# Patient Record
Sex: FEMALE | Race: WHITE | NOT HISPANIC OR LATINO | Employment: OTHER | URBAN - METROPOLITAN AREA
[De-identification: names, ages, dates, MRNs, and addresses within clinical notes are randomized per-mention and may not be internally consistent; named-entity substitution may affect disease eponyms.]

---

## 2017-02-13 ENCOUNTER — OFFICE VISIT (OUTPATIENT)
Dept: FAMILY MEDICINE CLINIC | Facility: CLINIC | Age: 77
End: 2017-02-13

## 2017-02-13 VITALS
HEIGHT: 65 IN | OXYGEN SATURATION: 97 % | HEART RATE: 59 BPM | WEIGHT: 210.8 LBS | SYSTOLIC BLOOD PRESSURE: 132 MMHG | DIASTOLIC BLOOD PRESSURE: 85 MMHG | RESPIRATION RATE: 16 BRPM | TEMPERATURE: 97.9 F | BODY MASS INDEX: 35.12 KG/M2

## 2017-02-13 DIAGNOSIS — J43.8 OTHER EMPHYSEMA (HCC): ICD-10-CM

## 2017-02-13 DIAGNOSIS — G47.00 INSOMNIA, UNSPECIFIED TYPE: ICD-10-CM

## 2017-02-13 DIAGNOSIS — B02.29 POST HERPETIC NEURALGIA: Primary | ICD-10-CM

## 2017-02-13 PROCEDURE — 99214 OFFICE O/P EST MOD 30 MIN: CPT | Performed by: FAMILY MEDICINE

## 2017-02-13 RX ORDER — ZOLPIDEM TARTRATE 5 MG/1
5 TABLET ORAL NIGHTLY PRN
Qty: 30 TABLET | Refills: 0 | Status: SHIPPED | OUTPATIENT
Start: 2017-02-13 | End: 2017-06-12 | Stop reason: SDUPTHER

## 2017-02-13 RX ORDER — PREGABALIN 75 MG/1
75 CAPSULE ORAL 2 TIMES DAILY
Qty: 28 CAPSULE | Refills: 0 | Status: SHIPPED | OUTPATIENT
Start: 2017-02-13 | End: 2017-03-02 | Stop reason: SDUPTHER

## 2017-02-13 NOTE — PROGRESS NOTES
Subjective   Luann Frances is a 76 y.o. female.     Chief Complaint   Patient presents with   • Establish Care       HPI     Pt recently diagnosed with facial shingles. Was given Valacyclovir and Hydrocodone. Doing okay. States the pain meds make her nauseous. Pain was getting better but has gotten worse. Located on R side of face.     Insomnia: Patient suffers occasionally from difficulty falling asleep.  She has a prescription that she has previously taken for Ambien 5 mg on a when necessary basis.  She states the last time she took this medication was about 10 days ago.  She uses maybe 1 pill every 2 weeks.  She is in need of a refill today.  No adverse side effects noted.  Taking and tolerating this medication irregularly and not very frequently.    COPD: Stable and well controlled with Advair and when necessary albuterol.  She states that she does not have any complications with this.  She does smoke 5 cigarettes a day.  She's in need of refills today.    The following portions of the patient's history were reviewed and updated as appropriate: allergies, current medications, past family history, past medical history, past social history, past surgical history and problem list.    Review of Systems   Eyes: Positive for pain, discharge and itching.   Skin: Positive for rash.   All other systems reviewed and are negative.      Objective  Vitals:    02/13/17 1028   BP: 132/85   Pulse: 59   Resp: 16   Temp: 97.9 °F (36.6 °C)   SpO2: 97%        Physical Exam   Constitutional: She is oriented to person, place, and time. She appears well-developed and well-nourished. No distress.   HENT:   Head: Normocephalic and atraumatic.   Right Ear: External ear normal.   Left Ear: External ear normal.   Nose: Nose normal.   Mouth/Throat: Oropharynx is clear and moist.   Eyes: Conjunctivae and EOM are normal. Pupils are equal, round, and reactive to light. Right eye exhibits no discharge. Left eye exhibits no discharge. No  scleral icterus.   Neck: Normal range of motion. Neck supple.   Cardiovascular: Normal rate, regular rhythm and normal heart sounds.  Exam reveals no friction rub.    No murmur heard.  Pulmonary/Chest: Effort normal and breath sounds normal. No respiratory distress. She has no wheezes. She has no rales.   Abdominal: Soft. Bowel sounds are normal. She exhibits no distension. There is no tenderness. There is no rebound and no guarding.   Lymphadenopathy:     She has no cervical adenopathy.   Neurological: She is alert and oriented to person, place, and time.   Skin: Skin is warm and dry. Lesion noted. She is not diaphoretic. There is erythema.        Nursing note and vitals reviewed.        Current Outpatient Prescriptions:   •  [employer] ANTACID & ANTI-GAS 1000-60 MG chewable tablet, , Disp: , Rfl:   •  docusate sodium (COLACE) 100 MG capsule, Take 1 capsule by mouth daily., Disp: , Rfl:   •  ergocalciferol (DRISDOL) 51292 UNITS capsule, Take 1 capsule by mouth 1 (One) Time Per Week., Disp: 13 capsule, Rfl: 3  •  fluticasone-salmeterol (ADVAIR) 100-50 MCG/DOSE DISKUS, Inhale 1 puff 2 (Two) Times a Day. Advair Diskus 100-50 MCG/DOSE Inhalation Aerosol Powder Breath Activated, 1 puff twice daily, Disp: 2 each, Rfl: 3  •  omeprazole OTC (PriLOSEC OTC) 20 MG EC tablet, Take 20 mg by mouth Daily. Take 1-2 tablet, Disp: , Rfl:   •  Probiotic Product (PROBIOTIC DAILY) capsule, Take 1 tablet by mouth daily., Disp: , Rfl:   •  Thyroid 130 MG PO tablet, Take 1 tablet by mouth Daily., Disp: 30 tablet, Rfl: 5  •  valACYclovir (VALTREX) 1000 MG tablet, One tab PO TID for 7 days, Disp: 21 tablet, Rfl: 0  •  zolpidem (AMBIEN) 5 MG tablet, Take 1 tablet by mouth At Night As Needed for sleep., Disp: 30 tablet, Rfl: 0  •  albuterol (PROAIR HFA) 108 (90 BASE) MCG/ACT inhaler, Inhale 2 puffs every 4 (four) hours as needed for wheezing., Disp: 18 g, Rfl: 11  •  ondansetron (ZOFRAN) 4 MG tablet, Take 1 tablet by mouth Every 8 (Eight) Hours As  Needed for nausea or vomiting. For nausea/vomiting, Disp: 30 tablet, Rfl: 0  •  pregabalin (LYRICA) 75 MG capsule, Take 1 capsule by mouth 2 (Two) Times a Day., Disp: 28 capsule, Rfl: 0    Procedures    Lab Results (most recent)     None          Assessment/Plan   Luann was seen today for establish care.    Diagnoses and all orders for this visit:    Post herpetic neuralgia  -     pregabalin (LYRICA) 75 MG capsule; Take 1 capsule by mouth 2 (Two) Times a Day.    Insomnia, unspecified type  -     zolpidem (AMBIEN) 5 MG tablet; Take 1 tablet by mouth At Night As Needed for sleep.    Other emphysema  -     fluticasone-salmeterol (ADVAIR) 100-50 MCG/DOSE DISKUS; Inhale 1 puff 2 (Two) Times a Day. Advair Diskus 100-50 MCG/DOSE Inhalation Aerosol Powder Breath Activated, 1 puff twice daily      I advised the patient to discontinue taking her hydrocodone.  We'll give a prescription for 2 weeks of Lyrica.  Discussed side effect profile.    Extensive conversation had with the patient regarding her use of Ambien.  I believe it's okay to give her a refill of this medication at this time and advised her to take it on a strictly when necessary basis.  I advised her that we may need to get discontinue this medication in the future due to side effect profile at her age.    Refill given for Advair as above.  Discussed smoking cessation.    Return in about 4 weeks (around 3/13/2017) for Recheck.      Sanjiv Small MD

## 2017-03-01 ENCOUNTER — RESULTS ENCOUNTER (OUTPATIENT)
Dept: ENDOCRINOLOGY | Age: 77
End: 2017-03-01

## 2017-03-01 ENCOUNTER — TELEPHONE (OUTPATIENT)
Dept: FAMILY MEDICINE CLINIC | Facility: CLINIC | Age: 77
End: 2017-03-01

## 2017-03-01 DIAGNOSIS — R63.5 WEIGHT GAIN: ICD-10-CM

## 2017-03-01 DIAGNOSIS — E78.5 DYSLIPIDEMIA: ICD-10-CM

## 2017-03-01 DIAGNOSIS — R68.89 COLD INTOLERANCE: ICD-10-CM

## 2017-03-01 DIAGNOSIS — E03.9 ACQUIRED HYPOTHYROIDISM: ICD-10-CM

## 2017-03-01 DIAGNOSIS — R53.82 CHRONIC FATIGUE: ICD-10-CM

## 2017-03-01 NOTE — TELEPHONE ENCOUNTER
Pt called and stated that she had shingles when she was seeing and given Lyrica for shingles, she said that she still has pain in her face been taken Tylenol it did not help, she said she will make a sooner Appt to be checked againA

## 2017-03-02 ENCOUNTER — OFFICE VISIT (OUTPATIENT)
Dept: FAMILY MEDICINE CLINIC | Facility: CLINIC | Age: 77
End: 2017-03-02

## 2017-03-02 VITALS
HEIGHT: 65 IN | DIASTOLIC BLOOD PRESSURE: 82 MMHG | TEMPERATURE: 98.1 F | OXYGEN SATURATION: 96 % | RESPIRATION RATE: 14 BRPM | WEIGHT: 213.2 LBS | HEART RATE: 67 BPM | SYSTOLIC BLOOD PRESSURE: 144 MMHG | BODY MASS INDEX: 35.52 KG/M2

## 2017-03-02 DIAGNOSIS — B02.29 POST HERPETIC NEURALGIA: ICD-10-CM

## 2017-03-02 PROCEDURE — 99214 OFFICE O/P EST MOD 30 MIN: CPT | Performed by: FAMILY MEDICINE

## 2017-03-02 RX ORDER — PREGABALIN 75 MG/1
75 CAPSULE ORAL 2 TIMES DAILY
Qty: 60 CAPSULE | Refills: 1 | Status: SHIPPED | OUTPATIENT
Start: 2017-03-02 | End: 2017-03-22 | Stop reason: SDUPTHER

## 2017-03-02 NOTE — PROGRESS NOTES
Subjective   Luann Frances is a 77 y.o. female.     Chief Complaint   Patient presents with   • Herpes Zoster     follow up face shingles. Pt still has pain and itchness in the face        HPI     Patient presents for follow-up with shingles.  At our last visit she had been diagnosed was receiving treatment for shingles on her right side of her face.  She was having some nerve pain which was most likely postherpetic neuralgia.  She was trialed on 2 weeks of Lyrica which she states greatly resolved her symptoms.  After finishing the trial she had an increase in pain.  No blurry vision, no headache, nausea, or vomiting.    The following portions of the patient's history were reviewed and updated as appropriate: allergies, current medications, past family history, past medical history, past social history, past surgical history and problem list.    Review of Systems   HENT: Positive for facial swelling (itchness and pain due to shingles).        Objective  Vitals:    03/02/17 1437   BP: 144/82   Pulse: 67   Resp: 14   Temp: 98.1 °F (36.7 °C)   SpO2: 96%        Physical Exam   Constitutional: She is oriented to person, place, and time. She appears well-developed and well-nourished. No distress.   HENT:   Head: Normocephalic and atraumatic.       Right Ear: External ear normal.   Left Ear: External ear normal.   Nose: Nose normal.   Mouth/Throat: Oropharynx is clear and moist.   Eyes: Conjunctivae and EOM are normal. Pupils are equal, round, and reactive to light. Right eye exhibits no discharge. Left eye exhibits no discharge. No scleral icterus.   Cardiovascular: Normal rate, regular rhythm and normal heart sounds.    Pulmonary/Chest: Effort normal and breath sounds normal. No respiratory distress. She has no wheezes. She has no rales.   Abdominal: Soft. Bowel sounds are normal. She exhibits no distension. There is no tenderness.   Neurological: She is alert and oriented to person, place, and time.   Skin: Skin is  warm and dry. She is not diaphoretic.   Nursing note and vitals reviewed.        Current Outpatient Prescriptions:   •  albuterol (PROAIR HFA) 108 (90 BASE) MCG/ACT inhaler, Inhale 2 puffs every 4 (four) hours as needed for wheezing., Disp: 18 g, Rfl: 11  •  [employer] ANTACID & ANTI-GAS 1000-60 MG chewable tablet, , Disp: , Rfl:   •  docusate sodium (COLACE) 100 MG capsule, Take 1 capsule by mouth daily., Disp: , Rfl:   •  ergocalciferol (DRISDOL) 17874 UNITS capsule, Take 1 capsule by mouth 1 (One) Time Per Week., Disp: 13 capsule, Rfl: 3  •  fluticasone-salmeterol (ADVAIR) 100-50 MCG/DOSE DISKUS, Inhale 1 puff 2 (Two) Times a Day. Advair Diskus 100-50 MCG/DOSE Inhalation Aerosol Powder Breath Activated, 1 puff twice daily, Disp: 2 each, Rfl: 3  •  omeprazole OTC (PriLOSEC OTC) 20 MG EC tablet, Take 20 mg by mouth Daily. Take 1-2 tablet, Disp: , Rfl:   •  ondansetron (ZOFRAN) 4 MG tablet, Take 1 tablet by mouth Every 8 (Eight) Hours As Needed for nausea or vomiting. For nausea/vomiting, Disp: 30 tablet, Rfl: 0  •  pregabalin (LYRICA) 75 MG capsule, Take 1 capsule by mouth 2 (Two) Times a Day., Disp: 60 capsule, Rfl: 1  •  Probiotic Product (PROBIOTIC DAILY) capsule, Take 1 tablet by mouth daily., Disp: , Rfl:   •  Thyroid 130 MG PO tablet, Take 1 tablet by mouth Daily., Disp: 30 tablet, Rfl: 5  •  valACYclovir (VALTREX) 1000 MG tablet, One tab PO TID for 7 days, Disp: 21 tablet, Rfl: 0  •  zolpidem (AMBIEN) 5 MG tablet, Take 1 tablet by mouth At Night As Needed for sleep., Disp: 30 tablet, Rfl: 0    Procedures    Lab Results (most recent)     None          Assessment/Plan   Luann was seen today for herpes zoster.    Diagnoses and all orders for this visit:    Post herpetic neuralgia  -     pregabalin (LYRICA) 75 MG capsule; Take 1 capsule by mouth 2 (Two) Times a Day.    Due to the patient's increase of posthepatic neuralgia symptoms Will give patient a one-month supply of Lyrica.  Discussed concerning signs and symptoms  and reasons for discontinuing the medication.  Patient will follow-up in 2 weeks.    Return in about 2 weeks (around 3/16/2017) for Recheck.      Sanjiv Small MD

## 2017-03-14 ENCOUNTER — OFFICE VISIT (OUTPATIENT)
Dept: FAMILY MEDICINE CLINIC | Facility: CLINIC | Age: 77
End: 2017-03-14

## 2017-03-14 VITALS
HEART RATE: 59 BPM | OXYGEN SATURATION: 96 % | TEMPERATURE: 98.4 F | WEIGHT: 221.5 LBS | SYSTOLIC BLOOD PRESSURE: 118 MMHG | HEIGHT: 65 IN | DIASTOLIC BLOOD PRESSURE: 80 MMHG | BODY MASS INDEX: 36.9 KG/M2 | RESPIRATION RATE: 16 BRPM

## 2017-03-14 DIAGNOSIS — B02.29 POST HERPETIC NEURALGIA: Primary | ICD-10-CM

## 2017-03-14 PROCEDURE — 99213 OFFICE O/P EST LOW 20 MIN: CPT | Performed by: FAMILY MEDICINE

## 2017-03-14 NOTE — PROGRESS NOTES
Subjective   Luann Frances is a 77 y.o. female.     Chief Complaint   Patient presents with   • Follow-up     Patient had shingles and wants to follow up on that still has pain.        HPI     Pt is suffering from post herpetic neuralgia. She was started on Lyrica about 4 weeks ago. Tolerating well but she states that she is having continued pain and irritation at night. She is wondering if she can take two pills at night. No vision problems. No further lesions. No fever or chills.    The following portions of the patient's history were reviewed and updated as appropriate: allergies, current medications, past family history, past medical history, past social history, past surgical history and problem list.    Review of Systems   Reason unable to perform ROS: shingles on face    All other systems reviewed and are negative.      Objective  Vitals:    03/14/17 1121   BP: 118/80   Pulse: 59   Resp: 16   Temp: 98.4 °F (36.9 °C)   SpO2: 96%        Physical Exam   Constitutional: She is oriented to person, place, and time. She appears well-developed and well-nourished. No distress.   HENT:   Head: Normocephalic and atraumatic.   Right Ear: External ear normal.   Left Ear: External ear normal.   Nose: Nose normal.   Mouth/Throat: Oropharynx is clear and moist.   Eyes: Conjunctivae and EOM are normal. Pupils are equal, round, and reactive to light. Right eye exhibits no discharge. Left eye exhibits no discharge. No scleral icterus.   Cardiovascular: Normal rate, regular rhythm and normal heart sounds.    Pulmonary/Chest: Effort normal and breath sounds normal. No respiratory distress. She has no wheezes. She has no rales.   Abdominal: Soft. Bowel sounds are normal. She exhibits no distension. There is no tenderness.   Neurological: She is alert and oriented to person, place, and time.   Skin: Skin is warm and dry. She is not diaphoretic.   Nursing note and vitals reviewed.        Current Outpatient Prescriptions:   •   albuterol (PROAIR HFA) 108 (90 BASE) MCG/ACT inhaler, Inhale 2 puffs every 4 (four) hours as needed for wheezing., Disp: 18 g, Rfl: 11  •  [employer] ANTACID & ANTI-GAS 1000-60 MG chewable tablet, , Disp: , Rfl:   •  docusate sodium (COLACE) 100 MG capsule, Take 1 capsule by mouth daily., Disp: , Rfl:   •  ergocalciferol (DRISDOL) 63417 UNITS capsule, Take 1 capsule by mouth 1 (One) Time Per Week., Disp: 13 capsule, Rfl: 3  •  fluticasone-salmeterol (ADVAIR) 100-50 MCG/DOSE DISKUS, Inhale 1 puff 2 (Two) Times a Day. Advair Diskus 100-50 MCG/DOSE Inhalation Aerosol Powder Breath Activated, 1 puff twice daily, Disp: 2 each, Rfl: 3  •  omeprazole OTC (PriLOSEC OTC) 20 MG EC tablet, Take 20 mg by mouth Daily. Take 1-2 tablet, Disp: , Rfl:   •  pregabalin (LYRICA) 75 MG capsule, Take 1 capsule by mouth 2 (Two) Times a Day., Disp: 60 capsule, Rfl: 1  •  Thyroid 130 MG PO tablet, Take 1 tablet by mouth Daily., Disp: 30 tablet, Rfl: 5  •  zolpidem (AMBIEN) 5 MG tablet, Take 1 tablet by mouth At Night As Needed for sleep., Disp: 30 tablet, Rfl: 0  •  ondansetron (ZOFRAN) 4 MG tablet, Take 1 tablet by mouth Every 8 (Eight) Hours As Needed for nausea or vomiting. For nausea/vomiting, Disp: 30 tablet, Rfl: 0  •  Probiotic Product (PROBIOTIC DAILY) capsule, Take 1 tablet by mouth daily., Disp: , Rfl:     Procedures    Lab Results (most recent)     None          Assessment/Plan   Luann was seen today for follow-up.    Diagnoses and all orders for this visit:    Post herpetic neuralgia    Patient is currently taking Lyrica 75 mg twice a day.  We will increase this to one pill in the morning and 2 pills at night.  Patient will try this for one week.  Advised patient on the titration process.  If one in the morning and 2 pills at night is not sufficient the patient will increase to 2 pills in the morning and 2 pills at night.  Advised that this is the maximum dose that she should be taking for this medication.  If symptoms are well  controlled at a new dosage will phone in prescription    Return in about 1 week (around 3/21/2017) for Recheck.      Sanjiv Small MD

## 2017-03-22 ENCOUNTER — TELEPHONE (OUTPATIENT)
Dept: FAMILY MEDICINE CLINIC | Facility: CLINIC | Age: 77
End: 2017-03-22

## 2017-03-22 DIAGNOSIS — B02.29 POST HERPETIC NEURALGIA: ICD-10-CM

## 2017-03-22 RX ORDER — PREGABALIN 75 MG/1
75 CAPSULE ORAL 2 TIMES DAILY
Qty: 60 CAPSULE | Refills: 0 | OUTPATIENT
Start: 2017-03-22 | End: 2017-04-19

## 2017-03-22 RX ORDER — PREGABALIN 75 MG/1
75 CAPSULE ORAL 2 TIMES DAILY
Qty: 60 CAPSULE | Refills: 3 | Status: SHIPPED | OUTPATIENT
Start: 2017-03-22 | End: 2017-03-22 | Stop reason: SDUPTHER

## 2017-03-22 NOTE — TELEPHONE ENCOUNTER
Pt called and said she is taking Lyrica 75 mg  For 8 days   one in the morning and two in the evening but she had mild headache for 4 days and 4 days she was fine. She will call next week to inform you about how she is doing after using it for 2 weeks.

## 2017-03-27 ENCOUNTER — LAB (OUTPATIENT)
Dept: ENDOCRINOLOGY | Age: 77
End: 2017-03-27

## 2017-03-27 DIAGNOSIS — G47.00 INSOMNIA, UNSPECIFIED TYPE: ICD-10-CM

## 2017-03-27 DIAGNOSIS — E78.5 DYSLIPIDEMIA: ICD-10-CM

## 2017-03-27 DIAGNOSIS — E55.9 UNSPECIFIED VITAMIN D DEFICIENCY: ICD-10-CM

## 2017-03-27 DIAGNOSIS — R53.82 CHRONIC FATIGUE: ICD-10-CM

## 2017-03-27 DIAGNOSIS — E55.9 UNSPECIFIED VITAMIN D DEFICIENCY: Primary | ICD-10-CM

## 2017-03-27 DIAGNOSIS — R68.89 COLD INTOLERANCE: ICD-10-CM

## 2017-03-27 DIAGNOSIS — E03.9 ACQUIRED HYPOTHYROIDISM: ICD-10-CM

## 2017-03-27 DIAGNOSIS — R63.5 WEIGHT GAIN: ICD-10-CM

## 2017-03-27 DIAGNOSIS — E03.9 ACQUIRED HYPOTHYROIDISM: Primary | ICD-10-CM

## 2017-03-28 LAB
25(OH)D3+25(OH)D2 SERPL-MCNC: 41.9 NG/ML (ref 30–100)
ALBUMIN SERPL-MCNC: 4 G/DL (ref 3.5–5.2)
ALBUMIN/GLOB SERPL: 1.3 G/DL
ALP SERPL-CCNC: 68 U/L (ref 39–117)
ALT SERPL-CCNC: 23 U/L (ref 1–33)
AST SERPL-CCNC: 19 U/L (ref 1–32)
BILIRUB SERPL-MCNC: 0.3 MG/DL (ref 0.1–1.2)
BUN SERPL-MCNC: 23 MG/DL (ref 8–23)
BUN/CREAT SERPL: 26.7 (ref 7–25)
CALCIUM SERPL-MCNC: 9.9 MG/DL (ref 8.6–10.5)
CHLORIDE SERPL-SCNC: 106 MMOL/L (ref 98–107)
CHOLEST SERPL-MCNC: 193 MG/DL (ref 0–200)
CO2 SERPL-SCNC: 22.9 MMOL/L (ref 22–29)
CREAT SERPL-MCNC: 0.86 MG/DL (ref 0.57–1)
FT4I SERPL CALC-MCNC: 1.9 (ref 1.2–4.9)
GLOBULIN SER CALC-MCNC: 3.1 GM/DL
GLUCOSE SERPL-MCNC: 98 MG/DL (ref 65–99)
HDLC SERPL-MCNC: 78 MG/DL (ref 40–60)
LDLC SERPL CALC-MCNC: 97 MG/DL (ref 0–100)
POTASSIUM SERPL-SCNC: 4.9 MMOL/L (ref 3.5–5.2)
PROT SERPL-MCNC: 7.1 G/DL (ref 6–8.5)
SODIUM SERPL-SCNC: 145 MMOL/L (ref 136–145)
T3FREE SERPL-MCNC: 3.4 PG/ML (ref 2–4.4)
T3RU NFR SERPL: 25 % (ref 24–39)
T4 FREE SERPL-MCNC: 0.95 NG/DL (ref 0.93–1.7)
T4 SERPL-MCNC: 7.4 UG/DL (ref 4.5–12)
TRIGL SERPL-MCNC: 88 MG/DL (ref 0–150)
TSH SERPL DL<=0.005 MIU/L-ACNC: 0.15 UIU/ML (ref 0.45–4.5)
VLDLC SERPL CALC-MCNC: 17.6 MG/DL (ref 5–40)

## 2017-04-10 ENCOUNTER — OFFICE VISIT (OUTPATIENT)
Dept: ENDOCRINOLOGY | Age: 77
End: 2017-04-10

## 2017-04-10 VITALS
BODY MASS INDEX: 36.49 KG/M2 | HEIGHT: 65 IN | DIASTOLIC BLOOD PRESSURE: 88 MMHG | WEIGHT: 219 LBS | SYSTOLIC BLOOD PRESSURE: 142 MMHG

## 2017-04-10 DIAGNOSIS — E03.9 ACQUIRED HYPOTHYROIDISM: Primary | ICD-10-CM

## 2017-04-10 DIAGNOSIS — E88.81 INSULIN RESISTANCE: ICD-10-CM

## 2017-04-10 DIAGNOSIS — R53.82 CHRONIC FATIGUE: ICD-10-CM

## 2017-04-10 DIAGNOSIS — E78.5 DYSLIPIDEMIA: ICD-10-CM

## 2017-04-10 PROBLEM — E88.819 INSULIN RESISTANCE: Status: ACTIVE | Noted: 2017-04-10

## 2017-04-10 PROCEDURE — 99214 OFFICE O/P EST MOD 30 MIN: CPT | Performed by: NURSE PRACTITIONER

## 2017-04-10 NOTE — PROGRESS NOTES
Subjective   Luann Frances is a 77 y.o. female is here today for follow-up primary hypothyroid    Hypothyroidism   This is a chronic problem. The current episode started more than 1 month ago. Associated symptoms include fatigue, headaches and joint swelling (ankles). Pertinent negatives include no coughing or rash. Nothing aggravates the symptoms. Treatments tried: medication. The treatment provided mild (symptoms are waxing and waning) relief.   Fatigue   This is a chronic problem. The current episode started more than 1 year ago. The problem occurs constantly. The problem has been waxing and waning. Associated symptoms include fatigue, headaches and joint swelling (ankles). Pertinent negatives include no coughing or rash. Nothing aggravates the symptoms. She has tried nothing for the symptoms. The treatment provided mild relief.   Obesity   This is a chronic (in the last year, has gained 40-50lbs) problem. The current episode started more than 1 year ago. The problem occurs constantly. The problem has been gradually improving. Associated symptoms include fatigue, headaches and joint swelling (ankles). Pertinent negatives include no coughing or rash. Nothing aggravates the symptoms. She has tried eating for the symptoms. The treatment provided no relief.       The following portions of the patient's history were reviewed and updated as appropriate: current medications, past family history, past medical history, past social history, past surgical history and problem list.    Patient Active Problem List    Diagnosis   • Insulin resistance [E88.81]   • Post herpetic neuralgia [B02.29]   • Weight gain [R63.5]   • Cold intolerance [R68.89]   • Dyslipidemia [E78.5]   • Elevated BP [I10]   • Acute sinusitis [J01.90]   • Atopic rhinitis [J30.9]   • Asthma [J45.909]   • Candidiasis [B37.9]   • Chronic obstructive pulmonary disease [J44.9]   • Cough [R05]   • Mild dehydration [E86.0]   • Diverticulitis of colon [K57.32]    • Gastroesophageal reflux disease with esophagitis [K21.0]   • Fatigue [R53.83]   • Hypothyroidism [E03.9]   • Insomnia [G47.00]   • Knee pain [M25.569]   • Pain of lower extremity [M79.606]   • Nausea [R11.0]   • Chronic obstructive pulmonary disease with acute exacerbation [J44.1]   • Shoulder pain [M25.519]   • Ventricular premature beats [I49.3]         Current Outpatient Prescriptions:   •  albuterol (PROAIR HFA) 108 (90 BASE) MCG/ACT inhaler, Inhale 2 puffs every 4 (four) hours as needed for wheezing., Disp: 18 g, Rfl: 11  •  [employer] ANTACID & ANTI-GAS 1000-60 MG chewable tablet, , Disp: , Rfl:   •  docusate sodium (COLACE) 100 MG capsule, Take 1 capsule by mouth daily., Disp: , Rfl:   •  ergocalciferol (DRISDOL) 69271 UNITS capsule, Take 1 capsule by mouth 1 (One) Time Per Week., Disp: 13 capsule, Rfl: 3  •  fluticasone-salmeterol (ADVAIR) 100-50 MCG/DOSE DISKUS, Inhale 1 puff 2 (Two) Times a Day. Advair Diskus 100-50 MCG/DOSE Inhalation Aerosol Powder Breath Activated, 1 puff twice daily, Disp: 2 each, Rfl: 3  •  omeprazole OTC (PriLOSEC OTC) 20 MG EC tablet, Take 20 mg by mouth Daily. Take 1-2 tablet, Disp: , Rfl:   •  ondansetron (ZOFRAN) 4 MG tablet, Take 1 tablet by mouth Every 8 (Eight) Hours As Needed for nausea or vomiting. For nausea/vomiting, Disp: 30 tablet, Rfl: 0  •  pregabalin (LYRICA) 75 MG capsule, Take 1 capsule by mouth 2 (Two) Times a Day., Disp: 60 capsule, Rfl: 0  •  Probiotic Product (PROBIOTIC DAILY) capsule, Take 1 tablet by mouth daily., Disp: , Rfl:   •  Thyroid 130 MG PO tablet, Take 1 tablet by mouth Daily., Disp: 30 tablet, Rfl: 5  •  zolpidem (AMBIEN) 5 MG tablet, Take 1 tablet by mouth At Night As Needed for sleep., Disp: 30 tablet, Rfl: 0       Review of Systems   Constitutional: Positive for fatigue and unexpected weight change.   HENT: Negative for trouble swallowing.    Eyes: Negative for visual disturbance.   Respiratory: Negative for cough.    Gastrointestinal: Negative for  constipation.   Endocrine: Positive for cold intolerance.   Genitourinary: Negative for difficulty urinating.   Musculoskeletal: Positive for joint swelling (ankles).   Skin: Negative for rash.   Allergic/Immunologic: Negative.    Neurological: Positive for headaches.   Hematological: Bruises/bleeds easily.   Psychiatric/Behavioral: Positive for sleep disturbance. The patient is not nervous/anxious.    All other systems reviewed and are negative.    Lab on 03/27/2017   Component Date Value Ref Range Status   • Glucose 03/27/2017 98  65 - 99 mg/dL Final   • BUN 03/27/2017 23  8 - 23 mg/dL Final   • Creatinine 03/27/2017 0.86  0.57 - 1.00 mg/dL Final   • eGFR Non  Am 03/27/2017 64  >60 mL/min/1.73 Final    Comment: The MDRD GFR formula is only valid for adults with stable  renal function between ages 18 and 70.     • eGFR  Am 03/27/2017 78  >60 mL/min/1.73 Final   • BUN/Creatinine Ratio 03/27/2017 26.7* 7.0 - 25.0 Final   • Sodium 03/27/2017 145  136 - 145 mmol/L Final   • Potassium 03/27/2017 4.9  3.5 - 5.2 mmol/L Final   • Chloride 03/27/2017 106  98 - 107 mmol/L Final   • Total CO2 03/27/2017 22.9  22.0 - 29.0 mmol/L Final   • Calcium 03/27/2017 9.9  8.6 - 10.5 mg/dL Final   • Total Protein 03/27/2017 7.1  6.0 - 8.5 g/dL Final   • Albumin 03/27/2017 4.00  3.50 - 5.20 g/dL Final   • Globulin 03/27/2017 3.1  gm/dL Final   • A/G Ratio 03/27/2017 1.3  g/dL Final   • Total Bilirubin 03/27/2017 0.3  0.1 - 1.2 mg/dL Final   • Alkaline Phosphatase 03/27/2017 68  39 - 117 U/L Final   • AST (SGOT) 03/27/2017 19  1 - 32 U/L Final   • ALT (SGPT) 03/27/2017 23  1 - 33 U/L Final   • Free T4 03/27/2017 0.95  0.93 - 1.70 ng/dL Final   • T3, Free 03/27/2017 3.4  2.0 - 4.4 pg/mL Final   • TSH 03/27/2017 0.147* 0.450 - 4.500 uIU/mL Final   • T4, Total 03/27/2017 7.4  4.5 - 12.0 ug/dL Final   • T3 Uptake 03/27/2017 25  24 - 39 % Final   • Free Thyroxine Index 03/27/2017 1.9  1.2 - 4.9 Final   • Total Cholesterol  03/27/2017 193  0 - 200 mg/dL Final   • Triglycerides 03/27/2017 88  0 - 150 mg/dL Final   • HDL Cholesterol 03/27/2017 78* 40 - 60 mg/dL Final   • VLDL Cholesterol 03/27/2017 17.6  5 - 40 mg/dL Final   • LDL Cholesterol  03/27/2017 97  0 - 100 mg/dL Final       Wt Readings from Last 3 Encounters:   04/10/17 219 lb (99.3 kg)   03/14/17 221 lb 8 oz (100 kg)   03/02/17 213 lb 3.2 oz (96.7 kg)     Temp Readings from Last 3 Encounters:   03/14/17 98.4 °F (36.9 °C) (Oral)   03/02/17 98.1 °F (36.7 °C) (Oral)   02/13/17 97.9 °F (36.6 °C) (Oral)     BP Readings from Last 3 Encounters:   04/10/17 142/88   03/14/17 118/80   03/02/17 144/82     Pulse Readings from Last 3 Encounters:   03/14/17 59   03/02/17 67   02/13/17 59       Objective   Physical Exam   Constitutional: She is oriented to person, place, and time. Vital signs are normal. She appears well-developed and well-nourished. She is sleeping, active and cooperative.   HENT:   Head: Atraumatic.   Eyes: EOM are normal.   Neck: Normal range of motion. Neck supple. No tracheal tenderness present. Carotid bruit is not present. No tracheal deviation present. No thyroid mass and no thyromegaly present.   Cardiovascular: Normal rate, regular rhythm, S1 normal, S2 normal and normal heart sounds.  Exam reveals no gallop.    No murmur heard.  Pulmonary/Chest: Effort normal and breath sounds normal. No accessory muscle usage. No respiratory distress.   Abdominal: Soft. Normal appearance and bowel sounds are normal. There is no tenderness.   Musculoskeletal: Normal range of motion.   Neurological: She is alert and oriented to person, place, and time. She has normal strength and normal reflexes. Gait normal.   Skin: Skin is warm, dry and intact. There is pallor.   Psychiatric: She has a normal mood and affect. Her speech is normal and behavior is normal. Judgment and thought content normal. Her mood appears not anxious. Her affect is not blunt. Cognition and memory are normal. She  does not exhibit a depressed mood. She is attentive.   Nursing note and vitals reviewed.        Assessment/Plan   Luann was seen today for follow-up.    Diagnoses and all orders for this visit:    Acquired hypothyroidism    Chronic fatigue  -     C-Peptide  -     Hemoglobin A1c  -     Insulin, Total    Dyslipidemia  -     C-Peptide  -     Hemoglobin A1c  -     Insulin, Total    Insulin resistance  -     C-Peptide  -     Hemoglobin A1c  -     Insulin, Total          Instructions today:  We will draw C-peptide insulin level today-if still elevated as was in December,  will put on Janumet 100/1000 in the morning only.    Return in about 7 months (around 11/10/2017). Appointment in August with Dr. Saleh-appointment in November with Josephine-1 week prior to each for labs

## 2017-04-11 LAB
C PEPTIDE SERPL-MCNC: 2.8 NG/ML (ref 1.1–4.4)
HBA1C MFR BLD: 5.53 % (ref 4.8–5.6)
INSULIN SERPL-ACNC: 7.3 UIU/ML (ref 2.6–24.9)

## 2017-04-14 ENCOUNTER — TELEPHONE (OUTPATIENT)
Dept: FAMILY MEDICINE CLINIC | Facility: CLINIC | Age: 77
End: 2017-04-14

## 2017-04-14 DIAGNOSIS — H26.493 BILATERAL POSTERIOR CAPSULAR OPACIFICATION: Primary | ICD-10-CM

## 2017-04-19 ENCOUNTER — OFFICE VISIT (OUTPATIENT)
Dept: FAMILY MEDICINE CLINIC | Facility: CLINIC | Age: 77
End: 2017-04-19

## 2017-04-19 VITALS
HEART RATE: 56 BPM | SYSTOLIC BLOOD PRESSURE: 112 MMHG | DIASTOLIC BLOOD PRESSURE: 70 MMHG | WEIGHT: 224.4 LBS | BODY MASS INDEX: 37.39 KG/M2 | OXYGEN SATURATION: 98 % | HEIGHT: 65 IN | TEMPERATURE: 97.8 F

## 2017-04-19 DIAGNOSIS — M54.41 ACUTE RIGHT-SIDED LOW BACK PAIN WITH RIGHT-SIDED SCIATICA: Primary | ICD-10-CM

## 2017-04-19 DIAGNOSIS — B02.29 POST HERPETIC NEURALGIA: ICD-10-CM

## 2017-04-19 PROCEDURE — 99214 OFFICE O/P EST MOD 30 MIN: CPT | Performed by: FAMILY MEDICINE

## 2017-04-19 PROCEDURE — 96372 THER/PROPH/DIAG INJ SC/IM: CPT | Performed by: FAMILY MEDICINE

## 2017-04-19 RX ORDER — METHYLPREDNISOLONE ACETATE 80 MG/ML
80 INJECTION, SUSPENSION INTRA-ARTICULAR; INTRALESIONAL; INTRAMUSCULAR; SOFT TISSUE ONCE
Status: COMPLETED | OUTPATIENT
Start: 2017-04-19 | End: 2017-04-19

## 2017-04-19 RX ORDER — PREGABALIN 75 MG/1
75 CAPSULE ORAL 2 TIMES DAILY
Qty: 60 CAPSULE | Refills: 6 | Status: SHIPPED | OUTPATIENT
Start: 2017-04-19 | End: 2017-04-24 | Stop reason: SDUPTHER

## 2017-04-19 RX ADMIN — METHYLPREDNISOLONE ACETATE 80 MG: 80 INJECTION, SUSPENSION INTRA-ARTICULAR; INTRALESIONAL; INTRAMUSCULAR; SOFT TISSUE at 12:15

## 2017-04-19 NOTE — PATIENT INSTRUCTIONS
Sciatica With Rehab  The sciatic nerve runs from the back down the leg and is responsible for sensation and control of the muscles in the back (posterior) side of the thigh, lower leg, and foot. Sciatica is a condition that is characterized by inflammation of this nerve.   SYMPTOMS   · Signs of nerve damage, including numbness and/or weakness along the posterior side of the lower extremity.  · Pain in the back of the thigh that may also travel down the leg.  · Pain that worsens when sitting for long periods of time.  · Occasionally, pain in the back or buttock.  CAUSES   Inflammation of the sciatic nerve is the cause of sciatica. The inflammation is due to something irritating the nerve. Common sources of irritation include:  · Sitting for long periods of time.  · Direct trauma to the nerve.  · Arthritis of the spine.  · Herniated or ruptured disk.  · Slipping of the vertebrae (spondylolisthesis).  · Pressure from soft tissues, such as muscles or ligament-like tissue (fascia).  RISK INCREASES WITH:  · Sports that place pressure or stress on the spine (football or weightlifting).  · Poor strength and flexibility.  · Failure to warm up properly before activity.  · Family history of low back pain or disk disorders.  · Previous back injury or surgery.  · Poor body mechanics, especially when lifting, or poor posture.  PREVENTION   · Warm up and stretch properly before activity.  · Maintain physical fitness:    Strength, flexibility, and endurance.  · Cardiovascular fitness.  · Learn and use proper technique, especially with posture and lifting. When possible, have  correct improper technique.  · Avoid activities that place stress on the spine.  PROGNOSIS  If treated properly, then sciatica usually resolves within 6 weeks. However, occasionally surgery is necessary.   RELATED COMPLICATIONS   · Permanent nerve damage, including pain, numbness, tingle, or weakness.  · Chronic back pain.  · Risks of surgery: infection,  bleeding, nerve damage, or damage to surrounding tissues.  TREATMENT  Treatment initially involves resting from any activities that aggravate your symptoms. The use of ice and medication may help reduce pain and inflammation. The use of strengthening and stretching exercises may help reduce pain with activity. These exercises may be performed at home or with referral to a therapist. A therapist may recommend further treatments, such as transcutaneous electronic nerve stimulation (TENS) or ultrasound. Your caregiver may recommend corticosteroid injections to help reduce inflammation of the sciatic nerve. If symptoms persist despite non-surgical (conservative) treatment, then surgery may be recommended.  MEDICATION  · If pain medication is necessary, then nonsteroidal anti-inflammatory medications, such as aspirin and ibuprofen, or other minor pain relievers, such as acetaminophen, are often recommended.  · Do not take pain medication for 7 days before surgery.  · Prescription pain relievers may be given if deemed necessary by your caregiver. Use only as directed and only as much as you need.  · Ointments applied to the skin may be helpful.  · Corticosteroid injections may be given by your caregiver. These injections should be reserved for the most serious cases, because they may only be given a certain number of times.  HEAT AND COLD  · Cold treatment (icing) relieves pain and reduces inflammation. Cold treatment should be applied for 10 to 15 minutes every 2 to 3 hours for inflammation and pain and immediately after any activity that aggravates your symptoms. Use ice packs or massage the area with a piece of ice (ice massage).  · Heat treatment may be used prior to performing the stretching and strengthening activities prescribed by your caregiver, physical therapist, or . Use a heat pack or soak the injury in warm water.  SEEK MEDICAL CARE IF:  · Treatment seems to offer no benefit, or the condition  worsens.  · Any medications produce adverse side effects.  EXERCISES   RANGE OF MOTION (ROM) AND STRETCHING EXERCISES - Sciatica  Most people with sciatic will find that their symptoms worsen with either excessive bending forward (flexion) or arching at the low back (extension). The exercises which will help resolve your symptoms will focus on the opposite motion. Your physician, physical therapist or  will help you determine which exercises will be most helpful to resolve your low back pain. Do not complete any exercises without first consulting with your clinician. Discontinue any exercises which worsen your symptoms until you speak to your clinician. If you have pain, numbness or tingling which travels down into your buttocks, leg or foot, the goal of the therapy is for these symptoms to move closer to your back and eventually resolve. Occasionally, these leg symptoms will get better, but your low back pain may worsen; this is typically an indication of progress in your rehabilitation. Be certain to be very alert to any changes in your symptoms and the activities in which you participated in the 24 hours prior to the change. Sharing this information with your clinician will allow him/her to most efficiently treat your condition.  These exercises may help you when beginning to rehabilitate your injury. Your symptoms may resolve with or without further involvement from your physician, physical therapist or . While completing these exercises, remember:   · Restoring tissue flexibility helps normal motion to return to the joints. This allows healthier, less painful movement and activity.  · An effective stretch should be held for at least 30 seconds.  · A stretch should never be painful. You should only feel a gentle lengthening or release in the stretched tissue.  FLEXION RANGE OF MOTION AND STRETCHING EXERCISES:  STRETCH - Flexion, Single Knee to Chest   · Lie on a firm bed or floor  with both legs extended in front of you.  · Keeping one leg in contact with the floor, bring your opposite knee to your chest. Hold your leg in place by either grabbing behind your thigh or at your knee.  · Pull until you feel a gentle stretch in your low back. Hold __________ seconds.  · Slowly release your grasp and repeat the exercise with the opposite side.  Repeat __________ times. Complete this exercise __________ times per day.   STRETCH - Flexion, Double Knee to Chest  · Lie on a firm bed or floor with both legs extended in front of you.  · Keeping one leg in contact with the floor, bring your opposite knee to your chest.  · Tense your stomach muscles to support your back and then lift your other knee to your chest. Hold your legs in place by either grabbing behind your thighs or at your knees.  · Pull both knees toward your chest until you feel a gentle stretch in your low back. Hold __________ seconds.  · Tense your stomach muscles and slowly return one leg at a time to the floor.  Repeat __________ times. Complete this exercise __________ times per day.   STRETCH - Low Trunk Rotation   · Lie on a firm bed or floor. Keeping your legs in front of you, bend your knees so they are both pointed toward the ceiling and your feet are flat on the floor.  · Extend your arms out to the side. This will stabilize your upper body by keeping your shoulders in contact with the floor.  · Gently and slowly drop both knees together to one side until you feel a gentle stretch in your low back. Hold for __________ seconds.  · Tense your stomach muscles to support your low back as you bring your knees back to the starting position. Repeat the exercise to the other side.  Repeat __________ times. Complete this exercise __________ times per day   EXTENSION RANGE OF MOTION AND FLEXIBILITY EXERCISES:  STRETCH - Extension, Prone on Elbows  · Lie on your stomach on the floor, a bed will be too soft. Place your palms about shoulder  "width apart and at the height of your head.  · Place your elbows under your shoulders. If this is too painful, stack pillows under your chest.  · Allow your body to relax so that your hips drop lower and make contact more completely with the floor.  · Hold this position for __________ seconds.  · Slowly return to lying flat on the floor.  Repeat __________ times. Complete this exercise __________ times per day.   RANGE OF MOTION - Extension, Prone Press Ups  · Lie on your stomach on the floor, a bed will be too soft. Place your palms about shoulder width apart and at the height of your head.  · Keeping your back as relaxed as possible, slowly straighten your elbows while keeping your hips on the floor. You may adjust the placement of your hands to maximize your comfort. As you gain motion, your hands will come more underneath your shoulders.  · Hold this position __________ seconds.  · Slowly return to lying flat on the floor.  Repeat __________ times. Complete this exercise __________ times per day.   STRENGTHENING EXERCISES - Sciatica   These exercises may help you when beginning to rehabilitate your injury. These exercises should be done near your \"sweet spot.\" This is the neutral, low-back arch, somewhere between fully rounded and fully arched, that is your least painful position. When performed in this safe range of motion, these exercises can be used for people who have either a flexion or extension based injury. These exercises may resolve your symptoms with or without further involvement from your physician, physical therapist or . While completing these exercises, remember:   · Muscles can gain both the endurance and the strength needed for everyday activities through controlled exercises.  · Complete these exercises as instructed by your physician, physical therapist or . Progress with the resistance and repetition exercises only as your caregiver advises.  · You may " experience muscle soreness or fatigue, but the pain or discomfort you are trying to eliminate should never worsen during these exercises. If this pain does worsen, stop and make certain you are following the directions exactly. If the pain is still present after adjustments, discontinue the exercise until you can discuss the trouble with your clinician.  STRENGTHENING - Deep Abdominals, Pelvic Tilt   · Lie on a firm bed or floor. Keeping your legs in front of you, bend your knees so they are both pointed toward the ceiling and your feet are flat on the floor.  · Tense your lower abdominal muscles to press your low back into the floor. This motion will rotate your pelvis so that your tail bone is scooping upwards rather than pointing at your feet or into the floor.  · With a gentle tension and even breathing, hold this position for __________ seconds.  Repeat __________ times. Complete this exercise __________ times per day.   STRENGTHENING - Abdominals, Crunches   · Lie on a firm bed or floor. Keeping your legs in front of you, bend your knees so they are both pointed toward the ceiling and your feet are flat on the floor. Cross your arms over your chest.  · Slightly tip your chin down without bending your neck.  · Tense your abdominals and slowly lift your trunk high enough to just clear your shoulder blades. Lifting higher can put excessive stress on the low back and does not further strengthen your abdominal muscles.  · Control your return to the starting position.  Repeat __________ times. Complete this exercise __________ times per day.   STRENGTHENING - Quadruped, Opposite UE/LE Lift  · Assume a hands and knees position on a firm surface. Keep your hands under your shoulders and your knees under your hips. You may place padding under your knees for comfort.  · Find your neutral spine and gently tense your abdominal muscles so that you can maintain this position. Your shoulders and hips should form a rectangle  that is parallel with the floor and is not twisted.  · Keeping your trunk steady, lift your right hand no higher than your shoulder and then your left leg no higher than your hip. Make sure you are not holding your breath. Hold this position __________ seconds.  · Continuing to keep your abdominal muscles tense and your back steady, slowly return to your starting position. Repeat with the opposite arm and leg.  Repeat __________ times. Complete this exercise __________ times per day.   STRENGTHENING - Abdominals and Quadriceps, Straight Leg Raise   · Lie on a firm bed or floor with both legs extended in front of you.  · Keeping one leg in contact with the floor, bend the other knee so that your foot can rest flat on the floor.  · Find your neutral spine, and tense your abdominal muscles to maintain your spinal position throughout the exercise.  · Slowly lift your straight leg off the floor about 6 inches for a count of 15, making sure to not hold your breath.  · Still keeping your neutral spine, slowly lower your leg all the way to the floor.  Repeat this exercise with each leg __________ times. Complete this exercise __________ times per day.  POSTURE AND BODY MECHANICS CONSIDERATIONS - Sciatica  Keeping correct posture when sitting, standing or completing your activities will reduce the stress put on different body tissues, allowing injured tissues a chance to heal and limiting painful experiences. The following are general guidelines for improved posture. Your physician or physical therapist will provide you with any instructions specific to your needs. While reading these guidelines, remember:  · The exercises prescribed by your provider will help you have the flexibility and strength to maintain correct postures.  · The correct posture provides the optimal environment for your joints to work. All of your joints have less wear and tear when properly supported by a spine with good posture. This means you will  experience a healthier, less painful body.  · Correct posture must be practiced with all of your activities, especially prolonged sitting and standing. Correct posture is as important when doing repetitive low-stress activities (typing) as it is when doing a single heavy-load activity (lifting).  RESTING POSITIONS  Consider which positions are most painful for you when choosing a resting position. If you have pain with flexion-based activities (sitting, bending, stooping, squatting), choose a position that allows you to rest in a less flexed posture. You would want to avoid curling into a fetal position on your side. If your pain worsens with extension-based activities (prolonged standing, working overhead), avoid resting in an extended position such as sleeping on your stomach. Most people will find more comfort when they rest with their spine in a more neutral position, neither too rounded nor too arched. Lying on a non-sagging bed on your side with a pillow between your knees, or on your back with a pillow under your knees will often provide some relief. Keep in mind, being in any one position for a prolonged period of time, no matter how correct your posture, can still lead to stiffness.  PROPER SITTING POSTURE  In order to minimize stress and discomfort on your spine, you must sit with correct posture Sitting with good posture should be effortless for a healthy body. Returning to good posture is a gradual process. Many people can work toward this most comfortably by using various supports until they have the flexibility and strength to maintain this posture on their own.  When sitting with proper posture, your ears will fall over your shoulders and your shoulders will fall over your hips. You should use the back of the chair to support your upper back. Your low back will be in a neutral position, just slightly arched. You may place a small pillow or folded towel at the base of your low back for support.   When  working at a desk, create an environment that supports good, upright posture. Without extra support, muscles fatigue and lead to excessive strain on joints and other tissues. Keep these recommendations in mind:  CHAIR:   · A chair should be able to slide under your desk when your back makes contact with the back of the chair. This allows you to work closely.  · The chair's height should allow your eyes to be level with the upper part of your monitor and your hands to be slightly lower than your elbows.  BODY POSITION  · Your feet should make contact with the floor. If this is not possible, use a foot rest.  · Keep your ears over your shoulders. This will reduce stress on your neck and low back.  INCORRECT SITTING POSTURES   · If you are feeling tired and unable to assume a healthy sitting posture, do not slouch or slump. This puts excessive strain on your back tissues, causing more damage and pain. Healthier options include:  · Using more support, like a lumbar pillow.  · Switching tasks to something that requires you to be upright or walking.  · Talking a brief walk.  · Lying down to rest in a neutral-spine position.  PROLONGED STANDING WHILE SLIGHTLY LEANING FORWARD   When completing a task that requires you to lean forward while standing in one place for a long time, place either foot up on a stationary 2-4 inch high object to help maintain the best posture. When both feet are on the ground, the low back tends to lose its slight inward curve. If this curve flattens (or becomes too large), then the back and your other joints will experience too much stress, fatigue more quickly and can cause pain.   CORRECT STANDING POSTURES  Proper standing posture should be assumed with all daily activities, even if they only take a few moments, like when brushing your teeth. As in sitting, your ears should fall over your shoulders and your shoulders should fall over your hips. You should keep a slight tension in your abdominal  muscles to brace your spine. Your tailbone should point down to the ground, not behind your body, resulting in an over-extended swayback posture.   INCORRECT STANDING POSTURES   Common incorrect standing postures include a forward head, locked knees and/or an excessive swayback.  WALKING  Walk with an upright posture. Your ears, shoulders and hips should all line-up.  PROLONGED ACTIVITY IN A FLEXED POSITION  When completing a task that requires you to bend forward at your waist or lean over a low surface, try to find a way to stabilize 3 of 4 of your limbs. You can place a hand or elbow on your thigh or rest a knee on the surface you are reaching across. This will provide you more stability so that your muscles do not fatigue as quickly. By keeping your knees relaxed, or slightly bent, you will also reduce stress across your low back.  CORRECT LIFTING TECHNIQUES  DO :   · Assume a wide stance. This will provide you more stability and the opportunity to get as close as possible to the object which you are lifting.  · Tense your abdominals to brace your spine; then bend at the knees and hips. Keeping your back locked in a neutral-spine position, lift using your leg muscles. Lift with your legs, keeping your back straight.  · Test the weight of unknown objects before attempting to lift them.  · Try to keep your elbows locked down at your sides in order get the best strength from your shoulders when carrying an object.  · Always ask for help when lifting heavy or awkward objects.  INCORRECT LIFTING TECHNIQUES  DO NOT:   · Lock your knees when lifting, even if it is a small object.  · Bend and twist. Pivot at your feet or move your feet when needing to change directions.  · Assume that you cannot safely  a paperclip without proper posture.     This information is not intended to replace advice given to you by your health care provider. Make sure you discuss any questions you have with your health care provider.      Document Released: 12/18/2006 Document Revised: 05/03/2016 Document Reviewed: 09/02/2016  [employer] Patient Education ©2016 [employer]

## 2017-04-19 NOTE — PROGRESS NOTES
Subjective   Luann Frances is a 77 y.o. female.     Chief Complaint   Patient presents with   • Sciatica     Patient is having pain on the right side of her lower back going down her right leg            HPI     Patient presents today with a new problem.  She states that over the last 3 or 4 days she's had increased low back pain on the right side with pain that shoots down into her right leg.  She denies any known injury.  She does have a history of some low back issues and has had sciatica in the past.  No numbness or tingling.  No muscle weakness.  No loss of bowel or bladder.  No fever or chills.    She continues to have issues with some postherpetic neuralgia that she had from shingles on her face.  She was taking and tolerating Lyrica quite nicely.  She is out of this and would like a prescription for this.    The following portions of the patient's history were reviewed and updated as appropriate: allergies, current medications, past family history, past medical history, past social history, past surgical history and problem list.    Review of Systems   Musculoskeletal: Positive for back pain.   All other systems reviewed and are negative.      Objective  Vitals:    04/19/17 1141   BP: 112/70   Pulse: 56   Temp: 97.8 °F (36.6 °C)   SpO2: 98%        Physical Exam   Constitutional: She is oriented to person, place, and time. She appears well-developed and well-nourished. No distress.   HENT:   Head: Normocephalic and atraumatic.   Right Ear: External ear normal.   Left Ear: External ear normal.   Nose: Nose normal.   Mouth/Throat: Oropharynx is clear and moist.   Eyes: Conjunctivae and EOM are normal. Pupils are equal, round, and reactive to light. Right eye exhibits no discharge. Left eye exhibits no discharge. No scleral icterus.   Cardiovascular: Normal rate, regular rhythm and normal heart sounds.    Pulmonary/Chest: Effort normal and breath sounds normal. No respiratory distress. She has no wheezes. She  has no rales.   Abdominal: Soft. Bowel sounds are normal. She exhibits no distension. There is no tenderness.   Musculoskeletal:        Lumbar back: She exhibits tenderness, pain and spasm. She exhibits normal range of motion, no bony tenderness, no swelling, no edema, no deformity, no laceration and normal pulse.        Back:    Neurological: She is alert and oriented to person, place, and time.   Skin: Skin is warm and dry. She is not diaphoretic.   Nursing note and vitals reviewed.        Current Outpatient Prescriptions:   •  albuterol (PROAIR HFA) 108 (90 BASE) MCG/ACT inhaler, Inhale 2 puffs every 4 (four) hours as needed for wheezing., Disp: 18 g, Rfl: 11  •  [employer] ANTACID & ANTI-GAS 1000-60 MG chewable tablet, , Disp: , Rfl:   •  docusate sodium (COLACE) 100 MG capsule, Take 1 capsule by mouth daily., Disp: , Rfl:   •  ergocalciferol (DRISDOL) 39535 UNITS capsule, Take 1 capsule by mouth 1 (One) Time Per Week., Disp: 13 capsule, Rfl: 3  •  fluticasone-salmeterol (ADVAIR) 100-50 MCG/DOSE DISKUS, Inhale 1 puff 2 (Two) Times a Day. Advair Diskus 100-50 MCG/DOSE Inhalation Aerosol Powder Breath Activated, 1 puff twice daily, Disp: 2 each, Rfl: 3  •  omeprazole OTC (PriLOSEC OTC) 20 MG EC tablet, Take 20 mg by mouth Daily. Take 1-2 tablet, Disp: , Rfl:   •  Probiotic Product (PROBIOTIC DAILY) capsule, Take 1 tablet by mouth daily., Disp: , Rfl:   •  Thyroid 130 MG PO tablet, Take 1 tablet by mouth Daily., Disp: 30 tablet, Rfl: 5  •  zolpidem (AMBIEN) 5 MG tablet, Take 1 tablet by mouth At Night As Needed for sleep., Disp: 30 tablet, Rfl: 0  •  pregabalin (LYRICA) 75 MG capsule, Take 1 capsule by mouth 2 (Two) Times a Day., Disp: 60 capsule, Rfl: 6    Current Facility-Administered Medications:   •  methylPREDNISolone acetate (DEPO-medrol) injection 80 mg, 80 mg, Intramuscular, Once, Sanjiv Small MD    Procedures    Lab Results (most recent)     None          Assessment/Plan   Luann was seen today for  sciatica.    Diagnoses and all orders for this visit:    Acute right-sided low back pain with right-sided sciatica  -     methylPREDNISolone acetate (DEPO-medrol) injection 80 mg; Inject 1 mL into the shoulder, thigh, or buttocks 1 (One) Time.  -     pregabalin (LYRICA) 75 MG capsule; Take 1 capsule by mouth 2 (Two) Times a Day.    Post herpetic neuralgia  -     pregabalin (LYRICA) 75 MG capsule; Take 1 capsule by mouth 2 (Two) Times a Day.      We'll give the patient a steroid injection in the office today.  A refill of her pregabalin will also likely help with the pain.  Refill given for both the sciatic pain and the postherpetic neuralgia.  Patient will monitor things closely.  If no improvement in 4-5 days will consider physical therapy.    Return in about 2 weeks (around 5/3/2017) for Recheck.      Sanjiv Small MD

## 2017-04-24 ENCOUNTER — TELEPHONE (OUTPATIENT)
Dept: FAMILY MEDICINE CLINIC | Facility: CLINIC | Age: 77
End: 2017-04-24

## 2017-04-24 DIAGNOSIS — M54.41 ACUTE RIGHT-SIDED LOW BACK PAIN WITH RIGHT-SIDED SCIATICA: ICD-10-CM

## 2017-04-24 DIAGNOSIS — B02.29 POST HERPETIC NEURALGIA: ICD-10-CM

## 2017-04-24 RX ORDER — PREGABALIN 75 MG/1
75 CAPSULE ORAL 3 TIMES DAILY
Qty: 90 CAPSULE | Refills: 6 | OUTPATIENT
Start: 2017-04-24 | End: 2017-07-24

## 2017-04-24 NOTE — TELEPHONE ENCOUNTER
----- Message from Verito Jeter MA sent at 4/21/2017  2:10 PM EDT -----  Regarding: Pharmacy request  Pt stated that she is using Lyrica 75 mg more than two times when she needs it , so she is running out of it sooner. However med instructions says take 1capsule twice a day.Is it to take more than twice a day or she should take it twice only ? And if it is ok can you give her more than 60 pre month?

## 2017-04-24 NOTE — TELEPHONE ENCOUNTER
I will write a new prescription for 3 times a day dosing.  Please call the patient and let her know.  Also please call this into the pharmacy.  Thank you.

## 2017-06-12 ENCOUNTER — OFFICE VISIT (OUTPATIENT)
Dept: FAMILY MEDICINE CLINIC | Facility: CLINIC | Age: 77
End: 2017-06-12

## 2017-06-12 VITALS
OXYGEN SATURATION: 97 % | TEMPERATURE: 98.8 F | BODY MASS INDEX: 35.65 KG/M2 | RESPIRATION RATE: 14 BRPM | HEART RATE: 64 BPM | HEIGHT: 65 IN | SYSTOLIC BLOOD PRESSURE: 118 MMHG | DIASTOLIC BLOOD PRESSURE: 62 MMHG | WEIGHT: 214 LBS

## 2017-06-12 DIAGNOSIS — G89.29 CHRONIC RIGHT-SIDED LOW BACK PAIN WITH RIGHT-SIDED SCIATICA: Primary | ICD-10-CM

## 2017-06-12 DIAGNOSIS — M54.41 CHRONIC RIGHT-SIDED LOW BACK PAIN WITH RIGHT-SIDED SCIATICA: Primary | ICD-10-CM

## 2017-06-12 DIAGNOSIS — G47.00 INSOMNIA, UNSPECIFIED TYPE: ICD-10-CM

## 2017-06-12 PROCEDURE — 99214 OFFICE O/P EST MOD 30 MIN: CPT | Performed by: FAMILY MEDICINE

## 2017-06-12 RX ORDER — ZOLPIDEM TARTRATE 5 MG/1
2.5 TABLET ORAL NIGHTLY PRN
Qty: 30 TABLET | Refills: 0 | Status: SHIPPED | OUTPATIENT
Start: 2017-06-12 | End: 2017-11-13

## 2017-06-12 RX ORDER — PREDNISONE 20 MG/1
40 TABLET ORAL DAILY
Qty: 10 TABLET | Refills: 0 | Status: SHIPPED | OUTPATIENT
Start: 2017-06-12 | End: 2017-06-24

## 2017-06-12 NOTE — PROGRESS NOTES
Subjective   Luann Frances is a 77 y.o. female.     Chief Complaint   Patient presents with   • Back Pain     Patient has back pain and went to urgent care had a cortison shot, but pain started to hurt again.        HPI     Patient is here to discuss problem that is new to me as well as follow-up on insomnia.  Patient states that she is been having a flare of her sciatica of her right lower extremity.  She denies any injury to the area.  She's had this issue on and off for quite some time.  Previously she has been treated with nonsteroidal anti-inflammatories which she cannot take due to stomach issues.  She states that the pain is sharp and will radiate down her right leg.  She denies any muscle weakness, numbness, or tingling.  No incontinence issues.  No issues walking.  Previously steroids have helped her quite well with these issues.    Patient has insomnia for which she has been taking Ambien 5 mg on a when necessary basis.  She tolerates this medication well with no adverse side effects noted.  She states understanding of the risks associated with use of this medication and would like to wean down over time if possible.    The following portions of the patient's history were reviewed and updated as appropriate: allergies, current medications, past family history, past medical history, past social history, past surgical history and problem list.    Review of Systems   Musculoskeletal: Positive for back pain (right hip pain).   All other systems reviewed and are negative.      Objective  Vitals:    06/12/17 1400   BP: 118/62   Pulse: 64   Resp: 14   Temp: 98.8 °F (37.1 °C)   SpO2: 97%        Physical Exam   Constitutional: She is oriented to person, place, and time. She appears well-developed and well-nourished. No distress.   HENT:   Head: Normocephalic and atraumatic.   Right Ear: External ear normal.   Left Ear: External ear normal.   Nose: Nose normal.   Mouth/Throat: Oropharynx is clear and moist.    Eyes: Conjunctivae and EOM are normal. Pupils are equal, round, and reactive to light. Right eye exhibits no discharge. Left eye exhibits no discharge. No scleral icterus.   Cardiovascular: Normal rate, regular rhythm and normal heart sounds.    Pulmonary/Chest: Effort normal and breath sounds normal. No respiratory distress. She has no wheezes. She has no rales.   Abdominal: Soft. Bowel sounds are normal. She exhibits no distension. There is no tenderness.   Musculoskeletal:        Lumbar back: She exhibits tenderness, pain and spasm. She exhibits normal range of motion, no bony tenderness, no swelling, no edema, no deformity, no laceration and normal pulse.        Back:    Neurological: She is alert and oriented to person, place, and time.   Skin: Skin is warm and dry. She is not diaphoretic.   Nursing note and vitals reviewed.        Current Outpatient Prescriptions:   •  [employer] ANTACID & ANTI-GAS 1000-60 MG chewable tablet, , Disp: , Rfl:   •  docusate sodium (COLACE) 100 MG capsule, Take 1 capsule by mouth daily., Disp: , Rfl:   •  fluticasone-salmeterol (ADVAIR) 100-50 MCG/DOSE DISKUS, Inhale 1 puff 2 (Two) Times a Day. Advair Diskus 100-50 MCG/DOSE Inhalation Aerosol Powder Breath Activated, 1 puff twice daily, Disp: 2 each, Rfl: 3  •  omeprazole OTC (PriLOSEC OTC) 20 MG EC tablet, Take 20 mg by mouth Daily. Take 1-2 tablet, Disp: , Rfl:   •  pregabalin (LYRICA) 75 MG capsule, Take 1 capsule by mouth 3 (Three) Times a Day., Disp: 90 capsule, Rfl: 6  •  Probiotic Product (PROBIOTIC DAILY) capsule, Take 1 tablet by mouth daily., Disp: , Rfl:   •  Thyroid 130 MG PO tablet, Take 1 tablet by mouth Daily., Disp: 30 tablet, Rfl: 5  •  zolpidem (AMBIEN) 5 MG tablet, Take 0.5 tablets by mouth At Night As Needed for Sleep., Disp: 30 tablet, Rfl: 0  •  albuterol (PROAIR HFA) 108 (90 BASE) MCG/ACT inhaler, Inhale 2 puffs every 4 (four) hours as needed for wheezing., Disp: 18 g, Rfl: 11  •  Cetirizine HCl 10 MG capsule, 10  mg Daily., Disp: , Rfl:   •  ergocalciferol (DRISDOL) 83740 UNITS capsule, Take 1 capsule by mouth 1 (One) Time Per Week., Disp: 13 capsule, Rfl: 3  •  predniSONE (DELTASONE) 20 MG tablet, Take 2 tablets by mouth Daily., Disp: 10 tablet, Rfl: 0  •  traMADol (ULTRAM) 50 MG tablet, Take 1 tablet by mouth Every 4 (Four) Hours As Needed for Moderate Pain (4-6)., Disp: 40 tablet, Rfl: 0    Procedures    Lab Results (most recent)     None          Assessment/Plan   Luann was seen today for back pain.    Diagnoses and all orders for this visit:    Chronic right-sided low back pain with right-sided sciatica  -     predniSONE (DELTASONE) 20 MG tablet; Take 2 tablets by mouth Daily.    Insomnia, unspecified type  -     zolpidem (AMBIEN) 5 MG tablet; Take 0.5 tablets by mouth At Night As Needed for Sleep.    Extensive conversation had with the patient regarding her use of Ambien.  Greater than 25 minutes was spent in total in counseling and coronation of care.  A 10 day supply of prednisone was given to the patient as above to hopefully decrease the inflammation and help with her leg.  We will give a refill of Ambien with instructions to take one half tablet on a when necessary basis.  Also recommended the use of melatonin as needed.    Return for As needed.      Sanjiv Small MD

## 2017-07-24 ENCOUNTER — OFFICE VISIT (OUTPATIENT)
Dept: FAMILY MEDICINE CLINIC | Facility: CLINIC | Age: 77
End: 2017-07-24

## 2017-07-24 VITALS
TEMPERATURE: 98.6 F | WEIGHT: 212 LBS | HEIGHT: 65 IN | HEART RATE: 71 BPM | BODY MASS INDEX: 35.32 KG/M2 | DIASTOLIC BLOOD PRESSURE: 70 MMHG | SYSTOLIC BLOOD PRESSURE: 142 MMHG | OXYGEN SATURATION: 96 %

## 2017-07-24 DIAGNOSIS — R11.0 NAUSEA: Primary | ICD-10-CM

## 2017-07-24 DIAGNOSIS — R63.0 DECREASED APPETITE: ICD-10-CM

## 2017-07-24 LAB
ALBUMIN SERPL-MCNC: 4 G/DL (ref 3.5–5.2)
ALBUMIN/GLOB SERPL: 1.2 G/DL
ALP SERPL-CCNC: 70 U/L (ref 39–117)
ALT SERPL-CCNC: 24 U/L (ref 1–33)
AST SERPL-CCNC: 18 U/L (ref 1–32)
BILIRUB SERPL-MCNC: 0.3 MG/DL (ref 0.1–1.2)
BUN SERPL-MCNC: 23 MG/DL (ref 8–23)
BUN/CREAT SERPL: 23.2 (ref 7–25)
CALCIUM SERPL-MCNC: 10.2 MG/DL (ref 8.6–10.5)
CHLORIDE SERPL-SCNC: 103 MMOL/L (ref 98–107)
CO2 SERPL-SCNC: 22.8 MMOL/L (ref 22–29)
CREAT SERPL-MCNC: 0.99 MG/DL (ref 0.57–1)
GLOBULIN SER CALC-MCNC: 3.3 GM/DL
GLUCOSE SERPL-MCNC: 85 MG/DL (ref 65–99)
POTASSIUM SERPL-SCNC: 4.8 MMOL/L (ref 3.5–5.2)
PROT SERPL-MCNC: 7.3 G/DL (ref 6–8.5)
SODIUM SERPL-SCNC: 142 MMOL/L (ref 136–145)

## 2017-07-24 PROCEDURE — 99214 OFFICE O/P EST MOD 30 MIN: CPT | Performed by: FAMILY MEDICINE

## 2017-07-24 RX ORDER — ONDANSETRON 4 MG/1
4 TABLET, FILM COATED ORAL EVERY 8 HOURS PRN
Qty: 30 TABLET | Refills: 1 | Status: SHIPPED | OUTPATIENT
Start: 2017-07-24 | End: 2017-08-01 | Stop reason: SDUPTHER

## 2017-07-24 NOTE — PROGRESS NOTES
Subjective   Luann Frances is a 77 y.o. female.     Chief Complaint   Patient presents with   • Nausea     Nausea off and on for the past10 days       HPI     Patient is here to discuss couple of issues that are new to me.  Patient states over the last month she has had increasing nausea.  No vomiting.  She's also had a decreased appetite during the day.  She does stay aerobically that when she lays down and the symptoms go away.  She denies any diarrhea or abdominal pain.  No fever or chills.  She's noted maybe a 2 pound weight loss over the last 6 weeks.  She's never had symptoms like this before.  She's not tried any medication.    The following portions of the patient's history were reviewed and updated as appropriate: allergies, current medications, past family history, past medical history, past social history, past surgical history and problem list.    Review of Systems   Gastrointestinal: Positive for nausea.       Objective  Vitals:    07/24/17 1321   BP: 142/70   Pulse: 71   Temp: 98.6 °F (37 °C)   SpO2: 96%        Physical Exam   Constitutional: She is oriented to person, place, and time. She appears well-developed and well-nourished. No distress.   HENT:   Head: Normocephalic and atraumatic.   Right Ear: External ear normal.   Left Ear: External ear normal.   Nose: Nose normal.   Mouth/Throat: Oropharynx is clear and moist.   Eyes: Conjunctivae and EOM are normal. Pupils are equal, round, and reactive to light. Right eye exhibits no discharge. Left eye exhibits no discharge. No scleral icterus.   Cardiovascular: Normal rate, regular rhythm and normal heart sounds.    Pulmonary/Chest: Effort normal and breath sounds normal. No respiratory distress. She has no wheezes. She has no rales.   Abdominal: Soft. Bowel sounds are normal. She exhibits no distension. There is no tenderness.   Neurological: She is alert and oriented to person, place, and time.   Skin: Skin is warm and dry. She is not diaphoretic.    Nursing note and vitals reviewed.        Current Outpatient Prescriptions:   •  albuterol (PROAIR HFA) 108 (90 BASE) MCG/ACT inhaler, Inhale 2 puffs every 4 (four) hours as needed for wheezing., Disp: 18 g, Rfl: 11  •  [employer] ANTACID & ANTI-GAS 1000-60 MG chewable tablet, , Disp: , Rfl:   •  docusate sodium (COLACE) 100 MG capsule, Take 1 capsule by mouth daily., Disp: , Rfl:   •  ergocalciferol (DRISDOL) 81933 UNITS capsule, Take 1 capsule by mouth 1 (One) Time Per Week., Disp: 13 capsule, Rfl: 3  •  fluticasone-salmeterol (ADVAIR) 100-50 MCG/DOSE DISKUS, Inhale 1 puff 2 (Two) Times a Day. Advair Diskus 100-50 MCG/DOSE Inhalation Aerosol Powder Breath Activated, 1 puff twice daily, Disp: 2 each, Rfl: 3  •  omeprazole OTC (PriLOSEC OTC) 20 MG EC tablet, Take 20 mg by mouth Daily. Take 1-2 tablet, Disp: , Rfl:   •  Probiotic Product (PROBIOTIC DAILY) capsule, Take 1 tablet by mouth daily., Disp: , Rfl:   •  Thyroid 130 MG PO tablet, Take 1 tablet by mouth Daily., Disp: 30 tablet, Rfl: 5  •  zolpidem (AMBIEN) 5 MG tablet, Take 0.5 tablets by mouth At Night As Needed for Sleep., Disp: 30 tablet, Rfl: 0  •  ondansetron (ZOFRAN) 4 MG tablet, Take 1 tablet by mouth Every 8 (Eight) Hours As Needed for Nausea or Vomiting., Disp: 30 tablet, Rfl: 1    Procedures    Lab Results (most recent)     None          Assessment/Plan   Luann was seen today for nausea.    Diagnoses and all orders for this visit:    Nausea  -     Comprehensive Metabolic Panel  -     ondansetron (ZOFRAN) 4 MG tablet; Take 1 tablet by mouth Every 8 (Eight) Hours As Needed for Nausea or Vomiting.    Decreased appetite  -     Comprehensive Metabolic Panel  -     ondansetron (ZOFRAN) 4 MG tablet; Take 1 tablet by mouth Every 8 (Eight) Hours As Needed for Nausea or Vomiting.    We'll get labs as above.  Prescription for Zofran given.  The other thing that it could be is an exacerbation of her acid reflux.  She will also try to take a double dose of her  omeprazole ordered take her pill in the morning.  We'll follow-up in one week.  Will consider referral to GI if no improvement noted.    Return in about 1 week (around 7/31/2017) for Recheck.      Sanjiv Small MD

## 2017-08-01 ENCOUNTER — OFFICE VISIT (OUTPATIENT)
Dept: FAMILY MEDICINE CLINIC | Facility: CLINIC | Age: 77
End: 2017-08-01

## 2017-08-01 VITALS
OXYGEN SATURATION: 98 % | TEMPERATURE: 98.6 F | DIASTOLIC BLOOD PRESSURE: 78 MMHG | HEART RATE: 89 BPM | BODY MASS INDEX: 35.77 KG/M2 | SYSTOLIC BLOOD PRESSURE: 122 MMHG | HEIGHT: 65 IN | WEIGHT: 214.7 LBS | RESPIRATION RATE: 16 BRPM

## 2017-08-01 DIAGNOSIS — R11.0 NAUSEA: Primary | ICD-10-CM

## 2017-08-01 DIAGNOSIS — R63.0 DECREASED APPETITE: ICD-10-CM

## 2017-08-01 DIAGNOSIS — Z23 NEED FOR SHINGLES VACCINE: ICD-10-CM

## 2017-08-01 PROCEDURE — 99213 OFFICE O/P EST LOW 20 MIN: CPT | Performed by: FAMILY MEDICINE

## 2017-08-01 RX ORDER — ONDANSETRON 4 MG/1
4 TABLET, FILM COATED ORAL EVERY 8 HOURS PRN
Qty: 30 TABLET | Refills: 1 | Status: SHIPPED | OUTPATIENT
Start: 2017-08-01 | End: 2020-06-22

## 2017-08-01 NOTE — PROGRESS NOTES
Subjective   Luann Frances is a 77 y.o. female.     Chief Complaint   Patient presents with   • Follow-up     Patient is still has nausea and she is using Zofran.        HPI       Patient is here today to follow-up on decreased appetite and nausea.  At her last visit 2 weeks ago she was given a prescription for Zofran.  She states which takes Zofran she actually feels pretty good and has no nausea.  The symptoms she doesn't take the nausea medicine for a day she has her return of her symptoms.  She denies any pain or actual vomiting.  No blood or diarrhea in her stool.  She is unable to pinpoint whether or not any certain foods are causative.  Patient is also in need of the shingles shot.      The following portions of the patient's history were reviewed and updated as appropriate: allergies, current medications, past family history, past medical history, past social history, past surgical history and problem list.    Review of Systems   Gastrointestinal: Positive for nausea.   All other systems reviewed and are negative.      Objective  Vitals:    08/01/17 1109   BP: 122/78   Pulse: 89   Resp: 16   Temp: 98.6 °F (37 °C)   SpO2: 98%        Physical Exam   Constitutional: She is oriented to person, place, and time. She appears well-developed and well-nourished. No distress.   HENT:   Head: Normocephalic and atraumatic.   Right Ear: External ear normal.   Left Ear: External ear normal.   Nose: Nose normal.   Mouth/Throat: Oropharynx is clear and moist.   Eyes: Conjunctivae and EOM are normal. Pupils are equal, round, and reactive to light. Right eye exhibits no discharge. Left eye exhibits no discharge. No scleral icterus.   Cardiovascular: Normal rate, regular rhythm and normal heart sounds.    Pulmonary/Chest: Effort normal and breath sounds normal. No respiratory distress. She has no wheezes. She has no rales.   Abdominal: Soft. Bowel sounds are normal. She exhibits no distension. There is no tenderness.    Neurological: She is alert and oriented to person, place, and time.   Skin: Skin is warm and dry. She is not diaphoretic.   Nursing note and vitals reviewed.        Current Outpatient Prescriptions:   •  docusate sodium (COLACE) 100 MG capsule, Take 1 capsule by mouth daily., Disp: , Rfl:   •  ergocalciferol (DRISDOL) 34388 UNITS capsule, Take 1 capsule by mouth 1 (One) Time Per Week., Disp: 13 capsule, Rfl: 3  •  fluticasone-salmeterol (ADVAIR) 100-50 MCG/DOSE DISKUS, Inhale 1 puff 2 (Two) Times a Day. Advair Diskus 100-50 MCG/DOSE Inhalation Aerosol Powder Breath Activated, 1 puff twice daily, Disp: 2 each, Rfl: 3  •  omeprazole OTC (PriLOSEC OTC) 20 MG EC tablet, Take 20 mg by mouth Daily. Take 1-2 tablet, Disp: , Rfl:   •  ondansetron (ZOFRAN) 4 MG tablet, Take 1 tablet by mouth Every 8 (Eight) Hours As Needed for Nausea or Vomiting., Disp: 30 tablet, Rfl: 1  •  Probiotic Product (PROBIOTIC DAILY) capsule, Take 1 tablet by mouth daily., Disp: , Rfl:   •  Thyroid 130 MG PO tablet, Take 1 tablet by mouth Daily., Disp: 30 tablet, Rfl: 5  •  zolpidem (AMBIEN) 5 MG tablet, Take 0.5 tablets by mouth At Night As Needed for Sleep., Disp: 30 tablet, Rfl: 0  •  albuterol (PROAIR HFA) 108 (90 BASE) MCG/ACT inhaler, Inhale 2 puffs every 4 (four) hours as needed for wheezing., Disp: 18 g, Rfl: 11  •  [employer] ANTACID & ANTI-GAS 1000-60 MG chewable tablet, , Disp: , Rfl:   •  zoster vaccine live (ZOSTAVAX) 98358 UNT/0.65ML reconstituted suspension, Inject 1 dose under the skin 1 (One) Time for 1 dose., Disp: 1 each, Rfl: 0    Procedures    Lab Results (most recent)     None          Assessment/Plan   Luann was seen today for follow-up.    Diagnoses and all orders for this visit:    Nausea  -     ondansetron (ZOFRAN) 4 MG tablet; Take 1 tablet by mouth Every 8 (Eight) Hours As Needed for Nausea or Vomiting.    Decreased appetite  -     ondansetron (ZOFRAN) 4 MG tablet; Take 1 tablet by mouth Every 8 (Eight) Hours As Needed for  Nausea or Vomiting.    Need for shingles vaccine  -     zoster vaccine live (ZOSTAVAX) 83979 UNT/0.65ML reconstituted suspension; Inject 1 dose under the skin 1 (One) Time for 1 dose.      We'll continue for another 2 weeks the use of Zofran.  If symptoms continue or do not completely resolve well refer to gastroenterology.    Prescription sent to the patient's pharmacy for the shingles shot.    Return in about 2 weeks (around 8/15/2017) for Recheck.      Sanjiv Small MD

## 2017-08-02 LAB
25(OH)D3+25(OH)D2 SERPL-MCNC: 47 NG/ML (ref 30–100)
ALBUMIN SERPL-MCNC: 4.1 G/DL (ref 3.5–5.2)
ALBUMIN/GLOB SERPL: 1.3 G/DL
ALP SERPL-CCNC: 67 U/L (ref 39–117)
ALT SERPL-CCNC: 25 U/L (ref 1–33)
AST SERPL-CCNC: 20 U/L (ref 1–32)
BILIRUB SERPL-MCNC: 0.4 MG/DL (ref 0.1–1.2)
BUN SERPL-MCNC: 23 MG/DL (ref 8–23)
BUN/CREAT SERPL: 19.2 (ref 7–25)
CALCIUM SERPL-MCNC: 10.3 MG/DL (ref 8.6–10.5)
CHLORIDE SERPL-SCNC: 106 MMOL/L (ref 98–107)
CHOLEST SERPL-MCNC: 217 MG/DL (ref 0–200)
CO2 SERPL-SCNC: 22.6 MMOL/L (ref 22–29)
CREAT SERPL-MCNC: 1.2 MG/DL (ref 0.57–1)
GLOBULIN SER CALC-MCNC: 3.1 GM/DL
GLUCOSE SERPL-MCNC: 94 MG/DL (ref 65–99)
HDLC SERPL-MCNC: 71 MG/DL (ref 40–60)
LDLC SERPL CALC-MCNC: 129 MG/DL (ref 0–100)
POTASSIUM SERPL-SCNC: 5.5 MMOL/L (ref 3.5–5.2)
PROT SERPL-MCNC: 7.2 G/DL (ref 6–8.5)
SODIUM SERPL-SCNC: 144 MMOL/L (ref 136–145)
T3FREE SERPL-MCNC: 2.7 PG/ML (ref 2–4.4)
T4 FREE SERPL-MCNC: 0.88 NG/DL (ref 0.93–1.7)
T4 SERPL-MCNC: 6.54 MCG/DL (ref 4.5–11.7)
THYROGLOB AB SERPL-ACNC: 122.3 IU/ML (ref 0–0.9)
THYROGLOB SERPL-MCNC: 9.8 NG/ML
TRIGL SERPL-MCNC: 83 MG/DL (ref 0–150)
TSH SERPL DL<=0.005 MIU/L-ACNC: 1.17 MIU/ML (ref 0.27–4.2)
URATE SERPL-MCNC: 6.6 MG/DL (ref 2.4–5.7)
VLDLC SERPL CALC-MCNC: 16.6 MG/DL (ref 5–40)

## 2017-08-03 ENCOUNTER — OFFICE VISIT (OUTPATIENT)
Dept: FAMILY MEDICINE CLINIC | Facility: CLINIC | Age: 77
End: 2017-08-03

## 2017-08-03 VITALS
BODY MASS INDEX: 36.74 KG/M2 | SYSTOLIC BLOOD PRESSURE: 128 MMHG | WEIGHT: 220.5 LBS | OXYGEN SATURATION: 94 % | DIASTOLIC BLOOD PRESSURE: 64 MMHG | HEART RATE: 95 BPM | HEIGHT: 65 IN | TEMPERATURE: 98.4 F

## 2017-08-03 DIAGNOSIS — J44.1 CHRONIC OBSTRUCTIVE PULMONARY DISEASE WITH ACUTE EXACERBATION (HCC): Primary | ICD-10-CM

## 2017-08-03 PROCEDURE — 99214 OFFICE O/P EST MOD 30 MIN: CPT | Performed by: FAMILY MEDICINE

## 2017-08-03 RX ORDER — PREDNISONE 20 MG/1
60 TABLET ORAL DAILY
Qty: 15 TABLET | Refills: 0 | Status: SHIPPED | OUTPATIENT
Start: 2017-08-03 | End: 2017-08-08

## 2017-08-03 RX ORDER — AZITHROMYCIN 250 MG/1
TABLET, FILM COATED ORAL
Qty: 6 TABLET | Refills: 0 | Status: SHIPPED | OUTPATIENT
Start: 2017-08-03 | End: 2017-08-11

## 2017-08-03 NOTE — PROGRESS NOTES
Subjective   Luann Frances is a 77 y.o. female.     Chief Complaint   Patient presents with   • Cough     Patient has had a cough since yesterday with chest congestion, coughing up alot of mucus         HPI     Patient presents stating that she has been having increasing cough and shortness of breath over the last 2 or 3 days.  She has no fever but has had chills.  Productive of sputum.  Has a history of COPD exacerbations.  She's tried some over-the-counter therapies which have not resolved her issues.  She states that symptoms seem to be getting worse.    The following portions of the patient's history were reviewed and updated as appropriate: allergies, current medications, past family history, past medical history, past social history, past surgical history and problem list.    Review of Systems   HENT: Positive for congestion.    Respiratory: Positive for cough.    All other systems reviewed and are negative.      Objective  Vitals:    08/03/17 1438   BP: 128/64   Pulse: 95   Temp: 98.4 °F (36.9 °C)   SpO2: 94%        Physical Exam   Constitutional: She is oriented to person, place, and time. She appears well-developed and well-nourished. No distress.   HENT:   Head: Normocephalic and atraumatic.   Right Ear: Tympanic membrane, external ear and ear canal normal.   Left Ear: Tympanic membrane, external ear and ear canal normal.   Nose: Mucosal edema and rhinorrhea present. Right sinus exhibits no maxillary sinus tenderness and no frontal sinus tenderness. Left sinus exhibits no maxillary sinus tenderness and no frontal sinus tenderness.   Mouth/Throat: Uvula is midline. Posterior oropharyngeal erythema present. No oropharyngeal exudate, posterior oropharyngeal edema or tonsillar abscesses. No tonsillar exudate.   Eyes: Conjunctivae, EOM and lids are normal. Pupils are equal, round, and reactive to light.   Cardiovascular: Normal rate, regular rhythm and normal heart sounds.  Exam reveals no friction rub.     No murmur heard.  Pulmonary/Chest: Effort normal. No respiratory distress. She has wheezes. She has rales.   Abdominal: Soft. Bowel sounds are normal. She exhibits no distension. There is no tenderness. There is no rebound and no guarding.   Neurological: She is alert and oriented to person, place, and time.   Skin: Skin is warm and dry. She is not diaphoretic.   Nursing note and vitals reviewed.        Current Outpatient Prescriptions:   •  albuterol (PROAIR HFA) 108 (90 BASE) MCG/ACT inhaler, Inhale 2 puffs every 4 (four) hours as needed for wheezing., Disp: 18 g, Rfl: 11  •  [employer] ANTACID & ANTI-GAS 1000-60 MG chewable tablet, , Disp: , Rfl:   •  docusate sodium (COLACE) 100 MG capsule, Take 1 capsule by mouth daily., Disp: , Rfl:   •  ergocalciferol (DRISDOL) 20817 UNITS capsule, Take 1 capsule by mouth 1 (One) Time Per Week., Disp: 13 capsule, Rfl: 3  •  fluticasone-salmeterol (ADVAIR) 100-50 MCG/DOSE DISKUS, Inhale 1 puff 2 (Two) Times a Day. Advair Diskus 100-50 MCG/DOSE Inhalation Aerosol Powder Breath Activated, 1 puff twice daily, Disp: 2 each, Rfl: 3  •  omeprazole OTC (PriLOSEC OTC) 20 MG EC tablet, Take 20 mg by mouth Daily. Take 1-2 tablet, Disp: , Rfl:   •  ondansetron (ZOFRAN) 4 MG tablet, Take 1 tablet by mouth Every 8 (Eight) Hours As Needed for Nausea or Vomiting., Disp: 30 tablet, Rfl: 1  •  Probiotic Product (PROBIOTIC DAILY) capsule, Take 1 tablet by mouth daily., Disp: , Rfl:   •  Thyroid 130 MG PO tablet, Take 1 tablet by mouth Daily., Disp: 30 tablet, Rfl: 5  •  zolpidem (AMBIEN) 5 MG tablet, Take 0.5 tablets by mouth At Night As Needed for Sleep., Disp: 30 tablet, Rfl: 0  •  azithromycin (ZITHROMAX Z-SIN) 250 MG tablet, Take 2 tablets the first day, then 1 tablet daily for 4 days., Disp: 6 tablet, Rfl: 0  •  predniSONE (DELTASONE) 20 MG tablet, Take 3 tablets by mouth Daily for 5 days., Disp: 15 tablet, Rfl: 0    Procedures    Lab Results (most recent)     None          Assessment/Plan    Luann was seen today for cough.    Diagnoses and all orders for this visit:    Chronic obstructive pulmonary disease with acute exacerbation  -     predniSONE (DELTASONE) 20 MG tablet; Take 3 tablets by mouth Daily for 5 days.  -     azithromycin (ZITHROMAX Z-SIN) 250 MG tablet; Take 2 tablets the first day, then 1 tablet daily for 4 days.    We'll treat as above for likely COPD exacerbation.  Discussed the need to monitor her symptoms closely.  Advised her to use her albuterol rescue inhaler every 4 hours.  Patient will call on Monday with report.    Return in about 2 weeks (around 8/17/2017) for Recheck.      Sanjiv Small MD

## 2017-08-07 ENCOUNTER — TELEPHONE (OUTPATIENT)
Dept: FAMILY MEDICINE CLINIC | Facility: CLINIC | Age: 77
End: 2017-08-07

## 2017-08-07 NOTE — TELEPHONE ENCOUNTER
----- Message from Sanjiv Small MD sent at 8/7/2017  2:56 PM EDT -----  Regarding: RE: Follow up call  As long as she is getting better I'm happy.  Please let her know that we're here for her for whatever she needs.  ----- Message -----     From: Verito Jeter MA     Sent: 8/7/2017  10:46 AM       To: Sanjiv Small MD  Subject: Follow up call                                   Pt called and stated that she is still coughing but she does feel a little bit better. She is just giving a follow up call

## 2017-08-10 ENCOUNTER — TELEPHONE (OUTPATIENT)
Dept: FAMILY MEDICINE CLINIC | Facility: CLINIC | Age: 77
End: 2017-08-10

## 2017-08-10 NOTE — TELEPHONE ENCOUNTER
----- Message from Sanjiv Small MD sent at 8/10/2017  3:47 PM EDT -----  Regarding: RE: Cough   Contact: [phone]  Yes, I should probably see her back in the office.  ----- Message -----     From: Verito Jeter MA     Sent: 8/10/2017  11:18 AM       To: Sanjiv Small MD  Subject: Cough                                            Pt left massage saying that she is still coughing and not feeling well she is asking if she needs to come back for recheck ?

## 2017-08-11 ENCOUNTER — OFFICE VISIT (OUTPATIENT)
Dept: FAMILY MEDICINE CLINIC | Facility: CLINIC | Age: 77
End: 2017-08-11

## 2017-08-11 VITALS
SYSTOLIC BLOOD PRESSURE: 118 MMHG | HEIGHT: 65 IN | WEIGHT: 213.6 LBS | DIASTOLIC BLOOD PRESSURE: 68 MMHG | HEART RATE: 71 BPM | BODY MASS INDEX: 35.59 KG/M2 | RESPIRATION RATE: 16 BRPM | OXYGEN SATURATION: 99 % | TEMPERATURE: 98.5 F

## 2017-08-11 DIAGNOSIS — J44.1 COPD EXACERBATION (HCC): Primary | ICD-10-CM

## 2017-08-11 PROCEDURE — 99213 OFFICE O/P EST LOW 20 MIN: CPT | Performed by: FAMILY MEDICINE

## 2017-08-11 RX ORDER — CLARITHROMYCIN 500 MG/1
500 TABLET, COATED ORAL 2 TIMES DAILY
Qty: 10 TABLET | Refills: 0 | Status: SHIPPED | OUTPATIENT
Start: 2017-08-11 | End: 2017-08-16

## 2017-08-11 NOTE — PROGRESS NOTES
Subjective   Luann Frances is a 77 y.o. female.     Chief Complaint   Patient presents with   • Cough     Patient is following up on bronchitis.    • Nausea     Patient still has nausea.        HPI     Patient presents with continued symptoms of COPD exacerbation.  Patient complains of shortness of breath and cough.  Also continues to have increased sinus congestion.  No fever or chills.  She did take the steroids and azithromycin that were prescribed to her previously.  She stated minimal benefit.  She has continued using her Advair and rescue inhaler.    The following portions of the patient's history were reviewed and updated as appropriate: allergies, current medications, past family history, past medical history, past social history, past surgical history and problem list.    Review of Systems   Respiratory: Positive for cough and shortness of breath.    Gastrointestinal: Positive for nausea.   All other systems reviewed and are negative.      Objective  Vitals:    08/11/17 1258   BP: 118/68   Pulse: 71   Resp: 16   Temp: 98.5 °F (36.9 °C)   SpO2: 99%        Physical Exam   Constitutional: She is oriented to person, place, and time. She appears well-developed and well-nourished. No distress.   HENT:   Head: Normocephalic and atraumatic.   Right Ear: External ear normal.   Left Ear: External ear normal.   Nose: Nose normal.   Mouth/Throat: Oropharynx is clear and moist.   Eyes: Conjunctivae and EOM are normal. Pupils are equal, round, and reactive to light. Right eye exhibits no discharge. Left eye exhibits no discharge. No scleral icterus.   Cardiovascular: Normal rate, regular rhythm and normal heart sounds.    Pulmonary/Chest: Effort normal. No respiratory distress. She has no wheezes. She has rales.   Abdominal: Soft. Bowel sounds are normal. She exhibits no distension. There is no tenderness.   Neurological: She is alert and oriented to person, place, and time.   Skin: Skin is warm and dry. She is not  diaphoretic.   Nursing note and vitals reviewed.        Current Outpatient Prescriptions:   •  albuterol (PROAIR HFA) 108 (90 BASE) MCG/ACT inhaler, Inhale 2 puffs every 4 (four) hours as needed for wheezing., Disp: 18 g, Rfl: 11  •  [employer] ANTACID & ANTI-GAS 1000-60 MG chewable tablet, , Disp: , Rfl:   •  docusate sodium (COLACE) 100 MG capsule, Take 1 capsule by mouth daily., Disp: , Rfl:   •  ergocalciferol (DRISDOL) 44491 UNITS capsule, Take 1 capsule by mouth 1 (One) Time Per Week., Disp: 13 capsule, Rfl: 3  •  fluticasone-salmeterol (ADVAIR) 100-50 MCG/DOSE DISKUS, Inhale 1 puff 2 (Two) Times a Day. Advair Diskus 100-50 MCG/DOSE Inhalation Aerosol Powder Breath Activated, 1 puff twice daily, Disp: 2 each, Rfl: 3  •  omeprazole OTC (PriLOSEC OTC) 20 MG EC tablet, Take 20 mg by mouth Daily. Take 1-2 tablet, Disp: , Rfl:   •  ondansetron (ZOFRAN) 4 MG tablet, Take 1 tablet by mouth Every 8 (Eight) Hours As Needed for Nausea or Vomiting., Disp: 30 tablet, Rfl: 1  •  Probiotic Product (PROBIOTIC DAILY) capsule, Take 1 tablet by mouth daily., Disp: , Rfl:   •  Thyroid 130 MG PO tablet, Take 1 tablet by mouth Daily., Disp: 30 tablet, Rfl: 5  •  zolpidem (AMBIEN) 5 MG tablet, Take 0.5 tablets by mouth At Night As Needed for Sleep., Disp: 30 tablet, Rfl: 0  •  clarithromycin (BIAXIN) 500 MG tablet, Take 1 tablet by mouth 2 (Two) Times a Day for 5 days., Disp: 10 tablet, Rfl: 0    Procedures    Lab Results (most recent)     None          Assessment/Plan   Luann was seen today for cough and nausea.    Diagnoses and all orders for this visit:    COPD exacerbation  -     clarithromycin (BIAXIN) 500 MG tablet; Take 1 tablet by mouth 2 (Two) Times a Day for 5 days.    Will change antibiotics to clarithromycin as above.  Samples given to patient for an inhaled anticholinergic.  Follow-up on Monday.    Return in about 1 week (around 8/18/2017) for Recheck.      Sanjiv Small MD

## 2017-08-23 RX ORDER — THYROID,PORK 130 MG
TABLET ORAL
Qty: 30 TABLET | Refills: 0 | Status: SHIPPED | OUTPATIENT
Start: 2017-08-23 | End: 2017-09-20 | Stop reason: SDUPTHER

## 2017-09-20 RX ORDER — THYROID,PORK 130 MG
TABLET ORAL
Qty: 30 TABLET | Refills: 0 | Status: SHIPPED | OUTPATIENT
Start: 2017-09-20 | End: 2017-10-31 | Stop reason: SDUPTHER

## 2017-10-31 RX ORDER — THYROID,PORK 130 MG
TABLET ORAL
Qty: 30 TABLET | Refills: 0 | Status: SHIPPED | OUTPATIENT
Start: 2017-10-31 | End: 2017-11-29 | Stop reason: SDUPTHER

## 2017-11-03 DIAGNOSIS — R73.01 IMPAIRED FASTING GLUCOSE: ICD-10-CM

## 2017-11-03 DIAGNOSIS — R68.89 ABNORMAL ENDOCRINE LABORATORY TEST FINDING: ICD-10-CM

## 2017-11-03 DIAGNOSIS — R63.5 WEIGHT GAIN: ICD-10-CM

## 2017-11-03 DIAGNOSIS — E55.9 AVITAMINOSIS D: ICD-10-CM

## 2017-11-03 DIAGNOSIS — E78.5 DYSLIPIDEMIA: ICD-10-CM

## 2017-11-03 DIAGNOSIS — E03.9 ACQUIRED HYPOTHYROIDISM: Primary | ICD-10-CM

## 2017-11-03 DIAGNOSIS — E88.81 INSULIN RESISTANCE: ICD-10-CM

## 2017-11-06 ENCOUNTER — LAB (OUTPATIENT)
Dept: ENDOCRINOLOGY | Age: 77
End: 2017-11-06

## 2017-11-06 DIAGNOSIS — R73.01 IMPAIRED FASTING GLUCOSE: ICD-10-CM

## 2017-11-06 DIAGNOSIS — E03.9 ACQUIRED HYPOTHYROIDISM: ICD-10-CM

## 2017-11-06 DIAGNOSIS — E78.5 DYSLIPIDEMIA: ICD-10-CM

## 2017-11-06 DIAGNOSIS — E55.9 AVITAMINOSIS D: ICD-10-CM

## 2017-11-06 DIAGNOSIS — R63.5 WEIGHT GAIN: ICD-10-CM

## 2017-11-06 DIAGNOSIS — E88.81 INSULIN RESISTANCE: ICD-10-CM

## 2017-11-06 DIAGNOSIS — R68.89 ABNORMAL ENDOCRINE LABORATORY TEST FINDING: ICD-10-CM

## 2017-11-12 LAB
25(OH)D3+25(OH)D2 SERPL-MCNC: 48.2 NG/ML (ref 30–100)
ALBUMIN SERPL-MCNC: 4.3 G/DL (ref 3.5–5.2)
ALBUMIN/GLOB SERPL: 1.3 G/DL
ALP SERPL-CCNC: 73 U/L (ref 39–117)
ALT SERPL-CCNC: 25 U/L (ref 1–33)
AST SERPL-CCNC: 19 U/L (ref 1–32)
BILIRUB SERPL-MCNC: 0.4 MG/DL (ref 0.1–1.2)
BUN SERPL-MCNC: 22 MG/DL (ref 8–23)
BUN/CREAT SERPL: 24.4 (ref 7–25)
C PEPTIDE SERPL-MCNC: 10.8 NG/ML (ref 1.1–4.4)
CALCIUM SERPL-MCNC: 10.6 MG/DL (ref 8.6–10.5)
CHLORIDE SERPL-SCNC: 103 MMOL/L (ref 98–107)
CHOLEST SERPL-MCNC: 194 MG/DL (ref 0–200)
CO2 SERPL-SCNC: 26.5 MMOL/L (ref 22–29)
CREAT SERPL-MCNC: 0.9 MG/DL (ref 0.57–1)
FT4I SERPL CALC-MCNC: 2.8 (ref 1.2–4.9)
GFR SERPLBLD CREATININE-BSD FMLA CKD-EPI: 61 ML/MIN/1.73
GFR SERPLBLD CREATININE-BSD FMLA CKD-EPI: 74 ML/MIN/1.73
GLOBULIN SER CALC-MCNC: 3.2 GM/DL
GLUCOSE SERPL-MCNC: 108 MG/DL (ref 65–99)
HBA1C MFR BLD: 5.89 % (ref 4.8–5.6)
HDLC SERPL-MCNC: 69 MG/DL (ref 40–60)
LDLC SERPL CALC-MCNC: 98 MG/DL (ref 0–100)
POTASSIUM SERPL-SCNC: 3.9 MMOL/L (ref 3.5–5.2)
PROT SERPL-MCNC: 7.5 G/DL (ref 6–8.5)
SODIUM SERPL-SCNC: 146 MMOL/L (ref 136–145)
T3FREE SERPL-MCNC: 5.3 PG/ML (ref 2–4.4)
T3RU NFR SERPL: 27 % (ref 24–39)
T4 FREE SERPL-MCNC: 1.28 NG/DL (ref 0.93–1.7)
T4 SERPL-MCNC: 10.2 UG/DL (ref 4.5–12)
THYROGLOB AB SERPL-ACNC: 213 IU/ML (ref 0–0.9)
THYROGLOB SERPL-MCNC: 16 NG/ML
TRIGL SERPL-MCNC: 133 MG/DL (ref 0–150)
TSH SERPL DL<=0.005 MIU/L-ACNC: 0.03 UIU/ML (ref 0.45–4.5)
VLDLC SERPL CALC-MCNC: 26.6 MG/DL (ref 5–40)

## 2017-11-13 ENCOUNTER — OFFICE VISIT (OUTPATIENT)
Dept: ENDOCRINOLOGY | Age: 77
End: 2017-11-13

## 2017-11-13 VITALS
BODY MASS INDEX: 36.99 KG/M2 | SYSTOLIC BLOOD PRESSURE: 142 MMHG | WEIGHT: 222 LBS | DIASTOLIC BLOOD PRESSURE: 90 MMHG | HEIGHT: 65 IN

## 2017-11-13 DIAGNOSIS — E88.81 INSULIN RESISTANCE: ICD-10-CM

## 2017-11-13 DIAGNOSIS — E78.5 DYSLIPIDEMIA: ICD-10-CM

## 2017-11-13 DIAGNOSIS — E03.8 OTHER SPECIFIED HYPOTHYROIDISM: Primary | ICD-10-CM

## 2017-11-13 DIAGNOSIS — R68.89 ABNORMAL ENDOCRINE LABORATORY TEST FINDING: ICD-10-CM

## 2017-11-13 DIAGNOSIS — R73.01 IMPAIRED FASTING GLUCOSE: ICD-10-CM

## 2017-11-13 DIAGNOSIS — G47.00 INSOMNIA, UNSPECIFIED TYPE: ICD-10-CM

## 2017-11-13 DIAGNOSIS — E55.9 VITAMIN D DEFICIENCY: ICD-10-CM

## 2017-11-13 PROCEDURE — 99214 OFFICE O/P EST MOD 30 MIN: CPT | Performed by: NURSE PRACTITIONER

## 2017-11-13 NOTE — PROGRESS NOTES
Subjective   Luann Frances is a 77 y.o. female is here today for follow-up.    Hypothyroidism   This is a chronic problem. The current episode started more than 1 month ago. Associated symptoms include fatigue, joint swelling and a rash (shingles). Nothing aggravates the symptoms. Treatments tried: medication. The treatment provided moderate (symptoms are waxing and waning) relief.   Fatigue   This is a chronic problem. The current episode started more than 1 year ago. The problem occurs constantly. The problem has been waxing and waning. Associated symptoms include fatigue, joint swelling and a rash (shingles). Nothing aggravates the symptoms. She has tried nothing for the symptoms. The treatment provided moderate relief.   Obesity   This is a chronic (in the last year, has gained 40-50lbs) problem. The current episode started more than 1 year ago. The problem occurs constantly. The problem has been gradually improving. Associated symptoms include fatigue, joint swelling and a rash (shingles). Nothing aggravates the symptoms. She has tried eating for the symptoms. The treatment provided no relief.   Insomnia   This is a chronic problem. The current episode started more than 1 year ago. The problem occurs constantly. The problem has been unchanged. Associated symptoms include fatigue, joint swelling and a rash (shingles). Nothing aggravates the symptoms. She has tried nothing for the symptoms. The treatment provided no relief.       The following portions of the patient's history were reviewed and updated as appropriate: current medications, past family history, past medical history, past social history, past surgical history and problem list.       Current Outpatient Prescriptions:   •  albuterol (PROAIR HFA) 108 (90 BASE) MCG/ACT inhaler, Inhale 2 puffs every 4 (four) hours as needed for wheezing., Disp: 18 g, Rfl: 11  •  [employer] ANTACID & ANTI-GAS 1000-60 MG chewable tablet, , Disp: , Rfl:   •  docusate sodium  (COLACE) 100 MG capsule, Take 1 capsule by mouth daily., Disp: , Rfl:   •  ergocalciferol (DRISDOL) 02432 UNITS capsule, Take 1 capsule by mouth 1 (One) Time Per Week., Disp: 13 capsule, Rfl: 3  •  fluticasone-salmeterol (ADVAIR) 100-50 MCG/DOSE DISKUS, Inhale 1 puff 2 (Two) Times a Day. Advair Diskus 100-50 MCG/DOSE Inhalation Aerosol Powder Breath Activated, 1 puff twice daily, Disp: 2 each, Rfl: 3  •  omeprazole OTC (PriLOSEC OTC) 20 MG EC tablet, Take 20 mg by mouth Daily. Take 1-2 tablet, Disp: , Rfl:   •  ondansetron (ZOFRAN) 4 MG tablet, Take 1 tablet by mouth Every 8 (Eight) Hours As Needed for Nausea or Vomiting., Disp: 30 tablet, Rfl: 1  •  Probiotic Product (PROBIOTIC DAILY) capsule, Take 1 tablet by mouth daily., Disp: , Rfl:   •  THYROID 130 MG tablet, TAKE 1 TABLET BY MOUTH DAILY, Disp: 30 tablet, Rfl: 0  •  Linagliptin-Metformin HCl (JENTADUETO) 2.5-850 MG tablet, Take one by mouth daily in the AM with breakfast, Disp: 30 tablet, Rfl: 4     Patient Active Problem List   Diagnosis   • Acute sinusitis   • Atopic rhinitis   • Asthma   • Candidiasis   • Chronic obstructive pulmonary disease   • Cough   • Mild dehydration   • Diverticulitis of colon   • Gastroesophageal reflux disease with esophagitis   • Fatigue   • Hypothyroidism   • Insomnia   • Knee pain   • Pain of lower extremity   • Nausea   • Chronic obstructive pulmonary disease with acute exacerbation   • Shoulder pain   • Ventricular premature beats   • Elevated BP   • Weight gain   • Cold intolerance   • Dyslipidemia   • Post herpetic neuralgia   • Insulin resistance       Review of Systems   Constitutional: Positive for fatigue.   HENT: Negative for trouble swallowing.    Eyes: Negative for visual disturbance.   Respiratory: Negative for choking.    Cardiovascular: Negative for palpitations.   Gastrointestinal: Negative for constipation.   Endocrine: Positive for cold intolerance.   Genitourinary: Negative for difficulty urinating.    Musculoskeletal: Positive for joint swelling.   Skin: Positive for rash (shingles).   Allergic/Immunologic: Negative.    Neurological: Negative for light-headedness.   Hematological: Bruises/bleeds easily.   Psychiatric/Behavioral: Positive for sleep disturbance (wake up every 2-3 hours). The patient has insomnia.      Lab on 11/06/2017   Component Date Value Ref Range Status   • Glucose 11/06/2017 108* 65 - 99 mg/dL Final   • BUN 11/06/2017 22  8 - 23 mg/dL Final   • Creatinine 11/06/2017 0.90  0.57 - 1.00 mg/dL Final   • eGFR Non  Am 11/06/2017 61  >60 mL/min/1.73 Final    Comment: The MDRD GFR formula is only valid for adults with stable  renal function between ages 18 and 70.     • eGFR  Am 11/06/2017 74  >60 mL/min/1.73 Final   • BUN/Creatinine Ratio 11/06/2017 24.4  7.0 - 25.0 Final   • Sodium 11/06/2017 146* 136 - 145 mmol/L Final   • Potassium 11/06/2017 3.9  3.5 - 5.2 mmol/L Final   • Chloride 11/06/2017 103  98 - 107 mmol/L Final   • Total CO2 11/06/2017 26.5  22.0 - 29.0 mmol/L Final   • Calcium 11/06/2017 10.6* 8.6 - 10.5 mg/dL Final   • Total Protein 11/06/2017 7.5  6.0 - 8.5 g/dL Final   • Albumin 11/06/2017 4.30  3.50 - 5.20 g/dL Final   • Globulin 11/06/2017 3.2  gm/dL Final   • A/G Ratio 11/06/2017 1.3  g/dL Final   • Total Bilirubin 11/06/2017 0.4  0.1 - 1.2 mg/dL Final   • Alkaline Phosphatase 11/06/2017 73  39 - 117 U/L Final   • AST (SGOT) 11/06/2017 19  1 - 32 U/L Final   • ALT (SGPT) 11/06/2017 25  1 - 33 U/L Final   • C-Peptide 11/06/2017 10.8* 1.1 - 4.4 ng/mL Final    C-Peptide reference interval is for fasting patients.   • Hemoglobin A1C 11/06/2017 5.89* 4.80 - 5.60 % Final    Comment: Hemoglobin A1C Ranges:  Increased Risk for Diabetes  5.7% to 6.4%  Diabetes                     >= 6.5%  Diabetic Goal                < 7.0%     • 25 Hydroxy, Vitamin D 11/06/2017 48.2  30.0 - 100.0 ng/mL Final    Comment: Reference Range for Total Vitamin D 25(OH)  Deficiency    <20.0  ng/mL  Insufficiency 21-29 ng/mL  Sufficiency    ng/mL  Toxicity      >100 ng/ml        • Free T4 11/06/2017 1.28  0.93 - 1.70 ng/dL Final   • T3, Free 11/06/2017 5.3* 2.0 - 4.4 pg/mL Final   • TSH 11/06/2017 0.033* 0.450 - 4.500 uIU/mL Final   • T4, Total 11/06/2017 10.2  4.5 - 12.0 ug/dL Final   • T3 Uptake 11/06/2017 27  24 - 39 % Final   • Free Thyroxine Index 11/06/2017 2.8  1.2 - 4.9 Final   • Thyroglobulin (TG-TIERRA) 11/06/2017 16  ng/mL Final    Comment: Reference Range:  Pubertal Children  and Adults: <40  According to the National Academy of Clinical [employer]ochemistry,  the reference interval for Thyroglobulin (TG) should be  related to euthyroid patients and not for patients who  underwent thyroidectomy.  TG reference intervals for these  patients depend on the residual mass of the thyroid tissue  left after surgery.  Establishing a post-operative baseline  is recommended.  The assay quantitation limit is 2.0 ng/mL.     • Thyroglobulin Ab 11/06/2017 213.0* 0.0 - 0.9 IU/mL Final    Thyroglobulin Antibody measured by [facility] Method[employer]   • Total Cholesterol 11/06/2017 194  0 - 200 mg/dL Final   • Triglycerides 11/06/2017 133  0 - 150 mg/dL Final   • HDL Cholesterol 11/06/2017 69* 40 - 60 mg/dL Final   • VLDL Cholesterol 11/06/2017 26.6  5 - 40 mg/dL Final   • LDL Cholesterol  11/06/2017 98  0 - 100 mg/dL Final       Wt Readings from Last 3 Encounters:   11/13/17 222 lb (101 kg)   08/11/17 213 lb 9.6 oz (96.9 kg)   08/03/17 220 lb 8 oz (100 kg)     Temp Readings from Last 3 Encounters:   08/11/17 98.5 °F (36.9 °C) (Oral)   08/03/17 98.4 °F (36.9 °C)   08/01/17 98.6 °F (37 °C) (Oral)     BP Readings from Last 3 Encounters:   11/13/17 142/90   08/11/17 118/68   08/03/17 128/64     Pulse Readings from Last 3 Encounters:   08/11/17 71   08/03/17 95   08/01/17 89     Objective   Physical Exam   Constitutional: She is oriented to person, place, and time. Vital signs are normal. She appears well-developed  and well-nourished. She is sleeping, active and cooperative.   HENT:   Head: Atraumatic.   Eyes: EOM are normal.   Neck: Normal range of motion. Neck supple. No tracheal tenderness present. Carotid bruit is not present. No tracheal deviation present. No thyroid mass and no thyromegaly present.   Cardiovascular: Normal rate, regular rhythm, S1 normal, S2 normal and normal heart sounds.  Exam reveals no gallop.    No murmur heard.  Pulmonary/Chest: Effort normal and breath sounds normal. No accessory muscle usage. No respiratory distress.   Abdominal: Soft. Normal appearance and bowel sounds are normal. There is no tenderness.   Musculoskeletal: Normal range of motion.   Neurological: She is alert and oriented to person, place, and time. She has normal strength. Gait normal.   Skin: Skin is warm, dry and intact. There is pallor.   Psychiatric: She has a normal mood and affect. Her speech is normal and behavior is normal. Judgment and thought content normal. Her mood appears not anxious. Her affect is not blunt. Cognition and memory are normal. She does not exhibit a depressed mood. She is attentive.   Nursing note and vitals reviewed.        Assessment/Plan   Luann was seen today for follow-up.    Diagnoses and all orders for this visit:    Other specified hypothyroidism  -     Comprehensive Metabolic Panel; Future  -     TSH; Future  -     T4, Free; Future  -     T3, Free; Future    Insomnia, unspecified type  -     Comprehensive Metabolic Panel; Future    Insulin resistance  -     Linagliptin-Metformin HCl (JENTADUETO) 2.5-850 MG tablet; Take one by mouth daily in the AM with breakfast  -     Comprehensive Metabolic Panel; Future  -     C-Peptide; Future  -     Hemoglobin A1c; Future  -     Insulin, Total; Future  -     Lipid Panel; Future  -     Microalbumin / Creatinine Urine Ratio - Urine, Clean Catch; Future  -     Uric Acid; Future  -     Vitamin D 25 Hydroxy; Future    Dyslipidemia  -     Lipid Panel;  Future    Vitamin D deficiency  -     Vitamin D 25 Hydroxy; Future    Abnormal endocrine laboratory test finding    Impaired fasting glucose   -     Hemoglobin A1c; Future               In Summary: I met with patient today is metabolically stable and doing well.  Patient reports that there is an increased fatigue, she is not sleeping well.  She does report that she has increased her exercise by walking.  Discussed her insomnia.  Patient reports that she has discontinued her Ambien due to chronic nausea.  At this time the patient reports that she will attempt and wants to try over-the-counter melatonin.  Instructed her that she could try up to 5-10 mg nightly per the 's advice and instruction.  Discussed laboratory workup that was obtained prior to the office visit.  Increased C-peptide and insulin level.  At this time we will treat her with Jentadueto and recheck labs at next visit.  Patient verbally stated she understood all instructions.      Medication changes:  Start Jentadeuto 2.5/850mg once daily in the AM with breakfast    Continue all other all     Start melatonin 5-10 mg as discussed      Face-to-face 25 minutes with 13 minutes being in education-instructed on follow-up instructions with signs and symptoms of insulin resistance.  Treatment of insomnia.  Sleep hygiene.    Return in about 3 months (around 2/13/2018), or if symptoms worsen or fail to improve, for Recheck. 3 months with Josephine-1 week prior for labs 6 months with Dr. Saleh-one week prior for labs

## 2017-11-13 NOTE — PATIENT INSTRUCTIONS
Start Jentadeuto 2.5/850mg once daily in the AM with breakfast    Continue all other all     Start melatonin 5-10 mg as discussed

## 2017-11-29 RX ORDER — THYROID,PORK 130 MG
TABLET ORAL
Qty: 30 TABLET | Refills: 0 | Status: SHIPPED | OUTPATIENT
Start: 2017-11-29 | End: 2017-12-30 | Stop reason: SDUPTHER

## 2017-12-06 ENCOUNTER — OFFICE VISIT (OUTPATIENT)
Dept: FAMILY MEDICINE CLINIC | Facility: CLINIC | Age: 77
End: 2017-12-06

## 2017-12-06 VITALS
TEMPERATURE: 99.1 F | OXYGEN SATURATION: 99 % | WEIGHT: 218 LBS | SYSTOLIC BLOOD PRESSURE: 138 MMHG | BODY MASS INDEX: 36.32 KG/M2 | HEART RATE: 74 BPM | RESPIRATION RATE: 14 BRPM | HEIGHT: 65 IN | DIASTOLIC BLOOD PRESSURE: 74 MMHG

## 2017-12-06 DIAGNOSIS — J32.9 SINOBRONCHITIS: Primary | ICD-10-CM

## 2017-12-06 DIAGNOSIS — J43.8 OTHER EMPHYSEMA (HCC): ICD-10-CM

## 2017-12-06 DIAGNOSIS — J40 SINOBRONCHITIS: Primary | ICD-10-CM

## 2017-12-06 DIAGNOSIS — J44.1 CHRONIC OBSTRUCTIVE PULMONARY DISEASE WITH ACUTE EXACERBATION (HCC): ICD-10-CM

## 2017-12-06 PROCEDURE — 99214 OFFICE O/P EST MOD 30 MIN: CPT | Performed by: FAMILY MEDICINE

## 2017-12-06 RX ORDER — DEXAMETHASONE 4 MG/1
4 TABLET ORAL 2 TIMES DAILY WITH MEALS
Qty: 5 TABLET | Refills: 0 | Status: SHIPPED | OUTPATIENT
Start: 2017-12-06 | End: 2018-03-20

## 2017-12-06 RX ORDER — ALBUTEROL SULFATE 90 UG/1
2 AEROSOL, METERED RESPIRATORY (INHALATION) EVERY 4 HOURS PRN
Qty: 1 INHALER | Refills: 3 | Status: SHIPPED | OUTPATIENT
Start: 2017-12-06 | End: 2022-01-17 | Stop reason: SDUPTHER

## 2017-12-06 RX ORDER — CLARITHROMYCIN 250 MG/1
250 TABLET, FILM COATED ORAL 2 TIMES DAILY
Qty: 10 TABLET | Refills: 0 | Status: SHIPPED | OUTPATIENT
Start: 2017-12-06 | End: 2017-12-11

## 2017-12-06 NOTE — PROGRESS NOTES
Subjective   Luann Frances is a 77 y.o. female.     Chief Complaint   Patient presents with   • Cough     Patient is having cough and congestion for some days.        HPI     Patient since the office today to discuss some problems that are new.  She has history of COPD.  She states that over the last 3-4 days she's had increasing cough, congestion, shortness of breath.  She denies any chest pain.  She is using her medications as prescribed for her COPD.  She also has had sputum production which is different than normal.  Also having sinus congestion and drainage.  No fever or chills.  Over-the-counter therapies for cough if not improved her symptoms.    The following portions of the patient's history were reviewed and updated as appropriate: allergies, current medications, past family history, past medical history, past social history, past surgical history and problem list.    Review of Systems   HENT: Positive for congestion.    Respiratory: Positive for cough.    All other systems reviewed and are negative.      Objective  Vitals:    12/06/17 1134   BP: 138/74   Pulse: 74   Resp: 14   Temp: 99.1 °F (37.3 °C)   SpO2: 99%        Physical Exam   Constitutional: She is oriented to person, place, and time. She appears well-developed and well-nourished. No distress.   HENT:   Head: Normocephalic and atraumatic.   Right Ear: External ear and ear canal normal. No drainage, swelling or tenderness. Tympanic membrane is bulging. Tympanic membrane is not injected and not erythematous. Tympanic membrane mobility is normal. No middle ear effusion. Decreased hearing is noted.   Left Ear: External ear and ear canal normal. No drainage, swelling or tenderness. Tympanic membrane is bulging. Tympanic membrane is not injected and not erythematous. Tympanic membrane mobility is normal.  No middle ear effusion. Decreased hearing is noted.   Nose: Mucosal edema and rhinorrhea present. Right sinus exhibits maxillary sinus tenderness  and frontal sinus tenderness. Left sinus exhibits maxillary sinus tenderness and frontal sinus tenderness.   Mouth/Throat: Uvula is midline. Posterior oropharyngeal erythema present. No oropharyngeal exudate, posterior oropharyngeal edema or tonsillar abscesses. No tonsillar exudate.   Eyes: Conjunctivae and EOM are normal. Pupils are equal, round, and reactive to light. Right eye exhibits no discharge. Left eye exhibits no discharge. No scleral icterus.   Neck: Normal range of motion. Neck supple.   Cardiovascular: Normal rate, regular rhythm and normal heart sounds.  Exam reveals no friction rub.    No murmur heard.  Pulmonary/Chest: Effort normal. No respiratory distress. She has wheezes. She has rales.   Abdominal: Soft. Bowel sounds are normal. She exhibits no distension. There is no tenderness. There is no rebound and no guarding.   Lymphadenopathy:     She has no cervical adenopathy.   Neurological: She is alert and oriented to person, place, and time.   Skin: Skin is warm and dry. She is not diaphoretic.   Nursing note and vitals reviewed.        Current Outpatient Prescriptions:   •  [employer] ANTACID & ANTI-GAS 1000-60 MG chewable tablet, , Disp: , Rfl:   •  docusate sodium (COLACE) 100 MG capsule, Take 1 capsule by mouth daily., Disp: , Rfl:   •  ergocalciferol (DRISDOL) 35013 UNITS capsule, Take 1 capsule by mouth 1 (One) Time Per Week., Disp: 13 capsule, Rfl: 3  •  fluticasone-salmeterol (ADVAIR) 100-50 MCG/DOSE DISKUS, Inhale 1 puff 2 (Two) Times a Day. Advair Diskus 100-50 MCG/DOSE Inhalation Aerosol Powder Breath Activated, 1 puff twice daily, Disp: 2 each, Rfl: 3  •  Linagliptin-Metformin HCl (JENTADUETO) 2.5-850 MG tablet, Take one by mouth daily in the AM with breakfast, Disp: 30 tablet, Rfl: 4  •  omeprazole OTC (PriLOSEC OTC) 20 MG EC tablet, Take 20 mg by mouth Daily. Take 1-2 tablet, Disp: , Rfl:   •  ondansetron (ZOFRAN) 4 MG tablet, Take 1 tablet by mouth Every 8 (Eight) Hours As Needed for Nausea  or Vomiting., Disp: 30 tablet, Rfl: 1  •  Probiotic Product (PROBIOTIC DAILY) capsule, Take 1 tablet by mouth daily., Disp: , Rfl:   •  THYROID 130 MG tablet, TAKE 1 TABLET BY MOUTH DAILY, Disp: 30 tablet, Rfl: 0  •  albuterol (PROVENTIL HFA;VENTOLIN HFA) 108 (90 Base) MCG/ACT inhaler, Inhale 2 puffs Every 4 (Four) Hours As Needed for Wheezing or Shortness of Air., Disp: 1 inhaler, Rfl: 3  •  clarithromycin (BIAXIN) 250 MG tablet, Take 1 tablet by mouth 2 (Two) Times a Day for 5 days., Disp: 10 tablet, Rfl: 0  •  dexamethasone (DECADRON) 4 MG tablet, Take 1 tablet by mouth 2 (Two) Times a Day With Meals., Disp: 5 tablet, Rfl: 0    Procedures    Lab Results (most recent)     None          Assessment/Plan   Luann was seen today for cough.    Diagnoses and all orders for this visit:    Sinobronchitis  -     clarithromycin (BIAXIN) 250 MG tablet; Take 1 tablet by mouth 2 (Two) Times a Day for 5 days.  -     albuterol (PROVENTIL HFA;VENTOLIN HFA) 108 (90 Base) MCG/ACT inhaler; Inhale 2 puffs Every 4 (Four) Hours As Needed for Wheezing or Shortness of Air.  -     dexamethasone (DECADRON) 4 MG tablet; Take 1 tablet by mouth 2 (Two) Times a Day With Meals.    Other emphysema  -     clarithromycin (BIAXIN) 250 MG tablet; Take 1 tablet by mouth 2 (Two) Times a Day for 5 days.  -     albuterol (PROVENTIL HFA;VENTOLIN HFA) 108 (90 Base) MCG/ACT inhaler; Inhale 2 puffs Every 4 (Four) Hours As Needed for Wheezing or Shortness of Air.  -     dexamethasone (DECADRON) 4 MG tablet; Take 1 tablet by mouth 2 (Two) Times a Day With Meals.    Chronic obstructive pulmonary disease with acute exacerbation  -     clarithromycin (BIAXIN) 250 MG tablet; Take 1 tablet by mouth 2 (Two) Times a Day for 5 days.  -     albuterol (PROVENTIL HFA;VENTOLIN HFA) 108 (90 Base) MCG/ACT inhaler; Inhale 2 puffs Every 4 (Four) Hours As Needed for Wheezing or Shortness of Air.  -     dexamethasone (DECADRON) 4 MG tablet; Take 1 tablet by mouth 2 (Two) Times  a Day With Meals.    We will treat with antibiotics, steroids, and albuterol refill.  Discussed other treatment options including over-the-counter therapies that will benefit her cough.  We'll keep a close eye.  Discussed concerning signs and symptoms and reasons for an ER visit.    Return in about 4 weeks (around 1/3/2018) for Recheck.      Sanjiv Small MD

## 2017-12-07 ENCOUNTER — TELEPHONE (OUTPATIENT)
Dept: FAMILY MEDICINE CLINIC | Facility: CLINIC | Age: 77
End: 2017-12-07

## 2017-12-07 NOTE — TELEPHONE ENCOUNTER
Pharmacy left massage said that pt was prescribed clarithromycin and she is using Advair too. Pharmacist said that Advair can contribute with clarithromycin and gives high risk medication use. Per Dr maher it is ok to dispense

## 2017-12-16 DIAGNOSIS — E55.9 VITAMIN D DEFICIENCY: ICD-10-CM

## 2017-12-18 RX ORDER — ERGOCALCIFEROL 1.25 MG/1
CAPSULE ORAL
Qty: 13 CAPSULE | Refills: 0 | Status: SHIPPED | OUTPATIENT
Start: 2017-12-18 | End: 2018-02-19 | Stop reason: SDUPTHER

## 2018-01-02 RX ORDER — THYROID,PORK 130 MG
TABLET ORAL
Qty: 30 TABLET | Refills: 0 | Status: SHIPPED | OUTPATIENT
Start: 2018-01-02 | End: 2018-02-19 | Stop reason: SDDI

## 2018-02-03 DIAGNOSIS — J43.8 OTHER EMPHYSEMA (HCC): ICD-10-CM

## 2018-02-12 ENCOUNTER — LAB (OUTPATIENT)
Dept: ENDOCRINOLOGY | Age: 78
End: 2018-02-12

## 2018-02-12 DIAGNOSIS — E88.81 INSULIN RESISTANCE: ICD-10-CM

## 2018-02-13 ENCOUNTER — RESULTS ENCOUNTER (OUTPATIENT)
Dept: ENDOCRINOLOGY | Age: 78
End: 2018-02-13

## 2018-02-13 DIAGNOSIS — E03.8 OTHER SPECIFIED HYPOTHYROIDISM: ICD-10-CM

## 2018-02-13 DIAGNOSIS — E55.9 VITAMIN D DEFICIENCY: ICD-10-CM

## 2018-02-13 DIAGNOSIS — G47.00 INSOMNIA, UNSPECIFIED TYPE: ICD-10-CM

## 2018-02-13 DIAGNOSIS — E78.5 DYSLIPIDEMIA: ICD-10-CM

## 2018-02-13 DIAGNOSIS — E88.81 INSULIN RESISTANCE: ICD-10-CM

## 2018-02-13 DIAGNOSIS — R73.01 IMPAIRED FASTING GLUCOSE: ICD-10-CM

## 2018-02-13 LAB
25(OH)D3+25(OH)D2 SERPL-MCNC: 54.8 NG/ML (ref 30–100)
ALBUMIN SERPL-MCNC: 3.7 G/DL (ref 3.5–5.2)
ALBUMIN/CREAT UR: 5.8 MG/G CREAT (ref 0–30)
ALBUMIN/GLOB SERPL: 1.2 G/DL
ALP SERPL-CCNC: 70 U/L (ref 39–117)
ALT SERPL-CCNC: 18 U/L (ref 1–33)
AST SERPL-CCNC: 16 U/L (ref 1–32)
BILIRUB SERPL-MCNC: 0.4 MG/DL (ref 0.1–1.2)
BUN SERPL-MCNC: 27 MG/DL (ref 8–23)
BUN/CREAT SERPL: 20.1 (ref 7–25)
C PEPTIDE SERPL-MCNC: 10.9 NG/ML (ref 1.1–4.4)
CALCIUM SERPL-MCNC: 9.8 MG/DL (ref 8.6–10.5)
CHLORIDE SERPL-SCNC: 104 MMOL/L (ref 98–107)
CHOLEST SERPL-MCNC: 237 MG/DL (ref 0–200)
CO2 SERPL-SCNC: 25.3 MMOL/L (ref 22–29)
CREAT SERPL-MCNC: 1.34 MG/DL (ref 0.57–1)
CREAT UR-MCNC: 230.4 MG/DL
GFR SERPLBLD CREATININE-BSD FMLA CKD-EPI: 38 ML/MIN/1.73
GFR SERPLBLD CREATININE-BSD FMLA CKD-EPI: 47 ML/MIN/1.73
GLOBULIN SER CALC-MCNC: 3.2 GM/DL
GLUCOSE SERPL-MCNC: 134 MG/DL (ref 65–99)
HBA1C MFR BLD: 6.04 % (ref 4.8–5.6)
HDLC SERPL-MCNC: 82 MG/DL (ref 40–60)
INSULIN SERPL-ACNC: 98.6 UIU/ML (ref 2.6–24.9)
INTERPRETATION: NORMAL
LDLC SERPL CALC-MCNC: 126 MG/DL (ref 0–100)
MICROALBUMIN UR-MCNC: 13.4 UG/ML
POTASSIUM SERPL-SCNC: 4.6 MMOL/L (ref 3.5–5.2)
PROT SERPL-MCNC: 6.9 G/DL (ref 6–8.5)
SODIUM SERPL-SCNC: 143 MMOL/L (ref 136–145)
T3FREE SERPL-MCNC: 1 PG/ML (ref 2–4.4)
T4 FREE SERPL-MCNC: 0.3 NG/DL (ref 0.93–1.7)
TRIGL SERPL-MCNC: 147 MG/DL (ref 0–150)
TSH SERPL DL<=0.005 MIU/L-ACNC: 54.14 MIU/ML (ref 0.27–4.2)
URATE SERPL-MCNC: 7.5 MG/DL (ref 2.4–5.7)
VLDLC SERPL CALC-MCNC: 29.4 MG/DL (ref 5–40)

## 2018-02-19 ENCOUNTER — OFFICE VISIT (OUTPATIENT)
Dept: ENDOCRINOLOGY | Age: 78
End: 2018-02-19

## 2018-02-19 VITALS
HEIGHT: 65 IN | DIASTOLIC BLOOD PRESSURE: 80 MMHG | BODY MASS INDEX: 35.65 KG/M2 | WEIGHT: 214 LBS | SYSTOLIC BLOOD PRESSURE: 120 MMHG

## 2018-02-19 DIAGNOSIS — E79.0 HYPERURICEMIA: ICD-10-CM

## 2018-02-19 DIAGNOSIS — E78.2 MIXED HYPERLIPIDEMIA: ICD-10-CM

## 2018-02-19 DIAGNOSIS — E03.8 OTHER SPECIFIED HYPOTHYROIDISM: Primary | ICD-10-CM

## 2018-02-19 DIAGNOSIS — E88.81 INSULIN RESISTANCE: ICD-10-CM

## 2018-02-19 DIAGNOSIS — N18.30 CKD (CHRONIC KIDNEY DISEASE) STAGE 3, GFR 30-59 ML/MIN (HCC): ICD-10-CM

## 2018-02-19 DIAGNOSIS — E55.9 VITAMIN D DEFICIENCY: ICD-10-CM

## 2018-02-19 DIAGNOSIS — R53.82 CHRONIC FATIGUE: ICD-10-CM

## 2018-02-19 PROCEDURE — 99214 OFFICE O/P EST MOD 30 MIN: CPT | Performed by: NURSE PRACTITIONER

## 2018-02-19 RX ORDER — ATORVASTATIN CALCIUM 20 MG/1
20 TABLET, FILM COATED ORAL DAILY
Qty: 30 TABLET | Refills: 4 | Status: SHIPPED | OUTPATIENT
Start: 2018-02-19 | End: 2018-05-30 | Stop reason: SDUPTHER

## 2018-02-19 RX ORDER — LEVOTHYROXINE SODIUM 0.12 MG/1
125 TABLET ORAL DAILY
Qty: 30 TABLET | Refills: 4 | Status: SHIPPED | OUTPATIENT
Start: 2018-02-19 | End: 2018-05-30

## 2018-02-19 RX ORDER — ALLOPURINOL 100 MG/1
100 TABLET ORAL DAILY
Qty: 30 TABLET | Refills: 2 | Status: SHIPPED | OUTPATIENT
Start: 2018-02-19 | End: 2018-05-30 | Stop reason: SDUPTHER

## 2018-02-19 RX ORDER — ERGOCALCIFEROL 1.25 MG/1
50000 CAPSULE ORAL
Qty: 13 CAPSULE | Refills: 4 | Status: SHIPPED | OUTPATIENT
Start: 2018-02-19 | End: 2018-05-30 | Stop reason: SDUPTHER

## 2018-02-19 NOTE — PATIENT INSTRUCTIONS
Start levoxyl 125 MCG daily on an empty stomach- Recheck labs in 6 weeks    Restart Jentadueto 2.5/850 mg once daily    Continue vitamin D as previously prescribed    Start Lipitor 20 mg once at night      Start Allopurinol 100 mg once a day

## 2018-02-19 NOTE — PROGRESS NOTES
Subjective   Luann Frances is a 78 y.o. female is here today for follow-up.    Hypothyroidism   This is a chronic problem. The current episode started more than 1 month ago. Associated symptoms include fatigue and joint swelling. Pertinent negatives include no headaches or rash. Nothing aggravates the symptoms. Treatments tried: medication. The treatment provided mild (symptoms are waxing and waning) relief.   Fatigue   This is a chronic problem. The current episode started more than 1 year ago. The problem occurs constantly. The problem has been gradually improving. Associated symptoms include fatigue and joint swelling. Pertinent negatives include no headaches or rash. Nothing aggravates the symptoms. She has tried nothing for the symptoms. The treatment provided mild relief.   Obesity   This is a chronic (in the last year, has gained 40-50lbs) problem. The current episode started more than 1 year ago. The problem occurs constantly. The problem has been gradually improving. Associated symptoms include fatigue and joint swelling. Pertinent negatives include no headaches or rash. Nothing aggravates the symptoms. She has tried eating for the symptoms. The treatment provided no relief.   Insomnia   This is a chronic problem. The current episode started more than 1 year ago. The problem occurs constantly. The problem has been unchanged. Associated symptoms include fatigue and joint swelling. Pertinent negatives include no headaches or rash. Nothing aggravates the symptoms. She has tried nothing for the symptoms. The treatment provided no relief.       The following portions of the patient's history were reviewed and updated as appropriate: current medications, past family history, past medical history, past social history, past surgical history and problem list.       Current Outpatient Prescriptions:   •  ADVAIR DISKUS 100-50 MCG/DOSE DISKUS, INHALE ONE PUFF BY MOUTH INTO THE LUNGS TWICE DAILY., Disp: 60 each, Rfl:  0  •  albuterol (PROVENTIL HFA;VENTOLIN HFA) 108 (90 Base) MCG/ACT inhaler, Inhale 2 puffs Every 4 (Four) Hours As Needed for Wheezing or Shortness of Air., Disp: 1 inhaler, Rfl: 3  •  [employer] ANTACID & ANTI-GAS 1000-60 MG chewable tablet, , Disp: , Rfl:   •  dexamethasone (DECADRON) 4 MG tablet, Take 1 tablet by mouth 2 (Two) Times a Day With Meals., Disp: 5 tablet, Rfl: 0  •  docusate sodium (COLACE) 100 MG capsule, Take 1 capsule by mouth daily., Disp: , Rfl:   •  Linagliptin-Metformin HCl (JENTADUETO) 2.5-850 MG tablet, Take one by mouth daily in the AM with breakfast, Disp: 30 tablet, Rfl: 4  •  omeprazole OTC (PriLOSEC OTC) 20 MG EC tablet, Take 20 mg by mouth Daily. Take 1-2 tablet, Disp: , Rfl:   •  ondansetron (ZOFRAN) 4 MG tablet, Take 1 tablet by mouth Every 8 (Eight) Hours As Needed for Nausea or Vomiting., Disp: 30 tablet, Rfl: 1  •  Probiotic Product (PROBIOTIC DAILY) capsule, Take 1 tablet by mouth daily., Disp: , Rfl:   •  vitamin D (ERGOCALCIFEROL) 70096 units capsule capsule, Take 1 capsule by mouth Every 7 (Seven) Days., Disp: 13 capsule, Rfl: 4  •  allopurinol (ZYLOPRIM) 100 MG tablet, Take 1 tablet by mouth Daily., Disp: 30 tablet, Rfl: 2  •  atorvastatin (LIPITOR) 20 MG tablet, Take 1 tablet by mouth Daily., Disp: 30 tablet, Rfl: 4  •  levothyroxine (SYNTHROID, LEVOTHROID) 125 MCG tablet, Take 1 tablet by mouth Daily., Disp: 30 tablet, Rfl: 4     Patient Active Problem List   Diagnosis   • Acute sinusitis   • Atopic rhinitis   • Asthma   • Candidiasis   • Chronic obstructive pulmonary disease   • Cough   • Mild dehydration   • Diverticulitis of colon   • Gastroesophageal reflux disease with esophagitis   • Fatigue   • Hypothyroidism   • Insomnia   • Knee pain   • Pain of lower extremity   • Nausea   • Chronic obstructive pulmonary disease with acute exacerbation   • Shoulder pain   • Ventricular premature beats   • Elevated BP   • Weight gain   • Cold intolerance   • Dyslipidemia   • Post herpetic  neuralgia   • Insulin resistance   • Vitamin D deficiency   • CKD (chronic kidney disease) stage 3, GFR 30-59 ml/min   • Mixed hyperlipidemia       Review of Systems   Constitutional: Positive for fatigue.   HENT: Negative for trouble swallowing.    Eyes: Negative for visual disturbance.   Respiratory: Negative for choking.    Cardiovascular: Negative for palpitations.   Gastrointestinal: Negative for diarrhea.   Endocrine: Positive for cold intolerance.   Genitourinary: Negative for difficulty urinating.   Musculoskeletal: Positive for joint swelling.   Skin: Negative for rash.   Allergic/Immunologic: Negative.    Neurological: Negative for headaches.   Hematological: Bruises/bleeds easily.   Psychiatric/Behavioral: Positive for sleep disturbance. The patient has insomnia.      Results Encounter on 02/13/2018   Component Date Value Ref Range Status   • Glucose 02/12/2018 134* 65 - 99 mg/dL Final   • BUN 02/12/2018 27* 8 - 23 mg/dL Final   • Creatinine 02/12/2018 1.34* 0.57 - 1.00 mg/dL Final   • eGFR Non  Am 02/12/2018 38* >60 mL/min/1.73 Final    Comment: The MDRD GFR formula is only valid for adults with stable  renal function between ages 18 and 70.     • eGFR  Am 02/12/2018 47* >60 mL/min/1.73 Final   • BUN/Creatinine Ratio 02/12/2018 20.1  7.0 - 25.0 Final   • Sodium 02/12/2018 143  136 - 145 mmol/L Final   • Potassium 02/12/2018 4.6  3.5 - 5.2 mmol/L Final   • Chloride 02/12/2018 104  98 - 107 mmol/L Final   • Total CO2 02/12/2018 25.3  22.0 - 29.0 mmol/L Final   • Calcium 02/12/2018 9.8  8.6 - 10.5 mg/dL Final   • Total Protein 02/12/2018 6.9  6.0 - 8.5 g/dL Final   • Albumin 02/12/2018 3.70  3.50 - 5.20 g/dL Final   • Globulin 02/12/2018 3.2  gm/dL Final   • A/G Ratio 02/12/2018 1.2  g/dL Final   • Total Bilirubin 02/12/2018 0.4  0.1 - 1.2 mg/dL Final   • Alkaline Phosphatase 02/12/2018 70  39 - 117 U/L Final   • AST (SGOT) 02/12/2018 16  1 - 32 U/L Final   • ALT (SGPT) 02/12/2018 18  1 - 33  U/L Final   • C-Peptide 02/12/2018 10.9* 1.1 - 4.4 ng/mL Final    C-Peptide reference interval is for fasting patients.   • Hemoglobin A1C 02/12/2018 6.04* 4.80 - 5.60 % Final    Comment: Hemoglobin A1C Ranges:  Increased Risk for Diabetes  5.7% to 6.4%  Diabetes                     >= 6.5%  Diabetic Goal                < 7.0%     • Insulin 02/12/2018 98.6* 2.6 - 24.9 uIU/mL Final   • Total Cholesterol 02/12/2018 237* 0 - 200 mg/dL Final   • Triglycerides 02/12/2018 147  0 - 150 mg/dL Final   • HDL Cholesterol 02/12/2018 82* 40 - 60 mg/dL Final   • VLDL Cholesterol 02/12/2018 29.4  5 - 40 mg/dL Final   • LDL Cholesterol  02/12/2018 126* 0 - 100 mg/dL Final   • Uric Acid 02/12/2018 7.5* 2.4 - 5.7 mg/dL Final   • 25 Hydroxy, Vitamin D 02/12/2018 54.8  30.0 - 100.0 ng/mL Final    Comment: Reference Range for Total Vitamin D 25(OH)  Deficiency    <20.0 ng/mL  Insufficiency 21-29 ng/mL  Sufficiency    ng/mL  Toxicity      >100 ng/ml        • TSH 02/12/2018 54.140* 0.270 - 4.200 mIU/mL Final   • Free T4 02/12/2018 0.30* 0.93 - 1.70 ng/dL Final   • T3, Free 02/12/2018 1.0* 2.0 - 4.4 pg/mL Final   • Creatinine, Urine 02/12/2018 230.4  Not Estab. mg/dL Final   • Microalbumin, Urine 02/12/2018 13.4  Not Estab. ug/mL Final   • Microalbumin/Creatinine Ratio 02/12/2018 5.8  0.0 - 30.0 mg/g creat Final   • Interpretation 02/12/2018 Note   Final    Supplemental report is available.       Wt Readings from Last 3 Encounters:   02/19/18 97.1 kg (214 lb)   12/06/17 98.9 kg (218 lb)   11/13/17 101 kg (222 lb)     Temp Readings from Last 3 Encounters:   12/06/17 99.1 °F (37.3 °C) (Oral)   08/11/17 98.5 °F (36.9 °C) (Oral)   08/03/17 98.4 °F (36.9 °C)     BP Readings from Last 3 Encounters:   02/19/18 120/80   12/06/17 138/74   11/13/17 142/90     Pulse Readings from Last 3 Encounters:   12/06/17 74   08/11/17 71   08/03/17 95       Objective   Physical Exam   Constitutional: She is oriented to person, place, and time. Vital signs  are normal. She appears well-developed and well-nourished. She is sleeping, active and cooperative.   HENT:   Head: Atraumatic.   Eyes: EOM are normal.   Neck: Normal range of motion. Neck supple. No tracheal tenderness present. Carotid bruit is not present. No tracheal deviation present. No thyroid mass and no thyromegaly present.   Cardiovascular: Normal rate, regular rhythm, S1 normal, S2 normal and normal heart sounds.  Exam reveals no gallop.    No murmur heard.  Pulmonary/Chest: Effort normal and breath sounds normal. No accessory muscle usage. No respiratory distress.   Abdominal: Soft. Normal appearance and bowel sounds are normal. There is no tenderness.   Neurological: She is alert and oriented to person, place, and time. She has normal strength and normal reflexes. Gait normal.   Skin: Skin is warm, dry and intact. There is pallor.   Psychiatric: She has a normal mood and affect. Her speech is normal and behavior is normal. Judgment and thought content normal. Her mood appears not anxious. Her affect is not blunt. Cognition and memory are normal. She does not exhibit a depressed mood. She is attentive.   Nursing note and vitals reviewed.        Assessment/Plan   Luann was seen today for follow-up.    Diagnoses and all orders for this visit:    Other specified hypothyroidism  -     levothyroxine (SYNTHROID, LEVOTHROID) 125 MCG tablet; Take 1 tablet by mouth Daily.  -     TSH; Future  -     T4, Free; Future  -     T3, Free; Future    Insulin resistance  -     Linagliptin-Metformin HCl (JENTADUETO) 2.5-850 MG tablet; Take one by mouth daily in the AM with breakfast    Vitamin D deficiency  -     vitamin D (ERGOCALCIFEROL) 48861 units capsule capsule; Take 1 capsule by mouth Every 7 (Seven) Days.    Chronic fatigue    CKD (chronic kidney disease) stage 3, GFR 30-59 ml/min    Mixed hyperlipidemia    Hyperuricemia  -     allopurinol (ZYLOPRIM) 100 MG tablet; Take 1 tablet by mouth Daily.    Other orders  -      atorvastatin (LIPITOR) 20 MG tablet; Take 1 tablet by mouth Daily.          In Summary: With the patient today who is metabolically stable and doing well.  Patient states that she feels better than she has in months she states that she had became ill and during the illness she had stopped all her medications and has not restarted them.  Reviewed lab work prior to obtain to the visit, discussed outcome with patient the medication changes today will be as follows:    Start levoxyl 125 MCG daily on an empty stomach- Recheck labs in 6 weeks    Restart Jentadueto 2.5/850 mg once daily    Continue vitamin D as previously prescribed    Start Lipitor 20 mg once at night      Start Allopurinol 100 mg once a day        Return in about 3 months (around 5/19/2018), or if symptoms worsen or fail to improve, for Recheck. Labs in 6 weeks, keep appointment in May with Dr. Saleh with labs 1 week prior.

## 2018-03-11 DIAGNOSIS — J43.8 OTHER EMPHYSEMA (HCC): ICD-10-CM

## 2018-03-20 ENCOUNTER — APPOINTMENT (OUTPATIENT)
Dept: GENERAL RADIOLOGY | Facility: HOSPITAL | Age: 78
End: 2018-03-20

## 2018-03-20 PROCEDURE — 71046 X-RAY EXAM CHEST 2 VIEWS: CPT | Performed by: GENERAL PRACTICE

## 2018-03-22 DIAGNOSIS — E78.5 DYSLIPIDEMIA: ICD-10-CM

## 2018-03-22 DIAGNOSIS — E78.2 MIXED HYPERLIPIDEMIA: ICD-10-CM

## 2018-03-22 DIAGNOSIS — E88.81 INSULIN RESISTANCE: Primary | ICD-10-CM

## 2018-03-22 DIAGNOSIS — E55.9 VITAMIN D DEFICIENCY: ICD-10-CM

## 2018-03-22 DIAGNOSIS — R73.09 OTHER ABNORMAL GLUCOSE: ICD-10-CM

## 2018-03-22 DIAGNOSIS — R53.82 CHRONIC FATIGUE: ICD-10-CM

## 2018-03-22 DIAGNOSIS — E03.8 OTHER SPECIFIED HYPOTHYROIDISM: ICD-10-CM

## 2018-04-02 ENCOUNTER — LAB (OUTPATIENT)
Dept: ENDOCRINOLOGY | Age: 78
End: 2018-04-02

## 2018-04-02 ENCOUNTER — RESULTS ENCOUNTER (OUTPATIENT)
Dept: ENDOCRINOLOGY | Age: 78
End: 2018-04-02

## 2018-04-02 DIAGNOSIS — E55.9 VITAMIN D DEFICIENCY: ICD-10-CM

## 2018-04-02 DIAGNOSIS — E78.5 DYSLIPIDEMIA: ICD-10-CM

## 2018-04-02 DIAGNOSIS — R53.82 CHRONIC FATIGUE: ICD-10-CM

## 2018-04-02 DIAGNOSIS — E03.8 OTHER SPECIFIED HYPOTHYROIDISM: ICD-10-CM

## 2018-04-02 DIAGNOSIS — R73.09 OTHER ABNORMAL GLUCOSE: ICD-10-CM

## 2018-04-02 DIAGNOSIS — E78.2 MIXED HYPERLIPIDEMIA: ICD-10-CM

## 2018-04-02 DIAGNOSIS — E88.81 INSULIN RESISTANCE: ICD-10-CM

## 2018-04-03 LAB
25(OH)D3+25(OH)D2 SERPL-MCNC: 71.6 NG/ML (ref 30–100)
ALBUMIN SERPL-MCNC: 3.9 G/DL (ref 3.5–5.2)
ALBUMIN/GLOB SERPL: 1.4 G/DL
ALP SERPL-CCNC: 64 U/L (ref 39–117)
ALT SERPL-CCNC: 17 U/L (ref 1–33)
AST SERPL-CCNC: 17 U/L (ref 1–32)
BILIRUB SERPL-MCNC: 0.8 MG/DL (ref 0.1–1.2)
BUN SERPL-MCNC: 22 MG/DL (ref 8–23)
BUN/CREAT SERPL: 17.6 (ref 7–25)
C PEPTIDE SERPL-MCNC: 4.9 NG/ML (ref 1.1–4.4)
CALCIUM SERPL-MCNC: 10.1 MG/DL (ref 8.6–10.5)
CHLORIDE SERPL-SCNC: 101 MMOL/L (ref 98–107)
CHOLEST SERPL-MCNC: 161 MG/DL (ref 0–200)
CO2 SERPL-SCNC: 26.6 MMOL/L (ref 22–29)
CREAT SERPL-MCNC: 1.25 MG/DL (ref 0.57–1)
GFR SERPLBLD CREATININE-BSD FMLA CKD-EPI: 42 ML/MIN/1.73
GFR SERPLBLD CREATININE-BSD FMLA CKD-EPI: 50 ML/MIN/1.73
GLOBULIN SER CALC-MCNC: 2.8 GM/DL
GLUCOSE SERPL-MCNC: 117 MG/DL (ref 65–99)
HBA1C MFR BLD: 5.79 % (ref 4.8–5.6)
HDLC SERPL-MCNC: 66 MG/DL (ref 40–60)
INTERPRETATION: NORMAL
LDLC SERPL CALC-MCNC: 71 MG/DL (ref 0–100)
Lab: NORMAL
POTASSIUM SERPL-SCNC: 5.1 MMOL/L (ref 3.5–5.2)
PROT SERPL-MCNC: 6.7 G/DL (ref 6–8.5)
SODIUM SERPL-SCNC: 142 MMOL/L (ref 136–145)
T3FREE SERPL-MCNC: 3.3 PG/ML (ref 2–4.4)
T4 FREE SERPL-MCNC: 2.32 NG/DL (ref 0.93–1.7)
T4 SERPL-MCNC: 14.09 MCG/DL (ref 4.5–11.7)
TRIGL SERPL-MCNC: 122 MG/DL (ref 0–150)
TSH SERPL DL<=0.005 MIU/L-ACNC: 0.28 MIU/ML (ref 0.27–4.2)
URATE SERPL-MCNC: 5.7 MG/DL (ref 2.4–5.7)
VLDLC SERPL CALC-MCNC: 24.4 MG/DL (ref 5–40)

## 2018-04-05 ENCOUNTER — OFFICE VISIT (OUTPATIENT)
Dept: FAMILY MEDICINE CLINIC | Facility: CLINIC | Age: 78
End: 2018-04-05

## 2018-04-05 VITALS
OXYGEN SATURATION: 97 % | HEART RATE: 89 BPM | DIASTOLIC BLOOD PRESSURE: 66 MMHG | BODY MASS INDEX: 35.85 KG/M2 | RESPIRATION RATE: 12 BRPM | WEIGHT: 210 LBS | HEIGHT: 64 IN | SYSTOLIC BLOOD PRESSURE: 126 MMHG | TEMPERATURE: 97.6 F

## 2018-04-05 DIAGNOSIS — J44.1 CHRONIC OBSTRUCTIVE PULMONARY DISEASE WITH ACUTE EXACERBATION (HCC): Primary | ICD-10-CM

## 2018-04-05 PROCEDURE — 99213 OFFICE O/P EST LOW 20 MIN: CPT | Performed by: NURSE PRACTITIONER

## 2018-04-05 RX ORDER — PREDNISONE 10 MG/1
TABLET ORAL
COMMUNITY
Start: 2018-03-20 | End: 2018-04-05

## 2018-04-05 RX ORDER — PREDNISONE 20 MG/1
TABLET ORAL
Qty: 15 TABLET | Refills: 0 | Status: SHIPPED | OUTPATIENT
Start: 2018-04-05 | End: 2019-11-05

## 2018-04-05 NOTE — PROGRESS NOTES
"Subjective   Luann Frances is a 78 y.o. female.     History of Present Illness   pT PRESENTING TO THE OFFICE TODAY WITH A CONTINUED COUGH AND soa.  sHE WENT TO [facility] AND given zpack and prednisone pack and she is feeling a little better but not 100%.  Still coughing and SOA.      The following portions of the patient's history were reviewed and updated as appropriate: allergies, current medications, past social history and problem list.    Review of Systems   HENT: Positive for congestion.    Respiratory: Positive for cough.    All other systems reviewed and are negative.      Objective   /66 (BP Location: Right arm, Patient Position: Sitting, Cuff Size: Adult)   Pulse 89   Temp 97.6 °F (36.4 °C) (Oral)   Resp 12   Ht 162.6 cm (64\")   Wt 95.3 kg (210 lb)   SpO2 97%   BMI 36.05 kg/m²   Physical Exam   Constitutional: She is oriented to person, place, and time. Vital signs are normal. She appears well-developed and well-nourished. No distress.   HENT:   Head: Normocephalic.   Cardiovascular: Normal rate, regular rhythm and normal heart sounds.    Pulmonary/Chest: Effort normal and breath sounds normal.   Neurological: She is alert and oriented to person, place, and time. Gait normal.   Psychiatric: She has a normal mood and affect. Her behavior is normal. Judgment and thought content normal.   Vitals reviewed.      Assessment/Plan   Problem List Items Addressed This Visit        Respiratory    Chronic obstructive pulmonary disease with acute exacerbation - Primary    Relevant Medications    predniSONE (DELTASONE) 20 MG tablet      Other Visit Diagnoses    None.          RTO PRN  "

## 2018-05-10 DIAGNOSIS — E55.9 VITAMIN D DEFICIENCY: ICD-10-CM

## 2018-05-10 DIAGNOSIS — R73.09 OTHER ABNORMAL GLUCOSE: ICD-10-CM

## 2018-05-10 DIAGNOSIS — R53.82 CHRONIC FATIGUE: ICD-10-CM

## 2018-05-10 DIAGNOSIS — E03.8 OTHER SPECIFIED HYPOTHYROIDISM: ICD-10-CM

## 2018-05-10 DIAGNOSIS — E88.81 INSULIN RESISTANCE: Primary | ICD-10-CM

## 2018-05-10 DIAGNOSIS — E78.2 MIXED HYPERLIPIDEMIA: ICD-10-CM

## 2018-05-10 DIAGNOSIS — E78.5 DYSLIPIDEMIA: ICD-10-CM

## 2018-05-23 ENCOUNTER — LAB (OUTPATIENT)
Dept: ENDOCRINOLOGY | Age: 78
End: 2018-05-23

## 2018-05-23 DIAGNOSIS — E03.8 OTHER SPECIFIED HYPOTHYROIDISM: ICD-10-CM

## 2018-05-23 DIAGNOSIS — E78.2 MIXED HYPERLIPIDEMIA: ICD-10-CM

## 2018-05-23 DIAGNOSIS — R73.09 OTHER ABNORMAL GLUCOSE: ICD-10-CM

## 2018-05-23 DIAGNOSIS — E78.5 DYSLIPIDEMIA: ICD-10-CM

## 2018-05-23 DIAGNOSIS — E88.81 INSULIN RESISTANCE: ICD-10-CM

## 2018-05-23 DIAGNOSIS — E55.9 VITAMIN D DEFICIENCY: ICD-10-CM

## 2018-05-23 DIAGNOSIS — R53.82 CHRONIC FATIGUE: ICD-10-CM

## 2018-05-28 LAB
25(OH)D3+25(OH)D2 SERPL-MCNC: 58 NG/ML (ref 30–100)
ALBUMIN SERPL-MCNC: 4.2 G/DL (ref 3.5–5.2)
ALBUMIN/GLOB SERPL: 1.5 G/DL
ALP SERPL-CCNC: 59 U/L (ref 39–117)
ALT SERPL-CCNC: 18 U/L (ref 1–33)
AST SERPL-CCNC: 16 U/L (ref 1–32)
BILIRUB SERPL-MCNC: 0.3 MG/DL (ref 0.1–1.2)
BUN SERPL-MCNC: 22 MG/DL (ref 8–23)
BUN/CREAT SERPL: 22.4 (ref 7–25)
C PEPTIDE SERPL-MCNC: 5.2 NG/ML (ref 1.1–4.4)
CALCIUM SERPL-MCNC: 9.7 MG/DL (ref 8.6–10.5)
CHLORIDE SERPL-SCNC: 104 MMOL/L (ref 98–107)
CHOLEST SERPL-MCNC: 139 MG/DL (ref 0–200)
CO2 SERPL-SCNC: 23.9 MMOL/L (ref 22–29)
CREAT SERPL-MCNC: 0.98 MG/DL (ref 0.57–1)
GFR SERPLBLD CREATININE-BSD FMLA CKD-EPI: 55 ML/MIN/1.73
GFR SERPLBLD CREATININE-BSD FMLA CKD-EPI: 67 ML/MIN/1.73
GLOBULIN SER CALC-MCNC: 2.8 GM/DL
GLUCOSE SERPL-MCNC: 92 MG/DL (ref 65–99)
HBA1C MFR BLD: 5.8 % (ref 4.8–5.6)
HDLC SERPL-MCNC: 71 MG/DL (ref 40–60)
INTERPRETATION: NORMAL
LDLC SERPL CALC-MCNC: 48 MG/DL (ref 0–100)
Lab: NORMAL
POTASSIUM SERPL-SCNC: 4.9 MMOL/L (ref 3.5–5.2)
PROT SERPL-MCNC: 7 G/DL (ref 6–8.5)
SODIUM SERPL-SCNC: 143 MMOL/L (ref 136–145)
T3FREE SERPL-MCNC: 3.5 PG/ML (ref 2–4.4)
T4 FREE SERPL-MCNC: 2.3 NG/DL (ref 0.93–1.7)
T4 SERPL-MCNC: 14.5 MCG/DL (ref 4.5–11.7)
THYROGLOB AB SERPL-ACNC: 262 IU/ML
THYROGLOB SERPL-MCNC: 16 NG/ML
THYROGLOB SERPL-MCNC: ABNORMAL NG/ML
TRIGL SERPL-MCNC: 99 MG/DL (ref 0–150)
TSH SERPL DL<=0.005 MIU/L-ACNC: 0.04 MIU/ML (ref 0.27–4.2)
URATE SERPL-MCNC: 5.4 MG/DL (ref 2.4–5.7)
VLDLC SERPL CALC-MCNC: 19.8 MG/DL (ref 5–40)

## 2018-05-30 ENCOUNTER — OFFICE VISIT (OUTPATIENT)
Dept: ENDOCRINOLOGY | Age: 78
End: 2018-05-30

## 2018-05-30 VITALS
DIASTOLIC BLOOD PRESSURE: 80 MMHG | WEIGHT: 216.2 LBS | HEIGHT: 65 IN | RESPIRATION RATE: 16 BRPM | SYSTOLIC BLOOD PRESSURE: 130 MMHG | BODY MASS INDEX: 36.02 KG/M2

## 2018-05-30 DIAGNOSIS — E79.0 HYPERURICEMIA: ICD-10-CM

## 2018-05-30 DIAGNOSIS — E03.8 OTHER SPECIFIED HYPOTHYROIDISM: Primary | ICD-10-CM

## 2018-05-30 DIAGNOSIS — E88.81 INSULIN RESISTANCE: ICD-10-CM

## 2018-05-30 DIAGNOSIS — E55.9 VITAMIN D DEFICIENCY: ICD-10-CM

## 2018-05-30 DIAGNOSIS — R73.9 HYPERGLYCEMIA: ICD-10-CM

## 2018-05-30 DIAGNOSIS — J44.1 CHRONIC OBSTRUCTIVE PULMONARY DISEASE WITH ACUTE EXACERBATION (HCC): ICD-10-CM

## 2018-05-30 PROCEDURE — 99214 OFFICE O/P EST MOD 30 MIN: CPT | Performed by: INTERNAL MEDICINE

## 2018-05-30 RX ORDER — LINAGLIPTIN AND METFORMIN HYDROCHLORIDE 5; 1000 MG/1; MG/1
1 TABLET, FILM COATED, EXTENDED RELEASE ORAL
Qty: 30 TABLET | Refills: 5 | Status: SHIPPED | OUTPATIENT
Start: 2018-05-30 | End: 2018-12-13 | Stop reason: SDUPTHER

## 2018-05-30 RX ORDER — ALLOPURINOL 100 MG/1
100 TABLET ORAL DAILY
Qty: 30 TABLET | Refills: 5 | Status: SHIPPED | OUTPATIENT
Start: 2018-05-30 | End: 2018-05-30 | Stop reason: SDUPTHER

## 2018-05-30 RX ORDER — ATORVASTATIN CALCIUM 20 MG/1
20 TABLET, FILM COATED ORAL DAILY
Qty: 30 TABLET | Refills: 5 | Status: SHIPPED | OUTPATIENT
Start: 2018-05-30 | End: 2018-12-13 | Stop reason: SDUPTHER

## 2018-05-30 RX ORDER — ERGOCALCIFEROL 1.25 MG/1
50000 CAPSULE ORAL
Qty: 13 CAPSULE | Refills: 3 | Status: SHIPPED | OUTPATIENT
Start: 2018-05-30 | End: 2018-12-13 | Stop reason: SDUPTHER

## 2018-05-30 RX ORDER — LEVOTHYROXINE SODIUM 112 MCG
112 TABLET ORAL DAILY
Qty: 30 TABLET | Refills: 11 | Status: SHIPPED | OUTPATIENT
Start: 2018-05-30 | End: 2018-12-13 | Stop reason: SDUPTHER

## 2018-05-30 RX ORDER — ALLOPURINOL 100 MG/1
100 TABLET ORAL DAILY
Qty: 30 TABLET | Refills: 5 | Status: SHIPPED | OUTPATIENT
Start: 2018-05-30 | End: 2019-05-30

## 2018-05-30 NOTE — PROGRESS NOTES
"Subjective   Luann Frances is a 78 y.o. female seen for follow up for hypothyroidism, lab review. Patient states that she is still feeling tired. She denies any other problems or concerns.     History of Present Illness this is a 78-year-old female known patient with hypothyroidism as well as dyslipidemia and vitamin D deficiency and prediabetes as well as vitamin D deficiency and hyperuricemia.  Over the course of last 6 months she has had no significant health problems for which to go to the emergency room or hospital.    /80   Resp 16   Ht 165.1 cm (65\")   Wt 98.1 kg (216 lb 3.2 oz)   BMI 35.98 kg/m²      Allergies   Allergen Reactions   • Ambien [Zolpidem Tartrate]      nausea   • Amoxicillin-Pot Clavulanate    • Doxycycline    • Levofloxacin    • Metronidazole    • Naproxen    • Sulfamethoxazole-Trimethoprim    • Synthroid [Levothyroxine Sodium] Nausea Only       Current Outpatient Prescriptions:   •  ADVAIR DISKUS 100-50 MCG/DOSE DISKUS, INHALE 1 PUFF BY MOUTH INTO THE LUNGS TWICE DAILY, Disp: 1 each, Rfl: 3  •  albuterol (PROVENTIL HFA;VENTOLIN HFA) 108 (90 Base) MCG/ACT inhaler, Inhale 2 puffs Every 4 (Four) Hours As Needed for Wheezing or Shortness of Air., Disp: 1 inhaler, Rfl: 3  •  allopurinol (ZYLOPRIM) 100 MG tablet, Take 1 tablet by mouth Daily., Disp: 30 tablet, Rfl: 2  •  atorvastatin (LIPITOR) 20 MG tablet, Take 1 tablet by mouth Daily., Disp: 30 tablet, Rfl: 4  •  [employer] ANTACID & ANTI-GAS 1000-60 MG chewable tablet, , Disp: , Rfl:   •  docusate sodium (COLACE) 100 MG capsule, Take 1 capsule by mouth daily., Disp: , Rfl:   •  levothyroxine (SYNTHROID, LEVOTHROID) 125 MCG tablet, Take 1 tablet by mouth Daily., Disp: 30 tablet, Rfl: 4  •  Linagliptin-Metformin HCl (JENTADUETO) 2.5-850 MG tablet, Take one by mouth daily in the AM with breakfast, Disp: 30 tablet, Rfl: 4  •  omeprazole OTC (PriLOSEC OTC) 20 MG EC tablet, Take 20 mg by mouth Daily. Take 1-2 tablet, Disp: , Rfl:   •  " ondansetron (ZOFRAN) 4 MG tablet, Take 1 tablet by mouth Every 8 (Eight) Hours As Needed for Nausea or Vomiting., Disp: 30 tablet, Rfl: 1  •  predniSONE (DELTASONE) 20 MG tablet, TAKE 2 TABS AS A ONE TIME DOSE FOR 5 DAYS, Disp: 15 tablet, Rfl: 0  •  Probiotic Product (PROBIOTIC DAILY) capsule, Take 1 tablet by mouth daily., Disp: , Rfl:   •  vitamin D (ERGOCALCIFEROL) 95717 units capsule capsule, Take 1 capsule by mouth Every 7 (Seven) Days., Disp: 13 capsule, Rfl: 4      The following portions of the patient's history were reviewed and updated as appropriate: allergies, current medications, past family history, past medical history, past social history, past surgical history and problem list.    Review of Systems   Constitutional: Positive for fatigue.   HENT: Negative.    Eyes: Negative.    Respiratory: Negative.    Cardiovascular: Negative.    Gastrointestinal: Negative.    Endocrine: Negative.    Genitourinary: Negative.    Musculoskeletal: Negative.    Skin: Negative.    Allergic/Immunologic: Negative.    Neurological: Negative.    Hematological: Negative.    Psychiatric/Behavioral: Negative.        Objective   Physical Exam   Constitutional: She is oriented to person, place, and time. She appears well-developed and well-nourished. No distress.   HENT:   Head: Normocephalic and atraumatic.   Right Ear: External ear normal.   Left Ear: External ear normal.   Nose: Nose normal.   Mouth/Throat: Oropharynx is clear and moist. No oropharyngeal exudate.   Eyes: Conjunctivae and EOM are normal. Pupils are equal, round, and reactive to light. Right eye exhibits no discharge. Left eye exhibits no discharge. No scleral icterus.   Neck: Normal range of motion. Neck supple. No JVD present. No tracheal deviation present. No thyromegaly present.   Cardiovascular: Normal rate, regular rhythm, normal heart sounds and intact distal pulses.  Exam reveals no gallop and no friction rub.    No murmur heard.  Pulmonary/Chest: Effort  normal and breath sounds normal. No stridor. No respiratory distress. She has no wheezes. She has no rales. She exhibits no tenderness.   Abdominal: Soft. Bowel sounds are normal. She exhibits no distension and no mass. There is no tenderness. There is no rebound and no guarding. No hernia.   Musculoskeletal: Normal range of motion. She exhibits no edema, tenderness or deformity.   Lymphadenopathy:     She has no cervical adenopathy.   Neurological: She is alert and oriented to person, place, and time. She displays normal reflexes. No cranial nerve deficit or sensory deficit. She exhibits normal muscle tone. Coordination normal.   Sluggish DTR in the relaxation phase.   Skin: Skin is warm and dry. No rash noted. She is not diaphoretic. No erythema. No pallor.   Psychiatric: She has a normal mood and affect. Her behavior is normal. Judgment and thought content normal.   Nursing note and vitals reviewed.    Results for orders placed or performed in visit on 05/23/18   Comprehensive Metabolic Panel   Result Value Ref Range    Glucose 92 65 - 99 mg/dL    BUN 22 8 - 23 mg/dL    Creatinine 0.98 0.57 - 1.00 mg/dL    eGFR Non African Am 55 (L) >60 mL/min/1.73    eGFR African Am 67 >60 mL/min/1.73    BUN/Creatinine Ratio 22.4 7.0 - 25.0    Sodium 143 136 - 145 mmol/L    Potassium 4.9 3.5 - 5.2 mmol/L    Chloride 104 98 - 107 mmol/L    Total CO2 23.9 22.0 - 29.0 mmol/L    Calcium 9.7 8.6 - 10.5 mg/dL    Total Protein 7.0 6.0 - 8.5 g/dL    Albumin 4.20 3.50 - 5.20 g/dL    Globulin 2.8 gm/dL    A/G Ratio 1.5 g/dL    Total Bilirubin 0.3 0.1 - 1.2 mg/dL    Alkaline Phosphatase 59 39 - 117 U/L    AST (SGOT) 16 1 - 32 U/L    ALT (SGPT) 18 1 - 33 U/L   Comprehensive Thyroglobulin   Result Value Ref Range    Thyroglobulin Ab 262 (H) IU/mL    Thyroglobulin Comment ng/mL    Thyroglobulin (TG-TIERRA) 16 ng/mL   T3, Free   Result Value Ref Range    T3, Free 3.5 2.0 - 4.4 pg/mL   T4 & TSH ([facility])   Result Value Ref Range    TSH 0.036 (L)  0.270 - 4.200 mIU/mL    T4, Total 14.50 (H) 4.50 - 11.70 mcg/dL   T4, Free   Result Value Ref Range    Free T4 2.30 (H) 0.93 - 1.70 ng/dL   Uric Acid   Result Value Ref Range    Uric Acid 5.4 2.4 - 5.7 mg/dL   Vitamin D 25 Hydroxy   Result Value Ref Range    25 Hydroxy, Vitamin D 58.0 30.0 - 100.0 ng/ml   Lipid Panel   Result Value Ref Range    Total Cholesterol 139 0 - 200 mg/dL    Triglycerides 99 0 - 150 mg/dL    HDL Cholesterol 71 (H) 40 - 60 mg/dL    VLDL Cholesterol 19.8 5 - 40 mg/dL    LDL Cholesterol  48 0 - 100 mg/dL   Hemoglobin A1c   Result Value Ref Range    Hemoglobin A1C 5.80 (H) 4.80 - 5.60 %   C-Peptide   Result Value Ref Range    C-Peptide 5.2 (H) 1.1 - 4.4 ng/mL   Cardiovascular Risk Assessment   Result Value Ref Range    Interpretation Note    Diabetes Patient Education   Result Value Ref Range    PDF Image Not applicable          Assessment/Plan   Diagnoses and all orders for this visit:    Other specified hypothyroidism  -     T3, Free; Future  -     T4 & TSH ([facility]); Future  -     T4, Free; Future  -     Thyroglobulin With Anti-TG; Future  -     Uric Acid; Future  -     Vitamin D 25 Hydroxy; Future  -     Comprehensive Metabolic Panel; Future  -     C-Peptide; Future  -     Follicle Stimulating Hormone; Future  -     Lipid Panel; Future  -     MicroAlbumin, Urine, Random - Urine, Clean Catch; Future    Vitamin D deficiency  -     vitamin D (ERGOCALCIFEROL) 78295 units capsule capsule; Take 1 capsule by mouth Every 7 (Seven) Days.  -     T3, Free; Future  -     T4 & TSH ([facility]); Future  -     T4, Free; Future  -     Thyroglobulin With Anti-TG; Future  -     Uric Acid; Future  -     Vitamin D 25 Hydroxy; Future  -     Comprehensive Metabolic Panel; Future  -     C-Peptide; Future  -     Follicle Stimulating Hormone; Future  -     Lipid Panel; Future  -     MicroAlbumin, Urine, Random - Urine, Clean Catch; Future    Chronic obstructive pulmonary disease with acute  exacerbation    Hyperuricemia  -     Discontinue: allopurinol (ZYLOPRIM) 100 MG tablet; Take 1 tablet by mouth Daily.  -     allopurinol (ZYLOPRIM) 100 MG tablet; Take 1 tablet by mouth Daily.  -     T3, Free; Future  -     T4 & TSH ([facility]); Future  -     T4, Free; Future  -     Thyroglobulin With Anti-TG; Future  -     Uric Acid; Future  -     Vitamin D 25 Hydroxy; Future  -     Comprehensive Metabolic Panel; Future  -     C-Peptide; Future  -     Follicle Stimulating Hormone; Future  -     Lipid Panel; Future  -     MicroAlbumin, Urine, Random - Urine, Clean Catch; Future    Insulin resistance  -     T3, Free; Future  -     T4 & TSH ([facility]); Future  -     T4, Free; Future  -     Thyroglobulin With Anti-TG; Future  -     Uric Acid; Future  -     Vitamin D 25 Hydroxy; Future  -     Comprehensive Metabolic Panel; Future  -     C-Peptide; Future  -     Follicle Stimulating Hormone; Future  -     Lipid Panel; Future  -     MicroAlbumin, Urine, Random - Urine, Clean Catch; Future    Hyperglycemia  -     T3, Free; Future  -     T4 & TSH ([facility]); Future  -     T4, Free; Future  -     Thyroglobulin With Anti-TG; Future  -     Uric Acid; Future  -     Vitamin D 25 Hydroxy; Future  -     Comprehensive Metabolic Panel; Future  -     C-Peptide; Future  -     Follicle Stimulating Hormone; Future  -     Lipid Panel; Future  -     MicroAlbumin, Urine, Random - Urine, Clean Catch; Future    Other orders  -     atorvastatin (LIPITOR) 20 MG tablet; Take 1 tablet by mouth Daily.  -     JENTADUETO XR 5-1000 MG tablet sustained-release 24 hour; Take 1 tablet by mouth Daily With Dinner.  -     SYNTHROID 112 MCG tablet; Take 1 tablet by mouth Daily.               In summary I saw and examined this 78-year-old female for above-mentioned problems.  I reviewed her laboratory evaluation of 05/16/2018 and provided her with a hard copy of it.  She is overall clinically and metabolically stable however her thyroid functions test borders  on high T4 and high high free T4.  We will therefore reduce the dose to Synthroid 112 µg daily.  She will continue rest of her medications and will see Ms. Josephine Jeffery in 6 months or sooner if needed with laboratory evaluation prior to each office visit.

## 2018-08-20 DIAGNOSIS — J43.8 OTHER EMPHYSEMA (HCC): ICD-10-CM

## 2018-09-27 DIAGNOSIS — J43.8 OTHER EMPHYSEMA (HCC): ICD-10-CM

## 2018-11-16 ENCOUNTER — RESULTS ENCOUNTER (OUTPATIENT)
Dept: ENDOCRINOLOGY | Age: 78
End: 2018-11-16

## 2018-11-16 DIAGNOSIS — E88.81 INSULIN RESISTANCE: ICD-10-CM

## 2018-11-16 DIAGNOSIS — E79.0 HYPERURICEMIA: ICD-10-CM

## 2018-11-16 DIAGNOSIS — E55.9 VITAMIN D DEFICIENCY: ICD-10-CM

## 2018-11-16 DIAGNOSIS — E03.8 OTHER SPECIFIED HYPOTHYROIDISM: ICD-10-CM

## 2018-11-16 DIAGNOSIS — R73.9 HYPERGLYCEMIA: ICD-10-CM

## 2018-11-19 DIAGNOSIS — J43.8 OTHER EMPHYSEMA (HCC): ICD-10-CM

## 2018-11-29 ENCOUNTER — LAB (OUTPATIENT)
Dept: ENDOCRINOLOGY | Age: 78
End: 2018-11-29

## 2018-11-29 DIAGNOSIS — E55.9 VITAMIN D DEFICIENCY: ICD-10-CM

## 2018-11-29 DIAGNOSIS — E03.8 OTHER SPECIFIED HYPOTHYROIDISM: ICD-10-CM

## 2018-11-29 DIAGNOSIS — E78.2 MIXED HYPERLIPIDEMIA: Primary | ICD-10-CM

## 2018-11-29 DIAGNOSIS — R73.9 HYPERGLYCEMIA: ICD-10-CM

## 2018-11-29 DIAGNOSIS — E88.81 INSULIN RESISTANCE: ICD-10-CM

## 2018-11-29 DIAGNOSIS — E78.2 MIXED HYPERLIPIDEMIA: ICD-10-CM

## 2018-11-30 LAB
25(OH)D3+25(OH)D2 SERPL-MCNC: 45.4 NG/ML (ref 30–100)
ALBUMIN SERPL-MCNC: 4.3 G/DL (ref 3.5–5.2)
ALBUMIN/GLOB SERPL: 1.7 G/DL
ALP SERPL-CCNC: 74 U/L (ref 39–117)
ALT SERPL-CCNC: 16 U/L (ref 1–33)
AST SERPL-CCNC: 16 U/L (ref 1–32)
BILIRUB SERPL-MCNC: 0.4 MG/DL (ref 0.1–1.2)
BUN SERPL-MCNC: 23 MG/DL (ref 8–23)
BUN/CREAT SERPL: 20.5 (ref 7–25)
C PEPTIDE SERPL-MCNC: 3.7 NG/ML (ref 1.1–4.4)
CALCIUM SERPL-MCNC: 10.1 MG/DL (ref 8.6–10.5)
CHLORIDE SERPL-SCNC: 103 MMOL/L (ref 98–107)
CHOLEST SERPL-MCNC: 145 MG/DL (ref 0–200)
CO2 SERPL-SCNC: 24.9 MMOL/L (ref 22–29)
CREAT SERPL-MCNC: 1.12 MG/DL (ref 0.57–1)
FT4I SERPL CALC-MCNC: 3.2 (ref 1.2–4.9)
GLOBULIN SER CALC-MCNC: 2.6 GM/DL
GLUCOSE SERPL-MCNC: 82 MG/DL (ref 65–99)
HBA1C MFR BLD: 5.4 % (ref 4.8–5.6)
HDLC SERPL-MCNC: 71 MG/DL (ref 40–60)
INSULIN SERPL-ACNC: 14.2 UIU/ML (ref 2.6–24.9)
INTERPRETATION: NORMAL
LDLC SERPL CALC-MCNC: 57 MG/DL (ref 0–100)
Lab: NORMAL
POTASSIUM SERPL-SCNC: 4.9 MMOL/L (ref 3.5–5.2)
PROT SERPL-MCNC: 6.9 G/DL (ref 6–8.5)
SODIUM SERPL-SCNC: 142 MMOL/L (ref 136–145)
T3FREE SERPL-MCNC: 2.5 PG/ML (ref 2–4.4)
T3RU NFR SERPL: 27 % (ref 24–39)
T4 FREE SERPL-MCNC: 1.53 NG/DL (ref 0.93–1.7)
T4 SERPL-MCNC: 11.7 UG/DL (ref 4.5–12)
TRIGL SERPL-MCNC: 87 MG/DL (ref 0–150)
TSH SERPL DL<=0.005 MIU/L-ACNC: 2.98 UIU/ML (ref 0.45–4.5)
VLDLC SERPL CALC-MCNC: 17.4 MG/DL (ref 5–40)

## 2018-12-13 ENCOUNTER — OFFICE VISIT (OUTPATIENT)
Dept: ENDOCRINOLOGY | Age: 78
End: 2018-12-13

## 2018-12-13 VITALS
HEIGHT: 65 IN | BODY MASS INDEX: 35.99 KG/M2 | WEIGHT: 216 LBS | SYSTOLIC BLOOD PRESSURE: 124 MMHG | DIASTOLIC BLOOD PRESSURE: 78 MMHG

## 2018-12-13 DIAGNOSIS — E78.2 MIXED HYPERLIPIDEMIA: Primary | ICD-10-CM

## 2018-12-13 DIAGNOSIS — E03.9 ACQUIRED HYPOTHYROIDISM: ICD-10-CM

## 2018-12-13 DIAGNOSIS — N18.30 CKD (CHRONIC KIDNEY DISEASE) STAGE 3, GFR 30-59 ML/MIN (HCC): ICD-10-CM

## 2018-12-13 DIAGNOSIS — E55.9 VITAMIN D DEFICIENCY: ICD-10-CM

## 2018-12-13 DIAGNOSIS — E88.81 INSULIN RESISTANCE: ICD-10-CM

## 2018-12-13 DIAGNOSIS — R79.9 ABNORMAL FINDING OF BLOOD CHEMISTRY: ICD-10-CM

## 2018-12-13 PROCEDURE — 99214 OFFICE O/P EST MOD 30 MIN: CPT | Performed by: NURSE PRACTITIONER

## 2018-12-13 RX ORDER — LINAGLIPTIN AND METFORMIN HYDROCHLORIDE 5; 1000 MG/1; MG/1
1 TABLET, FILM COATED, EXTENDED RELEASE ORAL
Qty: 30 TABLET | Refills: 5 | Status: SHIPPED | OUTPATIENT
Start: 2018-12-13 | End: 2019-01-04 | Stop reason: SDUPTHER

## 2018-12-13 RX ORDER — ERGOCALCIFEROL 1.25 MG/1
50000 CAPSULE ORAL
Qty: 13 CAPSULE | Refills: 4 | Status: SHIPPED | OUTPATIENT
Start: 2018-12-13 | End: 2019-06-13 | Stop reason: SDUPTHER

## 2018-12-13 RX ORDER — ATORVASTATIN CALCIUM 20 MG/1
20 TABLET, FILM COATED ORAL DAILY
Qty: 30 TABLET | Refills: 5 | Status: SHIPPED | OUTPATIENT
Start: 2018-12-13 | End: 2019-06-13 | Stop reason: SDUPTHER

## 2018-12-13 RX ORDER — LEVOTHYROXINE SODIUM 112 MCG
112 TABLET ORAL DAILY
Qty: 30 TABLET | Refills: 5 | Status: SHIPPED | OUTPATIENT
Start: 2018-12-13 | End: 2019-06-13

## 2018-12-13 NOTE — PROGRESS NOTES
Subjective    Luann Frances is a 78 y.o. female. she is here to be seen for hypothyroidism.     hypothyroid      Hypothyroidism   This is a chronic problem. The current episode started more than 1 year ago. The problem occurs constantly. The problem has been waxing and waning. Associated symptoms include fatigue. Pertinent negatives include no headaches or rash. Nothing aggravates the symptoms. Treatments tried: meds and labs. The treatment provided mild relief.        Evaluation history:  TSH   Date Value Ref Range Status   11/29/2018 2.980 0.450 - 4.500 uIU/mL Final     Free T4   Date Value Ref Range Status   11/29/2018 1.53 0.93 - 1.70 ng/dL Final     T3, Free   Date Value Ref Range Status   11/29/2018 2.5 2.0 - 4.4 pg/mL Final       Current medications:  Current Outpatient Medications   Medication Sig Dispense Refill   • ADVAIR DISKUS 100-50 MCG/DOSE DISKUS INHALE 1 PUFF BY MOUTH INTO THE LUNGS TWICE DAILY 60 each 0   • albuterol (PROVENTIL HFA;VENTOLIN HFA) 108 (90 Base) MCG/ACT inhaler Inhale 2 puffs Every 4 (Four) Hours As Needed for Wheezing or Shortness of Air. 1 inhaler 3   • allopurinol (ZYLOPRIM) 100 MG tablet Take 1 tablet by mouth Daily. 30 tablet 5   • atorvastatin (LIPITOR) 20 MG tablet Take 1 tablet by mouth Daily. 30 tablet 5   • [facility] ANTACID & ANTI-GAS 1000-60 MG chewable tablet      • docusate sodium (COLACE) 100 MG capsule Take 1 capsule by mouth daily.     • JENTADUETO XR 5-1000 MG tablet sustained-release 24 hour Take 1 tablet by mouth Daily With Dinner. 30 tablet 5   • omeprazole OTC (PriLOSEC OTC) 20 MG EC tablet Take 20 mg by mouth Daily. Take 1-2 tablet     • ondansetron (ZOFRAN) 4 MG tablet Take 1 tablet by mouth Every 8 (Eight) Hours As Needed for Nausea or Vomiting. 30 tablet 1   • predniSONE (DELTASONE) 20 MG tablet TAKE 2 TABS AS A ONE TIME DOSE FOR 5 DAYS 15 tablet 0   • Probiotic Product (PROBIOTIC DAILY) capsule Take 1 tablet by mouth daily.     • SYNTHROID 112 MCG tablet Take  1 tablet by mouth Daily. 30 tablet 5   • vitamin D (ERGOCALCIFEROL) 67787 units capsule capsule Take 1 capsule by mouth Every 7 (Seven) Days. 13 capsule 4     No current facility-administered medications for this visit.        The following portions of the patient's history were reviewed and updated as appropriate:   Past Medical History:   Diagnosis Date   • Chronic bronchitis (CMS/HCC)    • Depression    • Fracture of humerus    • Hypothyroidism    • Itch of skin     face itchness due to shingles   • Pneumonia    • Pulmonary emphysema (CMS/HCC)    • Renal calculi    • Shingles rash      Past Surgical History:   Procedure Laterality Date   • APPENDECTOMY     • EYE SURGERY      cataracts   • HUMERUS FRACTURE SURGERY     • TOTAL KNEE ARTHROPLASTY Left 04/2015     Family History   Problem Relation Age of Onset   • Cancer Mother         breast   • Heart disease Maternal Grandmother      OB History     No data available        Allergies   Allergen Reactions   • Ambien [Zolpidem Tartrate]      nausea   • Amoxicillin-Pot Clavulanate    • Doxycycline    • Levofloxacin    • Metronidazole    • Naproxen    • Sulfamethoxazole-Trimethoprim    • Synthroid [Levothyroxine Sodium] Nausea Only     Social History     Socioeconomic History   • Marital status:      Spouse name: Not on file   • Number of children: Not on file   • Years of education: Not on file   • Highest education level: Not on file   Tobacco Use   • Smoking status: Current Every Day Smoker   • Smokeless tobacco: Current User   Substance and Sexual Activity   • Alcohol use: Yes     Comment: social   • Drug use: No   • Sexual activity: Defer       Review of Systems  Review of Systems   Constitutional: Positive for fatigue.   HENT: Negative for trouble swallowing.    Eyes: Negative for visual disturbance.   Respiratory: Negative for choking.    Cardiovascular: Negative for palpitations.   Gastrointestinal: Negative for constipation.   Endocrine: Positive for cold  "intolerance.   Genitourinary: Negative for difficulty urinating.   Musculoskeletal: Positive for back pain.   Skin: Negative for rash.   Allergic/Immunologic: Negative.    Neurological: Negative for headaches.   Hematological: Does not bruise/bleed easily.   Psychiatric/Behavioral: Negative for sleep disturbance.   All other systems reviewed and are negative.      Objective    /78   Ht 165.1 cm (65\")   Wt 98 kg (216 lb)   BMI 35.94 kg/m²   Physical Exam   Constitutional: She is oriented to person, place, and time. She appears well-developed and well-nourished. No distress.   HENT:   Head: Normocephalic and atraumatic.   Eyes: EOM are normal. Pupils are equal, round, and reactive to light.   Neck: Normal range of motion. Neck supple.   Cardiovascular: Normal rate, regular rhythm, normal heart sounds and intact distal pulses.   No murmur heard.  Pulmonary/Chest: Effort normal and breath sounds normal.   Abdominal: Soft. Bowel sounds are normal.   Musculoskeletal: Normal range of motion.   Neurological: She is alert and oriented to person, place, and time.   Skin: Skin is warm and dry. Capillary refill takes 2 to 3 seconds. She is not diaphoretic. No pallor.   Psychiatric: She has a normal mood and affect. Her behavior is normal. Judgment and thought content normal.   Nursing note and vitals reviewed.      Lab Review  Lab Results   Component Value Date    TSH 2.980 11/29/2018     Lab Results   Component Value Date    FREET4 1.53 11/29/2018     Lab on 11/29/2018   Component Date Value Ref Range Status   • Total Cholesterol 11/29/2018 145  0 - 200 mg/dL Final   • Triglycerides 11/29/2018 87  0 - 150 mg/dL Final   • HDL Cholesterol 11/29/2018 71* 40 - 60 mg/dL Final   • VLDL Cholesterol 11/29/2018 17.4  5 - 40 mg/dL Final   • LDL Cholesterol  11/29/2018 57  0 - 100 mg/dL Final   • TSH 11/29/2018 2.980  0.450 - 4.500 uIU/mL Final   • T4, Total 11/29/2018 11.7  4.5 - 12.0 ug/dL Final   • T3 Uptake 11/29/2018 27  24 " - 39 % Final   • Free Thyroxine Index 11/29/2018 3.2  1.2 - 4.9 Final   • T3, Free 11/29/2018 2.5  2.0 - 4.4 pg/mL Final   • Free T4 11/29/2018 1.53  0.93 - 1.70 ng/dL Final   • 25 Hydroxy, Vitamin D 11/29/2018 45.4  30.0 - 100.0 ng/ml Final    Comment: Reference Range for Total Vitamin D 25(OH)  Deficiency    <20.0 ng/mL  Insufficiency 21-29 ng/mL  Sufficiency    ng/mL  Toxicity      >100 ng/ml        • Hemoglobin A1C 11/29/2018 5.40  4.80 - 5.60 % Final    Comment: Hemoglobin A1C Ranges:  Increased Risk for Diabetes  5.7% to 6.4%  Diabetes                     >= 6.5%  Diabetic Goal                < 7.0%     • C-Peptide 11/29/2018 3.7  1.1 - 4.4 ng/mL Final    C-Peptide reference interval is for fasting patients.   • Glucose 11/29/2018 82  65 - 99 mg/dL Final   • BUN 11/29/2018 23  8 - 23 mg/dL Final   • Creatinine 11/29/2018 1.12* 0.57 - 1.00 mg/dL Final   • eGFR Non  Am 11/29/2018 47* >60 mL/min/1.73 Final    Comment: The MDRD GFR formula is only valid for adults with stable  renal function between ages 18 and 70.     • eGFR  Am 11/29/2018 57* >60 mL/min/1.73 Final   • BUN/Creatinine Ratio 11/29/2018 20.5  7.0 - 25.0 Final   • Sodium 11/29/2018 142  136 - 145 mmol/L Final   • Potassium 11/29/2018 4.9  3.5 - 5.2 mmol/L Final   • Chloride 11/29/2018 103  98 - 107 mmol/L Final   • Total CO2 11/29/2018 24.9  22.0 - 29.0 mmol/L Final   • Calcium 11/29/2018 10.1  8.6 - 10.5 mg/dL Final   • Total Protein 11/29/2018 6.9  6.0 - 8.5 g/dL Final   • Albumin 11/29/2018 4.30  3.50 - 5.20 g/dL Final   • Globulin 11/29/2018 2.6  gm/dL Final   • A/G Ratio 11/29/2018 1.7  g/dL Final   • Total Bilirubin 11/29/2018 0.4  0.1 - 1.2 mg/dL Final   • Alkaline Phosphatase 11/29/2018 74  39 - 117 U/L Final   • AST (SGOT) 11/29/2018 16  1 - 32 U/L Final   • ALT (SGPT) 11/29/2018 16  1 - 33 U/L Final   • Insulin 11/29/2018 14.2  2.6 - 24.9 uIU/mL Final   • Interpretation 11/29/2018 Note   Final    Supplemental report is  available.   • PDF Image 11/29/2018 Not applicable   Final        Assessment/Plan      1. Mixed hyperlipidemia    2. Vitamin D deficiency    3. Insulin resistance    4. CKD (chronic kidney disease) stage 3, GFR 30-59 ml/min (CMS/[facility])    5. Acquired hypothyroidism    6. Abnormal finding of blood chemistry     . This diagnosis was discussed and reviewed with the patient including the advantages of drug therapy.     1. Orders placed during this encounter include:  Orders Placed This Encounter   Procedures   • Comprehensive Metabolic Panel     Standing Status:   Future   • C-Peptide     Standing Status:   Future   • Hemoglobin A1c     Standing Status:   Future     Standing Expiration Date:   12/13/2019   • Lipid Panel     Standing Status:   Future     Standing Expiration Date:   12/13/2019     Order Specific Question:   [facility] Has the patient fasted?     Answer:   No   • Microalbumin / Creatinine Urine Ratio - Urine, Clean Catch     Standing Status:   Future   • Uric Acid     Standing Status:   Future   • Vitamin D 25 Hydroxy     Standing Status:   Future   • TSH     Standing Status:   Future   • T4     Standing Status:   Future   • T3     Standing Status:   Future       Medications prescribed:      2. Repeat s-TSH in before patient's next visit.    3. The risks and benefits of my recommendations, as well as other treatment options were discussed with the patient today. Questions were answered.    A. Hypothyroid - chronic, stable, no change with medications.  B. 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  C. Vitamin D def- stable, chronic. No change with medications.       4. Return in about 6 months (around 6/13/2019), or if symptoms worsen or fail to improve, for Recheck.- 2 weeks prior for labs.

## 2019-01-04 DIAGNOSIS — J43.8 OTHER EMPHYSEMA (HCC): ICD-10-CM

## 2019-01-04 RX ORDER — LINAGLIPTIN AND METFORMIN HYDROCHLORIDE 5; 1000 MG/1; MG/1
1 TABLET, FILM COATED, EXTENDED RELEASE ORAL
Qty: 90 TABLET | Refills: 5 | Status: SHIPPED | OUTPATIENT
Start: 2019-01-04 | End: 2019-06-13 | Stop reason: SDUPTHER

## 2019-02-21 DIAGNOSIS — J43.8 OTHER EMPHYSEMA (HCC): ICD-10-CM

## 2019-04-15 DIAGNOSIS — J43.8 OTHER EMPHYSEMA (HCC): ICD-10-CM

## 2019-05-30 ENCOUNTER — LAB (OUTPATIENT)
Dept: ENDOCRINOLOGY | Age: 79
End: 2019-05-30

## 2019-05-30 DIAGNOSIS — E78.2 MIXED HYPERLIPIDEMIA: ICD-10-CM

## 2019-05-30 DIAGNOSIS — N18.30 CKD (CHRONIC KIDNEY DISEASE) STAGE 3, GFR 30-59 ML/MIN (HCC): ICD-10-CM

## 2019-05-30 DIAGNOSIS — R79.9 ABNORMAL FINDING OF BLOOD CHEMISTRY: ICD-10-CM

## 2019-05-30 DIAGNOSIS — E03.9 ACQUIRED HYPOTHYROIDISM: ICD-10-CM

## 2019-05-30 DIAGNOSIS — E55.9 VITAMIN D DEFICIENCY: ICD-10-CM

## 2019-05-30 DIAGNOSIS — E88.81 INSULIN RESISTANCE: ICD-10-CM

## 2019-05-31 LAB
25(OH)D3+25(OH)D2 SERPL-MCNC: 55.7 NG/ML (ref 30–100)
ALBUMIN SERPL-MCNC: 3.8 G/DL (ref 3.5–5.2)
ALBUMIN/CREAT UR: 3.3 MG/G CREAT (ref 0–30)
ALBUMIN/GLOB SERPL: 1.2 G/DL
ALP SERPL-CCNC: 68 U/L (ref 39–117)
ALT SERPL-CCNC: 15 U/L (ref 1–33)
AST SERPL-CCNC: 14 U/L (ref 1–32)
BILIRUB SERPL-MCNC: 0.4 MG/DL (ref 0.2–1.2)
BUN SERPL-MCNC: 24 MG/DL (ref 8–23)
BUN/CREAT SERPL: 21.1 (ref 7–25)
C PEPTIDE SERPL-MCNC: 5 NG/ML (ref 1.1–4.4)
CALCIUM SERPL-MCNC: 10.5 MG/DL (ref 8.6–10.5)
CHLORIDE SERPL-SCNC: 106 MMOL/L (ref 98–107)
CHOLEST SERPL-MCNC: 139 MG/DL (ref 0–200)
CO2 SERPL-SCNC: 25 MMOL/L (ref 22–29)
CREAT SERPL-MCNC: 1.14 MG/DL (ref 0.57–1)
CREAT UR-MCNC: 157.3 MG/DL
GLOBULIN SER CALC-MCNC: 3.1 GM/DL
GLUCOSE SERPL-MCNC: 84 MG/DL (ref 65–99)
HBA1C MFR BLD: 5.5 % (ref 4.8–5.6)
HDLC SERPL-MCNC: 67 MG/DL (ref 40–60)
INTERPRETATION: NORMAL
INTERPRETATION: NORMAL
LDLC SERPL CALC-MCNC: 47 MG/DL (ref 0–100)
Lab: NORMAL
Lab: NORMAL
MICROALBUMIN UR-MCNC: 5.2 UG/ML
POTASSIUM SERPL-SCNC: 5.2 MMOL/L (ref 3.5–5.2)
PROT SERPL-MCNC: 6.9 G/DL (ref 6–8.5)
SODIUM SERPL-SCNC: 143 MMOL/L (ref 136–145)
T3 SERPL-MCNC: 91.8 NG/DL (ref 80–200)
T4 SERPL-MCNC: 7.93 MCG/DL (ref 4.5–11.7)
TRIGL SERPL-MCNC: 126 MG/DL (ref 0–150)
TSH SERPL DL<=0.005 MIU/L-ACNC: 16.5 MIU/ML (ref 0.27–4.2)
URATE SERPL-MCNC: 5.3 MG/DL (ref 2.4–5.7)
VLDLC SERPL CALC-MCNC: 25.2 MG/DL

## 2019-06-13 ENCOUNTER — OFFICE VISIT (OUTPATIENT)
Dept: ENDOCRINOLOGY | Age: 79
End: 2019-06-13

## 2019-06-13 VITALS
HEIGHT: 65 IN | WEIGHT: 224.6 LBS | DIASTOLIC BLOOD PRESSURE: 78 MMHG | SYSTOLIC BLOOD PRESSURE: 140 MMHG | BODY MASS INDEX: 37.42 KG/M2

## 2019-06-13 DIAGNOSIS — E78.2 MIXED HYPERLIPIDEMIA: ICD-10-CM

## 2019-06-13 DIAGNOSIS — E88.81 INSULIN RESISTANCE: ICD-10-CM

## 2019-06-13 DIAGNOSIS — R53.82 CHRONIC FATIGUE: ICD-10-CM

## 2019-06-13 DIAGNOSIS — R79.9 ABNORMAL FINDING OF BLOOD CHEMISTRY: ICD-10-CM

## 2019-06-13 DIAGNOSIS — E78.5 DYSLIPIDEMIA: ICD-10-CM

## 2019-06-13 DIAGNOSIS — E03.9 ACQUIRED HYPOTHYROIDISM: Primary | ICD-10-CM

## 2019-06-13 DIAGNOSIS — E55.9 VITAMIN D DEFICIENCY: ICD-10-CM

## 2019-06-13 PROCEDURE — 99214 OFFICE O/P EST MOD 30 MIN: CPT | Performed by: NURSE PRACTITIONER

## 2019-06-13 RX ORDER — ATORVASTATIN CALCIUM 20 MG/1
20 TABLET, FILM COATED ORAL DAILY
Qty: 30 TABLET | Refills: 5 | Status: SHIPPED | OUTPATIENT
Start: 2019-06-13 | End: 2019-09-24 | Stop reason: SDUPTHER

## 2019-06-13 RX ORDER — ERGOCALCIFEROL 1.25 MG/1
50000 CAPSULE ORAL
Qty: 13 CAPSULE | Refills: 4 | Status: SHIPPED | OUTPATIENT
Start: 2019-06-13 | End: 2019-09-24 | Stop reason: SDUPTHER

## 2019-06-13 RX ORDER — LEVOTHYROXINE SODIUM 137 UG/1
137 TABLET ORAL DAILY
Qty: 30 TABLET | Refills: 4 | Status: SHIPPED | OUTPATIENT
Start: 2019-06-13 | End: 2019-09-24 | Stop reason: SDUPTHER

## 2019-06-13 RX ORDER — LINAGLIPTIN AND METFORMIN HYDROCHLORIDE 5; 1000 MG/1; MG/1
1 TABLET, FILM COATED, EXTENDED RELEASE ORAL
Qty: 90 TABLET | Refills: 5 | Status: SHIPPED | OUTPATIENT
Start: 2019-06-13 | End: 2019-09-24 | Stop reason: SDUPTHER

## 2019-06-13 NOTE — PROGRESS NOTES
Subjective    Luann Frances is a 79 y.o. female. she is here to be seen for hypothyroidism.     Hypothyroid, abn endocrine labs- IRS, chronic fatigue        Hypothyroidism   This is a chronic problem. The current episode started more than 1 month ago. The problem occurs constantly. The problem has been gradually worsening. Associated symptoms include fatigue. Pertinent negatives include no myalgias or rash. Nothing aggravates the symptoms. Treatments tried: labs and meds. The treatment provided no relief.   Fatigue   This is a chronic problem. The current episode started more than 1 year ago. The problem occurs constantly. The problem has been waxing and waning. Associated symptoms include fatigue. Pertinent negatives include no myalgias or rash. Nothing aggravates the symptoms. Treatments tried: labs and meds. The treatment provided no relief.        Evaluation history:  TSH   Date Value Ref Range Status   05/30/2019 16.500 (H) 0.270 - 4.200 mIU/mL Final     Free T4   Date Value Ref Range Status   11/29/2018 1.53 0.93 - 1.70 ng/dL Final     T3, Free   Date Value Ref Range Status   11/29/2018 2.5 2.0 - 4.4 pg/mL Final       Current medications:  Current Outpatient Medications   Medication Sig Dispense Refill   • ADVAIR DISKUS 100-50 MCG/DOSE DISKUS INHALE 1 PUFFS BY MOUTH TWICE DAILY 60 each 0   • albuterol (PROVENTIL HFA;VENTOLIN HFA) 108 (90 Base) MCG/ACT inhaler Inhale 2 puffs Every 4 (Four) Hours As Needed for Wheezing or Shortness of Air. 1 inhaler 3   • atorvastatin (LIPITOR) 20 MG tablet Take 1 tablet by mouth Daily. 30 tablet 5   • [facility] ANTACID & ANTI-GAS 1000-60 MG chewable tablet      • docusate sodium (COLACE) 100 MG capsule Take 1 capsule by mouth daily.     • JENTADUETO XR 5-1000 MG tablet sustained-release 24 hour Take 1 tablet by mouth Daily With Dinner. 90 tablet 5   • omeprazole OTC (PriLOSEC OTC) 20 MG EC tablet Take 20 mg by mouth Daily. Take 1-2 tablet     • ondansetron (ZOFRAN) 4 MG tablet  Take 1 tablet by mouth Every 8 (Eight) Hours As Needed for Nausea or Vomiting. 30 tablet 1   • predniSONE (DELTASONE) 20 MG tablet TAKE 2 TABS AS A ONE TIME DOSE FOR 5 DAYS 15 tablet 0   • Probiotic Product (PROBIOTIC DAILY) capsule Take 1 tablet by mouth daily.     • vitamin D (ERGOCALCIFEROL) 65786 units capsule capsule Take 1 capsule by mouth Every 7 (Seven) Days. 13 capsule 4   • levothyroxine (SYNTHROID) 137 MCG tablet Take 1 tablet by mouth Daily. 30 tablet 4     No current facility-administered medications for this visit.        The following portions of the patient's history were reviewed and updated as appropriate:   Past Medical History:   Diagnosis Date   • Chronic bronchitis (CMS/HCC)    • Depression    • Fracture of humerus    • Hypothyroidism    • Itch of skin     face itchness due to shingles   • Pneumonia    • Pulmonary emphysema (CMS/HCC)    • Renal calculi    • Shingles rash      Past Surgical History:   Procedure Laterality Date   • APPENDECTOMY     • EYE SURGERY      cataracts   • HUMERUS FRACTURE SURGERY     • TOTAL KNEE ARTHROPLASTY Left 04/2015     Family History   Problem Relation Age of Onset   • Cancer Mother         breast   • Heart disease Maternal Grandmother      OB History     No data available        Allergies   Allergen Reactions   • Ambien [Zolpidem Tartrate]      nausea   • Amoxicillin-Pot Clavulanate    • Doxycycline    • Levofloxacin    • Metronidazole    • Naproxen    • Sulfamethoxazole-Trimethoprim    • Synthroid [Levothyroxine Sodium] Nausea Only     Social History     Socioeconomic History   • Marital status:      Spouse name: Not on file   • Number of children: Not on file   • Years of education: Not on file   • Highest education level: Not on file   Tobacco Use   • Smoking status: Current Every Day Smoker     Packs/day: 0.25   • Smokeless tobacco: Current User   Substance and Sexual Activity   • Alcohol use: Yes     Comment: social   • Drug use: No   • Sexual  "activity: Defer       Review of Systems  Review of Systems   Constitutional: Positive for fatigue.   HENT: Negative for trouble swallowing.    Eyes: Negative for visual disturbance.   Respiratory: Negative for choking.    Cardiovascular: Negative for palpitations.   Gastrointestinal: Negative for constipation.   Endocrine: Positive for cold intolerance.   Genitourinary: Negative for difficulty urinating.   Musculoskeletal: Negative for myalgias.   Skin: Negative for rash.   Allergic/Immunologic: Negative.    Neurological: Negative for dizziness.   Hematological: Does not bruise/bleed easily.   Psychiatric/Behavioral: The patient is not nervous/anxious.         Objective    /78   Ht 165.1 cm (65\")   Wt 102 kg (224 lb 9.6 oz)   BMI 37.38 kg/m²   Physical Exam   Constitutional: She is oriented to person, place, and time. She appears well-developed and well-nourished. No distress.   HENT:   Head: Normocephalic and atraumatic.   Eyes: EOM are normal. Pupils are equal, round, and reactive to light.   Neck: Normal range of motion. Neck supple.   Cardiovascular: Normal rate, regular rhythm, normal heart sounds and intact distal pulses.   No murmur heard.  Pulmonary/Chest: Effort normal and breath sounds normal.   Abdominal: Soft. Bowel sounds are normal.   Musculoskeletal: Normal range of motion.   Neurological: She is alert and oriented to person, place, and time.   Skin: Skin is warm and dry. Capillary refill takes 2 to 3 seconds. She is not diaphoretic. No pallor.   Psychiatric: She has a normal mood and affect. Her behavior is normal. Judgment and thought content normal.   Nursing note and vitals reviewed.      Lab Review  Lab Results   Component Value Date    TSH 16.500 (H) 05/30/2019     Lab Results   Component Value Date    FREET4 1.53 11/29/2018     Lab on 05/30/2019   Component Date Value Ref Range Status   • T3, Total 05/30/2019 91.8  80.0 - 200.0 ng/dl Final   • T4, Total 05/30/2019 7.93  4.50 - 11.70 " mcg/dL Final   • TSH 05/30/2019 16.500* 0.270 - 4.200 mIU/mL Final   • 25 Hydroxy, Vitamin D 05/30/2019 55.7  30.0 - 100.0 ng/ml Final    Comment: Reference Range for Total Vitamin D 25(OH)  Deficiency <20.0 ng/mL  Insufficiency 21-29 ng/mL  Sufficiency  ng/mL  Toxicity >100 ng/ml     • Uric Acid 05/30/2019 5.3  2.4 - 5.7 mg/dL Final   • Creatinine, Urine 05/30/2019 157.3  Not Estab. mg/dL Final   • Microalbumin, Urine 05/30/2019 5.2  Not Estab. ug/mL Final   • Microalbumin/Creatinine Ratio 05/30/2019 3.3  0.0 - 30.0 mg/g creat Final    Comment:                      Normal:                0.0 -  30.0                       Albuminuria:          31.0 - 300.0                       Clinical albuminuria:       >300.0     • Total Cholesterol 05/30/2019 139  0 - 200 mg/dL Final   • Triglycerides 05/30/2019 126  0 - 150 mg/dL Final   • HDL Cholesterol 05/30/2019 67* 40 - 60 mg/dL Final   • VLDL Cholesterol 05/30/2019 25.2  mg/dL Final   • LDL Cholesterol  05/30/2019 47  0 - 100 mg/dL Final   • Hemoglobin A1C 05/30/2019 5.50  4.80 - 5.60 % Final    Comment: Hemoglobin A1C Ranges:  Increased Risk for Diabetes  5.7% to 6.4%  Diabetes                     >= 6.5%  Diabetic Goal                < 7.0%     • C-Peptide 05/30/2019 5.0* 1.1 - 4.4 ng/mL Final    C-Peptide reference interval is for fasting patients.   • Glucose 05/30/2019 84  65 - 99 mg/dL Final   • BUN 05/30/2019 24* 8 - 23 mg/dL Final   • Creatinine 05/30/2019 1.14* 0.57 - 1.00 mg/dL Final   • eGFR Non African Am 05/30/2019 46* >60 mL/min/1.73 Final    Comment: The MDRD GFR formula is only valid for adults with stable  renal function between ages 18 and 70.     • eGFR  Am 05/30/2019 56* >60 mL/min/1.73 Final   • BUN/Creatinine Ratio 05/30/2019 21.1  7.0 - 25.0 Final   • Sodium 05/30/2019 143  136 - 145 mmol/L Final   • Potassium 05/30/2019 5.2  3.5 - 5.2 mmol/L Final   • Chloride 05/30/2019 106  98 - 107 mmol/L Final   • Total CO2 05/30/2019 25.0  22.0 -  29.0 mmol/L Final   • Calcium 05/30/2019 10.5  8.6 - 10.5 mg/dL Final   • Total Protein 05/30/2019 6.9  6.0 - 8.5 g/dL Final   • Albumin 05/30/2019 3.80  3.50 - 5.20 g/dL Final   • Globulin 05/30/2019 3.1  gm/dL Final   • A/G Ratio 05/30/2019 1.2  g/dL Final   • Total Bilirubin 05/30/2019 0.4  0.2 - 1.2 mg/dL Final   • Alkaline Phosphatase 05/30/2019 68  39 - 117 U/L Final   • AST (SGOT) 05/30/2019 14  1 - 32 U/L Final   • ALT (SGPT) 05/30/2019 15  1 - 33 U/L Final   • Interpretation 05/30/2019 Note   Final    Comment: -------------------------------  CHRONIC KIDNEY DISEASE:  EGFR, BLOOD PRESSURE, AND PROTEINURIA ASSESSMENT  The overall regression of eGFR with time is not  statistically significant. Current eGFR is 46 mL/min/1.73mE2  corresponding to CKD stage 3a. Multiply eGFR by 1.159 if  patient is . Potassium is within goal and  has not changed significantly, was 4.9 and now is 5.2  mmol/L. Albumin:Creatinine ratio is not elevated and has  decreased, was 5.8 and now is 3.3 mg/g creat. If no other  markers of kidney damage are present consider measurement of  cystatin C to confirm diagnosis of CKD. Hemoglobin A1C is  5.5 %; diabetic status is unknown. Refer to ADA guidelines  for clinical practice recommendations.  EGFR, BLOOD PRESSURE, AND PROTEINURIA TREATMENT SUGGESTIONS  -  Based on current eGFR and absence of significant  proteinuria, patient is at moderately increased risk for  adverse outcomes such as CKD progression, CVD, and  mortality. Guidelines recommend a target blood                            pressure of  140/90 mmHg or less in CKD patients to reduce cardiovascular  risk and CKD progression. A higher blood pressure target may  be appropriate in older individuals to avoid symptomatic  hypotension.  EGFR, BLOOD PRESSURE, AND PROTEINURIA FOLLOW-UP  -  Spot Urine Panel (Albumin preferred) and fasting Renal Panel  within 12 months;  -  BONE and MINERAL ASSESSMENT  Calcium is within goal  and has risen, was 10.1 and now is  10.5 mg/dL. Carbon Dioxide is within goal and has not  changed significantly, was 25 and now is 25 mmol/L. Vitamin  D is adequate (was 45.4 and now is 55.7 ng/mL).  BONE and MINERAL TREATMENT SUGGESTIONS  -  Interpretations require simultaneous measurements of serum  calcium and phosphorus.  BONE and MINERAL FOLLOW-UP  -  25-Hydroxy Vitamin D within 12 months; fasting PTH with  Renal Panel is recommended by KDOQI guidelines, at least  yearly;  -  LIPIDS ASSESSMENT  Blood was non-fasting; interpret these results with caution.  LDL-C is optimal and has decreased,                            was 57 and now is 47  mg/dL. Triglyceride is normal and has risen, was 87 and now  is 126 mg/dL. Non-HDL Cholesterol is optimal and has not  changed significantly, was 74 and now is 72 mg/dL. HDL-C is  high and has not changed significantly, was 71 and now is 67  mg/dL.  LIPIDS TREATMENT SUGGESTIONS  -  Therapeutic lifestyle changes are always valuable to  maintain optimal blood lipid status (diet, exercise, weight  management). Continue statin if in use. Consider measurement  of LDL particle number or Apo B to adjudicate need for  further LDL lowering therapy. If statin cannot be tolerated  or increased, alternatives include use of an intestinal  agent (ezetimibe or bile acid sequestrant) or niacin.  LIPIDS FOLLOW-UP  -  fasting Lipid Panel within 12 months;  -  ANEMIA ASSESSMENT  Most recent order does not include a CBC Panel or iron  studies.  ANEMIA FOLLOW-UP  -  CBC is recommended by KDOQI guidelines, at least yearly;  -------------------------------  DISCLAIMER  These assessment                           s and treatment suggestions are provided as  a convenience in support of the physician-patient  relationship and are not intended to replace the physician's  clinical judgment. They are derived from national guidelines  in addition to other evidence and expert opinion. The  clinician should  consider this information within the  context of clinical opinion and the individual patient.  SEE GUIDANCE FOR CHRONIC KIDNEY DISEASE PROGRAM: Kidney  Disease Improving Global Outcomes (KDIGO) clinical practice  guidelines are at http://kdigo.org/home/guidelines/.  [employer] Kidney Disease Outcomes Quality  Initiative (KDOQI (TM)), with its limitations and  disclaimers, are at www.kidney.org/professionals/KDOQI. This  program is intended for patients who have been diagnosed  with stages 3, 4, or pre-dialysis 5 CKD. It is not intended  for children, pregnant patients, or transplant patients.     • Interpretation 05/30/2019 Note   Final    Supplemental report is available.   • PDF Image 05/30/2019 Not applicable   Final   • PDF Image 05/30/2019 Not applicable   Final        Assessment/Plan      1. Acquired hypothyroidism    2. Insulin resistance    3. Vitamin D deficiency    4. Mixed hyperlipidemia    5. Dyslipidemia    6. Chronic fatigue    . This diagnosis was discussed and reviewed with the patient including the advantages of drug therapy.     1. Orders placed during this encounter include:  Orders Placed This Encounter   Procedures   • TSH     Standing Status:   Future   • T4, Free     Standing Status:   Future   • T3, Free     Standing Status:   Future       Medications prescribed:  Outpatient Encounter Medications as of 6/13/2019   Medication Sig Dispense Refill   • ADVAIR DISKUS 100-50 MCG/DOSE DISKUS INHALE 1 PUFFS BY MOUTH TWICE DAILY 60 each 0   • albuterol (PROVENTIL HFA;VENTOLIN HFA) 108 (90 Base) MCG/ACT inhaler Inhale 2 puffs Every 4 (Four) Hours As Needed for Wheezing or Shortness of Air. 1 inhaler 3   • atorvastatin (LIPITOR) 20 MG tablet Take 1 tablet by mouth Daily. 30 tablet 5   • [facility] ANTACID & ANTI-GAS 1000-60 MG chewable tablet      • docusate sodium (COLACE) 100 MG capsule Take 1 capsule by mouth daily.     • JENTADUETO XR 5-1000 MG tablet sustained-release 24 hour Take 1 tablet  by mouth Daily With Dinner. 90 tablet 5   • omeprazole OTC (PriLOSEC OTC) 20 MG EC tablet Take 20 mg by mouth Daily. Take 1-2 tablet     • ondansetron (ZOFRAN) 4 MG tablet Take 1 tablet by mouth Every 8 (Eight) Hours As Needed for Nausea or Vomiting. 30 tablet 1   • predniSONE (DELTASONE) 20 MG tablet TAKE 2 TABS AS A ONE TIME DOSE FOR 5 DAYS 15 tablet 0   • Probiotic Product (PROBIOTIC DAILY) capsule Take 1 tablet by mouth daily.     • vitamin D (ERGOCALCIFEROL) 30200 units capsule capsule Take 1 capsule by mouth Every 7 (Seven) Days. 13 capsule 4   • [DISCONTINUED] atorvastatin (LIPITOR) 20 MG tablet Take 1 tablet by mouth Daily. 30 tablet 5   • [DISCONTINUED] JENTADUETO XR 5-1000 MG tablet sustained-release 24 hour TAKE 1 TABLET BY MOUTH DAILY WITH DINNER 90 tablet 5   • [DISCONTINUED] SYNTHROID 112 MCG tablet Take 1 tablet by mouth Daily. 30 tablet 5   • [DISCONTINUED] vitamin D (ERGOCALCIFEROL) 83387 units capsule capsule Take 1 capsule by mouth Every 7 (Seven) Days. 13 capsule 4   • levothyroxine (SYNTHROID) 137 MCG tablet Take 1 tablet by mouth Daily. 30 tablet 4     No facility-administered encounter medications on file as of 6/13/2019.        2. Repeat s-TSH in 6-8 weeks and before patient's next visit.    3. The risks and benefits of my recommendations, as well as other treatment options were discussed with the patient today. Questions were answered.      Diagnoses and all orders for this visit:     1. Acquired hypothyroidism (Primary)- chronic, uncontrolled.  Medication changes were as follows  Increase synthroid to 137mcg daily - recheck in 6 weeks.    -     levothyroxine (SYNTHROID) 137 MCG tablet; Take 1 tablet by mouth Daily.  Dispense: 30 tablet; Refill: 4  -     TSH; Future  -     T4, Free; Future  -     T3, Free; Future    2. Insulin resistance- chronic, stable no medication changes at this time. Refills prescribed. Future labs ordered for upcoming appointment and assessment.     -     JENTADUETO  XR 5-1000 MG tablet sustained-release 24 hour; Take 1 tablet by mouth Daily With Dinner.  Dispense: 90 tablet; Refill: 5    3. Vitamin D deficiency- chronic, stable no medication changes at this time. Refills prescribed. Future labs ordered for upcoming appointment and assessment.     -     vitamin D (ERGOCALCIFEROL) 84265 units capsule capsule; Take 1 capsule by mouth Every 7 (Seven) Days.  Dispense: 13 capsule; Refill: 4    4. Mixed hyperlipidemia- chronic, stable no medication changes at this time. Refills prescribed. Future labs ordered for upcoming appointment and assessment.     -     atorvastatin (LIPITOR) 20 MG tablet; Take 1 tablet by mouth Daily.  Dispense: 30 tablet; Refill: 5       4. Return in about 3 months (around 9/13/2019). 3 months with Josephine-2 weeks prior for labs 6 months with Dr. Saleh-2 weeks prior for labs

## 2019-06-15 DIAGNOSIS — E79.0 HYPERURICEMIA: ICD-10-CM

## 2019-06-15 DIAGNOSIS — J43.8 OTHER EMPHYSEMA (HCC): ICD-10-CM

## 2019-06-17 RX ORDER — ALLOPURINOL 100 MG/1
100 TABLET ORAL DAILY
Qty: 30 TABLET | Refills: 0 | OUTPATIENT
Start: 2019-06-17 | End: 2020-06-16

## 2019-06-19 DIAGNOSIS — E79.0 HYPERURICEMIA: ICD-10-CM

## 2019-06-19 RX ORDER — ALLOPURINOL 100 MG/1
100 TABLET ORAL DAILY
Qty: 30 TABLET | Refills: 0 | Status: SHIPPED | OUTPATIENT
Start: 2019-06-19 | End: 2019-07-25 | Stop reason: SDUPTHER

## 2019-07-25 ENCOUNTER — RESULTS ENCOUNTER (OUTPATIENT)
Dept: ENDOCRINOLOGY | Age: 79
End: 2019-07-25

## 2019-07-25 DIAGNOSIS — E79.0 HYPERURICEMIA: ICD-10-CM

## 2019-07-25 DIAGNOSIS — E03.9 ACQUIRED HYPOTHYROIDISM: ICD-10-CM

## 2019-07-26 LAB
T3FREE SERPL-MCNC: 2.8 PG/ML (ref 2–4.4)
T4 FREE SERPL-MCNC: 1.8 NG/DL (ref 0.93–1.7)
TSH SERPL DL<=0.005 MIU/L-ACNC: 0.67 MIU/ML (ref 0.27–4.2)

## 2019-07-26 RX ORDER — ALLOPURINOL 100 MG/1
100 TABLET ORAL DAILY
Qty: 90 TABLET | Refills: 0 | Status: SHIPPED | OUTPATIENT
Start: 2019-07-26 | End: 2019-09-24 | Stop reason: SDUPTHER

## 2019-09-11 LAB
25(OH)D3+25(OH)D2 SERPL-MCNC: 51 NG/ML (ref 30–100)
ALBUMIN SERPL-MCNC: 4.1 G/DL (ref 3.5–5.2)
ALBUMIN/GLOB SERPL: 1.5 G/DL
ALP SERPL-CCNC: 66 U/L (ref 39–117)
ALT SERPL-CCNC: 17 U/L (ref 1–33)
AST SERPL-CCNC: 17 U/L (ref 1–32)
BILIRUB SERPL-MCNC: 0.4 MG/DL (ref 0.2–1.2)
BUN SERPL-MCNC: 20 MG/DL (ref 8–23)
BUN/CREAT SERPL: 18.2 (ref 7–25)
CALCIUM SERPL-MCNC: 9.7 MG/DL (ref 8.6–10.5)
CHLORIDE SERPL-SCNC: 105 MMOL/L (ref 98–107)
CHOLEST SERPL-MCNC: 129 MG/DL (ref 0–200)
CO2 SERPL-SCNC: 22.9 MMOL/L (ref 22–29)
CREAT SERPL-MCNC: 1.1 MG/DL (ref 0.57–1)
GLOBULIN SER CALC-MCNC: 2.7 GM/DL
GLUCOSE SERPL-MCNC: 103 MG/DL (ref 65–99)
HBA1C MFR BLD: 5.9 % (ref 4.8–5.6)
HDLC SERPL-MCNC: 63 MG/DL (ref 40–60)
INTERPRETATION: NORMAL
LDLC SERPL CALC-MCNC: 42 MG/DL (ref 0–100)
Lab: NORMAL
POTASSIUM SERPL-SCNC: 4.6 MMOL/L (ref 3.5–5.2)
PROT SERPL-MCNC: 6.8 G/DL (ref 6–8.5)
SODIUM SERPL-SCNC: 143 MMOL/L (ref 136–145)
T3FREE SERPL-MCNC: 2.9 PG/ML (ref 2–4.4)
T4 FREE SERPL-MCNC: 1.67 NG/DL (ref 0.93–1.7)
THYROGLOB AB SERPL-ACNC: 643.4 IU/ML (ref 0–0.9)
THYROPEROXIDASE AB SERPL-ACNC: >600 IU/ML (ref 0–34)
TRIGL SERPL-MCNC: 120 MG/DL (ref 0–150)
TSH SERPL DL<=0.005 MIU/L-ACNC: 1.61 UIU/ML (ref 0.27–4.2)
UNABLE TO VOID: NORMAL
URATE SERPL-MCNC: 5.2 MG/DL (ref 2.4–5.7)
VLDLC SERPL CALC-MCNC: 24 MG/DL

## 2019-09-13 ENCOUNTER — RESULTS ENCOUNTER (OUTPATIENT)
Dept: ENDOCRINOLOGY | Age: 79
End: 2019-09-13

## 2019-09-13 DIAGNOSIS — E03.9 ACQUIRED HYPOTHYROIDISM: ICD-10-CM

## 2019-09-13 DIAGNOSIS — E88.81 INSULIN RESISTANCE: ICD-10-CM

## 2019-09-13 DIAGNOSIS — E55.9 VITAMIN D DEFICIENCY: ICD-10-CM

## 2019-09-13 DIAGNOSIS — R53.82 CHRONIC FATIGUE: ICD-10-CM

## 2019-09-13 DIAGNOSIS — E78.2 MIXED HYPERLIPIDEMIA: ICD-10-CM

## 2019-09-13 DIAGNOSIS — R79.9 ABNORMAL FINDING OF BLOOD CHEMISTRY: ICD-10-CM

## 2019-09-13 DIAGNOSIS — E78.5 DYSLIPIDEMIA: ICD-10-CM

## 2019-09-24 ENCOUNTER — OFFICE VISIT (OUTPATIENT)
Dept: ENDOCRINOLOGY | Age: 79
End: 2019-09-24

## 2019-09-24 VITALS
WEIGHT: 224 LBS | HEIGHT: 65 IN | DIASTOLIC BLOOD PRESSURE: 78 MMHG | BODY MASS INDEX: 37.32 KG/M2 | SYSTOLIC BLOOD PRESSURE: 134 MMHG

## 2019-09-24 DIAGNOSIS — E55.9 VITAMIN D DEFICIENCY: ICD-10-CM

## 2019-09-24 DIAGNOSIS — E03.9 ACQUIRED HYPOTHYROIDISM: Primary | ICD-10-CM

## 2019-09-24 DIAGNOSIS — E78.2 MIXED HYPERLIPIDEMIA: ICD-10-CM

## 2019-09-24 DIAGNOSIS — E79.0 HYPERURICEMIA: ICD-10-CM

## 2019-09-24 DIAGNOSIS — R53.83 FATIGUE, UNSPECIFIED TYPE: ICD-10-CM

## 2019-09-24 DIAGNOSIS — R79.9 ABNORMAL FINDING OF BLOOD CHEMISTRY: ICD-10-CM

## 2019-09-24 DIAGNOSIS — E88.81 INSULIN RESISTANCE: ICD-10-CM

## 2019-09-24 PROCEDURE — 99214 OFFICE O/P EST MOD 30 MIN: CPT | Performed by: NURSE PRACTITIONER

## 2019-09-24 RX ORDER — LEVOTHYROXINE SODIUM 137 UG/1
137 TABLET ORAL DAILY
Qty: 30 TABLET | Refills: 4 | Status: SHIPPED | OUTPATIENT
Start: 2019-09-24 | End: 2020-01-20 | Stop reason: SDUPTHER

## 2019-09-24 RX ORDER — LINAGLIPTIN AND METFORMIN HYDROCHLORIDE 5; 1000 MG/1; MG/1
1 TABLET, FILM COATED, EXTENDED RELEASE ORAL
Qty: 90 TABLET | Refills: 5 | Status: SHIPPED | OUTPATIENT
Start: 2019-09-24 | End: 2020-01-20 | Stop reason: SDUPTHER

## 2019-09-24 RX ORDER — ERGOCALCIFEROL 1.25 MG/1
50000 CAPSULE ORAL
Qty: 13 CAPSULE | Refills: 4 | Status: SHIPPED | OUTPATIENT
Start: 2019-09-24 | End: 2020-01-20 | Stop reason: SDUPTHER

## 2019-09-24 RX ORDER — ALLOPURINOL 100 MG/1
100 TABLET ORAL DAILY
Qty: 90 TABLET | Refills: 0 | Status: SHIPPED | OUTPATIENT
Start: 2019-09-24 | End: 2020-01-20 | Stop reason: SDUPTHER

## 2019-09-24 RX ORDER — ATORVASTATIN CALCIUM 20 MG/1
20 TABLET, FILM COATED ORAL DAILY
Qty: 30 TABLET | Refills: 5 | Status: SHIPPED | OUTPATIENT
Start: 2019-09-24 | End: 2020-01-20 | Stop reason: SDUPTHER

## 2019-09-24 NOTE — PROGRESS NOTES
Subjective    Luann Frances is a 79 y.o. female. she is here to be seen for hypothyroidism.     Hypothyroid, IRS,       Hypothyroidism   This is a chronic problem. The current episode started more than 1 year ago. The problem occurs constantly. The problem has been waxing and waning. Associated symptoms include fatigue and myalgias. Pertinent negatives include no rash. Nothing aggravates the symptoms. Treatments tried: meds and labs. The treatment provided no relief.        Evaluation history:  TSH   Date Value Ref Range Status   09/10/2019 1.610 0.270 - 4.200 uIU/mL Final     Free T4   Date Value Ref Range Status   09/10/2019 1.67 0.93 - 1.70 ng/dL Final     T3, Free   Date Value Ref Range Status   09/10/2019 2.9 2.0 - 4.4 pg/mL Final       Current medications:  Current Outpatient Medications   Medication Sig Dispense Refill   • ADVAIR DISKUS 100-50 MCG/DOSE DISKUS USE 1 INHALATION BY MOUTH TWICE DAILY 60 each 5   • albuterol (PROVENTIL HFA;VENTOLIN HFA) 108 (90 Base) MCG/ACT inhaler Inhale 2 puffs Every 4 (Four) Hours As Needed for Wheezing or Shortness of Air. 1 inhaler 3   • allopurinol (ZYLOPRIM) 100 MG tablet Take 1 tablet by mouth Daily. 90 tablet 0   • atorvastatin (LIPITOR) 20 MG tablet Take 1 tablet by mouth Daily. 30 tablet 5   • [facility] ANTACID & ANTI-GAS 1000-60 MG chewable tablet      • docusate sodium (COLACE) 100 MG capsule Take 1 capsule by mouth daily.     • JENTADUETO XR 5-1000 MG tablet sustained-release 24 hour Take 1 tablet by mouth Daily With Dinner. 90 tablet 5   • levothyroxine (SYNTHROID) 137 MCG tablet Take 1 tablet by mouth Daily. 30 tablet 4   • omeprazole OTC (PriLOSEC OTC) 20 MG EC tablet Take 20 mg by mouth Daily. Take 1-2 tablet     • ondansetron (ZOFRAN) 4 MG tablet Take 1 tablet by mouth Every 8 (Eight) Hours As Needed for Nausea or Vomiting. 30 tablet 1   • predniSONE (DELTASONE) 20 MG tablet TAKE 2 TABS AS A ONE TIME DOSE FOR 5 DAYS 15 tablet 0   • Probiotic Product (PROBIOTIC  DAILY) capsule Take 1 tablet by mouth daily.     • vitamin D (ERGOCALCIFEROL) 78653 units capsule capsule Take 1 capsule by mouth Every 7 (Seven) Days. 13 capsule 4     No current facility-administered medications for this visit.        The following portions of the patient's history were reviewed and updated as appropriate:   Past Medical History:   Diagnosis Date   • Chronic bronchitis (CMS/HCC)    • Depression    • Fracture of humerus    • Hypothyroidism    • Itch of skin     face itchness due to shingles   • Pneumonia    • Pulmonary emphysema (CMS/HCC)    • Renal calculi    • Shingles rash      Past Surgical History:   Procedure Laterality Date   • APPENDECTOMY     • EYE SURGERY      cataracts   • HUMERUS FRACTURE SURGERY     • TOTAL KNEE ARTHROPLASTY Left 04/2015     Family History   Problem Relation Age of Onset   • Cancer Mother         breast   • Heart disease Maternal Grandmother      OB History     No data available        Allergies   Allergen Reactions   • Ambien [Zolpidem Tartrate]      nausea   • Amoxicillin-Pot Clavulanate    • Doxycycline    • Levofloxacin    • Metronidazole    • Naproxen    • Sulfamethoxazole-Trimethoprim    • Synthroid [Levothyroxine Sodium] Nausea Only     Social History     Socioeconomic History   • Marital status:      Spouse name: Not on file   • Number of children: Not on file   • Years of education: Not on file   • Highest education level: Not on file   Tobacco Use   • Smoking status: Current Every Day Smoker     Packs/day: 0.25   • Smokeless tobacco: Current User   Substance and Sexual Activity   • Alcohol use: Yes     Comment: social   • Drug use: No   • Sexual activity: Defer       Review of Systems  Review of Systems   Constitutional: Positive for fatigue.   HENT: Negative for trouble swallowing.    Eyes: Negative for visual disturbance.   Respiratory: Negative for choking.    Cardiovascular: Negative for palpitations.   Gastrointestinal: Negative for constipation.  "  Endocrine: Negative for cold intolerance.   Genitourinary: Negative for difficulty urinating.   Musculoskeletal: Positive for myalgias.   Skin: Negative for rash.   Neurological: Negative for light-headedness.   Hematological: Does not bruise/bleed easily.   Psychiatric/Behavioral: Negative for sleep disturbance.        Objective    /78   Ht 165.1 cm (65\")   Wt 102 kg (224 lb)   BMI 37.28 kg/m²   Physical Exam   Constitutional: She is oriented to person, place, and time. She appears well-developed and well-nourished. No distress.   HENT:   Head: Normocephalic and atraumatic.   Eyes: EOM are normal. Pupils are equal, round, and reactive to light.   Neck: Normal range of motion. Neck supple.   Cardiovascular: Normal rate, regular rhythm, normal heart sounds and intact distal pulses.   No murmur heard.  Pulmonary/Chest: Effort normal and breath sounds normal.   Abdominal: Soft. Bowel sounds are normal.   Musculoskeletal: Normal range of motion.   Neurological: She is alert and oriented to person, place, and time.   Skin: Skin is warm and dry. Capillary refill takes 2 to 3 seconds. She is not diaphoretic. No pallor.   Psychiatric: She has a normal mood and affect. Her behavior is normal. Judgment and thought content normal.   Nursing note and vitals reviewed.      Lab Review  Lab Results   Component Value Date    TSH 1.610 09/10/2019         Lab Results   Component Value Date    FREET4 1.67 09/10/2019       Results Encounter on 09/13/2019   Component Date Value Ref Range Status   • Glucose 09/10/2019 103* 65 - 99 mg/dL Final   • BUN 09/10/2019 20  8 - 23 mg/dL Final   • Creatinine 09/10/2019 1.10* 0.57 - 1.00 mg/dL Final   • eGFR Non  Am 09/10/2019 48* >60 mL/min/1.73 Final    Comment: The MDRD GFR formula is only valid for adults with stable  renal function between ages 18 and 70.     • eGFR  Am 09/10/2019 58* >60 mL/min/1.73 Final   • BUN/Creatinine Ratio 09/10/2019 18.2  7.0 - 25.0 Final   • " Sodium 09/10/2019 143  136 - 145 mmol/L Final   • Potassium 09/10/2019 4.6  3.5 - 5.2 mmol/L Final   • Chloride 09/10/2019 105  98 - 107 mmol/L Final   • Total CO2 09/10/2019 22.9  22.0 - 29.0 mmol/L Final   • Calcium 09/10/2019 9.7  8.6 - 10.5 mg/dL Final   • Total Protein 09/10/2019 6.8  6.0 - 8.5 g/dL Final   • Albumin 09/10/2019 4.10  3.50 - 5.20 g/dL Final   • Globulin 09/10/2019 2.7  gm/dL Final   • A/G Ratio 09/10/2019 1.5  g/dL Final   • Total Bilirubin 09/10/2019 0.4  0.2 - 1.2 mg/dL Final   • Alkaline Phosphatase 09/10/2019 66  39 - 117 U/L Final   • AST (SGOT) 09/10/2019 17  1 - 32 U/L Final   • ALT (SGPT) 09/10/2019 17  1 - 33 U/L Final   • Hemoglobin A1C 09/10/2019 5.90* 4.80 - 5.60 % Final    Comment: Hemoglobin A1C Ranges:  Increased Risk for Diabetes  5.7% to 6.4%  Diabetes                     >= 6.5%  Diabetic Goal                < 7.0%     • Total Cholesterol 09/10/2019 129  0 - 200 mg/dL Final   • Triglycerides 09/10/2019 120  0 - 150 mg/dL Final   • HDL Cholesterol 09/10/2019 63* 40 - 60 mg/dL Final   • VLDL Cholesterol 09/10/2019 24  mg/dL Final   • LDL Cholesterol  09/10/2019 42  0 - 100 mg/dL Final   • Uric Acid 09/10/2019 5.2  2.4 - 5.7 mg/dL Final   • 25 Hydroxy, Vitamin D 09/10/2019 51.0  30.0 - 100.0 ng/ml Final    Comment: Reference Range for Total Vitamin D 25(OH)  Deficiency <20.0 ng/mL  Insufficiency 21-29 ng/mL  Sufficiency  ng/mL  Toxicity >100 ng/ml     • TSH 09/10/2019 1.610  0.270 - 4.200 uIU/mL Final   • Free T4 09/10/2019 1.67  0.93 - 1.70 ng/dL Final   • Thyroid Peroxidase Antibody 09/10/2019 >600* 0 - 34 IU/mL Final   • Thyroglobulin Ab 09/10/2019 643.4* 0.0 - 0.9 IU/mL Final    Thyroglobulin Antibody measured by [facility]   • T3, Free 09/10/2019 2.9  2.0 - 4.4 pg/mL Final   • Interpretation 09/10/2019 Note   Final    Supplemental report is available.   • PDF Image 09/10/2019 Not applicable   Final   • Unable to Void 09/10/2019 Comment   Final     Comment: The patient was not able to render a urine sample and has been  instructed to return for a urine collection at their earliest  convenience.  The urine testing that you have requested has  been deleted from this report.  When the patient returns and  provides a urine specimen, the urine testing will be performed  and separately reported.          Assessment/Plan      1. Acquired hypothyroidism    2. Insulin resistance    3. Vitamin D deficiency    4. Mixed hyperlipidemia    5. Fatigue, unspecified type    6. Abnormal finding of blood chemistry     7. Hyperuricemia    . This diagnosis was discussed and reviewed with the patient including the advantages of drug therapy.     1. Orders placed during this encounter include:  Orders Placed This Encounter   Procedures   • Anemia Profile B ([facility])   • Anemia Profile A ([facility])   • Ferritin   • Vitamin B12 and Folate       Medications prescribed:  Outpatient Encounter Medications as of 9/24/2019   Medication Sig Dispense Refill   • ADVAIR DISKUS 100-50 MCG/DOSE DISKUS USE 1 INHALATION BY MOUTH TWICE DAILY 60 each 5   • albuterol (PROVENTIL HFA;VENTOLIN HFA) 108 (90 Base) MCG/ACT inhaler Inhale 2 puffs Every 4 (Four) Hours As Needed for Wheezing or Shortness of Air. 1 inhaler 3   • allopurinol (ZYLOPRIM) 100 MG tablet Take 1 tablet by mouth Daily. 90 tablet 0   • atorvastatin (LIPITOR) 20 MG tablet Take 1 tablet by mouth Daily. 30 tablet 5   • [facility] ANTACID & ANTI-GAS 1000-60 MG chewable tablet      • docusate sodium (COLACE) 100 MG capsule Take 1 capsule by mouth daily.     • JENTADUETO XR 5-1000 MG tablet sustained-release 24 hour Take 1 tablet by mouth Daily With Dinner. 90 tablet 5   • levothyroxine (SYNTHROID) 137 MCG tablet Take 1 tablet by mouth Daily. 30 tablet 4   • omeprazole OTC (PriLOSEC OTC) 20 MG EC tablet Take 20 mg by mouth Daily. Take 1-2 tablet     • ondansetron (ZOFRAN) 4 MG tablet Take 1 tablet by mouth Every 8 (Eight) Hours As Needed for Nausea  or Vomiting. 30 tablet 1   • predniSONE (DELTASONE) 20 MG tablet TAKE 2 TABS AS A ONE TIME DOSE FOR 5 DAYS 15 tablet 0   • Probiotic Product (PROBIOTIC DAILY) capsule Take 1 tablet by mouth daily.     • vitamin D (ERGOCALCIFEROL) 57491 units capsule capsule Take 1 capsule by mouth Every 7 (Seven) Days. 13 capsule 4   • [DISCONTINUED] allopurinol (ZYLOPRIM) 100 MG tablet TAKE 1 TABLET BY MOUTH DAILY 90 tablet 0   • [DISCONTINUED] atorvastatin (LIPITOR) 20 MG tablet Take 1 tablet by mouth Daily. 30 tablet 5   • [DISCONTINUED] JENTADUETO XR 5-1000 MG tablet sustained-release 24 hour Take 1 tablet by mouth Daily With Dinner. 90 tablet 5   • [DISCONTINUED] levothyroxine (SYNTHROID) 137 MCG tablet Take 1 tablet by mouth Daily. 30 tablet 4   • [DISCONTINUED] vitamin D (ERGOCALCIFEROL) 61073 units capsule capsule Take 1 capsule by mouth Every 7 (Seven) Days. 13 capsule 4     No facility-administered encounter medications on file as of 9/24/2019.        2. Repeat s-TSH in before patient's next visit.    3. The risks and benefits of my recommendations, as well as other treatment options were discussed with the patient today. Questions were answered.    Diagnoses and all orders for this visit:     1. Acquired hypothyroidism (Primary)- chronic, stable no medication changes at this time. Refills prescribed. Future labs ordered for upcoming appointment and assessment.       2. Insulin resistance- chronic, stable no medication changes at this time. Refills prescribed. Future labs ordered for upcoming appointment and assessment.       3. Vitamin D deficiency- chronic, stable no medication changes at this time. Refills prescribed. Future labs ordered for upcoming appointment and assessment.           4. Mixed hyperlipidemia- chronic, stable no medication changes at this time. Refills prescribed. Future labs ordered for upcoming appointment and assessment.            5. Fatigue, unspecified type- chronic, uncontrolled. No medication  changes were as follows, at this time a detailed lab work up will be obtained.     -     Anemia Profile B ([facility])  -     Anemia Profile A ([facility])  -     Ferritin  -     Vitamin B12 and Folate          6. Hyperuricemia- chronic, stable no medication changes at this time. Refills prescribed. Future labs ordered for upcoming appointment and assessment.     -     allopurinol (ZYLOPRIM) 100 MG tablet; Take 1 tablet by mouth Daily.  Dispense: 90 tablet; Refill: 0        4. Return in about 3 months (around 12/24/2019), or if symptoms worsen or fail to improve, for Recheck. 4 months with Josephine-2 weeks prior for labs8 months with Dr. Saleh-2 weeks prior for labs

## 2019-09-25 LAB
BASOPHILS # BLD AUTO: 0.09 10*3/MM3 (ref 0–0.2)
BASOPHILS NFR BLD AUTO: 1.3 % (ref 0–1.5)
EOSINOPHIL # BLD AUTO: 0.19 10*3/MM3 (ref 0–0.4)
EOSINOPHIL NFR BLD AUTO: 2.6 % (ref 0.3–6.2)
ERYTHROCYTE [DISTWIDTH] IN BLOOD BY AUTOMATED COUNT: 13.5 % (ref 12.3–15.4)
FERRITIN SERPL-MCNC: 113 NG/ML (ref 13–150)
FOLATE SERPL-MCNC: 18.3 NG/ML (ref 4.78–24.2)
HCT VFR BLD AUTO: 39.5 % (ref 34–46.6)
HGB BLD-MCNC: 12.5 G/DL (ref 12–15.9)
IMM GRANULOCYTES # BLD AUTO: 0.03 10*3/MM3 (ref 0–0.05)
IMM GRANULOCYTES NFR BLD AUTO: 0.4 % (ref 0–0.5)
IRON SATN MFR SERPL: 18 % (ref 20–50)
IRON SERPL-MCNC: 64 MCG/DL (ref 37–145)
LYMPHOCYTES # BLD AUTO: 2.24 10*3/MM3 (ref 0.7–3.1)
LYMPHOCYTES NFR BLD AUTO: 31.1 % (ref 19.6–45.3)
MCH RBC QN AUTO: 29.3 PG (ref 26.6–33)
MCHC RBC AUTO-ENTMCNC: 31.6 G/DL (ref 31.5–35.7)
MCV RBC AUTO: 92.7 FL (ref 79–97)
MONOCYTES # BLD AUTO: 0.59 10*3/MM3 (ref 0.1–0.9)
MONOCYTES NFR BLD AUTO: 8.2 % (ref 5–12)
NEUTROPHILS # BLD AUTO: 4.06 10*3/MM3 (ref 1.7–7)
NEUTROPHILS NFR BLD AUTO: 56.4 % (ref 42.7–76)
NRBC BLD AUTO-RTO: 0 /100 WBC (ref 0–0.2)
PLATELET # BLD AUTO: 232 10*3/MM3 (ref 140–450)
RBC # BLD AUTO: 4.26 10*6/MM3 (ref 3.77–5.28)
RETICS/RBC NFR AUTO: 1.69 % (ref 0.7–1.9)
TIBC SERPL-MCNC: 357 MCG/DL
UIBC SERPL-MCNC: 293 MCG/DL (ref 112–346)
VIT B12 SERPL-MCNC: 266 PG/ML (ref 211–946)
WBC # BLD AUTO: 7.2 10*3/MM3 (ref 3.4–10.8)

## 2019-09-27 ENCOUNTER — TELEPHONE (OUTPATIENT)
Dept: ENDOCRINOLOGY | Age: 79
End: 2019-09-27

## 2019-09-27 DIAGNOSIS — R53.83 FATIGUE, UNSPECIFIED TYPE: Primary | ICD-10-CM

## 2019-09-27 NOTE — TELEPHONE ENCOUNTER
Viewed anemia profile.  Within normal limits.  As discussed at visit a referral for sleep medicine was placed.  Please call patient to notify of referral.

## 2019-10-10 ENCOUNTER — APPOINTMENT (OUTPATIENT)
Dept: SLEEP MEDICINE | Facility: HOSPITAL | Age: 79
End: 2019-10-10

## 2019-10-16 ENCOUNTER — HOSPITAL ENCOUNTER (EMERGENCY)
Facility: HOSPITAL | Age: 79
Discharge: HOME | End: 2019-10-16
Attending: EMERGENCY MEDICINE | Admitting: EMERGENCY MEDICINE

## 2019-10-16 ENCOUNTER — OFFICE VISIT (OUTPATIENT)
Dept: SLEEP MEDICINE | Facility: HOSPITAL | Age: 79
End: 2019-10-16

## 2019-10-16 VITALS
SYSTOLIC BLOOD PRESSURE: 222 MMHG | DIASTOLIC BLOOD PRESSURE: 102 MMHG | TEMPERATURE: 97 F | RESPIRATION RATE: 18 BRPM | BODY MASS INDEX: 37.32 KG/M2 | HEART RATE: 77 BPM | WEIGHT: 224 LBS | HEIGHT: 65 IN | OXYGEN SATURATION: 98 %

## 2019-10-16 VITALS
OXYGEN SATURATION: 99 % | SYSTOLIC BLOOD PRESSURE: 206 MMHG | WEIGHT: 223.2 LBS | HEIGHT: 65 IN | DIASTOLIC BLOOD PRESSURE: 85 MMHG | HEART RATE: 83 BPM | BODY MASS INDEX: 37.19 KG/M2

## 2019-10-16 DIAGNOSIS — I10 PRIMARY HYPERTENSION: Primary | ICD-10-CM

## 2019-10-16 DIAGNOSIS — I10 ESSENTIAL HYPERTENSION: ICD-10-CM

## 2019-10-16 DIAGNOSIS — G47.10 HYPERSOMNIA: Primary | ICD-10-CM

## 2019-10-16 DIAGNOSIS — E66.9 OBESITY (BMI 30-39.9): ICD-10-CM

## 2019-10-16 LAB
ANION GAP SERPL CALCULATED.3IONS-SCNC: 13.8 MMOL/L (ref 5–15)
BASOPHILS # BLD AUTO: 0.1 10*3/MM3 (ref 0–0.2)
BASOPHILS NFR BLD AUTO: 1.3 % (ref 0–1.5)
BUN BLD-MCNC: 18 MG/DL (ref 8–23)
BUN/CREAT SERPL: 19.1 (ref 7–25)
CALCIUM SPEC-SCNC: 9.9 MG/DL (ref 8.6–10.5)
CHLORIDE SERPL-SCNC: 104 MMOL/L (ref 98–107)
CO2 SERPL-SCNC: 26.2 MMOL/L (ref 22–29)
CREAT BLD-MCNC: 0.94 MG/DL (ref 0.57–1)
DEPRECATED RDW RBC AUTO: 43.4 FL (ref 37–54)
EOSINOPHIL # BLD AUTO: 0.16 10*3/MM3 (ref 0–0.4)
EOSINOPHIL NFR BLD AUTO: 2 % (ref 0.3–6.2)
ERYTHROCYTE [DISTWIDTH] IN BLOOD BY AUTOMATED COUNT: 13.1 % (ref 12.3–15.4)
GFR SERPL CREATININE-BSD FRML MDRD: 58 ML/MIN/1.73
GLUCOSE BLD-MCNC: 93 MG/DL (ref 65–99)
HCT VFR BLD AUTO: 41.1 % (ref 34–46.6)
HGB BLD-MCNC: 13.3 G/DL (ref 12–15.9)
IMM GRANULOCYTES # BLD AUTO: 0.04 10*3/MM3 (ref 0–0.05)
IMM GRANULOCYTES NFR BLD AUTO: 0.5 % (ref 0–0.5)
LYMPHOCYTES # BLD AUTO: 2.53 10*3/MM3 (ref 0.7–3.1)
LYMPHOCYTES NFR BLD AUTO: 31.9 % (ref 19.6–45.3)
MCH RBC QN AUTO: 29.2 PG (ref 26.6–33)
MCHC RBC AUTO-ENTMCNC: 32.4 G/DL (ref 31.5–35.7)
MCV RBC AUTO: 90.3 FL (ref 79–97)
MONOCYTES # BLD AUTO: 0.6 10*3/MM3 (ref 0.1–0.9)
MONOCYTES NFR BLD AUTO: 7.6 % (ref 5–12)
NEUTROPHILS # BLD AUTO: 4.51 10*3/MM3 (ref 1.7–7)
NEUTROPHILS NFR BLD AUTO: 56.7 % (ref 42.7–76)
NRBC BLD AUTO-RTO: 0 /100 WBC (ref 0–0.2)
PLATELET # BLD AUTO: 266 10*3/MM3 (ref 140–450)
PMV BLD AUTO: 11 FL (ref 6–12)
POTASSIUM BLD-SCNC: 4.2 MMOL/L (ref 3.5–5.2)
RBC # BLD AUTO: 4.55 10*6/MM3 (ref 3.77–5.28)
SODIUM BLD-SCNC: 144 MMOL/L (ref 136–145)
WBC NRBC COR # BLD: 7.94 10*3/MM3 (ref 3.4–10.8)

## 2019-10-16 PROCEDURE — 93005 ELECTROCARDIOGRAM TRACING: CPT | Performed by: EMERGENCY MEDICINE

## 2019-10-16 PROCEDURE — 99283 EMERGENCY DEPT VISIT LOW MDM: CPT

## 2019-10-16 PROCEDURE — 85025 COMPLETE CBC W/AUTO DIFF WBC: CPT | Performed by: EMERGENCY MEDICINE

## 2019-10-16 PROCEDURE — 80048 BASIC METABOLIC PNL TOTAL CA: CPT | Performed by: EMERGENCY MEDICINE

## 2019-10-16 PROCEDURE — G0463 HOSPITAL OUTPT CLINIC VISIT: HCPCS

## 2019-10-16 PROCEDURE — 93010 ELECTROCARDIOGRAM REPORT: CPT | Performed by: INTERNAL MEDICINE

## 2019-10-16 RX ORDER — SODIUM CHLORIDE 0.9 % (FLUSH) 0.9 %
10 SYRINGE (ML) INJECTION AS NEEDED
Status: DISCONTINUED | OUTPATIENT
Start: 2019-10-16 | End: 2019-10-16 | Stop reason: HOSPADM

## 2019-10-16 RX ORDER — CHLORTHALIDONE 25 MG/1
25 TABLET ORAL DAILY
Qty: 30 TABLET | Refills: 0 | Status: SHIPPED | OUTPATIENT
Start: 2019-10-16 | End: 2019-11-05 | Stop reason: SDUPTHER

## 2019-10-16 RX ORDER — AMLODIPINE BESYLATE 5 MG/1
5 TABLET ORAL DAILY
Qty: 30 TABLET | Refills: 0 | Status: SHIPPED | OUTPATIENT
Start: 2019-10-16 | End: 2019-11-05 | Stop reason: SDUPTHER

## 2019-10-16 NOTE — ED PROVIDER NOTES
EMERGENCY DEPARTMENT ENCOUNTER    Room Number:  31/31  Date seen:  10/16/2019  Time seen: 3:39 PM  PCP: Sanjiv Small MD  Historian: Pt      HPI:  Chief Complaint: Elevated BP  A complete HPI/ROS/PMH/PSH/SH/FH are unobtainable due to: none  Context: Luann Frances is a 79 y.o. female who presents to the ED c/o elevated BP of 217/103 that was noticed earlier today. Pt states she was being evaluated at the sleep clinic when they checked her BP and found it to be high. She was sent to the ER for further evaluation. She reports Hx of COPD. She is not on any supplemental oxygen at home. She denies any new SOA, headache, dizziness, CP, or fever. She has no further complaints and states she currently feels fine.     PAST MEDICAL HISTORY  Active Ambulatory Problems     Diagnosis Date Noted   • Acute sinusitis 01/21/2016   • Atopic rhinitis 01/21/2016   • Asthma 01/21/2016   • Candidiasis 01/21/2016   • Chronic obstructive pulmonary disease (CMS/[facility]) 01/21/2016   • Cough 01/21/2016   • Mild dehydration 01/21/2016   • Diverticulitis of colon 01/21/2016   • Gastroesophageal reflux disease with esophagitis 01/21/2016   • Fatigue 01/21/2016   • Hypothyroidism 01/21/2016   • Insomnia 01/21/2016   • Knee pain 01/21/2016   • Pain of lower extremity 01/21/2016   • Nausea 01/21/2016   • Chronic obstructive pulmonary disease with acute exacerbation (CMS/[facility]) 01/21/2016   • Shoulder pain 01/21/2016   • Ventricular premature beats 01/21/2016   • Elevated BP 10/25/2016   • Weight gain 12/07/2016   • Cold intolerance 12/07/2016   • Dyslipidemia 12/07/2016   • Post herpetic neuralgia 03/02/2017   • Insulin resistance 04/10/2017   • Vitamin D deficiency 02/19/2018   • CKD (chronic kidney disease) stage 3, GFR 30-59 ml/min (CMS/[facility]) 02/19/2018   • Mixed hyperlipidemia 02/19/2018   • Hypersomnia 10/16/2019     Resolved Ambulatory Problems     Diagnosis Date Noted   • No Resolved Ambulatory Problems     Past Medical History:   Diagnosis  Date   • Chronic bronchitis (CMS/HCC)    • Depression    • Fracture of humerus    • Hypothyroidism    • Itch of skin    • Pneumonia    • Pulmonary emphysema (CMS/HCC)    • Renal calculi    • Shingles rash          PAST SURGICAL HISTORY  Past Surgical History:   Procedure Laterality Date   • APPENDECTOMY     • EYE SURGERY      cataracts   • HUMERUS FRACTURE SURGERY     • TOTAL KNEE ARTHROPLASTY Left 04/2015         FAMILY HISTORY  Family History   Problem Relation Age of Onset   • Cancer Mother         breast   • Heart disease Maternal Grandmother          SOCIAL HISTORY  Social History     Socioeconomic History   • Marital status:      Spouse name: Not on file   • Number of children: Not on file   • Years of education: Not on file   • Highest education level: Not on file   Tobacco Use   • Smoking status: Current Every Day Smoker     Packs/day: 0.25   • Smokeless tobacco: Current User   Substance and Sexual Activity   • Alcohol use: Yes     Comment: social   • Drug use: No   • Sexual activity: Defer         ALLERGIES  Ambien [zolpidem tartrate]; Amoxicillin-pot clavulanate; Doxycycline; Levofloxacin; Metronidazole; Naproxen; Sulfamethoxazole-trimethoprim; and Synthroid [levothyroxine sodium]        REVIEW OF SYSTEMS  Review of Systems   Constitutional: Negative for fever.   Respiratory: Negative for shortness of breath (new).    Cardiovascular: Negative for chest pain.   Neurological: Negative.  Negative for headaches.        All systems reviewed and negative except for those discussed in HPI.       PHYSICAL EXAM  ED Triage Vitals [10/16/19 1447]   Temp Heart Rate Resp BP SpO2   97 °F (36.1 °C) 77 18 -- 98 %      Temp src Heart Rate Source Patient Position BP Location FiO2 (%)   -- -- -- -- --         GENERAL: not distressed  HENT: nares patent  EYES: no scleral icterus  CV: regular rhythm, regular rate, no murmur  RESPIRATORY: normal effort, CTAB  ABDOMEN: soft, nontender   MUSCULOSKELETAL: no  deformity  NEURO: alert, moves all extremities, follows commands    Recent and remote memory functions are normal. The patient is attentive with normal concentration. Language is fluent. Speech is clear. The speech is non-dysarthric. Fund of knowledge is normal.   Symmetric smile with no facial droop.  Eyes close shut strongly bilaterally.  Symmetric eyebrow raise bilaterally.  EOMI, PERRL  CN II-XII grossly normal otherwise.  5/5 strength to extremities.  No pronator drift.  Intact FNF.  No meningismus.     SKIN: warm, dry    Vital signs and nursing notes reviewed.      LAB RESULTS  Recent Results (from the past 24 hour(s))   Basic Metabolic Panel    Collection Time: 10/16/19  3:40 PM   Result Value Ref Range    Glucose 93 65 - 99 mg/dL    BUN 18 8 - 23 mg/dL    Creatinine 0.94 0.57 - 1.00 mg/dL    Sodium 144 136 - 145 mmol/L    Potassium 4.2 3.5 - 5.2 mmol/L    Chloride 104 98 - 107 mmol/L    CO2 26.2 22.0 - 29.0 mmol/L    Calcium 9.9 8.6 - 10.5 mg/dL    eGFR Non African Amer 58 (L) >60 mL/min/1.73    BUN/Creatinine Ratio 19.1 7.0 - 25.0    Anion Gap 13.8 5.0 - 15.0 mmol/L   CBC Auto Differential    Collection Time: 10/16/19  3:40 PM   Result Value Ref Range    WBC 7.94 3.40 - 10.80 10*3/mm3    RBC 4.55 3.77 - 5.28 10*6/mm3    Hemoglobin 13.3 12.0 - 15.9 g/dL    Hematocrit 41.1 34.0 - 46.6 %    MCV 90.3 79.0 - 97.0 fL    MCH 29.2 26.6 - 33.0 pg    MCHC 32.4 31.5 - 35.7 g/dL    RDW 13.1 12.3 - 15.4 %    RDW-SD 43.4 37.0 - 54.0 fl    MPV 11.0 6.0 - 12.0 fL    Platelets 266 140 - 450 10*3/mm3    Neutrophil % 56.7 42.7 - 76.0 %    Lymphocyte % 31.9 19.6 - 45.3 %    Monocyte % 7.6 5.0 - 12.0 %    Eosinophil % 2.0 0.3 - 6.2 %    Basophil % 1.3 0.0 - 1.5 %    Immature Grans % 0.5 0.0 - 0.5 %    Neutrophils, Absolute 4.51 1.70 - 7.00 10*3/mm3    Lymphocytes, Absolute 2.53 0.70 - 3.10 10*3/mm3    Monocytes, Absolute 0.60 0.10 - 0.90 10*3/mm3    Eosinophils, Absolute 0.16 0.00 - 0.40 10*3/mm3    Basophils, Absolute 0.10  0.00 - 0.20 10*3/mm3    Immature Grans, Absolute 0.04 0.00 - 0.05 10*3/mm3    nRBC 0.0 0.0 - 0.2 /100 WBC       Ordered the above labs and reviewed the results.        RADIOLOGY  No Radiology Exams Resulted Within Past 24 Hours    I ordered the above noted radiological studies. Reviewed by me and discussed with radiologist.  See dictation for official radiology interpretation.        PROCEDURES  Procedures      MEDICATIONS GIVEN IN ER  Medications - No data to display      MEDICATIONS GIVEN IN ER    Medications - No data to display      PROGRESS, DATA ANALYSIS, CONSULTS, AND MEDICAL DECISION MAKING    All labs have been independently reviewed by me.  All radiology studies have been reviewed by me and discussed with radiologist dictating the report.   EKG's independently viewed and interpreted by me.  Discussion below represents my analysis of pertinent findings related to patient's condition, differential diagnosis, treatment plan and final disposition.      ED Course as of Oct 16 1937   Wed Oct 16, 2019   1624 EKG obtained at 1549.  Interpreted by myself.  Sinus rhythm.  Heart rate 64.  Normal intervals and axis.  T wave inversion in lead V1 and V2.  [TD]   1625 On medical chart review, EKG obtained from 3/31/2015.  There are multiple PVCs at this time.  There is otherwise no significant change.  [TD]   1629 Creatinine: 0.94 [TD]      ED Course User Index  [TD] Bernard Daniels II, MD     Per the American Heart Association guidelines, there is no indication for emergent management of her hypertension, admission, or IV administration of antihypertensives.  Instead, guidelines recommend initiation of antihypertensive management.  Given the guideline recommendations, I prescribed her to oral antihypertensive agents.  I gave her strict return precautions.  She will follow-up with her PCP within 1 week.      AS OF 7:37 PM VITALS:    BP - (!) 222/102  HR - 77  TEMP - 97 °F (36.1 °C)  02 SATS -  98%        DIAGNOSIS  Final diagnoses:   Primary hypertension         DISPOSITION  DISCHARGE    FOLLOW-UP  Sanjiv Small MD  [address] [address]  [phone]    Schedule an appointment as soon as possible for a visit in 1 week  For blood pressure recheck as well as potassium recheck.         Medication List      New Prescriptions    amLODIPine 5 MG tablet  Commonly known as:  NORVASC  Take 1 tablet by mouth Daily for 30 days.     chlorthalidone 25 MG tablet  Commonly known as:  HYGROTON  Take 1 tablet by mouth Daily for 30 days.            --  Documentation assistance provided by ray Guzman for Dr. Bernard Daniels MD.  Information recorded by the aletaibgail was done at my direction and has been verified and validated by me.                 Sarkis Guzman  10/16/19 1616       Bernard Daniels II, MD  10/16/19 [id number]

## 2019-10-16 NOTE — ED TRIAGE NOTES
Pt reports she was going to be elevated for a sleep study when they checked her blood pressure and noticed in was elevated. Reports her BP was 200s/100s, pt denies all symptoms at this time.

## 2019-10-16 NOTE — PROGRESS NOTES
[facility]  Telephone: [phone] / Fax: [phone] [place]  Telephone: [phone] / Fax: [phone] Ladan Payne    Referring Physician: Dr. Saleh  PCP: Sanjiv Small MD    Reason for consult:  sleep apnea    Luann Frances is a 79 y.o.female  was seen in the [facility] today for evaluation of sleep apnea. He reports worsening EDS in the past few years.She is unaware of snoring, gasping for breath, choking. She sleeps from 11pm-8am. She has 2-3 nocturnal arousals from sleep to use the restroom.  In the past 5 years she gained 70 lbs. She falls asleep while watching TV.  Her BP is very high today but it is usually running normal. She is not taking any BP medications either. She takes Melatonin at night to help her sleep at night. She took Ambien in the past for awhile but no longer takes it.    SH- retired, 1/4 pack per day, 3 alc per week.    ROS- +myalgias, +SOA, +GERD, +change in nails, +cold intolerance.    Luann Frances  has a past medical history of Chronic bronchitis (CMS/HCC), Depression, Fracture of humerus, Hypothyroidism, Itch of skin, Pneumonia, Pulmonary emphysema (CMS/HCC), Renal calculi, and Shingles rash.    Current Medications:    Current Outpatient Medications:   •  ADVAIR DISKUS 100-50 MCG/DOSE DISKUS, USE 1 INHALATION BY MOUTH TWICE DAILY, Disp: 60 each, Rfl: 5  •  albuterol (PROVENTIL HFA;VENTOLIN HFA) 108 (90 Base) MCG/ACT inhaler, Inhale 2 puffs Every 4 (Four) Hours As Needed for Wheezing or Shortness of Air., Disp: 1 inhaler, Rfl: 3  •  allopurinol (ZYLOPRIM) 100 MG tablet, Take 1 tablet by mouth Daily., Disp: 90 tablet, Rfl: 0  •  atorvastatin (LIPITOR) 20 MG tablet, Take 1 tablet by mouth Daily., Disp: 30 tablet, Rfl: 5  •  [employer] ANTACID & ANTI-GAS 1000-60 MG chewable tablet, , Disp: , Rfl:   •  docusate sodium (COLACE) 100 MG capsule, Take 1 capsule by mouth daily., Disp: , Rfl:   •  JENTADUETO XR 5-1000 MG tablet sustained-release 24  "hour, Take 1 tablet by mouth Daily With Dinner., Disp: 90 tablet, Rfl: 5  •  levothyroxine (SYNTHROID) 137 MCG tablet, Take 1 tablet by mouth Daily., Disp: 30 tablet, Rfl: 4  •  omeprazole OTC (PriLOSEC OTC) 20 MG EC tablet, Take 20 mg by mouth Daily. Take 1-2 tablet, Disp: , Rfl:   •  ondansetron (ZOFRAN) 4 MG tablet, Take 1 tablet by mouth Every 8 (Eight) Hours As Needed for Nausea or Vomiting., Disp: 30 tablet, Rfl: 1  •  predniSONE (DELTASONE) 20 MG tablet, TAKE 2 TABS AS A ONE TIME DOSE FOR 5 DAYS, Disp: 15 tablet, Rfl: 0  •  Probiotic Product (PROBIOTIC DAILY) capsule, Take 1 tablet by mouth daily., Disp: , Rfl:   •  vitamin D (ERGOCALCIFEROL) 07624 units capsule capsule, Take 1 capsule by mouth Every 7 (Seven) Days., Disp: 13 capsule, Rfl: 4    I have reviewed Past Medical History, Past Surgical History, Medication List, Social History and Family History as entered in Sleep Questionnaire and EPIC.    ESS  9   Vital Signs BP (!) 206/85 (BP Location: Left arm, Patient Position: Sitting)   Pulse 83   Ht 165.1 cm (65\")   Wt 101 kg (223 lb 3.2 oz)   SpO2 99%   BMI 37.14 kg/m²  Body mass index is 37.14 kg/m².    General Alert and oriented. No acute distress noted   Pharynx/Throat Class IV Mallampati airway, large tongue, no evidence of redundant lateral pharyngeal tissue. No oral lesions. No thrush. Moist mucous membranes.   Head Normocephalic. Symmetrical. Atraumatic.    Nose No septal deviation. No drainage   Chest Wall Normal shape. Symmetric expansion with respiration. No tenderness.   Neck Trachea midline, no thyromegaly or adenopathy    Lungs Clear to auscultation bilaterally. No wheezes. No rhonchi. No rales. Respirations regular, even and unlabored.   Heart Regular rhythm and normal rate. Normal S1 and S2. No murmur   Abdomen Soft, non-tender and non-distended. Normal bowel sounds. No masses.   Extremities Moves all extremities well. No edema   Psychiatric Normal mood and affect.        "   Impression:  1. Hypersomnia    2. Essential hypertension    3. Obesity (BMI 30-39.9)          Plan:  /85 and second reading 217/103. She is completely asymptomatic. However, I cannot send her home without addressing this. I asked her to go to the ER. She does not take any BP medications.     We scheduled in lab sleep study. She had nausea with Ambien, so Ambien was not prescribed for the study. I discussed the pathophysiology of obstructive sleep apnea with the patient.  We discussed the adverse outcomes associated with untreated sleep-disordered breathing.  We discussed treatment modalities of obstructive sleep apnea including CPAP device as well as oral mandibular advancement device. Sleep study will be scheduled to establish definitive diagnosis of sleep disorder breathing.  Weight loss will be strongly beneficial in order to reduce the severity of sleep-disordered breathing.  Patient has narrow oropharyngeal structure.  Caution during activities that require prolonged concentration is strongly advised.  Patient will be notified of sleep study results after sleep study is completed.  If sleep apnea is only mild,  oral mandibular advancement device may be one of the treatment options.  However if sleep apnea is moderately severe, CPAP treatment will be strongly encouraged.  The patient is not opposed to treatment with CPAP device if we confirm significant obstructive sleep apnea on polysomnography.     Thank you for allowing me to participate in your patient's care.    The patient will follow up with Dr. Sands after completion of polysomnography.    LIVIER Altamirano  [facility]  Phone: [phone]      Part of this note may be an electronic transcription/translation of spoken language to printed text using the Dragon Dictation System. Some errors may exist even though the document was edited.

## 2019-11-05 ENCOUNTER — OFFICE VISIT (OUTPATIENT)
Dept: FAMILY MEDICINE CLINIC | Facility: CLINIC | Age: 79
End: 2019-11-05

## 2019-11-05 VITALS
SYSTOLIC BLOOD PRESSURE: 144 MMHG | BODY MASS INDEX: 36.69 KG/M2 | TEMPERATURE: 97.7 F | HEIGHT: 65 IN | WEIGHT: 220.2 LBS | DIASTOLIC BLOOD PRESSURE: 78 MMHG | OXYGEN SATURATION: 98 % | HEART RATE: 78 BPM

## 2019-11-05 DIAGNOSIS — Z23 NEED FOR VACCINATION: Primary | ICD-10-CM

## 2019-11-05 DIAGNOSIS — I10 HYPERTENSION, ESSENTIAL: ICD-10-CM

## 2019-11-05 PROCEDURE — 90653 IIV ADJUVANT VACCINE IM: CPT | Performed by: FAMILY MEDICINE

## 2019-11-05 PROCEDURE — 99213 OFFICE O/P EST LOW 20 MIN: CPT | Performed by: FAMILY MEDICINE

## 2019-11-05 PROCEDURE — G0008 ADMIN INFLUENZA VIRUS VAC: HCPCS | Performed by: FAMILY MEDICINE

## 2019-11-05 RX ORDER — CHLORTHALIDONE 25 MG/1
25 TABLET ORAL DAILY
Qty: 90 TABLET | Refills: 0 | Status: SHIPPED | OUTPATIENT
Start: 2019-11-05 | End: 2020-02-13 | Stop reason: SDUPTHER

## 2019-11-05 RX ORDER — AMLODIPINE BESYLATE 5 MG/1
5 TABLET ORAL DAILY
Qty: 90 TABLET | Refills: 0 | Status: SHIPPED | OUTPATIENT
Start: 2019-11-05 | End: 2020-02-13 | Stop reason: SDUPTHER

## 2019-11-05 NOTE — PROGRESS NOTES
Luann Frances is a 79 y.o. female.     Chief Complaint   Patient presents with   • Hypertension     pt is following up on bp, also follow up from er visit        HPI     Patient presents office today to discuss an issue that is new to me.  Patient was recently found to have elevated blood pressures and was started on chlorthalidone and amlodipine in the emergency department.  Patient is tolerating his medications well.  Other work-up in the ER was negative.    The following portions of the patient's history were reviewed and updated as appropriate: allergies, current medications, past family history, past medical history, past social history, past surgical history and problem list.    Review of Systems   Neurological: Negative for dizziness, weakness, light-headedness and headaches.   All other systems reviewed and are negative.    I have reviewed the ROS as documented by the MA. Sanjiv Small MD    Objective  Vitals:    11/05/19 1331   BP: 144/78   Pulse: 78   Temp: 97.7 °F (36.5 °C)   SpO2: 98%     Body mass index is 36.64 kg/m².    Physical Exam   Constitutional: She is oriented to person, place, and time. She appears well-developed and well-nourished. No distress.   HENT:   Head: Normocephalic and atraumatic.   Right Ear: External ear normal.   Left Ear: External ear normal.   Nose: Nose normal.   Mouth/Throat: Oropharynx is clear and moist.   Eyes: Conjunctivae and EOM are normal. Pupils are equal, round, and reactive to light. Right eye exhibits no discharge. Left eye exhibits no discharge. No scleral icterus.   Cardiovascular: Normal rate, regular rhythm and normal heart sounds.   Pulmonary/Chest: Effort normal and breath sounds normal. No respiratory distress. She has no wheezes. She has no rales.   Abdominal: Soft. Bowel sounds are normal. She exhibits no distension. There is no tenderness.   Neurological: She is alert and oriented to person, place, and time.   Skin: Skin is warm and dry. She is not  diaphoretic.   Nursing note and vitals reviewed.        Current Outpatient Medications:   •  ADVAIR DISKUS 100-50 MCG/DOSE DISKUS, USE 1 INHALATION BY MOUTH TWICE DAILY, Disp: 60 each, Rfl: 5  •  albuterol (PROVENTIL HFA;VENTOLIN HFA) 108 (90 Base) MCG/ACT inhaler, Inhale 2 puffs Every 4 (Four) Hours As Needed for Wheezing or Shortness of Air., Disp: 1 inhaler, Rfl: 3  •  allopurinol (ZYLOPRIM) 100 MG tablet, Take 1 tablet by mouth Daily., Disp: 90 tablet, Rfl: 0  •  amLODIPine (NORVASC) 5 MG tablet, Take 1 tablet by mouth Daily., Disp: 90 tablet, Rfl: 0  •  atorvastatin (LIPITOR) 20 MG tablet, Take 1 tablet by mouth Daily., Disp: 30 tablet, Rfl: 5  •  chlorthalidone (HYGROTON) 25 MG tablet, Take 1 tablet by mouth Daily., Disp: 90 tablet, Rfl: 0  •  docusate sodium (COLACE) 100 MG capsule, Take 1 capsule by mouth daily., Disp: , Rfl:   •  JENTADUETO XR 5-1000 MG tablet sustained-release 24 hour, Take 1 tablet by mouth Daily With Dinner., Disp: 90 tablet, Rfl: 5  •  levothyroxine (SYNTHROID) 137 MCG tablet, Take 1 tablet by mouth Daily., Disp: 30 tablet, Rfl: 4  •  omeprazole OTC (PriLOSEC OTC) 20 MG EC tablet, Take 20 mg by mouth Daily. Take 1-2 tablet, Disp: , Rfl:   •  Probiotic Product (PROBIOTIC DAILY) capsule, Take 1 tablet by mouth daily., Disp: , Rfl:   •  vitamin D (ERGOCALCIFEROL) 50114 units capsule capsule, Take 1 capsule by mouth Every 7 (Seven) Days., Disp: 13 capsule, Rfl: 4  •  [employer] ANTACID & ANTI-GAS 1000-60 MG chewable tablet, , Disp: , Rfl:   •  ondansetron (ZOFRAN) 4 MG tablet, Take 1 tablet by mouth Every 8 (Eight) Hours As Needed for Nausea or Vomiting., Disp: 30 tablet, Rfl: 1  Current outpatient and discharge medications have been reconciled for the patient.  Reviewed by: Sanjiv Small MD      Procedures    Lab Results (most recent)     None                  Luann was seen today for hypertension.    Diagnoses and all orders for this visit:    Need for vaccination  -     Fluad Quad >65  years    Hypertension, essential  -     amLODIPine (NORVASC) 5 MG tablet; Take 1 tablet by mouth Daily.  -     chlorthalidone (HYGROTON) 25 MG tablet; Take 1 tablet by mouth Daily.      Will continue medications for hypertension as above for a total of 3 months.  We will have patient check pressures at home.  Discussed concerning signs and symptoms of both hyper and hypotension.  Will adjust treatment plan after follow-up in 3 months.    Return in about 3 months (around 2/5/2020) for Recheck.      Sanjiv Small MD

## 2020-01-04 ENCOUNTER — APPOINTMENT (OUTPATIENT)
Dept: GENERAL RADIOLOGY | Facility: HOSPITAL | Age: 80
End: 2020-01-04

## 2020-01-04 PROCEDURE — 71046 X-RAY EXAM CHEST 2 VIEWS: CPT | Performed by: EMERGENCY MEDICINE

## 2020-01-20 ENCOUNTER — OFFICE VISIT (OUTPATIENT)
Dept: ENDOCRINOLOGY | Age: 80
End: 2020-01-20

## 2020-01-20 VITALS
HEIGHT: 65 IN | SYSTOLIC BLOOD PRESSURE: 128 MMHG | DIASTOLIC BLOOD PRESSURE: 80 MMHG | BODY MASS INDEX: 35.49 KG/M2 | RESPIRATION RATE: 16 BRPM | WEIGHT: 213 LBS

## 2020-01-20 DIAGNOSIS — R68.89 COLD INTOLERANCE: ICD-10-CM

## 2020-01-20 DIAGNOSIS — E79.0 HYPERURICEMIA: ICD-10-CM

## 2020-01-20 DIAGNOSIS — E06.3 HYPOTHYROIDISM DUE TO HASHIMOTO'S THYROIDITIS: Primary | ICD-10-CM

## 2020-01-20 DIAGNOSIS — E55.9 VITAMIN D DEFICIENCY: ICD-10-CM

## 2020-01-20 DIAGNOSIS — E88.81 INSULIN RESISTANCE: ICD-10-CM

## 2020-01-20 DIAGNOSIS — E78.2 MIXED HYPERLIPIDEMIA: ICD-10-CM

## 2020-01-20 DIAGNOSIS — E03.8 HYPOTHYROIDISM DUE TO HASHIMOTO'S THYROIDITIS: Primary | ICD-10-CM

## 2020-01-20 DIAGNOSIS — E03.9 ACQUIRED HYPOTHYROIDISM: ICD-10-CM

## 2020-01-20 DIAGNOSIS — I10 ESSENTIAL HYPERTENSION: ICD-10-CM

## 2020-01-20 PROCEDURE — 99214 OFFICE O/P EST MOD 30 MIN: CPT | Performed by: INTERNAL MEDICINE

## 2020-01-20 RX ORDER — LINAGLIPTIN AND METFORMIN HYDROCHLORIDE 5; 1000 MG/1; MG/1
1 TABLET, FILM COATED, EXTENDED RELEASE ORAL
Qty: 90 TABLET | Refills: 3 | Status: SHIPPED | OUTPATIENT
Start: 2020-01-20 | End: 2020-01-20

## 2020-01-20 RX ORDER — LEVOTHYROXINE SODIUM 137 UG/1
137 TABLET ORAL DAILY
Qty: 30 TABLET | Refills: 11 | Status: SHIPPED | OUTPATIENT
Start: 2020-01-20 | End: 2020-01-21

## 2020-01-20 RX ORDER — ERGOCALCIFEROL 1.25 MG/1
50000 CAPSULE ORAL
Qty: 13 CAPSULE | Refills: 3 | Status: SHIPPED | OUTPATIENT
Start: 2020-01-20 | End: 2021-02-21 | Stop reason: HOSPADM

## 2020-01-20 RX ORDER — ALLOPURINOL 100 MG/1
100 TABLET ORAL DAILY
Qty: 90 TABLET | Refills: 3 | Status: SHIPPED | OUTPATIENT
Start: 2020-01-20 | End: 2021-01-19

## 2020-01-20 RX ORDER — ATORVASTATIN CALCIUM 20 MG/1
20 TABLET, FILM COATED ORAL DAILY
Qty: 90 TABLET | Refills: 3 | Status: SHIPPED | OUTPATIENT
Start: 2020-01-20 | End: 2021-01-19

## 2020-01-20 NOTE — PROGRESS NOTES
"Subjective   Luann Frances is a 79 y.o. female seen for follow up for hypothyroidism. No lab review. Patient has been taking antibiotic and steroid for bronchitis. She denies any other problems or concerns.    History of Present Illness this is a 79-year-old female known patient with hypothyroidism and hypertension and dyslipidemia as well as insulin resistance and hyperuricemia with vitamin D deficiency.  She is on antibiotic and steroid therapy for upper respiratory infection.  Beyond that she has no significant complaints except she feels that after taking Jentadueto it makes her nauseated.    /80   Resp 16   Ht 165.1 cm (65\")   Wt 96.6 kg (213 lb)   BMI 35.45 kg/m²      Allergies   Allergen Reactions   • Ambien [Zolpidem Tartrate]      nausea   • Amoxicillin-Pot Clavulanate    • Doxycycline    • Levofloxacin    • Metronidazole    • Naproxen    • Sulfamethoxazole-Trimethoprim    • Synthroid [Levothyroxine Sodium] Nausea Only       Current Outpatient Medications:   •  ADVAIR DISKUS 100-50 MCG/DOSE DISKUS, USE 1 INHALATION BY MOUTH TWICE DAILY, Disp: 60 each, Rfl: 5  •  albuterol (PROVENTIL HFA;VENTOLIN HFA) 108 (90 Base) MCG/ACT inhaler, Inhale 2 puffs Every 4 (Four) Hours As Needed for Wheezing or Shortness of Air., Disp: 1 inhaler, Rfl: 3  •  allopurinol (ZYLOPRIM) 100 MG tablet, Take 1 tablet by mouth Daily., Disp: 90 tablet, Rfl: 0  •  amLODIPine (NORVASC) 5 MG tablet, Take 1 tablet by mouth Daily., Disp: 90 tablet, Rfl: 0  •  atorvastatin (LIPITOR) 20 MG tablet, Take 1 tablet by mouth Daily., Disp: 30 tablet, Rfl: 5  •  cefdinir (OMNICEF) 300 MG capsule, Take 1 capsule by mouth 2 (Two) Times a Day., Disp: 20 capsule, Rfl: 0  •  chlorthalidone (HYGROTON) 25 MG tablet, Take 1 tablet by mouth Daily., Disp: 90 tablet, Rfl: 0  •  [employer]S ANTACID & ANTI-GAS 1000-60 MG chewable tablet, , Disp: , Rfl:   •  docusate sodium (COLACE) 100 MG capsule, Take 1 capsule by mouth daily., Disp: , Rfl:   •  JENTADUETO " XR 5-1000 MG tablet sustained-release 24 hour, Take 1 tablet by mouth Daily With Dinner., Disp: 90 tablet, Rfl: 5  •  levothyroxine (SYNTHROID) 137 MCG tablet, Take 1 tablet by mouth Daily., Disp: 30 tablet, Rfl: 4  •  omeprazole OTC (PriLOSEC OTC) 20 MG EC tablet, Take 20 mg by mouth Daily. Take 1-2 tablet, Disp: , Rfl:   •  ondansetron (ZOFRAN) 4 MG tablet, Take 1 tablet by mouth Every 8 (Eight) Hours As Needed for Nausea or Vomiting., Disp: 30 tablet, Rfl: 1  •  predniSONE (DELTASONE) 20 MG tablet, 3 for 3 days, 2 for 3 days, 1 for 3 days, Disp: 18 tablet, Rfl: 0  •  Probiotic Product (PROBIOTIC DAILY) capsule, Take 1 tablet by mouth daily., Disp: , Rfl:   •  vitamin D (ERGOCALCIFEROL) 79539 units capsule capsule, Take 1 capsule by mouth Every 7 (Seven) Days., Disp: 13 capsule, Rfl: 4      The following portions of the patient's history were reviewed and updated as appropriate: allergies, current medications, past family history, past medical history, past social history, past surgical history and problem list.    Review of Systems   Constitutional: Negative.    HENT: Negative.    Eyes: Negative.    Respiratory: Negative.    Cardiovascular: Negative.    Gastrointestinal: Negative.    Endocrine: Negative.    Genitourinary: Negative.    Musculoskeletal: Negative.    Skin: Negative.    Allergic/Immunologic: Negative.    Neurological: Negative.    Hematological: Negative.    Psychiatric/Behavioral: Negative.    The above review of system was reviewed, corroborated and accepted.    Objective   Physical Exam   Constitutional: She is oriented to person, place, and time. She appears well-developed and well-nourished. No distress.   HENT:   Head: Normocephalic and atraumatic.   Right Ear: External ear normal.   Left Ear: External ear normal.   Nose: Nose normal.   Mouth/Throat: Oropharynx is clear and moist. No oropharyngeal exudate.   Eyes: Pupils are equal, round, and reactive to light. Conjunctivae and EOM are normal.  Right eye exhibits no discharge. Left eye exhibits no discharge. No scleral icterus.   Neck: Normal range of motion. Neck supple. No JVD present. No tracheal deviation present. No thyromegaly present.   Cardiovascular: Normal rate, regular rhythm, normal heart sounds and intact distal pulses. Exam reveals no gallop and no friction rub.   No murmur heard.  Pulmonary/Chest: Effort normal and breath sounds normal. No stridor. No respiratory distress. She has no wheezes. She has no rales. She exhibits no tenderness.   Abdominal: Soft. Bowel sounds are normal. She exhibits no distension and no mass. There is no tenderness. There is no rebound and no guarding. No hernia.   Musculoskeletal: Normal range of motion. She exhibits no edema, tenderness or deformity.   Lymphadenopathy:     She has no cervical adenopathy.   Neurological: She is alert and oriented to person, place, and time. She displays normal reflexes. No cranial nerve deficit or sensory deficit. She exhibits normal muscle tone. Coordination normal.   Skin: Skin is warm and dry. No rash noted. She is not diaphoretic. No erythema. No pallor.   Psychiatric: She has a normal mood and affect. Her behavior is normal. Judgment and thought content normal.   Nursing note and vitals reviewed.  No significant change since 5/30/2018 office visit.      Assessment/Plan   Luann was seen today for hypothyroidism.    Diagnoses and all orders for this visit:    Hypothyroidism due to Hashimoto's thyroiditis  -     T4 & TSH ([facility])  -     T3, Free  -     T4, Free  -     Uric Acid  -     Vitamin D 25 Hydroxy  -     Comprehensive Metabolic Panel  -     C-Peptide  -     Hemoglobin A1c  -     Lipid Panel  -     MicroAlbumin, Urine, Random - Urine, Clean Catch  -     T4 & TSH ([facility]); Future  -     T3, Free; Future  -     T4, Free; Future  -     Thyroglobulin With Anti-TG; Future  -     Uric Acid; Future  -     Vitamin D 25 Hydroxy; Future  -     Comprehensive Metabolic Panel;  Future  -     C-Peptide; Future  -     Hemoglobin A1c; Future  -     Lipid Panel; Future  -     MicroAlbumin, Urine, Random - Urine, Clean Catch; Future    Insulin resistance  -     T4 & TSH ([facility])  -     T3, Free  -     T4, Free  -     Uric Acid  -     Vitamin D 25 Hydroxy  -     Comprehensive Metabolic Panel  -     C-Peptide  -     Hemoglobin A1c  -     Lipid Panel  -     MicroAlbumin, Urine, Random - Urine, Clean Catch  -     Discontinue: JENTADUETO XR 5-1000 MG tablet sustained-release 24 hour; Take 1 tablet by mouth Daily With Dinner.  -     T4 & TSH ([facility]); Future  -     T3, Free; Future  -     T4, Free; Future  -     Thyroglobulin With Anti-TG; Future  -     Uric Acid; Future  -     Vitamin D 25 Hydroxy; Future  -     Comprehensive Metabolic Panel; Future  -     C-Peptide; Future  -     Hemoglobin A1c; Future  -     Lipid Panel; Future  -     MicroAlbumin, Urine, Random - Urine, Clean Catch; Future    Mixed hyperlipidemia  -     T4 & TSH ([facility])  -     T3, Free  -     T4, Free  -     Uric Acid  -     Vitamin D 25 Hydroxy  -     Comprehensive Metabolic Panel  -     C-Peptide  -     Hemoglobin A1c  -     Lipid Panel  -     MicroAlbumin, Urine, Random - Urine, Clean Catch  -     atorvastatin (LIPITOR) 20 MG tablet; Take 1 tablet by mouth Daily.  -     T4 & TSH ([facility]); Future  -     T3, Free; Future  -     T4, Free; Future  -     Thyroglobulin With Anti-TG; Future  -     Uric Acid; Future  -     Vitamin D 25 Hydroxy; Future  -     Comprehensive Metabolic Panel; Future  -     C-Peptide; Future  -     Hemoglobin A1c; Future  -     Lipid Panel; Future  -     MicroAlbumin, Urine, Random - Urine, Clean Catch; Future    Vitamin D deficiency  -     T4 & TSH ([facility])  -     T3, Free  -     T4, Free  -     Uric Acid  -     Vitamin D 25 Hydroxy  -     Comprehensive Metabolic Panel  -     C-Peptide  -     Hemoglobin A1c  -     Lipid Panel  -     MicroAlbumin, Urine, Random - Urine, Clean Catch  -     vitamin D  (ERGOCALCIFEROL) 1.25 MG (08609 UT) capsule capsule; Take 1 capsule by mouth Every 7 (Seven) Days.  -     T4 & TSH ([facility]); Future  -     T3, Free; Future  -     T4, Free; Future  -     Thyroglobulin With Anti-TG; Future  -     Uric Acid; Future  -     Vitamin D 25 Hydroxy; Future  -     Comprehensive Metabolic Panel; Future  -     C-Peptide; Future  -     Hemoglobin A1c; Future  -     Lipid Panel; Future  -     MicroAlbumin, Urine, Random - Urine, Clean Catch; Future    Cold intolerance  -     T4 & TSH ([facility])  -     T3, Free  -     T4, Free  -     Uric Acid  -     Vitamin D 25 Hydroxy  -     Comprehensive Metabolic Panel  -     C-Peptide  -     Hemoglobin A1c  -     Lipid Panel  -     MicroAlbumin, Urine, Random - Urine, Clean Catch  -     T4 & TSH ([facility]); Future  -     T3, Free; Future  -     T4, Free; Future  -     Thyroglobulin With Anti-TG; Future  -     Uric Acid; Future  -     Vitamin D 25 Hydroxy; Future  -     Comprehensive Metabolic Panel; Future  -     C-Peptide; Future  -     Hemoglobin A1c; Future  -     Lipid Panel; Future  -     MicroAlbumin, Urine, Random - Urine, Clean Catch; Future    Essential hypertension  -     T4 & TSH ([facility])  -     T3, Free  -     T4, Free  -     Uric Acid  -     Vitamin D 25 Hydroxy  -     Comprehensive Metabolic Panel  -     C-Peptide  -     Hemoglobin A1c  -     Lipid Panel  -     MicroAlbumin, Urine, Random - Urine, Clean Catch  -     T4 & TSH ([facility]); Future  -     T3, Free; Future  -     T4, Free; Future  -     Thyroglobulin With Anti-TG; Future  -     Uric Acid; Future  -     Vitamin D 25 Hydroxy; Future  -     Comprehensive Metabolic Panel; Future  -     C-Peptide; Future  -     Hemoglobin A1c; Future  -     Lipid Panel; Future  -     MicroAlbumin, Urine, Random - Urine, Clean Catch; Future    Hyperuricemia  -     T4 & TSH ([facility])  -     T3, Free  -     T4, Free  -     Uric Acid  -     Vitamin D 25 Hydroxy  -     Comprehensive Metabolic Panel  -      C-Peptide  -     Hemoglobin A1c  -     Lipid Panel  -     MicroAlbumin, Urine, Random - Urine, Clean Catch  -     allopurinol (ZYLOPRIM) 100 MG tablet; Take 1 tablet by mouth Daily.  -     T4 & TSH ([facility]); Future  -     T3, Free; Future  -     T4, Free; Future  -     Thyroglobulin With Anti-TG; Future  -     Uric Acid; Future  -     Vitamin D 25 Hydroxy; Future  -     Comprehensive Metabolic Panel; Future  -     C-Peptide; Future  -     Hemoglobin A1c; Future  -     Lipid Panel; Future  -     MicroAlbumin, Urine, Random - Urine, Clean Catch; Future    Acquired hypothyroidism  -     levothyroxine (SYNTHROID) 137 MCG tablet; Take 1 tablet by mouth Daily.  -     T4 & TSH ([facility]); Future  -     T3, Free; Future  -     T4, Free; Future  -     Thyroglobulin With Anti-TG; Future  -     Uric Acid; Future  -     Vitamin D 25 Hydroxy; Future  -     Comprehensive Metabolic Panel; Future  -     C-Peptide; Future  -     Hemoglobin A1c; Future  -     Lipid Panel; Future  -     MicroAlbumin, Urine, Random - Urine, Clean Catch; Future      In summary I saw and examined this 79-year-old female for above-mentioned problems.  I reviewed her laboratory evaluation of 9/10/2019 and provided her with a hard copy of it.  At this time however we will go ahead and order a new laboratory evaluation and once the results come back we will go ahead and call for any possible modification or new medications.  However because of the fact that she becomes nauseated after taking Jentadueto has asked her to stop Jentadueto and watch her diet and become more active and we will check everything on her next office visit.  I will see her in 6 months or sooner if needed with laboratory evaluation prior to each office  visit.

## 2020-01-21 LAB
25(OH)D3+25(OH)D2 SERPL-MCNC: 91.7 NG/ML (ref 30–100)
ALBUMIN SERPL-MCNC: 4.5 G/DL (ref 3.7–4.7)
ALBUMIN/GLOB SERPL: 1.7 {RATIO} (ref 1.2–2.2)
ALP SERPL-CCNC: 71 IU/L (ref 39–117)
ALT SERPL-CCNC: 16 IU/L (ref 0–32)
AST SERPL-CCNC: 17 IU/L (ref 0–40)
BILIRUB SERPL-MCNC: 0.8 MG/DL (ref 0–1.2)
BUN SERPL-MCNC: 29 MG/DL (ref 8–27)
BUN/CREAT SERPL: 20 (ref 12–28)
C PEPTIDE SERPL-MCNC: 6.7 NG/ML (ref 1.1–4.4)
CALCIUM SERPL-MCNC: 10.3 MG/DL (ref 8.7–10.3)
CHLORIDE SERPL-SCNC: 95 MMOL/L (ref 96–106)
CHOLEST SERPL-MCNC: 185 MG/DL (ref 100–199)
CO2 SERPL-SCNC: 23 MMOL/L (ref 20–29)
CREAT SERPL-MCNC: 1.46 MG/DL (ref 0.57–1)
GLOBULIN SER CALC-MCNC: 2.6 G/DL (ref 1.5–4.5)
GLUCOSE SERPL-MCNC: 103 MG/DL (ref 65–99)
HBA1C MFR BLD: 6.6 % (ref 4.8–5.6)
HDLC SERPL-MCNC: 77 MG/DL
INTERPRETATION: NORMAL
INTERPRETATION: NORMAL
LDLC SERPL CALC-MCNC: 73 MG/DL (ref 0–99)
Lab: NORMAL
Lab: NORMAL
MICROALBUMIN UR-MCNC: 13.7 UG/ML
POTASSIUM SERPL-SCNC: 3.9 MMOL/L (ref 3.5–5.2)
PROT SERPL-MCNC: 7.1 G/DL (ref 6–8.5)
SODIUM SERPL-SCNC: 139 MMOL/L (ref 134–144)
T3FREE SERPL-MCNC: 1.1 PG/ML (ref 2–4.4)
T4 FREE SERPL-MCNC: 0.4 NG/DL (ref 0.82–1.77)
T4 SERPL-MCNC: 3.9 UG/DL (ref 4.5–12)
TRIGL SERPL-MCNC: 174 MG/DL (ref 0–149)
TSH SERPL DL<=0.005 MIU/L-ACNC: 56.11 UIU/ML (ref 0.45–4.5)
URATE SERPL-MCNC: 7.6 MG/DL (ref 2.5–7.1)
VLDLC SERPL CALC-MCNC: 35 MG/DL (ref 5–40)

## 2020-01-21 RX ORDER — LEVOTHYROXINE SODIUM 0.2 MG/1
200 TABLET ORAL DAILY
Qty: 30 TABLET | Refills: 11 | Status: SHIPPED | OUTPATIENT
Start: 2020-01-21 | End: 2020-06-22

## 2020-02-13 ENCOUNTER — OFFICE VISIT (OUTPATIENT)
Dept: FAMILY MEDICINE CLINIC | Facility: CLINIC | Age: 80
End: 2020-02-13

## 2020-02-13 VITALS
HEART RATE: 70 BPM | SYSTOLIC BLOOD PRESSURE: 136 MMHG | RESPIRATION RATE: 14 BRPM | BODY MASS INDEX: 35.65 KG/M2 | TEMPERATURE: 97.8 F | OXYGEN SATURATION: 95 % | WEIGHT: 214 LBS | DIASTOLIC BLOOD PRESSURE: 78 MMHG | HEIGHT: 65 IN

## 2020-02-13 DIAGNOSIS — E78.2 MIXED HYPERLIPIDEMIA: Primary | ICD-10-CM

## 2020-02-13 DIAGNOSIS — I10 ESSENTIAL HYPERTENSION: ICD-10-CM

## 2020-02-13 DIAGNOSIS — E06.3 HYPOTHYROIDISM DUE TO HASHIMOTO'S THYROIDITIS: ICD-10-CM

## 2020-02-13 DIAGNOSIS — I10 HYPERTENSION, ESSENTIAL: ICD-10-CM

## 2020-02-13 DIAGNOSIS — E11.65 TYPE 2 DIABETES MELLITUS WITH HYPERGLYCEMIA, WITHOUT LONG-TERM CURRENT USE OF INSULIN (HCC): ICD-10-CM

## 2020-02-13 DIAGNOSIS — E03.8 HYPOTHYROIDISM DUE TO HASHIMOTO'S THYROIDITIS: ICD-10-CM

## 2020-02-13 PROCEDURE — 99214 OFFICE O/P EST MOD 30 MIN: CPT | Performed by: FAMILY MEDICINE

## 2020-02-13 RX ORDER — AMLODIPINE BESYLATE 5 MG/1
5 TABLET ORAL DAILY
Qty: 90 TABLET | Refills: 0 | Status: SHIPPED | OUTPATIENT
Start: 2020-02-13 | End: 2020-02-18

## 2020-02-13 RX ORDER — CHLORTHALIDONE 25 MG/1
25 TABLET ORAL DAILY
Qty: 90 TABLET | Refills: 0 | Status: SHIPPED | OUTPATIENT
Start: 2020-02-13 | End: 2020-11-30 | Stop reason: SDUPTHER

## 2020-02-13 NOTE — PROGRESS NOTES
Luann Frances is a 79 y.o. female.     Chief Complaint   Patient presents with   • Hypertension     patient is following up on bp       HPI     Patient presents the office today to follow-up on hypertension, hyperlipidemia, hypothyroidism, and diabetes.  Currently taking amlodipine, atorvastatin, chlorthalidone, levothyroxine.  Not taking any medications for diabetes at this time.  Lab work done by other specialists in December reviewed today show a hemoglobin A1c of 6.6.  Slight elevation in triglycerides.  Otherwise normal.  Tolerating the medication somewhat well.  Does not feel like the levothyroxine causes nausea.  Has a follow-up appointment scheduled with endocrine.    The following portions of the patient's history were reviewed and updated as appropriate: allergies, current medications, past family history, past medical history, past social history, past surgical history and problem list.    Review of Systems   Constitutional: Negative for fatigue.   All other systems reviewed and are negative.    I have reviewed the ROS as documented by the MA. Sanjiv Small MD    Objective  Vitals:    02/13/20 1304   BP: 136/78   Pulse: 70   Resp: 14   Temp: 97.8 °F (36.6 °C)   SpO2: 95%     Body mass index is 35.61 kg/m².    Physical Exam   Constitutional: She is oriented to person, place, and time. She appears well-developed and well-nourished. No distress.   HENT:   Head: Normocephalic and atraumatic.   Right Ear: External ear normal.   Left Ear: External ear normal.   Nose: Nose normal.   Mouth/Throat: Oropharynx is clear and moist.   Eyes: Pupils are equal, round, and reactive to light. Conjunctivae and EOM are normal. Right eye exhibits no discharge. Left eye exhibits no discharge. No scleral icterus.   Cardiovascular: Normal rate, regular rhythm and normal heart sounds.   Pulmonary/Chest: Effort normal and breath sounds normal. No respiratory distress. She has no wheezes. She has no rales.   Abdominal:  Soft. Bowel sounds are normal. She exhibits no distension. There is no tenderness.   Neurological: She is alert and oriented to person, place, and time.   Skin: Skin is warm and dry. She is not diaphoretic.   Nursing note and vitals reviewed.        Current Outpatient Medications:   •  ADVAIR DISKUS 100-50 MCG/DOSE DISKUS, USE 1 INHALATION BY MOUTH TWICE DAILY, Disp: 60 each, Rfl: 5  •  albuterol (PROVENTIL HFA;VENTOLIN HFA) 108 (90 Base) MCG/ACT inhaler, Inhale 2 puffs Every 4 (Four) Hours As Needed for Wheezing or Shortness of Air., Disp: 1 inhaler, Rfl: 3  •  allopurinol (ZYLOPRIM) 100 MG tablet, Take 1 tablet by mouth Daily., Disp: 90 tablet, Rfl: 3  •  amLODIPine (NORVASC) 5 MG tablet, Take 1 tablet by mouth Daily., Disp: 90 tablet, Rfl: 0  •  atorvastatin (LIPITOR) 20 MG tablet, Take 1 tablet by mouth Daily., Disp: 90 tablet, Rfl: 3  •  chlorthalidone (HYGROTON) 25 MG tablet, Take 1 tablet by mouth Daily., Disp: 90 tablet, Rfl: 0  •  docusate sodium (COLACE) 100 MG capsule, Take 1 capsule by mouth daily., Disp: , Rfl:   •  levothyroxine (SYNTHROID) 200 MCG tablet, Take 1 tablet by mouth Daily., Disp: 30 tablet, Rfl: 11  •  omeprazole OTC (PriLOSEC OTC) 20 MG EC tablet, Take 20 mg by mouth Daily. Take 1-2 tablet, Disp: , Rfl:   •  Probiotic Product (PROBIOTIC DAILY) capsule, Take 1 tablet by mouth daily., Disp: , Rfl:   •  vitamin D (ERGOCALCIFEROL) 1.25 MG (53417 UT) capsule capsule, Take 1 capsule by mouth Every 7 (Seven) Days., Disp: 13 capsule, Rfl: 3  •  [employer] ANTACID & ANTI-GAS 1000-60 MG chewable tablet, , Disp: , Rfl:   •  ondansetron (ZOFRAN) 4 MG tablet, Take 1 tablet by mouth Every 8 (Eight) Hours As Needed for Nausea or Vomiting., Disp: 30 tablet, Rfl: 1  Current outpatient and discharge medications have been reconciled for the patient.  Reviewed by: Sanjiv Small MD      Procedures    Lab Results (most recent)     None                  Luann was seen today for hypertension.    Diagnoses and all  orders for this visit:    Mixed hyperlipidemia    Hypertension, essential  -     amLODIPine (NORVASC) 5 MG tablet; Take 1 tablet by mouth Daily.  -     chlorthalidone (HYGROTON) 25 MG tablet; Take 1 tablet by mouth Daily.    Essential hypertension    Hypothyroidism due to Hashimoto's thyroiditis    Type 2 diabetes mellitus with hyperglycemia, without long-term current use of insulin (CMS/[facility])    Refills as above.  Reviewed lab work and abnormalities with the patient done by specialist.  Continue current therapy as prescribed.  Follow-up in 3 months.      Return in about 3 months (around 5/13/2020) for Recheck.      Sanjiv Small MD

## 2020-02-18 DIAGNOSIS — I10 HYPERTENSION, ESSENTIAL: ICD-10-CM

## 2020-02-18 RX ORDER — AMLODIPINE BESYLATE 5 MG/1
5 TABLET ORAL DAILY
Qty: 90 TABLET | Refills: 0 | Status: SHIPPED | OUTPATIENT
Start: 2020-02-18 | End: 2021-02-21 | Stop reason: HOSPADM

## 2020-02-19 DIAGNOSIS — J43.8 OTHER EMPHYSEMA (HCC): ICD-10-CM

## 2020-03-08 ENCOUNTER — HOSPITAL ENCOUNTER (OUTPATIENT)
Dept: SLEEP MEDICINE | Facility: HOSPITAL | Age: 80
Discharge: HOME | End: 2020-03-08
Admitting: NURSE PRACTITIONER

## 2020-03-08 DIAGNOSIS — G47.10 HYPERSOMNIA: ICD-10-CM

## 2020-03-08 PROCEDURE — 95811 POLYSOM 6/>YRS CPAP 4/> PARM: CPT

## 2020-03-10 ENCOUNTER — TELEPHONE (OUTPATIENT)
Dept: SLEEP MEDICINE | Facility: HOSPITAL | Age: 80
End: 2020-03-10

## 2020-03-10 NOTE — TELEPHONE ENCOUNTER
Called pt with sleep results and faxed orders to [facility] on 3/10/2020.  F/u scheduled for May 29 @ 2pm with Dr. Sands.  CV

## 2020-05-29 ENCOUNTER — APPOINTMENT (OUTPATIENT)
Dept: SLEEP MEDICINE | Facility: HOSPITAL | Age: 80
End: 2020-05-29

## 2020-06-22 ENCOUNTER — OFFICE VISIT (OUTPATIENT)
Dept: FAMILY MEDICINE CLINIC | Facility: CLINIC | Age: 80
End: 2020-06-22

## 2020-06-22 VITALS
DIASTOLIC BLOOD PRESSURE: 70 MMHG | WEIGHT: 219.4 LBS | HEART RATE: 69 BPM | TEMPERATURE: 98.6 F | BODY MASS INDEX: 36.55 KG/M2 | OXYGEN SATURATION: 99 % | SYSTOLIC BLOOD PRESSURE: 130 MMHG | HEIGHT: 65 IN

## 2020-06-22 DIAGNOSIS — E03.8 HYPOTHYROIDISM DUE TO HASHIMOTO'S THYROIDITIS: ICD-10-CM

## 2020-06-22 DIAGNOSIS — E78.2 MIXED HYPERLIPIDEMIA: ICD-10-CM

## 2020-06-22 DIAGNOSIS — I10 ESSENTIAL HYPERTENSION: Primary | ICD-10-CM

## 2020-06-22 DIAGNOSIS — E06.3 HYPOTHYROIDISM DUE TO HASHIMOTO'S THYROIDITIS: ICD-10-CM

## 2020-06-22 PROCEDURE — 99214 OFFICE O/P EST MOD 30 MIN: CPT | Performed by: FAMILY MEDICINE

## 2020-06-22 RX ORDER — LEVOTHYROXINE SODIUM 137 MCG
TABLET ORAL
COMMUNITY
Start: 2020-06-14 | End: 2020-08-29

## 2020-06-22 NOTE — PROGRESS NOTES
Luann Frances is a 80 y.o. female.     Chief Complaint   Patient presents with   • Hypertension     Patient is here to f/u on b/p        HPI     Patient presents the office today to discuss chronic medical issues.  She has hyperlipidemia, hypertension, and hypothyroidism.  Currently taking and tolerating allopurinol, amlodipine, atorvastatin, chlorthalidone, omeprazole, Synthroid 137 mcg.  She is tolerating these medications well.  Is tried to make improvements in both diet and exercise.  Reports no adverse side effects from the medications.    The following portions of the patient's history were reviewed and updated as appropriate: allergies, current medications, past family history, past medical history, past social history, past surgical history and problem list.    Review of Systems   Cardiovascular: Negative for chest pain, palpitations and leg swelling.   All other systems reviewed and are negative.    I have reviewed the ROS as documented by the MA. Sanjiv Small MD    Objective  Vitals:    06/22/20 1059   BP: 130/70   Pulse: 69   Temp: 98.6 °F (37 °C)   SpO2: 99%     Body mass index is 36.51 kg/m².    Physical Exam   Constitutional: She is oriented to person, place, and time. She appears well-developed and well-nourished. No distress.   Neurological: She is alert and oriented to person, place, and time.   Skin: She is not diaphoretic.   Psychiatric: She has a normal mood and affect. Her behavior is normal.   Nursing note and vitals reviewed.        Current Outpatient Medications:   •  ADVAIR DISKUS 100-50 MCG/DOSE DISKUS, Inhale 1 puff 2 (Two) Times a Day., Disp: 180 each, Rfl: 5  •  albuterol (PROVENTIL HFA;VENTOLIN HFA) 108 (90 Base) MCG/ACT inhaler, Inhale 2 puffs Every 4 (Four) Hours As Needed for Wheezing or Shortness of Air., Disp: 1 inhaler, Rfl: 3  •  allopurinol (ZYLOPRIM) 100 MG tablet, Take 1 tablet by mouth Daily., Disp: 90 tablet, Rfl: 3  •  amLODIPine (NORVASC) 5 MG tablet, TAKE 1  TABLET BY MOUTH DAILY, Disp: 90 tablet, Rfl: 0  •  atorvastatin (LIPITOR) 20 MG tablet, Take 1 tablet by mouth Daily., Disp: 90 tablet, Rfl: 3  •  chlorthalidone (HYGROTON) 25 MG tablet, Take 1 tablet by mouth Daily., Disp: 90 tablet, Rfl: 0  •  [employer]S ANTACID & ANTI-GAS 1000-60 MG chewable tablet, , Disp: , Rfl:   •  omeprazole OTC (PriLOSEC OTC) 20 MG EC tablet, Take 20 mg by mouth Daily. Take 1-2 tablet, Disp: , Rfl:   •  Probiotic Product (PROBIOTIC DAILY) capsule, Take 1 tablet by mouth daily., Disp: , Rfl:   •  SYNTHROID 137 MCG tablet, , Disp: , Rfl:   •  vitamin D (ERGOCALCIFEROL) 1.25 MG (27970 UT) capsule capsule, Take 1 capsule by mouth Every 7 (Seven) Days., Disp: 13 capsule, Rfl: 3  Current outpatient and discharge medications have been reconciled for the patient.  Reviewed by: Sanjiv Small MD      Procedures    Lab Results (most recent)     None                  Luann was seen today for hypertension.    Diagnoses and all orders for this visit:    Essential hypertension    Mixed hyperlipidemia    Hypothyroidism due to Hashimoto's thyroiditis    Pleased with the patient's progress.  Tolerating medications well at this time.  Continue current therapy as prescribed.  Advised on improvements in and continuing with diet and exercise.      Return for As needed.      Sanjiv Small MD

## 2020-07-06 ENCOUNTER — RESULTS ENCOUNTER (OUTPATIENT)
Dept: ENDOCRINOLOGY | Age: 80
End: 2020-07-06

## 2020-07-06 DIAGNOSIS — E78.2 MIXED HYPERLIPIDEMIA: ICD-10-CM

## 2020-07-06 DIAGNOSIS — E06.3 HYPOTHYROIDISM DUE TO HASHIMOTO'S THYROIDITIS: ICD-10-CM

## 2020-07-06 DIAGNOSIS — E03.9 ACQUIRED HYPOTHYROIDISM: ICD-10-CM

## 2020-07-06 DIAGNOSIS — E55.9 VITAMIN D DEFICIENCY: ICD-10-CM

## 2020-07-06 DIAGNOSIS — E03.8 HYPOTHYROIDISM DUE TO HASHIMOTO'S THYROIDITIS: ICD-10-CM

## 2020-07-06 DIAGNOSIS — E88.81 INSULIN RESISTANCE: ICD-10-CM

## 2020-07-06 DIAGNOSIS — E79.0 HYPERURICEMIA: ICD-10-CM

## 2020-07-06 DIAGNOSIS — I10 ESSENTIAL HYPERTENSION: ICD-10-CM

## 2020-07-06 DIAGNOSIS — R68.89 COLD INTOLERANCE: ICD-10-CM

## 2020-07-17 ENCOUNTER — PATIENT OUTREACH (OUTPATIENT)
Dept: CASE MANAGEMENT | Facility: OTHER | Age: 80
End: 2020-07-17

## 2020-07-17 NOTE — OUTREACH NOTE
Attempted to contact pt to schedule AWV, Pts mailbox is full.    JAILYN Ferraro  Network Clinical Coordinator  [phone]     contact guard

## 2020-07-20 NOTE — OUTREACH NOTE
Attempted to contact pt to schedule AWV, Pts mailbox is full.     JAILYN Ferraro  Network Clinical Coordinator  [phone]

## 2020-07-21 NOTE — OUTREACH NOTE
Attempted to contact pt to schedule AWV, Pts mailbox is full.     JAILYN Ferraro  Network Clinical Coordinator  [phone]

## 2020-07-24 ENCOUNTER — OFFICE VISIT (OUTPATIENT)
Dept: SLEEP MEDICINE | Facility: HOSPITAL | Age: 80
End: 2020-07-24

## 2020-07-24 VITALS
WEIGHT: 219 LBS | BODY MASS INDEX: 36.49 KG/M2 | HEART RATE: 89 BPM | DIASTOLIC BLOOD PRESSURE: 81 MMHG | SYSTOLIC BLOOD PRESSURE: 153 MMHG | OXYGEN SATURATION: 97 % | HEIGHT: 65 IN

## 2020-07-24 DIAGNOSIS — Z72.0 TOBACCO ABUSE: ICD-10-CM

## 2020-07-24 DIAGNOSIS — E66.9 OBESITY (BMI 30-39.9): ICD-10-CM

## 2020-07-24 DIAGNOSIS — G47.33 OBSTRUCTIVE SLEEP APNEA: Primary | ICD-10-CM

## 2020-07-24 DIAGNOSIS — G47.00 INSOMNIA, UNSPECIFIED TYPE: ICD-10-CM

## 2020-07-24 PROCEDURE — G0463 HOSPITAL OUTPT CLINIC VISIT: HCPCS

## 2020-07-24 NOTE — PROGRESS NOTES
[facility] SLEEP MEDICINE  [address] [facility]  [address]RD [address]LO[address]  [place] [address]  [phone]    PCP: Sanjiv Small MD    Reason for visit:  Sleep disorders: DELROY    Luann is a 80 y.o.female who was seen in the [facility] today. She was setup with CPAP after PSG.  She is unable to fall asleep with the CPAP at sleep onset. She sleeps from 12mn to 7am. Using ffm, fits well. No air leaks. She has some dry mouth. She is more rested when she awakens.  She smokes about 4 cigs per day at most. She cut down her cigs 5 yrs ago. Prior to that only 1/2 ppd. She has COPD and chronic soa for same. She uses her inhaler daily.  [place] Sleepiness Scale is 9. Caffeine 4 per day. Alcohol 1-2 per week.    Luann  reports that she has been smoking. She has been smoking about 0.25 packs per day. She uses smokeless tobacco.    Pertinent Positive Review of Systems of soa, acid reflux  Rest of Review of Systems was negative as recorded in Sleep Questionnaire.    Patient  has a past medical history of Chronic bronchitis (CMS/HCC), Depression, Fracture of humerus, Hypothyroidism, Itch of skin, Pneumonia, Pulmonary emphysema (CMS/HCC), Renal calculi, Shingles rash, and Type 2 diabetes mellitus (CMS/HCC).     Current Medications:    Current Outpatient Medications:   •  ADVAIR DISKUS 100-50 MCG/DOSE DISKUS, Inhale 1 puff 2 (Two) Times a Day., Disp: 180 each, Rfl: 5  •  albuterol (PROVENTIL HFA;VENTOLIN HFA) 108 (90 Base) MCG/ACT inhaler, Inhale 2 puffs Every 4 (Four) Hours As Needed for Wheezing or Shortness of Air., Disp: 1 inhaler, Rfl: 3  •  allopurinol (ZYLOPRIM) 100 MG tablet, Take 1 tablet by mouth Daily., Disp: 90 tablet, Rfl: 3  •  amLODIPine (NORVASC) 5 MG tablet, TAKE 1 TABLET BY MOUTH DAILY, Disp: 90 tablet, Rfl: 0  •  atorvastatin (LIPITOR) 20 MG tablet, Take 1 tablet by mouth Daily., Disp: 90 tablet, Rfl: 3  •  chlorthalidone (HYGROTON) 25 MG tablet, Take 1 tablet by mouth Daily., Disp: 90 tablet, Rfl: 0  •   "[employer]S ANTACID & ANTI-GAS 1000-60 MG chewable tablet, , Disp: , Rfl:   •  omeprazole OTC (PriLOSEC OTC) 20 MG EC tablet, Take 20 mg by mouth Daily. Take 1-2 tablet, Disp: , Rfl:   •  Probiotic Product (PROBIOTIC DAILY) capsule, Take 1 tablet by mouth daily., Disp: , Rfl:   •  SYNTHROID 137 MCG tablet, , Disp: , Rfl:   •  vitamin D (ERGOCALCIFEROL) 1.25 MG (20408 UT) capsule capsule, Take 1 capsule by mouth Every 7 (Seven) Days., Disp: 13 capsule, Rfl: 3   also entered in Sleep Questionnaire         Vital Signs: /81   Pulse 89   Ht 165.1 cm (65\")   Wt 99.3 kg (219 lb)   SpO2 97%   BMI 36.44 kg/m²     Body mass index is 36.44 kg/m².       Tongue: large      Dentition: good       Pharynx: Posterior pharyngeal pillars are narrow   Mallampatti: IV (only hard palate visible)        General: Alert. Cooperative. Well developed. No acute distress.             Head:  Normocephalic. Symmetrical. Atraumatic.              Nose: No septal deviation. No drainage.          Throat: No oral lesions. No thrush. Moist mucous membranes.    Chest Wall:  Normal shape. Symmetric expansion with respiration. No tenderness.             Neck:  Trachea midline.           Lungs:  Clear to auscultation bilaterally. No wheezes. No rhonchi. No rales. Respirations regular, even and unlabored.            Heart:  Regular rhythm and normal rate. Normal S1 and S2. No murmur.     Abdomen:  Soft, non-tender and non-distended. Normal bowel sounds. No masses.  Extremities:  Moves all extremities well. No edema.    Psychiatric: Normal mood and affect.    Study:  · 3/8/20  Overnight split polysomnogram study.  Diagnostic study from 10:45 PM to 1:29 AM.  Sleep efficiency 67% with 1.83 hours total sleep time.  Sleep distribution is normal.  AHI index 16 indicating moderately severe obstructive sleep apnea.  Severity underestimated due to absence of supine sleep.  During 22 minutes of REM sleep AHI index is 52.  During 88 minutes of non-REM sleep AHI " index is 7.5.  Oxygen saturation remained above 88%.  Arousal index 22 events an hour.  Patient had 68% time snoring.  Titration study from 1:29 AM to 4:48 AM.  Sleep efficiency was even poor at 54.5% with 1.81 hours total sleep time.  Slow-wave sleep increased from diagnostic to 23%.  However REM sleep decreased to 8.3%.  Apneas hypopneas eliminated with positive airway pressure device as well as snoring.  CPAP increased from 6 to 14 cm water pressure.  Maximum sleep at 12 cm water pressure though maximum REM sleep seen at 14 cm water pressure.  Despite that only 6 minutes of total REM sleep was seen.    Testing:  · Compliance is improving.  Average usage for days used 3 hours 19 seconds but for the last 1 month patient seems to be achieving close to 4 hours every night.  AHI 3.4.  Average pressure 12 to 14 cm.  Auto CPAP between 12 and 20 cm.    [employer]: [employer]    Impression:  1. Obstructive sleep apnea    2. Obesity (BMI 30-39.9)    3. Insomnia, unspecified type    4. Tobacco abuse        Plan:  Luann was asked to increase compliance.  She seems to be tolerating the mask and pressures a little bit better now.  Asked to use during the day to get used to the pressures and the mask.      She does have insomnia.  No restless legs. Avoid caffeine after noon.  Can use Benadryl 25 mg or Claritin 10 mg nightly to help her sleep.    She is a current smoker and has COPD.  She follows with her primary care physician.  Overall her symptoms are controlled with present inhalers.  Advised strongly to discontinue smoking as this will also help her sleep disordered breathing.    I reiterated the importance of effective treatment of obstructive sleep apnea with PAP machine.  Cardiovascular health risks of untreated sleep apnea were again reviewed.  Patient was asked to remain cautious if there is persistent hypersomnolence. The benefit of weight loss in reducing severity of obstructive sleep apnea was discussed.  Patient would  benefit from adhering to a strict diet to achieve ideal BMI.     Change of PAP supplies regularly is important for effective use.  Change of cushion on the mask or plugs on nasal pillows along with disposable filters once every month and change of mask frame, tubing, headgear and Velcro straps every 6 months at the minimum was reiterated.    This patient is compliant with PAP machine and benefits from its use.  Apnea hypopneas index is corrected/improved.  Daytime hypersomnolence has resolved.     Patient will follow up in this clinic in 3 months  APRN    Thank you for allowing me to participate in your patient's care.    Jeff Sands MD    Part of this note may be an electronic transcription/translation of spoken language to printed text using the Dragon Dictation System.

## 2020-08-20 ENCOUNTER — LAB (OUTPATIENT)
Dept: ENDOCRINOLOGY | Age: 80
End: 2020-08-20

## 2020-08-20 DIAGNOSIS — E55.9 VITAMIN D DEFICIENCY: ICD-10-CM

## 2020-08-20 DIAGNOSIS — E79.0 HYPERURICEMIA: ICD-10-CM

## 2020-08-20 DIAGNOSIS — E03.8 OTHER SPECIFIED HYPOTHYROIDISM: ICD-10-CM

## 2020-08-20 DIAGNOSIS — R73.9 HYPERGLYCEMIA: ICD-10-CM

## 2020-08-20 DIAGNOSIS — E88.81 INSULIN RESISTANCE: ICD-10-CM

## 2020-08-29 LAB
25(OH)D3+25(OH)D2 SERPL-MCNC: 68.5 NG/ML (ref 30–100)
ALBUMIN SERPL-MCNC: 4.4 G/DL (ref 3.5–5.2)
ALBUMIN/GLOB SERPL: 1.8 G/DL
ALP SERPL-CCNC: 63 U/L (ref 39–117)
ALT SERPL-CCNC: 14 U/L (ref 1–33)
AST SERPL-CCNC: 20 U/L (ref 1–32)
BILIRUB SERPL-MCNC: 0.6 MG/DL (ref 0–1.2)
BUN SERPL-MCNC: 28 MG/DL (ref 8–23)
BUN/CREAT SERPL: 20.6 (ref 7–25)
C PEPTIDE SERPL-MCNC: 5.2 NG/ML (ref 1.1–4.4)
CALCIUM SERPL-MCNC: 9.8 MG/DL (ref 8.6–10.5)
CHLORIDE SERPL-SCNC: 101 MMOL/L (ref 98–107)
CHOLEST SERPL-MCNC: 178 MG/DL (ref 0–200)
CO2 SERPL-SCNC: 25.3 MMOL/L (ref 22–29)
CREAT SERPL-MCNC: 1.36 MG/DL (ref 0.57–1)
GLOBULIN SER CALC-MCNC: 2.5 GM/DL
GLUCOSE SERPL-MCNC: 96 MG/DL (ref 65–99)
HBA1C MFR BLD: 5.9 % (ref 4.8–5.6)
HDLC SERPL-MCNC: 66 MG/DL (ref 40–60)
INTERPRETATION: NORMAL
LDLC SERPL CALC-MCNC: 90 MG/DL (ref 0–100)
Lab: NORMAL
MICROALBUMIN UR-MCNC: 4.4 UG/ML
POTASSIUM SERPL-SCNC: 4.4 MMOL/L (ref 3.5–5.2)
PROT SERPL-MCNC: 6.9 G/DL (ref 6–8.5)
SODIUM SERPL-SCNC: 140 MMOL/L (ref 136–145)
T3FREE SERPL-MCNC: 2 PG/ML (ref 2–4.4)
T4 FREE SERPL-MCNC: 1.11 NG/DL (ref 0.93–1.7)
T4 SERPL-MCNC: 9.12 MCG/DL (ref 4.5–11.7)
THYROGLOB AB SERPL-ACNC: 802.8 IU/ML (ref 0–0.9)
THYROGLOB SERPL-MCNC: 11 NG/ML
TRIGL SERPL-MCNC: 108 MG/DL (ref 0–150)
TSH SERPL DL<=0.005 MIU/L-ACNC: 34.9 UIU/ML (ref 0.27–4.2)
URATE SERPL-MCNC: 7.1 MG/DL (ref 2.4–5.7)
VLDLC SERPL CALC-MCNC: 21.6 MG/DL

## 2020-08-29 RX ORDER — LEVOTHYROXINE SODIUM 150 MCG
150 TABLET ORAL DAILY
Qty: 30 TABLET | Refills: 2 | Status: SHIPPED | OUTPATIENT
Start: 2020-08-29 | End: 2020-11-20 | Stop reason: ALTCHOICE

## 2020-10-23 ENCOUNTER — OFFICE VISIT (OUTPATIENT)
Dept: SLEEP MEDICINE | Facility: HOSPITAL | Age: 80
End: 2020-10-23

## 2020-10-23 VITALS
DIASTOLIC BLOOD PRESSURE: 72 MMHG | HEIGHT: 66 IN | OXYGEN SATURATION: 96 % | WEIGHT: 219.4 LBS | BODY MASS INDEX: 35.26 KG/M2 | SYSTOLIC BLOOD PRESSURE: 147 MMHG | HEART RATE: 95 BPM

## 2020-10-23 DIAGNOSIS — E66.9 OBESITY (BMI 30-39.9): ICD-10-CM

## 2020-10-23 DIAGNOSIS — G47.33 OBSTRUCTIVE SLEEP APNEA: Primary | ICD-10-CM

## 2020-10-23 DIAGNOSIS — Z72.0 TOBACCO ABUSE: ICD-10-CM

## 2020-10-23 DIAGNOSIS — I10 ESSENTIAL HYPERTENSION: ICD-10-CM

## 2020-10-23 PROCEDURE — G0463 HOSPITAL OUTPT CLINIC VISIT: HCPCS

## 2020-10-23 NOTE — PROGRESS NOTES
[facility]  Telephone: [phone] / Fax: [phone] [place]  Telephone: [phone] / Fax: [phone] [place] Payne    PCP: Sanjiv Small MD    Reason for visit: DELROY f/u    Luann Frances is a 80 y.o.female  was last seen at [facility] sleep lab in July 2020. Since seeing us last, she was able to improve CPAP compliance and is able to sleep through the night with the CPAP-the entire night. She is now using her machine for 5.5 hours. She feels that treatment of DELROY has made a big difference. She is now on BP medications and is feeling better. She is unaware of snoring as she sleeps alone.  She denies further episodes of choking/gasping for breath. Her bedtime is 11pm-7am. She uses a FFM which fits well. She is a mouth breather and was unable to use nasal mask.      SH- smokes cigarettes, drinks 1-2 alc per week, 2 cups of coffee per day, 0 energy drinks.    ROS- +SOA and GERD. Rest is negative.    DME [facility].    Current Medications:    Current Outpatient Medications:   •  ADVAIR DISKUS 100-50 MCG/DOSE DISKUS, Inhale 1 puff 2 (Two) Times a Day., Disp: 180 each, Rfl: 5  •  albuterol (PROVENTIL HFA;VENTOLIN HFA) 108 (90 Base) MCG/ACT inhaler, Inhale 2 puffs Every 4 (Four) Hours As Needed for Wheezing or Shortness of Air., Disp: 1 inhaler, Rfl: 3  •  allopurinol (ZYLOPRIM) 100 MG tablet, Take 1 tablet by mouth Daily., Disp: 90 tablet, Rfl: 3  •  amLODIPine (NORVASC) 5 MG tablet, TAKE 1 TABLET BY MOUTH DAILY, Disp: 90 tablet, Rfl: 0  •  atorvastatin (LIPITOR) 20 MG tablet, Take 1 tablet by mouth Daily., Disp: 90 tablet, Rfl: 3  •  chlorthalidone (HYGROTON) 25 MG tablet, Take 1 tablet by mouth Daily., Disp: 90 tablet, Rfl: 0  •  CVS ANTACID & ANTI-GAS 1000-60 MG chewable tablet, , Disp: , Rfl:   •  omeprazole OTC (PriLOSEC OTC) 20 MG EC tablet, Take 20 mg by mouth Daily. Take 1-2 tablet, Disp: , Rfl:   •  Probiotic Product (PROBIOTIC DAILY) capsule, Take 1 tablet by mouth daily., Disp: , Rfl:  "  •  SYNTHROID 150 MCG tablet, Take 1 tablet by mouth Daily., Disp: 30 tablet, Rfl: 2  •  vitamin D (ERGOCALCIFEROL) 1.25 MG (86856 UT) capsule capsule, Take 1 capsule by mouth Every 7 (Seven) Days., Disp: 13 capsule, Rfl: 3   also entered in Sleep Questionnaire    Patient  has a past medical history of Chronic bronchitis (CMS/HCC), Depression, Fracture of humerus, Hypothyroidism, Itch of skin, Pneumonia, Pulmonary emphysema (CMS/HCC), Renal calculi, Shingles rash, and Type 2 diabetes mellitus (CMS/HCC).    I have reviewed the Past Medical History, Past Surgical History, Social History and Family History.    Vital Signs /72   Pulse 95   Ht 166.4 cm (65.5\")   Wt 99.5 kg (219 lb 6.4 oz)   SpO2 96%   BMI 35.95 kg/m²  Body mass index is 35.95 kg/m².    General Alert and oriented. No acute distress noted   Pharynx/Throat Class IV Mallampati airway, large tongue, no evidence of redundant lateral pharyngeal tissue. No oral lesions. No thrush. Moist mucous membranes.   Head Normocephalic. Symmetrical. Atraumatic.    Nose No septal deviation. No drainage   Chest Wall Normal shape. Symmetric expansion with respiration. No tenderness.   Neck Trachea midline, no thyromegaly or adenopathy    Lungs Clear to auscultation bilaterally. No wheezes. No rhonchi. No rales. Respirations regular, even and unlabored.   Heart Regular rhythm and normal rate. Normal S1 and S2. No murmur   Abdomen Soft, non-tender and non-distended. Normal bowel sounds. No masses.   Extremities Moves all extremities well. No edema   Psychiatric Normal mood and affect.     Testing:  · Download 7/25/20- 10/22/20- 97% usage with average nightly use of 5 hours and 31 minutes on auto CPAP 12-20 cm with average pressure of 15.2, AHI 3.3, leak of 22 minutes and 15 sec    -Study:  · 3/8/20  Overnight split polysomnogram study.  Diagnostic study from 10:45 PM to 1:29 AM.  Sleep efficiency 67% with 1.83 hours total sleep time.  Sleep distribution is normal. "  AHI index 16 indicating moderately severe obstructive sleep apnea.  Severity underestimated due to absence of supine sleep.  During 22 minutes of REM sleep AHI index is 52.  During 88 minutes of non-REM sleep AHI index is 7.5.  Oxygen saturation remained above 88%.  Arousal index 22 events an hour.  Patient had 68% time snoring.    Titration study from 1:29 AM to 4:48 AM.  Sleep efficiency was even poor at 54.5% with 1.81 hours total sleep time.  Slow-wave sleep increased from diagnostic to 23%.  However REM sleep decreased to 8.3%.  Apneas hypopneas eliminated with positive airway pressure device as well as snoring.  CPAP increased from 6 to 14 cm water pressure.  Maximum sleep at 12 cm water pressure though maximum REM sleep seen at 14 cm water pressure.  Despite that only 6 minutes of total REM sleep was seen.    Impression:  1. Obstructive sleep apnea    2. Obesity (BMI 30-39.9)    3. Tobacco abuse    4. Essential hypertension          Plan:  Patient uses the CPAP device and benefits from its use in terms of reduction of hypersomnia and snoring.  AHI appears to be within adequate range. I reviewed download report and original sleep study report with the patient. She continues to smoke and has been unable to quit. Since starting BP medications last year, she noticed improvement in how she feels.      Weight loss will be strongly beneficial to reduce the severity of sleep-disordered breathing.  Caution during activities that require prolonged concentration is strongly advised if sleepiness returns. Changing of PAP supplies regularly is important for effective use.  Nasal pillows should be changed twice per month, tubing every 3 months, disposable filters twice per  month, water chamber every 6 months. Full face mask frame should be replaced every 3 months, full face cushion monthly and headgear every 6 months..    Follow up with Dr. Sands in one year    Thank you for allowing me to participate in your patient's  care.      LIVIER Altamirano  [facility]  Phone: [phone]      Part of this note may be an electronic transcription/translation of spoken language to printed text using the Dragon Dictation System.

## 2020-11-15 NOTE — PROGRESS NOTES
Subjective   Luann Frances is a 80 y.o. female.     F/u from dr Saleh for hypothyroidism , hyperlipidemia, hypertension/ flu vaccine @ [facility]      Patient is a 80-year-old female who consented to a phone visit.  She is new to me.  Total time 28 minutes.    She has hypothyroidism since 2017.  She has no history of goiter.  She has no history of thyroid surgery or radiation therapy.  She has been on Synthroid 150 mcg/day but has stopped taking the medication for at least a week because she will feel tired and sleepy shortly after she takes the medication.  She denies any significant weight change.  She feels colder.  She denies hair, skin or nail changes.    She has hyperlipidemia is on Lipitor 20 mg/day she denies myalgia.    She has hypertension and is on amlodipine 5 mg/day and chlorthalidone 25 mg/day.  She has no history of heart attack or stroke.  Blood pressure at home runs less than 135.  She denies chest pain, palpitations or pedal edema.    She has COPD and is on Rex, and albuterol.  She is smokes 4 to 5 cigarettes/day.    She has sleep apnea and uses a CPAP regularly.    She has no history of diabetes mellitus.  The following portions of the patient's history were reviewed and updated as appropriate: allergies, current medications, past family history, past medical history, past social history, past surgical history and problem list.    Review of Systems   Constitutional: Positive for fatigue. Negative for chills and fever.   Respiratory: Positive for shortness of breath (COPD ). Negative for chest tightness and wheezing.    Cardiovascular: Negative for chest pain, palpitations and leg swelling.   Gastrointestinal: Negative for abdominal distention and abdominal pain.   Genitourinary: Negative for difficulty urinating, frequency and urgency.   Musculoskeletal: Negative for back pain, joint swelling and neck pain.   Neurological: Negative for dizziness, tremors, seizures, light-headedness, numbness  and headaches.   Hematological: Does not bruise/bleed easily.   Psychiatric/Behavioral: Positive for sleep disturbance (sleep apnea using CPAP machine ). Negative for agitation and confusion. The patient is not nervous/anxious.      Objective      There were no vitals filed for this visit.  Physical Exam  Results Encounter on 07/06/2020   Component Date Value Ref Range Status   • TSH 08/20/2020 34.900* 0.270 - 4.200 uIU/mL Final   • T4, Total 08/20/2020 9.12  4.50 - 11.70 mcg/dL Final    Results may be falsely increased if patient taking Biotin.   • T3, Free 08/20/2020 2.0  2.0 - 4.4 pg/mL Final   • Free T4 08/20/2020 1.11  0.93 - 1.70 ng/dL Final    Results may be falsely increased if patient taking Biotin.   • Thyroglobulin Ab 08/20/2020 802.8* 0.0 - 0.9 IU/mL Final    Thyroglobulin Antibody measured by [employer] Methodology   • Uric Acid 08/20/2020 7.1* 2.4 - 5.7 mg/dL Final   • 25 Hydroxy, Vitamin D 08/20/2020 68.5  30.0 - 100.0 ng/mL Final    Comment: Results may be falsely increased if patient taking Biotin.  Reference Range for Total Vitamin D 25(OH)  Deficiency <20.0 ng/mL  Insufficiency 21-29 ng/mL  Sufficiency  ng/mL  Toxicity >100 ng/ml     • Glucose 08/20/2020 96  65 - 99 mg/dL Final   • BUN 08/20/2020 28* 8 - 23 mg/dL Final   • Creatinine 08/20/2020 1.36* 0.57 - 1.00 mg/dL Final   • eGFR Non African Am 08/20/2020 38* >60 mL/min/1.73 Final    Comment: The MDRD GFR formula is only valid for adults with stable  renal function between ages 18 and 70.     • eGFR  Am 08/20/2020 45* >60 mL/min/1.73 Final   • BUN/Creatinine Ratio 08/20/2020 20.6  7.0 - 25.0 Final   • Sodium 08/20/2020 140  136 - 145 mmol/L Final   • Potassium 08/20/2020 4.4  3.5 - 5.2 mmol/L Final   • Chloride 08/20/2020 101  98 - 107 mmol/L Final   • Total CO2 08/20/2020 25.3  22.0 - 29.0 mmol/L Final   • Calcium 08/20/2020 9.8  8.6 - 10.5 mg/dL Final   • Total Protein 08/20/2020 6.9  6.0 - 8.5 g/dL Final   • Albumin  08/20/2020 4.40  3.50 - 5.20 g/dL Final   • Globulin 08/20/2020 2.5  gm/dL Final   • A/G Ratio 08/20/2020 1.8  g/dL Final   • Total Bilirubin 08/20/2020 0.6  0.0 - 1.2 mg/dL Final   • Alkaline Phosphatase 08/20/2020 63  39 - 117 U/L Final   • AST (SGOT) 08/20/2020 20  1 - 32 U/L Final   • ALT (SGPT) 08/20/2020 14  1 - 33 U/L Final   • C-Peptide 08/20/2020 5.2* 1.1 - 4.4 ng/mL Final    C-Peptide reference interval is for fasting patients.   • Hemoglobin A1C 08/20/2020 5.90* 4.80 - 5.60 % Final    Comment: Hemoglobin A1C Ranges:  Increased Risk for Diabetes  5.7% to 6.4%  Diabetes                     >= 6.5%  Diabetic Goal                < 7.0%     • Total Cholesterol 08/20/2020 178  0 - 200 mg/dL Final   • Triglycerides 08/20/2020 108  0 - 150 mg/dL Final   • HDL Cholesterol 08/20/2020 66* 40 - 60 mg/dL Final   • VLDL Cholesterol 08/20/2020 21.6  mg/dL Final   • LDL Cholesterol  08/20/2020 90  0 - 100 mg/dL Final   • Microalbumin, Urine 08/20/2020 4.4  Not Estab. ug/mL Final   • Interpretation 08/20/2020 Note   Final    Supplemental report is available.   • PDF Image 08/20/2020 Not applicable   Final   • Thyroglobulin (TG-TIERRA) 08/20/2020 11  ng/mL Final    Comment: This test was developed and its performance characteristics  determined by [facility]. It has not been cleared or approved  by the Food and Drug Administration.  Reference Range:  Pubertal Children  and Adults: <40  According to the National Academy of Clinical Biochemistry,  the reference interval for Thyroglobulin (TG) should be  related to euthyroid patients and not for patients who  underwent thyroidectomy.  TG reference intervals for these  patients depend on the residual mass of the thyroid tissue  left after surgery.  Establishing a post-operative baseline  is recommended.  The assay quantitation limit is 2.0 ng/mL.       Assessment/Plan   Diagnoses and all orders for this visit:    1. Hypothyroidism due to Hashimoto's thyroiditis (Primary)  -      Comprehensive Metabolic Panel; Future  -     Lipid Panel; Future  -     TSH; Future  -     T4, Free; Future  -     T3, Free; Future    2. Mixed hyperlipidemia  -     Comprehensive Metabolic Panel; Future  -     Lipid Panel; Future    3. Essential hypertension  -     Comprehensive Metabolic Panel; Future    4. Vitamin D deficiency  -     Comprehensive Metabolic Panel; Future  -     Vitamin D 25 Hydroxy; Future    Other orders  -     levothyroxine sodium (Tirosint) 150 MCG capsule; Take 1 capsule by mouth Daily. No substitute  Dispense: 30 capsule; Refill: 3      Discontinue Synthroid.  Switch to Tirosint 150 mcg/day.  Repeat thyroid function tests in 1 month.  Continue Lipitor 10 mg/day  Continue amlodipine and chlorthalidone.    Follow-up in 4 months

## 2020-11-20 ENCOUNTER — OFFICE VISIT (OUTPATIENT)
Dept: ENDOCRINOLOGY | Age: 80
End: 2020-11-20

## 2020-11-20 DIAGNOSIS — I10 ESSENTIAL HYPERTENSION: ICD-10-CM

## 2020-11-20 DIAGNOSIS — E78.2 MIXED HYPERLIPIDEMIA: ICD-10-CM

## 2020-11-20 DIAGNOSIS — E55.9 VITAMIN D DEFICIENCY: ICD-10-CM

## 2020-11-20 DIAGNOSIS — E06.3 HYPOTHYROIDISM DUE TO HASHIMOTO'S THYROIDITIS: Primary | ICD-10-CM

## 2020-11-20 DIAGNOSIS — E03.8 HYPOTHYROIDISM DUE TO HASHIMOTO'S THYROIDITIS: Primary | ICD-10-CM

## 2020-11-20 PROCEDURE — 99214 OFFICE O/P EST MOD 30 MIN: CPT | Performed by: INTERNAL MEDICINE

## 2020-11-20 RX ORDER — INFLUENZA A VIRUS A/MICHIGAN/45/2015 X-275 (H1N1) ANTIGEN (FORMALDEHYDE INACTIVATED), INFLUENZA A VIRUS A/SINGAPORE/INFIMH-16-0019/2016 IVR-186 (H3N2) ANTIGEN (FORMALDEHYDE INACTIVATED), INFLUENZA B VIRUS B/PHUKET/3073/2013 ANTIGEN (FORMALDEHYDE INACTIVATED), AND INFLUENZA B VIRUS B/MARYLAND/15/2016 BX-69A ANTIGEN (FORMALDEHYDE INACTIVATED) 60; 60; 60; 60 UG/.7ML; UG/.7ML; UG/.7ML; UG/.7ML
INJECTION, SUSPENSION INTRAMUSCULAR
COMMUNITY
Start: 2020-09-04 | End: 2020-12-08

## 2020-11-20 RX ORDER — LEVOTHYROXINE SODIUM 150 UG/1
150 CAPSULE ORAL DAILY
Qty: 30 CAPSULE | Refills: 3 | Status: SHIPPED | OUTPATIENT
Start: 2020-11-20 | End: 2020-12-10 | Stop reason: SDUPTHER

## 2020-11-30 DIAGNOSIS — I10 HYPERTENSION, ESSENTIAL: ICD-10-CM

## 2020-12-01 RX ORDER — CHLORTHALIDONE 25 MG/1
25 TABLET ORAL DAILY
Qty: 90 TABLET | Refills: 0 | Status: SHIPPED | OUTPATIENT
Start: 2020-12-01 | End: 2021-02-21 | Stop reason: HOSPADM

## 2020-12-02 ENCOUNTER — TELEPHONE (OUTPATIENT)
Dept: ENDOCRINOLOGY | Age: 80
End: 2020-12-02

## 2020-12-02 NOTE — TELEPHONE ENCOUNTER
Patient called needs the status of the PA for the priosint 150 mg    She said it was requested over two weeks ago     She is expecting a return call to let her know the status at [phone]

## 2020-12-08 ENCOUNTER — OFFICE VISIT (OUTPATIENT)
Dept: FAMILY MEDICINE CLINIC | Facility: CLINIC | Age: 80
End: 2020-12-08

## 2020-12-08 VITALS
BODY MASS INDEX: 35.9 KG/M2 | TEMPERATURE: 96.9 F | OXYGEN SATURATION: 97 % | HEART RATE: 82 BPM | DIASTOLIC BLOOD PRESSURE: 78 MMHG | SYSTOLIC BLOOD PRESSURE: 138 MMHG | WEIGHT: 223.4 LBS | HEIGHT: 66 IN

## 2020-12-08 DIAGNOSIS — G47.33 OSA (OBSTRUCTIVE SLEEP APNEA): ICD-10-CM

## 2020-12-08 DIAGNOSIS — I10 ESSENTIAL HYPERTENSION: Primary | ICD-10-CM

## 2020-12-08 DIAGNOSIS — E03.8 HYPOTHYROIDISM DUE TO HASHIMOTO'S THYROIDITIS: ICD-10-CM

## 2020-12-08 DIAGNOSIS — E06.3 HYPOTHYROIDISM DUE TO HASHIMOTO'S THYROIDITIS: ICD-10-CM

## 2020-12-08 DIAGNOSIS — J43.8 OTHER EMPHYSEMA (HCC): ICD-10-CM

## 2020-12-08 DIAGNOSIS — K21.9 GASTROESOPHAGEAL REFLUX DISEASE, UNSPECIFIED WHETHER ESOPHAGITIS PRESENT: ICD-10-CM

## 2020-12-08 PROCEDURE — 99214 OFFICE O/P EST MOD 30 MIN: CPT | Performed by: FAMILY MEDICINE

## 2020-12-08 RX ORDER — DIPHENHYDRAMINE HCL 25 MG
25 CAPSULE ORAL NIGHTLY PRN
COMMUNITY
End: 2021-02-21 | Stop reason: HOSPADM

## 2020-12-08 NOTE — PROGRESS NOTES
Luann Frances is a 80 y.o. female.     Chief Complaint   Patient presents with   • Establish Care     pt is here to establish care - sees endo and sleep medicine   • Hypertension     pt is here to discuss HTN     Masks/face shield/appropriate PPE were worn for the entirety of the visit by the patient, MA, and provider.     HPI     Pt is a pleasant 80 y.o. YO female here for hypertension.  She is a new patient to me and is here today to establish care.  She is transferring from a former [facility] physician.  Her other chronic medical problems include hypothyroidism, hyperlipidemia, COPD, DELROY, and GERD.    Hypertension -chronic, on treatment with amlodipine 5 mg once daily and chlorthalidone 25 mg once daily. She checks her blood pressure at home intermittently with a wrist cuff and it is typically around 120s-140/80.     Hypothyroidism -chronic, follows with endocrinology regularly (Dr. Garcia).  Patient has been on Synthroid for a long time but states that over the last 2 years she has had severe fatigue after taking it.  For this reason she requested a change and Dr. Garcia scribed her different formulation of levothyroxine called Tirosant.  Patient states that the medication been after prior authorization was very expensive around $100.  She has not picked it up or taking it.  Over the last couple of weeks while she has been off of the levothyroxine she states that she has felt better than she ever has, quite hesitant to restart any type of therapy.    COPD -chronic, follows with pulmonology - Dr. Sands. She is on treatment with daily Advair Diskus and has albuterol to use as needed.  She has never required intubation because of her COPD but she has been hospitalized for exacerbation with pneumonia.  Her last exacerbation was January of this year, typically gets about 2 exacerbations a year.  She does state that she had 2 pneumonia vaccines previously and the last was within the last couple of years.  She also had  the flu shot earlier this season.     - She does continue to smoke 5 cigarettes a day.  She has smoked close to 1/2 pack/day for the last 64 years.  He has no interest in quitting at this time.  She has never intentionally tried to quit in the past.  The only time she has been without cigarettes is when she required a surgery and postop rehab.      DELROY - chronic, follows with sleep medicine - uses a cpap machine nightly.      GERD -chronic, intermittent.  Takes omeprazole 2 tablets over-the-counter (40 mg total) on a as needed basis, typically about 2 times a week.    The following portions of the patient's history were reviewed by me and updated as appropriate: allergies, current medications, past family history, past medical history, past social history, past surgical history, and problem list.     Review of Systems   Constitutional: Negative.    HENT: Negative.    Eyes: Negative.    Respiratory: Negative.    Cardiovascular: Negative.  Negative for chest pain, palpitations and leg swelling.   Gastrointestinal: Negative.    Endocrine: Negative.    Genitourinary: Negative.    Musculoskeletal: Negative.    Skin: Negative.    Allergic/Immunologic: Negative.    Neurological: Negative.    Hematological: Negative.    Psychiatric/Behavioral: Positive for sleep disturbance.   I have reviewed and confirmed the accuracy of the ROS as documented by the MA/LPN/RN Keri Newell MD    Objective  Vitals:    12/08/20 1424   BP: 138/78   Pulse: 82   Temp: 96.9 °F (36.1 °C)   SpO2: 97%     Body mass index is 36.61 kg/m².       Physical Exam  Vitals signs reviewed.   Constitutional:       General: She is not in acute distress.     Appearance: She is well-developed.   HENT:      Head: Normocephalic and atraumatic.   Eyes:      General: No scleral icterus.        Right eye: No discharge.         Left eye: No discharge.      Conjunctiva/sclera: Conjunctivae normal.   Cardiovascular:      Rate and Rhythm: Normal rate and regular  rhythm.      Heart sounds: No murmur.   Pulmonary:      Effort: Pulmonary effort is normal. No respiratory distress.      Breath sounds: Normal breath sounds.   Skin:     Coloration: Skin is not pale.      Findings: No erythema.   Neurological:      Mental Status: She is alert and oriented to person, place, and time.   Psychiatric:         Behavior: Behavior normal.         Thought Content: Thought content normal.         Judgment: Judgment normal.           Current Outpatient Medications:   •  ADVAIR DISKUS 100-50 MCG/DOSE DISKUS, Inhale 1 puff 2 (Two) Times a Day., Disp: 180 each, Rfl: 5  •  albuterol (PROVENTIL HFA;VENTOLIN HFA) 108 (90 Base) MCG/ACT inhaler, Inhale 2 puffs Every 4 (Four) Hours As Needed for Wheezing or Shortness of Air., Disp: 1 inhaler, Rfl: 3  •  allopurinol (ZYLOPRIM) 100 MG tablet, Take 1 tablet by mouth Daily., Disp: 90 tablet, Rfl: 3  •  amLODIPine (NORVASC) 5 MG tablet, TAKE 1 TABLET BY MOUTH DAILY, Disp: 90 tablet, Rfl: 0  •  atorvastatin (LIPITOR) 20 MG tablet, Take 1 tablet by mouth Daily., Disp: 90 tablet, Rfl: 3  •  chlorthalidone (HYGROTON) 25 MG tablet, Take 1 tablet by mouth Daily., Disp: 90 tablet, Rfl: 0  •  [employer] ANTACID & ANTI-GAS 1000-60 MG chewable tablet, , Disp: , Rfl:   •  diphenhydrAMINE (BENADRYL) 25 mg capsule, Take 25 mg by mouth At Night As Needed for Itching., Disp: , Rfl:   •  levothyroxine sodium (Tirosint) 150 MCG capsule, Take 1 capsule by mouth Daily. No substitute, Disp: 30 capsule, Rfl: 3  •  omeprazole OTC (PriLOSEC OTC) 20 MG EC tablet, Take 20 mg by mouth As Needed. Take 1-2 tablet, Disp: , Rfl:   •  Probiotic Product (PROBIOTIC DAILY) capsule, Take 1 tablet by mouth daily., Disp: , Rfl:   •  vitamin D (ERGOCALCIFEROL) 1.25 MG (27134 UT) capsule capsule, Take 1 capsule by mouth Every 7 (Seven) Days., Disp: 13 capsule, Rfl: 3    Procedures    Lab Results (most recent)     None              Diagnoses and all orders for this visit:    1. Essential hypertension  (Primary)  Chronic, well-controlled, continue current regimen of amlodipine and hydrochlorothiazide once daily.    2. Hypothyroidism due to Hashimoto's thyroiditis  Chronic, patient's last thyroid levels were within normal limits but she was still taking her Synthroid at that time.  She has since stopped and states that symptoms of fatigue have significantly improved but I am concerned that she is not on any type of medication.  Will check her thyroid levels today, pharmacy reportedly reaching out to Dr. Garcia regarding patient's medications concerns, may need to reach out myself based on where her levels are.   -     TSH  -     T4, Free  -     T3 Uptake & FTI    3. Other emphysema (CMS/HCC)  Chronic, stable on twice daily Advair with albuterol as needed continue same.  Patient not interested in smoking cessation.    4. DELROY (obstructive sleep apnea)  Chronic, well-controlled on CPAP machine nightly.    5. Gastroesophageal reflux disease, unspecified whether esophagitis present  Chronic, well-controlled with omeprazole use on a as needed basis, continue same.      Return in about 5 months (around 5/8/2021) for Medicare Wellness.      Keri Newell MD

## 2020-12-09 ENCOUNTER — TELEPHONE (OUTPATIENT)
Dept: ENDOCRINOLOGY | Age: 80
End: 2020-12-09

## 2020-12-09 LAB
FT4I SERPL CALC-MCNC: 0.5 (ref 1.2–4.9)
T3RU NFR SERPL: 15 % (ref 24–39)
T4 FREE SERPL-MCNC: 0.35 NG/DL (ref 0.93–1.7)
T4 SERPL-MCNC: 3.1 UG/DL (ref 4.5–12)
TSH SERPL DL<=0.005 MIU/L-ACNC: 60.4 UIU/ML (ref 0.27–4.2)

## 2020-12-09 NOTE — TELEPHONE ENCOUNTER
lynn  [phone] called regarding patient and the generic levothyroxine cost 100.00 a month. They want to change to something more affordable for patient . Please advise

## 2020-12-10 RX ORDER — LEVOTHYROXINE SODIUM 13 UG/1
150 CAPSULE ORAL DAILY
Qty: 30 CAPSULE | Refills: 3 | Status: SHIPPED | OUTPATIENT
Start: 2020-12-10 | End: 2020-12-22 | Stop reason: CLARIF

## 2020-12-11 ENCOUNTER — TELEPHONE (OUTPATIENT)
Dept: ENDOCRINOLOGY | Age: 80
End: 2020-12-11

## 2020-12-11 NOTE — TELEPHONE ENCOUNTER
PT WANTS TO KNOW IF THERE IS ANOTHER OPTION FOR MED Tirosint 150 MCG capsule [[id number]] (Order [id number])    PT FINDS $100 COPAY TO BE TOO MUCH.

## 2020-12-16 ENCOUNTER — HOSPITAL ENCOUNTER (EMERGENCY)
Facility: HOSPITAL | Age: 80
Discharge: HOME | End: 2020-12-16
Attending: EMERGENCY MEDICINE | Admitting: EMERGENCY MEDICINE

## 2020-12-16 ENCOUNTER — APPOINTMENT (OUTPATIENT)
Dept: CT IMAGING | Facility: HOSPITAL | Age: 80
End: 2020-12-16

## 2020-12-16 VITALS
BODY MASS INDEX: 36.65 KG/M2 | RESPIRATION RATE: 20 BRPM | HEART RATE: 76 BPM | HEIGHT: 65 IN | DIASTOLIC BLOOD PRESSURE: 69 MMHG | OXYGEN SATURATION: 100 % | WEIGHT: 220 LBS | SYSTOLIC BLOOD PRESSURE: 156 MMHG | TEMPERATURE: 97.3 F

## 2020-12-16 DIAGNOSIS — N20.1 URETEROLITHIASIS: Primary | ICD-10-CM

## 2020-12-16 DIAGNOSIS — N13.30 HYDRONEPHROSIS OF LEFT KIDNEY: ICD-10-CM

## 2020-12-16 LAB
ALBUMIN SERPL-MCNC: 4.54 G/DL (ref 3.5–5.2)
ALBUMIN/GLOB SERPL: 1.3 G/DL
ALP SERPL-CCNC: 66 U/L (ref 39–117)
ALT SERPL W P-5'-P-CCNC: 15 U/L (ref 1–33)
ANION GAP SERPL CALCULATED.3IONS-SCNC: 14.2 MMOL/L (ref 5–15)
AST SERPL-CCNC: 23 U/L (ref 1–32)
BASOPHILS # BLD AUTO: 0.08 10*3/MM3 (ref 0–0.2)
BASOPHILS NFR BLD AUTO: 1 % (ref 0–1.5)
BILIRUB SERPL-MCNC: 0.5 MG/DL (ref 0–1.2)
BUN SERPL-MCNC: 32 MG/DL (ref 8–23)
BUN/CREAT SERPL: 21.8 (ref 7–25)
CALCIUM SPEC-SCNC: 10.2 MG/DL (ref 8.6–10.5)
CHLORIDE SERPL-SCNC: 104 MMOL/L (ref 98–107)
CO2 SERPL-SCNC: 23.8 MMOL/L (ref 22–29)
CREAT SERPL-MCNC: 1.47 MG/DL (ref 0.57–1)
CRP SERPL-MCNC: 0.53 MG/DL (ref 0–0.5)
D-LACTATE SERPL-SCNC: 1.9 MMOL/L (ref 0.5–2)
DEPRECATED RDW RBC AUTO: 48.9 FL (ref 37–54)
EOSINOPHIL # BLD AUTO: 0.1 10*3/MM3 (ref 0–0.4)
EOSINOPHIL NFR BLD AUTO: 1.2 % (ref 0.3–6.2)
ERYTHROCYTE [DISTWIDTH] IN BLOOD BY AUTOMATED COUNT: 14 % (ref 12.3–15.4)
GFR SERPL CREATININE-BSD FRML MDRD: 34 ML/MIN/1.73
GLOBULIN UR ELPH-MCNC: 3.6 GM/DL
GLUCOSE SERPL-MCNC: 145 MG/DL (ref 65–99)
HCT VFR BLD AUTO: 41.1 % (ref 34–46.6)
HGB BLD-MCNC: 13.2 G/DL (ref 12–15.9)
HOLD SPECIMEN: NORMAL
HOLD SPECIMEN: NORMAL
IMM GRANULOCYTES # BLD AUTO: 0.03 10*3/MM3 (ref 0–0.05)
IMM GRANULOCYTES NFR BLD AUTO: 0.4 % (ref 0–0.5)
LIPASE SERPL-CCNC: 28 U/L (ref 13–60)
LYMPHOCYTES # BLD AUTO: 2.12 10*3/MM3 (ref 0.7–3.1)
LYMPHOCYTES NFR BLD AUTO: 25.9 % (ref 19.6–45.3)
MCH RBC QN AUTO: 30.6 PG (ref 26.6–33)
MCHC RBC AUTO-ENTMCNC: 32.1 G/DL (ref 31.5–35.7)
MCV RBC AUTO: 95.1 FL (ref 79–97)
MONOCYTES # BLD AUTO: 0.64 10*3/MM3 (ref 0.1–0.9)
MONOCYTES NFR BLD AUTO: 7.8 % (ref 5–12)
NEUTROPHILS NFR BLD AUTO: 5.23 10*3/MM3 (ref 1.7–7)
NEUTROPHILS NFR BLD AUTO: 63.7 % (ref 42.7–76)
NRBC BLD AUTO-RTO: 0 /100 WBC (ref 0–0.2)
PLATELET # BLD AUTO: 270 10*3/MM3 (ref 140–450)
PMV BLD AUTO: 11.2 FL (ref 6–12)
POTASSIUM SERPL-SCNC: 4.2 MMOL/L (ref 3.5–5.2)
PROT SERPL-MCNC: 8.1 G/DL (ref 6–8.5)
RBC # BLD AUTO: 4.32 10*6/MM3 (ref 3.77–5.28)
SODIUM SERPL-SCNC: 142 MMOL/L (ref 136–145)
WBC # BLD AUTO: 8.2 10*3/MM3 (ref 3.4–10.8)
WHOLE BLOOD HOLD SPECIMEN: NORMAL
WHOLE BLOOD HOLD SPECIMEN: NORMAL

## 2020-12-16 PROCEDURE — 85025 COMPLETE CBC W/AUTO DIFF WBC: CPT | Performed by: PHYSICIAN ASSISTANT

## 2020-12-16 PROCEDURE — 96365 THER/PROPH/DIAG IV INF INIT: CPT

## 2020-12-16 PROCEDURE — 87040 BLOOD CULTURE FOR BACTERIA: CPT | Performed by: EMERGENCY MEDICINE

## 2020-12-16 PROCEDURE — 25010000002 HYDROMORPHONE 1 MG/ML SOLUTION: Performed by: EMERGENCY MEDICINE

## 2020-12-16 PROCEDURE — 96376 TX/PRO/DX INJ SAME DRUG ADON: CPT

## 2020-12-16 PROCEDURE — 25010000002 MORPHINE PER 10 MG: Performed by: EMERGENCY MEDICINE

## 2020-12-16 PROCEDURE — 74176 CT ABD & PELVIS W/O CONTRAST: CPT | Performed by: RADIOLOGY

## 2020-12-16 PROCEDURE — 99283 EMERGENCY DEPT VISIT LOW MDM: CPT

## 2020-12-16 PROCEDURE — 83690 ASSAY OF LIPASE: CPT | Performed by: PHYSICIAN ASSISTANT

## 2020-12-16 PROCEDURE — 80053 COMPREHEN METABOLIC PANEL: CPT | Performed by: PHYSICIAN ASSISTANT

## 2020-12-16 PROCEDURE — 25010000002 CEFTRIAXONE PER 250 MG: Performed by: EMERGENCY MEDICINE

## 2020-12-16 PROCEDURE — 86140 C-REACTIVE PROTEIN: CPT | Performed by: PHYSICIAN ASSISTANT

## 2020-12-16 PROCEDURE — 25010000002 ONDANSETRON PER 1 MG: Performed by: EMERGENCY MEDICINE

## 2020-12-16 PROCEDURE — 25010000002 HYDROMORPHONE PER 4 MG: Performed by: EMERGENCY MEDICINE

## 2020-12-16 PROCEDURE — 96375 TX/PRO/DX INJ NEW DRUG ADDON: CPT

## 2020-12-16 PROCEDURE — 74176 CT ABD & PELVIS W/O CONTRAST: CPT

## 2020-12-16 PROCEDURE — 83605 ASSAY OF LACTIC ACID: CPT | Performed by: EMERGENCY MEDICINE

## 2020-12-16 RX ORDER — OXYCODONE AND ACETAMINOPHEN 7.5; 325 MG/1; MG/1
1 TABLET ORAL EVERY 6 HOURS PRN
Qty: 12 TABLET | Refills: 0 | Status: SHIPPED | OUTPATIENT
Start: 2020-12-16 | End: 2020-12-31

## 2020-12-16 RX ORDER — TAMSULOSIN HYDROCHLORIDE 0.4 MG/1
1 CAPSULE ORAL DAILY
Qty: 14 CAPSULE | Refills: 0 | Status: SHIPPED | OUTPATIENT
Start: 2020-12-16 | End: 2021-01-06 | Stop reason: HOSPADM

## 2020-12-16 RX ORDER — SODIUM CHLORIDE 0.9 % (FLUSH) 0.9 %
10 SYRINGE (ML) INJECTION AS NEEDED
Status: DISCONTINUED | OUTPATIENT
Start: 2020-12-16 | End: 2020-12-16 | Stop reason: HOSPADM

## 2020-12-16 RX ORDER — ONDANSETRON 4 MG/1
4 TABLET, ORALLY DISINTEGRATING ORAL EVERY 8 HOURS PRN
Qty: 15 TABLET | Refills: 0 | Status: SHIPPED | OUTPATIENT
Start: 2020-12-16 | End: 2021-02-21 | Stop reason: HOSPADM

## 2020-12-16 RX ORDER — ONDANSETRON 2 MG/ML
4 INJECTION INTRAMUSCULAR; INTRAVENOUS ONCE
Status: COMPLETED | OUTPATIENT
Start: 2020-12-16 | End: 2020-12-16

## 2020-12-16 RX ORDER — HYDROMORPHONE HYDROCHLORIDE 1 MG/ML
0.5 INJECTION, SOLUTION INTRAMUSCULAR; INTRAVENOUS; SUBCUTANEOUS ONCE
Status: COMPLETED | OUTPATIENT
Start: 2020-12-16 | End: 2020-12-16

## 2020-12-16 RX ORDER — CEFDINIR 300 MG/1
300 CAPSULE ORAL 2 TIMES DAILY
Qty: 14 CAPSULE | Refills: 0 | Status: SHIPPED | OUTPATIENT
Start: 2020-12-16 | End: 2020-12-31

## 2020-12-16 RX ORDER — MORPHINE SULFATE 2 MG/ML
2 INJECTION, SOLUTION INTRAMUSCULAR; INTRAVENOUS ONCE
Status: COMPLETED | OUTPATIENT
Start: 2020-12-16 | End: 2020-12-16

## 2020-12-16 RX ADMIN — HYDROMORPHONE HYDROCHLORIDE 0.5 MG: 1 INJECTION, SOLUTION INTRAMUSCULAR; INTRAVENOUS; SUBCUTANEOUS at 14:29

## 2020-12-16 RX ADMIN — ONDANSETRON 4 MG: 2 INJECTION INTRAMUSCULAR; INTRAVENOUS at 13:52

## 2020-12-16 RX ADMIN — ONDANSETRON 4 MG: 2 INJECTION INTRAMUSCULAR; INTRAVENOUS at 14:29

## 2020-12-16 RX ADMIN — SODIUM CHLORIDE 500 ML: 9 INJECTION, SOLUTION INTRAVENOUS at 13:51

## 2020-12-16 RX ADMIN — CEFTRIAXONE 1 G: 1 INJECTION, POWDER, FOR SOLUTION INTRAMUSCULAR; INTRAVENOUS at 15:46

## 2020-12-16 RX ADMIN — HYDROMORPHONE HYDROCHLORIDE 1 MG: 1 INJECTION, SOLUTION INTRAMUSCULAR; INTRAVENOUS; SUBCUTANEOUS at 15:20

## 2020-12-16 RX ADMIN — MORPHINE SULFATE 2 MG: 2 INJECTION, SOLUTION INTRAMUSCULAR; INTRAVENOUS at 13:52

## 2020-12-16 NOTE — DISCHARGE INSTRUCTIONS
Recommend that you follow-up with urology in the next 1 to 2 days.  Also recommend that you return to your nearest emergency room should you develop a fever, have worsening/intractable pain, or decreased urination.  Otherwise have prescribed medication for infection, pain, and nausea and vomiting.

## 2020-12-16 NOTE — ED PROVIDER NOTES
Subjective   80-year-old female presents to the emergency room with left flank pain.  Patient states she was in [place] earlier this morning and traveling back home when she had a sudden onset of flank pain around 1130.  Patient states she does have a history of kidney stones, however that has been 35 years ago.  She states that she does not remember the pain being this bad.  The pain does radiate to her left side and left lower quadrant abdominal area.  She does endorse nausea with vomiting.  Denies fever to her knowledge or sick contacts.  Aggravating factors include movement.  Denies any alleviating factors.      History provided by:  Patient   used: No        Review of Systems   Constitutional: Negative.  Negative for fever.   HENT: Negative.    Respiratory: Negative.    Cardiovascular: Negative.  Negative for chest pain.   Gastrointestinal: Positive for nausea and vomiting. Negative for abdominal pain.   Endocrine: Negative.    Genitourinary: Positive for flank pain. Negative for dysuria.   Skin: Negative.    Neurological: Negative.    Psychiatric/Behavioral: Negative.    All other systems reviewed and are negative.      Past Medical History:   Diagnosis Date   • Chronic bronchitis (CMS/HCC)    • Depression    • Fracture of humerus    • Hypothyroidism    • Itch of skin     face itchness due to shingles   • Pneumonia    • Pulmonary emphysema (CMS/HCC)    • Renal calculi    • Shingles rash    • Type 2 diabetes mellitus (CMS/HCC)        Allergies   Allergen Reactions   • Ambien [Zolpidem Tartrate]      nausea   • Amoxicillin-Pot Clavulanate    • Doxycycline    • Levofloxacin    • Metronidazole    • Naproxen    • Sulfamethoxazole-Trimethoprim    • Synthroid [Levothyroxine Sodium] Nausea Only       Past Surgical History:   Procedure Laterality Date   • APPENDECTOMY     • EYE SURGERY      cataracts   • HUMERUS FRACTURE SURGERY     • TOTAL KNEE ARTHROPLASTY Left 04/2015       Family History    Problem Relation Age of Onset   • Cancer Mother         breast   • Breast cancer Mother    • No Known Problems Father    • Heart disease Maternal Grandmother    • Cancer Brother         esophageal , stomach    • Esophageal cancer Brother    • No Known Problems Son    • Breast cancer Maternal Aunt    • Heart disease Maternal Aunt    • No Known Problems Son        Social History     Socioeconomic History   • Marital status:      Spouse name: Not on file   • Number of children: Not on file   • Years of education: Not on file   • Highest education level: Not on file   Tobacco Use   • Smoking status: Current Every Day Smoker     Packs/day: 0.25     Years: 50.00     Pack years: 12.50     Types: Cigarettes     Start date: 1970   • Smokeless tobacco: Never Used   Substance and Sexual Activity   • Alcohol use: Yes     Comment: social   • Drug use: No   • Sexual activity: Defer           Objective   Physical Exam  Vitals signs and nursing note reviewed.   Constitutional:       General: She is not in acute distress.     Appearance: She is well-developed. She is not diaphoretic.   HENT:      Head: Normocephalic and atraumatic.      Right Ear: External ear normal.      Left Ear: External ear normal.      Nose: Nose normal.   Eyes:      Conjunctiva/sclera: Conjunctivae normal.      Pupils: Pupils are equal, round, and reactive to light.   Neck:      Musculoskeletal: Normal range of motion and neck supple.      Vascular: No JVD.      Trachea: No tracheal deviation.   Cardiovascular:      Rate and Rhythm: Normal rate and regular rhythm.      Heart sounds: Normal heart sounds. No murmur.   Pulmonary:      Effort: Pulmonary effort is normal. No respiratory distress.      Breath sounds: Normal breath sounds. No wheezing.   Abdominal:      General: Bowel sounds are normal.      Palpations: Abdomen is soft.      Tenderness: There is no abdominal tenderness. There is left CVA tenderness.   Musculoskeletal: Normal range of  motion.         General: No deformity.   Skin:     General: Skin is warm and dry.      Coloration: Skin is not pale.      Findings: No erythema or rash.   Neurological:      Mental Status: She is alert and oriented to person, place, and time.      Cranial Nerves: No cranial nerve deficit.   Psychiatric:         Behavior: Behavior normal.         Thought Content: Thought content normal.         Procedures           ED Course  ED Course as of Dec 16 1617   Wed Dec 16, 2020   1514 CT Abdomen Pelvis Stone Protocol [TK]   1611 Clinically do not think patient is septic at this time.  Heart rate 76.  Temperature 97.3.  White blood cell count within normal limits at 8.20.  CRP slightly elevated at 0.53.  Lactic acid is within normal limits at 1.9.  Currently we have no urology coverage and patient prefers to go back onto [facility] to follow-up with urology.  She was instructed to return to the nearest emergency room should she develop a fever, decreased urination, or intractable pain.    [TK]      ED Course User Index  [TK] Sonny Dudley PA-C                                           MDM  Number of Diagnoses or Management Options  Hydronephrosis of left kidney: new and requires workup  Ureterolithiasis: new and requires workup     Amount and/or Complexity of Data Reviewed  Clinical lab tests: reviewed and ordered  Tests in the radiology section of CPT®: reviewed and ordered    Risk of Complications, Morbidity, and/or Mortality  Presenting problems: moderate  Diagnostic procedures: moderate  Management options: moderate        Final diagnoses:   Ureterolithiasis   Hydronephrosis of left kidney            Sonny Dudley PA-C  12/16/20 1617

## 2020-12-20 ENCOUNTER — RESULTS ENCOUNTER (OUTPATIENT)
Dept: ENDOCRINOLOGY | Age: 80
End: 2020-12-20

## 2020-12-20 DIAGNOSIS — E03.8 HYPOTHYROIDISM DUE TO HASHIMOTO'S THYROIDITIS: ICD-10-CM

## 2020-12-20 DIAGNOSIS — E06.3 HYPOTHYROIDISM DUE TO HASHIMOTO'S THYROIDITIS: ICD-10-CM

## 2020-12-20 DIAGNOSIS — E55.9 VITAMIN D DEFICIENCY: ICD-10-CM

## 2020-12-20 DIAGNOSIS — I10 ESSENTIAL HYPERTENSION: ICD-10-CM

## 2020-12-20 DIAGNOSIS — E78.2 MIXED HYPERLIPIDEMIA: ICD-10-CM

## 2020-12-21 ENCOUNTER — EPISODE CHANGES (OUTPATIENT)
Dept: CASE MANAGEMENT | Facility: OTHER | Age: 80
End: 2020-12-21

## 2020-12-21 ENCOUNTER — PATIENT OUTREACH (OUTPATIENT)
Dept: CASE MANAGEMENT | Facility: OTHER | Age: 80
End: 2020-12-21

## 2020-12-21 LAB
BACTERIA SPEC AEROBE CULT: NORMAL
BACTERIA SPEC AEROBE CULT: NORMAL

## 2020-12-21 NOTE — OUTREACH NOTE
Care Plan Note      Responses   AWV Materials  Send Materials   Care Gaps Addressed  Flu Shot   Flu Shot Status  Up to Date   Specific Disease Process Teaching  Hypertension   Other Patient Education/Resources   24/7 [facility] Nurse Call Line, Advanced Care Planning, [facility]   24/7 Nurse Call Line Education Method  Verbal   ACP Education Method  -- [Has information]   [facility] Education Method  Send Materials   Does patient have depression diagnosis?  No   Advanced Directives:  -- [Has information and needs to complete]   Ed Visits past 12 months:  1   Hospitalizations past 12 months  None   Medication Adherence  Medications understood        The main concerns and/or symptoms the patient would like to address are: Talked with patient. Discussed 12/16/20  ED visit regarding ureterolisthiasis. Patient states to be compliant with ED recommendations; taking medications as directed and states symptoms have improved. She had 12/18/20 urologist, Dr. Schwartz and is awaiting further follow up and recommendations.Patient lives with family in next door apartment; independent with ADL's; meal preparation; transportation and ambulates without assistive device.  Patient compliant with medications; medical appointments and monitoring of blood pressure 1-2 times per week (WNL's).  She reports no difficulty with fever; chest pain; SOB; appetite or sleeping.     Education/instruction provided by Care Coordinator: Reviewed with patient ED AVS recommendations; education provided; COVID 19 precautions; 24/7 Nurse Line Telephone number; ACM contact information; Advance Directives; My Chart; gaps in care; [facility]  Case Management and [facility] Health Planning and Suport services.  Patient verbalized understanding and states to appreciate phone call.  No further questions or concerns voiced at this time.     Follow Up Outreach Due: Follow up as needed.     Dea Rodrigez RN  Ambulatory     12/21/2020, 17:11 EST

## 2020-12-22 ENCOUNTER — TELEPHONE (OUTPATIENT)
Dept: ENDOCRINOLOGY | Age: 80
End: 2020-12-22

## 2020-12-22 DIAGNOSIS — E03.9 ACQUIRED HYPOTHYROIDISM: Primary | ICD-10-CM

## 2020-12-22 RX ORDER — LEVOTHYROXINE SODIUM 150 UG/1
150 TABLET ORAL DAILY
Qty: 30 TABLET | Refills: 5 | Status: SHIPPED | OUTPATIENT
Start: 2020-12-22 | End: 2021-04-01

## 2020-12-29 ENCOUNTER — TRANSCRIBE ORDERS (OUTPATIENT)
Dept: PREADMISSION TESTING | Facility: HOSPITAL | Age: 80
End: 2020-12-29

## 2020-12-29 DIAGNOSIS — Z01.818 OTHER SPECIFIED PRE-OPERATIVE EXAMINATION: Primary | ICD-10-CM

## 2020-12-31 ENCOUNTER — APPOINTMENT (OUTPATIENT)
Dept: PREADMISSION TESTING | Facility: HOSPITAL | Age: 80
End: 2020-12-31

## 2020-12-31 VITALS
WEIGHT: 223 LBS | HEIGHT: 65 IN | OXYGEN SATURATION: 100 % | BODY MASS INDEX: 37.15 KG/M2 | DIASTOLIC BLOOD PRESSURE: 89 MMHG | HEART RATE: 60 BPM | TEMPERATURE: 97.7 F | SYSTOLIC BLOOD PRESSURE: 144 MMHG | RESPIRATION RATE: 16 BRPM

## 2020-12-31 LAB
ANION GAP SERPL CALCULATED.3IONS-SCNC: 11.2 MMOL/L (ref 5–15)
BUN SERPL-MCNC: 29 MG/DL (ref 8–23)
BUN/CREAT SERPL: 23.2 (ref 7–25)
CALCIUM SPEC-SCNC: 10.2 MG/DL (ref 8.6–10.5)
CHLORIDE SERPL-SCNC: 102 MMOL/L (ref 98–107)
CO2 SERPL-SCNC: 26.8 MMOL/L (ref 22–29)
CREAT SERPL-MCNC: 1.25 MG/DL (ref 0.57–1)
DEPRECATED RDW RBC AUTO: 50.2 FL (ref 37–54)
ERYTHROCYTE [DISTWIDTH] IN BLOOD BY AUTOMATED COUNT: 13.9 % (ref 12.3–15.4)
GFR SERPL CREATININE-BSD FRML MDRD: 41 ML/MIN/1.73
GLUCOSE SERPL-MCNC: 94 MG/DL (ref 65–99)
HCT VFR BLD AUTO: 39.6 % (ref 34–46.6)
HGB BLD-MCNC: 12.6 G/DL (ref 12–15.9)
MCH RBC QN AUTO: 30.8 PG (ref 26.6–33)
MCHC RBC AUTO-ENTMCNC: 31.8 G/DL (ref 31.5–35.7)
MCV RBC AUTO: 96.8 FL (ref 79–97)
PLATELET # BLD AUTO: 280 10*3/MM3 (ref 140–450)
PMV BLD AUTO: 10.6 FL (ref 6–12)
POTASSIUM SERPL-SCNC: 4.5 MMOL/L (ref 3.5–5.2)
QT INTERVAL: 425 MS
RBC # BLD AUTO: 4.09 10*6/MM3 (ref 3.77–5.28)
SODIUM SERPL-SCNC: 140 MMOL/L (ref 136–145)
WBC # BLD AUTO: 7.32 10*3/MM3 (ref 3.4–10.8)

## 2020-12-31 PROCEDURE — 36415 COLL VENOUS BLD VENIPUNCTURE: CPT

## 2020-12-31 PROCEDURE — 93010 ELECTROCARDIOGRAM REPORT: CPT | Performed by: INTERNAL MEDICINE

## 2020-12-31 PROCEDURE — 80048 BASIC METABOLIC PNL TOTAL CA: CPT

## 2020-12-31 PROCEDURE — 93005 ELECTROCARDIOGRAM TRACING: CPT

## 2020-12-31 PROCEDURE — 85027 COMPLETE CBC AUTOMATED: CPT

## 2020-12-31 RX ORDER — HYDROCODONE BITARTRATE AND ACETAMINOPHEN 7.5; 325 MG/1; MG/1
1 TABLET ORAL EVERY 6 HOURS PRN
COMMUNITY
End: 2021-02-21 | Stop reason: HOSPADM

## 2021-01-04 ENCOUNTER — LAB (OUTPATIENT)
Dept: LAB | Facility: HOSPITAL | Age: 81
End: 2021-01-04

## 2021-01-04 DIAGNOSIS — Z01.818 OTHER SPECIFIED PRE-OPERATIVE EXAMINATION: ICD-10-CM

## 2021-01-04 PROCEDURE — U0004 COV-19 TEST NON-CDC HGH THRU: HCPCS

## 2021-01-04 PROCEDURE — C9803 HOPD COVID-19 SPEC COLLECT: HCPCS

## 2021-01-05 LAB — SARS-COV-2 RNA RESP QL NAA+PROBE: NOT DETECTED

## 2021-01-06 ENCOUNTER — ANESTHESIA EVENT (OUTPATIENT)
Dept: PERIOP | Facility: HOSPITAL | Age: 81
End: 2021-01-06

## 2021-01-06 ENCOUNTER — HOSPITAL ENCOUNTER (OUTPATIENT)
Facility: HOSPITAL | Age: 81
Setting detail: HOSPITAL OUTPATIENT SURGERY
Discharge: HOME | End: 2021-01-06
Attending: UROLOGY | Admitting: UROLOGY

## 2021-01-06 ENCOUNTER — ANESTHESIA (OUTPATIENT)
Dept: PERIOP | Facility: HOSPITAL | Age: 81
End: 2021-01-06

## 2021-01-06 VITALS
TEMPERATURE: 97 F | HEART RATE: 72 BPM | SYSTOLIC BLOOD PRESSURE: 132 MMHG | DIASTOLIC BLOOD PRESSURE: 80 MMHG | RESPIRATION RATE: 16 BRPM | OXYGEN SATURATION: 96 %

## 2021-01-06 DIAGNOSIS — N20.0 RENAL CALCULUS: Primary | ICD-10-CM

## 2021-01-06 PROCEDURE — 25010000002 ONDANSETRON PER 1 MG: Performed by: NURSE ANESTHETIST, CERTIFIED REGISTERED

## 2021-01-06 PROCEDURE — 25010000002 FENTANYL CITRATE (PF) 100 MCG/2ML SOLUTION: Performed by: NURSE ANESTHETIST, CERTIFIED REGISTERED

## 2021-01-06 PROCEDURE — C1769 GUIDE WIRE: HCPCS | Performed by: UROLOGY

## 2021-01-06 PROCEDURE — 0 IOVERSOL 74 % SOLUTION: Performed by: UROLOGY

## 2021-01-06 PROCEDURE — 25010000002 PROPOFOL 10 MG/ML EMULSION: Performed by: NURSE ANESTHETIST, CERTIFIED REGISTERED

## 2021-01-06 PROCEDURE — C1758 CATHETER, URETERAL: HCPCS | Performed by: UROLOGY

## 2021-01-06 PROCEDURE — 25010000002 DEXAMETHASONE PER 1 MG: Performed by: NURSE ANESTHETIST, CERTIFIED REGISTERED

## 2021-01-06 RX ORDER — SODIUM CHLORIDE 0.9 % (FLUSH) 0.9 %
3 SYRINGE (ML) INJECTION EVERY 12 HOURS SCHEDULED
Status: DISCONTINUED | OUTPATIENT
Start: 2021-01-06 | End: 2021-01-06 | Stop reason: HOSPADM

## 2021-01-06 RX ORDER — NALOXONE HCL 0.4 MG/ML
0.2 VIAL (ML) INJECTION AS NEEDED
Status: DISCONTINUED | OUTPATIENT
Start: 2021-01-06 | End: 2021-01-06 | Stop reason: HOSPADM

## 2021-01-06 RX ORDER — ONDANSETRON 2 MG/ML
4 INJECTION INTRAMUSCULAR; INTRAVENOUS ONCE AS NEEDED
Status: DISCONTINUED | OUTPATIENT
Start: 2021-01-06 | End: 2021-01-06 | Stop reason: HOSPADM

## 2021-01-06 RX ORDER — DEXAMETHASONE SODIUM PHOSPHATE 10 MG/ML
INJECTION INTRAMUSCULAR; INTRAVENOUS AS NEEDED
Status: DISCONTINUED | OUTPATIENT
Start: 2021-01-06 | End: 2021-01-06 | Stop reason: SURG

## 2021-01-06 RX ORDER — EPHEDRINE SULFATE 50 MG/ML
5 INJECTION, SOLUTION INTRAVENOUS ONCE AS NEEDED
Status: DISCONTINUED | OUTPATIENT
Start: 2021-01-06 | End: 2021-01-06 | Stop reason: HOSPADM

## 2021-01-06 RX ORDER — SODIUM CHLORIDE 9 MG/ML
INJECTION, SOLUTION INTRAVENOUS AS NEEDED
Status: DISCONTINUED | OUTPATIENT
Start: 2021-01-06 | End: 2021-01-06 | Stop reason: HOSPADM

## 2021-01-06 RX ORDER — PROMETHAZINE HYDROCHLORIDE 25 MG/1
25 TABLET ORAL ONCE AS NEEDED
Status: DISCONTINUED | OUTPATIENT
Start: 2021-01-06 | End: 2021-01-06 | Stop reason: HOSPADM

## 2021-01-06 RX ORDER — HYDROCODONE BITARTRATE AND ACETAMINOPHEN 5; 325 MG/1; MG/1
1 TABLET ORAL ONCE AS NEEDED
Status: DISCONTINUED | OUTPATIENT
Start: 2021-01-06 | End: 2021-01-06 | Stop reason: HOSPADM

## 2021-01-06 RX ORDER — SODIUM CHLORIDE, SODIUM LACTATE, POTASSIUM CHLORIDE, CALCIUM CHLORIDE 600; 310; 30; 20 MG/100ML; MG/100ML; MG/100ML; MG/100ML
9 INJECTION, SOLUTION INTRAVENOUS CONTINUOUS
Status: DISCONTINUED | OUTPATIENT
Start: 2021-01-06 | End: 2021-01-06 | Stop reason: HOSPADM

## 2021-01-06 RX ORDER — LIDOCAINE HYDROCHLORIDE 20 MG/ML
INJECTION, SOLUTION INFILTRATION; PERINEURAL AS NEEDED
Status: DISCONTINUED | OUTPATIENT
Start: 2021-01-06 | End: 2021-01-06 | Stop reason: SURG

## 2021-01-06 RX ORDER — SODIUM CHLORIDE 0.9 % (FLUSH) 0.9 %
3-10 SYRINGE (ML) INJECTION AS NEEDED
Status: DISCONTINUED | OUTPATIENT
Start: 2021-01-06 | End: 2021-01-06 | Stop reason: HOSPADM

## 2021-01-06 RX ORDER — DIPHENHYDRAMINE HCL 25 MG
25 CAPSULE ORAL
Status: DISCONTINUED | OUTPATIENT
Start: 2021-01-06 | End: 2021-01-06 | Stop reason: HOSPADM

## 2021-01-06 RX ORDER — GLYCOPYRROLATE 0.2 MG/ML
INJECTION INTRAMUSCULAR; INTRAVENOUS AS NEEDED
Status: DISCONTINUED | OUTPATIENT
Start: 2021-01-06 | End: 2021-01-06 | Stop reason: SURG

## 2021-01-06 RX ORDER — HYDRALAZINE HYDROCHLORIDE 20 MG/ML
5 INJECTION INTRAMUSCULAR; INTRAVENOUS
Status: DISCONTINUED | OUTPATIENT
Start: 2021-01-06 | End: 2021-01-06 | Stop reason: HOSPADM

## 2021-01-06 RX ORDER — HYDROCODONE BITARTRATE AND ACETAMINOPHEN 5; 325 MG/1; MG/1
1 TABLET ORAL EVERY 4 HOURS PRN
Qty: 20 TABLET | Refills: 0 | Status: SHIPPED | OUTPATIENT
Start: 2021-01-06 | End: 2021-02-21 | Stop reason: HOSPADM

## 2021-01-06 RX ORDER — ONDANSETRON 2 MG/ML
INJECTION INTRAMUSCULAR; INTRAVENOUS AS NEEDED
Status: DISCONTINUED | OUTPATIENT
Start: 2021-01-06 | End: 2021-01-06 | Stop reason: SURG

## 2021-01-06 RX ORDER — PROMETHAZINE HYDROCHLORIDE 25 MG/1
25 SUPPOSITORY RECTAL ONCE AS NEEDED
Status: DISCONTINUED | OUTPATIENT
Start: 2021-01-06 | End: 2021-01-06 | Stop reason: HOSPADM

## 2021-01-06 RX ORDER — OXYCODONE AND ACETAMINOPHEN 7.5; 325 MG/1; MG/1
1 TABLET ORAL ONCE AS NEEDED
Status: DISCONTINUED | OUTPATIENT
Start: 2021-01-06 | End: 2021-01-06 | Stop reason: HOSPADM

## 2021-01-06 RX ORDER — HYDROMORPHONE HYDROCHLORIDE 1 MG/ML
0.5 INJECTION, SOLUTION INTRAMUSCULAR; INTRAVENOUS; SUBCUTANEOUS
Status: DISCONTINUED | OUTPATIENT
Start: 2021-01-06 | End: 2021-01-06 | Stop reason: HOSPADM

## 2021-01-06 RX ORDER — PROPOFOL 10 MG/ML
VIAL (ML) INTRAVENOUS AS NEEDED
Status: DISCONTINUED | OUTPATIENT
Start: 2021-01-06 | End: 2021-01-06 | Stop reason: SURG

## 2021-01-06 RX ORDER — LIDOCAINE HYDROCHLORIDE 10 MG/ML
0.5 INJECTION, SOLUTION EPIDURAL; INFILTRATION; INTRACAUDAL; PERINEURAL ONCE AS NEEDED
Status: COMPLETED | OUTPATIENT
Start: 2021-01-06 | End: 2021-01-06

## 2021-01-06 RX ORDER — FENTANYL CITRATE 50 UG/ML
50 INJECTION, SOLUTION INTRAMUSCULAR; INTRAVENOUS
Status: DISCONTINUED | OUTPATIENT
Start: 2021-01-06 | End: 2021-01-06 | Stop reason: HOSPADM

## 2021-01-06 RX ORDER — DIPHENHYDRAMINE HYDROCHLORIDE 50 MG/ML
12.5 INJECTION INTRAMUSCULAR; INTRAVENOUS
Status: DISCONTINUED | OUTPATIENT
Start: 2021-01-06 | End: 2021-01-06 | Stop reason: HOSPADM

## 2021-01-06 RX ORDER — FLUMAZENIL 0.1 MG/ML
0.2 INJECTION INTRAVENOUS AS NEEDED
Status: DISCONTINUED | OUTPATIENT
Start: 2021-01-06 | End: 2021-01-06 | Stop reason: HOSPADM

## 2021-01-06 RX ORDER — CEPHALEXIN 500 MG/1
500 CAPSULE ORAL 3 TIMES DAILY
Qty: 21 CAPSULE | Refills: 0 | Status: SHIPPED | OUTPATIENT
Start: 2021-01-06 | End: 2021-01-13

## 2021-01-06 RX ORDER — EPHEDRINE SULFATE 50 MG/ML
INJECTION, SOLUTION INTRAVENOUS AS NEEDED
Status: DISCONTINUED | OUTPATIENT
Start: 2021-01-06 | End: 2021-01-06 | Stop reason: SURG

## 2021-01-06 RX ORDER — LABETALOL HYDROCHLORIDE 5 MG/ML
5 INJECTION, SOLUTION INTRAVENOUS
Status: DISCONTINUED | OUTPATIENT
Start: 2021-01-06 | End: 2021-01-06 | Stop reason: HOSPADM

## 2021-01-06 RX ORDER — HYDROCODONE BITARTRATE AND ACETAMINOPHEN 7.5; 325 MG/1; MG/1
1 TABLET ORAL ONCE AS NEEDED
Status: DISCONTINUED | OUTPATIENT
Start: 2021-01-06 | End: 2021-01-06 | Stop reason: HOSPADM

## 2021-01-06 RX ORDER — FENTANYL CITRATE 50 UG/ML
INJECTION, SOLUTION INTRAMUSCULAR; INTRAVENOUS AS NEEDED
Status: DISCONTINUED | OUTPATIENT
Start: 2021-01-06 | End: 2021-01-06 | Stop reason: SURG

## 2021-01-06 RX ADMIN — LIDOCAINE HYDROCHLORIDE 80 MG: 20 INJECTION, SOLUTION INFILTRATION; PERINEURAL at 13:39

## 2021-01-06 RX ADMIN — FENTANYL CITRATE 50 MCG: 50 INJECTION INTRAMUSCULAR; INTRAVENOUS at 13:39

## 2021-01-06 RX ADMIN — LIDOCAINE HYDROCHLORIDE 0.5 ML: 10 INJECTION, SOLUTION EPIDURAL; INFILTRATION; INTRACAUDAL; PERINEURAL at 11:51

## 2021-01-06 RX ADMIN — FENTANYL CITRATE 50 MCG: 50 INJECTION INTRAMUSCULAR; INTRAVENOUS at 14:25

## 2021-01-06 RX ADMIN — FENTANYL CITRATE 50 MCG: 50 INJECTION, SOLUTION INTRAMUSCULAR; INTRAVENOUS at 15:15

## 2021-01-06 RX ADMIN — PROPOFOL 200 MG: 10 INJECTION, EMULSION INTRAVENOUS at 13:39

## 2021-01-06 RX ADMIN — FENTANYL CITRATE 50 MCG: 50 INJECTION, SOLUTION INTRAMUSCULAR; INTRAVENOUS at 15:29

## 2021-01-06 RX ADMIN — SODIUM CHLORIDE, POTASSIUM CHLORIDE, SODIUM LACTATE AND CALCIUM CHLORIDE 9 ML/HR: 600; 310; 30; 20 INJECTION, SOLUTION INTRAVENOUS at 11:51

## 2021-01-06 RX ADMIN — EPHEDRINE SULFATE 10 MG: 50 INJECTION INTRAVENOUS at 13:50

## 2021-01-06 RX ADMIN — ONDANSETRON HYDROCHLORIDE 4 MG: 2 SOLUTION INTRAMUSCULAR; INTRAVENOUS at 13:53

## 2021-01-06 RX ADMIN — DEXAMETHASONE SODIUM PHOSPHATE 8 MG: 10 INJECTION INTRAMUSCULAR; INTRAVENOUS at 13:50

## 2021-01-06 RX ADMIN — GLYCOPYRROLATE 0.2 MG: 0.2 INJECTION INTRAMUSCULAR; INTRAVENOUS at 13:54

## 2021-01-06 RX ADMIN — EPHEDRINE SULFATE 10 MG: 50 INJECTION INTRAVENOUS at 13:53

## 2021-01-06 NOTE — ANESTHESIA PROCEDURE NOTES
Airway  Urgency: elective    Date/Time: 1/6/2021 1:41 PM  Airway not difficult    General Information and Staff    Patient location during procedure: OR  CRNA: Kayla Vigil CRNA    Indications and Patient Condition  Indications for airway management: airway protection    Preoxygenated: yes  Mask difficulty assessment: 0 - not attempted    Final Airway Details  Final airway type: supraglottic airway      Successful airway: classic  Size 4    Number of attempts at approach: 1  Assessment: lips, teeth, and gum same as pre-op and atraumatic intubation    Additional Comments  LMA inserted without difficulty.  Lips and teeth intact as preop condition.  No signs or symptoms of gastric regurgitation.  Seal with minimal pressure and secure.

## 2021-01-06 NOTE — PERIOPERATIVE NURSING NOTE
Pt c/o pain in the upper thigh posterior side on adm. .Couldn't move leg on adm. Is moving now.     Dr Novoa called. stated she was legs were apart to perform cystoscopy but otherwise nothing unusual happened with the case.     Med w/IV pain med and getting better. Pulses palpable. Slight edema of ankle which pt states was there preoperativlly.    Dr novoa called to see pt.

## 2021-01-06 NOTE — H&P
[facility] History and Physical    Patient Care Team:  Keri Foreman MD as PCP - General (Family Medicine)  Chas Garcia MD as Consulting Physician (Endocrinology)  Jeff Sands MD as Consulting Physician (Pulmonary Disease)    Chief complaint flank pain    Subjective     Patient is a 80 y.o. female presents with left proximal ureteral calculus 6 mm.  She has had minimal pain.  She has had no progression of pain to the front..     Review of Systems   Pertinent items are noted in HPI    Past Medical History:   Diagnosis Date   • Arthritis    • Asymptomatic PVCs    • Chronic bronchitis (CMS/HCC)    • Fracture of humerus    • GERD (gastroesophageal reflux disease)    • Hyperlipidemia    • Hypertension    • Hypothyroidism    • Pneumonia    • Pulmonary emphysema (CMS/HCC)    • Renal calculi    • Sleep apnea     CPAP USED   • Type 2 diabetes mellitus (CMS/HCC)     NO MEDS     Past Surgical History:   Procedure Laterality Date   • APPENDECTOMY     • EYE SURGERY      cataracts   • HUMERUS FRACTURE SURGERY     • TOTAL KNEE ARTHROPLASTY Left 04/2015   • UMBILICAL HERNIA REPAIR       Family History   Problem Relation Age of Onset   • Cancer Mother         breast   • Breast cancer Mother    • No Known Problems Father    • Heart disease Maternal Grandmother    • Cancer Brother         esophageal , stomach    • Esophageal cancer Brother    • No Known Problems Son    • Breast cancer Maternal Aunt    • Heart disease Maternal Aunt    • No Known Problems Son    • Malig Hyperthermia Neg Hx      Social History     Tobacco Use   • Smoking status: Current Every Day Smoker     Packs/day: 0.25     Years: 50.00     Pack years: 12.50     Types: Cigarettes     Start date: 1970   • Smokeless tobacco: Never Used   Substance Use Topics   • Alcohol use: Yes     Comment: social   • Drug use: No       Meds:  Medications Prior to Admission   Medication Sig Dispense Refill Last Dose   • ADVAIR DISKUS 100-50 MCG/DOSE  DISKUS Inhale 1 puff 2 (Two) Times a Day. 180 each 5 1/6/2021 at Unknown time   • allopurinol (ZYLOPRIM) 100 MG tablet Take 1 tablet by mouth Daily. 90 tablet 3 1/5/2021 at Unknown time   • amLODIPine (NORVASC) 5 MG tablet TAKE 1 TABLET BY MOUTH DAILY 90 tablet 0 1/6/2021 at Unknown time   • atorvastatin (LIPITOR) 20 MG tablet Take 1 tablet by mouth Daily. 90 tablet 3 1/5/2021 at Unknown time   • chlorthalidone (HYGROTON) 25 MG tablet Take 1 tablet by mouth Daily. 90 tablet 0 1/5/2021 at Unknown time   • diphenhydrAMINE (BENADRYL) 25 mg capsule Take 25 mg by mouth At Night As Needed for Itching.   1/5/2021 at Unknown time   • ondansetron ODT (ZOFRAN-ODT) 4 MG disintegrating tablet Place 1 tablet on the tongue Every 8 (Eight) Hours As Needed for Nausea or Vomiting. 15 tablet 0 Past Week at Unknown time   • Probiotic Product (PROBIOTIC DAILY) capsule Take 1 tablet by mouth daily.   1/5/2021 at Unknown time   • tamsulosin (FLOMAX) 0.4 MG capsule 24 hr capsule Take 1 capsule by mouth Daily. 14 capsule 0 1/5/2021 at Unknown time   • Unithroid 150 MCG tablet Take 1 tablet by mouth Daily. 30 tablet 5 1/6/2021 at Unknown time   • albuterol (PROVENTIL HFA;VENTOLIN HFA) 108 (90 Base) MCG/ACT inhaler Inhale 2 puffs Every 4 (Four) Hours As Needed for Wheezing or Shortness of Air. 1 inhaler 3 12/23/2020   • [facility] ANTACID & ANTI-GAS 1000-60 MG chewable tablet    1/3/2021   • HYDROcodone-acetaminophen (NORCO) 7.5-325 MG per tablet Take 1 tablet by mouth Every 6 (Six) Hours As Needed for Moderate Pain .   12/23/2020   • omeprazole OTC (PriLOSEC OTC) 20 MG EC tablet Take 20 mg by mouth As Needed. Take 1-2 tablet   1/3/2021   • vitamin D (ERGOCALCIFEROL) 1.25 MG (45756 UT) capsule capsule Take 1 capsule by mouth Every 7 (Seven) Days. 13 capsule 3 12/27/2020       Allergies:  Ambien [zolpidem tartrate], Amoxicillin-pot clavulanate, Doxycycline, Levofloxacin, Metronidazole, Naproxen, Sulfamethoxazole-trimethoprim, and Synthroid  [levothyroxine sodium]    Debilities:  Arthritis    Objective     Vital Signs  Temp:  [97.5 °F (36.4 °C)] 97.5 °F (36.4 °C)  Heart Rate:  [68] 68  Resp:  [18] 18  BP: (178)/(83) 178/83  No intake or output data in the 24 hours ending 01/06/21 1329       Physical Exam:     General Appearance:    Alert, cooperative, NAD   HEENT:    No trauma, pupils reactive, hearing intact   Back:     No CVA tenderness   Lungs:     Respirations unlabored, no wheezing    Heart:    RRR, intact peripheral pulses   Abdomen:     Soft, NDNT, no masses, no guarding   :   Atrophic   Extremities:   No edema, no deformity   Lymphatic:   No neck or groin LAD   Skin:   No bleeding, bruising or rashes   Neuro/Psych:   Orientation intact, mood/affect pleasant, no focal findings     Results Review:    I reviewed the patient's new clinical results.  Results for orders placed or performed in visit on 01/04/21   COVID-19,BIOTAP, NP/OP SWAB IN TRANSPORT MEDIA OR SALINE 24-36 HR TAT - Swab, Nasopharynx    Specimen: Nasopharynx; Swab   Result Value Ref Range    SARS-CoV-2 PCR Not Detected Not Detected        Assessment:  Left proximal ureteral calculus    Plan:    Left shockwave lithotripsy    I discussed the patients findings and my recommendations with patient.   Risks, complications, outcomes and alternatives discussed with the patient at the bedside and office.    Walter Schwartz MD  01/06/21  13:29 EST

## 2021-01-06 NOTE — OP NOTE
EXTRACORPOREAL SHOCKWAVE LITHOTRIPSY  Procedure Note    Luann Frances  1/6/2021    Pre-op Diagnosis:   Left Renal Calculus    Post-op Diagnosis:     Post-Op Diagnosis Codes:     * Renal calculus [N20.0]    Procedure(s):  EXTRACORPOREAL SHOCKWAVE LITHOTRIPSY, CYSTOSCOPY, RETROGRADE PYLOGRAM    Surgeon(s):  Walter Schwartz MD    Anesthesia: General    Staff:   Circulator: Oscar Robles RN  Scrub Person: Heber Lee RN  Vendor Representative: Ellis, Duane    Estimated Blood Loss: none    Specimens:                * No orders in the log *      Drains: * No LDAs found *    Findings: Left proximal ureteral calculus had migrated back up to the renal pelvis.  Shockwave lithotripsy performed 3000 shocks with adequate fragmentation.  Cystoscopy and retrograde study at the end of the case confirmed no filling defects prompt drainage of the left collecting system.  No stent placed.    Complications: None apparent    Indications: 80-year-old female left ureteral calculus now presents for shockwave lithotripsy.    Procedure: Patient was taken up she given general anesthesia.  Placed on the shockwave table.  Timeout was performed.  Schocket was brought up.  The stone was visualized.  Migrated from the proximal ureter to the pelvis and probably the lower pole.  The stone was coupled.  Shockwave lithotripsy was performed.  3000 shocks were given.  At about 2500 shocks I inserted the cystoscope and a retrograde pyelogram was performed on the left side.  She had a little bit of bladder prolapse making the identification of the orifice a little difficult.  The retrograde showed a normal ureter that was clean without filling defects well filling of the pelvis and questionable residual stone filling defect in the lower pole which we finished up her last 500 shocks at a lower power.  No stent was placed.  She tolerated this well and was taken to recovery in stable condition.      Walter Schwartz,  MD     Date: 1/6/2021  Time: 14:31 EST

## 2021-01-06 NOTE — ANESTHESIA POSTPROCEDURE EVALUATION
Patient: Luann Frances    Procedure Summary     Date: 01/06/21 Room / Location:  [facility]  [facility]    Anesthesia Start: 1336 Anesthesia Stop: 1446    Procedure: EXTRACORPOREAL SHOCKWAVE LITHOTRIPSY, CYSTOSCOPY, RETROGRADE PYLOGRAM (Left ) Diagnosis:       Renal calculus      (Left Renal Calculus)    Surgeon: Walter Schwartz MD Provider: Cody Garland MD    Anesthesia Type: general ASA Status: 3          Anesthesia Type: general    Vitals  Vitals Value Taken Time   /69 01/06/21 1505   Temp     Pulse 73 01/06/21 1510   Resp     SpO2 99 % 01/06/21 1510   Vitals shown include unvalidated device data.        Post Anesthesia Care and Evaluation    Patient location during evaluation: bedside  Patient participation: complete - patient participated  Level of consciousness: awake and alert  Pain management: adequate  Airway patency: patent  Anesthetic complications: No anesthetic complications  PONV Status: controlled  Cardiovascular status: blood pressure returned to baseline and acceptable  Respiratory status: acceptable  Hydration status: acceptable

## 2021-02-12 ENCOUNTER — APPOINTMENT (OUTPATIENT)
Dept: CT IMAGING | Facility: HOSPITAL | Age: 81
End: 2021-02-12

## 2021-02-12 ENCOUNTER — APPOINTMENT (OUTPATIENT)
Dept: GENERAL RADIOLOGY | Facility: HOSPITAL | Age: 81
End: 2021-02-12

## 2021-02-12 ENCOUNTER — HOSPITAL ENCOUNTER (INPATIENT)
Facility: HOSPITAL | Age: 81
LOS: 9 days | Discharge: NURSING, REHAB OR LONG-TERM CARE | End: 2021-02-21
Attending: EMERGENCY MEDICINE | Admitting: HOSPITALIST

## 2021-02-12 DIAGNOSIS — R93.5 ABNORMAL CT OF THE ABDOMEN: ICD-10-CM

## 2021-02-12 DIAGNOSIS — K86.89 PANCREATIC MASS: ICD-10-CM

## 2021-02-12 DIAGNOSIS — S22.41XA CLOSED FRACTURE OF MULTIPLE RIBS OF RIGHT SIDE, INITIAL ENCOUNTER: Primary | ICD-10-CM
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
## 2021-02-12 DIAGNOSIS — R07.89 RIGHT-SIDED CHEST WALL PAIN: ICD-10-CM

## 2021-02-12 LAB
ALBUMIN SERPL-MCNC: 4 G/DL (ref 3.5–5.2)
ALBUMIN/GLOB SERPL: 1.2 G/DL
ALP SERPL-CCNC: 63 U/L (ref 39–117)
ALT SERPL W P-5'-P-CCNC: 19 U/L (ref 1–33)
ANION GAP SERPL CALCULATED.3IONS-SCNC: 9.7 MMOL/L (ref 5–15)
AST SERPL-CCNC: 15 U/L (ref 1–32)
BASOPHILS # BLD AUTO: 0.08 10*3/MM3 (ref 0–0.2)
BASOPHILS NFR BLD AUTO: 0.7 % (ref 0–1.5)
BILIRUB SERPL-MCNC: 0.4 MG/DL (ref 0–1.2)
BUN SERPL-MCNC: 21 MG/DL (ref 8–23)
BUN/CREAT SERPL: 19.8 (ref 7–25)
CALCIUM SPEC-SCNC: 10.1 MG/DL (ref 8.6–10.5)
CHLORIDE SERPL-SCNC: 105 MMOL/L (ref 98–107)
CO2 SERPL-SCNC: 26.3 MMOL/L (ref 22–29)
CREAT SERPL-MCNC: 1.06 MG/DL (ref 0.57–1)
DEPRECATED RDW RBC AUTO: 47.2 FL (ref 37–54)
EOSINOPHIL # BLD AUTO: 0.06 10*3/MM3 (ref 0–0.4)
EOSINOPHIL NFR BLD AUTO: 0.6 % (ref 0.3–6.2)
ERYTHROCYTE [DISTWIDTH] IN BLOOD BY AUTOMATED COUNT: 13.6 % (ref 12.3–15.4)
GFR SERPL CREATININE-BSD FRML MDRD: 50 ML/MIN/1.73
GLOBULIN UR ELPH-MCNC: 3.4 GM/DL
GLUCOSE BLDC GLUCOMTR-MCNC: 108 MG/DL (ref 70–130)
GLUCOSE SERPL-MCNC: 122 MG/DL (ref 65–99)
HCT VFR BLD AUTO: 39.7 % (ref 34–46.6)
HGB BLD-MCNC: 12.5 G/DL (ref 12–15.9)
HOLD SPECIMEN: NORMAL
HOLD SPECIMEN: NORMAL
IMM GRANULOCYTES # BLD AUTO: 0.08 10*3/MM3 (ref 0–0.05)
IMM GRANULOCYTES NFR BLD AUTO: 0.7 % (ref 0–0.5)
LYMPHOCYTES # BLD AUTO: 1.14 10*3/MM3 (ref 0.7–3.1)
LYMPHOCYTES NFR BLD AUTO: 10.6 % (ref 19.6–45.3)
MCH RBC QN AUTO: 29.6 PG (ref 26.6–33)
MCHC RBC AUTO-ENTMCNC: 31.5 G/DL (ref 31.5–35.7)
MCV RBC AUTO: 94.1 FL (ref 79–97)
MONOCYTES # BLD AUTO: 0.59 10*3/MM3 (ref 0.1–0.9)
MONOCYTES NFR BLD AUTO: 5.5 % (ref 5–12)
NEUTROPHILS NFR BLD AUTO: 8.8 10*3/MM3 (ref 1.7–7)
NEUTROPHILS NFR BLD AUTO: 81.9 % (ref 42.7–76)
NRBC BLD AUTO-RTO: 0 /100 WBC (ref 0–0.2)
PLATELET # BLD AUTO: 267 10*3/MM3 (ref 140–450)
PMV BLD AUTO: 11 FL (ref 6–12)
POTASSIUM SERPL-SCNC: 4.4 MMOL/L (ref 3.5–5.2)
PROT SERPL-MCNC: 7.4 G/DL (ref 6–8.5)
QT INTERVAL: 448 MS
RBC # BLD AUTO: 4.22 10*6/MM3 (ref 3.77–5.28)
SARS-COV-2 ORF1AB RESP QL NAA+PROBE: NOT DETECTED
SODIUM SERPL-SCNC: 141 MMOL/L (ref 136–145)
TROPONIN T SERPL-MCNC: <0.01 NG/ML (ref 0–0.03)
WBC # BLD AUTO: 10.75 10*3/MM3 (ref 3.4–10.8)
WHOLE BLOOD HOLD SPECIMEN: NORMAL
WHOLE BLOOD HOLD SPECIMEN: NORMAL

## 2021-02-12 PROCEDURE — 25010000002 IOPAMIDOL 61 % SOLUTION: Performed by: INTERNAL MEDICINE

## 2021-02-12 PROCEDURE — G0378 HOSPITAL OBSERVATION PER HR: HCPCS

## 2021-02-12 PROCEDURE — 25010000002 ONDANSETRON PER 1 MG: Performed by: NURSE PRACTITIONER

## 2021-02-12 PROCEDURE — 93010 ELECTROCARDIOGRAM REPORT: CPT | Performed by: INTERNAL MEDICINE

## 2021-02-12 PROCEDURE — 25010000002 MORPHINE PER 10 MG: Performed by: NURSE PRACTITIONER

## 2021-02-12 PROCEDURE — 93005 ELECTROCARDIOGRAM TRACING: CPT | Performed by: NURSE PRACTITIONER

## 2021-02-12 PROCEDURE — 99284 EMERGENCY DEPT VISIT MOD MDM: CPT

## 2021-02-12 PROCEDURE — 80053 COMPREHEN METABOLIC PANEL: CPT | Performed by: NURSE PRACTITIONER

## 2021-02-12 PROCEDURE — 76376 3D RENDER W/INTRP POSTPROCES: CPT

## 2021-02-12 PROCEDURE — 84484 ASSAY OF TROPONIN QUANT: CPT | Performed by: NURSE PRACTITIONER

## 2021-02-12 PROCEDURE — 85025 COMPLETE CBC W/AUTO DIFF WBC: CPT | Performed by: NURSE PRACTITIONER

## 2021-02-12 PROCEDURE — 71101 X-RAY EXAM UNILAT RIBS/CHEST: CPT

## 2021-02-12 PROCEDURE — U0004 COV-19 TEST NON-CDC HGH THRU: HCPCS | Performed by: NURSE PRACTITIONER

## 2021-02-12 PROCEDURE — 82962 GLUCOSE BLOOD TEST: CPT

## 2021-02-12 PROCEDURE — 74177 CT ABD & PELVIS W/CONTRAST: CPT

## 2021-02-12 PROCEDURE — 71250 CT THORAX DX C-: CPT

## 2021-02-12 PROCEDURE — 25010000002 MORPHINE PER 10 MG: Performed by: INTERNAL MEDICINE

## 2021-02-12 RX ORDER — UREA 10 %
3 LOTION (ML) TOPICAL NIGHTLY PRN
Status: DISCONTINUED | OUTPATIENT
Start: 2021-02-12 | End: 2021-02-21 | Stop reason: HOSPADM

## 2021-02-12 RX ORDER — HYDROCODONE BITARTRATE AND ACETAMINOPHEN 7.5; 325 MG/1; MG/1
1 TABLET ORAL EVERY 6 HOURS PRN
Status: DISCONTINUED | OUTPATIENT
Start: 2021-02-12 | End: 2021-02-13

## 2021-02-12 RX ORDER — BUDESONIDE AND FORMOTEROL FUMARATE DIHYDRATE 80; 4.5 UG/1; UG/1
2 AEROSOL RESPIRATORY (INHALATION)
Status: DISCONTINUED | OUTPATIENT
Start: 2021-02-12 | End: 2021-02-21 | Stop reason: HOSPADM

## 2021-02-12 RX ORDER — ONDANSETRON 2 MG/ML
4 INJECTION INTRAMUSCULAR; INTRAVENOUS ONCE
Status: COMPLETED | OUTPATIENT
Start: 2021-02-12 | End: 2021-02-12

## 2021-02-12 RX ORDER — CHLORTHALIDONE 25 MG/1
25 TABLET ORAL DAILY
Status: DISCONTINUED | OUTPATIENT
Start: 2021-02-13 | End: 2021-02-18

## 2021-02-12 RX ORDER — SODIUM CHLORIDE 0.9 % (FLUSH) 0.9 %
10 SYRINGE (ML) INJECTION AS NEEDED
Status: DISCONTINUED | OUTPATIENT
Start: 2021-02-12 | End: 2021-02-21 | Stop reason: HOSPADM

## 2021-02-12 RX ORDER — MORPHINE SULFATE 2 MG/ML
2 INJECTION, SOLUTION INTRAMUSCULAR; INTRAVENOUS EVERY 4 HOURS PRN
Status: DISCONTINUED | OUTPATIENT
Start: 2021-02-12 | End: 2021-02-13

## 2021-02-12 RX ORDER — ONDANSETRON 2 MG/ML
4 INJECTION INTRAMUSCULAR; INTRAVENOUS EVERY 6 HOURS PRN
Status: DISCONTINUED | OUTPATIENT
Start: 2021-02-12 | End: 2021-02-21 | Stop reason: HOSPADM

## 2021-02-12 RX ORDER — ALUMINA, MAGNESIA, AND SIMETHICONE 2400; 2400; 240 MG/30ML; MG/30ML; MG/30ML
15 SUSPENSION ORAL EVERY 6 HOURS PRN
Status: DISCONTINUED | OUTPATIENT
Start: 2021-02-12 | End: 2021-02-21 | Stop reason: HOSPADM

## 2021-02-12 RX ORDER — PANTOPRAZOLE SODIUM 40 MG/1
40 TABLET, DELAYED RELEASE ORAL EVERY MORNING
Status: DISCONTINUED | OUTPATIENT
Start: 2021-02-13 | End: 2021-02-21 | Stop reason: HOSPADM

## 2021-02-12 RX ORDER — MORPHINE SULFATE 2 MG/ML
2 INJECTION, SOLUTION INTRAMUSCULAR; INTRAVENOUS ONCE
Status: COMPLETED | OUTPATIENT
Start: 2021-02-12 | End: 2021-02-12

## 2021-02-12 RX ORDER — ONDANSETRON 4 MG/1
4 TABLET, FILM COATED ORAL EVERY 6 HOURS PRN
Status: DISCONTINUED | OUTPATIENT
Start: 2021-02-12 | End: 2021-02-21 | Stop reason: HOSPADM

## 2021-02-12 RX ORDER — L.ACID,PARA/B.BIFIDUM/S.THERM 8B CELL
1 CAPSULE ORAL DAILY
Status: DISCONTINUED | OUTPATIENT
Start: 2021-02-13 | End: 2021-02-21 | Stop reason: HOSPADM

## 2021-02-12 RX ORDER — INSULIN LISPRO 100 [IU]/ML
0-7 INJECTION, SOLUTION INTRAVENOUS; SUBCUTANEOUS
Status: DISCONTINUED | OUTPATIENT
Start: 2021-02-13 | End: 2021-02-13

## 2021-02-12 RX ORDER — LEVOTHYROXINE SODIUM 0.15 MG/1
150 TABLET ORAL
Status: DISCONTINUED | OUTPATIENT
Start: 2021-02-13 | End: 2021-02-21 | Stop reason: HOSPADM

## 2021-02-12 RX ORDER — AMLODIPINE BESYLATE 5 MG/1
5 TABLET ORAL DAILY
Status: DISCONTINUED | OUTPATIENT
Start: 2021-02-13 | End: 2021-02-19

## 2021-02-12 RX ORDER — NICOTINE POLACRILEX 4 MG
15 LOZENGE BUCCAL
Status: DISCONTINUED | OUTPATIENT
Start: 2021-02-12 | End: 2021-02-13

## 2021-02-12 RX ORDER — ACETAMINOPHEN 325 MG/1
650 TABLET ORAL EVERY 4 HOURS PRN
Status: DISCONTINUED | OUTPATIENT
Start: 2021-02-12 | End: 2021-02-13

## 2021-02-12 RX ORDER — NITROGLYCERIN 0.4 MG/1
0.4 TABLET SUBLINGUAL
Status: DISCONTINUED | OUTPATIENT
Start: 2021-02-12 | End: 2021-02-21 | Stop reason: HOSPADM

## 2021-02-12 RX ORDER — DEXTROSE MONOHYDRATE 25 G/50ML
25 INJECTION, SOLUTION INTRAVENOUS
Status: DISCONTINUED | OUTPATIENT
Start: 2021-02-12 | End: 2021-02-13

## 2021-02-12 RX ORDER — ALBUTEROL SULFATE 2.5 MG/3ML
2.5 SOLUTION RESPIRATORY (INHALATION) EVERY 6 HOURS PRN
Status: DISCONTINUED | OUTPATIENT
Start: 2021-02-12 | End: 2021-02-21 | Stop reason: HOSPADM

## 2021-02-12 RX ADMIN — IOPAMIDOL 95 ML: 612 INJECTION, SOLUTION INTRAVENOUS at 20:57

## 2021-02-12 RX ADMIN — MORPHINE SULFATE 2 MG: 2 INJECTION, SOLUTION INTRAMUSCULAR; INTRAVENOUS at 16:16

## 2021-02-12 RX ADMIN — SODIUM CHLORIDE, PRESERVATIVE FREE 10 ML: 5 INJECTION INTRAVENOUS at 16:19

## 2021-02-12 RX ADMIN — MORPHINE SULFATE 2 MG: 2 INJECTION, SOLUTION INTRAMUSCULAR; INTRAVENOUS at 20:25

## 2021-02-12 RX ADMIN — HYDROCODONE BITARTRATE AND ACETAMINOPHEN 1 TABLET: 7.5; 325 TABLET ORAL at 22:25

## 2021-02-12 RX ADMIN — ONDANSETRON 4 MG: 2 INJECTION INTRAMUSCULAR; INTRAVENOUS at 16:16

## 2021-02-12 NOTE — ED NOTES
"Pt c/o right rib pain after falling on her front porch this morning. States \"I missed a step or caught my foot on the rug or something.\" Denies hitting her head. Denies LOC, CP, SOA, dizziness before or after falling. Denies any other pain or injuries. NAD, respirations even and unlaboroed.    Pt wearing mask. This RN wearing appropriate PPE, including mask and eye protection, during all pt care.       Caitlin Jackson, RN  02/12/21 3615    "

## 2021-02-12 NOTE — ED PROVIDER NOTES
13:51 EST  Patient seen and examined with nurse practitioner.  Briefly patient slipped and fell today injuring her right chest wall.  Had no shortness of breath.  Pain with movement.  Patient had no head injury.  No abdominal pain or hip pain.  Pain isolated to right chest wall.          On exam patient is alert cooperative in no distress.  Patient has no midline neck tenderness.  Patient's abdomen soft and nontender.  Patient's chest tender to palpation right side.  Lungs are clear bilaterally.    EKG          EKG time: 1511  Rhythm/Rate: Sinus bradycardia 58  P waves and VT: Normal P waves  QRS, axis: Normal QRS  ST and T waves: Normal ST-T waves    Interpreted Contemporaneously by me, independently viewed  Unchanged compared to prior 12/31/2020      Chest x-ray shows 3 rib fractures.  Plan is CT of the chest.        MD ATTESTATION NOTE    The KYM and I have discussed this patient's history, physical exam, and treatment plan.  I have reviewed the documentation and personally had a face to face interaction with the patient. I affirm the documentation and agree with the treatment and plan.  The attached note describes my personal findings.      Patient was wearing a face mask when I entered the room and they continued to wear a mask throughout their stay in the ED.  I wore PPE, including  gloves, face mask with shield or face mask with goggles whenever I was in the room with patient.      Milo Gonzalez MD  02/12/21 0805

## 2021-02-12 NOTE — ED NOTES
Pt slipped on ice and fell on right side. She is c/o of right rib pain. No other injuries.     Mask placed on patient in triage. Triage staff wore appropriate PPE during interaction with patient.        Rachael Newsome RN  02/12/21 6101

## 2021-02-12 NOTE — ED PROVIDER NOTES
EMERGENCY DEPARTMENT ENCOUNTER    Room Number:  E548/1  Date of encounter:  2/12/2021  PCP: Keri Foreman MD  Historian: patient   Full history not obtainable due to: none     HPI:  Chief Complaint: Fall, chest pain     Context: Luann Frances is a 80 y.o. female who presents to the ED c/o fall onset earlier today.  The patient states she turned around on the front porch lost her footing and fell onto the right side.  She denies any head injury or LOC.  She reports constant right-sided chest wall pain.  She states it is worse to move or breathe.  Pain can be severe at times.  It is not described as radiating.  She denies any other injury.  She is not anticoagulated.      PAST MEDICAL HISTORY    Active Ambulatory Problems     Diagnosis Date Noted   • Atopic rhinitis 01/21/2016   • Chronic obstructive pulmonary disease (CMS/[facility]) 01/21/2016   • Diverticulitis of colon 01/21/2016   • Acid reflux 01/21/2016   • Fatigue 01/21/2016   • Hypothyroidism 01/21/2016   • Insomnia 01/21/2016   • Knee pain 01/21/2016   • Pain of lower extremity 01/21/2016   • Shoulder pain 01/21/2016   • Ventricular premature beats 01/21/2016   • Weight gain 12/07/2016   • Cold intolerance 12/07/2016   • Post herpetic neuralgia 03/02/2017   • Insulin resistance 04/10/2017   • Vitamin D deficiency 02/19/2018   • CKD (chronic kidney disease) stage 3, GFR 30-59 ml/min (CMS/[facility]) 02/19/2018   • Mixed hyperlipidemia 02/19/2018   • Hypersomnia 10/16/2019   • Hyperuricemia 01/20/2020   • Essential hypertension 01/20/2020   • DELROY (obstructive sleep apnea) 12/08/2020   • Renal calculus 01/06/2021     Resolved Ambulatory Problems     Diagnosis Date Noted   • Acute sinusitis 01/21/2016   • Asthma 01/21/2016   • Candidiasis 01/21/2016   • Cough 01/21/2016   • Mild dehydration 01/21/2016   • Nausea 01/21/2016   • Chronic obstructive pulmonary disease with acute exacerbation (CMS/[facility]) 01/21/2016   • Elevated BP 10/25/2016   • Dyslipidemia 12/07/2016      Past Medical History:   Diagnosis Date   • Arthritis    • Asymptomatic PVCs    • Chronic bronchitis (CMS/HCC)    • Fracture of humerus    • GERD (gastroesophageal reflux disease)    • Hyperlipidemia    • Hypertension    • Pneumonia    • Pulmonary emphysema (CMS/HCC)    • Renal calculi    • Sleep apnea    • Type 2 diabetes mellitus (CMS/HCC)          PAST SURGICAL HISTORY  Past Surgical History:   Procedure Laterality Date   • APPENDECTOMY     • EXTRACORPOREAL SHOCK WAVE LITHOTRIPSY (ESWL) Left 1/6/2021    Procedure: EXTRACORPOREAL SHOCKWAVE LITHOTRIPSY, CYSTOSCOPY, RETROGRADE PYLEOGRAM;  Surgeon: Walter Schwartz MD;  Location: [facility];  Service: Urology;  Laterality: Left;   • EYE SURGERY      cataracts   • HUMERUS FRACTURE SURGERY     • TOTAL KNEE ARTHROPLASTY Left 04/2015   • UMBILICAL HERNIA REPAIR           FAMILY HISTORY  Family History   Problem Relation Age of Onset   • Cancer Mother         breast   • Breast cancer Mother    • No Known Problems Father    • Heart disease Maternal Grandmother    • Cancer Brother         esophageal , stomach    • Esophageal cancer Brother    • No Known Problems Son    • Breast cancer Maternal Aunt    • Heart disease Maternal Aunt    • No Known Problems Son    • Malig Hyperthermia Neg Hx          SOCIAL HISTORY  Social History     Socioeconomic History   • Marital status:      Spouse name: Not on file   • Number of children: Not on file   • Years of education: Not on file   • Highest education level: Not on file   Social Needs   • Financial resource strain: Not hard at all   • Food insecurity     Worry: Never true     Inability: Never true   • Transportation needs     Medical: No     Non-medical: No   Tobacco Use   • Smoking status: Current Every Day Smoker     Packs/day: 0.25     Years: 50.00     Pack years: 12.50     Types: Cigarettes     Start date: 1970   • Smokeless tobacco: Never Used   Substance and Sexual Activity   • Alcohol use: Yes     Comment:  social   • Drug use: No         ALLERGIES  Ambien [zolpidem tartrate], Amoxicillin-pot clavulanate, Doxycycline, Levofloxacin, Metronidazole, Naproxen, Sulfamethoxazole-trimethoprim, and Synthroid [levothyroxine sodium]        REVIEW OF SYSTEMS  Review of Systems   All systems reviewed and marked as negative except as listed in HPI       PHYSICAL EXAM    I have reviewed the triage vital signs and nursing notes.    ED Triage Vitals   Temp Heart Rate Resp BP SpO2   02/12/21 1143 02/12/21 1143 02/12/21 1143 02/12/21 1143 02/12/21 1143   98.4 °F (36.9 °C) 90 16 152/80 100 %      Temp src Heart Rate Source Patient Position BP Location FiO2 (%)   02/12/21 1143 02/12/21 1257 02/12/21 1257 02/12/21 1257 --   Tympanic Monitor Sitting Left arm        GENERAL: Alert well developed, well nourished in mild distress secondary to pain  HENT: NCAT, neck supple, trachea midline  EYES: no scleral icterus, PERRL, normal conjunctivae  CV: regular rhythm, regular rate, no murmur  RESPIRATORY: unlabored effort, CTAB.  Tenderness of the right lateral mid ribs.  There is a small contusion to the right posterior aspec.  No crepitus is appreciated.  ABDOMEN: soft, nontender, nondistended, bowel sounds present  MUSCULOSKELETAL: no gross deformity.  No tenderness of the CT or L-spine.  NEURO: alert,  sensory and motor function of extremities grossly intact, speech clear, mental status normal/baseline  SKIN: warm, dry, no rash  PSYCH:  Appropriate mood and affect    Vital signs and nursing notes reviewed.          LAB RESULTS  Recent Results (from the past 24 hour(s))   ECG 12 Lead    Collection Time: 02/12/21  3:11 PM   Result Value Ref Range    QT Interval 448 ms   Comprehensive Metabolic Panel    Collection Time: 02/12/21  3:34 PM    Specimen: Blood   Result Value Ref Range    Glucose 122 (H) 65 - 99 mg/dL    BUN 21 8 - 23 mg/dL    Creatinine 1.06 (H) 0.57 - 1.00 mg/dL    Sodium 141 136 - 145 mmol/L    Potassium 4.4 3.5 - 5.2 mmol/L     Chloride 105 98 - 107 mmol/L    CO2 26.3 22.0 - 29.0 mmol/L    Calcium 10.1 8.6 - 10.5 mg/dL    Total Protein 7.4 6.0 - 8.5 g/dL    Albumin 4.00 3.50 - 5.20 g/dL    ALT (SGPT) 19 1 - 33 U/L    AST (SGOT) 15 1 - 32 U/L    Alkaline Phosphatase 63 39 - 117 U/L    Total Bilirubin 0.4 0.0 - 1.2 mg/dL    eGFR Non African Amer 50 (L) >60 mL/min/1.73    Globulin 3.4 gm/dL    A/G Ratio 1.2 g/dL    BUN/Creatinine Ratio 19.8 7.0 - 25.0    Anion Gap 9.7 5.0 - 15.0 mmol/L   Troponin    Collection Time: 02/12/21  3:34 PM    Specimen: Blood   Result Value Ref Range    Troponin T <0.010 0.000 - 0.030 ng/mL   CBC Auto Differential    Collection Time: 02/12/21  3:34 PM    Specimen: Blood   Result Value Ref Range    WBC 10.75 3.40 - 10.80 10*3/mm3    RBC 4.22 3.77 - 5.28 10*6/mm3    Hemoglobin 12.5 12.0 - 15.9 g/dL    Hematocrit 39.7 34.0 - 46.6 %    MCV 94.1 79.0 - 97.0 fL    MCH 29.6 26.6 - 33.0 pg    MCHC 31.5 31.5 - 35.7 g/dL    RDW 13.6 12.3 - 15.4 %    RDW-SD 47.2 37.0 - 54.0 fl    MPV 11.0 6.0 - 12.0 fL    Platelets 267 140 - 450 10*3/mm3    Neutrophil % 81.9 (H) 42.7 - 76.0 %    Lymphocyte % 10.6 (L) 19.6 - 45.3 %    Monocyte % 5.5 5.0 - 12.0 %    Eosinophil % 0.6 0.3 - 6.2 %    Basophil % 0.7 0.0 - 1.5 %    Immature Grans % 0.7 (H) 0.0 - 0.5 %    Neutrophils, Absolute 8.80 (H) 1.70 - 7.00 10*3/mm3    Lymphocytes, Absolute 1.14 0.70 - 3.10 10*3/mm3    Monocytes, Absolute 0.59 0.10 - 0.90 10*3/mm3    Eosinophils, Absolute 0.06 0.00 - 0.40 10*3/mm3    Basophils, Absolute 0.08 0.00 - 0.20 10*3/mm3    Immature Grans, Absolute 0.08 (H) 0.00 - 0.05 10*3/mm3    nRBC 0.0 0.0 - 0.2 /100 WBC   Light Blue Top    Collection Time: 02/12/21  3:34 PM   Result Value Ref Range    Extra Tube hold for add-on    Green Top (Gel)    Collection Time: 02/12/21  3:34 PM   Result Value Ref Range    Extra Tube Hold for add-ons.    Lavender Top    Collection Time: 02/12/21  3:34 PM   Result Value Ref Range    Extra Tube hold for add-on    Gold Top - SST     Collection Time: 02/12/21  3:34 PM   Result Value Ref Range    Extra Tube Hold for add-ons.        Ordered the above labs and independently reviewed the results.        RADIOLOGY  Xr Ribs Right With Pa Chest    Result Date: 2/12/2021  XR RIBS RIGHT W PA CHEST-  INDICATIONS: Trauma  TECHNIQUE: Frontal view of the chest, 5 views of the right ribs  COMPARISON: 01/04/2020  FINDINGS:  The heart size is enlarged. Aorta is calcified. Pulmonary vasculature is unremarkable. Small likely atelectasis or scarring in the left mid to lower lung. No pleural effusion or pneumothorax.  Fractures are seen in peripheral right ribs 3, 4, 5, with as much as about 3 mm cortical step-off.  A 6 mm lucent focus in the right humeral neck is not seen on the prior exam from 08/31/2014, potentially a lytic lesion such as myeloma or metastatic disease, correlate clinically, interval follow-up can characterize change, further evaluation can be obtained as indicated.       As described.    This report was finalized on 2/12/2021 12:28 PM by Dr. Alec Arcos M.D.      Ct Chest Without Contrast Diagnostic    Result Date: 2/12/2021  CT CHEST WO CONTRAST DIAGNOSTIC-  CLINICAL HISTORY: Patient fell. Right-sided rib pain.  TECHNIQUE: Spiral CT images were obtained through the chest without IV contrast and were reconstructed in 3 mm thick axial slices.  Radiation dose reduction techniques were utilized, including automated exposure control and exposure modulation based on body size.  COMPARISON: Chest CT dated 01/25/2013.  FINDINGS: There is no mediastinal or hilar or axillary lymphadenopathy. Moderately extensive calcification is noted in the thoracic aorta. Lung window images demonstrate no parenchymal infiltrates or noncalcified nodules. There is no pneumothorax. There are no pleural effusions. There are acute nondisplaced fractures of the lateral aspects of the right fourth and fifth and sixth ribs. An acute incomplete minimally angulated  fracture of the anterolateral aspect of the right third rib is also noted. No other rib fractures are identified. The spinous intact. The most inferior image of the study through the upper abdomen shows a possible solid mass with spiculated margins in the head of the pancreas. Further evaluation with a follow-up CT scan of the abdomen with pancreatic protocol is recommended.      Multiple nondisplaced and mildly displaced right rib fractures as described. There is no pneumothorax or evidence of pulmonary contusion. Equivocal evidence of a pancreatic mass as noted, only partially imaged on this study. Further evaluation with a follow-up CT scan of the abdomen is recommended.  These findings were called to LIVIER Betts on 02/12/2021 at 2:50 PM.  This report was finalized on 2/12/2021 2:50 PM by Dr. Malik Cerrato M.D.        I ordered the above noted radiological studies. Independently reviewed by me and discussed with radiologist.  See dictation above for official radiology interpretation.      PROCEDURES    Procedures        MEDICATIONS GIVEN IN ER    Medications   sodium chloride 0.9 % flush 10 mL (10 mL Intravenous Given 2/12/21 1619)   nitroglycerin (NITROSTAT) SL tablet 0.4 mg (has no administration in time range)   acetaminophen (TYLENOL) tablet 650 mg (has no administration in time range)   ondansetron (ZOFRAN) tablet 4 mg (has no administration in time range)     Or   ondansetron (ZOFRAN) injection 4 mg (has no administration in time range)   melatonin tablet 3 mg (has no administration in time range)   morphine injection 2 mg (has no administration in time range)   morphine injection 2 mg (2 mg Intravenous Given 2/12/21 1616)   ondansetron (ZOFRAN) injection 4 mg (4 mg Intravenous Given 2/12/21 1616)         PROGRESS, DATA ANALYSIS, CONSULTS, AND MEDICAL DECISION MAKING    All labs have been independently reviewed by me.  All radiology studies have been reviewed by me.   EKG's independently reviewed  by me.  Discussion below represents my analysis of pertinent findings related to patient's condition, differential diagnosis, treatment plan and final disposition.    DIFFERENTIAL DIAGNOSIS INCLUDE BUT NOT LIMITED TO:  pneumothorax, hemothorax, cardiac versus pulmonary contusion, hematoma, rib fracture, PE, ACS, MI    ED Course as of Feb 12 2006 Fri Feb 12, 2021   1220 I viewed chest x-ray on PACS system.  My findings are cardiomegaly.  There are rib fractures of #4 5 and 6 right ribs noted.    [JS]   1525 I discussed patient with Dr. Sethi who reports the patient has 4 rib fractures.  #3, 4 5 and 6.  The patient also has what could be a pancreatic mass on 1 meds.  Recommend CT abdomen pelvis with pancreatic protocol for further evaluation.  Otherwise negative acute CT chest.     [JS]   1530 Given that the patient has 4 rib fractures she require admission to the hospital.  Consult placed to hospitalist.    [JS]   1536 I discussed patient with Dr. Gracia who is agreeable admit the patient to the hospital.    [JS]   2005 WBC: 10.75 [JS]   2005 Troponin T: <0.010 [JS]   2005 Hemoglobin: 12.5 [JS]   2005 BUN: 21 [JS]   2005 Creatinine(!): 1.06 [JS]      ED Course User Index  [JS] Kayla Schwartz, LIVIER       AS OF 20:06 EST VITALS:        BP - 144/65  HR - 74  TEMP - 98.5 °F (36.9 °C) (Oral)  O2 SATS - 95%        DIAGNOSIS  Final diagnoses:   Closed fracture of multiple ribs of right side, initial encounter   Right-sided chest wall pain   Fall from standing, initial encounter   Pancreatic mass   Abnormal CT of the abdomen         DISPOSITION  Admission     Pt masked in first look. I wore a surgical mask and protective eye wear throughout my encounters with the pt. I performed hand hygiene on entry into the pt room and upon exit.     Dictated utilizing Dragon dictation:  Much of this encounter note is an electronic transcription/translation of spoken language to printed text. The electronic translation of spoken  language may permit erroneous, or at times, nonsensical words or phrases to be inadvertently transcribed; Although I have reviewed the note for such errors, some may still exist.     Kayla Schwartz APRN  02/12/21 2006

## 2021-02-13 LAB
ANION GAP SERPL CALCULATED.3IONS-SCNC: 10.8 MMOL/L (ref 5–15)
BUN SERPL-MCNC: 24 MG/DL (ref 8–23)
BUN/CREAT SERPL: 20.5 (ref 7–25)
CALCIUM SPEC-SCNC: 9.8 MG/DL (ref 8.6–10.5)
CHLORIDE SERPL-SCNC: 105 MMOL/L (ref 98–107)
CO2 SERPL-SCNC: 22.2 MMOL/L (ref 22–29)
CREAT SERPL-MCNC: 1.17 MG/DL (ref 0.57–1)
DEPRECATED RDW RBC AUTO: 46.5 FL (ref 37–54)
ERYTHROCYTE [DISTWIDTH] IN BLOOD BY AUTOMATED COUNT: 13.3 % (ref 12.3–15.4)
GFR SERPL CREATININE-BSD FRML MDRD: 45 ML/MIN/1.73
GLUCOSE BLDC GLUCOMTR-MCNC: 114 MG/DL (ref 70–130)
GLUCOSE BLDC GLUCOMTR-MCNC: 129 MG/DL (ref 70–130)
GLUCOSE SERPL-MCNC: 100 MG/DL (ref 65–99)
HCT VFR BLD AUTO: 37.8 % (ref 34–46.6)
HGB BLD-MCNC: 12 G/DL (ref 12–15.9)
MCH RBC QN AUTO: 30 PG (ref 26.6–33)
MCHC RBC AUTO-ENTMCNC: 31.7 G/DL (ref 31.5–35.7)
MCV RBC AUTO: 94.5 FL (ref 79–97)
PLATELET # BLD AUTO: 241 10*3/MM3 (ref 140–450)
PMV BLD AUTO: 11.2 FL (ref 6–12)
POTASSIUM SERPL-SCNC: 4 MMOL/L (ref 3.5–5.2)
RBC # BLD AUTO: 4 10*6/MM3 (ref 3.77–5.28)
SODIUM SERPL-SCNC: 138 MMOL/L (ref 136–145)
WBC # BLD AUTO: 7.47 10*3/MM3 (ref 3.4–10.8)

## 2021-02-13 PROCEDURE — 94799 UNLISTED PULMONARY SVC/PX: CPT

## 2021-02-13 PROCEDURE — 80048 BASIC METABOLIC PNL TOTAL CA: CPT | Performed by: INTERNAL MEDICINE

## 2021-02-13 PROCEDURE — 85027 COMPLETE CBC AUTOMATED: CPT | Performed by: INTERNAL MEDICINE

## 2021-02-13 PROCEDURE — 25010000002 ENOXAPARIN PER 10 MG: Performed by: INTERNAL MEDICINE

## 2021-02-13 PROCEDURE — 82962 GLUCOSE BLOOD TEST: CPT

## 2021-02-13 PROCEDURE — 94640 AIRWAY INHALATION TREATMENT: CPT

## 2021-02-13 PROCEDURE — 25010000002 MORPHINE PER 10 MG: Performed by: INTERNAL MEDICINE

## 2021-02-13 PROCEDURE — 99233 SBSQ HOSP IP/OBS HIGH 50: CPT | Performed by: THORACIC SURGERY (CARDIOTHORACIC VASCULAR SURGERY)

## 2021-02-13 RX ORDER — GABAPENTIN 300 MG/1
300 CAPSULE ORAL EVERY 8 HOURS SCHEDULED
Status: DISCONTINUED | OUTPATIENT
Start: 2021-02-13 | End: 2021-02-14

## 2021-02-13 RX ORDER — HYDROMORPHONE HYDROCHLORIDE 1 MG/ML
0.5 INJECTION, SOLUTION INTRAMUSCULAR; INTRAVENOUS; SUBCUTANEOUS
Status: DISCONTINUED | OUTPATIENT
Start: 2021-02-13 | End: 2021-02-17

## 2021-02-13 RX ORDER — OXYCODONE HYDROCHLORIDE 5 MG/1
5 TABLET ORAL EVERY 4 HOURS PRN
Status: DISCONTINUED | OUTPATIENT
Start: 2021-02-13 | End: 2021-02-14

## 2021-02-13 RX ORDER — LIDOCAINE 50 MG/G
1 PATCH TOPICAL
Status: DISCONTINUED | OUTPATIENT
Start: 2021-02-13 | End: 2021-02-21 | Stop reason: HOSPADM

## 2021-02-13 RX ORDER — CELECOXIB 100 MG/1
100 CAPSULE ORAL EVERY 12 HOURS SCHEDULED
Status: DISCONTINUED | OUTPATIENT
Start: 2021-02-13 | End: 2021-02-17

## 2021-02-13 RX ADMIN — LIDOCAINE 1 PATCH: 50 PATCH TOPICAL at 16:55

## 2021-02-13 RX ADMIN — Medication 1 CAPSULE: at 10:07

## 2021-02-13 RX ADMIN — ENOXAPARIN SODIUM 40 MG: 40 INJECTION SUBCUTANEOUS at 11:54

## 2021-02-13 RX ADMIN — CHLORTHALIDONE 25 MG: 25 TABLET ORAL at 10:07

## 2021-02-13 RX ADMIN — HYDROCODONE BITARTRATE AND ACETAMINOPHEN 1 TABLET: 7.5; 325 TABLET ORAL at 11:54

## 2021-02-13 RX ADMIN — OXYCODONE 5 MG: 5 TABLET ORAL at 16:55

## 2021-02-13 RX ADMIN — AMLODIPINE BESYLATE 5 MG: 5 TABLET ORAL at 10:08

## 2021-02-13 RX ADMIN — BUDESONIDE AND FORMOTEROL FUMARATE DIHYDRATE 2 PUFF: 80; 4.5 AEROSOL RESPIRATORY (INHALATION) at 08:22

## 2021-02-13 RX ADMIN — PANTOPRAZOLE SODIUM 40 MG: 40 TABLET, DELAYED RELEASE ORAL at 06:00

## 2021-02-13 RX ADMIN — SODIUM CHLORIDE, PRESERVATIVE FREE 10 ML: 5 INJECTION INTRAVENOUS at 10:08

## 2021-02-13 RX ADMIN — GABAPENTIN 300 MG: 300 CAPSULE ORAL at 21:09

## 2021-02-13 RX ADMIN — CELECOXIB 100 MG: 100 CAPSULE ORAL at 13:42

## 2021-02-13 RX ADMIN — GABAPENTIN 300 MG: 300 CAPSULE ORAL at 13:42

## 2021-02-13 RX ADMIN — MORPHINE SULFATE 2 MG: 2 INJECTION, SOLUTION INTRAMUSCULAR; INTRAVENOUS at 05:52

## 2021-02-13 RX ADMIN — CELECOXIB 100 MG: 100 CAPSULE ORAL at 21:09

## 2021-02-13 RX ADMIN — BUDESONIDE AND FORMOTEROL FUMARATE DIHYDRATE 2 PUFF: 80; 4.5 AEROSOL RESPIRATORY (INHALATION) at 21:11

## 2021-02-13 RX ADMIN — LEVOTHYROXINE SODIUM 150 MCG: 150 TABLET ORAL at 05:52

## 2021-02-13 NOTE — PLAN OF CARE
Goal Outcome Evaluation:  Plan of Care Reviewed With: patient  Progress: improving  Outcome Summary: VSS. On room air. Right sided chest pain due to rib fractures controlled with prn pain meds. Dr Cross with Thoracic consulted for today. Up with assist x1. Will ctm.

## 2021-02-13 NOTE — H&P
HISTORY AND PHYSICAL   [facility]        Patient Identification:  Name: Luann Frances  Age: 80 y.o.  Sex: female  :  1940  MRN: [id number]                     Primary Care Physician: Keri Foreman MD    Chief Complaint:  80 year old female who fell on her porch today; she fell onto her right side; imaging revealed several rib fractures; she denies hitting her head or loss of consciousness; she denies fever or chills;     History of Present Illness:   As above    Past Medical History:  Past Medical History:   Diagnosis Date   • Arthritis    • Asymptomatic PVCs    • Chronic bronchitis (CMS/HCC)    • Fracture of humerus    • GERD (gastroesophageal reflux disease)    • Hyperlipidemia    • Hypertension    • Hypothyroidism    • Pneumonia    • Pulmonary emphysema (CMS/HCC)    • Renal calculi    • Renal calculus 2021   • Sleep apnea     CPAP USED   • Type 2 diabetes mellitus (CMS/HCC)     NO MEDS     Past Surgical History:  Past Surgical History:   Procedure Laterality Date   • APPENDECTOMY     • EXTRACORPOREAL SHOCK WAVE LITHOTRIPSY (ESWL) Left 2021    Procedure: EXTRACORPOREAL SHOCKWAVE LITHOTRIPSY, CYSTOSCOPY, RETROGRADE PYLEOGRAM;  Surgeon: Walter Schwartz MD;  Location: [facility], operated by [facility];  Service: Urology;  Laterality: Left;   • EYE SURGERY      cataracts   • HUMERUS FRACTURE SURGERY     • TOTAL KNEE ARTHROPLASTY Left 2015   • UMBILICAL HERNIA REPAIR        Home Meds:  Medications Prior to Admission   Medication Sig Dispense Refill Last Dose   • ADVAIR DISKUS 100-50 MCG/DOSE DISKUS Inhale 1 puff 2 (Two) Times a Day. 180 each 5 2021 at Unknown time   • albuterol (PROVENTIL HFA;VENTOLIN HFA) 108 (90 Base) MCG/ACT inhaler Inhale 2 puffs Every 4 (Four) Hours As Needed for Wheezing or Shortness of Air. 1 inhaler 3 Past Week at Unknown time   • amLODIPine (NORVASC) 5 MG tablet TAKE 1 TABLET BY MOUTH DAILY 90 tablet 0 2021 at Unknown time   • chlorthalidone (HYGROTON) 25  MG tablet Take 1 tablet by mouth Daily. 90 tablet 0 2/11/2021 at Unknown time   • [facility] ANTACID & ANTI-GAS 1000-60 MG chewable tablet    2/11/2021 at Unknown time   • diphenhydrAMINE (BENADRYL) 25 mg capsule Take 25 mg by mouth At Night As Needed for Itching.   Past Month at Unknown time   • Probiotic Product (PROBIOTIC DAILY) capsule Take 1 tablet by mouth daily.   2/11/2021 at Unknown time   • Unithroid 150 MCG tablet Take 1 tablet by mouth Daily. 30 tablet 5 2/11/2021 at Unknown time   • vitamin D (ERGOCALCIFEROL) 1.25 MG (79338 UT) capsule capsule Take 1 capsule by mouth Every 7 (Seven) Days. 13 capsule 3 2/11/2021 at Unknown time   • HYDROcodone-acetaminophen (NORCO) 5-325 MG per tablet Take 1 tablet by mouth Every 4 (Four) Hours As Needed (Pain). 20 tablet 0 More than a month at Unknown time   • HYDROcodone-acetaminophen (NORCO) 7.5-325 MG per tablet Take 1 tablet by mouth Every 6 (Six) Hours As Needed for Moderate Pain .   More than a month at Unknown time   • omeprazole OTC (PriLOSEC OTC) 20 MG EC tablet Take 20 mg by mouth As Needed. Take 1-2 tablet      • ondansetron ODT (ZOFRAN-ODT) 4 MG disintegrating tablet Place 1 tablet on the tongue Every 8 (Eight) Hours As Needed for Nausea or Vomiting. 15 tablet 0 More than a month at Unknown time       Allergies:  Allergies   Allergen Reactions   • Ambien [Zolpidem Tartrate]      nausea   • Amoxicillin-Pot Clavulanate Nausea Only   • Doxycycline Nausea Only   • Levofloxacin Nausea Only   • Metronidazole Nausea Only   • Naproxen GI Intolerance   • Sulfamethoxazole-Trimethoprim Nausea Only   • Synthroid [Levothyroxine Sodium] Nausea Only     Immunizations:  Immunization History   Administered Date(s) Administered   • Fluad Quad 65+ 11/05/2019   • Fluzone High Dose =>65 Years ([employer] ONLY) 11/05/2019     Social History:   Social History     Social History Narrative   • Not on file     Social History     Socioeconomic History   • Marital status:      Spouse  name: Not on file   • Number of children: Not on file   • Years of education: Not on file   • Highest education level: Not on file   Social Needs   • Financial resource strain: Not hard at all   • Food insecurity     Worry: Never true     Inability: Never true   • Transportation needs     Medical: No     Non-medical: No   Tobacco Use   • Smoking status: Current Every Day Smoker     Packs/day: 0.25     Years: 50.00     Pack years: 12.50     Types: Cigarettes     Start date:    • Smokeless tobacco: Never Used   Substance and Sexual Activity   • Alcohol use: Yes     Comment: social   • Drug use: No       Family History:  Family History   Problem Relation Age of Onset   • Cancer Mother         breast   • Breast cancer Mother    • No Known Problems Father    • Heart disease Maternal Grandmother    • Cancer Brother         esophageal , stomach    • Esophageal cancer Brother    • No Known Problems Son    • Breast cancer Maternal Aunt    • Heart disease Maternal Aunt    • No Known Problems Son    • Malig Hyperthermia Neg Hx         Review of Systems  See history of present illness and past medical history.  Patient denies headache, dizziness, syncope, change in vision, change in hearing, change in taste, changes in weight, changes in appetite, focal weakness, numbness, or paresthesia.  Patient denies chest pain, palpitations, dyspnea, orthopnea, PND, cough, sinus pressure, rhinorrhea, epistaxis, hemoptysis, nausea, vomiting,hematemesis, diarrhea, constipation or hematchezia.  Denies cold or heat intolerance, polydipsia, polyuria, polyphagia. Denies hematuria, pyuria, dysuria, hesitancy, frequency or urgency. Denies consumption of raw and under cooked meats foods or change in water source.  Denies fever, chills, sweats, night sweats.  Denies missing any routine medications. Remainder of ROS is negative.    Objective:  T Max 24 hrs: Temp (24hrs), Av.3 °F (36.8 °C), Min:98.1 °F (36.7 °C), Max:98.5 °F (36.9  "°C)    Vitals Ranges:   Temp:  [98.1 °F (36.7 °C)-98.5 °F (36.9 °C)] 98.5 °F (36.9 °C)  Heart Rate:  [58-90] 74  Resp:  [16-20] 20  BP: (117-163)/(57-83) 144/65      Exam:  /65 (BP Location: Left arm, Patient Position: Lying)   Pulse 74   Temp 98.5 °F (36.9 °C) (Oral)   Resp 20   Ht 165.1 cm (65\")   Wt 97.5 kg (215 lb)   SpO2 95%   BMI 35.78 kg/m²     General Appearance:    Alert, cooperative, no distress, appears stated age   Head:    Normocephalic, without obvious abnormality, atraumatic   Eyes:    PERRL, conjunctivae/corneas clear, EOM's intact, both eyes   Ears:    Normal external ear canals, both ears   Nose:   Nares normal, septum midline, mucosa normal, no drainage    or sinus tenderness   Throat:   Lips, mucosa, and tongue normal   Neck:   Supple, symmetrical, trachea midline, no adenopathy;     thyroid:  no enlargement/tenderness/nodules; no carotid    bruit or JVD   Back:     Symmetric, no curvature, ROM normal, no CVA tenderness   Lungs:     Decreased breath sounds bilaterally, respirations unlabored   Chest Wall:    No tenderness or deformity    Heart:    Regular rate and rhythm, S1 and S2 normal, no murmur, rub   or gallop   Abdomen:     Soft, nontender, bowel sounds active all four quadrants,     no masses, no hepatomegaly, no splenomegaly   Extremities:   Extremities normal, atraumatic, no cyanosis or edema   Pulses:   2+ and symmetric all extremities   Skin:   Skin color, texture, turgor normal, no rashes or lesions   Lymph nodes:   Cervical, supraclavicular, and axillary nodes normal   Neurologic:   CNII-XII intact, normal strength, sensation intact throughout      .    Data Review:  Labs in chart were reviewed.  WBC   Date Value Ref Range Status   02/12/2021 10.75 3.40 - 10.80 10*3/mm3 Final     Hemoglobin   Date Value Ref Range Status   02/12/2021 12.5 12.0 - 15.9 g/dL Final     Hematocrit   Date Value Ref Range Status   02/12/2021 39.7 34.0 - 46.6 % Final     Platelets   Date Value " Ref Range Status   02/12/2021 267 140 - 450 10*3/mm3 Final     Sodium   Date Value Ref Range Status   02/12/2021 141 136 - 145 mmol/L Final     Potassium   Date Value Ref Range Status   02/12/2021 4.4 3.5 - 5.2 mmol/L Final     Chloride   Date Value Ref Range Status   02/12/2021 105 98 - 107 mmol/L Final     CO2   Date Value Ref Range Status   02/12/2021 26.3 22.0 - 29.0 mmol/L Final     BUN   Date Value Ref Range Status   02/12/2021 21 8 - 23 mg/dL Final     Creatinine   Date Value Ref Range Status   02/12/2021 1.06 (H) 0.57 - 1.00 mg/dL Final     Glucose   Date Value Ref Range Status   02/12/2021 122 (H) 65 - 99 mg/dL Final     Calcium   Date Value Ref Range Status   02/12/2021 10.1 8.6 - 10.5 mg/dL Final     AST (SGOT)   Date Value Ref Range Status   02/12/2021 15 1 - 32 U/L Final     ALT (SGPT)   Date Value Ref Range Status   02/12/2021 19 1 - 33 U/L Final     Alkaline Phosphatase   Date Value Ref Range Status   02/12/2021 63 39 - 117 U/L Final                Imaging Results (All)     Procedure Component Value Units Date/Time    CT Chest Without Contrast Diagnostic [[id number]] Collected: 02/12/21 1439     Updated: 02/12/21 1453    Narrative:      CT CHEST WO CONTRAST DIAGNOSTIC-     CLINICAL HISTORY: Patient fell. Right-sided rib pain.     TECHNIQUE: Spiral CT images were obtained through the chest without IV  contrast and were reconstructed in 3 mm thick axial slices.     Radiation dose reduction techniques were utilized, including automated  exposure control and exposure modulation based on body size.     COMPARISON: Chest CT dated 01/25/2013.     FINDINGS: There is no mediastinal or hilar or axillary lymphadenopathy.  Moderately extensive calcification is noted in the thoracic aorta. Lung  window images demonstrate no parenchymal infiltrates or noncalcified  nodules. There is no pneumothorax. There are no pleural effusions. There  are acute nondisplaced fractures of the lateral aspects of the right  fourth  and fifth and sixth ribs. An acute incomplete minimally angulated  fracture of the anterolateral aspect of the right third rib is also  noted. No other rib fractures are identified. The spinous intact. The  most inferior image of the study through the upper abdomen shows a  possible solid mass with spiculated margins in the head of the pancreas.  Further evaluation with a follow-up CT scan of the abdomen with  pancreatic protocol is recommended.       Impression:      Multiple nondisplaced and mildly displaced right rib  fractures as described. There is no pneumothorax or evidence of  pulmonary contusion. Equivocal evidence of a pancreatic mass as noted,  only partially imaged on this study. Further evaluation with a follow-up  CT scan of the abdomen is recommended.     These findings were called to LIVIER Betts on 02/12/2021 at 2:50  PM.     This report was finalized on 2/12/2021 2:50 PM by Dr. Malik Cerrato M.D.       XR Ribs Right With PA Chest [[id number]] Collected: 02/12/21 1223     Updated: 02/12/21 1231    Narrative:      XR RIBS RIGHT W PA CHEST-     INDICATIONS: Trauma     TECHNIQUE: Frontal view of the chest, 5 views of the right ribs     COMPARISON: 01/04/2020     FINDINGS:     The heart size is enlarged. Aorta is calcified. Pulmonary vasculature is  unremarkable. Small likely atelectasis or scarring in the left mid to  lower lung. No pleural effusion or pneumothorax.     Fractures are seen in peripheral right ribs 3, 4, 5, with as much as  about 3 mm cortical step-off.     A 6 mm lucent focus in the right humeral neck is not seen on the prior  exam from 08/31/2014, potentially a lytic lesion such as myeloma or  metastatic disease, correlate clinically, interval follow-up can  characterize change, further evaluation can be obtained as indicated.       Impression:         As described.           This report was finalized on 2/12/2021 12:28 PM by Dr. Alec Arcos M.D.           Patient  Active Problem List   Diagnosis Code   • Atopic rhinitis J30.9   • Chronic obstructive pulmonary disease (CMS/[facility]) J44.9   • Diverticulitis of colon K57.32   • Acid reflux K21.9   • Fatigue R53.83   • Hypothyroidism E03.9   • Insomnia G47.00   • Knee pain M25.569   • Pain of lower extremity M79.606   • Shoulder pain M25.519   • Ventricular premature beats I49.3   • Weight gain R63.5   • Cold intolerance R68.89   • Post herpetic neuralgia B02.29   • Insulin resistance E88.81   • Vitamin D deficiency E55.9   • CKD (chronic kidney disease) stage 3, GFR 30-59 ml/min (CMS/[facility]) N18.30   • Mixed hyperlipidemia E78.2   • Hypersomnia G47.10   • Hyperuricemia E79.0   • Essential hypertension I10   • DELROY (obstructive sleep apnea) G47.33   • Renal calculus N20.0   • Multiple closed fractures of ribs of right side S22.41XA       Assessment:    Multiple closed fractures of ribs of right side  s/p fall  Diabetes  Hypertension  Obesity  Sleep apnea  Copd  Hypothyroidism  Pancreatic lesion    Plan:  Will ask thoracic surgery to see her  Check ct abd/pelvis to evaluate her pancreas  Pain control  accu checks, insulin sliding scale  Home meds for hypertension, hypothyroidism  Monitor on telemetry    Sonia Edward MD  2/12/2021  20:21 EST

## 2021-02-13 NOTE — PROGRESS NOTES
[facility] Medicine Services  PROGRESS NOTE    Patient Name: Luann Frances  : 1940  MRN: [id number]    Date of Admission: 2021  Primary Care Physician: Keri Foreman MD    Subjective   Subjective     CC:  Follow-up rib fractures    HPI:  Patient notes significant pain particularly with any movement.  She says her pain is well controlled on current pain medicines though.  She denies any shortness of breath.  She is overall frustrated by her condition.    Review of Systems  No current fevers or chills  No current shortness of breath or cough  No current nausea, vomiting, or diarrhea  No current chest pain or palpitations       Objective   Objective     Vital Signs:   Temp:  [97.7 °F (36.5 °C)-98.5 °F (36.9 °C)] 97.7 °F (36.5 °C)  Heart Rate:  [58-90] 68  Resp:  [16-20] 18  BP: (117-163)/(57-83) 120/57        Physical Exam:  Constitutional:Awake, alert  HENT: NCAT, mucous membranes moist, neck supple  Respiratory: Clear to auscultation bilaterally, respiratory effort normal, nonlabored breathing   Cardiovascular: RRR, normal radial pulses  Gastrointestinal: Positive bowel sounds, soft, nontender, nondistended  Musculoskeletal: Elderly and chronically debilitated appearance, obese, BMI is 35, minimal lower extremity edema  Psychiatric: Appropriate affect, cooperative, conversational  Neurologic: No slurred speech or facial droop, follows commands  Skin: No rashes or jaundice, warm      Results Reviewed:  Results from last 7 days   Lab Units 21  0516 21  1534   WBC 10*3/mm3 7.47 10.75   HEMOGLOBIN g/dL 12.0 12.5   HEMATOCRIT % 37.8 39.7   PLATELETS 10*3/mm3 241 267     Results from last 7 days   Lab Units 21  0516 21  1534   SODIUM mmol/L 138 141   POTASSIUM mmol/L 4.0 4.4   CHLORIDE mmol/L 105 105   CO2 mmol/L 22.2 26.3   BUN mg/dL 24* 21   CREATININE mg/dL 1.17* 1.06*   GLUCOSE mg/dL 100* 122*   CALCIUM mg/dL 9.8 10.1   ALT (SGPT) U/L  --  19   AST (SGOT) U/L  --  15    TROPONIN T ng/mL  --  <0.010     Estimated Creatinine Clearance: 44.8 mL/min (A) (by C-G formula based on SCr of 1.17 mg/dL (H)).    Microbiology Results Abnormal     Procedure Component Value - Date/Time    COVID PRE-OP / PRE-PROCEDURE SCREENING ORDER (NO ISOLATION) - Swab, Nasopharynx [[id number]]  (Normal) Collected: 02/12/21 1621    Lab Status: Final result Specimen: Swab from Nasopharynx Updated: 02/12/21 2044    Narrative:      The following orders were created for panel order COVID PRE-OP / PRE-PROCEDURE SCREENING ORDER (NO ISOLATION) - Swab, Nasopharynx.  Procedure                               Abnormality         Status                     ---------                               -----------         ------                     COVID-19,APTIMA PANTHER,...[[id number]]  Normal              Final result                 Please view results for these tests on the individual orders.    COVID-19,APTIMA PANTHER,[facility], NP/OP SWAB IN UTM/VTM/[facility] TRANSPORT MEDIA,24 HR TAT - Swab, Nasopharynx [[id number]]  (Normal) Collected: 02/12/21 1621    Lab Status: Final result Specimen: Swab from Nasopharynx Updated: 02/12/21 2044     COVID19 Not Detected    Narrative:      Fact sheet for providers: https://www.fda.gov/media/999245/download     Fact sheet for patients: https://www.fda.gov/media/276250/download    Test performed by RT PCR.          Imaging Results (Last 24 Hours)     Procedure Component Value Units Date/Time    CT Abdomen Pelvis With Contrast [[id number]] Collected: 02/12/21 2142     Updated: 02/12/21 2142    Narrative:        Patient: VAIBHAV WESTON  Time Out: 21:41  Exam(s): CT ABDOMEN + PELVIS With Contrast     EXAM:    CT Abdomen and Pelvis With Intravenous Contrast    CLINICAL HISTORY:     Reason for exam: Pancreatitis suspected.    TECHNIQUE:    Axial computed tomography images of the abdomen and pelvis with   intravenous contrast.  CTDI is 36.7 mGy and DLP is 953.5 mGy-cm.  This CT   exam was  performed according to the principle of ALARA (As Low As   Reasonably Achievable) by using one or more of the following dose   reduction techniques: automated exposure control, adjustment of the mA   and or kV according to patient size, and or use of iterative   reconstruction technique.    COMPARISON:    No relevant prior studies available.    FINDINGS:  The lung bases demonstrate mild atelectasis.  No acute infiltrate.    Hepatic steatosis.  The biliary tree is nondilated.  1.2 cm right adrenal   nodule.  Density suggests a lipid rich adenoma.  Calcified splenic   granulomata.  Pancreatic atrophy without evidence of acute glandular   inflammation.  Bilateral renal cortical atrophy and scarring.  Small left   renal cyst.  No hydronephrosis.    Diverticulosis coli without diverticulitis.  No bowel obstruction or   perforation.  The appendix is identified.  Fusiform infrarenal abdominal   aortic ectasia measuring 2.6 cm.  No rupture.  Mild lumbar levocurvature.    Multilevel spondylosis.  No acute fracture.    IMPRESSION:       No CT findings of pancreatitis, though diagnosis can still be made on   appropriate clinical grounds.    Hepatic steatosis.    Diverticulosis coli without diverticulitis.    Incidental findings as detailed above.      Impression:          Electronically signed by Danyel Duran M.D. on 02-12-21 at 2141    CT Chest Without Contrast Diagnostic [[id number]] Collected: 02/12/21 1439     Updated: 02/12/21 1453    Narrative:      CT CHEST WO CONTRAST DIAGNOSTIC-     CLINICAL HISTORY: Patient fell. Right-sided rib pain.     TECHNIQUE: Spiral CT images were obtained through the chest without IV  contrast and were reconstructed in 3 mm thick axial slices.     Radiation dose reduction techniques were utilized, including automated  exposure control and exposure modulation based on body size.     COMPARISON: Chest CT dated 01/25/2013.     FINDINGS: There is no mediastinal or hilar or axillary  lymphadenopathy.  Moderately extensive calcification is noted in the thoracic aorta. Lung  window images demonstrate no parenchymal infiltrates or noncalcified  nodules. There is no pneumothorax. There are no pleural effusions. There  are acute nondisplaced fractures of the lateral aspects of the right  fourth and fifth and sixth ribs. An acute incomplete minimally angulated  fracture of the anterolateral aspect of the right third rib is also  noted. No other rib fractures are identified. The spinous intact. The  most inferior image of the study through the upper abdomen shows a  possible solid mass with spiculated margins in the head of the pancreas.  Further evaluation with a follow-up CT scan of the abdomen with  pancreatic protocol is recommended.       Impression:      Multiple nondisplaced and mildly displaced right rib  fractures as described. There is no pneumothorax or evidence of  pulmonary contusion. Equivocal evidence of a pancreatic mass as noted,  only partially imaged on this study. Further evaluation with a follow-up  CT scan of the abdomen is recommended.     These findings were called to LIVIER Betts on 02/12/2021 at 2:50  PM.     This report was finalized on 2/12/2021 2:50 PM by Dr. Malik Cerrato M.D.       XR Ribs Right With PA Chest [[id number]] Collected: 02/12/21 1223     Updated: 02/12/21 1231    Narrative:      XR RIBS RIGHT W PA CHEST-     INDICATIONS: Trauma     TECHNIQUE: Frontal view of the chest, 5 views of the right ribs     COMPARISON: 01/04/2020     FINDINGS:     The heart size is enlarged. Aorta is calcified. Pulmonary vasculature is  unremarkable. Small likely atelectasis or scarring in the left mid to  lower lung. No pleural effusion or pneumothorax.     Fractures are seen in peripheral right ribs 3, 4, 5, with as much as  about 3 mm cortical step-off.     A 6 mm lucent focus in the right humeral neck is not seen on the prior  exam from 08/31/2014, potentially a lytic  lesion such as myeloma or  metastatic disease, correlate clinically, interval follow-up can  characterize change, further evaluation can be obtained as indicated.       Impression:         As described.           This report was finalized on 2/12/2021 12:28 PM by Dr. Alec Arcos M.D.             Results for orders placed in visit on 04/08/15   SCANNED - ECHOCARDIOGRAM       I have reviewed the medications:  Scheduled Meds:amLODIPine, 5 mg, Oral, Daily  budesonide-formoterol, 2 puff, Inhalation, BID - RT  chlorthalidone, 25 mg, Oral, Daily  insulin lispro, 0-7 Units, Subcutaneous, TID AC  lactobacillus acidophilus, 1 capsule, Oral, Daily  levothyroxine, 150 mcg, Oral, Q AM  pantoprazole, 40 mg, Oral, QAM      Continuous Infusions:   PRN Meds:.•  acetaminophen  •  albuterol  •  aluminum-magnesium hydroxide-simethicone  •  dextrose  •  dextrose  •  glucagon (human recombinant)  •  HYDROcodone-acetaminophen  •  melatonin  •  Morphine  •  nitroglycerin  •  ondansetron **OR** ondansetron  •  [COMPLETED] Insert peripheral IV **AND** sodium chloride    Assessment/Plan   Assessment & Plan     Active Hospital Problems    Diagnosis  POA   • Multiple closed fractures of ribs of right side [S22.41XA]  Yes      Resolved Hospital Problems   No resolved problems to display.        Brief Hospital Course to date:  Luann Frances is a 80 y.o. female     Multiple closed rib fracture  Status post fall  Diet-controlled diabetes  Obesity BMI 35  Essential hypertension  Sleep apnea  COPD  Initial question for pancreatic lesion which was ruled out with subsequent scan  Hypothyroid    Plan:  Case discussed with thoracic surgery and they are considering rib plating if patient is agreeable to this plan.  Continue high-level supportive care and symptom treatment.  Currently requiring IV narcotics.  Careful monitoring of these medications  Images reviewed and findings noted.  Repeat images for pancreatic lesion was negative for  pancreatic lesion unclear why there was findings on the initial CT of chest.  Discontinue Accu-Cheks as patient not requiring insulin she is diet controlled  Continue other current medications.  PT OT for debility, surgery states weightbearing as tolerated    DVT Prophylaxis: Lovenox      Disposition: Location to be determined    CODE STATUS:   Code Status and Medical Interventions:   Ordered at: 02/12/21 1856     Code Status:    CPR     Medical Interventions (Level of Support Prior to Arrest):    Full       Noble Avila MD  02/13/21

## 2021-02-13 NOTE — PLAN OF CARE
Goal Outcome Evaluation:  Plan of Care Reviewed With: patient     Outcome Summary: VSS, surgery planned for tues.  Pt on room air, ambulated on shift asst x1, PT is now working with pt.  Good pulm hygiene.  ERAS protocol started, prn pain meds given for pain. Will continue to monitor.

## 2021-02-13 NOTE — CONSULTS
Inpatient Thoracic Surgery Consult  Consult performed by: Kelley Delong MD  Consult ordered by: Sonia Edward MD          Patient Care Team:  Keri Foreman MD as PCP - General (Family Medicine)  Chas Garcia MD as Consulting Physician (Endocrinology)  Jeff Sands MD as Consulting Physician (Pulmonary Disease)    Chief Complaint   Patient presents with   • Fall   • Rib Pain   Consulting physician: Dr. Edward    Subjective     History of Present Illness  Ms. Frances is a pleasant 80-year-old lady who sustained a mechanical fall from her porch and presented to the emergency department yesterday complaining of right-sided chest pain.  On work-up she was found to have multiple right-sided rib fractures.  Today she has complaints of right-sided chest pain,  Review of Systems     Patient Active Problem List   Diagnosis   • Atopic rhinitis   • Chronic obstructive pulmonary disease (CMS/HCC)   • Diverticulitis of colon   • Acid reflux   • Fatigue   • Hypothyroidism   • Insomnia   • Knee pain   • Pain of lower extremity   • Shoulder pain   • Ventricular premature beats   • Weight gain   • Cold intolerance   • Post herpetic neuralgia   • Insulin resistance   • Vitamin D deficiency   • CKD (chronic kidney disease) stage 3, GFR 30-59 ml/min (CMS/HCC)   • Mixed hyperlipidemia   • Hypersomnia   • Hyperuricemia   • Essential hypertension   • DELROY (obstructive sleep apnea)   • Renal calculus   • Multiple closed fractures of ribs of right side     Past Medical History:   Diagnosis Date   • Arthritis    • Asymptomatic PVCs    • Chronic bronchitis (CMS/HCC)    • Fracture of humerus    • GERD (gastroesophageal reflux disease)    • Hyperlipidemia    • Hypertension    • Hypothyroidism    • Pneumonia    • Pulmonary emphysema (CMS/HCC)    • Renal calculi    • Renal calculus 1/6/2021   • Sleep apnea     CPAP USED   • Type 2 diabetes mellitus (CMS/HCC)     NO MEDS     Past Surgical History:   Procedure Laterality  Date   • APPENDECTOMY     • EXTRACORPOREAL SHOCK WAVE LITHOTRIPSY (ESWL) Left 1/6/2021    Procedure: EXTRACORPOREAL SHOCKWAVE LITHOTRIPSY, CYSTOSCOPY, RETROGRADE PYLEOGRAM;  Surgeon: Walter Schwartz MD;  Location: [facility] OR [facility];  Service: Urology;  Laterality: Left;   • EYE SURGERY      cataracts   • HUMERUS FRACTURE SURGERY     • TOTAL KNEE ARTHROPLASTY Left 04/2015   • UMBILICAL HERNIA REPAIR       Family History   Problem Relation Age of Onset   • Cancer Mother         breast   • Breast cancer Mother    • No Known Problems Father    • Heart disease Maternal Grandmother    • Cancer Brother         esophageal , stomach    • Esophageal cancer Brother    • No Known Problems Son    • Breast cancer Maternal Aunt    • Heart disease Maternal Aunt    • No Known Problems Son    • Malig Hyperthermia Neg Hx      Social History     Socioeconomic History   • Marital status:      Spouse name: Not on file   • Number of children: Not on file   • Years of education: Not on file   • Highest education level: Not on file   Social Needs   • Financial resource strain: Not hard at all   • Food insecurity     Worry: Never true     Inability: Never true   • Transportation needs     Medical: No     Non-medical: No   Tobacco Use   • Smoking status: Current Every Day Smoker     Packs/day: 0.25     Years: 50.00     Pack years: 12.50     Types: Cigarettes     Start date: 1970   • Smokeless tobacco: Never Used   Substance and Sexual Activity   • Alcohol use: Yes     Comment: social   • Drug use: No     Medications Prior to Admission   Medication Sig Dispense Refill Last Dose   • ADVAIR DISKUS 100-50 MCG/DOSE DISKUS Inhale 1 puff 2 (Two) Times a Day. 180 each 5 2/11/2021 at Unknown time   • albuterol (PROVENTIL HFA;VENTOLIN HFA) 108 (90 Base) MCG/ACT inhaler Inhale 2 puffs Every 4 (Four) Hours As Needed for Wheezing or Shortness of Air. 1 inhaler 3 Past Week at Unknown time   • amLODIPine (NORVASC) 5 MG tablet TAKE 1 TABLET BY MOUTH  DAILY 90 tablet 0 2/11/2021 at Unknown time   • chlorthalidone (HYGROTON) 25 MG tablet Take 1 tablet by mouth Daily. 90 tablet 0 2/11/2021 at Unknown time   • [facility] ANTACID & ANTI-GAS 1000-60 MG chewable tablet    2/11/2021 at Unknown time   • diphenhydrAMINE (BENADRYL) 25 mg capsule Take 25 mg by mouth At Night As Needed for Itching.   Past Month at Unknown time   • Probiotic Product (PROBIOTIC DAILY) capsule Take 1 tablet by mouth daily.   2/11/2021 at Unknown time   • Unithroid 150 MCG tablet Take 1 tablet by mouth Daily. 30 tablet 5 2/11/2021 at Unknown time   • vitamin D (ERGOCALCIFEROL) 1.25 MG (72078 UT) capsule capsule Take 1 capsule by mouth Every 7 (Seven) Days. 13 capsule 3 2/11/2021 at Unknown time   • HYDROcodone-acetaminophen (NORCO) 5-325 MG per tablet Take 1 tablet by mouth Every 4 (Four) Hours As Needed (Pain). 20 tablet 0 More than a month at Unknown time   • HYDROcodone-acetaminophen (NORCO) 7.5-325 MG per tablet Take 1 tablet by mouth Every 6 (Six) Hours As Needed for Moderate Pain .   More than a month at Unknown time   • omeprazole OTC (PriLOSEC OTC) 20 MG EC tablet Take 20 mg by mouth As Needed. Take 1-2 tablet      • ondansetron ODT (ZOFRAN-ODT) 4 MG disintegrating tablet Place 1 tablet on the tongue Every 8 (Eight) Hours As Needed for Nausea or Vomiting. 15 tablet 0 More than a month at Unknown time     Allergies   Allergen Reactions   • Ambien [Zolpidem Tartrate]      nausea   • Amoxicillin-Pot Clavulanate Nausea Only   • Doxycycline Nausea Only   • Levofloxacin Nausea Only   • Metronidazole Nausea Only   • Naproxen GI Intolerance   • Sulfamethoxazole-Trimethoprim Nausea Only   • Synthroid [Levothyroxine Sodium] Nausea Only       Objective      Vital Signs  Temp:  [97.5 °F (36.4 °C)-98.5 °F (36.9 °C)] 97.5 °F (36.4 °C)  Heart Rate:  [58-77] 62  Resp:  [16-20] 18  BP: (116-163)/(50-83) 116/50    Intake & Output (last day)       02/12 0701 - 02/13 0700 02/13 0701 - 02/14 0700    P.O. 240 120     Total Intake(mL/kg) 240 (2.5) 120 (1.2)    Urine (mL/kg/hr) 300 100 (0.2)    Total Output 300 100    Net -60 +20          Urine Unmeasured Occurrence 0 x           Physical Exam  Constitutional:       Appearance: Normal appearance.   HENT:      Head: Normocephalic and atraumatic.   Neck:      Musculoskeletal: Normal range of motion.   Cardiovascular:      Rate and Rhythm: Normal rate and regular rhythm.   Pulmonary:      Effort: Pulmonary effort is normal.      Breath sounds: Examination of the right-middle field reveals decreased breath sounds. Examination of the right-lower field reveals decreased breath sounds. Decreased breath sounds present.   Abdominal:      Palpations: Abdomen is soft.      Tenderness: There is no abdominal tenderness.   Neurological:      Mental Status: She is alert.   Psychiatric:         Mood and Affect: Mood normal.         Thought Content: Thought content normal.         Results Review:    I reviewed the patient's new clinical results.  I reviewed the patient's new imaging results and agree with the interpretation.  I reviewed the patient's other test results and agree with the interpretation          Assessment/Plan       Multiple closed fractures of ribs of right side      Assessment & Plan  Ms. Frances is a pleasant 80-year-old lady status post mechanical fall now with right-sided third, fourth, fifth and sixth rib fractures.  I have recommended video-assisted rib fixation to decrease her risk of pneumonia as well as to improve her pain.  She is amenable.  We will plan to schedule this for Tuesday.  I have adjusted her pain meds to our rib fracture protocol.  I discussed the patients findings and our recommendations with patient, nursing staff and consulting provider.    Thank you for this consult and allowing us to participate in the care of your patient.  We will follow along with you during this hospitalization.       Kelley Delong MD  [facility]  02/13/21  12:29 EST

## 2021-02-14 PROBLEM — R07.89 NON-CARDIAC CHEST PAIN: Status: ACTIVE | Noted: 2021-02-14

## 2021-02-14 PROCEDURE — 25010000002 ENOXAPARIN PER 10 MG: Performed by: INTERNAL MEDICINE

## 2021-02-14 PROCEDURE — 99024 POSTOP FOLLOW-UP VISIT: CPT | Performed by: THORACIC SURGERY (CARDIOTHORACIC VASCULAR SURGERY)

## 2021-02-14 PROCEDURE — 97530 THERAPEUTIC ACTIVITIES: CPT

## 2021-02-14 PROCEDURE — 94799 UNLISTED PULMONARY SVC/PX: CPT

## 2021-02-14 PROCEDURE — 97161 PT EVAL LOW COMPLEX 20 MIN: CPT

## 2021-02-14 PROCEDURE — 25010000002 HYDROMORPHONE PER 4 MG: Performed by: THORACIC SURGERY (CARDIOTHORACIC VASCULAR SURGERY)

## 2021-02-14 RX ORDER — HYDROCODONE BITARTRATE AND ACETAMINOPHEN 5; 325 MG/1; MG/1
1 TABLET ORAL EVERY 4 HOURS PRN
Status: DISCONTINUED | OUTPATIENT
Start: 2021-02-14 | End: 2021-02-16

## 2021-02-14 RX ORDER — POLYETHYLENE GLYCOL 3350 17 G/17G
17 POWDER, FOR SOLUTION ORAL DAILY
Status: DISCONTINUED | OUTPATIENT
Start: 2021-02-14 | End: 2021-02-21 | Stop reason: HOSPADM

## 2021-02-14 RX ORDER — AMOXICILLIN 250 MG
2 CAPSULE ORAL 2 TIMES DAILY
Status: DISCONTINUED | OUTPATIENT
Start: 2021-02-14 | End: 2021-02-21 | Stop reason: HOSPADM

## 2021-02-14 RX ORDER — GABAPENTIN 300 MG/1
300 CAPSULE ORAL EVERY 12 HOURS SCHEDULED
Status: DISCONTINUED | OUTPATIENT
Start: 2021-02-14 | End: 2021-02-21 | Stop reason: HOSPADM

## 2021-02-14 RX ADMIN — DOCUSATE SODIUM 50MG AND SENNOSIDES 8.6MG 2 TABLET: 8.6; 5 TABLET, FILM COATED ORAL at 20:58

## 2021-02-14 RX ADMIN — GABAPENTIN 300 MG: 300 CAPSULE ORAL at 20:58

## 2021-02-14 RX ADMIN — ENOXAPARIN SODIUM 40 MG: 40 INJECTION SUBCUTANEOUS at 12:11

## 2021-02-14 RX ADMIN — LIDOCAINE 1 PATCH: 50 PATCH TOPICAL at 08:59

## 2021-02-14 RX ADMIN — CELECOXIB 100 MG: 100 CAPSULE ORAL at 20:58

## 2021-02-14 RX ADMIN — LEVOTHYROXINE SODIUM 150 MCG: 150 TABLET ORAL at 06:04

## 2021-02-14 RX ADMIN — BUDESONIDE AND FORMOTEROL FUMARATE DIHYDRATE 2 PUFF: 80; 4.5 AEROSOL RESPIRATORY (INHALATION) at 11:14

## 2021-02-14 RX ADMIN — POLYETHYLENE GLYCOL 3350 17 G: 17 POWDER, FOR SOLUTION ORAL at 11:00

## 2021-02-14 RX ADMIN — PANTOPRAZOLE SODIUM 40 MG: 40 TABLET, DELAYED RELEASE ORAL at 06:04

## 2021-02-14 RX ADMIN — CELECOXIB 100 MG: 100 CAPSULE ORAL at 08:59

## 2021-02-14 RX ADMIN — HYDROMORPHONE HYDROCHLORIDE 0.5 MG: 1 INJECTION, SOLUTION INTRAMUSCULAR; INTRAVENOUS; SUBCUTANEOUS at 09:00

## 2021-02-14 RX ADMIN — BUDESONIDE AND FORMOTEROL FUMARATE DIHYDRATE 2 PUFF: 80; 4.5 AEROSOL RESPIRATORY (INHALATION) at 19:36

## 2021-02-14 RX ADMIN — DOCUSATE SODIUM 50MG AND SENNOSIDES 8.6MG 2 TABLET: 8.6; 5 TABLET, FILM COATED ORAL at 11:00

## 2021-02-14 RX ADMIN — CHLORTHALIDONE 25 MG: 25 TABLET ORAL at 08:57

## 2021-02-14 RX ADMIN — GABAPENTIN 300 MG: 300 CAPSULE ORAL at 06:04

## 2021-02-14 RX ADMIN — Medication 1 CAPSULE: at 08:57

## 2021-02-14 RX ADMIN — AMLODIPINE BESYLATE 5 MG: 5 TABLET ORAL at 08:57

## 2021-02-14 RX ADMIN — HYDROMORPHONE HYDROCHLORIDE 0.5 MG: 1 INJECTION, SOLUTION INTRAMUSCULAR; INTRAVENOUS; SUBCUTANEOUS at 17:41

## 2021-02-14 NOTE — PLAN OF CARE
Goal Outcome Evaluation:  Plan of Care Reviewed With: patient     Outcome Summary: VSS, room air. pain is controlled with oral and Iv pain meds.  Pt is on ERAS protocol.  Good pulm hygiene, pt abmulating with asst x1.  Surgery scheduled for tuesday, consent signed and in chart.  Will contineut to monitor.

## 2021-02-14 NOTE — PROGRESS NOTES
"    Chief Complaint: Multiple right-sided rib fractures      Subjective  Complaints of pain in the right chest, able to pull approximately 800 cc on incentive spirometer  Vital Signs:  Temp:  [97.3 °F (36.3 °C)-98.1 °F (36.7 °C)] 97.3 °F (36.3 °C)  Heart Rate:  [62-89] 64  Resp:  [18-20] 20  BP: ()/(56-70) 95/56    Intake & Output (last day)       02/13 0701 - 02/14 0700 02/14 0701 - 02/15 0700    P.O. 840 360    Total Intake(mL/kg) 840 (8.2) 360 (3.5)    Urine (mL/kg/hr) 400 (0.2) 100 (0.1)    Total Output 400 100    Net +440 +260          Urine Unmeasured Occurrence  1 x          Objective:  General Appearance:  Comfortable and well-appearing.    Vital signs: (most recent): Blood pressure 95/56, pulse 64, temperature 97.3 °F (36.3 °C), temperature source Oral, resp. rate 20, height 165.1 cm (65\"), weight 102 kg (224 lb 13.9 oz), SpO2 97 %.    HEENT: Normal HEENT exam.    Lungs:  Normal effort.  There are decreased breath sounds.    Heart: Normal rate.  Regular rhythm.    Abdomen: Abdomen is soft.    Skin:  Warm and dry.                  Results Review:     I reviewed the patient's new clinical results.  I reviewed the patient's new imaging results and agree with the interpretation.  I reviewed the patient's other test results and agree with the interpretation    Imaging Results (Last 24 Hours)     ** No results found for the last 24 hours. **          Lab Results:     Lab Results (last 24 hours)     ** No results found for the last 24 hours. **           Assessment/Plan       Multiple closed fractures of ribs of right side    Chronic obstructive pulmonary disease (CMS/HCC)    Hypothyroidism    CKD (chronic kidney disease) stage 3, GFR 30-59 ml/min (CMS/[facility])    Essential hypertension    DELROY (obstructive sleep apnea)    Non-cardiac chest pain       Assessment & Plan  Ms. Frances is a pleasant 81-year-old lady status post mechanical fall with right 3, 4, 5, 6 rib fractures.  We will plan surgical fixation on " Tuesday.  This was discussed at length with the patient today.  I have encouraged her to continue her incentive parameter and ambulation for pulmonary toilet.  Kelley Delong MD  [facility]  02/14/21  15:33 EST

## 2021-02-14 NOTE — PLAN OF CARE
Goal Outcome Evaluation:  Plan of Care Reviewed With: patient      Pt admit after fall with right rib fx 3,4,5,6. PMH: COPD, GERD, leg pain, shoulder pain, obesity. PLOF is indep all aspects. She lives in a home with 3 steps to enter with railing or 2 steps. Her son and his partner live above her. Pt does not have any equipment at home and reports that her bed is antique and very high. It might need modified. Pt bed mobility NT today due to uic. Pt cga with vc for STS to rwx and amb 150' around nsg station today on RA with cga very slow guarded gait. O2 sat >90% upon return to room. Pt is scheduled for rib fixation. Pt bed mobility might be the most difficult and asst with mobility may change following rib fixation. Pt demonstrated decline in mobility, impaired balance and impaired endurance. Pt will likely benefit from cont skilled PT for progression toward indep prior to d/c. Aniticipate need for rwx and elevated bsc if she returns home with family asst though she may need more asst following procedure.

## 2021-02-14 NOTE — PLAN OF CARE
Goal Outcome Evaluation:  Plan of Care Reviewed With: patient  Progress: improving  Outcome Summary: VSS. On 2L o2. No c/o pain from pt during shift. Scheduled ERAS meds given. Pt sleeping well. Surgery scheduled for tuesday for right sided rib fractures. Will continue to monitor.

## 2021-02-14 NOTE — THERAPY EVALUATION
Patient Name: Luann Frances  : 1940    MRN: [id number]                              Today's Date: 2021       Admit Date: 2021    Visit Dx:     ICD-10-CM ICD-9-CM   1. Closed fracture of multiple ribs of right side, initial encounter  S22.41XA 807.09   2. Right-sided chest wall pain  R07.89 786.52   3. Fall from standing, initial encounter  W19.XXXA E888.9   4. Pancreatic mass  K86.89 577.8   5. Abnormal CT of the abdomen  R93.5 793.6     Patient Active Problem List   Diagnosis   • Atopic rhinitis   • Chronic obstructive pulmonary disease (CMS/HCC)   • Diverticulitis of colon   • Acid reflux   • Fatigue   • Hypothyroidism   • Insomnia   • Knee pain   • Pain of lower extremity   • Shoulder pain   • Ventricular premature beats   • Weight gain   • Cold intolerance   • Post herpetic neuralgia   • Insulin resistance   • Vitamin D deficiency   • CKD (chronic kidney disease) stage 3, GFR 30-59 ml/min (CMS/[facility])   • Mixed hyperlipidemia   • Hypersomnia   • Hyperuricemia   • Essential hypertension   • DELROY (obstructive sleep apnea)   • Renal calculus   • Multiple closed fractures of ribs of right side   • Non-cardiac chest pain     Past Medical History:   Diagnosis Date   • Arthritis    • Asymptomatic PVCs    • Chronic bronchitis (CMS/HCC)    • Fracture of humerus    • GERD (gastroesophageal reflux disease)    • Hyperlipidemia    • Hypertension    • Hypothyroidism    • Pneumonia    • Pulmonary emphysema (CMS/HCC)    • Renal calculi    • Renal calculus 2021   • Sleep apnea     CPAP USED   • Type 2 diabetes mellitus (CMS/HCC)     NO MEDS     Past Surgical History:   Procedure Laterality Date   • APPENDECTOMY     • EXTRACORPOREAL SHOCK WAVE LITHOTRIPSY (ESWL) Left 2021    Procedure: EXTRACORPOREAL SHOCKWAVE LITHOTRIPSY, CYSTOSCOPY, RETROGRADE PYLEOGRAM;  Surgeon: Walter Schwartz MD;  Location: [facility] OR [facility] [place];  Service: Urology;  Laterality: Left;   • EYE SURGERY      cataracts   • HUMERUS FRACTURE  SURGERY     • TOTAL KNEE ARTHROPLASTY Left 04/2015   • UMBILICAL HERNIA REPAIR       General Information    No documentation.       Mobility     Row Name 02/14/21 1034          Bed Mobility    Comment (Bed Mobility)  NT due to uic  -SV     Row Name 02/14/21 1034          Sit-Stand Transfer    Sit-Stand [place] (Transfers)  contact guard;verbal cues  -     Assistive Device (Sit-Stand Transfers)  walker, front-wheeled  -SV     Row Name 02/14/21 1034          Gait/Stairs (Locomotion)    [place] Level (Gait)  contact guard  -     Assistive Device (Gait)  walker, front-wheeled  -SV     Distance in Feet (Gait)  120 slow guarded gait with shuffle steps  -       User Key  (r) = Recorded By, (t) = Taken By, (c) = Cosigned By    Initials Name Provider Type    Tri Oglesby, PT Physical Therapist        Obj/Interventions     Row Name 02/14/21 1035          Range of Motion Comprehensive    General Range of Motion  no range of motion deficits identified  -SV     Row Name 02/14/21 1035          Strength Comprehensive (MMT)    Comment, General Manual Muscle Testing (MMT) Assessment  BUE/BLE gross general strength >3/5 obs with mobility and arom  -SV     Row Name 02/14/21 1035          Balance    Comment, Balance  sba with AD  -SV     Row Name 02/14/21 1035          Sensory Assessment (Somatosensory)    Sensory Assessment (Somatosensory)  sensation intact  -       User Key  (r) = Recorded By, (t) = Taken By, (c) = Cosigned By    Initials Name Provider Type    SV Tri Pérez, PT Physical Therapist        Goals/Plan     Row Name 02/14/21 1037          Bed Mobility Goal 1 (PT)    Activity/Assistive Device (Bed Mobility Goal 1, PT)  sit to supine/supine to sit  -SV     [place] Level/Cues Needed (Bed Mobility Goal 1, PT)  modified independence;supervision required  -     Time Frame (Bed Mobility Goal 1, PT)  1 week  -     Row Name 02/14/21 1037          Transfer Goal 1 (PT)    Activity/Assistive  Device (Transfer Goal 1, PT)  sit-to-stand/stand-to-sit;walker, rolling  -SV     Time Frame (Transfer Goal 1, PT)  1 week  -SV     Row Name 02/14/21 1037          Gait Training Goal 1 (PT)    Activity/Assistive Device (Gait Training Goal 1, PT)  gait (walking locomotion);walker, rolling  -SV     [place] Level (Gait Training Goal 1, PT)  standby assist;modified independence  -SV     Time Frame (Gait Training Goal 1, PT)  1 week  -SV       User Key  (r) = Recorded By, (t) = Taken By, (c) = Cosigned By    Initials Name Provider Type    SV Tri Pérez, PT Physical Therapist        Clinical Impression     Row Name 02/14/21 1035          Pain    Additional Documentation  Pain Scale: FACES Pre/Post-Treatment (Group)  -SV     [facility] Name 02/14/21 1035          Pain Scale: Numbers Pre/Post-Treatment    Pain Intervention(s)  Ambulation/increased activity  -     Row Name 02/14/21 1035          Pain Scale: FACES Pre/Post-Treatment    Pain: FACES Scale, Pretreatment  2-->hurts little bit  -SV     Posttreatment Pain Rating  2-->hurts little bit  -SV     Pre/Posttreatment Pain Comment  right ribs  -SV     Row Name 02/14/21 1035          Plan of Care Review    Plan of Care Reviewed With  patient  -SV     [facility] Name 02/14/21 1035          Therapy Assessment/Plan (PT)    Patient/Family Therapy Goals Statement (PT)  indep with mobility  -SV     Rehab Potential (PT)  good, to achieve stated therapy goals  -SV     Criteria for Skilled Interventions Met (PT)  yes  -SV     Predicted Duration of Therapy Intervention (PT)  1-2 weeks  -     Row Name 02/14/21 1035          Vital Signs    Pre Systolic BP Rehab  116  -SV     Pre Treatment Diastolic BP  50  -SV     Pretreatment Heart Rate (beats/min)  62  -SV     Posttreatment Heart Rate (beats/min)  75  -SV     Pre SpO2 (%)  96  -SV     O2 Delivery Pre Treatment  room air  -SV     Post SpO2 (%)  94  -SV     O2 Delivery Post Treatment  room air  -SV     Pre Patient Position  Sitting   -SV     Intra Patient Position  Standing  -SV     Post Patient Position  Sitting  -SV     Row Name 02/14/21 1035          Positioning and Restraints    Pre-Treatment Position  sitting in chair/recliner  -SV     Post Treatment Position  chair  -SV     In Chair  sitting;call light within reach;encouraged to call for assist  -SV       User Key  (r) = Recorded By, (t) = Taken By, (c) = Cosigned By    Initials Name Provider Type    Tri Oglesby, PT Physical Therapist        Outcome Measures     Row Name 02/14/21 1038          How much help from another person do you currently need...    Turning from your back to your side while in flat bed without using bedrails?  3  -SV     Moving from lying on back to sitting on the side of a flat bed without bedrails?  2  -SV     Moving to and from a bed to a chair (including a wheelchair)?  3  -SV     Standing up from a chair using your arms (e.g., wheelchair, bedside chair)?  3  -SV     Climbing 3-5 steps with a railing?  2  -SV     To walk in hospital room?  3  -SV     AM-PAC 6 Clicks Score (PT)  16  -SV     Row Name 02/14/21 1038          Functional Assessment    Outcome Measure Options  AM-PAC 6 Clicks Basic Mobility (PT)  -SV       User Key  (r) = Recorded By, (t) = Taken By, (c) = Cosigned By    Initials Name Provider Type    Tri Oglesby, PT Physical Therapist        Physical Therapy Education                 Title: PT OT SLP Therapies (Done)     Topic: Physical Therapy (Done)     Point: Mobility training (Done)     Learning Progress Summary           Patient Acceptance, E,TB,D, VU,NR by  at 2/13/2021 1433                   Point: Home exercise program (Done)     Learning Progress Summary           Patient Acceptance, E,TB,D, VU,NR by  at 2/13/2021 1433                   Point: Body mechanics (Done)     Learning Progress Summary           Patient Acceptance, E,TB,D, VU,NR by  at 2/13/2021 1433                               User Key     Initials Effective  Dates Name Provider Type Discipline     04/03/18 -  Tri Pérez, PT Physical Therapist PT              PT Recommendation and Plan  Planned Therapy Interventions (PT): balance training, bed mobility training, gait training, home exercise program, patient/family education, stair training, strengthening, transfer training  Plan of Care Reviewed With: patient     Time Calculation:     Therapy Charges for Today     Code Description Service Date Service Provider Modifiers Qty    97125225585 HC PT EVAL LOW COMPLEXITY 2 2/14/2021 Tri Pérez, PT GP 1          PT G-Codes  Outcome Measure Options: AM-PAC 6 Clicks Basic Mobility (PT)  AM-PAC 6 Clicks Score (PT): 16    Tri Pérez, PT  2/14/2021

## 2021-02-14 NOTE — PLAN OF CARE
Goal Outcome Evaluation:  Plan of Care Reviewed With: patient      Pt uic today on RA., CGA for STS and for amb 150 with rwx. Pt soa in the last 10-20 ' with mild unsteadiness due to increased speed back to chair. Pt for rib fixation Tuesday. Pt expects less pain and increased mobility following. Pt has not practiced sup to sit with PT. I expect most difficulty and pain with bed mobility.

## 2021-02-14 NOTE — THERAPY TREATMENT NOTE
Patient Name: Luann Frances  : 1940    MRN: [id number]                              Today's Date: 2021       Admit Date: 2021    Visit Dx:     ICD-10-CM ICD-9-CM   1. Closed fracture of multiple ribs of right side, initial encounter  S22.41XA 807.09   2. Right-sided chest wall pain  R07.89 786.52   3. Fall from standing, initial encounter  W19.XXXA E888.9   4. Pancreatic mass  K86.89 577.8   5. Abnormal CT of the abdomen  R93.5 793.6     Patient Active Problem List   Diagnosis   • Atopic rhinitis   • Chronic obstructive pulmonary disease (CMS/HCC)   • Diverticulitis of colon   • Acid reflux   • Fatigue   • Hypothyroidism   • Insomnia   • Knee pain   • Pain of lower extremity   • Shoulder pain   • Ventricular premature beats   • Weight gain   • Cold intolerance   • Post herpetic neuralgia   • Insulin resistance   • Vitamin D deficiency   • CKD (chronic kidney disease) stage 3, GFR 30-59 ml/min (CMS/[facility])   • Mixed hyperlipidemia   • Hypersomnia   • Hyperuricemia   • Essential hypertension   • DELROY (obstructive sleep apnea)   • Renal calculus   • Multiple closed fractures of ribs of right side   • Non-cardiac chest pain     Past Medical History:   Diagnosis Date   • Arthritis    • Asymptomatic PVCs    • Chronic bronchitis (CMS/HCC)    • Fracture of humerus    • GERD (gastroesophageal reflux disease)    • Hyperlipidemia    • Hypertension    • Hypothyroidism    • Pneumonia    • Pulmonary emphysema (CMS/HCC)    • Renal calculi    • Renal calculus 2021   • Sleep apnea     CPAP USED   • Type 2 diabetes mellitus (CMS/HCC)     NO MEDS     Past Surgical History:   Procedure Laterality Date   • APPENDECTOMY     • EXTRACORPOREAL SHOCK WAVE LITHOTRIPSY (ESWL) Left 2021    Procedure: EXTRACORPOREAL SHOCKWAVE LITHOTRIPSY, CYSTOSCOPY, RETROGRADE PYLEOGRAM;  Surgeon: Walter Schwartz MD;  Location: [facility];  Service: Urology;  Laterality: Left;   • EYE SURGERY      cataracts   • HUMERUS FRACTURE  SURGERY     • TOTAL KNEE ARTHROPLASTY Left 04/2015   • UMBILICAL HERNIA REPAIR       General Information     Row Name 02/14/21 1350          Physical Therapy Time and Intention    Document Type  therapy note (daily note)  -SV     Mode of Treatment  individual therapy;physical therapy  -     Row Name 02/14/21 1350          General Information    Patient Profile Reviewed  yes  -SV       User Key  (r) = Recorded By, (t) = Taken By, (c) = Cosigned By    Initials Name Provider Type    SV Tri Pérez, PT Physical Therapist        Mobility     Row Name 02/14/21 1449 02/14/21 1034       Bed Mobility    Comment (Bed Mobility)  NT uic  -SV  NT due to uic  -SV    Row Name 02/14/21 1449 02/14/21 1034       Sit-Stand Transfer    Sit-Stand [place] (Transfers)  contact guard  -SV  contact guard;verbal cues  -SV    Assistive Device (Sit-Stand Transfers)  walker, front-wheeled  -SV  walker, front-wheeled  -SV    Row Name 02/14/21 1449 02/14/21 1034       Gait/Stairs (Locomotion)    [place] Level (Gait)  contact guard  -SV  contact guard  -SV    Assistive Device (Gait)  walker, front-wheeled  -SV  walker, front-wheeled  -SV    Distance in Feet (Gait)  150' soa the last 10' with mild unsteadiness noted  -SV  120 slow guarded gait with shuffle steps  -SV      User Key  (r) = Recorded By, (t) = Taken By, (c) = Cosigned By    Initials Name Provider Type    SV Tri Pérez, PT Physical Therapist        Obj/Interventions     Row Name 02/14/21 1035          Range of Motion Comprehensive    General Range of Motion  no range of motion deficits identified  -SV     Row Name 02/14/21 1035          Strength Comprehensive (MMT)    Comment, General Manual Muscle Testing (MMT) Assessment  BUE/BLE gross general strength >3/5 obs with mobility and arom  -SV     Row Name 02/14/21 1035          Balance    Comment, Balance  sba with AD  -SV     Row Name 02/14/21 1035          Sensory Assessment (Somatosensory)    Sensory Assessment  (Somatosensory)  sensation intact  -SV       User Key  (r) = Recorded By, (t) = Taken By, (c) = Cosigned By    Initials Name Provider Type    SV Tri Pérez, PT Physical Therapist        Goals/Plan     Row Name 02/14/21 1037          Bed Mobility Goal 1 (PT)    Activity/Assistive Device (Bed Mobility Goal 1, PT)  sit to supine/supine to sit  -SV     [place] Level/Cues Needed (Bed Mobility Goal 1, PT)  modified independence;supervision required  -SV     Time Frame (Bed Mobility Goal 1, PT)  1 week  -SV     Row Name 02/14/21 1037          Transfer Goal 1 (PT)    Activity/Assistive Device (Transfer Goal 1, PT)  sit-to-stand/stand-to-sit;walker, rolling  -SV     Time Frame (Transfer Goal 1, PT)  1 week  -SV     Row Name 02/14/21 1037          Gait Training Goal 1 (PT)    Activity/Assistive Device (Gait Training Goal 1, PT)  gait (walking locomotion);walker, rolling  -SV     [place] Level (Gait Training Goal 1, PT)  standby assist;modified independence  -SV     Time Frame (Gait Training Goal 1, PT)  1 week  -SV       User Key  (r) = Recorded By, (t) = Taken By, (c) = Cosigned By    Initials Name Provider Type    SV Tri Pérez, PT Physical Therapist        Clinical Impression     Row Name 02/14/21 1035          Pain    Additional Documentation  Pain Scale: FACES Pre/Post-Treatment (Group)  -SV     Row Name 02/14/21 1035          Pain Scale: Numbers Pre/Post-Treatment    Pain Intervention(s)  Ambulation/increased activity  -     Row Name 02/14/21 1626 02/14/21 1035       Pain Scale: FACES Pre/Post-Treatment    Pain: FACES Scale, Pretreatment  4-->hurts little more  -SV  2-->hurts little bit  -SV    Posttreatment Pain Rating  6-->hurts even more  -SV  2-->hurts little bit  -SV    Pain Location - Side  Right  -SV  --    Pain Location  flank  -SV  --    Pre/Posttreatment Pain Comment  pain with mobility increased  -SV  right ribs  -SV    Row Name 02/14/21 1035          Plan of Care Review    Plan of  Care Reviewed With  patient  -SV     Row Name 02/14/21 1035          Therapy Assessment/Plan (PT)    Patient/Family Therapy Goals Statement (PT)  indep with mobility  -SV     Rehab Potential (PT)  good, to achieve stated therapy goals  -SV     Criteria for Skilled Interventions Met (PT)  yes  -SV     Predicted Duration of Therapy Intervention (PT)  1-2 weeks  -SV     Row Name 02/14/21 1626 02/14/21 1035       Vital Signs    Pre Systolic BP Rehab  --  116  -SV    Pre Treatment Diastolic BP  --  50  -SV    Pretreatment Heart Rate (beats/min)  --  62  -SV    Posttreatment Heart Rate (beats/min)  --  75  -SV    Pre SpO2 (%)  95  -SV  96  -SV    O2 Delivery Pre Treatment  room air  -SV  room air  -SV    Intra SpO2 (%)  88  -SV  --    O2 Delivery Intra Treatment  room air  -SV  --    Post SpO2 (%)  93  -SV  94  -SV    O2 Delivery Post Treatment  room air  -SV  room air  -SV    Pre Patient Position  Sitting  -SV  Sitting  -SV    Intra Patient Position  Standing  -SV  Standing  -SV    Post Patient Position  Sitting  -SV  Sitting  -SV    Row Name 02/14/21 1626 02/14/21 1035       Positioning and Restraints    Pre-Treatment Position  sitting in chair/recliner  -SV  sitting in chair/recliner  -SV    Post Treatment Position  chair  -SV  chair  -SV    In Chair  sitting;call light within reach;encouraged to call for assist;exit alarm on  -SV  sitting;call light within reach;encouraged to call for assist  -SV      User Key  (r) = Recorded By, (t) = Taken By, (c) = Cosigned By    Initials Name Provider Type    SV Tri Pérez, PT Physical Therapist        Outcome Measures     Row Name 02/14/21 1627 02/14/21 1038       How much help from another person do you currently need...    Turning from your back to your side while in flat bed without using bedrails?  3  -SV  3  -SV    Moving from lying on back to sitting on the side of a flat bed without bedrails?  2  -SV  2  -SV    Moving to and from a bed to a chair (including a  wheelchair)?  3  -SV  3  -SV    Standing up from a chair using your arms (e.g., wheelchair, bedside chair)?  3  -SV  3  -SV    Climbing 3-5 steps with a railing?  3  -SV  2  -SV    To walk in hospital room?  3  -SV  3  -SV    AM-PAC 6 Clicks Score (PT)  17  -SV  16  -SV    Row Name 02/14/21 1038          Functional Assessment    Outcome Measure Options  AM-PAC 6 Clicks Basic Mobility (PT)  -SV       User Key  (r) = Recorded By, (t) = Taken By, (c) = Cosigned By    Initials Name Provider Type    SV Tri Pérez, PT Physical Therapist        Physical Therapy Education                 Title: PT OT SLP Therapies (Done)     Topic: Physical Therapy (Done)     Point: Mobility training (Done)     Learning Progress Summary           Patient Acceptance, E,TB,D, VU,NR by  at 2/13/2021 1433                   Point: Home exercise program (Done)     Learning Progress Summary           Patient Acceptance, E,TB,D, VU,NR by  at 2/13/2021 1433                   Point: Body mechanics (Done)     Learning Progress Summary           Patient Acceptance, E,TB,D, VU,NR by  at 2/13/2021 1433                               User Key     Initials Effective Dates Name Provider Type Discipline     04/03/18 -  Tri Pérez, PT Physical Therapist PT              PT Recommendation and Plan  Planned Therapy Interventions (PT): balance training, bed mobility training, gait training, home exercise program, patient/family education, stair training, strengthening, transfer training  Plan of Care Reviewed With: patient     Time Calculation:   PT Charges     Row Name 02/14/21 1430             Time Calculation    Start Time  1430  -SV      Stop Time  1449  -SV      Time Calculation (min)  19 min  -SV      PT Received On  02/14/21  -SV      PT - Next Appointment  02/15/21  -        User Key  (r) = Recorded By, (t) = Taken By, (c) = Cosigned By    Initials Name Provider Type    SV Tri Pérez, PT Physical Therapist        Therapy Charges  for Today     Code Description Service Date Service Provider Modifiers Qty    86488274358 HC PT EVAL LOW COMPLEXITY 2 2/14/2021 Tri Pérez, PT GP 1    05741901438 HC PT THERAPEUTIC ACT EA 15 MIN 2/14/2021 Tri Pérez, PT GP 1          PT G-Codes  Outcome Measure Options: AM-PAC 6 Clicks Basic Mobility (PT)  AM-PAC 6 Clicks Score (PT): 17    Tri Pérez, PT  2/14/2021

## 2021-02-14 NOTE — PROGRESS NOTES
"    DAILY PROGRESS NOTE  [facility]    Patient Identification:  Name: Luann Frances  Age: 81 y.o.  Sex: female  :  1940  MRN: [id number]         Primary Care Physician: Keri Foreman MD    Subjective:  Interval History: Chest pain still an issue made worse with movement and deep breathing.  She has required some additional IV medication for breakthrough pain.  She admits to some intermittent confusion at times.  We discussed a lot of her home med regimen.  She denies any current nausea or vomiting.  She states oxycodone knocked her out.  She is accustomed to Norco and has tolerated this in the past.  Denies any current altered mentation nausea vomiting.  Claims compliant with CPAP at home    Objective: Alert and oriented x3 for me.  Conversational and pleasant.  She does not appear toxic though elderly and frail.  No family currently present at bedside    Scheduled Meds:amLODIPine, 5 mg, Oral, Daily  budesonide-formoterol, 2 puff, Inhalation, BID - RT  celecoxib, 100 mg, Oral, Q12H  chlorthalidone, 25 mg, Oral, Daily  enoxaparin, 40 mg, Subcutaneous, Q24H  gabapentin, 300 mg, Oral, Q12H  lactobacillus acidophilus, 1 capsule, Oral, Daily  levothyroxine, 150 mcg, Oral, Q AM  lidocaine, 1 patch, Transdermal, Q24H  pantoprazole, 40 mg, Oral, QAM  polyethylene glycol, 17 g, Oral, Daily  senna-docusate sodium, 2 tablet, Oral, BID      Continuous Infusions:     Vital signs in last 24 hours:  Temp:  [97.4 °F (36.3 °C)-98.1 °F (36.7 °C)] 98.1 °F (36.7 °C)  Heart Rate:  [62-89] 71  Resp:  [18-20] 20  BP: (103-143)/(50-70) 140/61    Intake/Output:    Intake/Output Summary (Last 24 hours) at 2021 0989  Last data filed at 2021 0618  Gross per 24 hour   Intake 720 ml   Output 300 ml   Net 420 ml       Exam:  /61   Pulse 71   Temp 98.1 °F (36.7 °C) (Oral)   Resp 20   Ht 165.1 cm (65\")   Wt 102 kg (224 lb 13.9 oz)   SpO2 98%   BMI 37.42 kg/m²     General Appearance:    Alert, " cooperative, AAOx3                          Head:    Normocephalic, without obvious abnormality, atraumatic                           Eyes:  Conjunctiva normal without icterus                         throat:   Oral mucosa pink and moist                           Neck:   Supple, symmetrical, trachea midline, no JVD                         Lungs:    Clear to auscultation bilaterally, respirations unlabored                 Chest Wall:  Positive tenderness over areas of fracture                          Heart:    Regular rate and rhythm, S1 and S2 normal                 Abdomen:     Soft, nontender, bowel sounds active                 Extremities: Moving all, no cyanosis or edema, SCDs in place                        Pulses:   Pulses palpable in lower extremities                            Skin:   Skin is warm and dry                  Neurologic:   CNII-XII intact, no focal deficits noted     Data Review:  Labs in chart were reviewed.    Assessment:  Active Hospital Problems    Diagnosis  POA   • **Multiple closed fractures of ribs of right side [S22.41XA]  Yes   • Non-cardiac chest pain [R07.89]  Unknown   • DELROY (obstructive sleep apnea) [G47.33]  Yes   • Essential hypertension [I10]  Yes   • CKD (chronic kidney disease) stage 3, GFR 30-59 ml/min (CMS/HCC) [N18.30]  Yes   • Hypothyroidism [E03.9]  Yes   • Chronic obstructive pulmonary disease (CMS/HCC) [J44.9]  Yes      Resolved Hospital Problems   No resolved problems to display.       Plan:    Appreciate TTS consult with plans for surgical correction on Tuesday   -Celebrex/Lidoderm added per their recommendations   -Transition oxycodone to [place] as patient better tolerates this and start to utilize p.o. medications for pain control reserving IV for severe breakthrough   -Make bowel regimen scheduled   -Past history of DELROY, counseled patient to have family bring home CPAP   -Reduced Neurontin to twice daily    HTN stable    CKD 3 stable    COPD without acute  exacerbation on Symbicort    GERD on PPI    Hypothyroidism on levothyroxine    SCDs/Lovenox for DVT prophylaxis    Oscar Soria MD  2/14/2021  09:29 EST

## 2021-02-15 ENCOUNTER — PREP FOR SURGERY (OUTPATIENT)
Dept: OTHER | Facility: HOSPITAL | Age: 81
End: 2021-02-15

## 2021-02-15 LAB — SARS-COV-2 ORF1AB RESP QL NAA+PROBE: NOT DETECTED

## 2021-02-15 PROCEDURE — 94799 UNLISTED PULMONARY SVC/PX: CPT

## 2021-02-15 PROCEDURE — 99024 POSTOP FOLLOW-UP VISIT: CPT | Performed by: NURSE PRACTITIONER

## 2021-02-15 PROCEDURE — 97535 SELF CARE MNGMENT TRAINING: CPT

## 2021-02-15 PROCEDURE — U0004 COV-19 TEST NON-CDC HGH THRU: HCPCS | Performed by: THORACIC SURGERY (CARDIOTHORACIC VASCULAR SURGERY)

## 2021-02-15 PROCEDURE — 97166 OT EVAL MOD COMPLEX 45 MIN: CPT

## 2021-02-15 PROCEDURE — 25010000002 HYDROMORPHONE PER 4 MG: Performed by: THORACIC SURGERY (CARDIOTHORACIC VASCULAR SURGERY)

## 2021-02-15 PROCEDURE — 97110 THERAPEUTIC EXERCISES: CPT

## 2021-02-15 RX ORDER — CEFAZOLIN SODIUM 2 G/100ML
2 INJECTION, SOLUTION INTRAVENOUS ONCE
Status: CANCELLED | OUTPATIENT
Start: 2021-02-15 | End: 2021-02-15

## 2021-02-15 RX ORDER — SODIUM CHLORIDE 9 MG/ML
75 INJECTION, SOLUTION INTRAVENOUS ONCE
Status: COMPLETED | OUTPATIENT
Start: 2021-02-16 | End: 2021-02-16

## 2021-02-15 RX ORDER — BISACODYL 10 MG
10 SUPPOSITORY, RECTAL RECTAL DAILY PRN
Status: DISCONTINUED | OUTPATIENT
Start: 2021-02-15 | End: 2021-02-16

## 2021-02-15 RX ADMIN — DOCUSATE SODIUM 50MG AND SENNOSIDES 8.6MG 2 TABLET: 8.6; 5 TABLET, FILM COATED ORAL at 08:11

## 2021-02-15 RX ADMIN — BUDESONIDE AND FORMOTEROL FUMARATE DIHYDRATE 2 PUFF: 80; 4.5 AEROSOL RESPIRATORY (INHALATION) at 16:41

## 2021-02-15 RX ADMIN — CELECOXIB 100 MG: 100 CAPSULE ORAL at 08:11

## 2021-02-15 RX ADMIN — GABAPENTIN 300 MG: 300 CAPSULE ORAL at 06:26

## 2021-02-15 RX ADMIN — AMLODIPINE BESYLATE 5 MG: 5 TABLET ORAL at 08:11

## 2021-02-15 RX ADMIN — CHLORTHALIDONE 25 MG: 25 TABLET ORAL at 08:11

## 2021-02-15 RX ADMIN — GABAPENTIN 300 MG: 300 CAPSULE ORAL at 20:46

## 2021-02-15 RX ADMIN — HYDROMORPHONE HYDROCHLORIDE 0.5 MG: 1 INJECTION, SOLUTION INTRAMUSCULAR; INTRAVENOUS; SUBCUTANEOUS at 06:25

## 2021-02-15 RX ADMIN — PANTOPRAZOLE SODIUM 40 MG: 40 TABLET, DELAYED RELEASE ORAL at 06:30

## 2021-02-15 RX ADMIN — LIDOCAINE 1 PATCH: 50 PATCH TOPICAL at 08:12

## 2021-02-15 RX ADMIN — POLYETHYLENE GLYCOL 3350 17 G: 17 POWDER, FOR SOLUTION ORAL at 08:12

## 2021-02-15 RX ADMIN — HYDROCODONE BITARTRATE AND ACETAMINOPHEN 1 TABLET: 5; 325 TABLET ORAL at 20:49

## 2021-02-15 RX ADMIN — BUDESONIDE AND FORMOTEROL FUMARATE DIHYDRATE 2 PUFF: 80; 4.5 AEROSOL RESPIRATORY (INHALATION) at 07:11

## 2021-02-15 RX ADMIN — LEVOTHYROXINE SODIUM 150 MCG: 150 TABLET ORAL at 06:25

## 2021-02-15 RX ADMIN — CELECOXIB 100 MG: 100 CAPSULE ORAL at 20:46

## 2021-02-15 RX ADMIN — Medication 1 CAPSULE: at 08:11

## 2021-02-15 NOTE — PROGRESS NOTES
Discharge Planning Assessment  [facility]     Patient Name: Luann Frances  MRN: [id number]  Today's Date: 2/15/2021    Admit Date: 2/12/2021    Discharge Needs Assessment     Row Name 02/15/21 1458       Living Environment    Lives With  child(carlo), adult    Name(s) of Who Lives With Patient  Dylan Frances    Current Living Arrangements  home/apartment/condo    Primary Care Provided by  self    Provides Primary Care For  no one    Family Caregiver if Needed  child(carlo), adult    Family Caregiver Names  dylan Frances, Cooper Frances    Quality of Family Relationships  supportive       Resource/Environmental Concerns    Resource/Environmental Concerns  none       Transition Planning    Patient/Family Anticipates Transition to  home    Patient/Family Anticipated Services at Transition  none    Transportation Anticipated  family or friend will provide       Discharge Needs Assessment    Equipment Currently Used at Home  cpap        Discharge Plan     Row Name 02/15/21 1500       Plan    Plan  Home with HH vs SNF    Patient/Family in Agreement with Plan  yes    Plan Comments  Spoke with pt over phone.  Introduced self, explained CCP role, facesheet verified. Pt states she lives alone in lower level apt and son lives in apartment space upstairs.  Pt denies use of any DME.  Has been to [facility] for rehab in past.  Pt states she is interested in rehab at discharge and would like referral to facility that her son/Dylan reccomends.  Call placed to Dylan and left voicemail.  CCP will follow.  ARIANA Patterson RN        Continued Care and Services - Admitted Since 2/12/2021    Coordination has not been started for this encounter.         Demographic Summary     Row Name 02/15/21 1457       General Information    Admission Type  inpatient    Arrived From  home    Referral Source  admission list    Reason for Consult  discharge planning    Preferred Language  English       Contact Information    Permission Granted to Share  Info With  family/designee Dylan Frances (son) [phone]        Functional Status    No documentation.       Psychosocial    No documentation.       Abuse/Neglect    No documentation.       Legal    No documentation.       Substance Abuse    No documentation.       Patient Forms    No documentation.           Crystal Patterson RN

## 2021-02-15 NOTE — PLAN OF CARE
Problem: Adult Inpatient Plan of Care  Goal: Plan of Care Review  Flowsheets (Taken 2/15/2021 9042)  Progress: improving  Plan of Care Reviewed With: patient  Outcome Summary: on room air until apx 0400 sats down upper 80's oxygen applied at 2 liters didnt want to but on cpap at that point no request for pain med resting comfortably at this point   Goal Outcome Evaluation:  Plan of Care Reviewed With: patient  Progress: improving  Outcome Summary: on room air until apx 0400 sats down upper 80's oxygen applied at 2 liters didnt want to but on cpap at that point no request for pain med resting comfortably at this point

## 2021-02-15 NOTE — PLAN OF CARE
Goal Outcome Evaluation:  Plan of Care Reviewed With: patient  Progress: improving  Outcome Summary: patient vss. pain well controlled. plans for surgery tomorrow. will monitor.

## 2021-02-15 NOTE — PROGRESS NOTES
Name: Luann Frances ADMIT: 2021   : 1940  PCP: Keri Foreman MD    MRN: [id number] LOS: 2 days   AGE/SEX: 81 y.o. female  ROOM: [facility]     Subjective   Subjective   Up in chair. Getting cleaned up. Denies any new complaints. Pain is tolerable, but worse with movement/deep breathing. No chest pain. No cough/fever/chills. Eating well. No nausea or vomiting. No BM in a few days and taking miralax. Would like to have a BM prior to surgery tomorrow. Some lower extremity swelling that she states is chronic, but slightly worse after being up in chair. Using IS at bedside.     Objective   Objective   Vital Signs  Temp:  [97.1 °F (36.2 °C)-98.2 °F (36.8 °C)] 97.6 °F (36.4 °C)  Heart Rate:  [62-87] 68  Resp:  [20] 20  BP: ()/(56-80) 117/56  SpO2:  [92 %-100 %] 98 %  on  Flow (L/min):  [2] 2;   Device (Oxygen Therapy): nasal cannula  Body mass index is 36.96 kg/m².     Physical Exam  Vitals signs and nursing note reviewed.   Constitutional:       General: She is not in acute distress.     Appearance: She is obese. She is not toxic-appearing.   HENT:      Head: Normocephalic and atraumatic.   Eyes:      General:         Right eye: No discharge.         Left eye: No discharge.      Conjunctiva/sclera: Conjunctivae normal.   Neck:      Musculoskeletal: Normal range of motion and neck supple.   Cardiovascular:      Rate and Rhythm: Normal rate and regular rhythm.      Pulses: Normal pulses.      Heart sounds: Normal heart sounds.   Pulmonary:      Effort: Pulmonary effort is normal. No respiratory distress.      Comments: Diminished at the bases posteriorly. Faint crackles. On RA.  Abdominal:      General: Bowel sounds are normal. There is no distension.      Palpations: Abdomen is soft.      Tenderness: There is no abdominal tenderness.   Musculoskeletal: Normal range of motion.         General: Swelling (mild BLE (left slightly worse than right which is baseline per her reports)) present.    Skin:     General: Skin is warm and dry.      Findings: No bruising.   Neurological:      Mental Status: She is alert and oriented to person, place, and time.      Sensory: No sensory deficit.      Motor: Weakness (generalized) present.      Coordination: Coordination normal.   Psychiatric:         Mood and Affect: Mood normal.         Behavior: Behavior normal.        Results Review:       I reviewed the patient's new clinical results.  Results from last 7 days   Lab Units 02/13/21  0516 02/12/21  1534   WBC 10*3/mm3 7.47 10.75   HEMOGLOBIN g/dL 12.0 12.5   PLATELETS 10*3/mm3 241 267     Results from last 7 days   Lab Units 02/13/21  0516 02/12/21  1534   SODIUM mmol/L 138 141   POTASSIUM mmol/L 4.0 4.4   CHLORIDE mmol/L 105 105   CO2 mmol/L 22.2 26.3   BUN mg/dL 24* 21   CREATININE mg/dL 1.17* 1.06*   GLUCOSE mg/dL 100* 122*   Estimated Creatinine Clearance: 45.2 mL/min (A) (by C-G formula based on SCr of 1.17 mg/dL (H)).  Results from last 7 days   Lab Units 02/12/21  1534   ALBUMIN g/dL 4.00   BILIRUBIN mg/dL 0.4   ALK PHOS U/L 63   AST (SGOT) U/L 15   ALT (SGPT) U/L 19     Results from last 7 days   Lab Units 02/13/21  0516 02/12/21  1534   CALCIUM mg/dL 9.8 10.1   ALBUMIN g/dL  --  4.00       Glucose   Date/Time Value Ref Range Status   02/13/2021 1106 129 70 - 130 mg/dL Final   02/13/2021 0603 114 70 - 130 mg/dL Final   02/12/2021 2117 108 70 - 130 mg/dL Final       amLODIPine, 5 mg, Oral, Daily  budesonide-formoterol, 2 puff, Inhalation, BID - RT  celecoxib, 100 mg, Oral, Q12H  chlorthalidone, 25 mg, Oral, Daily  gabapentin, 300 mg, Oral, Q12H  lactobacillus acidophilus, 1 capsule, Oral, Daily  levothyroxine, 150 mcg, Oral, Q AM  lidocaine, 1 patch, Transdermal, Q24H  pantoprazole, 40 mg, Oral, QAM  polyethylene glycol, 17 g, Oral, Daily  senna-docusate sodium, 2 tablet, Oral, BID  [START ON 2/16/2021] sodium chloride, 75 mL/hr, Intravenous, Once       Diet Regular; Cardiac  NPO Diet NPO Except: Sips with  meds       Assessment/Plan     Active Hospital Problems    Diagnosis  POA   • **Multiple closed fractures of ribs of right side [S22.41XA]  Yes   • Non-cardiac chest pain [R07.89]  Unknown   • DELROY (obstructive sleep apnea) [G47.33]  Yes   • Essential hypertension [I10]  Yes   • CKD (chronic kidney disease) stage 3, GFR 30-59 ml/min (CMS/[facility]) [N18.30]  Yes   • Hypothyroidism [E03.9]  Yes   • Chronic obstructive pulmonary disease (CMS/[facility]) [J44.9]  Yes      Resolved Hospital Problems   No resolved problems to display.     Ms. Frances is a 81 year old female who presented to the hospital after a fall at home.     Multiple closed fractures of ribs of right side  -Thoracic surgery managing. Plans for ORIF of right rib fractures tomorrow.  -Pain management. Celebrex in place. Norco and Dilaudid as needed. Dilaudid last used this AM. Hopefully can transition to just PO following repair tomorrow.  -Encouraged IS and educated on complications of atelectasis and/or pneumonia in this setting. Turn, cough, deep breath.     DELROY  -CPAP at bedside. Avoid oversedation.    HTN  -BP stable on Norvasc. Monitor.    CKD3  -Creatinine was stable 2/13 at 1.17. Repeat BMP in AM given diuretic usage as well as Celebrex for pain as above.  -Urine output not very helpful. Will change to strict I/O. Add daily weights. Monitor lower extremity edema. Encouraged elevation of legs.    COPD  -On RA and without complaints. No wheezing.  -Breathing treatments as needed. Continue Symbicort.    Hypothyroidism  -Home replacement.    Constipation  -Miralax and Senokot on board. Add PRN suppository if no response to bowel regimen today.    Discussed with patient.      VTE Prophylaxis - SCDs  Code Status - Full code  Disposition - Anticipate discharge TBD.      LIVIER Martines  [facility]  02/15/21  10:18 EST

## 2021-02-15 NOTE — PROGRESS NOTES
"    Chief Complaint: Multiple right-sided rib fractures      Subjective:  Symptoms:  Stable.  She reports chest pain.    Diet:  Adequate intake.  No nausea or vomiting.    Activity level: Returning to normal.    Pain:  She complains of pain that is mild.  Pain is well controlled.        Vital Signs:  Temp:  [97.1 °F (36.2 °C)-98.2 °F (36.8 °C)] 97.6 °F (36.4 °C)  Heart Rate:  [62-87] 68  Resp:  [20] 20  BP: ()/(56-80) 117/56    Intake & Output (last day)       02/14 0701 - 02/15 0700 02/15 0701 - 02/16 0700    P.O. 1280     Total Intake(mL/kg) 1280 (12.7)     Urine (mL/kg/hr) 100 (0)     Total Output 100     Net +1180           Urine Unmeasured Occurrence 2 x           Objective:  General Appearance:  Comfortable and well-appearing.    Vital signs: (most recent): Blood pressure 117/56, pulse 68, temperature 97.6 °F (36.4 °C), temperature source Oral, resp. rate 20, height 165.1 cm (65\"), weight 101 kg (222 lb 1.6 oz), SpO2 98 %.    HEENT: Normal HEENT exam.    Lungs:  Normal effort and normal respiratory rate.  There are decreased breath sounds.    Heart: Normal rate.  Regular rhythm.    Abdomen: Abdomen is soft.    Extremities: There is dependent edema (BLE).    Neurological: Patient is alert and oriented to person, place and time.    Skin:  Warm and dry.                  Results Review:     I reviewed the patient's new clinical results.  I reviewed the patient's new imaging results and agree with the interpretation.  I reviewed the patient's other test results and agree with the interpretation  Discussed with patient, RN and Dr. Cross.    Imaging Results (Last 24 Hours)     ** No results found for the last 24 hours. **          Lab Results:     Lab Results (last 24 hours)     ** No results found for the last 24 hours. **           Assessment/Plan       Multiple closed fractures of ribs of right side    Chronic obstructive pulmonary disease (CMS/HCC)    Hypothyroidism    CKD (chronic kidney disease) stage 3, " GFR 30-59 ml/min (CMS/HCC)    Essential hypertension    DELROY (obstructive sleep apnea)    Non-cardiac chest pain       Assessment & Plan    Ms. Frances is a pleasant 81-year-old lady status post mechanical fall with right 3, 4, 5, 6 rib fractures.  Plan is for VATS with ORIF tomorrow with Dr. Delong.  Preoperative orders placed in epic.    N.p.o. at midnight.  Lovenox stopped.  We will reinitiate after surgery.  Discussed procedure at length with the patient and her son at bedside.  All their questions have been answered to their satisfaction.  Continue incentive spirometry, ambulation and good pulmonary hygiene prior to surgery.      LIVIER Canales  [facility]  02/15/21  10:27 EST    Patient was seen and assessed while wearing personal protective equipment including facemask, protective eyewear and gloves.  Hand hygiene performed prior to entering the room and upon exiting with doffing of gloves.

## 2021-02-15 NOTE — THERAPY EVALUATION
Patient Name: Luann Frances  : 1940    MRN: [id number]                              Today's Date: 2/15/2021       Admit Date: 2021    Visit Dx:     ICD-10-CM ICD-9-CM   1. Closed fracture of multiple ribs of right side, initial encounter  S22.41XA 807.09   2. Right-sided chest wall pain  R07.89 786.52   3. Fall from standing, initial encounter  W19.XXXA E888.9   4. Pancreatic mass  K86.89 577.8   5. Abnormal CT of the abdomen  R93.5 793.6     Patient Active Problem List   Diagnosis   • Atopic rhinitis   • Chronic obstructive pulmonary disease (CMS/HCC)   • Diverticulitis of colon   • Acid reflux   • Fatigue   • Hypothyroidism   • Insomnia   • Knee pain   • Pain of lower extremity   • Shoulder pain   • Ventricular premature beats   • Weight gain   • Cold intolerance   • Post herpetic neuralgia   • Insulin resistance   • Vitamin D deficiency   • CKD (chronic kidney disease) stage 3, GFR 30-59 ml/min (CMS/[facility])   • Mixed hyperlipidemia   • Hypersomnia   • Hyperuricemia   • Essential hypertension   • DELROY (obstructive sleep apnea)   • Renal calculus   • Multiple closed fractures of ribs of right side   • Non-cardiac chest pain     Past Medical History:   Diagnosis Date   • Arthritis    • Asymptomatic PVCs    • Chronic bronchitis (CMS/HCC)    • Fracture of humerus    • GERD (gastroesophageal reflux disease)    • Hyperlipidemia    • Hypertension    • Hypothyroidism    • Pneumonia    • Pulmonary emphysema (CMS/HCC)    • Renal calculi    • Renal calculus 2021   • Sleep apnea     CPAP USED   • Type 2 diabetes mellitus (CMS/HCC)     NO MEDS     Past Surgical History:   Procedure Laterality Date   • APPENDECTOMY     • EXTRACORPOREAL SHOCK WAVE LITHOTRIPSY (ESWL) Left 2021    Procedure: EXTRACORPOREAL SHOCKWAVE LITHOTRIPSY, CYSTOSCOPY, RETROGRADE PYLEOGRAM;  Surgeon: Walter Schwartz MD;  [place] [facility] [place] [facility] [place]  Service: Urology;  Laterality: Left;   • EYE SURGERY      cataracts   • HUMERUS FRACTURE  SURGERY     • TOTAL KNEE ARTHROPLASTY Left 04/2015   • UMBILICAL HERNIA REPAIR       General Information     Row Name 02/15/21 1422          OT Time and Intention    Document Type  evaluation  -OBDULIA     Mode of Treatment  individual therapy;occupational therapy  -OBDULIA     Row Name 02/15/21 1422          General Information    Patient Profile Reviewed  yes  -OBDULIA     Prior Level of Function  independent:;ADL's  -OBDULIA     Existing Precautions/Restrictions  fall  -OBDULIA     Barriers to Rehab  none identified  -OBDULIA     Row Name 02/15/21 1422          Living Environment    Lives With  alone  -OBDULIA     Row Name 02/15/21 1422          Cognition    Orientation Status (Cognition)  oriented x 4  -OBDULIA     Row Name 02/15/21 1422          Safety Issues, Functional Mobility    Impairments Affecting Function (Mobility)  balance;pain;shortness of breath;endurance/activity tolerance  -OBDULIA       User Key  (r) = Recorded By, (t) = Taken By, (c) = Cosigned By    Initials Name Provider Type    OBDULIA Taylor Mitchell, OT Occupational Therapist          Mobility/ADL's     Row Name 02/15/21 1422          Bed Mobility    Bed Mobility  supine-sit  -OBDULIA     Supine-Sit [place] (Bed Mobility)  minimum assist (75% patient effort)  -OBDULIA     Bed Mobility, Safety Issues  decreased use of arms for pushing/pulling  -OBDULIA     Assistive Device (Bed Mobility)  bed rails;head of bed elevated  -OBDULIA     Comment (Bed Mobility)  able to complete with increased time due to R flank pain 2/2 to several rib fractures  -OBDULIA     Row Name 02/15/21 1422          Transfers    Transfers  sit-stand transfer;toilet transfer  -OBDULIA     Sit-Stand [place] (Transfers)  contact guard  -OBDULIA     [place] Level (Toilet Transfer)  contact guard  -OBDULIA     Assistive Device (Toilet Transfer)  walker, front-wheeled  -OBDULIA     Row Name 02/15/21 1422          Sit-Stand Transfer    Assistive Device (Sit-Stand Transfers)  walker, front-wheeled  -OBDULIA     Row Name 02/15/21 1422          Toilet Transfer     Type (Toilet Transfer)  stand pivot/stand step  -OBDULIA     Row Name 02/15/21 1422          Functional Mobility    Functional Mobility- Ind. Level  contact guard assist;verbal cues required  -OBDULIA     Functional Mobility- Device  rolling walker  -OBDULIA     Functional Mobility- Comment  ambulated from bed > sinkside > restroom > reclining chair with CGA. No LOBs, mild unsteadiness due to R flank pain 2/2 rib fractures  -OBDULIA     Row Name 02/15/21 1422          Activities of Daily Living    BADL Assessment/Intervention  toileting;grooming  -OBDULIA     Row Name 02/15/21 1422          Toileting Assessment/Training    [place] Level (Toileting)  toileting skills;contact guard assist;verbal cues  -OBDULIA     Position (Toileting)  supported sitting  -OBDULIA     Row Name 02/15/21 1422          Grooming Assessment/Training    [place] Level (Grooming)  grooming skills;oral care regimen;wash face, hands;supervision  -OBDULIA     Position (Grooming)  sink side;supported standing  -OBDULIA     Comment (Grooming)  CGA for steadying A  -OBDULIA       User Key  (r) = Recorded By, (t) = Taken By, (c) = Cosigned By    Initials Name Provider Type    OBDULIA Taylor Mitchell, OT Occupational Therapist        Obj/Interventions     Row Name 02/15/21 1425          Sensory Assessment (Somatosensory)    Sensory Assessment (Somatosensory)  UE sensation intact  -OBDULIA     Row Name 02/15/21 1425          Vision Assessment/Intervention    Visual Impairment/Limitations  WFL  -OBDULIA     Row Name 02/15/21 1425          Range of Motion Comprehensive    General Range of Motion  no range of motion deficits identified  -OBDULIA     Comment, General Range of Motion  BUE WFL, R UE mildly impacted due to R flank pain 2/2 rib fractures  -OBDULIA     Row Name 02/15/21 1425          Strength Comprehensive (MMT)    Comment, General Manual Muscle Testing (MMT) Assessment  B UE grossly >3/5 during functional tasks  -OBDULIA     Row Name 02/15/21 1425          Balance    Balance Assessment  sitting static  balance;sitting dynamic balance;sit to stand dynamic balance;standing static balance;standing dynamic balance  -OBDULIA     Static Sitting Balance  WFL;sitting, edge of bed  -OBDULIA     Dynamic Sitting Balance  WFL;sitting, edge of bed  -OBDULIA     Sit to Stand Dynamic Balance  mild impairment;supported;standing  -OBDULIA     Static Standing Balance  mild impairment;supported;standing  -OBDULIA     Dynamic Standing Balance  mild impairment;supported;standing  -OBDULIA     Balance Interventions  sitting;standing;sit to stand;supported;static;dynamic  -OBDULIA     Comment, Balance  CGA with HHA, SBA with RWx. No overt LOBs noted  -OBDULIA       User Key  (r) = Recorded By, (t) = Taken By, (c) = Cosigned By    Initials Name Provider Type    Taylor Wadsworth, OT Occupational Therapist        Goals/Plan     Row Name 02/15/21 1432          Bed Mobility Goal 1 (OT)    Activity/Assistive Device (Bed Mobility Goal 1, OT)  bed mobility activities, all  -OBDULIA     [place] Level/Cues Needed (Bed Mobility Goal 1, OT)  standby assist  -OBDULIA     Time Frame (Bed Mobility Goal 1, OT)  2 weeks;short term goal (STG)  -OBDULIA     Progress/Outcomes (Bed Mobility Goal 1, OT)  goal ongoing  -OBDULIA     Row Name 02/15/21 1432          Transfer Goal 1 (OT)    Activity/Assistive Device (Transfer Goal 1, OT)  transfers, all;sit-to-stand/stand-to-sit;bed-to-chair/chair-to-bed;toilet;walker, rolling  -OBDULIA     [place] Level/Cues Needed (Transfer Goal 1, OT)  modified independence  -OBDULIA     Time Frame (Transfer Goal 1, OT)  2 weeks;short term goal (STG)  -OBDULIA     Progress/Outcome (Transfer Goal 1, OT)  goal ongoing  -OBDULIA     Row Name 02/15/21 1432          Bathing Goal 1 (OT)    Activity/Device (Bathing Goal 1, OT)  upper body bathing;lower body bathing  -OBDULIA     [place] Level/Cues Needed (Bathing Goal 1, OT)  set-up required  -OBDULIA     Time Frame (Bathing Goal 1, OT)  2 weeks;short term goal (STG)  -OBDULIA     Progress/Outcomes (Bathing Goal 1, OT)  goal ongoing  -OBDULIA     Row Name 02/15/21  1432          Toileting Goal 1 (OT)    Activity/Device (Toileting Goal 1, OT)  toileting skills, all;adjust/manage clothing;perform perineal hygiene  -OBDULIA     [place] Level/Cues Needed (Toileting Goal 1, OT)  modified independence  -OBDULIA     Time Frame (Toileting Goal 1, OT)  2 weeks;short term goal (STG)  -OBDULIA     Progress/Outcome (Toileting Goal 1, OT)  goal ongoing  -OBDULIA     Row Name 02/15/21 1432          Therapy Assessment/Plan (OT)    Planned Therapy Interventions (OT)  activity tolerance training;adaptive equipment training;BADL retraining;functional balance retraining;IADL retraining;occupation/activity based interventions;patient/caregiver education/training;ROM/therapeutic exercise;strengthening exercise;transfer/mobility retraining  -OBDULIA       User Key  (r) = Recorded By, (t) = Taken By, (c) = Cosigned By    Initials Name Provider Type    OBDULIATaylor Caldwell, OT Occupational Therapist        Clinical Impression     Row Name 02/15/21 142          Pain Assessment    Additional Documentation  Pain Scale: Numbers Pre/Post-Treatment (Group)  -OBDULIA     Row Name 02/15/21 1427          Pain Scale: Numbers Pre/Post-Treatment    Pretreatment Pain Rating  6/10  -OBDULIA     Posttreatment Pain Rating  4/10  -OBDULIA     Pain Location - Side  Right  -OBDULIA     Pain Location  flank  -OBDULIA     Pain Intervention(s)  Repositioned;Rest;Ambulation/increased activity  -OBDULIA     Row Name 02/15/21 142          Plan of Care Review    Plan of Care Reviewed With  patient;grandchild(carlo)  -OBDULIA     Outcome Summary  Pt is 81 y.o. female admitted to [facility] post a mechanical fall at home, with images revealing rib fractures at 3, 4, 5, and 6. Pt reports living alone and was indep with ADLs PTA. Today, pt presented with impaired functional activity tolerance, generalized weakness, R flank pain impacting engagement in functional tasks, and overall decreased safety and indep with ADLs. Pt able to perform bed mobility with min A, grooming tasks in supported  stand at sinkside with s/u A (CGA for steading balance A), CGA for toileting and functional mobility tasks this date. Pt would benefit from skilled OT services while admitted acutely to maximize level of indep with ADLs for safe return to PLOF.  -OBDULIA     Row Name 02/15/21 1427          Therapy Assessment/Plan (OT)    Rehab Potential (OT)  good, to achieve stated therapy goals  -OBDULIA     Criteria for Skilled Therapeutic Interventions Met (OT)  yes  -OBDULIA     Therapy Frequency (OT)  3 times/wk  -OBDULIA     Row Name 02/15/21 1427          Positioning and Restraints    Pre-Treatment Position  in bed  -OBDULIA     Post Treatment Position  chair  -OBDULIA     In Chair  reclined;sitting;call light within reach;encouraged to call for assist;exit alarm on;with family/caregiver;with other staff  -OBDULIA       User Key  (r) = Recorded By, (t) = Taken By, (c) = Cosigned By    Initials Name Provider Type    Taylor Wadsworth OT Occupational Therapist        Outcome Measures     Row Name 02/15/21 1433          How much help from another is currently needed...    Putting on and taking off regular lower body clothing?  2  -OBDULIA     Bathing (including washing, rinsing, and drying)  3  -OBDULIA     Toileting (which includes using toilet bed pan or urinal)  3  -OBDULIA     Putting on and taking off regular upper body clothing  2  -OBDULIA     Taking care of personal grooming (such as brushing teeth)  3  -OBDULIA     Eating meals  3  -OBDULIA     AM-PAC 6 Clicks Score (OT)  16  -OBDULIA     Row Name 02/15/21 1433          Functional Assessment    Outcome Measure Options  AM-PAC 6 Clicks Daily Activity (OT)  -OBDULIA       User Key  (r) = Recorded By, (t) = Taken By, (c) = Cosigned By    Initials Name Provider Type    Taylor Wadsworth OT Occupational Therapist        Occupational Therapy Education                 Title: PT OT SLP Therapies (In Progress)     Topic: Occupational Therapy (In Progress)     Point: ADL training (Done)     Description:   Instruct learner(s) on proper safety  adaptation and remediation techniques during self care or transfers.   Instruct in proper use of assistive devices.              Learning Progress Summary           Patient Acceptance, E, VU by OBDULIA at 2/15/2021 1433    Comment: Educated pt on OT role, OT plan of care, safety techniques, and body mechanics for functional transfers and ADLs.                   Point: Home exercise program (Not Started)     Description:   Instruct learner(s) on appropriate technique for monitoring, assisting and/or progressing therapeutic exercises/activities.              Learner Progress:  Not documented in this visit.          Point: Precautions (Not Started)     Description:   Instruct learner(s) on prescribed precautions during self-care and functional transfers.              Learner Progress:  Not documented in this visit.          Point: Body mechanics (Done)     Description:   Instruct learner(s) on proper positioning and spine alignment during self-care, functional mobility activities and/or exercises.              Learning Progress Summary           Patient Acceptance, E, VU by OBDULIA at 2/15/2021 1433    Comment: Educated pt on OT role, OT plan of care, safety techniques, and body mechanics for functional transfers and ADLs.                               User Key     Initials Effective Dates Name Provider Type Discipline    OBDULIA 09/14/20 -  Taylor Mitchell, OT Occupational Therapist OT              OT Recommendation and Plan  Planned Therapy Interventions (OT): activity tolerance training, adaptive equipment training, BADL retraining, functional balance retraining, IADL retraining, occupation/activity based interventions, patient/caregiver education/training, ROM/therapeutic exercise, strengthening exercise, transfer/mobility retraining  Therapy Frequency (OT): 3 times/wk  Plan of Care Review  Plan of Care Reviewed With: patient, grandchild(carlo)  Outcome Summary: Pt is 81 y.o. female admitted to [facility] post a mechanical fall at home,  with images revealing rib fractures at 3, 4, 5, and 6. Pt reports living alone and was indep with ADLs PTA. Today, pt presented with impaired functional activity tolerance, generalized weakness, R flank pain impacting engagement in functional tasks, and overall decreased safety and indep with ADLs. Pt able to perform bed mobility with min A, grooming tasks in supported stand at sinkside with s/u A (CGA for steading balance A), CGA for toileting and functional mobility tasks this date. Pt would benefit from skilled OT services while admitted acutely to maximize level of indep with ADLs for safe return to OF.     Time Calculation:   Time Calculation- OT     Row Name 02/15/21 1434             Time Calculation- OT    OT Start Time  0903  -OBDULIA      OT Stop Time  0923  -OBDULIA      OT Time Calculation (min)  20 min  -OBDULIA      Total Timed Code Minutes- OT  15 minute(s)  -OBDULIA      OT Received On  02/15/21  -OBDULIA      OT - Next Appointment  02/17/21  -OBDULIA      OT Goal Re-Cert Due Date  03/01/21  -OBDULIA        User Key  (r) = Recorded By, (t) = Taken By, (c) = Cosigned By    Initials Name Provider Type    Taylor Wadsworth OT Occupational Therapist        Therapy Charges for Today     Code Description Service Date Service Provider Modifiers Qty    [id number]  OT EVAL MOD COMPLEXITY 2 2/15/2021 Taylor Mitchell OT GO 1    [id number] HC OT SELF CARE/MGMT/TRAIN EA 15 MIN 2/15/2021 Taylor Mitchell OT GO 2               Taylor Mitchell OT  2/15/2021

## 2021-02-15 NOTE — PLAN OF CARE
Goal Outcome Evaluation:  Plan of Care Reviewed With: patient, grandchild(carlo)     Outcome Summary: Pt is 81 y.o. female admitted to [facility] post a mechanical fall at home, with images revealing rib fractures at 3, 4, 5, and 6. Pt reports living alone and was indep with ADLs PTA. Today, pt presented with impaired functional activity tolerance, generalized weakness, R flank pain impacting engagement in functional tasks, and overall decreased safety and indep with ADLs. Pt able to perform bed mobility with min A, grooming tasks in supported stand at sinkside with s/u A (CGA for steading balance A), CGA for toileting and functional mobility tasks this date. Pt would benefit from skilled OT services while admitted acutely to maximize level of indep with ADLs for safe return to PLOF.      Patient was placed in a face mask during this therapy encounter. Therapist used appropriate personal protective equipment including surgical mask, eye shield and gloves during the entire therapy session. Hand hygiene was completed before and after therapy session. Patient is not in enhanced droplet precautions.

## 2021-02-15 NOTE — THERAPY TREATMENT NOTE
Patient Name: Luann Frances  : 1940    MRN: [id number]                              Today's Date: 2/15/2021       Admit Date: 2021    Visit Dx:     ICD-10-CM ICD-9-CM   1. Closed fracture of multiple ribs of right side, initial encounter  S22.41XA 807.09   2. Right-sided chest wall pain  R07.89 786.52   3. Fall from standing, initial encounter  W19.XXXA E888.9   4. Pancreatic mass  K86.89 577.8   5. Abnormal CT of the abdomen  R93.5 793.6     Patient Active Problem List   Diagnosis   • Atopic rhinitis   • Chronic obstructive pulmonary disease (CMS/HCC)   • Diverticulitis of colon   • Acid reflux   • Fatigue   • Hypothyroidism   • Insomnia   • Knee pain   • Pain of lower extremity   • Shoulder pain   • Ventricular premature beats   • Weight gain   • Cold intolerance   • Post herpetic neuralgia   • Insulin resistance   • Vitamin D deficiency   • CKD (chronic kidney disease) stage 3, GFR 30-59 ml/min (CMS/[facility])   • Mixed hyperlipidemia   • Hypersomnia   • Hyperuricemia   • Essential hypertension   • DELROY (obstructive sleep apnea)   • Renal calculus   • Multiple closed fractures of ribs of right side   • Non-cardiac chest pain     Past Medical History:   Diagnosis Date   • Arthritis    • Asymptomatic PVCs    • Chronic bronchitis (CMS/HCC)    • Fracture of humerus    • GERD (gastroesophageal reflux disease)    • Hyperlipidemia    • Hypertension    • Hypothyroidism    • Pneumonia    • Pulmonary emphysema (CMS/HCC)    • Renal calculi    • Renal calculus 2021   • Sleep apnea     CPAP USED   • Type 2 diabetes mellitus (CMS/HCC)     NO MEDS     Past Surgical History:   Procedure Laterality Date   • APPENDECTOMY     • EXTRACORPOREAL SHOCK WAVE LITHOTRIPSY (ESWL) Left 2021    Procedure: EXTRACORPOREAL SHOCKWAVE LITHOTRIPSY, CYSTOSCOPY, RETROGRADE PYLEOGRAM;  Surgeon: Walter Schwartz MD;  [place] [facility] [place] [facility] [place]  Service: [place]rology;  Laterality: Left;   • EYE SURGERY      cataracts   • HUMERUS FRACTURE  SURGERY     • TOTAL KNEE ARTHROPLASTY Left 04/2015   • UMBILICAL HERNIA REPAIR       General Information     [facility] Name 02/15/21 1513          Physical Therapy Time and Intention    Document Type  therapy note (daily note)  -     Mode of Treatment  individual therapy;physical therapy  -[facility] Name 02/15/21 1513          General Information    Patient Profile Reviewed  yes  -     Existing Precautions/Restrictions  fall  -[facility] Name 02/15/21 1513          Cognition    Orientation Status (Cognition)  oriented x 4  -[facility] Name 02/15/21 1513          Safety Issues, Functional Mobility    Impairments Affecting Function (Mobility)  balance;endurance/activity tolerance;strength  -       User Key  (r) = Recorded By, (t) = Taken By, (c) = Cosigned By    Initials Name Provider Type    Kayla Vieira PTA Physical Therapy Assistant        Mobility     [facility] Name 02/15/21 1514          Bed Mobility    Supine-Sit [place] (Bed Mobility)  not tested  -     Comment (Bed Mobility)  up to BR w/nsg  -JM     Row Name 02/15/21 1514          Gait/Stairs (Locomotion)    [place] Level (Gait)  contact guard;verbal cues  -     Assistive Device (Gait)  walker, front-wheeled  -     Distance in Feet (Gait)  90ft, fatigued from being up a while and already did OT, slight LOB w/turns and inattention  -       User Key  (r) = Recorded By, (t) = Taken By, (c) = Cosigned By    Initials Name Provider Type    Kayla Vieira PTA Physical Therapy Assistant        Obj/Interventions    No documentation.       Goals/Plan    No documentation.       Clinical Impression     [facility] Name 02/15/21 1516          Pain Scale: Numbers Pre/Post-Treatment    Pretreatment Pain Rating  0/10 - no pain  -     Posttreatment Pain Rating  0/10 - no pain  -[facility] Name 02/15/21 1516          Plan of Care Review    Plan of Care Reviewed With  patient  -     Outcome Summary  Pt agreed to PT session, but c/o fatigue and being awake  a while; decr amb dist, but agreeable to amb; plans SNU at [place], sx R ribs planned for 2/16  -     Row Name 02/15/21 1516          Therapy Assessment/Plan (PT)    Rehab Potential (PT)  good, to achieve stated therapy goals  -     Criteria for Skilled Interventions Met (PT)  yes  -     Row Name 02/15/21 1516          Vital Signs    O2 Delivery Pre Treatment  room air  -     Row Name 02/15/21 1516          Positioning and Restraints    Pre-Treatment Position  standing in room  -     Post Treatment Position  chair  -     In Chair  sitting;call light within reach;encouraged to call for assist;notified nsg no alarm upon entry  -       User Key  (r) = Recorded By, (t) = Taken By, (c) = Cosigned By    Initials Name Provider Type    Kayla Vieira PTA Physical Therapy Assistant        Outcome Measures     Row Name 02/15/21 1520          How much help from another person do you currently need...    Turning from your back to your side while in flat bed without using bedrails?  3  -JM     Moving from lying on back to sitting on the side of a flat bed without bedrails?  2  -JM     Moving to and from a bed to a chair (including a wheelchair)?  3  -JM     Standing up from a chair using your arms (e.g., wheelchair, bedside chair)?  3  -JM     Climbing 3-5 steps with a railing?  2  -JM     To walk in hospital room?  3  -JM     AM-PAC 6 Clicks Score (PT)  16  -       User Key  (r) = Recorded By, (t) = Taken By, (c) = Cosigned By    Initials Name Provider Type    Kayla Vieira PTA Physical Therapy Assistant        Physical Therapy Education                 Title: PT OT SLP Therapies (In Progress)     Topic: Physical Therapy (Done)     Point: Mobility training (Done)     Learning Progress Summary           Patient Eager, E,TB,D, VU by MARIELY at 2/15/2021 1520    Acceptance, E,TB,D, VU,NR by RADHA at 2/13/2021 1433                   Point: Home exercise program (Done)     Learning Progress Summary           Patient  Eager, E,TB,D, VU by  at 2/15/2021 1520    Acceptance, E,TB,D, VU,NR by  at 2/13/2021 1433                   Point: Body mechanics (Done)     Learning Progress Summary           Patient Eager, E,TB,D, VU by  at 2/15/2021 1520    Acceptance, E,TB,D, VU,NR by  at 2/13/2021 1433                               User Key     Initials Effective Dates Name Provider Type Discipline     03/07/18 -  Kayla Small PTA Physical Therapy Assistant PT    SV 04/03/18 -  Tri Pérez PT Physical Therapist PT              PT Recommendation and Plan     Plan of Care Reviewed With: patient  Outcome Summary: Pt agreed to PT session, but c/o fatigue and being awake a while; decr amb dist, but agreeable to amb; plans SNU at [place], sx R ribs planned for 2/16     Time Calculation:   PT Charges     Row Name 02/15/21 1511             Time Calculation    Start Time  1115  -      Stop Time  1132  -      Time Calculation (min)  17 min  -      PT Received On  02/15/21  -      PT - Next Appointment  02/16/21  -        User Key  (r) = Recorded By, (t) = Taken By, (c) = Cosigned By    Initials Name Provider Type     Kayla Small PTA Physical Therapy Assistant        Therapy Charges for Today     Code Description Service Date Service Provider Modifiers Qty    [id number] HC PT THER PROC EA 15 MIN 2/15/2021 Kayla Small PTA GP 1          PT G-Codes  Outcome Measure Options: AM-PAC 6 Clicks Daily Activity (OT)  AM-PAC 6 Clicks Score (PT): 16  AM-PAC 6 Clicks Score (OT): 16    Kayla Small PTA  2/15/2021

## 2021-02-15 NOTE — PROGRESS NOTES
Continued Stay Note  [facility]     Patient Name: Luann Frances  MRN: [id number]  Today's Date: 2/15/2021    Admit Date: 2/12/2021    Discharge Plan     Row Name 02/15/21 1516       Plan    Plan  Home with HH vs SNF    Patient/Family in Agreement with Plan  yes    Plan Comments  Spoke with pt's son/Dylan who requests referral to [facility] [place].  Referral placed in [facility].  CCP will continue to follow.  ARIANA Patterson RN    Row Name 02/15/21 1500       Plan    Plan  Home with HH vs SNF    Patient/Family in Agreement with Plan  yes    Plan Comments  Spoke with pt over phone.  Introduced self, explained CCP role, facesheet verified. Pt states she lives alone in lower level apt and son lives in apartment space upstairs.  Pt denies use of any DME.  Has been to [facility] for rehab in past.  Pt states she is interested in rehab at discharge and would like referral to facility that her son/Dylan reccomends.  Call placed to Dylan and left voicemail.  CCP will follow.  ARIANA Patterson RN        Discharge Codes    No documentation.             Crystal Patterson, RN

## 2021-02-15 NOTE — PLAN OF CARE
Goal Outcome Evaluation:  Plan of Care Reviewed With: patient     Outcome Summary: Pt agreed to PT session, but c/o fatigue and being awake a while; decr amb dist, but agreeable to amb; plans SNU at [place], sx R ribs planned for 2/16    Patient was wearing a face mask during this therapy encounter. Therapist used appropriate personal protective equipment including eye protection, mask, and gloves.  Mask used was standard procedure mask. Appropriate PPE was worn during the entire therapy session. Hand hygiene was completed before and after therapy session. Patient is not in enhanced droplet precautions.

## 2021-02-15 NOTE — DISCHARGE PLACEMENT REQUEST
"Luann Frances (81 y.o. Female)     Date of Birth Social Security Number Address Home Phone MRN    1940  [address]  [place] [address] [phone] [id number]    Congregational Marital Status          None        Admission Date Admission Type Admitting Provider Attending Provider Department, Room/Bed    2/12/21 Emergency Stingl, MD Yang Caballero Troy Andrew, MD [address], [address]    Discharge Date Discharge Disposition Discharge Destination                       Attending Provider: Oscar Soria MD    Allergies: Ambien [Zolpidem Tartrate], Amoxicillin-pot Clavulanate, Doxycycline, Levofloxacin, Metronidazole, Naproxen, Sulfamethoxazole-trimethoprim, Synthroid [Levothyroxine Sodium]    Isolation: None   Infection: COVID Screen (preop/placement) (02/15/21)   Code Status: CPR    Ht: 165.1 cm (65\")   Wt: 101 kg (223 lb 4.8 oz)    Admission Cmt: None   Principal Problem: Multiple closed fractures of ribs of right side [S22.41XA]                 Active Insurance as of 2/12/2021     Primary Coverage     Payor Plan Insurance Group Employer/Plan Group    HUMANA MEDICARE REPLACEMENT HUMANA MEDICARE REPLACEMENT [id number]     Payor Plan Address Payor Plan Phone Number Payor Plan Fax Number Effective Dates    [address] [address] [phone]  1/1/2018 - None Entered    [facility] [id number]       Subscriber Name Subscriber Birth Date Member ID       LUANN FRANCES 1940 [id number]                 Emergency Contacts      (Rel.) Home Phone Work Phone Mobile Phone    CED FRANCES (Son) [phone] -- --    Cooper Frances (Son) [phone] -- [phone]          "

## 2021-02-16 ENCOUNTER — ANESTHESIA EVENT (OUTPATIENT)
Dept: PERIOP | Facility: HOSPITAL | Age: 81
End: 2021-02-16

## 2021-02-16 ENCOUNTER — ANESTHESIA (OUTPATIENT)
Dept: PERIOP | Facility: HOSPITAL | Age: 81
End: 2021-02-16

## 2021-02-16 ENCOUNTER — APPOINTMENT (OUTPATIENT)
Dept: GENERAL RADIOLOGY | Facility: HOSPITAL | Age: 81
End: 2021-02-16

## 2021-02-16 LAB
ABO GROUP BLD: NORMAL
ANION GAP SERPL CALCULATED.3IONS-SCNC: 8.5 MMOL/L (ref 5–15)
APTT PPP: 27.2 SECONDS (ref 22.7–35.4)
BLD GP AB SCN SERPL QL: NEGATIVE
BUN SERPL-MCNC: 36 MG/DL (ref 8–23)
BUN/CREAT SERPL: 27.9 (ref 7–25)
CALCIUM SPEC-SCNC: 9.4 MG/DL (ref 8.6–10.5)
CHLORIDE SERPL-SCNC: 104 MMOL/L (ref 98–107)
CO2 SERPL-SCNC: 24.5 MMOL/L (ref 22–29)
CREAT SERPL-MCNC: 1.29 MG/DL (ref 0.57–1)
DEPRECATED RDW RBC AUTO: 45.4 FL (ref 37–54)
ERYTHROCYTE [DISTWIDTH] IN BLOOD BY AUTOMATED COUNT: 13.2 % (ref 12.3–15.4)
GFR SERPL CREATININE-BSD FRML MDRD: 40 ML/MIN/1.73
GLUCOSE BLDC GLUCOMTR-MCNC: 131 MG/DL (ref 70–130)
GLUCOSE SERPL-MCNC: 128 MG/DL (ref 65–99)
HCT VFR BLD AUTO: 33.6 % (ref 34–46.6)
HGB BLD-MCNC: 11 G/DL (ref 12–15.9)
INR PPP: 1.08 (ref 0.9–1.1)
MCH RBC QN AUTO: 30.6 PG (ref 26.6–33)
MCHC RBC AUTO-ENTMCNC: 32.7 G/DL (ref 31.5–35.7)
MCV RBC AUTO: 93.6 FL (ref 79–97)
PLATELET # BLD AUTO: 224 10*3/MM3 (ref 140–450)
PMV BLD AUTO: 11.5 FL (ref 6–12)
POTASSIUM SERPL-SCNC: 3.8 MMOL/L (ref 3.5–5.2)
PROTHROMBIN TIME: 13.9 SECONDS (ref 11.7–14.2)
RBC # BLD AUTO: 3.59 10*6/MM3 (ref 3.77–5.28)
RH BLD: NEGATIVE
SODIUM SERPL-SCNC: 137 MMOL/L (ref 136–145)
T&S EXPIRATION DATE: NORMAL
WBC # BLD AUTO: 7 10*3/MM3 (ref 3.4–10.8)

## 2021-02-16 PROCEDURE — 80048 BASIC METABOLIC PNL TOTAL CA: CPT | Performed by: NURSE PRACTITIONER

## 2021-02-16 PROCEDURE — C9290 INJ, BUPIVACAINE LIPOSOME: HCPCS | Performed by: ANESTHESIOLOGY

## 2021-02-16 PROCEDURE — 25010000002 MAGNESIUM SULFATE PER 500 MG OF MAGNESIUM: Performed by: NURSE ANESTHETIST, CERTIFIED REGISTERED

## 2021-02-16 PROCEDURE — 85610 PROTHROMBIN TIME: CPT | Performed by: NURSE PRACTITIONER

## 2021-02-16 PROCEDURE — 86850 RBC ANTIBODY SCREEN: CPT | Performed by: NURSE PRACTITIONER

## 2021-02-16 PROCEDURE — 25010000002 ONDANSETRON PER 1 MG: Performed by: NURSE ANESTHETIST, CERTIFIED REGISTERED

## 2021-02-16 PROCEDURE — 25010000002 HYDROMORPHONE PER 4 MG: Performed by: NURSE ANESTHETIST, CERTIFIED REGISTERED

## 2021-02-16 PROCEDURE — 25010000003 CEFAZOLIN IN DEXTROSE 2-4 GM/100ML-% SOLUTION: Performed by: THORACIC SURGERY (CARDIOTHORACIC VASCULAR SURGERY)

## 2021-02-16 PROCEDURE — 25010000002 FENTANYL CITRATE (PF) 100 MCG/2ML SOLUTION: Performed by: NURSE ANESTHETIST, CERTIFIED REGISTERED

## 2021-02-16 PROCEDURE — 94799 UNLISTED PULMONARY SVC/PX: CPT

## 2021-02-16 PROCEDURE — 25010000002 ONDANSETRON PER 1 MG: Performed by: THORACIC SURGERY (CARDIOTHORACIC VASCULAR SURGERY)

## 2021-02-16 PROCEDURE — 25010000003 BUPIVACAINE LIPOSOME 1.3 % SUSPENSION: Performed by: ANESTHESIOLOGY

## 2021-02-16 PROCEDURE — 85027 COMPLETE CBC AUTOMATED: CPT | Performed by: NURSE PRACTITIONER

## 2021-02-16 PROCEDURE — 86901 BLOOD TYPING SEROLOGIC RH(D): CPT | Performed by: NURSE PRACTITIONER

## 2021-02-16 PROCEDURE — 21812 TREATMENT OF RIB FRACTURE: CPT | Performed by: THORACIC SURGERY (CARDIOTHORACIC VASCULAR SURGERY)

## 2021-02-16 PROCEDURE — 76942 ECHO GUIDE FOR BIOPSY: CPT | Performed by: THORACIC SURGERY (CARDIOTHORACIC VASCULAR SURGERY)

## 2021-02-16 PROCEDURE — C1713 ANCHOR/SCREW BN/BN,TIS/BN: HCPCS | Performed by: THORACIC SURGERY (CARDIOTHORACIC VASCULAR SURGERY)

## 2021-02-16 PROCEDURE — 25010000002 FENTANYL CITRATE (PF) 100 MCG/2ML SOLUTION: Performed by: ANESTHESIOLOGY

## 2021-02-16 PROCEDURE — C1729 CATH, DRAINAGE: HCPCS | Performed by: THORACIC SURGERY (CARDIOTHORACIC VASCULAR SURGERY)

## 2021-02-16 PROCEDURE — 25010000002 HYDROMORPHONE PER 4 MG: Performed by: THORACIC SURGERY (CARDIOTHORACIC VASCULAR SURGERY)

## 2021-02-16 PROCEDURE — 71045 X-RAY EXAM CHEST 1 VIEW: CPT

## 2021-02-16 PROCEDURE — 25010000002 MIDAZOLAM PER 1 MG: Performed by: ANESTHESIOLOGY

## 2021-02-16 PROCEDURE — 82962 GLUCOSE BLOOD TEST: CPT

## 2021-02-16 PROCEDURE — 0BJ08ZZ INSPECTION OF TRACHEOBRONCHIAL TREE, VIA NATURAL OR ARTIFICIAL OPENING ENDOSCOPIC: ICD-10-PCS | Performed by: THORACIC SURGERY (CARDIOTHORACIC VASCULAR SURGERY)

## 2021-02-16 PROCEDURE — 0WJC4ZZ INSPECTION OF MEDIASTINUM, PERCUTANEOUS ENDOSCOPIC APPROACH: ICD-10-PCS | Performed by: THORACIC SURGERY (CARDIOTHORACIC VASCULAR SURGERY)

## 2021-02-16 PROCEDURE — 0PS204Z REPOSITION 3 OR MORE RIBS WITH INTERNAL FIXATION DEVICE, OPEN APPROACH: ICD-10-PCS | Performed by: THORACIC SURGERY (CARDIOTHORACIC VASCULAR SURGERY)

## 2021-02-16 PROCEDURE — 25010000002 PROPOFOL 10 MG/ML EMULSION: Performed by: NURSE ANESTHETIST, CERTIFIED REGISTERED

## 2021-02-16 PROCEDURE — 85730 THROMBOPLASTIN TIME PARTIAL: CPT | Performed by: NURSE PRACTITIONER

## 2021-02-16 PROCEDURE — 25010000002 HYDROMORPHONE PER 4 MG: Performed by: ANESTHESIOLOGY

## 2021-02-16 PROCEDURE — 86900 BLOOD TYPING SEROLOGIC ABO: CPT | Performed by: NURSE PRACTITIONER

## 2021-02-16 DEVICE — IMPLANTABLE DEVICE: Type: IMPLANTABLE DEVICE | Site: CHEST | Status: FUNCTIONAL

## 2021-02-16 DEVICE — SCRW SD RIB BLU 2.4X8MM: Type: IMPLANTABLE DEVICE | Site: CHEST | Status: FUNCTIONAL

## 2021-02-16 DEVICE — IMPLANTABLE DEVICE
Type: IMPLANTABLE DEVICE | Site: CHEST | Status: FUNCTIONAL
Brand: RIBFIX BLU SYSTEM

## 2021-02-16 RX ORDER — ONDANSETRON 2 MG/ML
4 INJECTION INTRAMUSCULAR; INTRAVENOUS ONCE AS NEEDED
Status: DISCONTINUED | OUTPATIENT
Start: 2021-02-16 | End: 2021-02-16 | Stop reason: HOSPADM

## 2021-02-16 RX ORDER — MAGNESIUM SULFATE HEPTAHYDRATE 500 MG/ML
INJECTION, SOLUTION INTRAMUSCULAR; INTRAVENOUS AS NEEDED
Status: DISCONTINUED | OUTPATIENT
Start: 2021-02-16 | End: 2021-02-16 | Stop reason: SURG

## 2021-02-16 RX ORDER — KETAMINE HCL IN NACL, ISO-OSM 100MG/10ML
10 SYRINGE (ML) INJECTION
Status: ACTIVE | OUTPATIENT
Start: 2021-02-16 | End: 2021-02-16

## 2021-02-16 RX ORDER — NALOXONE HCL 0.4 MG/ML
0.4 VIAL (ML) INJECTION
Status: DISCONTINUED | OUTPATIENT
Start: 2021-02-16 | End: 2021-02-16 | Stop reason: HOSPADM

## 2021-02-16 RX ORDER — CEFAZOLIN SODIUM 2 G/100ML
2 INJECTION, SOLUTION INTRAVENOUS ONCE
Status: COMPLETED | OUTPATIENT
Start: 2021-02-16 | End: 2021-02-16

## 2021-02-16 RX ORDER — HYDROMORPHONE HYDROCHLORIDE 1 MG/ML
0.25 INJECTION, SOLUTION INTRAMUSCULAR; INTRAVENOUS; SUBCUTANEOUS
Status: DISCONTINUED | OUTPATIENT
Start: 2021-02-16 | End: 2021-02-16 | Stop reason: HOSPADM

## 2021-02-16 RX ORDER — LIDOCAINE HYDROCHLORIDE 20 MG/ML
INJECTION, SOLUTION INFILTRATION; PERINEURAL AS NEEDED
Status: DISCONTINUED | OUTPATIENT
Start: 2021-02-16 | End: 2021-02-16 | Stop reason: SURG

## 2021-02-16 RX ORDER — SODIUM CHLORIDE 0.9 % (FLUSH) 0.9 %
3 SYRINGE (ML) INJECTION EVERY 12 HOURS SCHEDULED
Status: DISCONTINUED | OUTPATIENT
Start: 2021-02-16 | End: 2021-02-16 | Stop reason: HOSPADM

## 2021-02-16 RX ORDER — ROCURONIUM BROMIDE 10 MG/ML
INJECTION, SOLUTION INTRAVENOUS AS NEEDED
Status: DISCONTINUED | OUTPATIENT
Start: 2021-02-16 | End: 2021-02-16 | Stop reason: SURG

## 2021-02-16 RX ORDER — OXYCODONE HYDROCHLORIDE 5 MG/1
5 TABLET ORAL EVERY 4 HOURS PRN
Status: DISCONTINUED | OUTPATIENT
Start: 2021-02-16 | End: 2021-02-21 | Stop reason: HOSPADM

## 2021-02-16 RX ORDER — ACETAMINOPHEN 500 MG
1000 TABLET ORAL 3 TIMES DAILY
Status: DISCONTINUED | OUTPATIENT
Start: 2021-02-16 | End: 2021-02-18

## 2021-02-16 RX ORDER — DIPHENHYDRAMINE HYDROCHLORIDE 50 MG/ML
25 INJECTION INTRAMUSCULAR; INTRAVENOUS EVERY 4 HOURS PRN
Status: DISCONTINUED | OUTPATIENT
Start: 2021-02-16 | End: 2021-02-16

## 2021-02-16 RX ORDER — GABAPENTIN 300 MG/1
300 CAPSULE ORAL ONCE
Status: COMPLETED | OUTPATIENT
Start: 2021-02-16 | End: 2021-02-16

## 2021-02-16 RX ORDER — LIDOCAINE HYDROCHLORIDE 10 MG/ML
0.5 INJECTION, SOLUTION EPIDURAL; INFILTRATION; INTRACAUDAL; PERINEURAL ONCE AS NEEDED
Status: DISCONTINUED | OUTPATIENT
Start: 2021-02-16 | End: 2021-02-16 | Stop reason: HOSPADM

## 2021-02-16 RX ORDER — FENTANYL CITRATE 50 UG/ML
50 INJECTION, SOLUTION INTRAMUSCULAR; INTRAVENOUS
Status: DISCONTINUED | OUTPATIENT
Start: 2021-02-16 | End: 2021-02-16 | Stop reason: HOSPADM

## 2021-02-16 RX ORDER — MIDAZOLAM HYDROCHLORIDE 1 MG/ML
1 INJECTION INTRAMUSCULAR; INTRAVENOUS
Status: COMPLETED | OUTPATIENT
Start: 2021-02-16 | End: 2021-02-16

## 2021-02-16 RX ORDER — HEPARIN SODIUM 5000 [USP'U]/ML
5000 INJECTION, SOLUTION INTRAVENOUS; SUBCUTANEOUS EVERY 8 HOURS SCHEDULED
Status: DISCONTINUED | OUTPATIENT
Start: 2021-02-17 | End: 2021-02-21 | Stop reason: HOSPADM

## 2021-02-16 RX ORDER — KETAMINE HCL IN NACL, ISO-OSM 100MG/10ML
10 SYRINGE (ML) INJECTION
Status: DISCONTINUED | OUTPATIENT
Start: 2021-02-16 | End: 2021-02-17 | Stop reason: HOSPADM

## 2021-02-16 RX ORDER — MIDAZOLAM HYDROCHLORIDE 1 MG/ML
0.5 INJECTION INTRAMUSCULAR; INTRAVENOUS
Status: COMPLETED | OUTPATIENT
Start: 2021-02-16 | End: 2021-02-16

## 2021-02-16 RX ORDER — LIDOCAINE HYDROCHLORIDE 40 MG/ML
SOLUTION TOPICAL AS NEEDED
Status: DISCONTINUED | OUTPATIENT
Start: 2021-02-16 | End: 2021-02-16 | Stop reason: SURG

## 2021-02-16 RX ORDER — METOCLOPRAMIDE HYDROCHLORIDE 5 MG/ML
10 INJECTION INTRAMUSCULAR; INTRAVENOUS ONCE AS NEEDED
Status: DISCONTINUED | OUTPATIENT
Start: 2021-02-16 | End: 2021-02-16 | Stop reason: HOSPADM

## 2021-02-16 RX ORDER — BUPIVACAINE HYDROCHLORIDE 2.5 MG/ML
INJECTION, SOLUTION EPIDURAL; INFILTRATION; INTRACAUDAL
Status: COMPLETED | OUTPATIENT
Start: 2021-02-16 | End: 2021-02-16

## 2021-02-16 RX ORDER — ACETAMINOPHEN 500 MG
1000 TABLET ORAL ONCE
Status: COMPLETED | OUTPATIENT
Start: 2021-02-16 | End: 2021-02-16

## 2021-02-16 RX ORDER — ONDANSETRON 2 MG/ML
4 INJECTION INTRAMUSCULAR; INTRAVENOUS EVERY 6 HOURS PRN
Status: DISCONTINUED | OUTPATIENT
Start: 2021-02-16 | End: 2021-02-16 | Stop reason: SDUPTHER

## 2021-02-16 RX ORDER — ONDANSETRON 4 MG/1
4 TABLET, FILM COATED ORAL EVERY 6 HOURS PRN
Status: DISCONTINUED | OUTPATIENT
Start: 2021-02-16 | End: 2021-02-16 | Stop reason: SDUPTHER

## 2021-02-16 RX ORDER — FENTANYL CITRATE 50 UG/ML
INJECTION, SOLUTION INTRAMUSCULAR; INTRAVENOUS AS NEEDED
Status: DISCONTINUED | OUTPATIENT
Start: 2021-02-16 | End: 2021-02-16 | Stop reason: SURG

## 2021-02-16 RX ORDER — DIPHENHYDRAMINE HYDROCHLORIDE 50 MG/ML
25 INJECTION INTRAMUSCULAR; INTRAVENOUS EVERY 4 HOURS PRN
Status: DISCONTINUED | OUTPATIENT
Start: 2021-02-16 | End: 2021-02-16 | Stop reason: HOSPADM

## 2021-02-16 RX ORDER — MAGNESIUM HYDROXIDE 1200 MG/15ML
LIQUID ORAL AS NEEDED
Status: DISCONTINUED | OUTPATIENT
Start: 2021-02-16 | End: 2021-02-16 | Stop reason: HOSPADM

## 2021-02-16 RX ORDER — SODIUM CHLORIDE 0.9 % (FLUSH) 0.9 %
3-10 SYRINGE (ML) INJECTION AS NEEDED
Status: DISCONTINUED | OUTPATIENT
Start: 2021-02-16 | End: 2021-02-16 | Stop reason: HOSPADM

## 2021-02-16 RX ORDER — SODIUM CHLORIDE, SODIUM LACTATE, POTASSIUM CHLORIDE, CALCIUM CHLORIDE 600; 310; 30; 20 MG/100ML; MG/100ML; MG/100ML; MG/100ML
9 INJECTION, SOLUTION INTRAVENOUS CONTINUOUS
Status: DISCONTINUED | OUTPATIENT
Start: 2021-02-16 | End: 2021-02-18

## 2021-02-16 RX ORDER — KETAMINE HYDROCHLORIDE 10 MG/ML
INJECTION INTRAMUSCULAR; INTRAVENOUS AS NEEDED
Status: DISCONTINUED | OUTPATIENT
Start: 2021-02-16 | End: 2021-02-16 | Stop reason: SURG

## 2021-02-16 RX ORDER — HYDROMORPHONE HYDROCHLORIDE 1 MG/ML
0.5 INJECTION, SOLUTION INTRAMUSCULAR; INTRAVENOUS; SUBCUTANEOUS
Status: DISCONTINUED | OUTPATIENT
Start: 2021-02-16 | End: 2021-02-16 | Stop reason: HOSPADM

## 2021-02-16 RX ORDER — ONDANSETRON 2 MG/ML
INJECTION INTRAMUSCULAR; INTRAVENOUS AS NEEDED
Status: DISCONTINUED | OUTPATIENT
Start: 2021-02-16 | End: 2021-02-16 | Stop reason: SURG

## 2021-02-16 RX ORDER — OXYCODONE HYDROCHLORIDE 5 MG/1
5 TABLET ORAL ONCE AS NEEDED
Status: DISCONTINUED | OUTPATIENT
Start: 2021-02-16 | End: 2021-02-16 | Stop reason: HOSPADM

## 2021-02-16 RX ORDER — PROPOFOL 10 MG/ML
VIAL (ML) INTRAVENOUS AS NEEDED
Status: DISCONTINUED | OUTPATIENT
Start: 2021-02-16 | End: 2021-02-16 | Stop reason: SURG

## 2021-02-16 RX ORDER — DEXTROSE, SODIUM CHLORIDE, AND POTASSIUM CHLORIDE 5; .45; .15 G/100ML; G/100ML; G/100ML
50 INJECTION INTRAVENOUS CONTINUOUS
Status: DISCONTINUED | OUTPATIENT
Start: 2021-02-16 | End: 2021-02-18

## 2021-02-16 RX ORDER — FAMOTIDINE 10 MG/ML
20 INJECTION, SOLUTION INTRAVENOUS ONCE
Status: COMPLETED | OUTPATIENT
Start: 2021-02-16 | End: 2021-02-16

## 2021-02-16 RX ORDER — BISACODYL 10 MG
10 SUPPOSITORY, RECTAL RECTAL DAILY PRN
Status: DISCONTINUED | OUTPATIENT
Start: 2021-02-16 | End: 2021-02-21 | Stop reason: HOSPADM

## 2021-02-16 RX ORDER — IPRATROPIUM BROMIDE AND ALBUTEROL SULFATE 2.5; .5 MG/3ML; MG/3ML
3 SOLUTION RESPIRATORY (INHALATION)
Status: DISPENSED | OUTPATIENT
Start: 2021-02-16 | End: 2021-02-18

## 2021-02-16 RX ORDER — DIPHENHYDRAMINE HCL 25 MG
25 CAPSULE ORAL EVERY 4 HOURS PRN
Status: DISCONTINUED | OUTPATIENT
Start: 2021-02-16 | End: 2021-02-16 | Stop reason: HOSPADM

## 2021-02-16 RX ORDER — CELECOXIB 200 MG/1
200 CAPSULE ORAL ONCE
Status: COMPLETED | OUTPATIENT
Start: 2021-02-16 | End: 2021-02-16

## 2021-02-16 RX ORDER — DIPHENHYDRAMINE HCL 25 MG
25 CAPSULE ORAL EVERY 4 HOURS PRN
Status: DISCONTINUED | OUTPATIENT
Start: 2021-02-16 | End: 2021-02-16

## 2021-02-16 RX ADMIN — FENTANYL CITRATE 50 MCG: 50 INJECTION INTRAMUSCULAR; INTRAVENOUS at 13:50

## 2021-02-16 RX ADMIN — ONDANSETRON HYDROCHLORIDE 4 MG: 2 SOLUTION INTRAMUSCULAR; INTRAVENOUS at 15:58

## 2021-02-16 RX ADMIN — FAMOTIDINE 20 MG: 10 INJECTION INTRAVENOUS at 12:05

## 2021-02-16 RX ADMIN — OXYCODONE 5 MG: 5 TABLET ORAL at 23:27

## 2021-02-16 RX ADMIN — HYDROMORPHONE HYDROCHLORIDE 0.5 MG: 1 INJECTION, SOLUTION INTRAMUSCULAR; INTRAVENOUS; SUBCUTANEOUS at 00:26

## 2021-02-16 RX ADMIN — BUDESONIDE AND FORMOTEROL FUMARATE DIHYDRATE 2 PUFF: 80; 4.5 AEROSOL RESPIRATORY (INHALATION) at 08:26

## 2021-02-16 RX ADMIN — MIDAZOLAM 1 MG: 1 INJECTION INTRAMUSCULAR; INTRAVENOUS at 12:06

## 2021-02-16 RX ADMIN — HYDROMORPHONE HYDROCHLORIDE 0.5 MG: 1 INJECTION, SOLUTION INTRAMUSCULAR; INTRAVENOUS; SUBCUTANEOUS at 17:38

## 2021-02-16 RX ADMIN — GABAPENTIN 300 MG: 300 CAPSULE ORAL at 11:40

## 2021-02-16 RX ADMIN — HYDROMORPHONE HYDROCHLORIDE 0.25 MG: 1 INJECTION, SOLUTION INTRAMUSCULAR; INTRAVENOUS; SUBCUTANEOUS at 16:28

## 2021-02-16 RX ADMIN — CELECOXIB 200 MG: 200 CAPSULE ORAL at 11:40

## 2021-02-16 RX ADMIN — ACETAMINOPHEN 1000 MG: 500 TABLET, FILM COATED ORAL at 20:44

## 2021-02-16 RX ADMIN — CELECOXIB 100 MG: 100 CAPSULE ORAL at 20:44

## 2021-02-16 RX ADMIN — KETAMINE HYDROCHLORIDE 10 MG: 10 INJECTION INTRAMUSCULAR; INTRAVENOUS at 16:16

## 2021-02-16 RX ADMIN — ONDANSETRON 4 MG: 2 INJECTION INTRAMUSCULAR; INTRAVENOUS at 18:56

## 2021-02-16 RX ADMIN — SODIUM CHLORIDE 75 ML/HR: 9 INJECTION, SOLUTION INTRAVENOUS at 00:23

## 2021-02-16 RX ADMIN — GABAPENTIN 300 MG: 300 CAPSULE ORAL at 20:44

## 2021-02-16 RX ADMIN — KETAMINE HYDROCHLORIDE 30 MG: 10 INJECTION INTRAMUSCULAR; INTRAVENOUS at 14:15

## 2021-02-16 RX ADMIN — LEVOTHYROXINE SODIUM 150 MCG: 150 TABLET ORAL at 06:14

## 2021-02-16 RX ADMIN — CEFAZOLIN SODIUM 2 G: 2 INJECTION, SOLUTION INTRAVENOUS at 14:00

## 2021-02-16 RX ADMIN — PROPOFOL 140 MG: 10 INJECTION, EMULSION INTRAVENOUS at 13:50

## 2021-02-16 RX ADMIN — POTASSIUM CHLORIDE, DEXTROSE MONOHYDRATE AND SODIUM CHLORIDE 50 ML/HR: 150; 5; 450 INJECTION, SOLUTION INTRAVENOUS at 18:57

## 2021-02-16 RX ADMIN — ACETAMINOPHEN 1000 MG: 500 TABLET ORAL at 11:40

## 2021-02-16 RX ADMIN — MIDAZOLAM 1 MG: 1 INJECTION INTRAMUSCULAR; INTRAVENOUS at 12:12

## 2021-02-16 RX ADMIN — Medication 10 MG: at 17:11

## 2021-02-16 RX ADMIN — LIDOCAINE HYDROCHLORIDE 1 EACH: 40 SOLUTION TOPICAL at 13:51

## 2021-02-16 RX ADMIN — OXYCODONE 5 MG: 5 TABLET ORAL at 18:56

## 2021-02-16 RX ADMIN — BUPIVACAINE 10 ML: 13.3 INJECTION, SUSPENSION, LIPOSOMAL INFILTRATION at 12:19

## 2021-02-16 RX ADMIN — ROCURONIUM BROMIDE 50 MG: 10 INJECTION INTRAVENOUS at 13:50

## 2021-02-16 RX ADMIN — Medication 10 MG: at 17:23

## 2021-02-16 RX ADMIN — MAGNESIUM SULFATE HEPTAHYDRATE 2 G: 500 INJECTION, SOLUTION INTRAMUSCULAR; INTRAVENOUS at 14:15

## 2021-02-16 RX ADMIN — BUPIVACAINE HYDROCHLORIDE 20 ML: 2.5 INJECTION, SOLUTION EPIDURAL; INFILTRATION; INTRACAUDAL; PERINEURAL at 12:19

## 2021-02-16 RX ADMIN — FENTANYL CITRATE 50 MCG: 50 INJECTION, SOLUTION INTRAMUSCULAR; INTRAVENOUS at 12:06

## 2021-02-16 RX ADMIN — LIDOCAINE HYDROCHLORIDE 60 MG: 20 INJECTION, SOLUTION INFILTRATION; PERINEURAL at 13:50

## 2021-02-16 RX ADMIN — BUDESONIDE AND FORMOTEROL FUMARATE DIHYDRATE 2 PUFF: 80; 4.5 AEROSOL RESPIRATORY (INHALATION) at 23:16

## 2021-02-16 RX ADMIN — KETAMINE HYDROCHLORIDE 10 MG: 10 INJECTION INTRAMUSCULAR; INTRAVENOUS at 15:20

## 2021-02-16 RX ADMIN — SODIUM CHLORIDE, POTASSIUM CHLORIDE, SODIUM LACTATE AND CALCIUM CHLORIDE 9 ML/HR: 600; 310; 30; 20 INJECTION, SOLUTION INTRAVENOUS at 12:35

## 2021-02-16 RX ADMIN — HYDROMORPHONE HYDROCHLORIDE 0.5 MG: 1 INJECTION, SOLUTION INTRAMUSCULAR; INTRAVENOUS; SUBCUTANEOUS at 16:55

## 2021-02-16 RX ADMIN — PROPOFOL 60 MG: 10 INJECTION, EMULSION INTRAVENOUS at 14:40

## 2021-02-16 RX ADMIN — SUGAMMADEX 200 MG: 100 INJECTION, SOLUTION INTRAVENOUS at 15:50

## 2021-02-16 RX ADMIN — FENTANYL CITRATE 50 MCG: 50 INJECTION INTRAMUSCULAR; INTRAVENOUS at 16:14

## 2021-02-16 RX ADMIN — DOCUSATE SODIUM 50MG AND SENNOSIDES 8.6MG 2 TABLET: 8.6; 5 TABLET, FILM COATED ORAL at 20:44

## 2021-02-16 RX ADMIN — PANTOPRAZOLE SODIUM 40 MG: 40 TABLET, DELAYED RELEASE ORAL at 06:14

## 2021-02-16 NOTE — PLAN OF CARE
Goal Outcome Evaluation:  VSS. Pain controlled with Lortab. Plan for surgery today about 1400. NPO since MN. 1st chlorhexidine bath complete. Consent on chart. Will continue to monitor.

## 2021-02-16 NOTE — PROGRESS NOTES
Name: Luann Frances ADMIT: 2021   : 1940  PCP: Keri Foreman MD    MRN: [id number] LOS: 3 days   AGE/SEX: 81 y.o. female  ROOM: [facility]     Subjective   Subjective   Patient appears relatively comfortable & in no apparent distress.  Tolerating P.O.  BM x2 on 2/15/2021.  Voices no new concerns.      Review of Systems   Constitutional: Negative for chills and fever.   HENT: Negative for congestion and rhinorrhea.    Respiratory: Negative for cough and shortness of breath.    Cardiovascular: Negative for chest pain (right lateral chest wall tenderness 2/2 multiple rib fractures) and leg swelling.   Gastrointestinal: Negative for abdominal pain, constipation, diarrhea, nausea and vomiting.   Endocrine: Negative for polydipsia, polyphagia and polyuria.   Genitourinary: Negative for difficulty urinating and dysuria.   Musculoskeletal: Negative for back pain and myalgias.   Skin: Negative for rash and wound.   Neurological: Negative for dizziness, syncope, weakness and headaches.   Psychiatric/Behavioral: Negative for confusion and hallucinations.        Objective   Objective   Vital Signs  Temp:  [97.2 °F (36.2 °C)-97.8 °F (36.6 °C)] 97.5 °F (36.4 °C)  Heart Rate:  [64-87] 64  Resp:  [14-20] 16  BP: (112-156)/(59-73) 140/64  SpO2:  [94 %-98 %] 96 %  on   ;   Device (Oxygen Therapy): room air  Body mass index is 37.16 kg/m².     Physical Exam  Constitutional:       General: She is not in acute distress.     Appearance: She is obese.      Comments: Advanced age   HENT:      Head: Normocephalic and atraumatic.   Eyes:      Extraocular Movements: Extraocular movements intact.      Conjunctiva/sclera: Conjunctivae normal.   Neck:      Musculoskeletal: Normal range of motion and neck supple.   Cardiovascular:      Rate and Rhythm: Normal rate.      Heart sounds: Normal heart sounds.   Pulmonary:      Effort: Pulmonary effort is normal.      Comments: Diminished on expiration R>L  Abdominal:       General: Bowel sounds are normal. There is no distension.      Palpations: Abdomen is soft.   Musculoskeletal:      Right lower leg: Edema (1-2+ pitting BLE) present.      Left lower leg: Edema present.   Skin:     General: Skin is warm and dry.      Findings: Bruising (right lateral chest wall) present.   Neurological:      Mental Status: She is alert and oriented to person, place, and time.      Cranial Nerves: No cranial nerve deficit.   Psychiatric:         Behavior: Behavior normal.         Thought Content: Thought content normal.         Results Review     I reviewed the patient's new clinical results.  Results from last 7 days   Lab Units 02/16/21  0531 02/13/21  0516 02/12/21  1534   WBC 10*3/mm3 7.00 7.47 10.75   HEMOGLOBIN g/dL 11.0* 12.0 12.5   PLATELETS 10*3/mm3 224 241 267     Results from last 7 days   Lab Units 02/16/21  0531 02/13/21  0516 02/12/21  1534   SODIUM mmol/L 137 138 141   POTASSIUM mmol/L 3.8 4.0 4.4   CHLORIDE mmol/L 104 105 105   CO2 mmol/L 24.5 22.2 26.3   BUN mg/dL 36* 24* 21   CREATININE mg/dL 1.29* 1.17* 1.06*   GLUCOSE mg/dL 128* 100* 122*   Estimated Creatinine Clearance: 41 mL/min (A) (by C-G formula based on SCr of 1.29 mg/dL (H)).  Results from last 7 days   Lab Units 02/12/21  1534   ALBUMIN g/dL 4.00   BILIRUBIN mg/dL 0.4   ALK PHOS U/L 63   AST (SGOT) U/L 15   ALT (SGPT) U/L 19     Results from last 7 days   Lab Units 02/16/21  0531 02/13/21  0516 02/12/21  1534   CALCIUM mg/dL 9.4 9.8 10.1   ALBUMIN g/dL  --   --  4.00       COVID19   Date Value Ref Range Status   02/15/2021 Not Detected Not Detected - Ref. Range Final   02/12/2021 Not Detected Not Detected - Ref. Range Final     Glucose   Date/Time Value Ref Range Status   02/13/2021 1106 129 70 - 130 mg/dL Final       XR Chest 1 View  PORTABLE RADIOGRAPHIC VIEW OF THE CHEST     CLINICAL HISTORY: Preop testing.     FINDINGS: On the previous chest CT of 02/12/2021, multiple mildly  displaced and nondisplaced right rib  fractures. These are much better  seen on CT imaging. There is no evidence for pneumothorax. The  cardiomediastinal silhouette is stable in appearance when compared to  the prior chest x-ray of 02/12/2021. Stable minor atelectatic changes  are noted within the right base.     This report was finalized on 2/16/2021 7:49 AM by Dr. Bob Rubio M.D.       Scheduled Medications  amLODIPine, 5 mg, Oral, Daily  budesonide-formoterol, 2 puff, Inhalation, BID - RT  celecoxib, 100 mg, Oral, Q12H  chlorthalidone, 25 mg, Oral, Daily  gabapentin, 300 mg, Oral, Q12H  lactobacillus acidophilus, 1 capsule, Oral, Daily  levothyroxine, 150 mcg, Oral, Q AM  lidocaine, 1 patch, Transdermal, Q24H  pantoprazole, 40 mg, Oral, QAM  polyethylene glycol, 17 g, Oral, Daily  senna-docusate sodium, 2 tablet, Oral, BID    Infusions   Diet  NPO Diet NPO Except: Sips with meds       Assessment/Plan     Active Hospital Problems    Diagnosis  POA   • **Multiple closed fractures of ribs of right side [S22.41XA]  Yes   • Non-cardiac chest pain [R07.89]  Unknown   • DELROY (obstructive sleep apnea) [G47.33]  Yes   • Essential hypertension [I10]  Yes   • CKD (chronic kidney disease) stage 3, GFR 30-59 ml/min (CMS/HCC) [N18.30]  Yes   • Hypothyroidism [E03.9]  Yes   • Chronic obstructive pulmonary disease (CMS/[facility]) [J44.9]  Yes      Resolved Hospital Problems   No resolved problems to display.       81 y.o. female admitted with Multiple closed fractures of ribs of right side.      Multiple closed fractures of ribs of right side / non-cardiac chest pain.  2/2 reported slip / fall injury.  Denies head strike, LOC, dizziness, vision changes, or chest pain surrounding event.  TS following--plan multiple rib fixation on 2/16/2021.  Denies AC PTA or hx of CAD.  Opioid analgesic PRN, continuous pulse oximetry.  Lidoderm patch proximal affected area.  Bowel regimen to avoid opioid-induced constipation.  PT / OT following.  INR 1.08.    Abdominal pain.  CT  abd/pelv no evidence of pancreatitis.  Noted hepatic steatosis.  Likely 2/2 recent constipation--abdominal pain resolved following reported BM x2 on 2/15/2021.  MiraLAX, Mara-Colace.    Chronic obstructive pulmonary disease (CMS/HCC).  Stable appearance, O2 saturation 96% on room air.  Lungs relatively clear on auscultation throughout all lung fields diminished on expiration.    Hypothyroidism.  Tolerating levothyroxine.    CKD (chronic kidney disease) stage 3, GFR 30-59 ml/min (CMS/HCC).  Serum creatinine appears close to baseline--1.06.  Avoid nephrotoxins.  Monitor labs.  Unremarkable electrolytes.    Essential hypertension.  BP acceptable, hemodynamically stable.  Amlodipine.      DELROY (obstructive sleep apnea).  CPAP at bedside.    · SCD for DVT prophylaxis.  · CPR  · Discussed with Dr. Soria  · Anticipate discharge pending clinical progress / CCP following--plan home with  vs SNF      LIVIER Birmingham  [facility]  02/16/21  10:04 EST    I wore protective equipment throughout this patient encounter including a face mask, gloves and protective eyewear.  Hand hygiene was performed before donning protective equipment and after removal when leaving the room.

## 2021-02-16 NOTE — ANESTHESIA PREPROCEDURE EVALUATION
Anesthesia Evaluation     Patient summary reviewed and Nursing notes reviewed   no history of anesthetic complications:  NPO Solid Status: > 8 hours  NPO Liquid Status: > 8 hours           Airway   Mallampati: II  Dental - normal exam     Pulmonary - normal exam   (+) pneumonia stable , COPD, sleep apnea,   Cardiovascular - normal exam    (+) hypertension less than 2 medications, hyperlipidemia,       Neuro/Psych- negative ROS  GI/Hepatic/Renal/Endo    (+)  GERD,  renal disease CRI and stones, diabetes mellitus type 2,     Musculoskeletal     Abdominal    Substance History      OB/GYN          Other   arthritis,                      Anesthesia Plan    ASA 3     general with block     intravenous induction     Anesthetic plan, all risks, benefits, and alternatives have been provided, discussed and informed consent has been obtained with: patient.

## 2021-02-16 NOTE — ANESTHESIA PROCEDURE NOTES
Peripheral Block      Patient reassessed immediately prior to procedure    Patient location during procedure: pre-op  Start time: 2/16/2021 12:05 PM  Stop time: 2/16/2021 12:10 PM  Reason for block: at surgeon's request and post-op pain management  Performed by  Anesthesiologist: Malik Mcknight MD  Preanesthetic Checklist  Completed: patient identified, surgical consent, pre-op evaluation, timeout performed, IV checked, risks and benefits discussed and monitors and equipment checked  Prep:  Pt Position: sitting  Sterile barriers:cap, gloves and sterile barriers  Prep: ChloraPrep  Patient monitoring: blood pressure monitoring, continuous pulse oximetry and EKG  Procedure  Sedation:yes  Performed under: MAC  Guidance:ultrasound guided  Images:still images obtained    Laterality:right  Block Type:erector spinae block  Injection Technique:single-shot  Needle Type:echogenic  Needle Gauge:22 G      Medications Used: bupivacaine liposome (EXPAREL) 1.3 % injection, 10 mL  bupivacaine PF (MARCAINE) 0.25 % injection, 20 mL      Post Assessment  Injection Assessment: no paresthesia on injection and negative aspiration for heme  Patient Tolerance:comfortable throughout block  Complications:no

## 2021-02-16 NOTE — OP NOTE
THORACOSCOPY VIDEO ASSISTED  Procedure Report    Patient Name:  Luann Frances  YOB: 1940    Date of Surgery:  2/16/2021     Indications: Ms. Frances is a pleasant 81-year-old lady with right-sided at 3, 4, 5, 6 rib fracture secondary to mechanical fall.    Pre-op Diagnosis:   Closed fracture of multiple ribs of right side, initial encounter [S22.41XA]       Post-Op Diagnosis Codes:     * Closed fracture of multiple ribs of right side, initial encounter [S22.41XA]    Procedure/CPT® Codes:      Procedure(s):  bronchoscopy, right video assisted THORACOSCOPY WITH PLATING OF 3, 4, 5, AND 6 RIBS    Staff:  Surgeon(s):  Kelley Delong MD    Assistant: Ashley Sheridan CRNFA    Anesthesia: General with Block    Estimated Blood Loss: 50 mL    Implants:    Implant Name Type Inv. Item Serial No.  Lot No. LRB No. Used Action   PLT RIB FIX 8H - [id number] Implant PLT RIB FIX 8H  [employer] NA Right 4 Implanted   SCRW SD RIB AMALIA 2.4X8MM - [id number] Implant SCRW SD RIB AMALIA 2.4X8MM  [employer] NA Right 23 Implanted   SCRW RESCUE RIBFIX AMALIA 2.7X8MM - [id number] Implant SCRW RESCUE RIBFIX AMALIA 2.7X8MM  [employer] NA Right 1 Implanted       Specimen:          None      Findings: Fracture of the anterior third, anterolateral fourth, fifth and sixth ribs    Complications: None apparent    Description of Procedure: Luann Frances was identified in the preoperative holding area and again her consent for the procedure was verified.  The patient was transported to the operating room and placed on the operating room table in supine position.  A general anesthetic was successfully administered and She was intubated with a double lumen endotracheal tube without difficulty.  Placement of the double lumen was confirmed with a video bronchoscope examination.  A time out was performed to verify correct patient, correct procedure and the correct administration of IV antibiotics per [facility]  protocol.     The patient was placed in left lateral decubitus position, right side up and all pressure points were padded.  The patient was prepped and draped in standard sterile fashion.  An approximately 2cm incision was made in the 8th intercostal space midaxillary line, dissection was carried down into the chest cavity using Bovie electrocautery under direct vision.  An david wound protector was placed into the incision.  The camera was inserted into the chest and there is no obvious hematoma, there were obvious fractures in the anterior chest wall.      An approximately 4 cm incision was made over the fifth rib and dissection was carried down through the latissimus and serratus using Bovie electrocautery.  The chest wall was encountered and an David wound protector was placed to provide visualization.  The fifth rib fracture was identified and given the size of the bone I did not think that a VATS rib plate could be used.  We elected to use our standard open rib plates.  The fracture was identified and cleared with an Cezar, the fracture was reduced and an 8 hole rib plate was secured to the bone using 6 mm fixation screws.  The fourth rib fracture was identified and cleared, the fracture was reduced and fixated using an 8 hole plate with 6 8 mm fixation screws.  The third rib fracture was identified via palpation, but was too high to plate from the incision.  An approximately 2 cm incision was made over the third rib and dissection was carried down to the level of the rib.  The fracture was identified, cleared and reduced.  An 8 hole plate was used to fixate the fracture with 5 8 mm fixation screws.  The lateral sixth rib fracture was identified and cleared, reduced and fixated with an 8 hole plate and six 8 mm fixation screws.  A 24 Cypriot chest tube was placed in the 8th intercostal space incision. The lung was reexpanded under direct visualization.  The incisions were closed using deep vicryl sutures  and Monocryl for the skin in standard fashion.  The chest tube was secured to the skin.      The patient tolerated this procedure well and was extubated and transported to the recovery room in stable condition.  The counts were correct at the end of the case.     Assistant: Ashley Sheridan CRNFA  was responsible for performing the following activities: Retraction, Suction, Irrigation, Suturing, Closing, Placing Dressing and Held/Positioned Camera and their skilled assistance was necessary for the success of this case.      Kelley Delong MD     Date: 2/16/2021  Time: 15:57 EST

## 2021-02-16 NOTE — ANESTHESIA POSTPROCEDURE EVALUATION
"Patient: Luann Frances    Procedure Summary     Date: 02/16/21 Room / Location: [facility] OR 09 / [facility] MAIN OR    Anesthesia Start: 1340 Anesthesia Stop: 1621    Procedure: bronchoscopy, right video assisted THORACOSCOPY WITH PLATING OF 3, 4, 5, AND 6 RIBS (Right Chest) Diagnosis:       Closed fracture of multiple ribs of right side, initial encounter      (Closed fracture of multiple ribs of right side, initial encounter [S22.41XA])    Surgeon: Kelley Delong MD Provider: Jaun Jones MD    Anesthesia Type: general with block ASA Status: 3          Anesthesia Type: general with block    Vitals  Vitals Value Taken Time   /74 02/16/21 1745   Temp 36.6 °C (97.8 °F) 02/16/21 1618   Pulse 72 02/16/21 1759   Resp 16 02/16/21 1745   SpO2 97 % 02/16/21 1759   Vitals shown include unvalidated device data.        Post Anesthesia Care and Evaluation    Patient location during evaluation: bedside  Patient participation: complete - patient participated  Level of consciousness: awake and alert  Pain management: adequate  Airway patency: patent  Anesthetic complications: No anesthetic complications    Cardiovascular status: acceptable  Respiratory status: acceptable  Hydration status: acceptable    Comments: /74   Pulse 71   Temp 36.6 °C (97.8 °F) (Oral)   Resp 16   Ht 165.1 cm (65\")   Wt 101 kg (223 lb 4.8 oz)   SpO2 94%   BMI 37.16 kg/m²       "

## 2021-02-16 NOTE — ANESTHESIA PROCEDURE NOTES
Arterial Line      Patient reassessed immediately prior to procedure    Patient location during procedure: pre-op  Start time: 2/16/2021 12:11 PM  Stop Time:2/16/2021 12:18 PM       Line placed for hemodynamic monitoring.  Performed By   Anesthesiologist: Malik Mcknight MD  Preanesthetic Checklist  Completed: patient identified, surgical consent, pre-op evaluation, timeout performed, IV checked, risks and benefits discussed and monitors and equipment checked  Arterial Line Prep   Sterile Tech: gloves and cap  Prep: ChloraPrep  Patient monitoring: blood pressure monitoring, continuous pulse oximetry and EKG  Arterial Line Procedure   Laterality:left  Location:  radial artery  Catheter size: 20 G   Guidance: landmark technique  Number of attempts: 1  Successful placement: yes  Post Assessment   Dressing Type: occlusive dressing applied and wrist guard applied.   Complications no  Circ/Move/Sens Assessment: unchanged and normal.   Patient Tolerance: patient tolerated the procedure well with no apparent complications

## 2021-02-16 NOTE — ANESTHESIA PROCEDURE NOTES
Airway  Urgency: elective    Date/Time: 2/16/2021 1:51 PM  Airway not difficult    General Information and Staff    Patient location during procedure: OR  Anesthesiologist: Jaun Jones MD  CRNA: Mick Spencer CRNA    Indications and Patient Condition  Indications for airway management: airway protection    Preoxygenated: yes  MILS maintained throughout  Mask difficulty assessment: 2 - vent by mask + OA or adjuvant +/- NMBA    Final Airway Details  Final airway type: endotracheal airway      Successful airway: EBT - double lumen left  Cuffed: yes   Successful intubation technique: direct laryngoscopy  Endotracheal tube insertion site: oral  Blade: Shayla  Blade size: 3  EBT DL size (fr): 35  Cormack-Lehane Classification: grade IIa - partial view of glottis  Placement verified by: chest auscultation, bronchoscopy and capnometry   Measured from: teeth  ETT/EBT  to teeth (cm): 29  Number of attempts at approach: 1  Assessment: lips, teeth, and gum same as pre-op and atraumatic intubation    Additional Comments  FOB lacement verification of DAWNA

## 2021-02-16 NOTE — BRIEF OP NOTE
THORACOSCOPY VIDEO ASSISTED  Progress Note    Luann Frances  2/16/2021    Pre-op Diagnosis:   Closed fracture of multiple ribs of right side, initial encounter [S22.41XA]       Post-Op Diagnosis Codes:     * Closed fracture of multiple ribs of right side, initial encounter [S22.41XA]    Procedure/CPT® Codes:        Procedure(s):  bronchoscopy, right video assisted THORACOSCOPY WITH PLATING OF 3, 4, 5, AND 6 RIBS    Surgeon(s):  Kelley Delong MD    Anesthesia: General with Block    Staff:   Circulator: Maritza Parada RN; Noble Small RN  Scrub Person: Heber Crocker  Vendor Representative: Storm Melchor  Assistant: Ashley Sheridan CRNFA  Orientee: Harvinder Patel RN  Assistant: Ashley Sheridan CRNFA      Estimated Blood Loss: 50 mL    Urine Voided: 350 mL    Specimens:                None          Drains:   Chest Tube Right Midaxillary (Active)       Urethral Catheter Non-latex;Silicone 16 Fr. (Active)       [REMOVED] External Urinary Catheter (Removed)   Site Assessment Other (Comment) 02/12/21 1842   Application/Removal external catheter applied 02/12/21 1631   Collection Container Wall suction 02/12/21 1631   Wall suction (mmHG) 120 mmHG 02/12/21 1631   Securement Method Securing device 02/12/21 1631   Catheter care complete Yes 02/12/21 1631       Findings: Fracture of the anterior third, anterolateral fourth, fifth and sixth ribs    Complications: None apparent    Assistant: Ashley Sheridan CRNFA  was responsible for performing the following activities: Retraction, Suction, Irrigation, Suturing, Closing, Placing Dressing and Held/Positioned Camera and their skilled assistance was necessary for the success of this case.    Kelley Delong MD     Date: 2/16/2021  Time: 15:56 EST

## 2021-02-17 ENCOUNTER — APPOINTMENT (OUTPATIENT)
Dept: GENERAL RADIOLOGY | Facility: HOSPITAL | Age: 81
End: 2021-02-17

## 2021-02-17 LAB
ANION GAP SERPL CALCULATED.3IONS-SCNC: 13.3 MMOL/L (ref 5–15)
BASOPHILS # BLD AUTO: 0.06 10*3/MM3 (ref 0–0.2)
BASOPHILS NFR BLD AUTO: 0.7 % (ref 0–1.5)
BUN SERPL-MCNC: 33 MG/DL (ref 8–23)
BUN/CREAT SERPL: 24.1 (ref 7–25)
CALCIUM SPEC-SCNC: 9.1 MG/DL (ref 8.6–10.5)
CHLORIDE SERPL-SCNC: 104 MMOL/L (ref 98–107)
CO2 SERPL-SCNC: 21.7 MMOL/L (ref 22–29)
CREAT SERPL-MCNC: 1.37 MG/DL (ref 0.57–1)
DEPRECATED RDW RBC AUTO: 44.2 FL (ref 37–54)
EOSINOPHIL # BLD AUTO: 0.13 10*3/MM3 (ref 0–0.4)
EOSINOPHIL NFR BLD AUTO: 1.6 % (ref 0.3–6.2)
ERYTHROCYTE [DISTWIDTH] IN BLOOD BY AUTOMATED COUNT: 13.1 % (ref 12.3–15.4)
GFR SERPL CREATININE-BSD FRML MDRD: 37 ML/MIN/1.73
GLUCOSE SERPL-MCNC: 129 MG/DL (ref 65–99)
HCT VFR BLD AUTO: 37.5 % (ref 34–46.6)
HGB BLD-MCNC: 11.9 G/DL (ref 12–15.9)
IMM GRANULOCYTES # BLD AUTO: 0.03 10*3/MM3 (ref 0–0.05)
IMM GRANULOCYTES NFR BLD AUTO: 0.4 % (ref 0–0.5)
LYMPHOCYTES # BLD AUTO: 1.21 10*3/MM3 (ref 0.7–3.1)
LYMPHOCYTES NFR BLD AUTO: 15.1 % (ref 19.6–45.3)
MCH RBC QN AUTO: 29.5 PG (ref 26.6–33)
MCHC RBC AUTO-ENTMCNC: 31.7 G/DL (ref 31.5–35.7)
MCV RBC AUTO: 93.1 FL (ref 79–97)
MONOCYTES # BLD AUTO: 0.8 10*3/MM3 (ref 0.1–0.9)
MONOCYTES NFR BLD AUTO: 10 % (ref 5–12)
NEUTROPHILS NFR BLD AUTO: 5.8 10*3/MM3 (ref 1.7–7)
NEUTROPHILS NFR BLD AUTO: 72.2 % (ref 42.7–76)
NRBC BLD AUTO-RTO: 0 /100 WBC (ref 0–0.2)
PLATELET # BLD AUTO: 233 10*3/MM3 (ref 140–450)
PMV BLD AUTO: 11.4 FL (ref 6–12)
POTASSIUM SERPL-SCNC: 4.9 MMOL/L (ref 3.5–5.2)
RBC # BLD AUTO: 4.03 10*6/MM3 (ref 3.77–5.28)
SODIUM SERPL-SCNC: 139 MMOL/L (ref 136–145)
WBC # BLD AUTO: 8.03 10*3/MM3 (ref 3.4–10.8)

## 2021-02-17 PROCEDURE — 71045 X-RAY EXAM CHEST 1 VIEW: CPT

## 2021-02-17 PROCEDURE — 99024 POSTOP FOLLOW-UP VISIT: CPT | Performed by: NURSE PRACTITIONER

## 2021-02-17 PROCEDURE — 94640 AIRWAY INHALATION TREATMENT: CPT

## 2021-02-17 PROCEDURE — 25010000003 HYDROMORPHONE HCL PF 50 MG/5ML SOLUTION: Performed by: THORACIC SURGERY (CARDIOTHORACIC VASCULAR SURGERY)

## 2021-02-17 PROCEDURE — 94799 UNLISTED PULMONARY SVC/PX: CPT

## 2021-02-17 PROCEDURE — 97110 THERAPEUTIC EXERCISES: CPT

## 2021-02-17 PROCEDURE — 85025 COMPLETE CBC W/AUTO DIFF WBC: CPT | Performed by: THORACIC SURGERY (CARDIOTHORACIC VASCULAR SURGERY)

## 2021-02-17 PROCEDURE — 80048 BASIC METABOLIC PNL TOTAL CA: CPT | Performed by: THORACIC SURGERY (CARDIOTHORACIC VASCULAR SURGERY)

## 2021-02-17 PROCEDURE — 97530 THERAPEUTIC ACTIVITIES: CPT

## 2021-02-17 PROCEDURE — 25010000002 HEPARIN (PORCINE) PER 1000 UNITS: Performed by: THORACIC SURGERY (CARDIOTHORACIC VASCULAR SURGERY)

## 2021-02-17 PROCEDURE — 25010000002 HYDROMORPHONE PER 4 MG: Performed by: THORACIC SURGERY (CARDIOTHORACIC VASCULAR SURGERY)

## 2021-02-17 RX ORDER — EMPAGLIFLOZIN 25 MG/1
25 TABLET, FILM COATED ORAL
Status: ON HOLD | COMMUNITY
End: 2021-02-17

## 2021-02-17 RX ORDER — NALOXONE HCL 0.4 MG/ML
0.1 VIAL (ML) INJECTION
Status: DISCONTINUED | OUTPATIENT
Start: 2021-02-17 | End: 2021-02-18

## 2021-02-17 RX ORDER — HYDROMORPHONE HCL IN 0.9% NACL 10 MG/50ML
PATIENT CONTROLLED ANALGESIA SYRINGE INTRAVENOUS CONTINUOUS
Status: DISCONTINUED | OUTPATIENT
Start: 2021-02-17 | End: 2021-02-18

## 2021-02-17 RX ADMIN — Medication 1 CAPSULE: at 08:10

## 2021-02-17 RX ADMIN — POLYETHYLENE GLYCOL 3350 17 G: 17 POWDER, FOR SOLUTION ORAL at 13:32

## 2021-02-17 RX ADMIN — HEPARIN SODIUM 5000 UNITS: 5000 INJECTION INTRAVENOUS; SUBCUTANEOUS at 15:52

## 2021-02-17 RX ADMIN — IPRATROPIUM BROMIDE AND ALBUTEROL SULFATE 3 ML: 2.5; .5 SOLUTION RESPIRATORY (INHALATION) at 23:11

## 2021-02-17 RX ADMIN — ACETAMINOPHEN 1000 MG: 500 TABLET, FILM COATED ORAL at 08:10

## 2021-02-17 RX ADMIN — BUDESONIDE AND FORMOTEROL FUMARATE DIHYDRATE 2 PUFF: 80; 4.5 AEROSOL RESPIRATORY (INHALATION) at 07:40

## 2021-02-17 RX ADMIN — IPRATROPIUM BROMIDE AND ALBUTEROL SULFATE 3 ML: 2.5; .5 SOLUTION RESPIRATORY (INHALATION) at 16:00

## 2021-02-17 RX ADMIN — GABAPENTIN 300 MG: 300 CAPSULE ORAL at 20:17

## 2021-02-17 RX ADMIN — CHLORTHALIDONE 25 MG: 25 TABLET ORAL at 08:10

## 2021-02-17 RX ADMIN — AMLODIPINE BESYLATE 5 MG: 5 TABLET ORAL at 08:10

## 2021-02-17 RX ADMIN — DOCUSATE SODIUM 50MG AND SENNOSIDES 8.6MG 2 TABLET: 8.6; 5 TABLET, FILM COATED ORAL at 20:16

## 2021-02-17 RX ADMIN — HEPARIN SODIUM 5000 UNITS: 5000 INJECTION INTRAVENOUS; SUBCUTANEOUS at 20:17

## 2021-02-17 RX ADMIN — OXYCODONE 5 MG: 5 TABLET ORAL at 11:07

## 2021-02-17 RX ADMIN — HYDROMORPHONE HYDROCHLORIDE: 10 INJECTION, SOLUTION INTRAMUSCULAR; INTRAVENOUS; SUBCUTANEOUS at 13:32

## 2021-02-17 RX ADMIN — LEVOTHYROXINE SODIUM 150 MCG: 150 TABLET ORAL at 05:11

## 2021-02-17 RX ADMIN — BUDESONIDE AND FORMOTEROL FUMARATE DIHYDRATE 2 PUFF: 80; 4.5 AEROSOL RESPIRATORY (INHALATION) at 20:25

## 2021-02-17 RX ADMIN — HYDROMORPHONE HYDROCHLORIDE 0.5 MG: 1 INJECTION, SOLUTION INTRAMUSCULAR; INTRAVENOUS; SUBCUTANEOUS at 08:23

## 2021-02-17 RX ADMIN — OXYCODONE 5 MG: 5 TABLET ORAL at 21:28

## 2021-02-17 RX ADMIN — CELECOXIB 100 MG: 100 CAPSULE ORAL at 08:10

## 2021-02-17 RX ADMIN — HEPARIN SODIUM 5000 UNITS: 5000 INJECTION INTRAVENOUS; SUBCUTANEOUS at 05:06

## 2021-02-17 RX ADMIN — ACETAMINOPHEN 1000 MG: 500 TABLET, FILM COATED ORAL at 20:17

## 2021-02-17 RX ADMIN — GABAPENTIN 300 MG: 300 CAPSULE ORAL at 08:10

## 2021-02-17 RX ADMIN — OXYCODONE 5 MG: 5 TABLET ORAL at 05:06

## 2021-02-17 RX ADMIN — PANTOPRAZOLE SODIUM 40 MG: 40 TABLET, DELAYED RELEASE ORAL at 05:06

## 2021-02-17 RX ADMIN — LIDOCAINE 1 PATCH: 50 PATCH TOPICAL at 08:11

## 2021-02-17 RX ADMIN — ACETAMINOPHEN 1000 MG: 500 TABLET, FILM COATED ORAL at 15:52

## 2021-02-17 RX ADMIN — POTASSIUM CHLORIDE, DEXTROSE MONOHYDRATE AND SODIUM CHLORIDE 50 ML/HR: 150; 5; 450 INJECTION, SOLUTION INTRAVENOUS at 13:26

## 2021-02-17 RX ADMIN — SODIUM CHLORIDE 500 ML: 9 INJECTION, SOLUTION INTRAVENOUS at 07:08

## 2021-02-17 RX ADMIN — DOCUSATE SODIUM 50MG AND SENNOSIDES 8.6MG 2 TABLET: 8.6; 5 TABLET, FILM COATED ORAL at 08:10

## 2021-02-17 RX ADMIN — IPRATROPIUM BROMIDE AND ALBUTEROL SULFATE 3 ML: 2.5; .5 SOLUTION RESPIRATORY (INHALATION) at 07:28

## 2021-02-17 NOTE — PLAN OF CARE
Goal Outcome Evaluation:  Plan of Care Reviewed With: patient     Outcome Summary: Pt in chair at arrival and agreeable to PT treatment. She required min A x2 to perform sit<>stand from chair but mod A x2 from commode. Pt ambulated ~50' using RW. Required verbal cues to improve gait mechanics. Pt will require SNF at d/c to address mobility deficits before returning home.    Patient was intermittently wearing a face mask during this therapy encounter. Therapist used appropriate personal protective equipment including eye protection, mask, and gloves.  Mask used was standard procedure mask. Appropriate PPE was worn during the entire therapy session. Hand hygiene was completed before and after therapy session. Patient is not in enhanced droplet precautions. Adam Carreno present.

## 2021-02-17 NOTE — PLAN OF CARE
Goal Outcome Evaluation:  Plan of Care Reviewed With: patient  Progress: improving  Outcome Summary: S/p right VATs with rib plating. Right chest tube placed to waterseal this AM per order. No air leak or sub Q air noted. Medicated per ERAS for pain. VSS. Pt unable to void thus far, up to restroom without results, bladder scan revealed 216cc. Will bladder scan again at 0630. Pt very unsteady on feet, assist of 2 to restroom.

## 2021-02-17 NOTE — PROGRESS NOTES
Name: Luann Frances ADMIT: 2021   : 1940  PCP: Keri Foreman MD    MRN: [id number] LOS: 4 days   AGE/SEX: 81 y.o. female  ROOM: [facility]     Subjective   Subjective   Up in chair. Chest tube removed.   Pain is tolerable, but worse with movement/deep breathing. No chest pain. No cough/fever/chills. Eating well. No nausea or vomiting.  Mild lower extremity swelling that she states is chronic. Good IS use.   Denies CP, SOB, fever chills.      Objective   Objective   Vital Signs  Temp:  [97.1 °F (36.2 °C)-98.2 °F (36.8 °C)] 97.4 °F (36.3 °C)  Heart Rate:  [56-85] 60  Resp:  [10-18] 18  BP: (104-144)/(46-90) 134/62  Arterial Line BP: (102-121)/(42-47) 118/46  SpO2:  [92 %-100 %] 98 %  on  Flow (L/min):  [2-6] 3;   Device (Oxygen Therapy): nasal cannula  Body mass index is 37.16 kg/m².     Physical Exam  Vitals signs and nursing note reviewed.   Constitutional:       General: She is not in acute distress.     Appearance: She is obese. She is not toxic-appearing.   HENT:      Head: Normocephalic and atraumatic.   Eyes:      General:         Right eye: No discharge.         Left eye: No discharge.      Conjunctiva/sclera: Conjunctivae normal.   Neck:      Musculoskeletal: Normal range of motion and neck supple.   Cardiovascular:      Rate and Rhythm: Normal rate and regular rhythm.      Pulses: Normal pulses.      Heart sounds: Normal heart sounds.   Pulmonary:      Effort: Pulmonary effort is normal. No respiratory distress.      Comments: Diminished at the bases posteriorly. Tender right chest at tube site. Dressing cdi  Abdominal:      General: Bowel sounds are normal. There is no distension.      Palpations: Abdomen is soft.      Tenderness: There is no abdominal tenderness.   Musculoskeletal: Normal range of motion.         General: Swelling (mild BLE (left slightly worse than right which is baseline)) present.   Skin:     General: Skin is warm and dry.      Findings: No bruising.    Neurological:      Mental Status: She is alert and oriented to person, place, and time.      Sensory: No sensory deficit.      Motor: No weakness ( ).      Coordination: Coordination normal.   Psychiatric:         Mood and Affect: Mood normal.         Behavior: Behavior normal.        Results Review:       I reviewed the patient's new clinical results.  Results from last 7 days   Lab Units 02/17/21 0526 02/16/21 0531 02/13/21  0516 02/12/21  1534   WBC 10*3/mm3 8.03 7.00 7.47 10.75   HEMOGLOBIN g/dL 11.9* 11.0* 12.0 12.5   PLATELETS 10*3/mm3 233 224 241 267     Results from last 7 days   Lab Units 02/17/21 0526 02/16/21  0531 02/13/21  0516 02/12/21  1534   SODIUM mmol/L 139 137 138 141   POTASSIUM mmol/L 4.9 3.8 4.0 4.4   CHLORIDE mmol/L 104 104 105 105   CO2 mmol/L 21.7* 24.5 22.2 26.3   BUN mg/dL 33* 36* 24* 21   CREATININE mg/dL 1.37* 1.29* 1.17* 1.06*   GLUCOSE mg/dL 129* 128* 100* 122*   Estimated Creatinine Clearance: 38.6 mL/min (A) (by C-G formula based on SCr of 1.37 mg/dL (H)).  Results from last 7 days   Lab Units 02/12/21  1534   ALBUMIN g/dL 4.00   BILIRUBIN mg/dL 0.4   ALK PHOS U/L 63   AST (SGOT) U/L 15   ALT (SGPT) U/L 19     Results from last 7 days   Lab Units 02/17/21 0526 02/16/21  0531 02/13/21  0516 02/12/21  1534   CALCIUM mg/dL 9.1 9.4 9.8 10.1   ALBUMIN g/dL  --   --   --  4.00       Glucose   Date/Time Value Ref Range Status   02/16/2021 1625 131 (H) 70 - 130 mg/dL Final       acetaminophen, 1,000 mg, Oral, TID  amLODIPine, 5 mg, Oral, Daily  budesonide-formoterol, 2 puff, Inhalation, BID - RT  chlorthalidone, 25 mg, Oral, Daily  gabapentin, 300 mg, Oral, Q12H  heparin (porcine), 5,000 Units, Subcutaneous, Q8H  ipratropium-albuterol, 3 mL, Nebulization, Q8H - RT  lactobacillus acidophilus, 1 capsule, Oral, Daily  levothyroxine, 150 mcg, Oral, Q AM  lidocaine, 1 patch, Transdermal, Q24H  pantoprazole, 40 mg, Oral, QAM  polyethylene glycol, 17 g, Oral, Daily  senna-docusate sodium, 2  tablet, Oral, BID      dextrose 5 % and sodium chloride 0.45 % with KCl 20 mEq/L, 50 mL/hr, Last Rate: 50 mL/hr (02/16/21 1857)  HYDROmorphone HCl-NaCl,   lactated ringers, 9 mL/hr, Last Rate: 9 mL/hr (02/16/21 1235)    Diet Regular; Cardiac       Assessment/Plan     Active Hospital Problems    Diagnosis  POA   • **Multiple closed fractures of ribs of right side [S22.41XA]  Yes   • Non-cardiac chest pain [R07.89]  Unknown   • DELROY (obstructive sleep apnea) [G47.33]  Yes   • Essential hypertension [I10]  Yes   • CKD (chronic kidney disease) stage 3, GFR 30-59 ml/min (CMS/[facility]) [N18.30]  Yes   • Hypothyroidism [E03.9]  Yes   • Chronic obstructive pulmonary disease (CMS/[facility]) [J44.9]  Yes      Resolved Hospital Problems   No resolved problems to display.     Ms. Frances is a 81 year old female who presented to the hospital after a fall at home.     Multiple closed fractures of ribs of right side  -Thoracic surgery managing. S/p ORIF 2/16. Chest tube removed. CXR in am. She is stable on 2L and with NC half out of nostril.   -Pain management. Stop Celebrex. Norco and now Dilaudid PCA.    -Encouraged IS and educated on complications of atelectasis and/or pneumonia in this setting. Turn, cough, deep breath.     DELROY  -CPAP at bedside. Avoid oversedation.    HTN  -BP stable on Norvasc. Monitor.    Urine rentention   Overnight now improved. Urology saw patient as social visit.     CKD3  -Creatinine was stable 2/13 at 1.17. now 1.37   -monitor closely. Monitor strict I/O.  daily weights. Monitor lower extremity edema. elevation of legs.    COPD  -no SOB but shallow breathing due to pain. No wheezing.  -Breathing treatments as needed. Continue Symbicort.    Hypothyroidism  -Home replacement.    Constipation  -Miralax and Senokot on board.  PRN suppository      Discussed with patient.      VTE Prophylaxis - SCDs  Code Status - Full code  Disposition - Anticipate discharge TBD.      Alyson MAIER   [facility]  02/17/21  11:39 EST

## 2021-02-17 NOTE — PROGRESS NOTES
"  POST-OPERATIVE NOTE     Chief Complaint: multiple rib fractures, post-operative care  S/P: bronchoscopy with videa assisted thoracoscopy with plating of right ribs 3, 4, 5, 6  POD # 1    Subjective:  Symptoms:  Stable.  She reports chest pain and weakness.  No shortness of breath.    Diet:  No nausea or vomiting.    Activity level: Impaired due to weakness.    Pain:  She complains of pain that is moderate.  Pain is partially controlled.        Objective:  General Appearance:  Uncomfortable and not in pain.    Vital signs: (most recent): Blood pressure 134/62, pulse 60, temperature 97.4 °F (36.3 °C), temperature source Oral, resp. rate 18, height 165.1 cm (65\"), weight 101 kg (223 lb 4.8 oz), SpO2 98 %.  Vital signs are normal.  No fever.    Lungs:  Normal effort and normal respiratory rate.  She is not in respiratory distress.  There are decreased breath sounds.    Heart: Normal rate.  Regular rhythm.    Chest: Chest wall tenderness (right chest wall) present.    Abdomen: Abdomen is soft.    Extremities: There is dependent edema.    Neurological: Patient is alert and oriented to person, place and time.              Chest tube:   Site: Right, Clean, Dry and Securement device intact  Suction: waterseal  Air Leak: negative  24 Hour Total: 220cc    Results Review:     I reviewed the patient's new clinical results.  I reviewed the patient's new imaging results and agree with the interpretation.  I reviewed the patient's other test results and agree with the interpretation  Discussed with patient, son at bedside, RN and Dr. Delong.    Assessment/Plan     Patient is POD#1 s/p ORIF of right rib fractures. Minimal output from chest tube with no large pneumothorax and no air leak. Removed chest tube at bedside without difficulty. Repeat chest x-ray in AM.  Patient having no urination overnight. Bolus given. Patient having urine output this morning. Stop Celebrex.   Complaining of poorly controlled chest wall pain. Will " initiate Dilaudid PCA.   Generalized weakness: PT/OT.    LIVIER Canales  [facility]  02/17/21  11:34 EST    Patient was seen and assessed while wearing personal protective equipment including facemask, protective eyewear and gloves.  Hand hygiene performed prior to entering the room and upon exiting with doffing of gloves.

## 2021-02-17 NOTE — THERAPY TREATMENT NOTE
Patient Name: Luann Frances  : 1940    MRN: [id number]                              Today's Date: 2021       Admit Date: 2021    Visit Dx:     ICD-10-CM ICD-9-CM   1. Closed fracture of multiple ribs of right side, initial encounter  S22.41XA 807.09   2. Right-sided chest wall pain  R07.89 786.52   3. Fall from standing, initial encounter  W19.XXXA E888.9   4. Pancreatic mass  K86.89 577.8   5. Abnormal CT of the abdomen  R93.5 793.6     Patient Active Problem List   Diagnosis   • Atopic rhinitis   • Chronic obstructive pulmonary disease (CMS/HCC)   • Diverticulitis of colon   • Acid reflux   • Fatigue   • Hypothyroidism   • Insomnia   • Knee pain   • Pain of lower extremity   • Shoulder pain   • Ventricular premature beats   • Weight gain   • Cold intolerance   • Post herpetic neuralgia   • Insulin resistance   • Vitamin D deficiency   • CKD (chronic kidney disease) stage 3, GFR 30-59 ml/min (CMS/[facility])   • Mixed hyperlipidemia   • Hypersomnia   • Hyperuricemia   • Essential hypertension   • DELROY (obstructive sleep apnea)   • Renal calculus   • Multiple closed fractures of ribs of right side   • Non-cardiac chest pain     Past Medical History:   Diagnosis Date   • Arthritis    • Asymptomatic PVCs    • Chronic bronchitis (CMS/HCC)    • Fracture of humerus    • GERD (gastroesophageal reflux disease)    • Hyperlipidemia    • Hypertension    • Hypothyroidism    • Pneumonia    • Pulmonary emphysema (CMS/HCC)    • Renal calculi    • Renal calculus 2021   • Sleep apnea     CPAP USED   • Type 2 diabetes mellitus (CMS/HCC)     NO MEDS     Past Surgical History:   Procedure Laterality Date   • APPENDECTOMY     • EXTRACORPOREAL SHOCK WAVE LITHOTRIPSY (ESWL) Left 2021    Procedure: EXTRACORPOREAL SHOCKWAVE LITHOTRIPSY, CYSTOSCOPY, RETROGRADE PYLEOGRAM;  Surgeon: Walter Schwartz MD;  Location: [facility];  Service: Urology;  Laterality: Left;   • EYE SURGERY      cataracts   • HUMERUS FRACTURE  SURGERY     • THORACOSCOPY Right 2/16/2021    Procedure: bronchoscopy, right video assisted THORACOSCOPY WITH PLATING OF 3, 4, 5, AND 6 RIBS;  Surgeon: Kelley Delong MD;  Location: [facility];  Service: Thoracic;  Laterality: Right;   • TOTAL KNEE ARTHROPLASTY Left 04/2015   • UMBILICAL HERNIA REPAIR       General Information     [facility] Name 02/17/21 1604          Physical Therapy Time and Intention    Document Type  therapy note (daily note)  -     Mode of Treatment  physical therapy;individual therapy  -     Row Name 02/17/21 1604          General Information    Patient Profile Reviewed  yes  -     Existing Precautions/Restrictions  fall  -     Row Name 02/17/21 1604          Cognition    Orientation Status (Cognition)  oriented x 4  -[facility] Name 02/17/21 1604          Safety Issues, Functional Mobility    Safety Issues Affecting Function (Mobility)  insight into deficits/self-awareness;awareness of need for assistance;positioning of assistive device;safety precautions follow-through/compliance;safety precaution awareness  -     Impairments Affecting Function (Mobility)  balance;endurance/activity tolerance;strength  -       User Key  (r) = Recorded By, (t) = Taken By, (c) = Cosigned By    Initials Name Provider Type     Cassie Swift, PT Physical Therapist        Mobility     [facility] Name 02/17/21 1604          Bed Mobility    Comment (Bed Mobility)  Up in chair at arrival  -[facility] Name 02/17/21 1604          Transfers    Comment (Transfers)  Mod A x2 for sit to stand from low commode  -[facility] Name 02/17/21 1604          Sit-Stand Transfer    Sit-Stand [place] (Transfers)  minimum assist (75% patient effort);2 person assist;verbal cues;nonverbal cues (demo/gesture)  -     Assistive Device (Sit-Stand Transfers)  walker, front-wheeled  -[facility] Name 02/17/21 1604          Gait/Stairs (Locomotion)    [place] Level (Gait)  verbal cues;minimum assist (75% patient effort);2  person assist;moderate assist (50% patient effort);nonverbal cues (demo/gesture)  -CF     Assistive Device (Gait)  walker, front-wheeled  -CF     Distance in Feet (Gait)  50'  -CF     Deviations/Abnormal Patterns (Gait)  moraima decreased;gait speed decreased;festinating/shuffling  -CF     Bilateral Gait Deviations  forward flexed posture;heel strike decreased  -CF     Comment (Gait/Stairs)  Cues for upright posture and to increase heel strike. pt with tendency to shuffle feet  -CF       User Key  (r) = Recorded By, (t) = Taken By, (c) = Cosigned By    Initials Name Provider Type    Cassie Bob, CHANTALE Physical Therapist        Obj/Interventions     Row Name 02/17/21 1606          Motor Skills    Therapeutic Exercise  other (see comments) encouraged pt to perform AP and QS  -CF       User Key  (r) = Recorded By, (t) = Taken By, (c) = Cosigned By    Initials Name Provider Type    CF Cassie Swift, CHANTALE Physical Therapist        Goals/Plan    No documentation.       Clinical Impression     Row Name 02/17/21 1607          Pain Scale: Numbers Pre/Post-Treatment    Pretreatment Pain Rating  3/10  -CF     Posttreatment Pain Rating  3/10  -CF     Pain Location - Side  Right  -CF     Pain Location  flank  -CF     Pain Intervention(s)  Repositioned;Ambulation/increased activity;Rest  -CF     Row Name 02/17/21 1609          Plan of Care Review    Plan of Care Reviewed With  patient  -CF     Outcome Summary  Pt in chair at arrival and agreeable to PT treatment. She required min A x2 to perform sit<>stand from chair but mod A x2 from commode. Pt ambulated ~50' using RW. Required verbal cues to improve gait mechanics. Pt will require SNF at d/c to address mobility deficits before returning home.  -CF     Row Name 02/17/21 1608          Vital Signs    Pre SpO2 (%)  99  -CF     O2 Delivery Pre Treatment  room air  -CF     O2 Delivery Intra Treatment  room air  -CF     O2 Delivery Post Treatment  room air  -CF     Row Name  02/17/21 1607          Positioning and Restraints    Pre-Treatment Position  sitting in chair/recliner  -CF     Post Treatment Position  chair  -CF     In Chair  notified nsg;reclined;call light within reach;encouraged to call for assist;exit alarm on  -CF       User Key  (r) = Recorded By, (t) = Taken By, (c) = Cosigned By    Initials Name Provider Type    Cassie Bob, CHANTALE Physical Therapist        Outcome Measures     Row Name 02/17/21 1609          How much help from another person do you currently need...    Turning from your back to your side while in flat bed without using bedrails?  3  -CF     Moving from lying on back to sitting on the side of a flat bed without bedrails?  2  -CF     Moving to and from a bed to a chair (including a wheelchair)?  2  -CF     Standing up from a chair using your arms (e.g., wheelchair, bedside chair)?  2  -CF     Climbing 3-5 steps with a railing?  1  -CF     To walk in hospital room?  2  -CF     AM-PAC 6 Clicks Score (PT)  12  -CF     Row Name 02/17/21 1609          Functional Assessment    Outcome Measure Options  AM-PAC 6 Clicks Basic Mobility (PT)  -CF       User Key  (r) = Recorded By, (t) = Taken By, (c) = Cosigned By    Initials Name Provider Type    Cassie Bob PT Physical Therapist        Physical Therapy Education                 Title: PT OT SLP Therapies (In Progress)     Topic: Physical Therapy (Done)     Point: Mobility training (Done)     Learning Progress Summary           Patient Acceptance, E, VU,NR by  at 2/17/2021 1609    Eager, E,TB,D, VU by MARIELY at 2/15/2021 1520    Acceptance, E,TB,D, VU,NR by ARDHA at 2/13/2021 1433                   Point: Home exercise program (Done)     Learning Progress Summary           Patient Acceptance, E, VU,NR by KEMAL at 2/17/2021 1609    Eager, E,TB,D, VU by MARIELY at 2/15/2021 1520    Acceptance, E,TB,D, VU,NR by  at 2/13/2021 1433                   Point: Body mechanics (Done)     Learning Progress Summary            Patient Acceptance, E, VU,NR by  at 2/17/2021 1609    Eager, E,TB,D, VU by  at 2/15/2021 1520    Acceptance, E,TB,D, VU,NR by  at 2/13/2021 1433                               User Key     Initials Effective Dates Name Provider Type Discipline     03/07/18 -  Kayla Small, PTA Physical Therapy Assistant PT     04/03/18 -  Tri Pérez PT Physical Therapist PT     09/02/20 -  Cassie Swift, CHANTALE Physical Therapist PT              PT Recommendation and Plan     Plan of Care Reviewed With: patient  Outcome Summary: Pt in chair at arrival and agreeable to PT treatment. She required min A x2 to perform sit<>stand from chair but mod A x2 from commode. Pt ambulated ~50' using RW. Required verbal cues to improve gait mechanics. Pt will require SNF at d/c to address mobility deficits before returning home.     Time Calculation:   PT Charges     Row Name 02/17/21 1603             Time Calculation    Start Time  1422  -CF      Stop Time  1449  -      Time Calculation (min)  27 min  -CF      PT Received On  02/17/21  -      PT - Next Appointment  02/18/21  -        User Key  (r) = Recorded By, (t) = Taken By, (c) = Cosigned By    Initials Name Provider Type     Cassie Swift, PT Physical Therapist        Therapy Charges for Today     Code Description Service Date Service Provider Modifiers Qty    06639331693 HC PT THER PROC EA 15 MIN 2/17/2021 Cassie Swift, PT GP 1    20072677085 HC PT THERAPEUTIC ACT EA 15 MIN 2/17/2021 Cassie Swift, PT GP 1    82822746300 HC PT THER SUPP EA 15 MIN 2/17/2021 Cassie Swift, PT GP 2          PT G-Codes  Outcome Measure Options: AM-PAC 6 Clicks Basic Mobility (PT)  AM-PAC 6 Clicks Score (PT): 12  AM-PAC 6 Clicks Score (OT): 16    Cassie Swift PT  2/17/2021

## 2021-02-17 NOTE — PROGRESS NOTES
Continued Stay Note  [facility]     Patient Name: Luann Frances  MRN: [id number]  Today's Date: 2/17/2021    Admit Date: 2/12/2021    Discharge Plan     Row Name 02/17/21 1123       Plan    Plan  SNF    Patient/Family in Agreement with Plan  yes    Plan Comments  Spoke with Elen/Connor who states [facility] cannot accept pt's insurance at this time.  Discussed with pt and son. Pt defers to son/Dylan.  Ydlan requests referrals to [facility] and [facility].  Referrals placed in epic.  ARIANA Patterson RN        Discharge Codes    No documentation.             Crystal Patterson, RN

## 2021-02-17 NOTE — PLAN OF CARE
Goal Outcome Evaluation:  Plan of Care Reviewed With: patient  Progress: no change  Outcome Summary: patient vss. pain better controlled with afternoon. patient has PCA now but not really using it much and has to be re-educated continuously on how to use it. chest tube removed today by md. will monitor.

## 2021-02-17 NOTE — DISCHARGE PLACEMENT REQUEST
"Luann Frances (81 y.o. Female)     Date of Birth Social Security Number Address Home Phone MRN    1940  [address]  [place] [address] [phone] [id number]    Shinto Marital Status          None        Admission Date Admission Type Admitting Provider Attending Provider Department, Room/Bed    2/12/21 Emergency Oscar Soria MD Mas[place], Troy Andrew, MD [address], E[address]/1    Discharge Date Discharge Disposition Discharge Destination                       Attending Provider: Oscar Soria MD    Allergies: Ambien [Zolpidem Tartrate], Amoxicillin-pot Clavulanate, Doxycycline, Levofloxacin, Metronidazole, Naproxen, Sulfamethoxazole-trimethoprim, Synthroid [Levothyroxine Sodium]    Isolation: None   Infection: None   Code Status: CPR    Ht: 165.1 cm (65\")   Wt: 101 kg (223 lb 4.8 oz)    Admission Cmt: None   Principal Problem: Multiple closed fractures of ribs of right side [S22.41XA]                 Active Insurance as of 2/12/2021     Primary Coverage     Payor Plan Insurance Group Employer/Plan Group    HUMANA MEDICARE REPLACEMENT HUMANA MEDICARE REPLACEMENT [id number]     Payor Plan Address Payor Plan Phone Number Payor Plan Fax Number Effective Dates    [address] [address] [phone]  1/1/2018 - None Entered    [facility] [address]       Subscriber Name Subscriber Birth Date Member ID       LUANN FRANCES 1940 [id number]                 Emergency Contacts      (Rel.) Home Phone Work Phone Mobile Phone    ENRICOCED LOCKHART (Son) [phone] -- --    Cooper Frances (Son) [phone] -- [phone]          "

## 2021-02-18 ENCOUNTER — APPOINTMENT (OUTPATIENT)
Dept: GENERAL RADIOLOGY | Facility: HOSPITAL | Age: 81
End: 2021-02-18

## 2021-02-18 LAB
ANION GAP SERPL CALCULATED.3IONS-SCNC: 8.6 MMOL/L (ref 5–15)
BUN SERPL-MCNC: 41 MG/DL (ref 8–23)
BUN/CREAT SERPL: 22.2 (ref 7–25)
CALCIUM SPEC-SCNC: 9.2 MG/DL (ref 8.6–10.5)
CHLORIDE SERPL-SCNC: 103 MMOL/L (ref 98–107)
CO2 SERPL-SCNC: 23.4 MMOL/L (ref 22–29)
CREAT SERPL-MCNC: 1.85 MG/DL (ref 0.57–1)
DEPRECATED RDW RBC AUTO: 43 FL (ref 37–54)
ERYTHROCYTE [DISTWIDTH] IN BLOOD BY AUTOMATED COUNT: 12.9 % (ref 12.3–15.4)
GFR SERPL CREATININE-BSD FRML MDRD: 26 ML/MIN/1.73
GLUCOSE SERPL-MCNC: 116 MG/DL (ref 65–99)
HCT VFR BLD AUTO: 32.2 % (ref 34–46.6)
HGB BLD-MCNC: 10.3 G/DL (ref 12–15.9)
MCH RBC QN AUTO: 29.3 PG (ref 26.6–33)
MCHC RBC AUTO-ENTMCNC: 32 G/DL (ref 31.5–35.7)
MCV RBC AUTO: 91.7 FL (ref 79–97)
PLATELET # BLD AUTO: 213 10*3/MM3 (ref 140–450)
PMV BLD AUTO: 11.3 FL (ref 6–12)
POTASSIUM SERPL-SCNC: 4.6 MMOL/L (ref 3.5–5.2)
RBC # BLD AUTO: 3.51 10*6/MM3 (ref 3.77–5.28)
SODIUM SERPL-SCNC: 135 MMOL/L (ref 136–145)
URATE SERPL-MCNC: 8 MG/DL (ref 2.4–5.7)
URATE SERPL-MCNC: 8.3 MG/DL (ref 2.4–5.7)
WBC # BLD AUTO: 8.74 10*3/MM3 (ref 3.4–10.8)

## 2021-02-18 PROCEDURE — 97535 SELF CARE MNGMENT TRAINING: CPT

## 2021-02-18 PROCEDURE — 84550 ASSAY OF BLOOD/URIC ACID: CPT | Performed by: NURSE PRACTITIONER

## 2021-02-18 PROCEDURE — 85027 COMPLETE CBC AUTOMATED: CPT | Performed by: NURSE PRACTITIONER

## 2021-02-18 PROCEDURE — 71045 X-RAY EXAM CHEST 1 VIEW: CPT

## 2021-02-18 PROCEDURE — 94799 UNLISTED PULMONARY SVC/PX: CPT

## 2021-02-18 PROCEDURE — 99024 POSTOP FOLLOW-UP VISIT: CPT | Performed by: NURSE PRACTITIONER

## 2021-02-18 PROCEDURE — 25010000002 HEPARIN (PORCINE) PER 1000 UNITS: Performed by: THORACIC SURGERY (CARDIOTHORACIC VASCULAR SURGERY)

## 2021-02-18 PROCEDURE — 80048 BASIC METABOLIC PNL TOTAL CA: CPT | Performed by: NURSE PRACTITIONER

## 2021-02-18 PROCEDURE — 97110 THERAPEUTIC EXERCISES: CPT

## 2021-02-18 RX ORDER — SODIUM CHLORIDE 9 MG/ML
75 INJECTION, SOLUTION INTRAVENOUS CONTINUOUS
Status: DISCONTINUED | OUTPATIENT
Start: 2021-02-18 | End: 2021-02-19

## 2021-02-18 RX ADMIN — IPRATROPIUM BROMIDE AND ALBUTEROL SULFATE 3 ML: 2.5; .5 SOLUTION RESPIRATORY (INHALATION) at 15:32

## 2021-02-18 RX ADMIN — AMLODIPINE BESYLATE 5 MG: 5 TABLET ORAL at 09:42

## 2021-02-18 RX ADMIN — LEVOTHYROXINE SODIUM 150 MCG: 150 TABLET ORAL at 05:57

## 2021-02-18 RX ADMIN — Medication 1 CAPSULE: at 09:42

## 2021-02-18 RX ADMIN — LIDOCAINE 1 PATCH: 50 PATCH TOPICAL at 09:42

## 2021-02-18 RX ADMIN — ACETAMINOPHEN 1000 MG: 500 TABLET, FILM COATED ORAL at 09:42

## 2021-02-18 RX ADMIN — GABAPENTIN 300 MG: 300 CAPSULE ORAL at 20:20

## 2021-02-18 RX ADMIN — DOCUSATE SODIUM 50MG AND SENNOSIDES 8.6MG 2 TABLET: 8.6; 5 TABLET, FILM COATED ORAL at 20:20

## 2021-02-18 RX ADMIN — DOCUSATE SODIUM 50MG AND SENNOSIDES 8.6MG 2 TABLET: 8.6; 5 TABLET, FILM COATED ORAL at 09:42

## 2021-02-18 RX ADMIN — ALUMINUM HYDROXIDE, MAGNESIUM HYDROXIDE, AND DIMETHICONE 15 ML: 400; 400; 40 SUSPENSION ORAL at 15:06

## 2021-02-18 RX ADMIN — SODIUM CHLORIDE 75 ML/HR: 9 INJECTION, SOLUTION INTRAVENOUS at 22:48

## 2021-02-18 RX ADMIN — OXYCODONE 5 MG: 5 TABLET ORAL at 22:48

## 2021-02-18 RX ADMIN — PANTOPRAZOLE SODIUM 40 MG: 40 TABLET, DELAYED RELEASE ORAL at 05:57

## 2021-02-18 RX ADMIN — HEPARIN SODIUM 5000 UNITS: 5000 INJECTION INTRAVENOUS; SUBCUTANEOUS at 05:58

## 2021-02-18 RX ADMIN — POLYETHYLENE GLYCOL 3350 17 G: 17 POWDER, FOR SOLUTION ORAL at 09:42

## 2021-02-18 RX ADMIN — POTASSIUM CHLORIDE, DEXTROSE MONOHYDRATE AND SODIUM CHLORIDE 50 ML/HR: 150; 5; 450 INJECTION, SOLUTION INTRAVENOUS at 09:41

## 2021-02-18 RX ADMIN — GABAPENTIN 300 MG: 300 CAPSULE ORAL at 09:42

## 2021-02-18 RX ADMIN — OXYCODONE 5 MG: 5 TABLET ORAL at 09:43

## 2021-02-18 RX ADMIN — BUDESONIDE AND FORMOTEROL FUMARATE DIHYDRATE 2 PUFF: 80; 4.5 AEROSOL RESPIRATORY (INHALATION) at 11:40

## 2021-02-18 RX ADMIN — BUDESONIDE AND FORMOTEROL FUMARATE DIHYDRATE 2 PUFF: 80; 4.5 AEROSOL RESPIRATORY (INHALATION) at 20:03

## 2021-02-18 RX ADMIN — HEPARIN SODIUM 5000 UNITS: 5000 INJECTION INTRAVENOUS; SUBCUTANEOUS at 20:20

## 2021-02-18 RX ADMIN — ACETAMINOPHEN 1000 MG: 500 TABLET, FILM COATED ORAL at 15:05

## 2021-02-18 RX ADMIN — HEPARIN SODIUM 5000 UNITS: 5000 INJECTION INTRAVENOUS; SUBCUTANEOUS at 15:00

## 2021-02-18 RX ADMIN — CHLORTHALIDONE 25 MG: 25 TABLET ORAL at 09:42

## 2021-02-18 NOTE — THERAPY TREATMENT NOTE
Patient Name: Luann Frances  : 1940    MRN: [id number]                              Today's Date: 2021       Admit Date: 2021    Visit Dx:     ICD-10-CM ICD-9-CM   1. Closed fracture of multiple ribs of right side, initial encounter  S22.41XA 807.09   2. Right-sided chest wall pain  R07.89 786.52   3. Fall from standing, initial encounter  W19.XXXA E888.9   4. Pancreatic mass  K86.89 577.8   5. Abnormal CT of the abdomen  R93.5 793.6     Patient Active Problem List   Diagnosis   • Atopic rhinitis   • Chronic obstructive pulmonary disease (CMS/HCC)   • Diverticulitis of colon   • Acid reflux   • Fatigue   • Hypothyroidism   • Insomnia   • Knee pain   • Pain of lower extremity   • Shoulder pain   • Ventricular premature beats   • Weight gain   • Cold intolerance   • Post herpetic neuralgia   • Insulin resistance   • Vitamin D deficiency   • CKD (chronic kidney disease) stage 3, GFR 30-59 ml/min (CMS/[facility])   • Mixed hyperlipidemia   • Hypersomnia   • Hyperuricemia   • Essential hypertension   • DELROY (obstructive sleep apnea)   • Renal calculus   • Multiple closed fractures of ribs of right side   • Non-cardiac chest pain     Past Medical History:   Diagnosis Date   • Arthritis    • Asymptomatic PVCs    • Chronic bronchitis (CMS/HCC)    • Fracture of humerus    • GERD (gastroesophageal reflux disease)    • Hyperlipidemia    • Hypertension    • Hypothyroidism    • Pneumonia    • Pulmonary emphysema (CMS/HCC)    • Renal calculi    • Renal calculus 2021   • Sleep apnea     CPAP USED   • Type 2 diabetes mellitus (CMS/HCC)     NO MEDS     Past Surgical History:   Procedure Laterality Date   • APPENDECTOMY     • EXTRACORPOREAL SHOCK WAVE LITHOTRIPSY (ESWL) Left 2021    Procedure: EXTRACORPOREAL SHOCKWAVE LITHOTRIPSY, CYSTOSCOPY, RETROGRADE PYLEOGRAM;  Surgeon: Walter Schwartz MD;  [place] [facility] [place] [facility]  Service: Urology;  Laterality: Left;   • EYE SURGERY      cataracts   • HUMERUS FRACTURE  SURGERY     • THORACOSCOPY Right 2/16/2021    Procedure: bronchoscopy, right video assisted THORACOSCOPY WITH PLATING OF 3, 4, 5, AND 6 RIBS;  Surgeon: Kelley Delong MD;  Location: [facility];  Service: Thoracic;  Laterality: Right;   • TOTAL KNEE ARTHROPLASTY Left 04/2015   • UMBILICAL HERNIA REPAIR       General Information     [facility] Name 02/18/21 1532          OT Time and Intention    Document Type  therapy note (daily note)  -     Mode of Treatment  occupational therapy;individual therapy  -[facility] Name 02/18/21 1532          General Information    Patient Profile Reviewed  yes  -     Existing Precautions/Restrictions  fall  -[facility] Name 02/18/21 1532          Cognition    Orientation Status (Cognition)  oriented x 4  -[facility] Name 02/18/21 1532          Safety Issues, Functional Mobility    Impairments Affecting Function (Mobility)  balance;strength;endurance/activity tolerance;range of motion (ROM);pain  -       User Key  (r) = Recorded By, (t) = Taken By, (c) = Cosigned By    Initials Name Provider Type     Laura Ferguson OT Occupational Therapist          Mobility/ADL's     [facility] Name 02/18/21 1532          Bed Mobility    Comment (Bed Mobility)  Pt up in chair prior to arrival.  -[facility] Name 02/18/21 1532          Transfers    Sit-Stand [place] (Transfers)  minimum assist (75% patient effort);2 person assist;verbal cues  -     [place] Level (Toilet Transfer)  minimum assist (75% patient effort);2 person assist;verbal cues  -     Assistive Device (Toilet Transfer)  walker, standard;grab bars/safety frame  -[facility] Name 02/18/21 1532          Sit-Stand Transfer    Assistive Device (Sit-Stand Transfers)  walker, front-wheeled  -[facility] Name 02/18/21 1532          Functional Mobility    Functional Mobility- Ind. Level  contact guard assist  -     Functional Mobility- Device  rolling walker  -     Functional Mobility- Comment  to bathroom.  -[facility] Name  02/18/21 1532          Activities of Daily Living    BADL Assessment/Intervention  grooming;toileting  -     Row Name 02/18/21 1532          Toileting Assessment/Training    [place] Level (Toileting)  toileting skills;contact guard assist;perform perineal hygiene  -     Assistive Devices (Toileting)  grab bar/safety frame  -     Row Name 02/18/21 1532          Grooming Assessment/Training    [place] Level (Grooming)  grooming skills;hair care, combing/brushing;wash face, hands;minimum assist (75% patient effort)  -     Assistive Devices (Grooming)  -- rwx  -     Position (Grooming)  sink side;supported sitting  -     Comment (Grooming)  min A for hair grooming d/t limited UE ROM/pain with overhead activites in R ribs.  -SM       User Key  (r) = Recorded By, (t) = Taken By, (c) = Cosigned By    Initials Name Provider Type    Laura Hernandez OT Occupational Therapist        Obj/Interventions     Row Name 02/18/21 1535          Balance    Static Standing Balance  mild impairment;supported  -SM     Balance Interventions  standing;occupation based/functional task  -     Comment, Balance  Able to stand sink side 5 minutes engaging in ADL with CGA.  -SM       User Key  (r) = Recorded By, (t) = Taken By, (c) = Cosigned By    Initials Name Provider Type    Laura Hernandez OT Occupational Therapist        Goals/Plan    No documentation.       Clinical Impression     Row Name 02/18/21 1536          Pain Scale: Numbers Pre/Post-Treatment    Pretreatment Pain Rating  6/10  -SM     Posttreatment Pain Rating  6/10  -     Pain Location - Side  Right  -SM     Pain Location  flank  -     Pain Intervention(s)  Repositioned;Rest  -     Row Name 02/18/21 1536          Plan of Care Review    Plan of Care Reviewed With  patient  -     Progress  improving  -     Outcome Summary  Pt tolerates OT session well today, improving transfer and mobility for ADL. Min AX2 for sit to stand from chair and  toilet with rwx/grab bars. CGA for mobility to bathroom and back, tolerated 5 minutes standing sink side for grooming, requires min A d/t impaired UE ROM and increased pain with overhead hair grooming. SOB after activity, on RA O2 drops to 81% but with quick recovery above 90. Plans rehab at d/c to return home safely/independently.  -     Row Name 02/18/21 1536          Therapy Assessment/Plan (OT)    Rehab Potential (OT)  good, to achieve stated therapy goals  -     Criteria for Skilled Therapeutic Interventions Met (OT)  yes;skilled treatment is necessary  -     Row Name 02/18/21 1536          Therapy Plan Review/Discharge Plan (OT)    Anticipated Discharge Disposition (OT)  skilled nursing facility  -[facility] Name 02/18/21 1536          Vital Signs    Pre SpO2 (%)  92  -SM     O2 Delivery Pre Treatment  room air  -SM     Intra SpO2 (%)  81  -SM     O2 Delivery Intra Treatment  room air  -SM     Post SpO2 (%)  93  -SM     O2 Delivery Post Treatment  room air  -SM     Recovery Time  20 seconds seated break, SOB with activity.  -[facility] Name 02/18/21 1536          Positioning and Restraints    Pre-Treatment Position  sitting in chair/recliner  -     Post Treatment Position  chair  -SM     In Chair  call light within reach;encouraged to call for assist;with family/caregiver  -       User Key  (r) = Recorded By, (t) = Taken By, (c) = Cosigned By    Initials Name Provider Type     Laura Ferguson, PANCHITO Occupational Therapist        Outcome Measures     Row Name 02/18/21 8060          How much help from another is currently needed...    Putting on and taking off regular lower body clothing?  2  -SM     Bathing (including washing, rinsing, and drying)  3  -SM     Toileting (which includes using toilet bed pan or urinal)  3  -SM     Putting on and taking off regular upper body clothing  2  -SM     Taking care of personal grooming (such as brushing teeth)  3  -SM     Eating meals  4  -SM     AM-PAC 6  Clicks Score (OT)  17  -SM     Row Name 02/18/21 1540          Functional Assessment    Outcome Measure Options  AM-PAC 6 Clicks Daily Activity (OT)  -       User Key  (r) = Recorded By, (t) = Taken By, (c) = Cosigned By    Initials Name Provider Type    Laura Hernandez OT Occupational Therapist        Occupational Therapy Education                 Title: PT OT SLP Therapies (Done)     Topic: Occupational Therapy (Done)     Point: ADL training (Done)     Description:   Instruct learner(s) on proper safety adaptation and remediation techniques during self care or transfers.   Instruct in proper use of assistive devices.              Learning Progress Summary           Patient Acceptance, E, VU by KT at 2/18/2021 1216    Acceptance, E, VU by OBDULIA at 2/15/2021 1433    Comment: Educated pt on OT role, OT plan of care, safety techniques, and body mechanics for functional transfers and ADLs.                   Point: Home exercise program (Done)     Description:   Instruct learner(s) on appropriate technique for monitoring, assisting and/or progressing therapeutic exercises/activities.              Learning Progress Summary           Patient Acceptance, E, VU by KT at 2/18/2021 1216                   Point: Precautions (Done)     Description:   Instruct learner(s) on prescribed precautions during self-care and functional transfers.              Learning Progress Summary           Patient Acceptance, E, VU by KT at 2/18/2021 1216                   Point: Body mechanics (Done)     Description:   Instruct learner(s) on proper positioning and spine alignment during self-care, functional mobility activities and/or exercises.              Learning Progress Summary           Patient Acceptance, E, VU by KT at 2/18/2021 1216    Acceptance, E, VU by OBDULIA at 2/15/2021 1433    Comment: Educated pt on OT role, OT plan of care, safety techniques, and body mechanics for functional transfers and ADLs.                                User Key     Initials Effective Dates Name Provider Type Discipline    KT 06/16/16 -  Brunilda Luong RN Registered Nurse Nurse    OBDULIA 09/14/20 -  Taylor Mitchell, OT Occupational Therapist OT              OT Recommendation and Plan     Plan of Care Review  Plan of Care Reviewed With: patient  Progress: improving  Outcome Summary: Pt tolerates OT session well today, improving transfer and mobility for ADL. Min AX2 for sit to stand from chair and toilet with rwx/grab bars. CGA for mobility to bathroom and back, tolerated 5 minutes standing sink side for grooming, requires min A d/t impaired UE ROM and increased pain with overhead hair grooming. SOB after activity, on RA O2 drops to 81% but with quick recovery above 90. Plans rehab at d/c to return home safely/independently.     Time Calculation:   Time Calculation- OT     Row Name 02/18/21 1541             Time Calculation- OT    OT Start Time  1308  -      OT Stop Time  1334  -      OT Time Calculation (min)  26 min  -      Total Timed Code Minutes- OT  26 minute(s)  -      OT Received On  02/18/21  -      OT - Next Appointment  02/19/21  -        User Key  (r) = Recorded By, (t) = Taken By, (c) = Cosigned By    Initials Name Provider Type     Laura Ferguson OT Occupational Therapist        Therapy Charges for Today     Code Description Service Date Service Provider Modifiers Qty    [id number]  OT SELF CARE/MGMT/TRAIN EA 15 MIN 2/18/2021 Laura Ferguson OT GO 2               Laura Ferguson OT  2/18/2021

## 2021-02-18 NOTE — DISCHARGE INSTRUCTIONS
Post-op VAT / Thoracotomy Discharge Instructions    1. Activity:  · Climb stairs as tolerated  · May drive car after 2 weeks or as directed by Physician  · Walk at least 3-4 times a day  · Limit lifting for first 6 weeks. No lifting, pushing, or pulling greater than 10 pounds till seen by MD.  · Continue to use your incentive spirometer at least 4 times per day.    2. Nutrition:  · Resume previous diet  · Eat well balanced meals  · Avoid constipation by eating fresh fruits, vegetables, whole grain foods and increasing fluid intake.    3. Incision (wound) Care:  · Remove dressing after 48 hours  · If continued drainage, change dressing every 24 hours and as needed.  · May shower after discharge.  · Wash around incision area with soap and water daily.  · No lotions or creams on incision area.  · Take temperature daily for the first week after discharge.     4. When to call for Medical Advise:  · Fever greater than 101 degrees  · Unusual or severe pain  · Difficulty breathing  · Incision changes (redness, swelling, or increased drainage)  · Any questions or problems    5. Medication Instructions:  · Take Pain medications as directed to stay comfortable  · No driving or drinking alcohol when taking prescribed pain meds  · Laxative of choice if needed for constipation.    6. Medication and dosages:  · See discharge medication instruction form

## 2021-02-18 NOTE — PLAN OF CARE
Goal Outcome Evaluation:  VSS. Pain controlled with PO oxycodone and PCA. Pulmonary hygiene encouraged. UOP better, voiding well. Appeared to sleep well. Will continue to monitor.

## 2021-02-18 NOTE — THERAPY TREATMENT NOTE
Patient Name: Luann Frances  : 1940    MRN: [id number]                              Today's Date: 2021       Admit Date: 2021    Visit Dx:     ICD-10-CM ICD-9-CM   1. Closed fracture of multiple ribs of right side, initial encounter  S22.41XA 807.09   2. Right-sided chest wall pain  R07.89 786.52   3. Fall from standing, initial encounter  W19.XXXA E888.9   4. Pancreatic mass  K86.89 577.8   5. Abnormal CT of the abdomen  R93.5 793.6     Patient Active Problem List   Diagnosis   • Atopic rhinitis   • Chronic obstructive pulmonary disease (CMS/HCC)   • Diverticulitis of colon   • Acid reflux   • Fatigue   • Hypothyroidism   • Insomnia   • Knee pain   • Pain of lower extremity   • Shoulder pain   • Ventricular premature beats   • Weight gain   • Cold intolerance   • Post herpetic neuralgia   • Insulin resistance   • Vitamin D deficiency   • CKD (chronic kidney disease) stage 3, GFR 30-59 ml/min (CMS/[facility])   • Mixed hyperlipidemia   • Hypersomnia   • Hyperuricemia   • Essential hypertension   • DELROY (obstructive sleep apnea)   • Renal calculus   • Multiple closed fractures of ribs of right side   • Non-cardiac chest pain     Past Medical History:   Diagnosis Date   • Arthritis    • Asymptomatic PVCs    • Chronic bronchitis (CMS/HCC)    • Fracture of humerus    • GERD (gastroesophageal reflux disease)    • Hyperlipidemia    • Hypertension    • Hypothyroidism    • Pneumonia    • Pulmonary emphysema (CMS/HCC)    • Renal calculi    • Renal calculus 2021   • Sleep apnea     CPAP USED   • Type 2 diabetes mellitus (CMS/HCC)     NO MEDS     Past Surgical History:   Procedure Laterality Date   • APPENDECTOMY     • EXTRACORPOREAL SHOCK WAVE LITHOTRIPSY (ESWL) Left 2021    Procedure: EXTRACORPOREAL SHOCKWAVE LITHOTRIPSY, CYSTOSCOPY, RETROGRADE PYLEOGRAM;  Surgeon: Walter Schwartz MD;  [place] [facility] [place] [facility] [place]  Service: [place]rology;  Laterality: Left;   • EYE SURGERY      cataracts   • HUMERUS FRACTURE  SURGERY     • THORACOSCOPY Right 2/16/2021    Procedure: bronchoscopy, right video assisted THORACOSCOPY WITH PLATING OF 3, 4, 5, AND 6 RIBS;  Surgeon: Kelley Delong MD;  Location: [facility];  Service: Thoracic;  Laterality: Right;   • TOTAL KNEE ARTHROPLASTY Left 04/2015   • UMBILICAL HERNIA REPAIR       General Information     Row Name 02/18/21 1145          Physical Therapy Time and Intention    Document Type  therapy note (daily note)  -DJ     Mode of Treatment  physical therapy;individual therapy  -DJ     Row Name 02/18/21 1145          General Information    Patient Profile Reviewed  yes  -DJ     Existing Precautions/Restrictions  fall  -DJ     Row Name 02/18/21 1145          Cognition    Orientation Status (Cognition)  oriented x 4 Pleasant and cooperative  -DJ     Row Name 02/18/21 1145          Safety Issues, Functional Mobility    Comment, Safety Issues/Impairments (Mobility)  gt belt, grippy socks  -DJ       User Key  (r) = Recorded By, (t) = Taken By, (c) = Cosigned By    Initials Name Provider Type    DJ Taya Souza, PT Physical Therapist        Mobility     Row Name 02/18/21 1146          Bed Mobility    Comment (Bed Mobility)  Pt UIC upon PT arrival  -DJ     Row Name 02/18/21 1146          Transfers    Comment (Transfers)  sit/stand from EOB  -DJ     Row Name 02/18/21 1146          Bed-Chair Transfer    Bed-Chair [place] (Transfers)  not tested  -DJ     Row Name 02/18/21 1146          Sit-Stand Transfer    Sit-Stand [place] (Transfers)  minimum assist (75% patient effort);2 person assist;verbal cues  -DJ     Assistive Device (Sit-Stand Transfers)  walker, front-wheeled  -DJ     Row Name 02/18/21 1146          Gait/Stairs (Locomotion)    [place] Level (Gait)  minimum assist (75% patient effort);2 person assist;verbal cues  -DJ     Assistive Device (Gait)  walker, front-wheeled  -DJ     Distance in Feet (Gait)  80'  -DJ     Deviations/Abnormal Patterns (Gait)  moraima  decreased;gait speed decreased;festinating/shuffling  -DJ     Bilateral Gait Deviations  forward flexed posture;heel strike decreased  -DJ     [place] Level (Stairs)  not tested  -DJ     Comment (Gait/Stairs)  Pt amb 80' with r wx with min A Of 2 and constant vc for posture and to take bigger steps. Pt has difficulty taking bigger steps and keeping her feet within the r wx. Posture is flexed, pace is slow, endurance is poor, balance is fair.  -DJ       User Key  (r) = Recorded By, (t) = Taken By, (c) = Cosigned By    Initials Name Provider Type    Taya Aguilar PT Physical Therapist        Obj/Interventions     Row Name 02/18/21 1150          Motor Skills    Therapeutic Exercise  -- AP, LAQ  -DJ     Row Name 02/18/21 1150          Balance    Balance Assessment  sitting static balance;standing static balance  -DJ     Static Sitting Balance  WFL;sitting, edge of bed  -DJ     Static Standing Balance  mild impairment;supported;standing  -DJ       User Key  (r) = Recorded By, (t) = Taken By, (c) = Cosigned By    Initials Name Provider Type    Taya Aguilar PT Physical Therapist        Goals/Plan    No documentation.       Clinical Impression     Row Name 02/18/21 1151          Pain    Additional Documentation  Pain Scale: Numbers Pre/Post-Treatment (Group)  -DJ     Row Name 02/18/21 1151          Pain Scale: Numbers Pre/Post-Treatment    Pretreatment Pain Rating  2/10  -DJ     Posttreatment Pain Rating  2/10  -DJ     Pain Location - Side  Right  -DJ     Pre/Posttreatment Pain Comment  expected c/o R side rib discomfort; pt instructed in bracing ribs with pillow for coughing and sneezing  -DJ     Pain Intervention(s)  Repositioned;Rest  -DJ     Row Name 02/18/21 1151          Plan of Care Review    Plan of Care Reviewed With  patient  -DJ     Progress  improving  -DJ     Outcome Summary  Pt UIC upon PT arrival - agreeable to participate in PT. Today, she req min A of 2 for sit/stand from recliner. She amb 80'  with r wx slowly and cautiously with min A of 2 and constant vc to take bigger steps and keep feet closer to r wx. Balance was fair, endurance is poor and limits further amb distance. Cont PT daily to address functional deficits. REcomend SNF to increase strength adn endurance prior to returning home.  -DJ     Row Name 02/18/21 1151          Therapy Assessment/Plan (PT)    Criteria for Skilled Interventions Met (PT)  yes;meets criteria;skilled treatment is necessary  -DJ     Row Name 02/18/21 1151          Vital Signs    O2 Delivery Pre Treatment  room air  -DJ     O2 Delivery Intra Treatment  room air  -DJ     O2 Delivery Post Treatment  room air  -DJ     Pre Patient Position  Sitting  -DJ     Intra Patient Position  Standing  -DJ     Post Patient Position  Sitting  -DJ     Row Name 02/18/21 1151          Positioning and Restraints    Pre-Treatment Position  sitting in chair/recliner  -DJ     Post Treatment Position  chair  -DJ     In Chair  reclined;call light within reach;encouraged to call for assist;exit alarm on  -DJ       User Key  (r) = Recorded By, (t) = Taken By, (c) = Cosigned By    Initials Name Provider Type    Taya Aguilar, PT Physical Therapist        Outcome Measures     Row Name 02/18/21 1154          How much help from another person do you currently need...    Turning from your back to your side while in flat bed without using bedrails?  3  -DJ     Moving from lying on back to sitting on the side of a flat bed without bedrails?  2  -DJ     Moving to and from a bed to a chair (including a wheelchair)?  2  -DJ     Standing up from a chair using your arms (e.g., wheelchair, bedside chair)?  3  -DJ     Climbing 3-5 steps with a railing?  1  -DJ     To walk in hospital room?  2  -DJ     AM-PAC 6 Clicks Score (PT)  13  -DJ     Row Name 02/18/21 1155          Functional Assessment    Outcome Measure Options  AM-PAC 6 Clicks Basic Mobility (PT)  -DJ       User Key  (r) = Recorded By, (t) = Taken By,  (c) = Cosigned By    Initials Name Provider Type    Taya Aguilar, PT Physical Therapist        Physical Therapy Education                 Title: PT OT SLP Therapies (In Progress)     Topic: Physical Therapy (Done)     Point: Mobility training (Done)     Learning Progress Summary           Patient Acceptance, TB, VU by  at 2/18/2021 1156    Acceptance, E, VU,NR by  at 2/17/2021 1609    Eager, E,TB,D, VU by  at 2/15/2021 1520    Acceptance, E,TB,D, VU,NR by  at 2/13/2021 1433                   Point: Home exercise program (Done)     Learning Progress Summary           Patient Acceptance, TB, VU by  at 2/18/2021 1156    Acceptance, E, VU,NR by  at 2/17/2021 1609    Eager, E,TB,D, VU by  at 2/15/2021 1520    Acceptance, E,TB,D, VU,NR by  at 2/13/2021 1433                   Point: Body mechanics (Done)     Learning Progress Summary           Patient Acceptance, TB, VU by  at 2/18/2021 1156    Acceptance, E, VU,NR by  at 2/17/2021 1609    Eager, E,TB,D, VU by  at 2/15/2021 1520    Acceptance, E,TB,D, VU,NR by  at 2/13/2021 1433                               User Key     Initials Effective Dates Name Provider Type Discipline     03/07/18 -  Kayla Small, PTA Physical Therapy Assistant PT     04/03/18 -  Tri Pérez, PT Physical Therapist PT     10/25/19 -  Taya Souza, PT Physical Therapist PT     09/02/20 -  Cassie Swift, CHANTALE Physical Therapist PT              PT Recommendation and Plan     Plan of Care Reviewed With: patient  Progress: improving  Outcome Summary: Pt UIC upon PT arrival - agreeable to participate in PT. Today, she req min A of 2 for sit/stand from recliner. She amb 80' with r wx slowly and cautiously with min A of 2 and constant vc to take bigger steps and keep feet closer to r wx. Balance was fair, endurance is poor and limits further amb distance. Cont PT daily to address functional deficits. REcomend SNF to increase strength adn endurance prior to  returning home.     Time Calculation:   PT Charges     Row Name 02/18/21 1157             Time Calculation    Start Time  1027  -DJ      Stop Time  1048  -DJ      Time Calculation (min)  21 min  -DJ      PT Non-Billable Time (min)  10 min  -DJ      PT Received On  02/18/21  -DJ      PT - Next Appointment  02/19/21  -DJ        User Key  (r) = Recorded By, (t) = Taken By, (c) = Cosigned By    Initials Name Provider Type    Taya Aguilar, CHANTALE Physical Therapist        Therapy Charges for Today     Code Description Service Date Service Provider Modifiers Qty    67232925818 HC PT THER PROC EA 15 MIN 2/18/2021 Taya Souza, PT GP 1    56270245569 HC PT THER SUPP EA 15 MIN 2/18/2021 Taya Souza, PT GP 1          PT G-Codes  Outcome Measure Options: AM-PAC 6 Clicks Basic Mobility (PT)  AM-PAC 6 Clicks Score (PT): 13  AM-PAC 6 Clicks Score (OT): 16    Taya Souza PT  2/18/2021

## 2021-02-18 NOTE — PROGRESS NOTES
Continued Stay Note  [facility]     Patient Name: Luann Frances  MRN: [id number]  Today's Date: 2/18/2021    Admit Date: 2/12/2021    Discharge Plan     Row Name 02/18/21 1338       Plan    Plan  [facility]    Patient/Family in Agreement with Plan  yes    Plan Comments  Pt accpeted to [facility].  Discussed with pt who states [facility] is her first choice.  Spoke with [place]/[facility] who states they will have bed tomorrow and precert is pending.  Pts son to transport.  ARIANA Patterson RN        Discharge Codes    No documentation.             Crystal Patterson, RN

## 2021-02-18 NOTE — PROGRESS NOTES
Name: Luann Frances ADMIT: 2021   : 1940  PCP: Keri Foreman MD    MRN: [id number] LOS: 5 days   AGE/SEX: 81 y.o. female  ROOM: [facility]     Subjective   Subjective   Pain is better but worse with movement and deep breathe. Eating well. No nausea or vomiting.  lower extremity swelling that she states is chronic but maybe a bit worse today. Good IS use.   Denies CP, SOB, fever chills, no cough/fever/chills.     Objective   Objective   Vital Signs  Temp:  [96.9 °F (36.1 °C)-97.6 °F (36.4 °C)] 97.6 °F (36.4 °C)  Heart Rate:  [56-72] 71  Resp:  [16-20] 16  BP: (102-136)/(42-94) 115/42  SpO2:  [91 %-100 %] 96 %  on  Flow (L/min):  [0-3] 0;   Device (Oxygen Therapy): room air  Body mass index is 37.16 kg/m².     Physical Exam  Vitals signs and nursing note reviewed.   Constitutional:       General: She is not in acute distress.     Appearance: She is obese. She is not toxic-appearing.   HENT:      Head: Normocephalic and atraumatic.   Eyes:      General:         Right eye: No discharge.         Left eye: No discharge.      Conjunctiva/sclera: Conjunctivae normal.   Neck:      Musculoskeletal: Normal range of motion and neck supple.   Cardiovascular:      Rate and Rhythm: Normal rate and regular rhythm.      Pulses: Normal pulses.      Heart sounds: Normal heart sounds.   Pulmonary:      Effort: Pulmonary effort is normal. No respiratory distress.      Comments: Diminished at the bases posteriorly. Tender right chest at tube site. Dressing cdi  Abdominal:      General: Bowel sounds are normal. There is no distension.      Palpations: Abdomen is soft.      Tenderness: There is no abdominal tenderness.   Musculoskeletal: Normal range of motion.         General: Swelling (BLE edema) present.   Skin:     General: Skin is warm and dry.      Findings: No bruising.   Neurological:      Mental Status: She is alert and oriented to person, place, and time.      Sensory: No sensory deficit.      Motor: No  weakness ( ).      Coordination: Coordination normal.   Psychiatric:         Mood and Affect: Mood normal.         Behavior: Behavior normal.        Results Review:       I reviewed the patient's new clinical results.  Results from last 7 days   Lab Units 02/18/21  1246 02/17/21  0526 02/16/21  0531 02/13/21  0516   WBC 10*3/mm3 8.74 8.03 7.00 7.47   HEMOGLOBIN g/dL 10.3* 11.9* 11.0* 12.0   PLATELETS 10*3/mm3 213 233 224 241     Results from last 7 days   Lab Units 02/17/21  0526 02/16/21  0531 02/13/21  0516 02/12/21  1534   SODIUM mmol/L 139 137 138 141   POTASSIUM mmol/L 4.9 3.8 4.0 4.4   CHLORIDE mmol/L 104 104 105 105   CO2 mmol/L 21.7* 24.5 22.2 26.3   BUN mg/dL 33* 36* 24* 21   CREATININE mg/dL 1.37* 1.29* 1.17* 1.06*   GLUCOSE mg/dL 129* 128* 100* 122*   Estimated Creatinine Clearance: 38.6 mL/min (A) (by C-G formula based on SCr of 1.37 mg/dL (H)).  Results from last 7 days   Lab Units 02/12/21  1534   ALBUMIN g/dL 4.00   BILIRUBIN mg/dL 0.4   ALK PHOS U/L 63   AST (SGOT) U/L 15   ALT (SGPT) U/L 19     Results from last 7 days   Lab Units 02/17/21  0526 02/16/21  0531 02/13/21  0516 02/12/21  1534   CALCIUM mg/dL 9.1 9.4 9.8 10.1   ALBUMIN g/dL  --   --   --  4.00       Glucose   Date/Time Value Ref Range Status   02/16/2021 1625 131 (H) 70 - 130 mg/dL Final       Intake/Output Summary (Last 24 hours) at 2/18/2021 1453  Last data filed at 2/18/2021 1200  Gross per 24 hour   Intake 1540 ml   Output 1450 ml   Net 90 ml         acetaminophen, 1,000 mg, Oral, TID  amLODIPine, 5 mg, Oral, Daily  budesonide-formoterol, 2 puff, Inhalation, BID - RT  chlorthalidone, 25 mg, Oral, Daily  gabapentin, 300 mg, Oral, Q12H  heparin (porcine), 5,000 Units, Subcutaneous, Q8H  ipratropium-albuterol, 3 mL, Nebulization, Q8H - RT  lactobacillus acidophilus, 1 capsule, Oral, Daily  levothyroxine, 150 mcg, Oral, Q AM  lidocaine, 1 patch, Transdermal, Q24H  pantoprazole, 40 mg, Oral, QAM  polyethylene glycol, 17 g, Oral,  Daily  senna-docusate sodium, 2 tablet, Oral, BID       Diet Regular; Cardiac       Assessment/Plan     Active Hospital Problems    Diagnosis  POA   • **Multiple closed fractures of ribs of right side [S22.41XA]  Yes   • Non-cardiac chest pain [R07.89]  Unknown   • DELROY (obstructive sleep apnea) [G47.33]  Yes   • Essential hypertension [I10]  Yes   • CKD (chronic kidney disease) stage 3, GFR 30-59 ml/min (CMS/[facility]) [N18.30]  Yes   • Hypothyroidism [E03.9]  Yes   • Chronic obstructive pulmonary disease (CMS/[facility]) [J44.9]  Yes      Resolved Hospital Problems   No resolved problems to display.     Ms. Frances is a 81 year old female who presented to the hospital after a fall at home.     Multiple closed fractures of ribs of right side  -Thoracic surgery managing. S/p ORIF 2/16. Chest tube removed. CXR this am is stable. She is stable on room air and cpap at night   -Pain management. Stop Celebrex. Stop dilaudid PCA.  PRN roxicodone.   -Encouraged IS and educated on complications of atelectasis and/or pneumonia in this setting. Turn, cough, deep breath.     DELROY  -CPAP at bedside. Avoid oversedation.    HTN  -BP stable on Norvasc. Monitor.    Urine rentention   Overnight now improved. Urology saw patient as social visit.     CKD3  -Creatinine was stable 2/13 at 1.17. Awaiting repeat BMP   -monitor closely. Monitor strict I/O.  daily weights. Monitor lower extremity edema. elevation of legs.    COPD  -no SOB but shallow breathing due to pain. No wheezing.  -Breathing treatments as needed. Continue Symbicort.    Hypothyroidism  -Home replacement.    Constipation  -Miralax and Senokot on board.  PRN suppository     Discussed with patient.      VTE Prophylaxis - SCDs  Code Status - Full code  Disposition - Anticipate discharge TBZAFAR MAIER   [facility]  02/18/21  14:53 EST     Addendum:  Crt increased to 1.8. She has adequate po intake and on IVF (d5kcl per surgery) at 50cc/hr which was  held at today lunch time. BMP resulted this afternoon. Check PVR. Increase IO monitoring.Hold chlorthalidone for now.  Pt euvolemic if not vol. overloaded with BLE edema. Weight increasing, may need diuresis. Consult renal for assistance. 1950

## 2021-02-18 NOTE — PROGRESS NOTES
"  POST-OPERATIVE NOTE     Chief Complaint: multiple rib fractures, post-operative care  S/P: bronchoscopy with videa assisted thoracoscopy with plating of right ribs 3, 4, 5, 6  POD # 3    Subjective:  Symptoms:  Stable.  She reports chest pain and weakness.  No shortness of breath.    Diet:  No nausea or vomiting.    Activity level: Impaired due to weakness.    Pain:  She complains of pain that is moderate.  Pain is partially controlled.        Objective:  General Appearance:  Uncomfortable and not in pain.    Vital signs: (most recent): Blood pressure 115/42, pulse 66, temperature 97.6 °F (36.4 °C), temperature source Oral, resp. rate 18, height 165.1 cm (65\"), weight 101 kg (223 lb 4.8 oz), SpO2 98 %.  Vital signs are normal.  No fever.    Output: Producing urine.    Lungs:  Normal effort and normal respiratory rate.  She is not in respiratory distress.  There are decreased breath sounds.    Heart: Normal rate.  Regular rhythm.    Chest: Chest wall tenderness (right chest wall) present.    Abdomen: Abdomen is soft.    Extremities: There is dependent edema.    Neurological: Patient is alert and oriented to person, place and time.        Results Review:     I reviewed the patient's new clinical results.  I reviewed the patient's new imaging results and agree with the interpretation.  I reviewed the patient's other test results and agree with the interpretation  Discussed with patient, RN and Dr. Cross.    Assessment/Plan     Patient is POD#2 s/p ORIF of right rib fractures. AM CXR is stable.  Incisions are C/D/I.  DC Dilaudid PCA and IV fluids.  As needed pain medication and ERAS protocol in place.  Generalized weakness: PT/OT.  Will likely need rehab at discharge.  Patient may discharge anytime from our standpoint.  We will plan follow-up in our office in 2 weeks with a hospital performed chest x-ray.    LIVIER Canales  [facility]  02/18/21  11:29 EST    Patient was seen and assessed " while wearing personal protective equipment including facemask, protective eyewear and gloves.  Hand hygiene performed prior to entering the room and upon exiting with doffing of gloves.

## 2021-02-18 NOTE — PLAN OF CARE
Goal Outcome Evaluation:  Plan of Care Reviewed With: patient  Progress: improving  Outcome Summary: Pt UIC upon PT arrival - agreeable to participate in PT. Today, she req min A of 2 for sit/stand from recliner. She amb 80' with r wx slowly and cautiously with min A of 2 and constant vc to take bigger steps and keep feet closer to r wx. Balance was fair, endurance is poor and limits further amb distance. Cont PT daily to address functional deficits. REcomend SNF to increase strength adn endurance prior to returning home.  Patient was intermittently wearing a face mask during this therapy encounter. Therapist used appropriate personal protective equipment including eye protection, mask, and gloves.  Mask used was standard procedure mask. Appropriate PPE was worn during the entire therapy session. Hand hygiene was completed before and after therapy session. Patient is not in enhanced droplet precautions.  PT Tech Kristie

## 2021-02-18 NOTE — PLAN OF CARE
Goal Outcome Evaluation:  Plan of Care Reviewed With: patient  Progress: improving  Outcome Summary: Pt tolerates OT session well today, improving transfer and mobility for ADL. Min AX2 for sit to stand from chair and toilet with rwx/grab bars. CGA for mobility to bathroom and back, tolerated 5 minutes standing sink side for grooming, requires min A d/t impaired UE ROM and increased pain with overhead hair grooming. SOB after activity, on RA O2 drops to 81% but with quick recovery above 90. Plans rehab at d/c to return home safely/independently.    Appropriate PPE worn during encounter including gloves, mask, and eye protection. Hand hygiene completed. Pt wore a mask.

## 2021-02-19 LAB
ANION GAP SERPL CALCULATED.3IONS-SCNC: 10 MMOL/L (ref 5–15)
ANION GAP SERPL CALCULATED.3IONS-SCNC: 10.7 MMOL/L (ref 5–15)
BUN SERPL-MCNC: 38 MG/DL (ref 8–23)
BUN SERPL-MCNC: 40 MG/DL (ref 8–23)
BUN/CREAT SERPL: 22.6 (ref 7–25)
BUN/CREAT SERPL: 23.8 (ref 7–25)
CALCIUM SPEC-SCNC: 8.9 MG/DL (ref 8.6–10.5)
CALCIUM SPEC-SCNC: 9.5 MG/DL (ref 8.6–10.5)
CHLORIDE SERPL-SCNC: 100 MMOL/L (ref 98–107)
CHLORIDE SERPL-SCNC: 105 MMOL/L (ref 98–107)
CO2 SERPL-SCNC: 21 MMOL/L (ref 22–29)
CO2 SERPL-SCNC: 23.3 MMOL/L (ref 22–29)
CREAT SERPL-MCNC: 1.6 MG/DL (ref 0.57–1)
CREAT SERPL-MCNC: 1.77 MG/DL (ref 0.57–1)
DEPRECATED RDW RBC AUTO: 44.5 FL (ref 37–54)
ERYTHROCYTE [DISTWIDTH] IN BLOOD BY AUTOMATED COUNT: 13.1 % (ref 12.3–15.4)
GFR SERPL CREATININE-BSD FRML MDRD: 28 ML/MIN/1.73
GFR SERPL CREATININE-BSD FRML MDRD: 31 ML/MIN/1.73
GLUCOSE SERPL-MCNC: 105 MG/DL (ref 65–99)
GLUCOSE SERPL-MCNC: 126 MG/DL (ref 65–99)
HCT VFR BLD AUTO: 33 % (ref 34–46.6)
HGB BLD-MCNC: 10.7 G/DL (ref 12–15.9)
MCH RBC QN AUTO: 30.1 PG (ref 26.6–33)
MCHC RBC AUTO-ENTMCNC: 32.4 G/DL (ref 31.5–35.7)
MCV RBC AUTO: 93 FL (ref 79–97)
PLATELET # BLD AUTO: 208 10*3/MM3 (ref 140–450)
PMV BLD AUTO: 11.5 FL (ref 6–12)
POTASSIUM SERPL-SCNC: 4.8 MMOL/L (ref 3.5–5.2)
POTASSIUM SERPL-SCNC: 4.9 MMOL/L (ref 3.5–5.2)
RBC # BLD AUTO: 3.55 10*6/MM3 (ref 3.77–5.28)
SODIUM SERPL-SCNC: 134 MMOL/L (ref 136–145)
SODIUM SERPL-SCNC: 136 MMOL/L (ref 136–145)
WBC # BLD AUTO: 9.41 10*3/MM3 (ref 3.4–10.8)

## 2021-02-19 PROCEDURE — 94799 UNLISTED PULMONARY SVC/PX: CPT

## 2021-02-19 PROCEDURE — 97116 GAIT TRAINING THERAPY: CPT

## 2021-02-19 PROCEDURE — 25010000002 HEPARIN (PORCINE) PER 1000 UNITS: Performed by: THORACIC SURGERY (CARDIOTHORACIC VASCULAR SURGERY)

## 2021-02-19 PROCEDURE — 97110 THERAPEUTIC EXERCISES: CPT

## 2021-02-19 PROCEDURE — 80048 BASIC METABOLIC PNL TOTAL CA: CPT | Performed by: NURSE PRACTITIONER

## 2021-02-19 PROCEDURE — 97530 THERAPEUTIC ACTIVITIES: CPT

## 2021-02-19 PROCEDURE — 25010000002 FUROSEMIDE PER 20 MG: Performed by: INTERNAL MEDICINE

## 2021-02-19 PROCEDURE — 85027 COMPLETE CBC AUTOMATED: CPT | Performed by: NURSE PRACTITIONER

## 2021-02-19 RX ORDER — FUROSEMIDE 10 MG/ML
40 INJECTION INTRAMUSCULAR; INTRAVENOUS ONCE
Status: COMPLETED | OUTPATIENT
Start: 2021-02-19 | End: 2021-02-19

## 2021-02-19 RX ADMIN — OXYCODONE 5 MG: 5 TABLET ORAL at 04:22

## 2021-02-19 RX ADMIN — PANTOPRAZOLE SODIUM 40 MG: 40 TABLET, DELAYED RELEASE ORAL at 06:53

## 2021-02-19 RX ADMIN — GABAPENTIN 300 MG: 300 CAPSULE ORAL at 22:06

## 2021-02-19 RX ADMIN — OXYCODONE 5 MG: 5 TABLET ORAL at 12:34

## 2021-02-19 RX ADMIN — LEVOTHYROXINE SODIUM 150 MCG: 150 TABLET ORAL at 06:53

## 2021-02-19 RX ADMIN — HEPARIN SODIUM 5000 UNITS: 5000 INJECTION INTRAVENOUS; SUBCUTANEOUS at 06:53

## 2021-02-19 RX ADMIN — HEPARIN SODIUM 5000 UNITS: 5000 INJECTION INTRAVENOUS; SUBCUTANEOUS at 15:00

## 2021-02-19 RX ADMIN — Medication 1 CAPSULE: at 10:00

## 2021-02-19 RX ADMIN — BUDESONIDE AND FORMOTEROL FUMARATE DIHYDRATE 2 PUFF: 80; 4.5 AEROSOL RESPIRATORY (INHALATION) at 06:42

## 2021-02-19 RX ADMIN — LIDOCAINE 1 PATCH: 50 PATCH TOPICAL at 10:00

## 2021-02-19 RX ADMIN — BUDESONIDE AND FORMOTEROL FUMARATE DIHYDRATE 2 PUFF: 80; 4.5 AEROSOL RESPIRATORY (INHALATION) at 20:51

## 2021-02-19 RX ADMIN — GABAPENTIN 300 MG: 300 CAPSULE ORAL at 10:00

## 2021-02-19 RX ADMIN — HEPARIN SODIUM 5000 UNITS: 5000 INJECTION INTRAVENOUS; SUBCUTANEOUS at 22:06

## 2021-02-19 RX ADMIN — DOCUSATE SODIUM 50MG AND SENNOSIDES 8.6MG 2 TABLET: 8.6; 5 TABLET, FILM COATED ORAL at 10:00

## 2021-02-19 RX ADMIN — DOCUSATE SODIUM 50MG AND SENNOSIDES 8.6MG 2 TABLET: 8.6; 5 TABLET, FILM COATED ORAL at 22:06

## 2021-02-19 RX ADMIN — FUROSEMIDE 40 MG: 10 INJECTION, SOLUTION INTRAMUSCULAR; INTRAVENOUS at 10:00

## 2021-02-19 NOTE — PROGRESS NOTES
Name: Luann Frances ADMIT: 2021   : 1940  PCP: Keri Foreman MD    MRN: [id number] LOS: 6 days   AGE/SEX: 81 y.o. female  ROOM: [facility]     Subjective   Subjective   Pain persists with movement and deep breathe but tolerable with roxicodone.  Told Tylenol upset her stomach so this has been held.. Eating okay. No nausea or vomiting.  Increased lower extremity swelling compared to her chronic baseline consistent with volume overload coinciding with MAILE development yesterday.  Good IS use.   Denies CP, SOB, fever chills, no cough/fever/chills.     Objective   Objective   Vital Signs  Temp:  [97.2 °F (36.2 °C)-97.8 °F (36.6 °C)] 97.5 °F (36.4 °C)  Heart Rate:  [] 103  Resp:  [16-18] 18  BP: (102-139)/(41-87) 116/71  SpO2:  [91 %-100 %] 91 %  on   ;   Device (Oxygen Therapy): room air  Body mass index is 37.16 kg/m².     Physical Exam  Vitals signs and nursing note reviewed.   Constitutional:       General: She is not in acute distress.     Appearance: She is obese. She is not toxic-appearing.   HENT:      Head: Normocephalic and atraumatic.   Eyes:      General:         Right eye: No discharge.         Left eye: No discharge.      Conjunctiva/sclera: Conjunctivae normal.   Neck:      Musculoskeletal: Normal range of motion and neck supple.   Cardiovascular:      Rate and Rhythm: Normal rate and regular rhythm.      Pulses: Normal pulses.      Heart sounds: Normal heart sounds.   Pulmonary:      Effort: Pulmonary effort is normal. No respiratory distress.      Comments: Diminished at the bases posteriorly. Tender right chest wall. Dressing cdi  Abdominal:      General: Bowel sounds are normal. There is no distension.      Palpations: Abdomen is soft.      Tenderness: There is no abdominal tenderness.   Musculoskeletal: Normal range of motion.         General: Swelling (BLE edema) present.      Right lower leg: Edema ( ) present.      Left lower leg: Edema present.   Skin:     General: Skin  is warm and dry.      Findings: No bruising.   Neurological:      Mental Status: She is alert and oriented to person, place, and time.      Sensory: No sensory deficit.      Motor: No weakness ( ).      Coordination: Coordination normal.   Psychiatric:         Mood and Affect: Mood normal.         Behavior: Behavior normal.        Results Review:       I reviewed the patient's new clinical results.  Results from last 7 days   Lab Units 02/19/21  0425 02/18/21  1246 02/17/21  0526 02/16/21  0531   WBC 10*3/mm3 9.41 8.74 8.03 7.00   HEMOGLOBIN g/dL 10.7* 10.3* 11.9* 11.0*   PLATELETS 10*3/mm3 208 213 233 224     Results from last 7 days   Lab Units 02/19/21  0425 02/18/21  1507 02/17/21  0526 02/16/21  0531   SODIUM mmol/L 136 135* 139 137   POTASSIUM mmol/L 4.9 4.6 4.9 3.8   CHLORIDE mmol/L 105 103 104 104   CO2 mmol/L 21.0* 23.4 21.7* 24.5   BUN mg/dL 40* 41* 33* 36*   CREATININE mg/dL 1.77* 1.85* 1.37* 1.29*   GLUCOSE mg/dL 105* 116* 129* 128*   Estimated Creatinine Clearance: 29.9 mL/min (A) (by C-G formula based on SCr of 1.77 mg/dL (H)).  Results from last 7 days   Lab Units 02/12/21  1534   ALBUMIN g/dL 4.00   BILIRUBIN mg/dL 0.4   ALK PHOS U/L 63   AST (SGOT) U/L 15   ALT (SGPT) U/L 19     Results from last 7 days   Lab Units 02/19/21  0425 02/18/21  1507 02/17/21  0526 02/16/21  0531  02/12/21  1534   CALCIUM mg/dL 9.5 9.2 9.1 9.4   < > 10.1   ALBUMIN g/dL  --   --   --   --   --  4.00    < > = values in this interval not displayed.       Glucose   Date/Time Value Ref Range Status   02/16/2021 1625 131 (H) 70 - 130 mg/dL Final       Intake/Output Summary (Last 24 hours) at 2/19/2021 1451  Last data filed at 2/19/2021 1300  Gross per 24 hour   Intake 1271.25 ml   Output 3100 ml   Net -1828.75 ml         budesonide-formoterol, 2 puff, Inhalation, BID - RT  gabapentin, 300 mg, Oral, Q12H  heparin (porcine), 5,000 Units, Subcutaneous, Q8H  lactobacillus acidophilus, 1 capsule, Oral, Daily  levothyroxine, 150 mcg,  Oral, Q AM  lidocaine, 1 patch, Transdermal, Q24H  pantoprazole, 40 mg, Oral, QAM  polyethylene glycol, 17 g, Oral, Daily  senna-docusate sodium, 2 tablet, Oral, BID       Diet Regular; Cardiac       Assessment/Plan     Active Hospital Problems    Diagnosis  POA   • **Multiple closed fractures of ribs of right side [S22.41XA]  Yes   • Non-cardiac chest pain [R07.89]  Unknown   • DELROY (obstructive sleep apnea) [G47.33]  Yes   • Essential hypertension [I10]  Yes   • CKD (chronic kidney disease) stage 3, GFR 30-59 ml/min (CMS/HCC) [N18.30]  Yes   • Hypothyroidism [E03.9]  Yes   • Chronic obstructive pulmonary disease (CMS/[facility]) [J44.9]  Yes      Resolved Hospital Problems   No resolved problems to display.     Ms. Frances is a 81 year old female who presented to the hospital after a fall at home.     Multiple closed fractures of ribs of right side  -Thoracic surgery signed off. S/p ORIF 2/16. Chest tube removed. CXR 2/18  is stable. She is stable on room air and cpap at night   -Pain management. Stop Celebrex/ tylenol/dilaudid PCA. Now on PRN roxicodone and lidoderm patch  -Encouraged IS and educated on complications of atelectasis and/or pneumonia in this setting. Turn, cough, deep breath.     MAILE/ CKD3  -Creatinine was stable 2/13 at 1.17 then slowly increased but with significant increase yesterday to 1.8 secondary to volume overload.  Nephrology consulted yesterday evening and appreciate their assistance.  -IV Lasix x1 given this morning.  Repeat renal panel this afternoon.  She might benefit from another dose of IV Lasix later this evening. However, will need to monitor blood pressure.  -Hold amlodipine as contributing factor to BLE edema.  Will touch base with nephrology regarding continuing home chlorthalidone at discharge.   -  Monitor strict I/O.  daily weights. Monitor lower extremity edema. elevation of legs.      DELROY  -CPAP at bedside. Avoid oversedation.    HTN  -BP stable on Norvasc. Monitor.    Urine  rentention   Overnight now improved. Urology saw patient as social visit.        COPD  -no SOB but shallow breathing due to pain. No wheezing.  -Breathing treatments as needed. Continue Symbicort.    Hypothyroidism  -Home replacement.    Constipation  -Miralax and Senokot on board. +BMs.  PRN suppository     Discussed with patient.      VTE Prophylaxis - SCDs  Code Status - Full code  Disposition - Anticipate discharge when volume status normalized, creatinine stable, and [facility].  Hopefully this will all occur tomorrow.      Alyson MAIER   [facility]  02/19/21  14:51 EST

## 2021-02-19 NOTE — THERAPY TREATMENT NOTE
Patient Name: Luann Frances  : 1940    MRN: [id number]                              Today's Date: 2021       Admit Date: 2021    Visit Dx:     ICD-10-CM ICD-9-CM   1. Closed fracture of multiple ribs of right side, initial encounter  S22.41XA 807.09   2. Right-sided chest wall pain  R07.89 786.52   3. Fall from standing, initial encounter  W19.XXXA E888.9   4. Pancreatic mass  K86.89 577.8   5. Abnormal CT of the abdomen  R93.5 793.6     Patient Active Problem List   Diagnosis   • Atopic rhinitis   • Chronic obstructive pulmonary disease (CMS/HCC)   • Diverticulitis of colon   • Acid reflux   • Fatigue   • Hypothyroidism   • Insomnia   • Knee pain   • Pain of lower extremity   • Shoulder pain   • Ventricular premature beats   • Weight gain   • Cold intolerance   • Post herpetic neuralgia   • Insulin resistance   • Vitamin D deficiency   • CKD (chronic kidney disease) stage 3, GFR 30-59 ml/min (CMS/[facility])   • Mixed hyperlipidemia   • Hypersomnia   • Hyperuricemia   • Essential hypertension   • DELROY (obstructive sleep apnea)   • Renal calculus   • Multiple closed fractures of ribs of right side   • Non-cardiac chest pain     Past Medical History:   Diagnosis Date   • Arthritis    • Asymptomatic PVCs    • Chronic bronchitis (CMS/HCC)    • Fracture of humerus    • GERD (gastroesophageal reflux disease)    • Hyperlipidemia    • Hypertension    • Hypothyroidism    • Pneumonia    • Pulmonary emphysema (CMS/HCC)    • Renal calculi    • Renal calculus 2021   • Sleep apnea     CPAP USED   • Type 2 diabetes mellitus (CMS/HCC)     NO MEDS     Past Surgical History:   Procedure Laterality Date   • APPENDECTOMY     • EXTRACORPOREAL SHOCK WAVE LITHOTRIPSY (ESWL) Left 2021    Procedure: EXTRACORPOREAL SHOCKWAVE LITHOTRIPSY, CYSTOSCOPY, RETROGRADE PYLEOGRAM;  Surgeon: Walter Schwartz MD;  Location: [facility];  Service: Urology;  Laterality: Left;   • EYE SURGERY      cataracts   • HUMERUS FRACTURE  SURGERY     • THORACOSCOPY Right 2/16/2021    Procedure: bronchoscopy, right video assisted THORACOSCOPY WITH PLATING OF 3, 4, 5, AND 6 RIBS;  Surgeon: Kelley Delong MD;  Location: [facility];  Service: Thoracic;  Laterality: Right;   • TOTAL KNEE ARTHROPLASTY Left 04/2015   • UMBILICAL HERNIA REPAIR       General Information     Row Name 02/19/21 1504          Physical Therapy Time and Intention    Document Type  therapy note (daily note)  -SR     Mode of Treatment  individual therapy  -SR     Row Name 02/19/21 1504          General Information    Patient Profile Reviewed  yes  -SR     Existing Precautions/Restrictions  fall  -SR     Row Name 02/19/21 1504          Cognition    Orientation Status (Cognition)  oriented x 4  -SR     Row Name 02/19/21 1504          Safety Issues, Functional Mobility    Impairments Affecting Function (Mobility)  balance;strength;endurance/activity tolerance;range of motion (ROM);pain  -SR     Comment, Safety Issues/Impairments (Mobility)  gait belt used for safety  -SR       User Key  (r) = Recorded By, (t) = Taken By, (c) = Cosigned By    Initials Name Provider Type    SR Laura Motta Physical Therapist        Mobility     Row Name 02/19/21 1505          Bed Mobility    Comment (Bed Mobility)  Pt up in chair with son in room upon PT arrival  -SR     Row Name 02/19/21 1505          Sit-Stand Transfer    Sit-Stand Pinal (Transfers)  minimum assist (75% patient effort);moderate assist (50% patient effort) STS Hipolito from bedside chair to walker, modA STS toilt transfer with walker and handrail due to low surface  -SR     Assistive Device (Sit-Stand Transfers)  walker, standard  -SR     Row Name 02/19/21 1505          Gait/Stairs (Locomotion)    Pinal Level (Gait)  contact guard;verbal cues  -SR     Assistive Device (Gait)  walker, standard  -SR     Distance in Feet (Gait)  120 ft  -SR     Deviations/Abnormal Patterns (Gait)  moraima decreased;gait speed  decreased;festinating/shuffling;stride length decreased  -SR     Bilateral Gait Deviations  heel strike decreased;forward flexed posture  -SR     Comment (Gait/Stairs)  Pt amb 120 ft requiring CGA and cues for deep breathing and upright posture. No LOB. Pt required multiple standing rest breaks to complete bout due to complaints of pain with deep breathing.  -SR       User Key  (r) = Recorded By, (t) = Taken By, (c) = Cosigned By    Initials Name Provider Type    Laura Persaud Physical Therapist        Obj/Interventions     Row Name 02/19/21 1510          Motor Skills    Therapeutic Exercise  -- 15x seated AP, 10x LAQ, seated hip flexion BLE  -SR     Row Name 02/19/21 1510          Balance    Balance Assessment  sitting static balance;sitting dynamic balance;standing static balance;standing dynamic balance  -SR     Comment, Balance  Pt performed dynamic sitting SBA to complete seated exercises. Pt CGA to perform toilet and hand hygiene. Pt CGA for static/dynamic standing with walker.  -SR       User Key  (r) = Recorded By, (t) = Taken By, (c) = Cosigned By    Initials Name Provider Type    Laura Persaud Physical Therapist        Goals/Plan    No documentation.       Clinical Impression     Row Name 02/19/21 1512          Pain Scale: Numbers Pre/Post-Treatment    Pretreatment Pain Rating  0/10 - no pain  -SR     Posttreatment Pain Rating  0/10 - no pain  -SR     Pre/Posttreatment Pain Comment  Pt reports no pain when immobile, however reports increased pain R side ribs with deep breathing. Pt educated to use pillow to splint for coughing, sneezing, deep breathing.  -SR     Row Name 02/19/21 1513          Plan of Care Review    Plan of Care Reviewed With  patient;son  -SR     Progress  improving  -SR     Outcome Summary  Pt ambulation distance improved to 120ft with standard walker CGA. Pt requires modA for toilet transfer, Hipolito for all other sit <> stand transfers with walker. Pt could continue to benefit from  skilled PT to improve safety and indepedence with functional mobility.  -SR     Row Name 02/19/21 1512          Therapy Assessment/Plan (PT)    Rehab Potential (PT)  good, to achieve stated therapy goals  -SR     Criteria for Skilled Interventions Met (PT)  yes;meets criteria;skilled treatment is necessary  -SR     Row Name 02/19/21 1512          Positioning and Restraints    Pre-Treatment Position  sitting in chair/recliner son in room  -SR     Post Treatment Position  chair son in room  -SR     In Chair  reclined;call light within reach;encouraged to call for assist;with family/caregiver  -SR       User Key  (r) = Recorded By, (t) = Taken By, (c) = Cosigned By    Initials Name Provider Type    Laura Persaud Physical Therapist        Outcome Measures     Row Name 02/19/21 1517          How much help from another person do you currently need...    Turning from your back to your side while in flat bed without using bedrails?  3  -SR     Moving from lying on back to sitting on the side of a flat bed without bedrails?  3  -SR     Moving to and from a bed to a chair (including a wheelchair)?  3  -SR     Standing up from a chair using your arms (e.g., wheelchair, bedside chair)?  3  -SR     Climbing 3-5 steps with a railing?  2  -SR     To walk in hospital room?  3  -SR     AM-PAC 6 Clicks Score (PT)  17  -SR     Row Name 02/19/21 1517          Functional Assessment    Outcome Measure Options  AM-PAC 6 Clicks Basic Mobility (PT)  -SR       User Key  (r) = Recorded By, (t) = Taken By, (c) = Cosigned By    Initials Name Provider Type    Laura Persaud Physical Therapist        Physical Therapy Education                 Title: PT OT SLP Therapies (Done)     Topic: Physical Therapy (Done)     Point: Mobility training (Done)     Learning Progress Summary           Patient Acceptance, E,D, VU by SR at 2/19/2021 1518    Acceptance, E, VU by KT at 2/19/2021 1317    Acceptance, E, VU by KT at 2/18/2021 1216    Acceptance,  TB, VU by DJ at 2/18/2021 1156    Acceptance, E, VU,NR by CF at 2/17/2021 1609    Eager, E,TB,D, VU by MARIELY at 2/15/2021 1520    Acceptance, E,TB,D, VU,NR by SV at 2/13/2021 1433                   Point: Home exercise program (Done)     Learning Progress Summary           Patient Acceptance, E, VU by KT at 2/19/2021 1317    Acceptance, E, VU by KT at 2/18/2021 1216    Acceptance, TB, VU by DJ at 2/18/2021 1156    Acceptance, E, VU,NR by CF at 2/17/2021 1609    Eager, E,TB,D, VU by MARIELY at 2/15/2021 1520    Acceptance, E,TB,D, VU,NR by SV at 2/13/2021 1433                   Point: Body mechanics (Done)     Learning Progress Summary           Patient Acceptance, E,D, VU by SR at 2/19/2021 1518    Acceptance, E, VU by KT at 2/19/2021 1317    Acceptance, E, VU by KT at 2/18/2021 1216    Acceptance, TB, VU by DJ at 2/18/2021 1156    Acceptance, E, VU,NR by CF at 2/17/2021 1609    Eager, E,TB,D, VU by MARIELY at 2/15/2021 1520    Acceptance, E,TB,D, VU,NR by SV at 2/13/2021 1433                               User Key     Initials Effective Dates Name Provider Type Discipline     03/07/18 -  Kayla Small PTA Physical Therapy Assistant PT    KT 06/16/16 -  Brunilda Luong RN Registered Nurse Nurse    SV 04/03/18 -  Tri Pérez, PT Physical Therapist PT    DJ 10/25/19 -  Taya Souza, PT Physical Therapist PT    CF 09/02/20 -  Cassie Swift, PT Physical Therapist PT    SR 02/08/21 -  Laura Motta Physical Therapist PT              PT Recommendation and Plan     Plan of Care Reviewed With: patient, son  Progress: improving  Outcome Summary: Pt ambulation distance improved to 120ft with standard walker CGA. Pt requires modA for toilet transfer, Hipolito for all other sit <> stand transfers with walker. Pt could continue to benefit from skilled PT to improve safety and indepedence with functional mobility.     Time Calculation:   PT Charges     Row Name 02/19/21 1520             Time Calculation    Start Time  1412  -SR       Stop Time  1441  -SR      Time Calculation (min)  29 min  -SR      PT Received On  02/19/21  -SR      PT - Next Appointment  02/20/21  -SR      PT Goal Re-Cert Due Date  02/21/21  -SR         Time Calculation- PT    Total Timed Code Minutes- PT  28 minute(s)  -SR        User Key  (r) = Recorded By, (t) = Taken By, (c) = Cosigned By    Initials Name Provider Type    SR Laura Motta Physical Therapist        Therapy Charges for Today     Code Description Service Date Service Provider Modifiers Qty    18322999807 HC GAIT TRAINING EA 15 MIN 2/19/2021 Laura Motta GP 1    46378805921 HC PT THER PROC EA 15 MIN 2/19/2021 Laura Motta GP 1    60660168674 HC PT THERAPEUTIC ACT EA 15 MIN 2/19/2021 Laura Motta GP 1          PT G-Codes  Outcome Measure Options: AM-PAC 6 Clicks Basic Mobility (PT)  AM-PAC 6 Clicks Score (PT): 17  AM-PAC 6 Clicks Score (OT): 17    Laura Motta  2/19/2021

## 2021-02-19 NOTE — PLAN OF CARE
Goal Outcome Evaluation:  Plan of Care Reviewed With: patient, son  Progress: improving  Outcome Summary: Pt ambulation distance improved to 120ft with standard walker CGA. Pt requires modA for toilet transfer, Hipolito for all other sit <> stand transfers with walker. Pt could continue to benefit from skilled PT to improve safety and indepedence with functional mobility.  Pt and PT staff wore appropriate PPE throughout session.

## 2021-02-19 NOTE — PLAN OF CARE
Goal Outcome Evaluation:  Vital signs stable. Alert and oriented x 4. Up with assistance of one. On room air. Normal sinus rhythm. Tolerating regular diet well. Denies severe pain. Oral medication given scheduled and prn for pain. Duoderm dressing dry/intact to right flank/chest site at previous chest tube insertion site. Possible discharge in morning to rehab facility. Sat up in recliner chair most of day and ambulated in room. PT dept working with patient as well. Will continue to monitor.

## 2021-02-19 NOTE — PLAN OF CARE
Goal Outcome Evaluation:  Plan of Care Reviewed With: patient  Progress: no change  Outcome Summary: S/p right VATs for rib plating, surgically stable. Right chest tube pull site CDI with duoderm. Medicated x 2 for pain. New Nephrology consult for MAILE. IVFs reinitiated, will see this AM. RA while awake, BIPAP for sleep. Ambulates with assist of 1 however markedly unsteady on feet. Will continue to monitor.

## 2021-02-19 NOTE — CONSULTS
NEPHROLOGY CONSULT NOTE    Reason for Consultation: MAILE on CKD stage III, with baseline creatinine around 1.3 to 1.4 mg/dL.    Chief complaint:  Mild ankle/pretibial swelling    History of present illness:    Ms. Frances is an 81-year-old lady with recent multiple closed fractures of the ribs status post ORIF, now with mild worsening renal function with serum creatinine up to 1.7 mg/dL.  Her baseline serum creatinine has been around 1.3 1.4 mg/dL.  She does endorse some mild ankle/pretibial lower extremity swelling bilaterally that is unchanged from prior  Her blood pressure is borderline low, currently on amlodipine 5 mg daily and says she has been on it since March of last year  Denies shortness of breath nausea vomiting  Appetite slowly picking up    Past Medical History:   Diagnosis Date   • Arthritis    • Asymptomatic PVCs    • Chronic bronchitis (CMS/HCC)    • Fracture of humerus    • GERD (gastroesophageal reflux disease)    • Hyperlipidemia    • Hypertension    • Hypothyroidism    • Pneumonia    • Pulmonary emphysema (CMS/HCC)    • Renal calculi    • Renal calculus 1/6/2021   • Sleep apnea     CPAP USED   • Type 2 diabetes mellitus (CMS/HCC)     NO MEDS     Past Surgical History:   Procedure Laterality Date   • APPENDECTOMY     • EXTRACORPOREAL SHOCK WAVE LITHOTRIPSY (ESWL) Left 1/6/2021    Procedure: EXTRACORPOREAL SHOCKWAVE LITHOTRIPSY, CYSTOSCOPY, RETROGRADE PYLEOGRAM;  Surgeon: Walter Schwartz MD;  Location: [facility];  Service: Urology;  Laterality: Left;   • EYE SURGERY      cataracts   • HUMERUS FRACTURE SURGERY     • THORACOSCOPY Right 2/16/2021    Procedure: bronchoscopy, right video assisted THORACOSCOPY WITH PLATING OF 3, 4, 5, AND 6 RIBS;  Surgeon: Kelley Delong MD;  Location: [facility];  Service: Thoracic;  Laterality: Right;   • TOTAL KNEE ARTHROPLASTY Left 04/2015   • UMBILICAL HERNIA REPAIR       Family History   Problem Relation Age of Onset   • Cancer Mother          breast   • Breast cancer Mother    • No Known Problems Father    • Heart disease Maternal Grandmother    • Cancer Brother         esophageal , stomach    • Esophageal cancer Brother    • No Known Problems Son    • Breast cancer Maternal Aunt    • Heart disease Maternal Aunt    • No Known Problems Son    • Malig Hyperthermia Neg Hx      Social History     Tobacco Use   • Smoking status: Current Every Day Smoker     Packs/day: 0.25     Years: 50.00     Pack years: 12.50     Types: Cigarettes     Start date: 1970   • Smokeless tobacco: Never Used   Substance Use Topics   • Alcohol use: Yes     Comment: social   • Drug use: No     Medications Prior to Admission   Medication Sig Dispense Refill Last Dose   • ADVAIR DISKUS 100-50 MCG/DOSE DISKUS Inhale 1 puff 2 (Two) Times a Day. 180 each 5 2/11/2021 at Unknown time   • albuterol (PROVENTIL HFA;VENTOLIN HFA) 108 (90 Base) MCG/ACT inhaler Inhale 2 puffs Every 4 (Four) Hours As Needed for Wheezing or Shortness of Air. 1 inhaler 3 Past Week at Unknown time   • amLODIPine (NORVASC) 5 MG tablet TAKE 1 TABLET BY MOUTH DAILY 90 tablet 0 2/11/2021 at Unknown time   • chlorthalidone (HYGROTON) 25 MG tablet Take 1 tablet by mouth Daily. 90 tablet 0 2/11/2021 at Unknown time   • [facility] ANTACID & ANTI-GAS 1000-60 MG chewable tablet    2/11/2021 at Unknown time   • diphenhydrAMINE (BENADRYL) 25 mg capsule Take 25 mg by mouth At Night As Needed for Itching.   Past Month at Unknown time   • Probiotic Product (PROBIOTIC DAILY) capsule Take 1 tablet by mouth daily.   2/11/2021 at Unknown time   • Unithroid 150 MCG tablet Take 1 tablet by mouth Daily. 30 tablet 5 2/11/2021 at Unknown time   • vitamin D (ERGOCALCIFEROL) 1.25 MG (83903 UT) capsule capsule Take 1 capsule by mouth Every 7 (Seven) Days. 13 capsule 3 2/11/2021 at Unknown time   • HYDROcodone-acetaminophen (NORCO) 5-325 MG per tablet Take 1 tablet by mouth Every 4 (Four) Hours As Needed (Pain). 20 tablet 0 More than a month at  "Unknown time   • HYDROcodone-acetaminophen (NORCO) 7.5-325 MG per tablet Take 1 tablet by mouth Every 6 (Six) Hours As Needed for Moderate Pain .   More than a month at Unknown time   • omeprazole OTC (PriLOSEC OTC) 20 MG EC tablet Take 20 mg by mouth As Needed. Take 1-2 tablet      • ondansetron ODT (ZOFRAN-ODT) 4 MG disintegrating tablet Place 1 tablet on the tongue Every 8 (Eight) Hours As Needed for Nausea or Vomiting. 15 tablet 0 More than a month at Unknown time     Allergies:  Ambien [zolpidem tartrate], Amoxicillin-pot clavulanate, Doxycycline, Levofloxacin, Metronidazole, Naproxen, Sulfamethoxazole-trimethoprim, and Synthroid [levothyroxine sodium]    Review of Systems  14 point review of systems is otherwise negative except for mentioned above on HPI    Objective     Vital Signs  Temp:  [97.2 °F (36.2 °C)-97.8 °F (36.6 °C)] 97.7 °F (36.5 °C)  Heart Rate:  [63-85] 85  Resp:  [16-18] 16  BP: (102-139)/(41-87) 102/41    Flowsheet Rows      First Filed Value   Admission Height  165.1 cm (65\") Documented at 02/12/2021 1255   Admission Weight  97.5 kg (215 lb) Documented at 02/12/2021 1255           I/O this shift:  In: 240 [P.O.:240]  Out: -   I/O last 3 completed shifts:  In: 1971.3 [P.O.:1440; I.V.:531.3]  Out: 3050 [Urine:3050]    Intake/Output Summary (Last 24 hours) at 2/19/2021 0947  Last data filed at 2/19/2021 0800  Gross per 24 hour   Intake 1391.25 ml   Output 2100 ml   Net -708.75 ml       Physical Exam:  General Appearance: alert, oriented x 3, no acute distress   Skin: warm and dry  HEENT: oral mucosa normal, nonicteric sclera  Neck: supple, no JVD  Lungs: CTA  Heart: RRR, normal S1 and S2  Abdomen: soft, nontender, nondistended  : no palpable bladder  Extremities: Mild pretibial edema bilaterally  Neuro: normal speech and mental status     Results Review:  Results from last 7 days   Lab Units 02/19/21  0425 02/18/21  1507 02/17/21  0526  02/12/21  1534   SODIUM mmol/L 136 135* 139   < > 141 "   POTASSIUM mmol/L 4.9 4.6 4.9   < > 4.4   CHLORIDE mmol/L 105 103 104   < > 105   CO2 mmol/L 21.0* 23.4 21.7*   < > 26.3   BUN mg/dL 40* 41* 33*   < > 21   CREATININE mg/dL 1.77* 1.85* 1.37*   < > 1.06*   CALCIUM mg/dL 9.5 9.2 9.1   < > 10.1   BILIRUBIN mg/dL  --   --   --   --  0.4   ALK PHOS U/L  --   --   --   --  63   ALT (SGPT) U/L  --   --   --   --  19   AST (SGOT) U/L  --   --   --   --  15   GLUCOSE mg/dL 105* 116* 129*   < > 122*    < > = values in this interval not displayed.         Results from last 7 days   Lab Units 02/18/21  1507 02/18/21  1246   URIC ACID mg/dL 8.3* 8.0*         budesonide-formoterol, 2 puff, Inhalation, BID - RT  furosemide, 40 mg, Intravenous, Once  gabapentin, 300 mg, Oral, Q12H  heparin (porcine), 5,000 Units, Subcutaneous, Q8H  lactobacillus acidophilus, 1 capsule, Oral, Daily  levothyroxine, 150 mcg, Oral, Q AM  lidocaine, 1 patch, Transdermal, Q24H  pantoprazole, 40 mg, Oral, QAM  polyethylene glycol, 17 g, Oral, Daily  senna-docusate sodium, 2 tablet, Oral, BID           Assessment/Plan   1. acute kidney injury on CKD stage III with underlying baseline creatinine of 1.3-1.4 mg/dL, likely due to prerenal state from relative hypotension and poor p.o. intake.  2.  Transient urinary retention, improving, followed by urology  3.  COPD  4.  Hypothyroidism    Plan:  1.  Initially on gentle IV fluids, we will go ahead and discontinue IV fluids at this time  2.  Give 1 dose of IV Lasix 40 mg  3.  We will hold amlodipine as patient has relative hypotension and lower extremity swelling may be attributed to side effect of amlodipine.  4.  Renal panel magnesium in a.m. if patient still hospitalized  5.  Okay to discharge from renal standpoint with outpatient follow-up in the renal clinic in around 2 weeks with updated renal panel.  Upon discharge I would keep off amlodipine given relative hypotension.  Ultimately if blood pressure starts rising we can restart amlodipine at a lower dose  or try another antihypertensive in the outpatient setting.      I discussed the patient's findings and my recommendations with patient in detail    Thank you for involving us in the care of Ms Frances.  We will continue to follow along.    Leonardo Arce MD  02/19/21  09:47 EST      Much of this encounter note is an electronic transcription/translation of spoken language to printed text. The electronic translation of spoken language may permit erroneous, or at times, nonsensical words or phrases to be inadvertently transcribed; Although I have reviewed the note for such errors, some may still exist.

## 2021-02-20 ENCOUNTER — APPOINTMENT (OUTPATIENT)
Dept: GENERAL RADIOLOGY | Facility: HOSPITAL | Age: 81
End: 2021-02-20

## 2021-02-20 LAB
ALBUMIN SERPL-MCNC: 3 G/DL (ref 3.5–5.2)
ANION GAP SERPL CALCULATED.3IONS-SCNC: 8.7 MMOL/L (ref 5–15)
BASOPHILS # BLD AUTO: 0.06 10*3/MM3 (ref 0–0.2)
BASOPHILS NFR BLD AUTO: 0.6 % (ref 0–1.5)
BUN SERPL-MCNC: 34 MG/DL (ref 8–23)
BUN/CREAT SERPL: 24.1 (ref 7–25)
CALCIUM SPEC-SCNC: 9.1 MG/DL (ref 8.6–10.5)
CHLORIDE SERPL-SCNC: 103 MMOL/L (ref 98–107)
CO2 SERPL-SCNC: 26.3 MMOL/L (ref 22–29)
CREAT SERPL-MCNC: 1.41 MG/DL (ref 0.57–1)
DEPRECATED RDW RBC AUTO: 43.5 FL (ref 37–54)
EOSINOPHIL # BLD AUTO: 0.16 10*3/MM3 (ref 0–0.4)
EOSINOPHIL NFR BLD AUTO: 1.7 % (ref 0.3–6.2)
ERYTHROCYTE [DISTWIDTH] IN BLOOD BY AUTOMATED COUNT: 13 % (ref 12.3–15.4)
GFR SERPL CREATININE-BSD FRML MDRD: 36 ML/MIN/1.73
GLUCOSE SERPL-MCNC: 111 MG/DL (ref 65–99)
HCT VFR BLD AUTO: 32.1 % (ref 34–46.6)
HGB BLD-MCNC: 10.6 G/DL (ref 12–15.9)
IMM GRANULOCYTES # BLD AUTO: 0.07 10*3/MM3 (ref 0–0.05)
IMM GRANULOCYTES NFR BLD AUTO: 0.7 % (ref 0–0.5)
LYMPHOCYTES # BLD AUTO: 1.37 10*3/MM3 (ref 0.7–3.1)
LYMPHOCYTES NFR BLD AUTO: 14.5 % (ref 19.6–45.3)
MAGNESIUM SERPL-MCNC: 2.3 MG/DL (ref 1.6–2.4)
MCH RBC QN AUTO: 30.2 PG (ref 26.6–33)
MCHC RBC AUTO-ENTMCNC: 33 G/DL (ref 31.5–35.7)
MCV RBC AUTO: 91.5 FL (ref 79–97)
MONOCYTES # BLD AUTO: 1.32 10*3/MM3 (ref 0.1–0.9)
MONOCYTES NFR BLD AUTO: 14 % (ref 5–12)
NEUTROPHILS NFR BLD AUTO: 6.46 10*3/MM3 (ref 1.7–7)
NEUTROPHILS NFR BLD AUTO: 68.5 % (ref 42.7–76)
NRBC BLD AUTO-RTO: 0 /100 WBC (ref 0–0.2)
PHOSPHATE SERPL-MCNC: 3 MG/DL (ref 2.5–4.5)
PLATELET # BLD AUTO: 256 10*3/MM3 (ref 140–450)
PMV BLD AUTO: 10.9 FL (ref 6–12)
POTASSIUM SERPL-SCNC: 4.5 MMOL/L (ref 3.5–5.2)
PROCALCITONIN SERPL-MCNC: 0.36 NG/ML (ref 0–0.25)
RBC # BLD AUTO: 3.51 10*6/MM3 (ref 3.77–5.28)
SODIUM SERPL-SCNC: 138 MMOL/L (ref 136–145)
WBC # BLD AUTO: 9.44 10*3/MM3 (ref 3.4–10.8)

## 2021-02-20 PROCEDURE — 94799 UNLISTED PULMONARY SVC/PX: CPT

## 2021-02-20 PROCEDURE — 80069 RENAL FUNCTION PANEL: CPT | Performed by: INTERNAL MEDICINE

## 2021-02-20 PROCEDURE — 99024 POSTOP FOLLOW-UP VISIT: CPT | Performed by: THORACIC SURGERY (CARDIOTHORACIC VASCULAR SURGERY)

## 2021-02-20 PROCEDURE — 83735 ASSAY OF MAGNESIUM: CPT | Performed by: INTERNAL MEDICINE

## 2021-02-20 PROCEDURE — 84145 PROCALCITONIN (PCT): CPT | Performed by: HOSPITALIST

## 2021-02-20 PROCEDURE — 97110 THERAPEUTIC EXERCISES: CPT

## 2021-02-20 PROCEDURE — 25010000002 FUROSEMIDE PER 20 MG: Performed by: HOSPITALIST

## 2021-02-20 PROCEDURE — 87205 SMEAR GRAM STAIN: CPT | Performed by: THORACIC SURGERY (CARDIOTHORACIC VASCULAR SURGERY)

## 2021-02-20 PROCEDURE — 71046 X-RAY EXAM CHEST 2 VIEWS: CPT

## 2021-02-20 PROCEDURE — 87070 CULTURE OTHR SPECIMN AEROBIC: CPT | Performed by: THORACIC SURGERY (CARDIOTHORACIC VASCULAR SURGERY)

## 2021-02-20 PROCEDURE — 85025 COMPLETE CBC W/AUTO DIFF WBC: CPT | Performed by: THORACIC SURGERY (CARDIOTHORACIC VASCULAR SURGERY)

## 2021-02-20 PROCEDURE — 25010000002 HEPARIN (PORCINE) PER 1000 UNITS: Performed by: THORACIC SURGERY (CARDIOTHORACIC VASCULAR SURGERY)

## 2021-02-20 RX ORDER — FUROSEMIDE 10 MG/ML
40 INJECTION INTRAMUSCULAR; INTRAVENOUS ONCE
Status: COMPLETED | OUTPATIENT
Start: 2021-02-20 | End: 2021-02-20

## 2021-02-20 RX ORDER — TORSEMIDE 20 MG/1
20 TABLET ORAL DAILY
Status: DISCONTINUED | OUTPATIENT
Start: 2021-02-20 | End: 2021-02-21 | Stop reason: HOSPADM

## 2021-02-20 RX ADMIN — HEPARIN SODIUM 5000 UNITS: 5000 INJECTION INTRAVENOUS; SUBCUTANEOUS at 15:12

## 2021-02-20 RX ADMIN — HEPARIN SODIUM 5000 UNITS: 5000 INJECTION INTRAVENOUS; SUBCUTANEOUS at 22:26

## 2021-02-20 RX ADMIN — PANTOPRAZOLE SODIUM 40 MG: 40 TABLET, DELAYED RELEASE ORAL at 07:20

## 2021-02-20 RX ADMIN — GABAPENTIN 300 MG: 300 CAPSULE ORAL at 22:26

## 2021-02-20 RX ADMIN — BUDESONIDE AND FORMOTEROL FUMARATE DIHYDRATE 2 PUFF: 80; 4.5 AEROSOL RESPIRATORY (INHALATION) at 08:14

## 2021-02-20 RX ADMIN — HEPARIN SODIUM 5000 UNITS: 5000 INJECTION INTRAVENOUS; SUBCUTANEOUS at 07:20

## 2021-02-20 RX ADMIN — BUDESONIDE AND FORMOTEROL FUMARATE DIHYDRATE 2 PUFF: 80; 4.5 AEROSOL RESPIRATORY (INHALATION) at 20:02

## 2021-02-20 RX ADMIN — OXYCODONE 5 MG: 5 TABLET ORAL at 15:12

## 2021-02-20 RX ADMIN — OXYCODONE 5 MG: 5 TABLET ORAL at 19:35

## 2021-02-20 RX ADMIN — OXYCODONE 5 MG: 5 TABLET ORAL at 08:59

## 2021-02-20 RX ADMIN — TORSEMIDE 20 MG: 20 TABLET ORAL at 18:22

## 2021-02-20 RX ADMIN — DOCUSATE SODIUM 50MG AND SENNOSIDES 8.6MG 2 TABLET: 8.6; 5 TABLET, FILM COATED ORAL at 09:00

## 2021-02-20 RX ADMIN — FUROSEMIDE 40 MG: 10 INJECTION, SOLUTION INTRAMUSCULAR; INTRAVENOUS at 12:34

## 2021-02-20 RX ADMIN — GABAPENTIN 300 MG: 300 CAPSULE ORAL at 09:00

## 2021-02-20 RX ADMIN — DOCUSATE SODIUM 50MG AND SENNOSIDES 8.6MG 2 TABLET: 8.6; 5 TABLET, FILM COATED ORAL at 22:26

## 2021-02-20 RX ADMIN — POLYETHYLENE GLYCOL 3350 17 G: 17 POWDER, FOR SOLUTION ORAL at 09:00

## 2021-02-20 RX ADMIN — LEVOTHYROXINE SODIUM 150 MCG: 150 TABLET ORAL at 07:20

## 2021-02-20 NOTE — PROGRESS NOTES
"   LOS: 7 days    Patient Care Team:  Keri Foreman MD as PCP - General (Family Medicine)  Chas Garcia MD as Consulting Physician (Endocrinology)  Jeff Sands MD as Consulting Physician (Pulmonary Disease)      Subjective     Patient resting comfortably.  Dyspnea on exertion+  Denies any chest pain, nausea or vomiting  Very good urine output    Objective     Vital Sign Min/Max for last 24 hours  Temp:  [97.8 °F (36.6 °C)-99.7 °F (37.6 °C)] 97.8 °F (36.6 °C)  Heart Rate:  [70-95] 70  Resp:  [16-18] 18  BP: (122-143)/(51-65) 135/61                       Flowsheet Rows      First Filed Value   Admission Height  165.1 cm (65\") Documented at 02/12/2021 1255   Admission Weight  97.5 kg (215 lb) Documented at 02/12/2021 1255          I/O this shift:  In: 480 [P.O.:480]  Out: 600 [Urine:600]  I/O last 3 completed shifts:  In: 1711.3 [P.O.:1280; I.V.:431.3]  Out: 4800 [Urine:4800]    Physical Exam:  Physical Exam    General Appearance: alert, oriented x 3, no acute distress   Skin: warm and dry  HEENT: oral mucosa normal, nonicteric sclera  Neck: supple, no JVD  Lungs: Decreased breath sounds at bases  Heart: RRR, normal S1 and S2  Abdomen: soft, nontender, nondistended  : no palpable bladder  Extremities: 1-2+ edema bilaterally  Neuro: normal speech and mental status      LABS:  Lab Results   Component Value Date    CALCIUM 9.1 02/20/2021    PHOS 3.0 02/20/2021     Results from last 7 days   Lab Units 02/20/21  0935 02/20/21  0534 02/19/21  1618 02/19/21  0425  02/18/21  1246   MAGNESIUM mg/dL  --  2.3  --   --   --   --    SODIUM mmol/L  --  138 134* 136   < >  --    POTASSIUM mmol/L  --  4.5 4.8 4.9   < >  --    CHLORIDE mmol/L  --  103 100 105   < >  --    CO2 mmol/L  --  26.3 23.3 21.0*   < >  --    BUN mg/dL  --  34* 38* 40*   < >  --    CREATININE mg/dL  --  1.41* 1.60* 1.77*   < >  --    GLUCOSE mg/dL  --  111* 126* 105*   < >  --    CALCIUM mg/dL  --  9.1 8.9 9.5   < >  --    WBC 10*3/mm3 9.44  --   " --  9.41  --  8.74   HEMOGLOBIN g/dL 10.6*  --   --  10.7*  --  10.3*   PLATELETS 10*3/mm3 256  --   --  208  --  213    < > = values in this interval not displayed.     Lab Results   Component Value Date    TROPONINT <0.010 02/12/2021     Estimated Creatinine Clearance: 37.5 mL/min (A) (by C-G formula based on SCr of 1.41 mg/dL (H)).      Brief Urine Lab Results     None        WEIGHTS:     Wt Readings from Last 1 Encounters:   02/15/21 1333 101 kg (223 lb 4.8 oz)   02/15/21 0500 101 kg (222 lb 1.6 oz)   02/14/21 0300 102 kg (224 lb 13.9 oz)   02/13/21 0600 96.5 kg (212 lb 11.9 oz)   02/12/21 1255 97.5 kg (215 lb)       budesonide-formoterol, 2 puff, Inhalation, BID - RT  gabapentin, 300 mg, Oral, Q12H  heparin (porcine), 5,000 Units, Subcutaneous, Q8H  lactobacillus acidophilus, 1 capsule, Oral, Daily  levothyroxine, 150 mcg, Oral, Q AM  lidocaine, 1 patch, Transdermal, Q24H  pantoprazole, 40 mg, Oral, QAM  polyethylene glycol, 17 g, Oral, Daily  senna-docusate sodium, 2 tablet, Oral, BID           Assessment/Plan       1.  Acute kidney injury on CKD 3  Creatinine improved to baseline.  Electrolytes okay.  Volume status generous but good urine output with IV Lasix  2.  Hypertension with CKD 3  Blood pressure okay.  Off amlodipine  3.  Edema/volume overload  Good urine output.  S/p IV Lasix yesterday  4. DELROY    Recs:  Start low-dose oral torsemide  Keep amlodipine on hold      Norm Keane MD  02/20/21  15:55 EST

## 2021-02-20 NOTE — THERAPY TREATMENT NOTE
Patient Name: Luann Frances  : 1940    MRN: [id number]                              Today's Date: 2021       Admit Date: 2021    Visit Dx:     ICD-10-CM ICD-9-CM   1. Closed fracture of multiple ribs of right side, initial encounter  S22.41XA 807.09   2. Right-sided chest wall pain  R07.89 786.52   3. Fall from standing, initial encounter  W19.XXXA E888.9   4. Pancreatic mass  K86.89 577.8   5. Abnormal CT of the abdomen  R93.5 793.6     Patient Active Problem List   Diagnosis   • Atopic rhinitis   • Chronic obstructive pulmonary disease (CMS/HCC)   • Diverticulitis of colon   • Acid reflux   • Fatigue   • Hypothyroidism   • Insomnia   • Knee pain   • Pain of lower extremity   • Shoulder pain   • Ventricular premature beats   • Weight gain   • Cold intolerance   • Post herpetic neuralgia   • Insulin resistance   • Vitamin D deficiency   • CKD (chronic kidney disease) stage 3, GFR 30-59 ml/min (CMS/[facility])   • Mixed hyperlipidemia   • Hypersomnia   • Hyperuricemia   • Essential hypertension   • DELROY (obstructive sleep apnea)   • Renal calculus   • Multiple closed fractures of ribs of right side   • Non-cardiac chest pain     Past Medical History:   Diagnosis Date   • Arthritis    • Asymptomatic PVCs    • Chronic bronchitis (CMS/HCC)    • Fracture of humerus    • GERD (gastroesophageal reflux disease)    • Hyperlipidemia    • Hypertension    • Hypothyroidism    • Pneumonia    • Pulmonary emphysema (CMS/HCC)    • Renal calculi    • Renal calculus 2021   • Sleep apnea     CPAP USED   • Type 2 diabetes mellitus (CMS/HCC)     NO MEDS     Past Surgical History:   Procedure Laterality Date   • APPENDECTOMY     • EXTRACORPOREAL SHOCK WAVE LITHOTRIPSY (ESWL) Left 2021    Procedure: EXTRACORPOREAL SHOCKWAVE LITHOTRIPSY, CYSTOSCOPY, RETROGRADE PYLEOGRAM;  Surgeon: Walter Schwartz MD;  [facility];  Service: Urology;  Laterality: Left;   • EYE SURGERY      cataracts   • HUMERUS FRACTURE  SURGERY     • THORACOSCOPY Right 2/16/2021    Procedure: bronchoscopy, right video assisted THORACOSCOPY WITH PLATING OF 3, 4, 5, AND 6 RIBS;  Surgeon: Kelley Delong MD;  Location: [facility];  Service: Thoracic;  Laterality: Right;   • TOTAL KNEE ARTHROPLASTY Left 04/2015   • UMBILICAL HERNIA REPAIR       General Information     Row Name 02/20/21 1516          Physical Therapy Time and Intention    Document Type  therapy note (daily note)  -CF     Mode of Treatment  physical therapy;individual therapy  -CF     Row Name 02/20/21 1516          General Information    Patient Profile Reviewed  yes  -CF     Existing Precautions/Restrictions  fall  -CF     Row Name 02/20/21 1516          Cognition    Orientation Status (Cognition)  oriented x 4  -CF     Row Name 02/20/21 1516          Safety Issues, Functional Mobility    Safety Issues Affecting Function (Mobility)  insight into deficits/self-awareness;awareness of need for assistance  -CF     Impairments Affecting Function (Mobility)  balance;strength;endurance/activity tolerance;range of motion (ROM);pain  -CF       User Key  (r) = Recorded By, (t) = Taken By, (c) = Cosigned By    Initials Name Provider Type     Cassie Swift, PT Physical Therapist        Mobility     Row Name 02/20/21 1516          Bed Mobility    Comment (Bed Mobility)  Pt declined transfers and amb today despite encouragement - states she was given lasix and needs to use the bathroom every 5 mins (PT notes purewick in place)  -CF       User Key  (r) = Recorded By, (t) = Taken By, (c) = Cosigned By    Initials Name Provider Type    Cassie Bob, CHANTALE Physical Therapist        Obj/Interventions     Row Name 02/20/21 1518          Motor Skills    Therapeutic Exercise  other (see comments) Supine B AP, hip abd/add, QS, heel slides, GS x20  -CF       User Key  (r) = Recorded By, (t) = Taken By, (c) = Cosigned By    Initials Name Provider Type    Cassie Bob PT Physical  Therapist        Goals/Plan    No documentation.       Clinical Impression     Row Name 02/20/21 1519          Pain Scale: Numbers Pre/Post-Treatment    Pretreatment Pain Rating  5/10  -CF     Posttreatment Pain Rating  5/10  -CF     Pain Location - Side  Right  -CF     Pain Location - Orientation  lower  -CF     Pain Location  extremity  -CF     Pre/Posttreatment Pain Comment  reports R thigh pain with ther ex  -CF     Pain Intervention(s)  Repositioned;Rest  -CF     Row Name 02/20/21 1519          Plan of Care Review    Plan of Care Reviewed With  patient  -CF     Outcome Summary  Pt agreeable to supine ther ex only today - states she has been urinating every few mins and does not want to get out of bed. Pt completed LE strengthening program but had complaints of R thigh pain. Will progress as able.  -CF     Row Name 02/20/21 1519          Vital Signs    O2 Delivery Pre Treatment  room air  -CF     O2 Delivery Intra Treatment  room air  -CF     O2 Delivery Post Treatment  room air  -CF     Row Name 02/20/21 1519          Positioning and Restraints    Pre-Treatment Position  in bed  -CF     Post Treatment Position  bed  -CF     In Bed  call light within reach;encouraged to call for assist;exit alarm on;fowlers;SCD pump applied  -CF       User Key  (r) = Recorded By, (t) = Taken By, (c) = Cosigned By    Initials Name Provider Type    CF Cassie Swift, PT Physical Therapist        Outcome Measures     Row Name 02/20/21 1521          How much help from another person do you currently need...    Turning from your back to your side while in flat bed without using bedrails?  3  -CF     Moving from lying on back to sitting on the side of a flat bed without bedrails?  3  -CF     Moving to and from a bed to a chair (including a wheelchair)?  3  -CF     Standing up from a chair using your arms (e.g., wheelchair, bedside chair)?  3  -CF     Climbing 3-5 steps with a railing?  2  -CF     To walk in hospital room?  3  -CF      AM-PAC 6 Clicks Score (PT)  17  -CF     Row Name 02/20/21 1521          Functional Assessment    Outcome Measure Options  AM-PAC 6 Clicks Basic Mobility (PT)  -CF       User Key  (r) = Recorded By, (t) = Taken By, (c) = Cosigned By    Initials Name Provider Type    CF Cassie Swift, CHANTALE Physical Therapist        Physical Therapy Education                 Title: PT OT SLP Therapies (Done)     Topic: Physical Therapy (Done)     Point: Mobility training (Done)     Learning Progress Summary           Patient Acceptance, E,D, VU by SR at 2/19/2021 1518    Acceptance, E, VU by KT at 2/19/2021 1317    Acceptance, E, VU by KT at 2/18/2021 1216    Acceptance, TB, VU by NELY at 2/18/2021 1156    Acceptance, E, VU,NR by CF at 2/17/2021 1609    Eager, E,TB,D, VU by MARIELY at 2/15/2021 1520    Acceptance, E,TB,D, VU,NR by SV at 2/13/2021 1433                   Point: Home exercise program (Done)     Learning Progress Summary           Patient Acceptance, E, NR,VU by CF at 2/20/2021 1522    Acceptance, E, VU by KT at 2/19/2021 1317    Acceptance, E, VU by KT at 2/18/2021 1216    Acceptance, TB, VU by NELY at 2/18/2021 1156    Acceptance, E, VU,NR by CF at 2/17/2021 1609    Eager, E,TB,D, VU by MARIELY at 2/15/2021 1520    Acceptance, E,TB,D, VU,NR by SV at 2/13/2021 1433                   Point: Body mechanics (Done)     Learning Progress Summary           Patient Acceptance, E, NR,VU by CF at 2/20/2021 1522    Acceptance, E,D, VU by SR at 2/19/2021 1518    Acceptance, E, VU by KT at 2/19/2021 1317    Acceptance, E, VU by KT at 2/18/2021 1216    Acceptance, TB, VU by NELY at 2/18/2021 1156    Acceptance, E, VU,NR by CF at 2/17/2021 1609    Eager, E,TB,D, VU by MARIELY at 2/15/2021 1520    Acceptance, E,TB,D, VU,NR by SV at 2/13/2021 1433                               User Key     Initials Effective Dates Name Provider Type Discipline    JM 03/07/18 -  Kayla Small PTA Physical Therapy Assistant PT    KT 06/16/16 -  Brunilda Luong RN  Registered Nurse Nurse    SV 04/03/18 -  Tri Pérez PT Physical Therapist PT    DJ 10/25/19 -  Taya Souza PT Physical Therapist PT    CF 09/02/20 -  Cassie Swift PT Physical Therapist PT    SR 02/08/21 -  Laura Motta Physical Therapist PT              PT Recommendation and Plan     Plan of Care Reviewed With: patient  Outcome Summary: Pt agreeable to supine ther ex only today - states she has been urinating every few mins and does not want to get out of bed. Pt completed LE strengthening program but had complaints of R thigh pain. Will progress as able.     Time Calculation:   PT Charges     Row Name 02/20/21 1515             Time Calculation    Start Time  1442  -CF      Stop Time  1500  -CF      Time Calculation (min)  18 min  -CF      PT Received On  02/20/21  -CF      PT - Next Appointment  02/21/21  -CF        User Key  (r) = Recorded By, (t) = Taken By, (c) = Cosigned By    Initials Name Provider Type    CF Cassie Swift, PT Physical Therapist        Therapy Charges for Today     Code Description Service Date Service Provider Modifiers Qty    [id number] HC PT THER PROC EA 15 MIN 2/20/2021 Cassie Swift, PT GP 1          PT G-Codes  Outcome Measure Options: AM-PAC 6 Clicks Basic Mobility (PT)  AM-PAC 6 Clicks Score (PT): 17  AM-PAC 6 Clicks Score (OT): 17    Cassie Swift PT  2/20/2021

## 2021-02-20 NOTE — PLAN OF CARE
Goal Outcome Evaluation:  Plan of Care Reviewed With: patient  Progress: improving  Vitals stable. Alert and oriented x 4. Resting in recliner chair with legs elevated. No acute distress noted. Denies discomfort at this time. Oral medication given for complaint of generalized pain every 4 to 6 hours as needed. Duoderm dressing dry/intact to right previous Chest tube insertion site. On room air. No shortness of breath voiced. Normal sinus rhythm. 3+ bilateral leg/foot/ankle edema present. IV lasix given today as ordered. BMP drawn and results called. IV fluids discontinued. Possible discharge in am to rehab facility. Will continue to monitor.

## 2021-02-20 NOTE — PROGRESS NOTES
"    DAILY PROGRESS NOTE  [facility]    Patient Identification:  Name: Luann Frances  Age: 81 y.o.  Sex: female  :  1940  MRN: [id number]         Primary Care Physician: Keri Foreman MD    Subjective:  Interval History: Feels weak and puny today.  She still having some cough and she is concerned about her swelling.  Denies any nausea vomiting or altered mentation.    Objective: Sitting up in the chair conversational not short of breath with conversation.  She does seem a little weak and frail today.  She is conversational with fluent speech.  No family present at bedside though RN was in we discussed the case    Scheduled Meds:budesonide-formoterol, 2 puff, Inhalation, BID - RT  furosemide, 40 mg, Intravenous, Once  gabapentin, 300 mg, Oral, Q12H  heparin (porcine), 5,000 Units, Subcutaneous, Q8H  lactobacillus acidophilus, 1 capsule, Oral, Daily  levothyroxine, 150 mcg, Oral, Q AM  lidocaine, 1 patch, Transdermal, Q24H  pantoprazole, 40 mg, Oral, QAM  polyethylene glycol, 17 g, Oral, Daily  senna-docusate sodium, 2 tablet, Oral, BID      Continuous Infusions:     Vital signs in last 24 hours:  Temp:  [97.5 °F (36.4 °C)-99.7 °F (37.6 °C)] 98 °F (36.7 °C)  Heart Rate:  [] 79  Resp:  [16-18] 18  BP: (115-143)/(51-71) 135/58    Intake/Output:    Intake/Output Summary (Last 24 hours) at 2021 0934  Last data filed at 2021 0836  Gross per 24 hour   Intake 1280 ml   Output 3750 ml   Net -2470 ml       Exam:  /58 (BP Location: Left arm, Patient Position: Lying)   Pulse 79   Temp 98 °F (36.7 °C) (Oral)   Resp 18   Ht 165.1 cm (65\")   Wt 101 kg (223 lb 4.8 oz)   SpO2 90%   BMI 37.16 kg/m²     General Appearance:    Alert, cooperative, AAOx3, nontoxic appearing                          Head:    Normocephalic, without obvious abnormality, atraumatic                           Eyes:  Conjunctiva clear                         throat:   Oral mucosa pink and moist          "                  Neck:   Supple, symmetrical, trachea midline, no JVD                         Lungs:    Mild decrease at bases with subtle rales clear to auscultation bilaterally, respirations unlabored                 Chest Wall:  Post ORIF                          heart:    Regular rate and rhythm, S1 and S2 normal                 Abdomen:     Soft, nontender, bowel sounds active                 Extremities: Moving all, no cyanosis with 1-2+ edema                        Pulses:   Pulses palpable in lower extremities                            Skin:   Skin is warm and dry                  Neurologic:   CNII-XII intact, no focal deficits noted     Data Review:  Labs in chart were reviewed.    Assessment:  Active Hospital Problems    Diagnosis  POA   • **Multiple closed fractures of ribs of right side [S22.41XA]  Yes   • Non-cardiac chest pain [R07.89]  Unknown   • DELROY (obstructive sleep apnea) [G47.33]  Yes   • Essential hypertension [I10]  Yes   • CKD (chronic kidney disease) stage 3, GFR 30-59 ml/min (CMS/HCC) [N18.30]  Yes   • Hypothyroidism [E03.9]  Yes   • Chronic obstructive pulmonary disease (CMS/[facility]) [J44.9]  Yes      Resolved Hospital Problems   No resolved problems to display.       Plan:    POD ORIF 2/16/2021.  Mild postoperative acute blood loss anemia with hemoglobin stable showing upward trend to 10.7    ARF/CKD 3 - remains volume overloaded and will give an additional 40 mg of IV Lasix x1.  I asked the RN to place a pure wick.   -Formally on chlorthalidone prior to admission    Thoracic surgery checking another chest x-ray as well as CBC and I have added a procalcitonin to the lab draw.  Possible concerns for postoperative pneumonia though I am more concerned that there could be aspects of effusion given her mild volume overload    DELROY with CPAP    Urinary retention with social vision noted per urology and appreciate that    HTN stable -amlodipine on hold given some postoperative hypotension with  current blood pressures are improving and now noted to be 135/58    Heparin for DVT prophylaxis      Disposition - on hold for today but hopeful for tomorrow    Oscar Soria MD  2/20/2021  09:34 EST

## 2021-02-20 NOTE — PLAN OF CARE
Goal Outcome Evaluation:  Plan of Care Reviewed With: patient  Progress: no change  Outcome Summary: VSS, pt felt worse today compare with pervious days.  3+ to pitting edema in bilat legs, IV lasix given with good out put. c/o right thigh pain not controlled with pain meds.  Right duplex ultra sound  ordered.  purwick placed for comfort due to lasix.  Will continue to monitor.

## 2021-02-20 NOTE — PLAN OF CARE
Problem: Adult Inpatient Plan of Care  Goal: Plan of Care Review  Flowsheets (Taken 2/20/2021 7219)  Progress: improving  Plan of Care Reviewed With: patient  Outcome Summary: lengs diminished with rhonchi using walker to walk bilateral lower extremities with edema cough productive of a small amount of yellow sputum low grade temp at 2000 ecouraged cough deep breath and incentive spirometer   Goal Outcome Evaluation:  Plan of Care Reviewed With: patient  Progress: improving  Outcome Summary: lengs diminished with rhonchi using walker to walk bilateral lower extremities with edema cough productive of a small amount of yellow sputum low grade temp at 2000 ecouraged cough deep breath and incentive spirometer

## 2021-02-20 NOTE — PLAN OF CARE
Goal Outcome Evaluation:  Plan of Care Reviewed With: patient     Outcome Summary: Pt agreeable to supine ther ex only today - states she has been urinating every few mins and does not want to get out of bed. Pt completed LE strengthening program but had complaints of R thigh pain. Will progress as able.    Patient was not wearing a face mask during this therapy encounter. Therapist used appropriate personal protective equipment including eye protection, mask, and gloves.  Mask used was standard procedure mask. Appropriate PPE was worn during the entire therapy session. Hand hygiene was completed before and after therapy session. Patient is not in enhanced droplet precautions.

## 2021-02-20 NOTE — PROGRESS NOTES
"    Chief Complaint: multiple rib fractures, post-operative care  S/P: bronchoscopy with videa assisted thoracoscopy with plating of right ribs 3, 4, 5, 6  POD # 5    Subjective     Patient does not feel well today.  In her words she feels yucky.  She is coughing up thick yellow sputum.  She has no pleuritic pain.  She has had no fever or chills.  She does not feel short of breath.  She reports no energy.    Vital Signs:  Temp:  [97.5 °F (36.4 °C)-99.7 °F (37.6 °C)] 98 °F (36.7 °C)  Heart Rate:  [] 79  Resp:  [16-18] 18  BP: (115-143)/(51-71) 135/58    Intake & Output (last day)       02/19 0701 - 02/20 0700 02/20 0701 - 02/21 0700    P.O. 1280 240    I.V. (mL/kg)      Total Intake(mL/kg) 1280 (12.7) 240 (2.4)    Urine (mL/kg/hr) 3750 (1.5)     Stool      Total Output 3750     Net -2470 +240          Urine Unmeasured Occurrence 1 x           Objective:  General Appearance:  Comfortable and in no acute distress.    Vital signs: (most recent): Blood pressure 135/58, pulse 79, temperature 98 °F (36.7 °C), temperature source Oral, resp. rate 18, height 165.1 cm (65\"), weight 101 kg (223 lb 4.8 oz), SpO2 90 %.  No fever.    Output: Producing urine.    Lungs:  Normal effort and normal respiratory rate.  There are decreased breath sounds.    Heart: Normal rate.  Regular rhythm.  No murmur.   Abdomen: Abdomen is soft.  Bowel sounds are normal.   There is no abdominal tenderness.   There is no mass.   Extremities: There is no dependent edema.    Neurological: Patient is alert and oriented to person, place and time.  Normal strength.              Results Review:     I reviewed the patient's new clinical results.  I reviewed the patient's new imaging results and agree with the interpretation.    Imaging Results (Last 24 Hours)     Procedure Component Value Units Date/Time    XR Chest 2 View [[id number]] Collected: 02/20/21 0956     Updated: 02/20/21 1001    Narrative:      TWO-VIEW CHEST     HISTORY: Recent surgical " "fixation of right-sided rib fractures.  Follow-up evaluation.     FINDINGS: The patient has had recent plate fixation of several right rib  fractures and there appears to be a very tiny right apical lateral  pneumothorax measuring 4 mm that is unchanged from 2 days ago. There is  some minimal vague atelectasis and pleural fluid at the right base that  is unchanged. The heart remains slightly enlarged. No new abnormality is  seen.     This report was finalized on 2/20/2021 9:58 AM by Dr. Rashawn Cooper M.D.             Lab Results:     Lab Results (last 24 hours)     Procedure Component Value Units Date/Time    Procalcitonin [[id number]]  (Abnormal) Collected: 02/20/21 0534    Specimen: Blood Updated: 02/20/21 1009     Procalcitonin 0.36 ng/mL     Narrative:      As a Marker for Sepsis (Non-Neonates):   1. <0.5 ng/mL represents a low risk of severe sepsis and/or septic shock.  1. >2 ng/mL represents a high risk of severe sepsis and/or septic shock.    As a Marker for Lower Respiratory Tract Infections that require antibiotic therapy:  PCT on Admission     Antibiotic Therapy             6-12 Hrs later  > 0.5                Strongly Recommended            >0.25 - <0.5         Recommended  0.1 - 0.25           Discouraged                   Remeasure/reassess PCT  <0.1                 Strongly Discouraged          Remeasure/reassess PCT      As 28 day mortality risk marker: \"Change in Procalcitonin Result\" (> 80 % or <=80 %) if Day 0 (or Day 1) and Day 4 values are available. Refer to http://www.[facility]-pct-calculator.com/   Change in PCT <=80 %   A decrease of PCT levels below or equal to 80 % defines a positive change in PCT test result representing a higher risk for 28-day all-cause mortality of patients diagnosed with severe sepsis or septic shock.  Change in PCT > 80 %   A decrease of PCT levels of more than 80 % defines a negative change in PCT result representing a lower risk for 28-day all-cause mortality of " patients diagnosed with severe sepsis or septic shock.                Results may be falsely decreased if patient taking Biotin.     CBC & Differential [[id number]]  (Abnormal) Collected: 02/20/21 0935    Specimen: Blood Updated: 02/20/21 0951    Narrative:      The following orders were created for panel order CBC & Differential.  Procedure                               Abnormality         Status                     ---------                               -----------         ------                     CBC Auto Differential[[id number]]        Abnormal            Final result                 Please view results for these tests on the individual orders.    CBC Auto Differential [[id number]]  (Abnormal) Collected: 02/20/21 0935    Specimen: Blood Updated: 02/20/21 0951     WBC 9.44 10*3/mm3      RBC 3.51 10*6/mm3      Hemoglobin 10.6 g/dL      Hematocrit 32.1 %      MCV 91.5 fL      MCH 30.2 pg      MCHC 33.0 g/dL      RDW 13.0 %      RDW-SD 43.5 fl      MPV 10.9 fL      Platelets 256 10*3/mm3      Neutrophil % 68.5 %      Lymphocyte % 14.5 %      Monocyte % 14.0 %      Eosinophil % 1.7 %      Basophil % 0.6 %      Immature Grans % 0.7 %      Neutrophils, Absolute 6.46 10*3/mm3      Lymphocytes, Absolute 1.37 10*3/mm3      Monocytes, Absolute 1.32 10*3/mm3      Eosinophils, Absolute 0.16 10*3/mm3      Basophils, Absolute 0.06 10*3/mm3      Immature Grans, Absolute 0.07 10*3/mm3      nRBC 0.0 /100 WBC     Magnesium [[id number]]  (Normal) Collected: 02/20/21 0534    Specimen: Blood Updated: 02/20/21 0625     Magnesium 2.3 mg/dL     Renal Function Panel [[id number]]  (Abnormal) Collected: 02/20/21 0534    Specimen: Blood Updated: 02/20/21 0625     Glucose 111 mg/dL      BUN 34 mg/dL      Creatinine 1.41 mg/dL      Sodium 138 mmol/L      Potassium 4.5 mmol/L      Chloride 103 mmol/L      CO2 26.3 mmol/L      Calcium 9.1 mg/dL      Albumin 3.00 g/dL      Phosphorus 3.0 mg/dL      Anion Gap 8.7 mmol/L      BUN/Creatinine Ratio  24.1     eGFR Non African Amer 36 mL/min/1.73     Narrative:      GFR Normal >60  Chronic Kidney Disease <60  Kidney Failure <15      Basic Metabolic Panel [[id number]]  (Abnormal) Collected: 02/19/21 1618    Specimen: Blood from Hand, Right Updated: 02/19/21 1725     Glucose 126 mg/dL      BUN 38 mg/dL      Creatinine 1.60 mg/dL      Sodium 134 mmol/L      Potassium 4.8 mmol/L      Chloride 100 mmol/L      CO2 23.3 mmol/L      Calcium 8.9 mg/dL      eGFR Non African Amer 31 mL/min/1.73      BUN/Creatinine Ratio 23.8     Anion Gap 10.7 mmol/L     Narrative:      GFR Normal >60  Chronic Kidney Disease <60  Kidney Failure <15             Assessment/Plan       Multiple closed fractures of ribs of right side    Chronic obstructive pulmonary disease (CMS/HCC)    Hypothyroidism    CKD (chronic kidney disease) stage 3, GFR 30-59 ml/min (CMS/HCC)    Essential hypertension    DELROY (obstructive sleep apnea)    Non-cardiac chest pain       Assessment & Plan     Obvious concern is for developing pneumonia.  Have asked for culture of her sputum.  Did a chest x-ray this morning that really is unremarkable and shows only postoperative changes on the right.  There may be some mild volume overload.  White blood cell count is 9.4 and the patient has had no fever.  We will try to increase the patient's activity.  Have encouraged her use of the incentive spirometer.  Will observe for now.    Jed Cross III, MD  [facility]  02/20/21  10:53 EST

## 2021-02-21 VITALS
BODY MASS INDEX: 37.2 KG/M2 | RESPIRATION RATE: 17 BRPM | HEART RATE: 77 BPM | OXYGEN SATURATION: 94 % | SYSTOLIC BLOOD PRESSURE: 131 MMHG | TEMPERATURE: 97.6 F | DIASTOLIC BLOOD PRESSURE: 62 MMHG | HEIGHT: 65 IN | WEIGHT: 223.3 LBS

## 2021-02-21 LAB
ALBUMIN SERPL-MCNC: 3 G/DL (ref 3.5–5.2)
ANION GAP SERPL CALCULATED.3IONS-SCNC: 12.8 MMOL/L (ref 5–15)
BUN SERPL-MCNC: 33 MG/DL (ref 8–23)
BUN/CREAT SERPL: 23.2 (ref 7–25)
CALCIUM SPEC-SCNC: 9.6 MG/DL (ref 8.6–10.5)
CHLORIDE SERPL-SCNC: 95 MMOL/L (ref 98–107)
CO2 SERPL-SCNC: 29.2 MMOL/L (ref 22–29)
CREAT SERPL-MCNC: 1.42 MG/DL (ref 0.57–1)
GFR SERPL CREATININE-BSD FRML MDRD: 36 ML/MIN/1.73
GLUCOSE SERPL-MCNC: 98 MG/DL (ref 65–99)
PHOSPHATE SERPL-MCNC: 4.2 MG/DL (ref 2.5–4.5)
POTASSIUM SERPL-SCNC: 4 MMOL/L (ref 3.5–5.2)
SODIUM SERPL-SCNC: 137 MMOL/L (ref 136–145)

## 2021-02-21 PROCEDURE — 99024 POSTOP FOLLOW-UP VISIT: CPT | Performed by: THORACIC SURGERY (CARDIOTHORACIC VASCULAR SURGERY)

## 2021-02-21 PROCEDURE — 25010000002 HEPARIN (PORCINE) PER 1000 UNITS: Performed by: THORACIC SURGERY (CARDIOTHORACIC VASCULAR SURGERY)

## 2021-02-21 PROCEDURE — 80069 RENAL FUNCTION PANEL: CPT | Performed by: INTERNAL MEDICINE

## 2021-02-21 RX ORDER — OXYCODONE HYDROCHLORIDE 5 MG/1
5 TABLET ORAL EVERY 4 HOURS PRN
Qty: 12 TABLET | Refills: 0 | Status: SHIPPED | OUTPATIENT
Start: 2021-02-21 | End: 2021-02-23

## 2021-02-21 RX ORDER — POLYETHYLENE GLYCOL 3350 17 G/17G
17 POWDER, FOR SOLUTION ORAL DAILY
Start: 2021-02-21 | End: 2021-04-01

## 2021-02-21 RX ORDER — LIDOCAINE 50 MG/G
1 PATCH TOPICAL
Start: 2021-02-21 | End: 2021-04-01

## 2021-02-21 RX ORDER — TORSEMIDE 20 MG/1
20 TABLET ORAL DAILY
Start: 2021-02-21 | End: 2021-04-15

## 2021-02-21 RX ORDER — AMOXICILLIN 250 MG
2 CAPSULE ORAL 2 TIMES DAILY
Start: 2021-02-21 | End: 2021-04-01

## 2021-02-21 RX ADMIN — HEPARIN SODIUM 5000 UNITS: 5000 INJECTION INTRAVENOUS; SUBCUTANEOUS at 07:48

## 2021-02-21 RX ADMIN — TORSEMIDE 20 MG: 20 TABLET ORAL at 10:24

## 2021-02-21 RX ADMIN — LEVOTHYROXINE SODIUM 150 MCG: 150 TABLET ORAL at 07:48

## 2021-02-21 RX ADMIN — POLYETHYLENE GLYCOL 3350 17 G: 17 POWDER, FOR SOLUTION ORAL at 10:25

## 2021-02-21 RX ADMIN — GABAPENTIN 300 MG: 300 CAPSULE ORAL at 10:25

## 2021-02-21 RX ADMIN — OXYCODONE 5 MG: 5 TABLET ORAL at 12:16

## 2021-02-21 RX ADMIN — PANTOPRAZOLE SODIUM 40 MG: 40 TABLET, DELAYED RELEASE ORAL at 07:48

## 2021-02-21 RX ADMIN — Medication 1 CAPSULE: at 10:25

## 2021-02-21 RX ADMIN — LIDOCAINE 1 PATCH: 50 PATCH TOPICAL at 10:26

## 2021-02-21 NOTE — DISCHARGE SUMMARY
[facility]    Date of Discharge:  2/21/2021    PCP: Keri Foreman MD    Discharge Diagnosis:   Active Hospital Problems    Diagnosis  POA   • **Multiple closed fractures of ribs of right side [S22.41XA]  Yes   • Non-cardiac chest pain [R07.89]  Unknown   • DELROY (obstructive sleep apnea) [G47.33]  Yes   • Essential hypertension [I10]  Yes   • CKD (chronic kidney disease) stage 3, GFR 30-59 ml/min (CMS/[facility]) [N18.30]  Yes   • Hypothyroidism [E03.9]  Yes   • Chronic obstructive pulmonary disease (CMS/[facility]) [J44.9]  Yes      Resolved Hospital Problems   No resolved problems to display.     Procedure(s):  bronchoscopy, right video assisted THORACOSCOPY WITH PLATING OF 3, 4, 5, AND 6 RIBS     Consults     Date and Time Order Name Status Description    2/18/2021 2036 Inpatient Nephrology Consult Completed     2/12/2021 1955 Inpatient Thoracic Surgery Consult Completed     2/12/2021 1454 [facility] (on-call MD unless specified) Details Completed         Hospital Course  Please see history and physical for details. Patient is a 81 y.o. female initially admitted for a mechanical fall which led to multiple right-sided rib fractures.  Patient was seen in consultation by thoracic surgery and underwent ORIF with plating of right-sided ribs 345 and 6.  This occurred on 2/16/2021 per .  Postoperatively she has done well and she has anemia of chronic kidney disease compounded by mild acute blood loss.  Hemoglobin upon last check was 10.6 in the I think her hemoglobin will continue to stabilize and show an upward trend.  We also dealt with some volume overload postoperatively which is significantly resolved with a couple doses of IV Lasix.  At this juncture nephrology was saw the patient in consult her previous use of chlorthalidone has been discontinued and that has been changed out for torsemide.  Renal also recommendations to keep Norvasc on hold for the time being.   Current vital signs demonstrate a blood pressure of 124/67 with a pulse of 69 and she is 94% on room air.  Further postoperative chest imaging still demonstrates a very small pneumothorax.  If okay with thoracic surgery then patient will be transition to subacute rehab.  I will defer further management and monitoring to their expertise.  She does have a past history of DELROY of which she is compliant with use of CPAP.  She was seen briefly in consultation by urology as she had some urinary retention which has resolved and patient no longer has any Hilton catheter in place.  We did utilize a pure wick Hilton while patient was getting actively diuresed.  She was maintained on heparin for DVT prophylaxis.  She did complain of some right lower extremity and hip discomfort which I feel was secondary to arthritis but compounded by volume overload.  With good diuresis and removal of the fluid she has had resolution of her discomfort so no further work-up or plans for Doppler of that extremity were pursued.  Vital signs are stable as well as afebrile.  I discussed the case with the son at bedside.  I also discussed the case with RN.  I think this patient is medically stable to be transition to subacute rehab as long as we have the blessing of thoracic surgery.  CCP to coordinate final needs.  Questions answered to the best my ability and all involved are amenable to the above plan.      Condition on Discharge: Improved.     Temp:  [97.8 °F (36.6 °C)-99.1 °F (37.3 °C)] 98.2 °F (36.8 °C)  Heart Rate:  [69-85] 69  Resp:  [16-18] 17  BP: (107-135)/(55-73) 124/67  Body mass index is 37.16 kg/m².    Physical Exam  HENT:      Head: Normocephalic.      Nose: Nose normal.      Mouth/Throat:      Mouth: Mucous membranes are moist.      Pharynx: Oropharynx is clear.   Eyes:      General: No scleral icterus.     Conjunctiva/sclera: Conjunctivae normal.   Cardiovascular:      Rate and Rhythm: Normal rate and regular rhythm.   Pulmonary:       Effort: Pulmonary effort is normal.      Comments: Right side chest wall postoperative.  Moving great air even to her bases and overall clear  Abdominal:      General: Bowel sounds are normal. There is no distension.      Palpations: Abdomen is soft.      Tenderness: There is no abdominal tenderness.   Musculoskeletal:      Comments: Resolving/trace edema   Skin:     General: Skin is warm and dry.      Coloration: Skin is not jaundiced.   Neurological:      General: No focal deficit present.      Mental Status: She is alert and oriented to person, place, and time. Mental status is at baseline.   Psychiatric:         Mood and Affect: Mood normal.         Behavior: Behavior normal.     [unfilled]    Disposition: Skilled Nursing Facility (DC - External)       Discharge Medications      New Medications      Instructions Start Date   lidocaine 5 %  Commonly known as: LIDODERM   1 patch, Transdermal, Every 24 Hours Scheduled, Remove & Discard patch within 12 hours or as directed by MD      oxyCODONE 5 MG immediate release tablet  Commonly known as: ROXICODONE   5 mg, Oral, Every 4 Hours PRN      polyethylene glycol 17 g packet  Commonly known as: MIRALAX   17 g, Oral, Daily      sennosides-docusate 8.6-50 MG per tablet  Commonly known as: PERICOLACE   2 tablets, Oral, 2 Times Daily      torsemide 20 MG tablet  Commonly known as: DEMADEX   20 mg, Oral, Daily         Continue These Medications      Instructions Start Date   Advair Diskus 100-50 MCG/DOSE DISKUS  Generic drug: fluticasone-salmeterol   1 puff, Inhalation, 2 Times Daily      albuterol sulfate  (90 Base) MCG/ACT inhaler  Commonly known as: PROVENTIL HFA;VENTOLIN HFA;PROAIR HFA   2 puffs, Inhalation, Every 4 Hours PRN      [employer] Antacid & Anti-Gas 1000-60 MG chewable tablet  Generic drug: Calcium Carbonate-Simethicone   No dose, route, or frequency recorded.      omeprazole OTC 20 MG EC tablet  Commonly known as: PriLOSEC OTC   20 mg, Oral, As Needed, Take 1-2  tablet      Probiotic Daily capsule   1 tablet, Oral, Daily      Unithroid 150 MCG tablet  Generic drug: levothyroxine   150 mcg, Oral, Daily         Stop These Medications    amLODIPine 5 MG tablet  Commonly known as: NORVASC     chlorthalidone 25 MG tablet  Commonly known as: HYGROTON     diphenhydrAMINE 25 mg capsule  Commonly known as: BENADRYL     HYDROcodone-acetaminophen 5-325 MG per tablet  Commonly known as: NORCO     HYDROcodone-acetaminophen 7.5-325 MG per tablet  Commonly known as: NORCO     ondansetron ODT 4 MG disintegrating tablet  Commonly known as: ZOFRAN-ODT     vitamin D 1.25 MG (73175 UT) capsule capsule  Commonly known as: ERGOCALCIFEROL             Additional Instructions for the Follow-ups that You Need to Schedule     Discharge Follow-up with PCP   As directed       Currently Documented PCP:    Keri Foreman MD    PCP Phone Number:    [phone]     Follow Up Details: PCP post rehab.  TTS and renal per their recommendations         XR Chest 2 View    Mar 04, 2021 (Approximate)      Exam reason: post-op, rib plating           Follow-up Information     Kelley Delong MD. Go on 3/8/2021.    Specialty: Thoracic Surgery  Why: Please go to the [facility] hospital for a chest x-ray at 9:45 AM, then come to our office for your follow-up appointment.  Please call the office prior to entering the building due to waiting room restrictions.  Contact information:  [address] [id number] [phone]             Keri Foreman MD .    Specialty: Family Medicine  Why: PCP post rehab.  TTS and renal per their recommendations  Contact information:  [address] [id number] [phone]                  Pending Labs     Order Current Status    Respiratory Culture - Sputum, Cough Preliminary result         Oscar Soria MD  02/21/21  09:56 EST    Discharge time spent greater than 30 minutes.

## 2021-02-21 NOTE — PLAN OF CARE
Problem: Adult Inpatient Plan of Care  Goal: Plan of Care Review  Flowsheets (Taken 2/21/2021 0306)  Progress: no change  Plan of Care Reviewed With: patient  Outcome Summary: continues with edema bilateral lower extremities using purwick due to diuretics large amt of out put noted complains of pain in right leg but feels like it is better with movement no request for pain meds at this time   Goal Outcome Evaluation:  Plan of Care Reviewed With: patient  Progress: no change  Outcome Summary: continues with edema bilateral lower extremities using purwick due to diuretics large amt of out put noted complains of pain in right leg but feels like it is better with movement no request for pain meds at this time

## 2021-02-21 NOTE — PROGRESS NOTES
Continued Stay Note  [facility]     Patient Name: Luann Frances  MRN: [id number]  Today's Date: 2/21/2021    Admit Date: 2/12/2021    Discharge Plan     Row Name 02/21/21 1106       Plan    Plan  [facility] - insurance precert pending    Plan Comments  Pt d/c today- inbound call rec'd from Maite mae.  Call placed to klaudia Jacinto @ [phone] and requested update if precert was obtained.          Expected Discharge Date and Time     Expected Discharge Date Expected Discharge Time    Feb 21, 2021             Jennifer Julio RN

## 2021-02-21 NOTE — PROGRESS NOTES
Discharge completed this a.m.    Has accepting location though apparently insurance precertification is still pending so discharge was canceled    Went back to bedside discussed this with patient as well as son and RN

## 2021-02-21 NOTE — PROGRESS NOTES
Continued Stay Note  [facility]     Patient Name: Luann Frances  MRN: [id number]  Today's Date: 2/21/2021    Admit Date: 2/12/2021    Discharge Plan     Row Name 02/21/21 1212       Plan    Plan  [facility]- precert rec 2/19    Plan Comments  No return call from weekend liason.  Placed call to [facility] ([phone]) and s/w Cesar.  Advises determination approved 2/19 (review due 2/23) auth# [id number].          Expected Discharge Date and Time     Expected Discharge Date Expected Discharge Time    Feb 21, 2021             Jennifer Julio RN

## 2021-02-21 NOTE — PLAN OF CARE
Goal Outcome Evaluation:  Plan of Care Reviewed With: patient  Progress: improving  Outcome Summary: VSS, pt is being discharged.  Pre-cert obtained for [facility].  Son to take pt to [facility], report called.  DC teaching completed.  Script sent with pt in packet.

## 2021-02-21 NOTE — PROGRESS NOTES
"    Chief Complaint: multiple rib fractures, post-operative care  S/P: bronchoscopy with videa assisted thoracoscopy with plating of right ribs 3, 4, 5, 6  POD # 6    Subjective:  Symptoms:  Improved.  She reports shortness of breath and weakness.  No chest pain.    Diet:  Adequate intake.  No nausea or vomiting.    Activity level: Impaired due to weakness.    Pain:  She complains of pain that is mild.  Pain is well controlled.        Vital Signs:  Temp:  [97.6 °F (36.4 °C)-99.1 °F (37.3 °C)] 97.6 °F (36.4 °C)  Heart Rate:  [69-85] 77  Resp:  [16-18] 17  BP: (107-135)/(58-73) 131/62    Intake & Output (last day)       02/20 0701 - 02/21 0700 02/21 0701 - 02/22 0700    P.O. 960 240    Total Intake(mL/kg) 960 (9.5) 240 (2.4)    Urine (mL/kg/hr) 4600 (1.9)     Stool 0     Total Output 4600     Net -3640 +240          Stool Unmeasured Occurrence 1 x           Objective:  General Appearance:  Comfortable, well-appearing and in no acute distress.    Vital signs: (most recent): Blood pressure 131/62, pulse 77, temperature 97.6 °F (36.4 °C), temperature source Oral, resp. rate 17, height 165.1 cm (65\"), weight 101 kg (223 lb 4.8 oz), SpO2 94 %.  No fever.    Output: Producing urine and producing stool.    Lungs:  Normal effort and normal respiratory rate.  There are decreased breath sounds.    Heart: Normal rate.  No murmur.   Abdomen: Abdomen is soft.  Bowel sounds are normal.   There is no abdominal tenderness.   There is no mass.   Extremities: There is dependent edema.    Neurological: Patient is alert and oriented to person, place and time.  Normal strength.              Results Review:     I reviewed the patient's new clinical results.  I reviewed the patient's new imaging results and agree with the interpretation.    Imaging Results (Last 24 Hours)     ** No results found for the last 24 hours. **          Lab Results:     Lab Results (last 24 hours)     Procedure Component Value Units Date/Time    Renal Function " Panel [[id number]]  (Abnormal) Collected: 02/21/21 0615    Specimen: Blood from Hand, Left Updated: 02/21/21 0817     Glucose 98 mg/dL      BUN 33 mg/dL      Creatinine 1.42 mg/dL      Sodium 137 mmol/L      Potassium 4.0 mmol/L      Chloride 95 mmol/L      CO2 29.2 mmol/L      Calcium 9.6 mg/dL      Albumin 3.00 g/dL      Phosphorus 4.2 mg/dL      Anion Gap 12.8 mmol/L      BUN/Creatinine Ratio 23.2     eGFR Non African Amer 36 mL/min/1.73     Narrative:      GFR Normal >60  Chronic Kidney Disease <60  Kidney Failure <15      Respiratory Culture - Sputum, Cough [[id number]] Collected: 02/20/21 2248    Specimen: Sputum from Cough Updated: 02/21/21 0757     Respiratory Culture Scant growth (1+) Normal Respiratory Dima: NO S.aureus/MRSA or Pseudomonas aeruginosa     Gram Stain Rare (1+) Mixed bacterial morphotypes seen on Gram Stain      No WBCs seen      No epithelial cells seen           Assessment/Plan       Multiple closed fractures of ribs of right side    Chronic obstructive pulmonary disease (CMS/HCC)    Hypothyroidism    CKD (chronic kidney disease) stage 3, GFR 30-59 ml/min (CMS/HCC)    Essential hypertension    DELROY (obstructive sleep apnea)    Non-cardiac chest pain       Assessment & Plan     Patient is having less sputum production.  Sputum cultures showed only normal dima.  She is afebrile.  White blood cell count is normal.  She has no pleuritic pain.  Chest x-ray shows no infiltrates and only postoperative changes on the right.  I see no evidence for pneumonia.  Discussed with Dr. Soria.  Patient may be discharged anytime from my standpoint.  She will follow-up in our office 2 weeks after discharge.  We will sign off for now but available if needed.    Jed Cross III, MD  [facility]  02/21/21  12:30 EST

## 2021-02-21 NOTE — PROGRESS NOTES
"   LOS: 8 days    Patient Care Team:  Keri Foreman MD as PCP - General (Family Medicine)  Chas Garcia MD as Consulting Physician (Endocrinology)  Jeff Sands MD as Consulting Physician (Pulmonary Disease)      Subjective     Patient sitting comfortably in chair  Denied any chest pain, shortness of breath, nausea or vomiting  Edema improving slowly per patient    Objective     Vital Sign Min/Max for last 24 hours  Temp:  [97.8 °F (36.6 °C)-99.1 °F (37.3 °C)] 98.2 °F (36.8 °C)  Heart Rate:  [69-85] 69  Resp:  [16-18] 17  BP: (107-135)/(55-73) 124/67                       Flowsheet Rows      First Filed Value   Admission Height  165.1 cm (65\") Documented at 02/12/2021 1255   Admission Weight  97.5 kg (215 lb) Documented at 02/12/2021 1255          No intake/output data recorded.  I/O last 3 completed shifts:  In: 1160 [P.O.:1160]  Out: 5900 [Urine:5900]    Physical Exam:  Physical Exam    General Appearance: alert, oriented x 3, no acute distress   Skin: warm and dry  HEENT: oral mucosa normal, nonicteric sclera  Neck: supple, no JVD  Lungs: Decreased breath sounds at bases  Heart: RRR, normal S1 and S2  Abdomen: soft, nontender, nondistended  : no palpable bladder  Extremities: 1-2+ edema bilaterally  Neuro: normal speech and mental status      LABS:  Lab Results   Component Value Date    CALCIUM 9.6 02/21/2021    PHOS 4.2 02/21/2021     Results from last 7 days   Lab Units 02/21/21  0615 02/20/21  0935 02/20/21  0534 02/19/21  1618 02/19/21  0425  02/18/21  1246   MAGNESIUM mg/dL  --   --  2.3  --   --   --   --    SODIUM mmol/L 137  --  138 134* 136   < >  --    POTASSIUM mmol/L 4.0  --  4.5 4.8 4.9   < >  --    CHLORIDE mmol/L 95*  --  103 100 105   < >  --    CO2 mmol/L 29.2*  --  26.3 23.3 21.0*   < >  --    BUN mg/dL 33*  --  34* 38* 40*   < >  --    CREATININE mg/dL 1.42*  --  1.41* 1.60* 1.77*   < >  --    GLUCOSE mg/dL 98  --  111* 126* 105*   < >  --    CALCIUM mg/dL 9.6  --  9.1 8.9 9.5   < " >  --    WBC 10*3/mm3  --  9.44  --   --  9.41  --  8.74   HEMOGLOBIN g/dL  --  10.6*  --   --  10.7*  --  10.3*   PLATELETS 10*3/mm3  --  256  --   --  208  --  213    < > = values in this interval not displayed.     Lab Results   Component Value Date    TROPONINT <0.010 02/12/2021     Estimated Creatinine Clearance: 37.2 mL/min (A) (by C-G formula based on SCr of 1.42 mg/dL (H)).      Brief Urine Lab Results     None        WEIGHTS:     Wt Readings from Last 1 Encounters:   02/15/21 1333 101 kg (223 lb 4.8 oz)   02/15/21 0500 101 kg (222 lb 1.6 oz)   02/14/21 0300 102 kg (224 lb 13.9 oz)   02/13/21 0600 96.5 kg (212 lb 11.9 oz)   02/12/21 1255 97.5 kg (215 lb)       budesonide-formoterol, 2 puff, Inhalation, BID - RT  gabapentin, 300 mg, Oral, Q12H  heparin (porcine), 5,000 Units, Subcutaneous, Q8H  lactobacillus acidophilus, 1 capsule, Oral, Daily  levothyroxine, 150 mcg, Oral, Q AM  lidocaine, 1 patch, Transdermal, Q24H  pantoprazole, 40 mg, Oral, QAM  polyethylene glycol, 17 g, Oral, Daily  senna-docusate sodium, 2 tablet, Oral, BID  torsemide, 20 mg, Oral, Daily           Assessment/Plan       1.  Acute kidney injury on CKD 3  Creatinine improved to baseline.  Electrolytes okay.  Volume status generous but diuresing well  2.  Hypertension with CKD 3  Blood pressure okay.  Off amlodipine  3.  Edema/volume overload  Good urine output.   4. DELROY    Recs:  Continue low-dose torsemide  Keep amlodipine on hold      Norm Keane MD  02/21/21  08:57 EST

## 2021-02-22 LAB
BACTERIA SPEC RESP CULT: NORMAL
GRAM STN SPEC: NORMAL

## 2021-02-22 NOTE — PROGRESS NOTES
Case Management Discharge Note      Final Note: Pt discharged to [facility] for rehab         Selected Continued Care - Discharged on 2/21/2021 Admission date: 2/12/2021 - Discharge disposition: Skilled Nursing Facility (DC - External)    Destination Coordination complete    Service Provider Selected Services Address Phone Fax Patient Preferred    [facility] [address] [facility]  Skilled Nursing [address] [facility] [address] [phone] --          Durable Medical Equipment    No services have been selected for the patient.              Dialysis/Infusion    No services have been selected for the patient.              Home Medical Care    No services have been selected for the patient.              Therapy    No services have been selected for the patient.              Community Resources    No services have been selected for the patient.                       Final Discharge Disposition Code: 03 - skilled nursing facility (SNF)

## 2021-03-04 ENCOUNTER — TELEPHONE (OUTPATIENT)
Dept: FAMILY MEDICINE CLINIC | Facility: CLINIC | Age: 81
End: 2021-03-04

## 2021-03-04 NOTE — TELEPHONE ENCOUNTER
Verbal orders given for nursing and physical therapy for home health. Patient fell and had 6 rib fractures.

## 2021-03-08 ENCOUNTER — TRANSITIONAL CARE MANAGEMENT TELEPHONE ENCOUNTER (OUTPATIENT)
Dept: CALL CENTER | Facility: HOSPITAL | Age: 81
End: 2021-03-08

## 2021-03-08 ENCOUNTER — OFFICE VISIT (OUTPATIENT)
Dept: SURGERY | Facility: CLINIC | Age: 81
End: 2021-03-08

## 2021-03-08 ENCOUNTER — READMISSION MANAGEMENT (OUTPATIENT)
Dept: CALL CENTER | Facility: HOSPITAL | Age: 81
End: 2021-03-08

## 2021-03-08 ENCOUNTER — HOSPITAL ENCOUNTER (OUTPATIENT)
Dept: GENERAL RADIOLOGY | Facility: HOSPITAL | Age: 81
Discharge: HOME | End: 2021-03-08
Admitting: NURSE PRACTITIONER

## 2021-03-08 VITALS
BODY MASS INDEX: 37.15 KG/M2 | WEIGHT: 223 LBS | OXYGEN SATURATION: 99 % | DIASTOLIC BLOOD PRESSURE: 68 MMHG | HEART RATE: 60 BPM | SYSTOLIC BLOOD PRESSURE: 108 MMHG | HEIGHT: 65 IN

## 2021-03-08 DIAGNOSIS — S22.41XD CLOSED FRACTURE OF MULTIPLE RIBS OF RIGHT SIDE WITH ROUTINE HEALING, SUBSEQUENT ENCOUNTER: Primary | ICD-10-CM

## 2021-03-08 DIAGNOSIS — S22.41XA CLOSED FRACTURE OF MULTIPLE RIBS OF RIGHT SIDE, INITIAL ENCOUNTER: ICD-10-CM

## 2021-03-08 PROCEDURE — 71046 X-RAY EXAM CHEST 2 VIEWS: CPT

## 2021-03-08 PROCEDURE — 99442 PR PHYS/QHP TELEPHONE EVALUATION 11-20 MIN: CPT | Performed by: THORACIC SURGERY (CARDIOTHORACIC VASCULAR SURGERY)

## 2021-03-08 NOTE — PROGRESS NOTES
"Chief Complaint  Multiple rib fractures  Subjective          Luann Frances presents to [facility] THORACIC SURGERY in follow-up status post ORIF of multiple rib fractures.  History of Present Illness  Ms. Frances is a very pleasant 81-year-old lady status post ORIF of the third, fourth, fifth and sixth ribs on the right.  She has recovered very well from this procedure.  Her pain is well controlled with just Tylenol at this point.  She has been released from rehab after undergoing a couple weeks of physical therapy.  Objective   Vital Signs:   /68 (BP Location: Right arm, Patient Position: Sitting, Cuff Size: Adult)   Pulse 60   Ht 165.1 cm (65\")   Wt 101 kg (223 lb)   SpO2 99%   BMI 37.11 kg/m²     Physical Exam  Pulmonary:      Comments: VATS incisions healing well.       Result Review :       Data reviewed: Radiologic studies :     I have independently reviewed the chest x-ray performed today which demonstrates good expansion bilateral lungs, minimal right-sided effusion.     Assessment and Plan    Ms. Frances is a very pleasant 81-year-old lady status post ORIF of multiple right-sided rib fractures.  Her plates are in good position on x-ray today.  She is recovering well.  She may resume all normal activity.  I will plan to see her in 3 months with a CT of the chest to document continued healing of her ribs and continued good position of her plates.  There are no diagnoses linked to this encounter.    Follow Up   No follow-ups on file.  Patient was given instructions and counseling regarding her condition or for health maintenance advice. Please see specific information pulled into the AVS if appropriate.       "

## 2021-03-08 NOTE — OUTREACH NOTE
Prep Survey      Responses   [facility] facility patient discharged from?  Non-BH   Is LACE score < 7 ?  Non-BH Discharge   Emergency Room discharge w/ pulse ox?  No   Eligibility  [facility]   Date of Admission  02/21/21   Date of Discharge  03/06/21   Discharge diagnosis  s/p ORIF with plating of right-sided ribs 345 and 6   Does the patient have one of the following disease processes/diagnoses(primary or secondary)?  General Surgery   Prep survey completed?  Yes          Crystal Quigley RN

## 2021-03-08 NOTE — OUTREACH NOTE
Call Center TCM Note      Responses   [facility] patient discharged from?  Non-BH   Does the patient have one of the following disease processes/diagnoses(primary or secondary)?  General Surgery   TCM attempt successful?  No   Unsuccessful attempts  Attempt 1          Sada Smith LPN    3/8/2021, 13:18 EST

## 2021-03-08 NOTE — OUTREACH NOTE
Call Center TCM Note      Responses   [facility] patient discharged from?  Non-BH   Does the patient have one of the following disease processes/diagnoses(primary or secondary)?  General Surgery   TCM attempt successful?  No   Unsuccessful attempts  Attempt 2          Ashley Oleary MA    3/8/2021, 17:28 EST

## 2021-03-09 ENCOUNTER — TELEPHONE (OUTPATIENT)
Dept: FAMILY MEDICINE CLINIC | Facility: CLINIC | Age: 81
End: 2021-03-09

## 2021-03-09 ENCOUNTER — TRANSITIONAL CARE MANAGEMENT TELEPHONE ENCOUNTER (OUTPATIENT)
Dept: CALL CENTER | Facility: HOSPITAL | Age: 81
End: 2021-03-09

## 2021-03-09 DIAGNOSIS — S22.49XD CLOSED FRACTURE OF MULTIPLE RIBS WITH ROUTINE HEALING, UNSPECIFIED LATERALITY, SUBSEQUENT ENCOUNTER: Primary | ICD-10-CM

## 2021-03-09 NOTE — TELEPHONE ENCOUNTER
RIDDHI DUNCAN OT WITH [employer] CALLED REQUESTING VERBAL ORDERS TO SEE PATIENT 2X WK FOR 2 WK, 1X WK FOR 1 WK    PLEASE CALL BACK TO GIVE VERBAL  [phone]

## 2021-03-09 NOTE — PROGRESS NOTES
Please schedule patient next available and add to wait list. Due to work restrictions and vacation, we will not be able to get patient in sooner at this time. Let patient know to call the office for any urgent problems/concerns as related to her hospital stay.

## 2021-03-09 NOTE — OUTREACH NOTE
Call Center TCM Note      Responses   [facility] patient discharged from?  Non-   Does the patient have one of the following disease processes/diagnoses(primary or secondary)?  General Surgery   TCM attempt successful?  Yes   Call start time  0919   Call end time  0926   Discharge diagnosis  s/p ORIF with plating of right-sided ribs 345 and 6   Meds reviewed with patient/caregiver?  Yes   Is the patient having any side effects they believe may be caused by any medication additions or changes?  No   Does the patient have all medications ordered at discharge?  Yes   Is the patient taking all medications as directed (includes completed medication regime)?  Yes   Does the patient have a primary care provider?   Yes   Does the patient have an appointment with their PCP within 7 days of discharge?  No   Comments regarding PCP  Seen by surgeon yesterday3/8/2021, I looked for availability for Dr Foreman and could not find any within TCM timeframe. Message sent to office   What is preventing the patient from scheduling follow up appointments within 7 days of discharge?  Waiting on return call   Nursing Interventions  Educated patient on importance of making appointment, Advised patient to make appointment   Has the patient kept scheduled appointments due by today?  N/A   Has home health visited the patient within 72 hours of discharge?  N/A   Psychosocial issues?  No   Did the patient receive a copy of their discharge instructions?  Yes   Nursing interventions  Reviewed instructions with patient   What is the patient's perception of their health status since discharge?  Improving   Is the patient/caregiver able to teach back signs and symptoms related to disease process for when to call PCP?  Yes   Is the patient/caregiver able to teach back signs and symptoms related to disease process for when to call 911?  Yes   Is the patient/caregiver able to teach back the hierarchy of who to call/visit for symptoms/problems?  PCP, Specialist, Home health nurse, Urgent Care, ED, 911  Yes   If the patient is a current smoker, are they able to teach back resources for cessation?  Smoking cessation medications   TCM call completed?  Yes          Price Carver RN    3/9/2021, 09:28 EST

## 2021-04-01 ENCOUNTER — OFFICE VISIT (OUTPATIENT)
Dept: FAMILY MEDICINE CLINIC | Facility: CLINIC | Age: 81
End: 2021-04-01

## 2021-04-01 ENCOUNTER — HOSPITAL ENCOUNTER (OUTPATIENT)
Dept: CARDIOLOGY | Facility: HOSPITAL | Age: 81
Discharge: HOME | End: 2021-04-01
Admitting: FAMILY MEDICINE

## 2021-04-01 VITALS
DIASTOLIC BLOOD PRESSURE: 78 MMHG | HEIGHT: 65 IN | TEMPERATURE: 97.7 F | HEART RATE: 76 BPM | WEIGHT: 213 LBS | BODY MASS INDEX: 35.49 KG/M2 | SYSTOLIC BLOOD PRESSURE: 112 MMHG | OXYGEN SATURATION: 99 %

## 2021-04-01 DIAGNOSIS — I82.409 ACUTE DEEP VEIN THROMBOSIS (DVT) OF LOWER EXTREMITY, UNSPECIFIED LATERALITY, UNSPECIFIED VEIN (HCC): Primary | ICD-10-CM

## 2021-04-01 DIAGNOSIS — N18.32 STAGE 3B CHRONIC KIDNEY DISEASE (HCC): ICD-10-CM

## 2021-04-01 DIAGNOSIS — E03.9 ACQUIRED HYPOTHYROIDISM: ICD-10-CM

## 2021-04-01 DIAGNOSIS — D64.9 ANEMIA, UNSPECIFIED TYPE: ICD-10-CM

## 2021-04-01 DIAGNOSIS — M79.604 RIGHT LEG PAIN: ICD-10-CM

## 2021-04-01 DIAGNOSIS — S22.49XS CLOSED FRACTURE OF MULTIPLE RIBS, UNSPECIFIED LATERALITY, SEQUELA: Primary | ICD-10-CM

## 2021-04-01 PROBLEM — I10 ESSENTIAL HYPERTENSION: Status: RESOLVED | Noted: 2020-01-20 | Resolved: 2021-04-01

## 2021-04-01 LAB
BH CV LOW VAS RIGHT MID FEMORAL SPONT: 1
BH CV LOW VAS RIGHT PROXIMAL FEMORAL SPONT: 1
BH CV LOWER VASCULAR LEFT COMMON FEMORAL AUGMENT: NORMAL
BH CV LOWER VASCULAR LEFT COMMON FEMORAL COMPETENT: NORMAL
BH CV LOWER VASCULAR LEFT COMMON FEMORAL COMPRESS: NORMAL
BH CV LOWER VASCULAR LEFT COMMON FEMORAL PHASIC: NORMAL
BH CV LOWER VASCULAR LEFT COMMON FEMORAL SPONT: NORMAL
BH CV LOWER VASCULAR RIGHT COMMON FEMORAL AUGMENT: NORMAL
BH CV LOWER VASCULAR RIGHT COMMON FEMORAL COMPETENT: NORMAL
BH CV LOWER VASCULAR RIGHT COMMON FEMORAL COMPRESS: NORMAL
BH CV LOWER VASCULAR RIGHT COMMON FEMORAL PHASIC: NORMAL
BH CV LOWER VASCULAR RIGHT COMMON FEMORAL SPONT: NORMAL
BH CV LOWER VASCULAR RIGHT DISTAL FEMORAL COMPRESS: NORMAL
BH CV LOWER VASCULAR RIGHT GASTRONEMIUS COMPRESS: NORMAL
BH CV LOWER VASCULAR RIGHT GREATER SAPH BK COMPRESS: NORMAL
BH CV LOWER VASCULAR RIGHT LESSER SAPH COMPRESS: NORMAL
BH CV LOWER VASCULAR RIGHT MID FEMORAL AUGMENT: NORMAL
BH CV LOWER VASCULAR RIGHT MID FEMORAL COMPRESS: NORMAL
BH CV LOWER VASCULAR RIGHT MID FEMORAL PHASIC: NORMAL
BH CV LOWER VASCULAR RIGHT MID FEMORAL SPONT: NORMAL
BH CV LOWER VASCULAR RIGHT MID FEMORAL THROMBUS: NORMAL
BH CV LOWER VASCULAR RIGHT PERONEAL COMPRESS: NORMAL
BH CV LOWER VASCULAR RIGHT POPLITEAL AUGMENT: NORMAL
BH CV LOWER VASCULAR RIGHT POPLITEAL COMPETENT: NORMAL
BH CV LOWER VASCULAR RIGHT POPLITEAL COMPRESS: NORMAL
BH CV LOWER VASCULAR RIGHT POPLITEAL PHASIC: NORMAL
BH CV LOWER VASCULAR RIGHT POPLITEAL SPONT: NORMAL
BH CV LOWER VASCULAR RIGHT POSTERIOR TIBIAL COMPRESS: NORMAL
BH CV LOWER VASCULAR RIGHT PROFUNDA FEMORAL COMPRESS: NORMAL
BH CV LOWER VASCULAR RIGHT PROXIMAL FEMORAL AUGMENT: NORMAL
BH CV LOWER VASCULAR RIGHT PROXIMAL FEMORAL COMPRESS: NORMAL
BH CV LOWER VASCULAR RIGHT PROXIMAL FEMORAL PHASIC: NORMAL
BH CV LOWER VASCULAR RIGHT PROXIMAL FEMORAL SPONT: NORMAL
BH CV LOWER VASCULAR RIGHT PROXIMAL FEMORAL THROMBUS: NORMAL
BH CV LOWER VASCULAR RIGHT SAPHENOFEMORAL JUNCTION COMPRESS: NORMAL

## 2021-04-01 PROCEDURE — 99214 OFFICE O/P EST MOD 30 MIN: CPT | Performed by: FAMILY MEDICINE

## 2021-04-01 PROCEDURE — 93971 EXTREMITY STUDY: CPT

## 2021-04-01 NOTE — PROGRESS NOTES
Luann Frances is a 81 y.o. female.     Chief Complaint   Patient presents with   • Hospital Follow Up Visit     pt in hospital 2/12-2/21 then rehab 3/6 for broken ribs - plated 3-6     Masks/face shield/appropriate PPE were worn for the entirety of the visit by the patient, MA, and provider.     HPI     Pt is a pleasant 81 y.o. YO female here for hospital follow-up visit.  Her chronic medical problems include COPD, CKD stage IIIb, GERD, and hypothyroidism.      Patient was hospitalized from 2/12 through 2/21/2021.  She was hospitalized subsequent to a fall which led to the fracture of multiple ribs on the right side, this required surgery and rib plating.  Postoperative course was complicated by fluid overload that required diuresis, urinary retention, anemia, and a small pneumothorax.    She was discharged on 2/21/2021 to subacute rehab and is now home.     She is not having any pain over rib area or the incision sites, does have some tenderness still in her right breast.     She started experiencing significant pain in right leg today, posterior thigh and popliteal region, tender to palpation.     Of note she has stopped taking her thyroid replacement, she was having significant nausea with it, this was the same for levothyroxine.     The following portions of the patient's history were reviewed by me and updated as appropriate: allergies, current medications, past family history, past medical history, past social history, past surgical history, and problem list.     Review of Systems   Constitutional: Negative.    HENT: Negative.    Eyes: Negative.    Respiratory: Positive for shortness of breath.    Cardiovascular: Negative.    Gastrointestinal: Negative.    Endocrine: Negative.    Genitourinary: Negative.    Musculoskeletal: Positive for gait problem, joint swelling and myalgias.   Skin: Negative.    Allergic/Immunologic: Negative.    Hematological: Negative.    Psychiatric/Behavioral: Negative.           Objective  Vitals:    04/01/21 1336   BP: 112/78   Pulse: 76   Temp: 97.7 °F (36.5 °C)   SpO2: 99%     Body mass index is 35.45 kg/m².       Physical Exam  Vitals reviewed.   Constitutional:       General: She is not in acute distress.     Appearance: She is well-developed.   HENT:      Head: Normocephalic and atraumatic.   Eyes:      General: No scleral icterus.        Right eye: No discharge.         Left eye: No discharge.      Conjunctiva/sclera: Conjunctivae normal.   Pulmonary:      Effort: Pulmonary effort is normal. No respiratory distress.   Musculoskeletal:      Comments: Tenderness to palpation of the posterior right thigh and popliteal region, no pitting edema.    Skin:     Coloration: Skin is not pale.      Findings: No erythema.   Neurological:      Mental Status: She is alert and oriented to person, place, and time.   Psychiatric:         Behavior: Behavior normal.         Thought Content: Thought content normal.         Judgment: Judgment normal.           Current Outpatient Medications:   •  ADVAIR DISKUS 100-50 MCG/DOSE DISKUS, Inhale 1 puff 2 (Two) Times a Day., Disp: 180 each, Rfl: 5  •  albuterol (PROVENTIL HFA;VENTOLIN HFA) 108 (90 Base) MCG/ACT inhaler, Inhale 2 puffs Every 4 (Four) Hours As Needed for Wheezing or Shortness of Air., Disp: 1 inhaler, Rfl: 3  •  [employer] ANTACID & ANTI-GAS 1000-60 MG chewable tablet, , Disp: , Rfl:   •  omeprazole OTC (PriLOSEC OTC) 20 MG EC tablet, Take 20 mg by mouth As Needed. Take 1-2 tablet, Disp: , Rfl:   •  torsemide (DEMADEX) 20 MG tablet, Take 1 tablet by mouth Daily., Disp:  , Rfl:     Procedures    Lab Results (most recent)     None              Diagnoses and all orders for this visit:    Closed fracture of multiple ribs, unspecified laterality, sequela  Pleasant 81-year-old female here today for hospital follow-up visit.  Of note she has developed new pain in her posterior right thigh.  Otherwise she is doing well.  She had follow-up with  cardiothoracic surgery and was told that she was clear until next follow-up in 3 months.  Had a repeat x-ray at that time.    Stage 3b chronic kidney disease (CMS/HCC)  Chronic, creatinine during hospitalization was around 1.4 at time of discharge.  Will recheck today to ensure stability.  Takes torsemide most days of the week but occasionally cannot tolerate secondary to urinary frequency.  -     Comprehensive Metabolic Panel    Acquired hypothyroidism  Chronic, has been unable to tolerate thyroid supplementation secondary to side effects of nausea and vomiting.  Believes that she has been on levothyroxine and now more recently euthyroid with significant side effects.  Does not believe she has been on a [place] Thyroid formulation.  We will recheck levels today, if significantly abnormal patient is open to trialing [place] Thyroid, will certainly not a first choice for treatment I think it is likely better than nothing.  -     TSH  -     T4, Free    Anemia, unspecified type  Patient with postoperative anemia after her rib surgery, will recheck today to ensure stability.  -     CBC & Differential    Right leg pain  New posterior right leg pain, do have significant concern for the possibility of a DVT given her relative immobility and recent surgery.  Tenderness to palpation on exam but no gross increase in size or swelling.  Working to get patient set up for a stat ultrasound.  -     Duplex Venous Lower Extremity -      Return in about 6 months (around 10/1/2021) for Fasting labs, Medicare Wellness.      Keri Newell MD

## 2021-04-01 NOTE — PROGRESS NOTES
Right lower venous doppler complete and preliminary is positive for acute dvt. Patient was instructed to  blood thinners and started by Philip Perez

## 2021-04-02 LAB
ALBUMIN SERPL-MCNC: 4.2 G/DL (ref 3.5–5.2)
ALBUMIN/GLOB SERPL: 1.4 G/DL
ALP SERPL-CCNC: 84 U/L (ref 39–117)
ALT SERPL-CCNC: 13 U/L (ref 1–33)
AST SERPL-CCNC: 15 U/L (ref 1–32)
BASOPHILS # BLD AUTO: 0.1 10*3/MM3 (ref 0–0.2)
BASOPHILS NFR BLD AUTO: 1.2 % (ref 0–1.5)
BILIRUB SERPL-MCNC: 0.5 MG/DL (ref 0–1.2)
BUN SERPL-MCNC: 27 MG/DL (ref 8–23)
BUN/CREAT SERPL: 18.8 (ref 7–25)
CALCIUM SERPL-MCNC: 10.1 MG/DL (ref 8.6–10.5)
CHLORIDE SERPL-SCNC: 100 MMOL/L (ref 98–107)
CO2 SERPL-SCNC: 25.7 MMOL/L (ref 22–29)
CREAT SERPL-MCNC: 1.44 MG/DL (ref 0.57–1)
EOSINOPHIL # BLD AUTO: 0.19 10*3/MM3 (ref 0–0.4)
EOSINOPHIL NFR BLD AUTO: 2.3 % (ref 0.3–6.2)
ERYTHROCYTE [DISTWIDTH] IN BLOOD BY AUTOMATED COUNT: 13.9 % (ref 12.3–15.4)
GLOBULIN SER CALC-MCNC: 3 GM/DL
GLUCOSE SERPL-MCNC: 105 MG/DL (ref 65–99)
HCT VFR BLD AUTO: 38.9 % (ref 34–46.6)
HGB BLD-MCNC: 12.8 G/DL (ref 12–15.9)
IMM GRANULOCYTES # BLD AUTO: 0.05 10*3/MM3 (ref 0–0.05)
IMM GRANULOCYTES NFR BLD AUTO: 0.6 % (ref 0–0.5)
LYMPHOCYTES # BLD AUTO: 2.2 10*3/MM3 (ref 0.7–3.1)
LYMPHOCYTES NFR BLD AUTO: 26.3 % (ref 19.6–45.3)
MCH RBC QN AUTO: 29.5 PG (ref 26.6–33)
MCHC RBC AUTO-ENTMCNC: 32.9 G/DL (ref 31.5–35.7)
MCV RBC AUTO: 89.6 FL (ref 79–97)
MONOCYTES # BLD AUTO: 0.71 10*3/MM3 (ref 0.1–0.9)
MONOCYTES NFR BLD AUTO: 8.5 % (ref 5–12)
NEUTROPHILS # BLD AUTO: 5.13 10*3/MM3 (ref 1.7–7)
NEUTROPHILS NFR BLD AUTO: 61.1 % (ref 42.7–76)
NRBC BLD AUTO-RTO: 0 /100 WBC (ref 0–0.2)
PLATELET # BLD AUTO: 278 10*3/MM3 (ref 140–450)
POTASSIUM SERPL-SCNC: 4.5 MMOL/L (ref 3.5–5.2)
PROT SERPL-MCNC: 7.2 G/DL (ref 6–8.5)
RBC # BLD AUTO: 4.34 10*6/MM3 (ref 3.77–5.28)
SODIUM SERPL-SCNC: 140 MMOL/L (ref 136–145)
T4 FREE SERPL-MCNC: 0.41 NG/DL (ref 0.93–1.7)
TSH SERPL DL<=0.005 MIU/L-ACNC: 51.9 UIU/ML (ref 0.27–4.2)
WBC # BLD AUTO: 8.38 10*3/MM3 (ref 3.4–10.8)

## 2021-04-05 ENCOUNTER — TELEPHONE (OUTPATIENT)
Dept: FAMILY MEDICINE CLINIC | Facility: CLINIC | Age: 81
End: 2021-04-05

## 2021-04-05 DIAGNOSIS — N18.32 STAGE 3B CHRONIC KIDNEY DISEASE (HCC): Primary | ICD-10-CM

## 2021-04-05 NOTE — TELEPHONE ENCOUNTER
PATIENT IS CALLING IN REGARDING THE VISIT THAT SHE HAD ON Thursday, STATES THAT SHE FELT LIKE THE MEDICATION WAS WORKING ON Friday AND Saturday. UNTIL Sunday EVENING AROUND 5 PATIENT NOTICE THE INTERIOR OF THE RIGHT KNEE  IS PAINFUL TO WALK,     PLEASE ADVISIE      [phone]

## 2021-04-06 NOTE — TELEPHONE ENCOUNTER
PATIENT IS REQUESTING A CALL BACK FROM DR. BERNARD.  SHE CALLED YESTERDAY AND WAS CHECKING TO SEE IF HER MESSAGE WAS RECEIVED TO GIVE HER A CALL?  I VERIFIED THAT THE MESSAGE WAS SENT BACK TO DR. BERNARD'S CLINICAL STAFF.    PATIENT MAY BE REACHED [phone]

## 2021-04-07 DIAGNOSIS — E03.9 ACQUIRED HYPOTHYROIDISM: Primary | ICD-10-CM

## 2021-04-07 RX ORDER — TORSEMIDE 20 MG/1
20 TABLET ORAL DAILY
Qty: 90 TABLET | Refills: 1 | Status: CANCELLED | OUTPATIENT
Start: 2021-04-07

## 2021-04-07 RX ORDER — LEVOTHYROXINE AND LIOTHYRONINE 19; 4.5 UG/1; UG/1
30 TABLET ORAL DAILY
Qty: 30 TABLET | Refills: 1 | Status: SHIPPED | OUTPATIENT
Start: 2021-04-07 | End: 2021-08-28 | Stop reason: ALTCHOICE

## 2021-04-07 NOTE — TELEPHONE ENCOUNTER
She can take tylenol 500-650 mg three times a day for pain. If she feels that the pain is severe or incapacitating she could go to the ER or urgent care for further evaluation. Otherwise we can follow up on it at our visit in a week

## 2021-04-07 NOTE — TELEPHONE ENCOUNTER
informed pt that per Dr. Foreman she does not need to continue taking the torsemide that was prescribed while in the hospital. Pt informed v/u

## 2021-04-15 ENCOUNTER — OFFICE VISIT (OUTPATIENT)
Dept: FAMILY MEDICINE CLINIC | Facility: CLINIC | Age: 81
End: 2021-04-15

## 2021-04-15 ENCOUNTER — HOSPITAL ENCOUNTER (OUTPATIENT)
Dept: CARDIOLOGY | Facility: HOSPITAL | Age: 81
Discharge: HOME | End: 2021-04-15
Admitting: FAMILY MEDICINE

## 2021-04-15 VITALS
HEIGHT: 65 IN | SYSTOLIC BLOOD PRESSURE: 130 MMHG | DIASTOLIC BLOOD PRESSURE: 80 MMHG | BODY MASS INDEX: 37.32 KG/M2 | HEART RATE: 87 BPM | WEIGHT: 224 LBS | OXYGEN SATURATION: 98 % | TEMPERATURE: 97.5 F

## 2021-04-15 DIAGNOSIS — L98.9 SKIN LESION: ICD-10-CM

## 2021-04-15 DIAGNOSIS — M25.561 ACUTE PAIN OF RIGHT KNEE: ICD-10-CM

## 2021-04-15 DIAGNOSIS — E03.9 ACQUIRED HYPOTHYROIDISM: ICD-10-CM

## 2021-04-15 DIAGNOSIS — I82.411 ACUTE DEEP VEIN THROMBOSIS (DVT) OF FEMORAL VEIN OF RIGHT LOWER EXTREMITY (HCC): ICD-10-CM

## 2021-04-15 DIAGNOSIS — M25.561 ACUTE PAIN OF RIGHT KNEE: Primary | ICD-10-CM

## 2021-04-15 LAB
BH CV LOW VAS RIGHT MID FEMORAL SPONT: 1
BH CV LOW VAS RIGHT PROXIMAL FEMORAL SPONT: 1
BH CV LOWER VASCULAR LEFT COMMON FEMORAL AUGMENT: NORMAL
BH CV LOWER VASCULAR LEFT COMMON FEMORAL COMPETENT: NORMAL
BH CV LOWER VASCULAR LEFT COMMON FEMORAL COMPRESS: NORMAL
BH CV LOWER VASCULAR LEFT COMMON FEMORAL PHASIC: NORMAL
BH CV LOWER VASCULAR LEFT COMMON FEMORAL SPONT: NORMAL
BH CV LOWER VASCULAR RIGHT COMMON FEMORAL AUGMENT: NORMAL
BH CV LOWER VASCULAR RIGHT COMMON FEMORAL COMPETENT: NORMAL
BH CV LOWER VASCULAR RIGHT COMMON FEMORAL COMPRESS: NORMAL
BH CV LOWER VASCULAR RIGHT COMMON FEMORAL PHASIC: NORMAL
BH CV LOWER VASCULAR RIGHT COMMON FEMORAL SPONT: NORMAL
BH CV LOWER VASCULAR RIGHT DISTAL FEMORAL COMPRESS: NORMAL
BH CV LOWER VASCULAR RIGHT GASTRONEMIUS COMPRESS: NORMAL
BH CV LOWER VASCULAR RIGHT GREATER SAPH AK COMPRESS: NORMAL
BH CV LOWER VASCULAR RIGHT GREATER SAPH BK COMPRESS: NORMAL
BH CV LOWER VASCULAR RIGHT LESSER SAPH COMPRESS: NORMAL
BH CV LOWER VASCULAR RIGHT MID FEMORAL AUGMENT: NORMAL
BH CV LOWER VASCULAR RIGHT MID FEMORAL COMPETENT: NORMAL
BH CV LOWER VASCULAR RIGHT MID FEMORAL COMPRESS: NORMAL
BH CV LOWER VASCULAR RIGHT MID FEMORAL PHASIC: NORMAL
BH CV LOWER VASCULAR RIGHT MID FEMORAL SPONT: NORMAL
BH CV LOWER VASCULAR RIGHT MID FEMORAL THROMBUS: NORMAL
BH CV LOWER VASCULAR RIGHT PERONEAL COMPRESS: NORMAL
BH CV LOWER VASCULAR RIGHT POPLITEAL AUGMENT: NORMAL
BH CV LOWER VASCULAR RIGHT POPLITEAL COMPETENT: NORMAL
BH CV LOWER VASCULAR RIGHT POPLITEAL COMPRESS: NORMAL
BH CV LOWER VASCULAR RIGHT POPLITEAL PHASIC: NORMAL
BH CV LOWER VASCULAR RIGHT POPLITEAL SPONT: NORMAL
BH CV LOWER VASCULAR RIGHT POSTERIOR TIBIAL COMPRESS: NORMAL
BH CV LOWER VASCULAR RIGHT PROFUNDA FEMORAL COMPRESS: NORMAL
BH CV LOWER VASCULAR RIGHT PROXIMAL FEMORAL AUGMENT: NORMAL
BH CV LOWER VASCULAR RIGHT PROXIMAL FEMORAL COMPETENT: NORMAL
BH CV LOWER VASCULAR RIGHT PROXIMAL FEMORAL COMPRESS: NORMAL
BH CV LOWER VASCULAR RIGHT PROXIMAL FEMORAL PHASIC: NORMAL
BH CV LOWER VASCULAR RIGHT PROXIMAL FEMORAL SPONT: NORMAL
BH CV LOWER VASCULAR RIGHT PROXIMAL FEMORAL THROMBUS: NORMAL
BH CV LOWER VASCULAR RIGHT SAPHENOFEMORAL JUNCTION COMPRESS: NORMAL

## 2021-04-15 PROCEDURE — 93971 EXTREMITY STUDY: CPT

## 2021-04-15 PROCEDURE — 99214 OFFICE O/P EST MOD 30 MIN: CPT | Performed by: FAMILY MEDICINE

## 2021-04-15 RX ORDER — ERGOCALCIFEROL 1.25 MG/1
50000 CAPSULE ORAL WEEKLY
COMMUNITY
End: 2022-05-17 | Stop reason: SDUPTHER

## 2021-04-15 NOTE — PROGRESS NOTES
Luann Frances is a 81 y.o. female.     Chief Complaint   Patient presents with   • Knee Pain     pt has R medial knee pain that has moved down into lower leg since 4/5. constant pain until she falls asleep. pt had  2 left over 7.5 mg Norco    • Rash     pt has a small circular red spot on posterior upper R arm- noticed after 2nd covid shot (2/25/21)     Masks/face shield/appropriate PPE were worn for the entirety of the visit by the patient, MA, and provider.     HPI     Pt is a pleasant 81 y.o. YO female here for acute right knee pain.  Patient's pain symptoms started about 2 weeks ago when she had some pain in her posterior right thigh and popliteal region.  She described this pain during our office visit for hospital follow-up for rib fractures and I recommended she be evaluated for DVT.  She was indeed found to have an acute DVT of the femoral vein (proximal and mid femoral) and she has since started Xarelto.    Approximately 3 to 4 days into her treatment with Xarelto patient began to experience significant pain in her knee.  Describes the pain as severe, has had difficulty walking because of it and has even taken pain medication she had from a prior issue-hydrocodone-APAP 7.5-325 mg, with only minimal relief.  She also has significant swelling of the right knee compared to the left with pain and tenderness over the posterior medial aspect.    She denies any new shortness breath or cough.    Also has a scaly red spot on right upper arm where she received the second covid vaccine - wants to know if she should be worried.     Chronic hypothyroidism chronic -has been intolerant to levothyroxine and Synthroid in the past, recommended she trial [place] Thyroid and sent this into her last time, patient has not yet tried.  Feels too poorly with her leg and worried about negative side effects.    The following portions of the patient's history were reviewed by me and updated as appropriate: allergies, current  medications, past family history, past medical history, past social history, past surgical history, and problem list.     Review of Systems   Constitutional: Negative.    HENT: Negative.    Eyes: Negative.    Respiratory: Negative.    Cardiovascular: Negative.    Gastrointestinal: Negative.    Endocrine: Negative.    Genitourinary: Negative.    Musculoskeletal: Positive for arthralgias, gait problem and joint swelling.   Skin: Positive for color change.   Allergic/Immunologic: Negative.    Hematological: Negative.    Psychiatric/Behavioral: Negative.    I have reviewed and confirmed the accuracy of the ROS as documented by the MA/LPN/RN Keri Newell MD    Objective  Vitals:    04/15/21 1239   BP: 130/80   Pulse: 87   Temp: 97.5 °F (36.4 °C)   SpO2: 98%     Body mass index is 37.28 kg/m².       Physical Exam  Vitals reviewed.   Constitutional:       General: She is not in acute distress.     Appearance: She is well-developed.   HENT:      Head: Normocephalic and atraumatic.   Eyes:      General: No scleral icterus.        Right eye: No discharge.         Left eye: No discharge.      Conjunctiva/sclera: Conjunctivae normal.   Pulmonary:      Effort: Pulmonary effort is normal. No respiratory distress.   Musculoskeletal:      Comments: Right knee with significant swelling compared to the left. No redness. Significant tenderness over the posterior- medial aspect and mild tenderness over the later-posterior aspect   Skin:     Coloration: Skin is not pale.      Findings: No erythema.      Comments: Erythematous scaly lesion on the right upper arm that is nickel sized   Neurological:      Mental Status: She is alert and oriented to person, place, and time.   Psychiatric:         Behavior: Behavior normal.         Thought Content: Thought content normal.         Judgment: Judgment normal.           Current Outpatient Medications:   •  ADVAIR DISKUS 100-50 MCG/DOSE DISKUS, Inhale 1 puff 2 (Two) Times a Day., Disp: 180  each, Rfl: 5  •  albuterol (PROVENTIL HFA;VENTOLIN HFA) 108 (90 Base) MCG/ACT inhaler, Inhale 2 puffs Every 4 (Four) Hours As Needed for Wheezing or Shortness of Air., Disp: 1 inhaler, Rfl: 3  •  [employer] ANTACID & ANTI-GAS 1000-60 MG chewable tablet, , Disp: , Rfl:   •  omeprazole OTC (PriLOSEC OTC) 20 MG EC tablet, Take 20 mg by mouth As Needed. Take 1-2 tablet, Disp: , Rfl:   •  rivaroxaban (Xarelto) 15 MG tablet, Take 1 tablet by mouth Daily., Disp: 42 tablet, Rfl: 0  •  Thyroid (ARMOUR THYROID) 30 MG PO tablet, Take 1 tablet by mouth Daily., Disp: 30 tablet, Rfl: 1  •  vitamin D (ERGOCALCIFEROL) 1.25 MG (01593 UT) capsule capsule, Take 50,000 Units by mouth 1 (One) Time Per Week., Disp: , Rfl:   •  triamcinolone (KENALOG) 0.1 % ointment, Apply  topically to the appropriate area as directed 2 (Two) Times a Day., Disp: 30 g, Rfl: 0    Procedures    Lab Results (most recent)     None              Diagnoses and all orders for this visit:    Acute pain of right knee  Patient with acute and severe pain of the posterior medial right knee that started about 4 days after she was found to have an acute femoral DVT and started on Xarelto. A right knee X-Ray 3 view with sunrise view was ordered. My reading of this film is significantly narrowed joint space of the medial compartment, no acute signs of fracture or dislocation.  Reading consistent with advanced medial compartment arthritis. No comparison films available: pending review by Radiologist.    While patient certainly does have advanced arthritis of the medial compartment I am concerned for possible failure of anticoagulation and development of a new clot or clot extension that is causing her swelling and pain.  Have set patient up for a repeat stat ultrasound that will be performed later this afternoon.  If it does show new clot formation patient will need to be admitted on heparin and a different anticoagulation started.  If her ultrasound is normal then I think  symptoms could be attributed to an acute flare of arthritis or bursitis, will have her return tomorrow for cortisone injection and she can continue to take the hydrocodone-Tylenol that she has at home as needed.    -     XR Knee 3 View Right  -     Ambulatory Referral to Vascular Surgery  -     Duplex Venous Lower Extremity - Right CAR; Future    Acute deep vein thrombosis (DVT) of femoral vein of right lower extremity (CMS/HCC)  -     Ambulatory Referral to Vascular Surgery  -     Duplex Venous Lower Extremity - Right CAR; Future    Acquired hypothyroidism  Chronic, uncontrolled, encouraged patient to trial the [place] Thyroid which she is agreeable to doing.    Skin lesion  Small scaly and erythematous lesion where she received the COVID-19 vaccine.  I do not think this is worrisome for any other problem but have sent in a prescription for topical triamcinolone which she can apply to the lesion.  -     triamcinolone (KENALOG) 0.1 % ointment; Apply  topically to the appropriate area as directed 2 (Two) Times a Day.    Other orders  -     vitamin D (ERGOCALCIFEROL) 1.25 MG (01778 UT) capsule capsule; Take 50,000 Units by mouth 1 (One) Time Per Week.      Return for Tomorrow for cortisone injection if ultrasound does not show new clot formation.      Keri Newell MD

## 2021-04-15 NOTE — PROGRESS NOTES
Right lower extremity venous Doppler preliminary results positive for acute DVT. No change from previous exam. Called report to Keri Foreman MD.

## 2021-04-16 ENCOUNTER — OFFICE VISIT (OUTPATIENT)
Dept: FAMILY MEDICINE CLINIC | Facility: CLINIC | Age: 81
End: 2021-04-16

## 2021-04-16 VITALS
WEIGHT: 223 LBS | DIASTOLIC BLOOD PRESSURE: 70 MMHG | HEART RATE: 65 BPM | HEIGHT: 65 IN | SYSTOLIC BLOOD PRESSURE: 138 MMHG | BODY MASS INDEX: 37.15 KG/M2 | OXYGEN SATURATION: 95 % | TEMPERATURE: 97.3 F

## 2021-04-16 DIAGNOSIS — M25.561 ACUTE PAIN OF RIGHT KNEE: Primary | ICD-10-CM

## 2021-04-16 DIAGNOSIS — I82.411 ACUTE DEEP VEIN THROMBOSIS (DVT) OF FEMORAL VEIN OF RIGHT LOWER EXTREMITY (HCC): ICD-10-CM

## 2021-04-16 PROCEDURE — 20610 DRAIN/INJ JOINT/BURSA W/O US: CPT | Performed by: FAMILY MEDICINE

## 2021-04-16 PROCEDURE — 99213 OFFICE O/P EST LOW 20 MIN: CPT | Performed by: FAMILY MEDICINE

## 2021-04-16 RX ORDER — TRIAMCINOLONE ACETONIDE 40 MG/ML
80 INJECTION, SUSPENSION INTRA-ARTICULAR; INTRAMUSCULAR ONCE
Status: COMPLETED | OUTPATIENT
Start: 2021-04-16 | End: 2021-04-16

## 2021-04-16 RX ORDER — HYDROCODONE BITARTRATE AND ACETAMINOPHEN 7.5; 325 MG/1; MG/1
1 TABLET ORAL EVERY 8 HOURS PRN
Qty: 30 TABLET | Refills: 0 | Status: SHIPPED | OUTPATIENT
Start: 2021-04-16 | End: 2021-08-17

## 2021-04-16 RX ADMIN — TRIAMCINOLONE ACETONIDE 80 MG: 40 INJECTION, SUSPENSION INTRA-ARTICULAR; INTRAMUSCULAR at 12:50

## 2021-04-16 NOTE — PROGRESS NOTES
Luann Frances is a 81 y.o. female.     Chief Complaint   Patient presents with   • Knee Pain     Patient is needing a cortisone injection in her right knee ( wore mask and goggles)    • blood clot     Patient has a question about the blood clot in her right leg she wants to know if its in the vein or artery    • Edema     Patient is wanting to ask about the swelling she has in both ankles        HPI     Pt is a pleasant 81 y.o. YO female here for follow-up of severe right knee and lower leg pain that started in the last 2 weeks.  Patient has unfortunately had a series of health events, started with a fall that led to several broken ribs and surgery.  Following this she developed a DVT of the right lower extremity in the femoral vein and has now with worsening swelling and developed severe right knee and lower leg pain.  She was seen in the office yesterday and I was concerned for possible failure of her anticoagulation and extension of the blood clot, she underwent repeat ultrasound imaging yesterday which thankfully showed no change.    Her x-ray did show significant medial compartment osteoarthritis.  I think that she has developed either flareup of the arthritis versus possible bursitis which could have been exacerbated by the clot and swelling as well as change in gait caused by the pain related to the DVT in her upper thigh.    She continues to describe pain as severe, now significantly limiting her mobility and she can barely walk. She has been taking hydrocodone that she had leftover on occasion but even with this seems to take an hour or two for any pain relief. She has CKD which prohibits her from taking NSAIDS.     She does have some swelling at baseline although since the DVT it is much more severe on the right side.  She used to be on furosemide for bilateral lower extremity edema but was unable to tolerate secondary to urinary frequency.    The following portions of the patient's history were  reviewed by me and updated as appropriate: allergies, current medications, past family history, past medical history, past social history, past surgical history, and problem list.     Review of Systems   Constitutional: Negative.    HENT: Negative.    Eyes: Negative.    Respiratory: Negative.    Cardiovascular: Positive for leg swelling.   Gastrointestinal: Negative.    Endocrine: Negative.    Genitourinary: Negative.    Musculoskeletal: Positive for arthralgias.   Skin: Negative.    Allergic/Immunologic: Negative.    Neurological: Negative.    Hematological: Negative.    Psychiatric/Behavioral: Negative.      I have reviewed and confirmed the accuracy of the ROS as documented by the MA/LPN/RN Keri Newell MD      Objective  Vitals:    04/16/21 1133   BP: 138/70   Pulse: 65   Temp: 97.3 °F (36.3 °C)   SpO2: 95%     Body mass index is 37.11 kg/m².       Physical Exam  Vitals reviewed.   Constitutional:       General: She is not in acute distress.     Appearance: She is well-developed.   HENT:      Head: Normocephalic and atraumatic.      Nose: Nose normal.   Eyes:      General: No scleral icterus.        Right eye: No discharge.         Left eye: No discharge.      Conjunctiva/sclera: Conjunctivae normal.   Pulmonary:      Effort: Pulmonary effort is normal. No respiratory distress.   Musculoskeletal:      Right lower leg: Edema present.      Left lower leg: Edema present.      Comments: Patient has bilateral lower extremity swelling, some is baseline, on the left it is trace and on the right 2+   Skin:     Coloration: Skin is not pale.      Findings: No erythema.   Neurological:      Mental Status: She is alert and oriented to person, place, and time.   Psychiatric:         Behavior: Behavior normal.         Thought Content: Thought content normal.         Judgment: Judgment normal.           Current Outpatient Medications:   •  ADVAIR DISKUS 100-50 MCG/DOSE DISKUS, Inhale 1 puff 2 (Two) Times a Day., Disp:  180 each, Rfl: 5  •  albuterol (PROVENTIL HFA;VENTOLIN HFA) 108 (90 Base) MCG/ACT inhaler, Inhale 2 puffs Every 4 (Four) Hours As Needed for Wheezing or Shortness of Air., Disp: 1 inhaler, Rfl: 3  •  [employer] ANTACID & ANTI-GAS 1000-60 MG chewable tablet, , Disp: , Rfl:   •  omeprazole OTC (PriLOSEC OTC) 20 MG EC tablet, Take 20 mg by mouth As Needed. Take 1-2 tablet, Disp: , Rfl:   •  rivaroxaban (Xarelto) 15 MG tablet, Take 1 tablet by mouth Daily., Disp: 42 tablet, Rfl: 0  •  Thyroid (ARMOUR THYROID) 30 MG PO tablet, Take 1 tablet by mouth Daily., Disp: 30 tablet, Rfl: 1  •  triamcinolone (KENALOG) 0.1 % ointment, Apply  topically to the appropriate area as directed 2 (Two) Times a Day., Disp: 30 g, Rfl: 0  •  vitamin D (ERGOCALCIFEROL) 1.25 MG (90168 UT) capsule capsule, Take 50,000 Units by mouth 1 (One) Time Per Week., Disp: , Rfl:   •  HYDROcodone-acetaminophen (NORCO) 7.5-325 MG per tablet, Take 1 tablet by mouth Every 8 (Eight) Hours As Needed for Moderate Pain ., Disp: 30 tablet, Rfl: 0    Procedures    Knee injection:  -risks and benefits of steroid injection discussed including potential for bleeding, pain, and infection with potential for 3-6 months of decreased OA symptoms  -verbal consent obtained  -area cleaned with alcohol swab  -2 mL (80 mg) Kenalog and 1 mL lidocaine 1% injected via anterolateral approach of the right knee  -an adhesive bandage was applied to site  -pt tolerated procedure well with no adverse effects        Diagnoses and all orders for this visit:    Acute pain of right knee  -     Ambulatory Referral to Orthopedic Surgery  -     HYDROcodone-acetaminophen (NORCO) 7.5-325 MG per tablet; Take 1 tablet by mouth Every 8 (Eight) Hours As Needed for Moderate Pain .    Acute deep vein thrombosis (DVT) of femoral vein of right lower extremity (CMS/HCC)    Patient here today for follow-up of severe right knee pain that started several days after being diagnosed with a femoral DVT.  Happy to say  that there was no extension of the clot or other issues seen on her repeat ultrasound.  I think that most likely it is a flare/exacerbation of underlying arthritis and/or possible bursitis.  Recommended a cortisone injection which patient was agreeable to, see procedure note above.  Also encouraged her to start taking the hydrocodone on a more scheduled basis over the next couple of days and then she can back off on it.  I think she is waiting until the pain is too severe and she would benefit from trying to take it on a somewhat regular basis 2-3 times a day.  Will also get her scheduled with Ortho to further evaluate.    For the swelling I encouraged her that she could use compression stockings which she has at home, she may ambulate as tolerated but keep the legs elevated when she is sitting.  Do not think that she would tolerate a diuretic secondary to urinary frequency.      Return in about 4 weeks (around 5/14/2021) for already has f/u appointment.      Keri Newell MD

## 2021-04-21 ENCOUNTER — TELEPHONE (OUTPATIENT)
Dept: FAMILY MEDICINE CLINIC | Facility: CLINIC | Age: 81
End: 2021-04-21

## 2021-04-21 DIAGNOSIS — M79.89 LEG SWELLING: Primary | ICD-10-CM

## 2021-04-21 RX ORDER — FUROSEMIDE 20 MG/1
20 TABLET ORAL DAILY
Qty: 90 TABLET | Refills: 3 | Status: SHIPPED | OUTPATIENT
Start: 2021-04-21 | End: 2022-10-11 | Stop reason: SDUPTHER

## 2021-04-21 NOTE — TELEPHONE ENCOUNTER
I would advise patient to call Dr. Lee's office, we have already sent the referral over to orthopedics but she should call as this may make getting an appointment much quicker.  She needs to be seen by them for further treatment of her knee.

## 2021-04-21 NOTE — TELEPHONE ENCOUNTER
PATIENT STATES SHE HAS NOT GOTTEN ANY RELIEF FROM THE CORTIZONE SHOT IN HER KNEE LAST WEEK. PATIENT IS TAKING 4-5 NARCO TABLETS PER DAY TO GET A MINIMAL RELIEF.    PATIENT IS IN A LOT OF PAIN.    PLEASE ADVISE: [phone]

## 2021-04-21 NOTE — TELEPHONE ENCOUNTER
PATIENT STATES SHE HAD AN APPT WITH DR HIGHTOWER ON 4/19. HE WAS TO PRESCRIBE PATIENT LASIX. PATIENT STATES PHARMACY HAS NEVER RECEIVED ORDERS. PATIENT CANNOT GET IN TOUCH WITH DR HIGHTOWER'S OFFICE.     PATIENT WANTS TO KNOW IF DR BERNARD CAN ISSUE ORDERS.    [id number] [place], [place] - [address] AT [facility] - [phone]  - [phone]   [phone]    PLEASE ADVISE: [phone]

## 2021-04-22 NOTE — TELEPHONE ENCOUNTER
PATIENT STATED THE HYDROcodone-acetaminophen (NORCO) 7.5-325 MG per tablet WAS NOT HELPING HER PAIN , PATIENT CAN'T REMEMBER ANYONE CALLING HER BUT WOULD LIKE TO DISCUSS HER OPTIONS FOR TRYING SOMETHING ELSE FOR HER PAIN.PLEASE ADVISE.

## 2021-04-23 DIAGNOSIS — M25.561 ACUTE PAIN OF RIGHT KNEE: ICD-10-CM

## 2021-04-23 RX ORDER — HYDROCODONE BITARTRATE AND ACETAMINOPHEN 7.5; 325 MG/1; MG/1
1 TABLET ORAL EVERY 8 HOURS PRN
Qty: 30 TABLET | Refills: 0 | Status: CANCELLED | OUTPATIENT
Start: 2021-04-23

## 2021-04-23 NOTE — TELEPHONE ENCOUNTER
Caller: Luann Frances    Relationship: Self    Best call back number: [phone] (H)    Medication needed:   Requested Prescriptions     Pending Prescriptions Disp Refills   • HYDROcodone-acetaminophen (NORCO) 7.5-325 MG per tablet 30 tablet 0     Sig: Take 1 tablet by mouth Every 8 (Eight) Hours As Needed for Moderate Pain .       When do you need the refill by: ASAP     What additional details did the patient provide when requesting the medication:     Does the patient have less than a 3 day supply:  [x] Yes  [] No    What is the patient's preferred pharmacy: [id number] [address] AT [facility] [phone] [facility] [phone]

## 2021-05-10 ENCOUNTER — OFFICE VISIT (OUTPATIENT)
Dept: FAMILY MEDICINE CLINIC | Facility: CLINIC | Age: 81
End: 2021-05-10

## 2021-05-10 VITALS
WEIGHT: 218 LBS | TEMPERATURE: 97.1 F | SYSTOLIC BLOOD PRESSURE: 134 MMHG | HEIGHT: 65 IN | HEART RATE: 88 BPM | OXYGEN SATURATION: 98 % | DIASTOLIC BLOOD PRESSURE: 78 MMHG | BODY MASS INDEX: 36.32 KG/M2

## 2021-05-10 DIAGNOSIS — Z78.0 POST-MENOPAUSAL: ICD-10-CM

## 2021-05-10 DIAGNOSIS — Z00.00 MEDICARE ANNUAL WELLNESS VISIT, SUBSEQUENT: Primary | ICD-10-CM

## 2021-05-10 DIAGNOSIS — E03.9 ACQUIRED HYPOTHYROIDISM: ICD-10-CM

## 2021-05-10 PROCEDURE — G0439 PPPS, SUBSEQ VISIT: HCPCS | Performed by: FAMILY MEDICINE

## 2021-05-10 PROCEDURE — 96160 PT-FOCUSED HLTH RISK ASSMT: CPT | Performed by: FAMILY MEDICINE

## 2021-05-10 PROCEDURE — 1170F FXNL STATUS ASSESSED: CPT | Performed by: FAMILY MEDICINE

## 2021-05-10 PROCEDURE — 1159F MED LIST DOCD IN RCRD: CPT | Performed by: FAMILY MEDICINE

## 2021-05-10 NOTE — PROGRESS NOTES
The ABCs of the Annual Wellness Visit  Subsequent Medicare Wellness Visit    Chief Complaint   Patient presents with   • Medicare Wellness-subsequent     SMW -nonfasting     Masks/face shield/appropriate PPE were worn for the entirety of the visit by the patient, MA, and provider.     Subjective   History of Present Illness:  Luann Frances is a 81 y.o. female who presents for a Subsequent Medicare Wellness Visit.  Her chronic medical problems include COPD, CKD, hypothyroidism (has been intolerant thyroid replacement for the most part), and more recently DVT and now compression fracture of the knee.    Patient had been having significant right-sided knee pain which I seen her on 2 occasions for, she got into orthopedics recently and was diagnosed with a compression fracture and told that she likely would need a knee replacement.  She wants to hold off for the time being as she is spent a lot of time in the hospital this year already and is still recovering.    Hypothyroidism -chronic, uncontrolled.  At her last visit encouraged her to trial [place] Thyroid, she did but she still had significant symptoms of nausea and fatigue on this medication.  Has been trialed on multiple other formulations of thyroid replacement with endocrinology in the past and not tolerated any of them.    HEALTH RISK ASSESSMENT    Recent Hospitalizations:  Recently treated at the following:  [facility] February 2021, fall with rib fractures.     Current Medical Providers:  Patient Care Team:  Keri Foreman MD as PCP - General (Family Medicine)  Chas Garcia MD as Consulting Physician (Endocrinology)  Jeff Sands MD as Consulting Physician (Pulmonary Disease)    Smoking Status:  Social History     Tobacco Use   Smoking Status Current Every Day Smoker   • Packs/day: 0.25   • Years: 50.00   • Pack years: 12.50   • Types: Cigarettes   • Start date: 1970   Smokeless Tobacco Never Used       Alcohol Consumption:  Social  History     Substance and Sexual Activity   Alcohol Use Yes    Comment: social       Depression Screen:   PHQ-2/PHQ-9 Depression Screening 5/10/2021   Little interest or pleasure in doing things 0   Feeling down, depressed, or hopeless 0   Total Score 0       Fall Risk Screen:  RHONA Fall Risk Assessment has not been completed.    Health Habits and Functional and Cognitive Screening:  Functional & Cognitive Status 5/10/2021   Do you have difficulty preparing food and eating? No   Do you have difficulty bathing yourself, getting dressed or grooming yourself? No   Do you have difficulty using the toilet? No   Do you have difficulty moving around from place to place? Yes   Do you have trouble with steps or getting out of a bed or a chair? No   Current Diet Well Balanced Diet   Dental Exam Not up to date   Eye Exam Not up to date   Exercise (times per week) 0 times per week   Current Exercise Activities Include None   Do you need help using the phone?  No   Are you deaf or do you have serious difficulty hearing?  No   Do you need help with transportation? Yes   Do you need help shopping? No   Do you need help preparing meals?  No   Do you need help with housework?  No   Do you need help with laundry? No   Do you need help taking your medications? No   Do you need help managing money? No   Do you ever drive or ride in a car without wearing a seat belt? No   Have you felt unusual stress, anger or loneliness in the last month? No   Who do you live with? Alone   If you need help, do you have trouble finding someone available to you? No   Have you been bothered in the last four weeks by sexual problems? No   Do you have difficulty concentrating, remembering or making decisions? No         Does the patient have evidence of cognitive impairment? No    Asprin use counseling:Does not need ASA (and currently is not on it)    Age-appropriate Screening Schedule:  Refer to the list below for future screening recommendations based  on patient's age, sex and/or medical conditions. Orders for these recommended tests are listed in the plan section. The patient has been provided with a written plan.    Health Maintenance   Topic Date Due   • DXA SCAN  Never done   • TDAP/TD VACCINES (1 - Tdap) Never done   • ZOSTER VACCINE (1 of 2) Never done   • MAMMOGRAM  Never done   • HEMOGLOBIN A1C  02/20/2021   • INFLUENZA VACCINE  08/01/2021   • LIPID PANEL  08/20/2021   • URINE MICROALBUMIN  08/20/2021   • DIABETIC EYE EXAM  Discontinued          The following portions of the patient's history were reviewed and updated as appropriate: allergies, current medications, past family history, past medical history, past social history, past surgical history and problem list.    Outpatient Medications Prior to Visit   Medication Sig Dispense Refill   • ADVAIR DISKUS 100-50 MCG/DOSE DISKUS Inhale 1 puff 2 (Two) Times a Day. 180 each 5   • albuterol (PROVENTIL HFA;VENTOLIN HFA) 108 (90 Base) MCG/ACT inhaler Inhale 2 puffs Every 4 (Four) Hours As Needed for Wheezing or Shortness of Air. 1 inhaler 3   • [facility] ANTACID & ANTI-GAS 1000-60 MG chewable tablet      • furosemide (Lasix) 20 MG tablet Take 1 tablet by mouth Daily. 90 tablet 3   • omeprazole OTC (PriLOSEC OTC) 20 MG EC tablet Take 20 mg by mouth As Needed. Take 1-2 tablet     • rivaroxaban (Xarelto) 15 MG tablet Take 1 tablet by mouth Daily. 42 tablet 0   • Thyroid (ARMOUR THYROID) 30 MG PO tablet Take 1 tablet by mouth Daily. 30 tablet 1   • triamcinolone (KENALOG) 0.1 % ointment Apply  topically to the appropriate area as directed 2 (Two) Times a Day. 30 g 0   • vitamin D (ERGOCALCIFEROL) 1.25 MG (54590 UT) capsule capsule Take 50,000 Units by mouth 1 (One) Time Per Week.     • HYDROcodone-acetaminophen (NORCO) 7.5-325 MG per tablet Take 1 tablet by mouth Every 8 (Eight) Hours As Needed for Moderate Pain . 30 tablet 0     No facility-administered medications prior to visit.       Patient Active Problem List  "  Diagnosis   • Atopic rhinitis   • Chronic obstructive pulmonary disease (CMS/[facility])   • Diverticulitis of colon   • Acid reflux   • Fatigue   • Hypothyroidism   • Insomnia   • Knee pain   • Pain of lower extremity   • Shoulder pain   • Ventricular premature beats   • Weight gain   • Cold intolerance   • Post herpetic neuralgia   • Insulin resistance   • Vitamin D deficiency   • CKD (chronic kidney disease) stage 3, GFR 30-59 ml/min (CMS/[facility])   • Mixed hyperlipidemia   • Hypersomnia   • Hyperuricemia   • DELROY (obstructive sleep apnea)   • Renal calculus   • Multiple closed fractures of ribs of right side   • Non-cardiac chest pain   • Acute deep vein thrombosis (DVT) of femoral vein of right lower extremity (CMS/[facility])       Advanced Care Planning:  ACP discussion was held with the patient during this visit. Patient does not have an advance directive, declines further assistance.    Review of Systems   Constitutional: Negative.    HENT: Negative.    Eyes: Negative.    Respiratory: Positive for shortness of breath.    Cardiovascular: Negative.    Gastrointestinal: Negative.    Endocrine: Negative.    Genitourinary: Negative.    Musculoskeletal: Positive for back pain and gait problem.   Skin: Negative.    Allergic/Immunologic: Negative.    Hematological: Negative.    Psychiatric/Behavioral: Negative.        Compared to one year ago, the patient feels her physical health is worse.  Compared to one year ago, the patient feels her mental health is the same.    Reviewed chart for potential of high risk medication in the elderly: yes  Reviewed chart for potential of harmful drug interactions in the elderly:yes    Objective         Vitals:    05/10/21 1323   BP: 134/78   BP Location: Left arm   Patient Position: Sitting   Pulse: 88   Temp: 97.1 °F (36.2 °C)   SpO2: 98%   Weight: 98.9 kg (218 lb)   Height: 165.1 cm (65\")       Body mass index is 36.28 kg/m².  Discussed the patient's BMI with her. The BMI is above " average.    Physical Exam  Vitals reviewed.   Constitutional:       General: She is not in acute distress.     Appearance: She is well-developed.   HENT:      Head: Normocephalic and atraumatic.   Eyes:      General: No scleral icterus.        Right eye: No discharge.         Left eye: No discharge.      Conjunctiva/sclera: Conjunctivae normal.   Pulmonary:      Effort: Pulmonary effort is normal. No respiratory distress.   Skin:     Coloration: Skin is not pale.      Findings: No erythema.   Neurological:      Mental Status: She is alert and oriented to person, place, and time.   Psychiatric:         Behavior: Behavior normal.         Thought Content: Thought content normal.         Judgment: Judgment normal.         Lab Results   Component Value Date     (H) 04/01/2021        Assessment/Plan   Medicare Risks and Personalized Health Plan  CMS Preventative Services Quick Reference  Advance Directive Discussion -patient does not have an advanced directive, has information on how to proceed making 1 and does not need further information at this time.  Breast Cancer/Mammogram Screening -discussed, I do not typically recommend routine breast cancer screening over the age of 75 and recommend a symptom diary and approach, patient was agreeable with plan.  Colon Cancer Screening: Discussed, I would not recommend continuing screening for her at this time which patient was agreeable with.  Immunizations Discussed/Encouraged: Patient has had her pneumococcal vaccine although it is not in our system.  We discussed the shingles vaccine, discussed that I would recommend it but it is not currently available in our office.  Lung Cancer Risk: Patient is a current smoker with more than 30-pack-year smoking history.  She is a candidate for lung cancer screening still although 81 years old is the oldest age that it is typically performed.  She had a CT chest done earlier this year for the rib fractures which showed no  concerning pulmonary findings and so I would not recommend repeating CT chest at this time.  Osteoporosis Risk: Patient has never had a bone density test that she is aware of.  I am concerned with the rib fractures from her fall and now possible compression fracture of the knee that she could have osteoporosis.  Recommended bone density screening which she was agreeable to, ordered today.    The above risks/problems have been discussed with the patient.  Pertinent information has been shared with the patient in the After Visit Summary.  Follow up plans and orders are seen below in the Assessment/Plan Section.    Diagnoses and all orders for this visit:    1. Medicare annual wellness visit, subsequent (Primary)  Pleasant 81-year-old female here today for Medicare wellness, see discussion points above.  Will be getting her scheduled for bone density testing.  We also discussed the shingles vaccine which I recommended to her.    2. Acquired hypothyroidism   chronic, has not been well controlled due to intolerance of multiple formulations of thyroid replacement.  Most recently trialed [place] Thyroid.  Patient does state that typically she can tolerate it for a day or 2 before symptoms start.  Recommend she try it a couple days out of the week or even every other day to see if she can tolerate taking it with lower frequency.  If she is able to we will plan on repeating thyroid levels in about 4 weeks.    3. Post-menopausal  -     DEXA Bone Density Axial; Future    Follow Up:  Return if symptoms worsen or fail to improve.     An After Visit Summary and PPPS were given to the patient.

## 2021-05-21 DIAGNOSIS — I82.409 ACUTE DEEP VEIN THROMBOSIS (DVT) OF LOWER EXTREMITY, UNSPECIFIED LATERALITY, UNSPECIFIED VEIN (HCC): ICD-10-CM

## 2021-06-29 ENCOUNTER — APPOINTMENT (OUTPATIENT)
Dept: CT IMAGING | Facility: HOSPITAL | Age: 81
End: 2021-06-29

## 2021-07-22 ENCOUNTER — HOSPITAL ENCOUNTER (OUTPATIENT)
Dept: CT IMAGING | Facility: HOSPITAL | Age: 81
Discharge: HOME | End: 2021-07-22
Admitting: THORACIC SURGERY (CARDIOTHORACIC VASCULAR SURGERY)

## 2021-07-22 DIAGNOSIS — S22.49XD CLOSED FRACTURE OF MULTIPLE RIBS WITH ROUTINE HEALING, UNSPECIFIED LATERALITY, SUBSEQUENT ENCOUNTER: ICD-10-CM

## 2021-07-22 PROCEDURE — 76377 3D RENDER W/INTRP POSTPROCES: CPT

## 2021-07-22 PROCEDURE — 71250 CT THORAX DX C-: CPT

## 2021-08-02 ENCOUNTER — OFFICE VISIT (OUTPATIENT)
Dept: SURGERY | Facility: CLINIC | Age: 81
End: 2021-08-02

## 2021-08-02 VITALS
RESPIRATION RATE: 16 BRPM | OXYGEN SATURATION: 97 % | DIASTOLIC BLOOD PRESSURE: 79 MMHG | TEMPERATURE: 96 F | SYSTOLIC BLOOD PRESSURE: 167 MMHG | BODY MASS INDEX: 36.65 KG/M2 | HEIGHT: 65 IN | HEART RATE: 72 BPM | WEIGHT: 220 LBS

## 2021-08-02 DIAGNOSIS — S22.49XD CLOSED FRACTURE OF MULTIPLE RIBS WITH ROUTINE HEALING, UNSPECIFIED LATERALITY, SUBSEQUENT ENCOUNTER: Primary | ICD-10-CM

## 2021-08-02 DIAGNOSIS — S22.070A COMPRESSION FRACTURE OF T9 VERTEBRA, INITIAL ENCOUNTER (HCC): ICD-10-CM

## 2021-08-02 PROCEDURE — 99213 OFFICE O/P EST LOW 20 MIN: CPT | Performed by: THORACIC SURGERY (CARDIOTHORACIC VASCULAR SURGERY)

## 2021-08-02 RX ORDER — TRAMADOL HYDROCHLORIDE 50 MG/1
50 TABLET ORAL AS NEEDED
COMMUNITY
Start: 2021-07-13 | End: 2022-08-23 | Stop reason: HOSPADM

## 2021-08-02 NOTE — PROGRESS NOTES
"Chief Complaint  Rib fractures  Subjective          Luann Frances presents to [facility] in follow-up.  History of Present Illness  Ms. Frances is a pleasant 81-year-old lady status post rib fixation secondary to mechanical fall and rib fractures.  She has recovered well from her procedure.  She does complain of significant back pain which she describes from her mid to low back.  She is being evaluated by orthopedics for this.  Objective   Vital Signs:   /79 (BP Location: Right arm, Patient Position: Sitting, Cuff Size: Adult)   Pulse 72   Temp 96 °F (35.6 °C) (Temporal)   Resp 16   Ht 165.1 cm (65\")   Wt 99.8 kg (220 lb)   SpO2 97%   BMI 36.61 kg/m²     Physical Exam  Vitals and nursing note reviewed.   Constitutional:       Appearance: She is well-developed.   HENT:      Head: Normocephalic and atraumatic.      Nose: Nose normal.   Eyes:      Conjunctiva/sclera: Conjunctivae normal.   Cardiovascular:      Rate and Rhythm: Normal rate.   Pulmonary:      Effort: Pulmonary effort is normal.   Abdominal:      Palpations: Abdomen is soft.   Musculoskeletal:      Cervical back: Neck supple.   Skin:     General: Skin is warm and dry.   Neurological:      Mental Status: She is alert and oriented to person, place, and time.   Psychiatric:         Behavior: Behavior normal.         Thought Content: Thought content normal.         Judgment: Judgment normal.        Result Review :       Data reviewed: Radiologic studies :     I have independently reviewed the CT of the chest performed on 7/22/2021 which demonstrates good healing of the prior rib fractures with plates in good position.  No new fractures.  No lung nodules.  No mediastinal or hilar lymphadenopathy.  No pleural pericardial effusion.  There is an interval compression fracture at the endplate of T9.     Assessment and Plan    Ms. Frances is a very pleasant 81-year-old lady status post repair of multiple traumatic " rib fractures.  These are healing nicely and need no further intervention.  I will plan to see her on a as needed basis for her rib fractures.    She does have a new compression fracture of T9, she will need an MRI of her thoracic spine and since she is already being seen by orthopedics, she will plan to follow-up with them to discuss her new thoracic spine fracture.  Diagnoses and all orders for this visit:    1. Closed fracture of multiple ribs with routine healing, unspecified laterality, subsequent encounter (Primary)    2. Compression fracture of T9 vertebra, initial encounter (CMS/[facility])  -     MRI thoracic spine w wo contrast; Future      I spent 25 minutes caring for Luann on this date of service. This time includes time spent by me in the following activities:preparing for the visit, reviewing tests, obtaining and/or reviewing a separately obtained history, performing a medically appropriate examination and/or evaluation , counseling and educating the patient/family/caregiver, ordering medications, tests, or procedures, referring and communicating with other health care professionals , documenting information in the medical record and independently interpreting results and communicating that information with the patient/family/caregiver  Follow Up   No follow-ups on file.  Patient was given instructions and counseling regarding her condition or for health maintenance advice. Please see specific information pulled into the AVS if appropriate.

## 2021-08-17 ENCOUNTER — TELEPHONE (OUTPATIENT)
Dept: FAMILY MEDICINE CLINIC | Facility: CLINIC | Age: 81
End: 2021-08-17

## 2021-08-17 ENCOUNTER — OFFICE VISIT (OUTPATIENT)
Dept: FAMILY MEDICINE CLINIC | Facility: CLINIC | Age: 81
End: 2021-08-17

## 2021-08-17 VITALS
BODY MASS INDEX: 36.21 KG/M2 | OXYGEN SATURATION: 99 % | RESPIRATION RATE: 16 BRPM | SYSTOLIC BLOOD PRESSURE: 126 MMHG | HEART RATE: 67 BPM | TEMPERATURE: 97.7 F | DIASTOLIC BLOOD PRESSURE: 78 MMHG | WEIGHT: 217.6 LBS

## 2021-08-17 DIAGNOSIS — S22.000A COMPRESSION FRACTURE OF BODY OF THORACIC VERTEBRA (HCC): ICD-10-CM

## 2021-08-17 DIAGNOSIS — N18.32 STAGE 3B CHRONIC KIDNEY DISEASE (HCC): ICD-10-CM

## 2021-08-17 DIAGNOSIS — R73.09 ELEVATED GLUCOSE: ICD-10-CM

## 2021-08-17 DIAGNOSIS — E03.9 HYPOTHYROIDISM, UNSPECIFIED TYPE: ICD-10-CM

## 2021-08-17 DIAGNOSIS — J43.8 OTHER EMPHYSEMA (HCC): Primary | ICD-10-CM

## 2021-08-17 DIAGNOSIS — K21.9 GASTROESOPHAGEAL REFLUX DISEASE, UNSPECIFIED WHETHER ESOPHAGITIS PRESENT: ICD-10-CM

## 2021-08-17 DIAGNOSIS — I82.411 ACUTE DEEP VEIN THROMBOSIS (DVT) OF FEMORAL VEIN OF RIGHT LOWER EXTREMITY (HCC): ICD-10-CM

## 2021-08-17 DIAGNOSIS — E55.9 VITAMIN D DEFICIENCY: ICD-10-CM

## 2021-08-17 PROCEDURE — 99214 OFFICE O/P EST MOD 30 MIN: CPT

## 2021-08-17 NOTE — TELEPHONE ENCOUNTER
Karlene Arvizu to schedule appt for pt asap. She was lost to f/u due to being hospitalized. She is not tolerating her thyroid medication well.

## 2021-08-17 NOTE — ASSESSMENT & PLAN NOTE
Educated the patient to try taking omeprazole 20 mg every other day to see if this helps control reflux and then takie the antacid as needed.  We will follow up in 3 months and see if this is helped.

## 2021-08-17 NOTE — ASSESSMENT & PLAN NOTE
Patient really needs to follow-up with endocrinology.  Will attempt to contact office and see about getting her appointment as soon as possible.  Encouraged patient to attempt to try taking [place] 30 mg even a few days a week if able.  Recheck labs today.

## 2021-08-17 NOTE — PROGRESS NOTES
Chief Complaint  Establish Care (non fasting had coffee, need advair refill. thyroid medications making her tired.also Xarelto make her nausea. Discuss heartburn medication.)    Subjective          Lunan Frances presents to [facility]  History of Present Illness     Patient presents to the clinic to establish care.  Was previously a patient of Dr. Newell and last visit was on 5/10/2021 for her Medicare wellness.  Last labs were in April of this year and have been reviewed.  On 4/1/2021 T4 0.41 and TSH 51.9.  Dr. Newell has then started her on [place] 30 mg to see if the patient would tolerate this better than the Synthroid.  Patient had seen Dr. Saleh in the past to manage her hypothyroidism.  However since he left the practice she was set up to see Dr. Garcia with [facility] endocrinology but was hospitalized earlier this year and was unable to make that appointment.  Since seeing Dr. Newell in May she was told to attempt to take the [place] at least a couple days a week.  However patient told me that she is unable to tolerate even every other day and had tried switching it around to see what she could tolerate.  Patient states she just gets excessively sleepy with medication and does not want to take it.  Since May patient has not been taking any medication.       Emphysema - controlled. patient takes Advair Diskus 100-50 twice a day.  Patient only has to use her albuterol inhaler couple times a month.  Symptoms are well controlled with no wheezing, coughing, or shortness of breath.    Compression fracture - Seeing Dr Sparks Monday.  She has been recently released from the care of thoracic surgery Dr. Delong who did put an order in for an MRI to look into a potential compression fracture of her T9.  The rib fractures have healed and she no longer needs to follow-up with Dr. Delong. After the MRI she is to follow-up with Dr. Sparks to review these results.    GERD-currently taking  Pepcid 20 mg as needed.  Feels like reflux is happening more often and is not as well controlled.  Also takes an as needed antacid.    Colon cancer screening-discussed with patient colon cancer screening options.  At this time patient wants to think about Cologuard versus colonoscopy and see if she wants to pursue either of them.    Acute DVT of right lower extremity-patient on Xarelto 15 mg daily.  Was wondering if Xarelto may be associated with her reflux.  She is going to try taking it at different times a day to see if there is in a change.  Takes daily with no other issues.    CKD-patient seeing Dr. Snyder to manage chronic kidney disease.  Last visit was on 5/20/2021.  Managing Lasix 20 mg daily.  His lower extremity edema today.  No side effects with Lasix.    Objective   Vital Signs:   /78   Pulse 67   Temp 97.7 °F (36.5 °C)   Resp 16   Wt 98.7 kg (217 lb 9.6 oz)   SpO2 99%   BMI 36.21 kg/m²     Physical Exam  Vitals reviewed.   HENT:      Head: Normocephalic.   Cardiovascular:      Rate and Rhythm: Normal rate and regular rhythm.      Heart sounds: Normal heart sounds.   Pulmonary:      Effort: Pulmonary effort is normal.      Breath sounds: Normal breath sounds.   Musculoskeletal:         General: Normal range of motion.      Cervical back: Normal range of motion.      Right foot: Swelling present.      Left foot: Swelling present.   Skin:     General: Skin is warm and dry.   Neurological:      General: No focal deficit present.      Mental Status: She is alert.   Psychiatric:         Mood and Affect: Mood normal.        Result Review :   The following data was reviewed by: LIVIER Mathis on 08/17/2021:  CMP    CMP 2/20/21 2/21/21 4/1/21   Glucose 111 (A) 98    Glucose   105 (A)   BUN 34 (A) 33 (A) 27 (A)   Creatinine 1.41 (A) 1.42 (A) 1.44 (A)   eGFR Non  Am 36 (A) 36 (A) 35 (A)   eGFR African Am   42 (A)   Sodium 138 137 140   Potassium 4.5 4.0 4.5   Chloride 103 95 (A) 100    Calcium 9.1 9.6 10.1   Total Protein   7.2   Albumin 3.00 (A) 3.00 (A) 4.20   Globulin   3.0   Total Bilirubin   0.5   Alkaline Phosphatase   84   AST (SGOT)   15   ALT (SGPT)   13   (A) Abnormal value       Comments are available for some flowsheets but are not being displayed.           CBC w/diff    CBC w/Diff 2/19/21 2/20/21 4/1/21   WBC 9.41 9.44 8.38   RBC 3.55 (A) 3.51 (A) 4.34   Hemoglobin 10.7 (A) 10.6 (A) 12.8   Hematocrit 33.0 (A) 32.1 (A) 38.9   MCV 93.0 91.5 89.6   MCH 30.1 30.2 29.5   MCHC 32.4 33.0 32.9   RDW 13.1 13.0 13.9   Platelets 208 256 278   Neutrophil Rel %  68.5 61.1   Immature Granulocyte Rel %  0.7 (A)    Lymphocyte Rel %  14.5 (A) 26.3   Monocyte Rel %  14.0 (A) 8.5   Eosinophil Rel %  1.7 2.3   Basophil Rel %  0.6 1.2   (A) Abnormal value                TSH    TSH 12/8/20 4/1/21   TSH 60.400 (A) 51.900 (A)   (A) Abnormal value                      Assessment and Plan    Diagnoses and all orders for this visit:    1. Other emphysema (CMS/[facility]) (Primary)  Assessment & Plan:  COPD is improving with treatment.  Continue current medications.        Orders:  -     Advair Diskus 100-50 MCG/DOSE DISKUS; Inhale 1 puff 2 (Two) Times a Day.  Dispense: 180 each; Refill: 5    2. Compression fracture of body of thoracic vertebra (CMS/[facility])  Assessment & Plan:  MRI is planned for August 28, 2021.  Patient will follow up with Dr. Sparks to evaluate results.      3. Acute deep vein thrombosis (DVT) of femoral vein of right lower extremity (CMS/[facility])  Assessment & Plan:  Continue current regimen of Xarelto 15 mg daily.      4. Hypothyroidism, unspecified type  Assessment & Plan:  Patient really needs to follow-up with endocrinology.  Will attempt to contact office and see about getting her appointment as soon as possible.  Encouraged patient to attempt to try taking [place] 30 mg even a few days a week if able.  Recheck labs today.    Orders:  -     Thyroid Panel With TSH    5. Vitamin D deficiency  -      Vitamin D 25 hydroxy    6. Stage 3b chronic kidney disease (CMS/[facility])  Assessment & Plan:  Follow-up with Dr. Arce.      7. Gastroesophageal reflux disease, unspecified whether esophagitis present  Assessment & Plan:  Educated the patient to try taking omeprazole 20 mg every other day to see if this helps control reflux and then takie the antacid as needed.  We will follow up in 3 months and see if this is helped.      8. Elevated glucose  -     Hemoglobin A1c      Follow Up {Instructions Charge Capture  Follow-up Communications :23}  Return in about 3 months (around 11/17/2021), or reflux / endo, for Recheck.  Patient was given instructions and counseling regarding her condition or for health maintenance advice. Please see specific information pulled into the AVS if appropriate.     Medical assistant and I wore mask and eyewear protection during entire encounter.  Patient wore mask.    Electronically signed by LIVIER Mathis, 08/17/21, 11:43 AM EDT.

## 2021-08-17 NOTE — TELEPHONE ENCOUNTER
Pt sched for tomorrow with MD Jose at 2:30. Attempted to call, voicemail is full. Will call again in a couple hours and tomorrow as well if unable to reach. *hub transfer to office*

## 2021-08-18 ENCOUNTER — OFFICE VISIT (OUTPATIENT)
Dept: ENDOCRINOLOGY | Age: 81
End: 2021-08-18

## 2021-08-18 VITALS
HEIGHT: 65 IN | BODY MASS INDEX: 36.06 KG/M2 | HEART RATE: 62 BPM | OXYGEN SATURATION: 97 % | WEIGHT: 216.4 LBS | DIASTOLIC BLOOD PRESSURE: 66 MMHG | SYSTOLIC BLOOD PRESSURE: 134 MMHG

## 2021-08-18 DIAGNOSIS — E07.9 DISORDER OF THYROID, UNSPECIFIED: ICD-10-CM

## 2021-08-18 DIAGNOSIS — E55.9 VITAMIN D DEFICIENCY: ICD-10-CM

## 2021-08-18 DIAGNOSIS — S22.000A COMPRESSION FRACTURE OF BODY OF THORACIC VERTEBRA (HCC): ICD-10-CM

## 2021-08-18 DIAGNOSIS — E78.5 HYPERLIPIDEMIA, UNSPECIFIED HYPERLIPIDEMIA TYPE: ICD-10-CM

## 2021-08-18 DIAGNOSIS — N18.32 STAGE 3B CHRONIC KIDNEY DISEASE (HCC): ICD-10-CM

## 2021-08-18 DIAGNOSIS — K21.9 GASTROESOPHAGEAL REFLUX DISEASE, UNSPECIFIED WHETHER ESOPHAGITIS PRESENT: ICD-10-CM

## 2021-08-18 DIAGNOSIS — Z87.442 HISTORY OF NEPHROLITHIASIS: ICD-10-CM

## 2021-08-18 DIAGNOSIS — E03.9 PRIMARY HYPOTHYROIDISM: Primary | ICD-10-CM

## 2021-08-18 LAB
25(OH)D3+25(OH)D2 SERPL-MCNC: 52.5 NG/ML (ref 30–100)
FT4I SERPL CALC-MCNC: 0.1 (ref 1.2–4.9)
HBA1C MFR BLD: 5.7 % (ref 4.8–5.6)
T3RU NFR SERPL: 11 % (ref 24–39)
T4 SERPL-MCNC: 1.2 UG/DL (ref 4.5–12)
TSH SERPL DL<=0.005 MIU/L-ACNC: 75.3 UIU/ML (ref 0.45–4.5)

## 2021-08-18 PROCEDURE — 99215 OFFICE O/P EST HI 40 MIN: CPT | Performed by: INTERNAL MEDICINE

## 2021-08-18 NOTE — PROGRESS NOTES
Subjective   Luann Frances is a 81 y.o. female.     F/u  for hypothyroidism , hyperlipidemia, hypertension        Patient is a 80-year-old female who came in for follow-up.     She has hypothyroidism since 2017.  She has no history of goiter.  She has no history of thyroid surgery or radiation therapy.  She has been on Synthroid 150 mcg/day but has stopped taking the medication for at least a week because she will feel tired and sleepy shortly after she takes the medication.  She was tried on Tirosint 150 mcg/day in November 2020 but she stopped it after several weeks because of nausea and increased tiredness.   She was switched by her PCP to [place] Thyroid 30 mg in April 2021.  She took it for a few weeks and discontinued it the cause of nausea and increased tiredness.  Nausea and tiredness resolved after 2 days of discontinuing [place] Thyroid.  TSH done in August 2021 is elevated at 75.3 with a low T4 at 1.2 mcg per DL.    She has GERD and is on omeprazole 20 mg/day.     She has hyperlipidemia and was taken off Lipitor 20 mg/day when she was admitted to the hospital in February 2021.  She has hepatic steatosis on CT of the abdomen done in February 2021.     She has hypertension and was taken off amlodipine 5 mg/day and chlorthalidone 25 mg/day in February 2021.  She has no history of heart attack or stroke.  Blood pressure at home runs less than 132.  She denies chest pain, palpitations or pedal edema.     She has COPD and is on Advair, and albuterol.  She smokes 4 cigarettes/day.     She has sleep apnea and uses a CPAP regularly.     She has vitamin D deficiency and is on ergocalciferol 50,000 units once a week.  25 hydroxy vitamin D done in August 2021 is normal at 52.5 ng/mL.    She had DVT of right femoral artery in April 2021.  She is on Eliquis.  She denies bleeding.    She had compression fracture of T9 incidentally found on CT of the chest in July 2021.  She is scheduled for bone density in September  "2021.     She had lithotripsy done by Dr. Walter Schwartz in January 2021.  She spontaneously passed kidney stone 30-40 years ago      The following portions of the patient's history were reviewed and updated as appropriate: allergies, current medications, past family history, past medical history, past social history, past surgical history and problem list.    Review of Systems   HENT: Negative.    Eyes: Negative.  Negative for visual disturbance.   Respiratory: Negative.  Negative for shortness of breath and wheezing.    Cardiovascular: Negative.  Negative for chest pain and palpitations.   Gastrointestinal: Negative.  Negative for constipation.   Endocrine: Positive for cold intolerance. Negative for heat intolerance.   Genitourinary: Negative.    Musculoskeletal: Negative for myalgias.   Neurological: Negative for numbness.     Objective      Vitals:    08/18/21 1441   BP: 134/66   BP Location: Right arm   Patient Position: Sitting   Cuff Size: Large Adult   Pulse: 62   SpO2: 97%   Weight: 98.2 kg (216 lb 6.4 oz)   Height: 165.1 cm (65\")     Physical Exam  Constitutional:       General: She is not in acute distress.     Appearance: Normal appearance. She is normal weight. She is not ill-appearing, toxic-appearing or diaphoretic.   HENT:      Head: Normocephalic.   Eyes:      General: No scleral icterus.        Right eye: No discharge.         Left eye: No discharge.      Extraocular Movements: Extraocular movements intact.      Conjunctiva/sclera: Conjunctivae normal.   Neck:      Vascular: No carotid bruit.   Cardiovascular:      Rate and Rhythm: Normal rate and regular rhythm.      Heart sounds: Normal heart sounds. No murmur heard.     Pulmonary:      Effort: No respiratory distress.      Breath sounds: Normal breath sounds. No stridor. No rales.   Chest:      Chest wall: No tenderness.   Abdominal:      General: Bowel sounds are normal. There is no distension.      Palpations: Abdomen is soft.      " Tenderness: There is no abdominal tenderness. There is no right CVA tenderness or left CVA tenderness.   Musculoskeletal:         General: Normal range of motion.      Cervical back: Normal range of motion and neck supple. No rigidity or tenderness.   Lymphadenopathy:      Cervical: No cervical adenopathy.   Skin:     General: Skin is warm.      Coloration: Skin is not jaundiced or pale.   Neurological:      General: No focal deficit present.      Mental Status: She is alert and oriented to person, place, and time.       Office Visit on 08/17/2021   Component Date Value Ref Range Status   • TSH 08/17/2021 75.300* 0.450 - 4.500 uIU/mL Final   • T4, Total 08/17/2021 1.2* 4.5 - 12.0 ug/dL Final   • T3 Uptake 08/17/2021 11* 24 - 39 % Final   • Free Thyroxine Index 08/17/2021 0.1* 1.2 - 4.9 Final   • 25 Hydroxy, Vitamin D 08/17/2021 52.5  30.0 - 100.0 ng/ml Final    Comment: Results may be falsely increased if patient taking Biotin.  Reference Range for Total Vitamin D 25(OH)  Deficiency <20.0 ng/mL  Insufficiency 21-29 ng/mL  Sufficiency  ng/mL  Toxicity >100 ng/ml     • Hemoglobin A1C 08/17/2021 5.70* 4.80 - 5.60 % Final    Comment: Hemoglobin A1C Ranges:  Increased Risk for Diabetes  5.7% to 6.4%  Diabetes                     >= 6.5%  Diabetic Goal                < 7.0%       Assessment/Plan   Diagnoses and all orders for this visit:    1. Primary hypothyroidism (Primary)  -     Adrenal 21-Hydroxylase Autoantibodies  -     Cortisol  -     ACTH    2. Vitamin D deficiency  -     Celiac Disease Panel    3. Hyperlipidemia, unspecified hyperlipidemia type  -     Comprehensive Metabolic Panel  -     Lipid Panel    4. History of nephrolithiasis  -     Uric Acid    5. Gastroesophageal reflux disease, unspecified whether esophagitis present    6. Stage 3b chronic kidney disease (CMS/HCC)    7. Compression fracture of body of thoracic vertebra (CMS/HCC)  -     Comprehensive Metabolic Panel  -     Propeptide Type I  Collagen  -     C-Telopeptide, Serum    8. Disorder of thyroid, unspecified   -     Propeptide Type I Collagen  -     C-Telopeptide, Serum        Check 21-hydroxylase antibody and ACTH.  Start dexamethasone 0.5 mg every morning.  Start Unithroid 50 mcg/day for 7 days then increase to 100 mcg daily.  Check lipid panel and consider restarting Lipitor.  Continue omeprazole 20 mg/day.  May increase dose to 40 mg/day if acid reflux is worse..  Continue ergocalciferol 50,000 units weekly.  Agree with bone density.  Check CT low peptide and propeptide type I collagen.    Copy my note sent to Monica Bruno NP, Dr. Paul, Dr. Delong, Dr. Walter Schwartz, and Dr. Sparks.    RTC 4 mos.    Total time 73 minutes

## 2021-08-20 NOTE — TELEPHONE ENCOUNTER
Patient informed of her results. She went to the endocrinologist 2 days ago they did blood work and he prescribe her some medications.

## 2021-08-28 ENCOUNTER — HOSPITAL ENCOUNTER (OUTPATIENT)
Dept: MRI IMAGING | Facility: HOSPITAL | Age: 81
Discharge: HOME | End: 2021-08-28
Admitting: THORACIC SURGERY (CARDIOTHORACIC VASCULAR SURGERY)

## 2021-08-28 DIAGNOSIS — E79.0 HYPERURICEMIA: Primary | ICD-10-CM

## 2021-08-28 DIAGNOSIS — S22.070A COMPRESSION FRACTURE OF T9 VERTEBRA, INITIAL ENCOUNTER (HCC): ICD-10-CM

## 2021-08-28 DIAGNOSIS — E03.9 PRIMARY HYPOTHYROIDISM: ICD-10-CM

## 2021-08-28 DIAGNOSIS — E78.2 MIXED HYPERLIPIDEMIA: ICD-10-CM

## 2021-08-28 LAB
21HYDROXYLASE AB SER-ACNC: NEGATIVE
ACTH PLAS-MCNC: 11.1 PG/ML (ref 7.2–63.3)
ALBUMIN SERPL-MCNC: 4.5 G/DL (ref 3.5–5.2)
ALBUMIN/GLOB SERPL: 1.7 G/DL
ALP SERPL-CCNC: 70 U/L (ref 39–117)
ALT SERPL-CCNC: 8 U/L (ref 1–33)
AST SERPL-CCNC: 17 U/L (ref 1–32)
BILIRUB SERPL-MCNC: 0.5 MG/DL (ref 0–1.2)
BUN SERPL-MCNC: 26 MG/DL (ref 8–23)
BUN/CREAT SERPL: 19.4 (ref 7–25)
CALCIUM SERPL-MCNC: 10.4 MG/DL (ref 8.6–10.5)
CHLORIDE SERPL-SCNC: 103 MMOL/L (ref 98–107)
CHOLEST SERPL-MCNC: 269 MG/DL (ref 0–200)
CO2 SERPL-SCNC: 26 MMOL/L (ref 22–29)
COLLAGEN CTX SERPL-MCNC: 192 PG/ML
CORTIS SERPL-MCNC: 4.7 UG/DL
CREAT SERPL-MCNC: 1.34 MG/DL (ref 0.57–1)
ENDOMYSIUM IGA SER QL: NEGATIVE
GLOBULIN SER CALC-MCNC: 2.7 GM/DL
GLUCOSE SERPL-MCNC: 117 MG/DL (ref 65–99)
HDLC SERPL-MCNC: 66 MG/DL (ref 40–60)
IGA SERPL-MCNC: 257 MG/DL (ref 64–422)
IMP & REVIEW OF LAB RESULTS: NORMAL
LDLC SERPL CALC-MCNC: 167 MG/DL (ref 0–100)
PINP SER-MCNC: 41 UG/L
POTASSIUM SERPL-SCNC: 4.1 MMOL/L (ref 3.5–5.2)
PROT SERPL-MCNC: 7.2 G/DL (ref 6–8.5)
SODIUM SERPL-SCNC: 140 MMOL/L (ref 136–145)
TRIGL SERPL-MCNC: 197 MG/DL (ref 0–150)
TTG IGA SER-ACNC: <2 U/ML (ref 0–3)
URATE SERPL-MCNC: 6.8 MG/DL (ref 2.4–5.7)
VLDLC SERPL CALC-MCNC: 36 MG/DL (ref 5–40)

## 2021-08-28 PROCEDURE — 72157 MRI CHEST SPINE W/O & W/DYE: CPT

## 2021-08-28 PROCEDURE — A9577 INJ MULTIHANCE: HCPCS | Performed by: THORACIC SURGERY (CARDIOTHORACIC VASCULAR SURGERY)

## 2021-08-28 PROCEDURE — 82565 ASSAY OF CREATININE: CPT

## 2021-08-28 PROCEDURE — 0 GADOBENATE DIMEGLUMINE 529 MG/ML SOLUTION: Performed by: THORACIC SURGERY (CARDIOTHORACIC VASCULAR SURGERY)

## 2021-08-28 RX ADMIN — GADOBENATE DIMEGLUMINE 20 ML: 529 INJECTION, SOLUTION INTRAVENOUS at 14:23

## 2021-08-28 NOTE — PROGRESS NOTES
LDL higher at 167.  HDL 66.  Patient went off Lipitor 20 mg and [place] Thyroid 1 week before labs were drawn.Normal 21-hydroxylase antibodies.ACTH 11.1 pg/mL.  Afternoon cortisol 4.7 mcg per DL.  May discontinue dexamethasoneNormal C-telopeptide.  Normal pro peptide type I collagen.Normal celiac panel.Uric acid elevated at 6.8 mg per DL.Repeat free T4, TSH, CMP, uric acid and lipid panel in 4 weeks.  Orders placed in chartPlease check on how patient is doing.Send copy of labs to Dr. Arce and Dr. Sparks.  Copy of labs sent to Dr. Walter Schwartz and Angie Bruno NP

## 2021-08-29 LAB — CREAT BLDA-MCNC: 1.1 MG/DL (ref 0.6–1.3)

## 2021-09-21 ENCOUNTER — APPOINTMENT (OUTPATIENT)
Dept: BONE DENSITY | Facility: HOSPITAL | Age: 81
End: 2021-09-21

## 2021-10-29 ENCOUNTER — OFFICE VISIT (OUTPATIENT)
Dept: SLEEP MEDICINE | Facility: HOSPITAL | Age: 81
End: 2021-10-29

## 2021-10-29 VITALS
BODY MASS INDEX: 34.55 KG/M2 | WEIGHT: 215 LBS | DIASTOLIC BLOOD PRESSURE: 79 MMHG | SYSTOLIC BLOOD PRESSURE: 184 MMHG | HEIGHT: 66 IN | HEART RATE: 75 BPM | OXYGEN SATURATION: 98 %

## 2021-10-29 DIAGNOSIS — G47.33 OBSTRUCTIVE SLEEP APNEA: Primary | ICD-10-CM

## 2021-10-29 DIAGNOSIS — E66.9 OBESITY (BMI 30-39.9): ICD-10-CM

## 2021-10-29 PROCEDURE — G0463 HOSPITAL OUTPT CLINIC VISIT: HCPCS

## 2021-10-29 NOTE — PROGRESS NOTES
[facility] SLEEP MEDICINE  [address] [facility]  3RD FLOOR  [facility] [address]  [phone]    PCP: Agnie Bruno APRN    Reason for visit:  Sleep disorders: DELROY    Luann is a 81 y.o.female who was seen in the [facility] Disorders [facility] today. Annual fu. She is doing well with the CPAP and is compliant. Using ffm, fits well. No air leaks or dry mouth. Sleeps from 12mn to 7am. She uses Benadryl to help her sleep and for AR. Does well with same.  Remains a current smoker.  [place] Sleepiness Scale is 3. Caffeine 3 per day. Alcohol 0 per week.    Luann  reports that she has been smoking cigarettes. She started smoking about 51 years ago. She has a 12.50 pack-year smoking history. She has never used smokeless tobacco.    Pertinent Positive Review of Systems of pnd, soa, acid reflux  Rest of Review of Systems was negative as recorded in Sleep Questionnaire.    Patient  has a past medical history of Arthritis, Asymptomatic PVCs, Chronic bronchitis (CMS/HCC), Essential hypertension (1/20/2020), Fracture of humerus, GERD (gastroesophageal reflux disease), Hyperlipidemia, Hypertension, Hypothyroidism, Pneumonia, Pulmonary emphysema (CMS/HCC), Renal calculi, Renal calculus (1/6/2021), and Sleep apnea.     Current Medications:    Current Outpatient Medications:   •  Advair Diskus 100-50 MCG/DOSE DISKUS, Inhale 1 puff 2 (Two) Times a Day., Disp: 180 each, Rfl: 5  •  albuterol (PROVENTIL HFA;VENTOLIN HFA) 108 (90 Base) MCG/ACT inhaler, Inhale 2 puffs Every 4 (Four) Hours As Needed for Wheezing or Shortness of Air., Disp: 1 inhaler, Rfl: 3  •  [employer] ANTACID & ANTI-GAS 1000-60 MG chewable tablet, , Disp: , Rfl:   •  furosemide (Lasix) 20 MG tablet, Take 1 tablet by mouth Daily., Disp: 90 tablet, Rfl: 3  •  omeprazole OTC (PriLOSEC OTC) 20 MG EC tablet, Take 20 mg by mouth As Needed. Take 1-2 tablet, Disp: , Rfl:   •  rivaroxaban (Xarelto) 15 MG tablet, Take 1 tablet by mouth Daily., Disp: 30 tablet, Rfl: 11  •   "traMADol (ULTRAM) 50 MG tablet, Take 50 mg by mouth As Needed., Disp: , Rfl:   •  triamcinolone (KENALOG) 0.1 % ointment, Apply  topically to the appropriate area as directed 2 (Two) Times a Day., Disp: 30 g, Rfl: 0  •  vitamin D (ERGOCALCIFEROL) 1.25 MG (60871 UT) capsule capsule, Take 50,000 Units by mouth 1 (One) Time Per Week., Disp: , Rfl:    also entered in Sleep Questionnaire         Vital Signs: BP (!) 184/79   Pulse 75   Ht 166.4 cm (65.51\")   Wt 97.5 kg (215 lb)   SpO2 98%   BMI 35.22 kg/m²     Body mass index is 35.22 kg/m².       Tongue: Large       Dentition: good       Pharynx: Posterior pharyngeal pillars are wide   Mallampatti: III (soft and hard palate and base of uvula visible)        General: Alert. Cooperative. Well developed. No acute distress.             Head:  Normocephalic. Symmetrical. Atraumatic.              Nose: No septal deviation. No drainage.          Throat: No oral lesions. No thrush. Moist mucous membranes.    Chest Wall:  Normal shape. Symmetric expansion with respiration. No tenderness.             Neck:  Trachea midline.           Lungs:  Clear to auscultation bilaterally. No wheezes. No rhonchi. No rales. Respirations regular, even and unlabored.            Heart:  Regular rhythm and normal rate. Normal S1 and S2. No murmur.     Abdomen:  Soft, non-tender and non-distended. Normal bowel sounds. No masses.  Extremities:  Moves all extremities well. No edema.    Psychiatric: Normal mood and affect.    Diagnostic data available to date is as below and was reviewed on current visit:  · 3/8/20  Overnight split polysomnogram study.  Diagnostic study from 10:45 PM to 1:29 AM.  Sleep efficiency 67% with 1.83 hours total sleep time.  Sleep distribution is normal.  AHI index 16 indicating moderately severe obstructive sleep apnea.  Severity underestimated due to absence of supine sleep.  During 22 minutes of REM sleep AHI index is 52.  During 88 minutes of non-REM sleep AHI index " is 7.5.  Oxygen saturation remained above 88%.  Arousal index 22 events an hour.  Patient had 68% time snoring.  Titration study from 1:29 AM to 4:48 AM.  Sleep efficiency was even poor at 54.5% with 1.81 hours total sleep time.  Slow-wave sleep increased from diagnostic to 23%.  However REM sleep decreased to 8.3%.  Apneas hypopneas eliminated with positive airway pressure device as well as snoring.  CPAP increased from 6 to 14 cm water pressure.  Maximum sleep at 12 cm water pressure though maximum REM sleep seen at 14 cm water pressure.  Despite that only 6 minutes of total REM sleep was seen.    Most current available usage data reviewed on 10/29/2021:  · 95% compliance average 5 hours 9 minutes AHI of 6.4 average pressure 14 to 17 cm auto CPAP 12-20    DME Company: [employer]    No orders of the defined types were placed in this encounter.         Prescription to DME for replacement supplies as below:    full face mask      Description Replacement    Nasal PILLOWS      A 7034 Nasal Pillows  every 3 mth    A 7033 Repl Nasal Pillows  2 per mth    Nasal MASK/CUSHION      A 7034 Nasal Mask/Cushion  every 3 mth    A 7032 Repl Nasal Mask/Cushion  2 per mth    Full Face MASK     x A 7030 Full Face Mask  every 3 mth   x A 7031 Repl Face Mask  1 per mth      A 4604 Heated Tubing  every 3 mth    A 7037 Standard Tubing  every 3 mth   x A 7035 Headgear  every 3 mth   x A 7046 Repl Humidifier Chamber  every 6 yrs   x A 7038 Disposable Filters  2 per mth   x A 7039 Non-disposable Filter  every 6 mth   x A 7036 Chin Strap  every 6 mth           Impression:  1. Obstructive sleep apnea    2. Obesity (BMI 30-39.9)        Plan:  Luann is compliant.  She currently benefits from the device and wakes up rested and refreshed.  She was advised to register for recall. Does not use SoClean. Advised to quit using humidifier.    I have discussed the Elli device recall issue with patient. Advised to use the CPAP with a bacterial filter  between machine and hose and without humidifier, until can replace or repair. Patient was given information regarding registration on Elli website at [email or web address]     I reiterated the importance of effective treatment of obstructive sleep apnea with PAP machine.  Cardiovascular health risks of untreated sleep apnea were again reviewed.  Patient was asked to remain cautious if there is persistent hypersomnolence. The benefit of weight loss in reducing severity of obstructive sleep apnea was discussed.  Patient would benefit from adhering to a strict diet to achieve ideal BMI.     Change of PAP supplies regularly is important for effective use.  Change of cushion on the mask or plugs on nasal pillows along with disposable filters once every month and change of mask frame, tubing, headgear and Velcro straps every 6 months at the minimum was reiterated.    This patient is compliant with PAP machine and benefits from its use.  Apnea hypopneas index is corrected/improved.  Daytime hypersomnolence has resolved.     Patient will follow up in this clinic in 1 year APRN    Thank you for allowing me to participate in your patient's care.    Electronically signed by Jeff Sands MD, 10/29/21, 1:40 PM EDT.    Part of this note may be an electronic transcription/translation of spoken language to printed text using the Dragon Dictation System.

## 2021-11-05 NOTE — PROGRESS NOTES
Subjective   Luann Frances is a 81 y.o. female.      F/u  for hypothyroidism , hyperlipidemia, hypertension/ flu vaccine given today         She has hypothyroidism since 2017.  She has no history of goiter.  She has no history of thyroid surgery or radiation therapy.  She has been on Synthroid 150 mcg/day but has stopped taking the medication for at least a week because she will feel tired and sleepy shortly after she takes the medication.  She was tried on Tirosint 150 mcg/day in November 2020 but she stopped it after several weeks because of nausea and increased tiredness.   She was switched by her PCP to [place] Thyroid 30 mg in April 2021.  She took it for a few weeks and discontinued it because of nausea and increased tiredness.  Nausea and tiredness resolved after 2 days of discontinuing [place] Thyroid.      She is supposed to be on Unithroid 100 mcg/day but stopped the medication 3 weeks ago because she gets nauseated.  She denies abdominal pain, vomiting, melena or hematochezia.     She has GERD and is on omeprazole 20 mg/day intermittently.     She has hyperlipidemia and was taken off Lipitor 20 mg/day when she was admitted to the hospital in February 2021.  She has hepatic steatosis on CT of the abdomen done in February 2021.     She has hypertension and was taken off amlodipine 5 mg/day and chlorthalidone 25 mg/day in February 2021.  She has no history of heart attack or stroke.  Blood pressure at home runs less than 132.  She denies chest pain, palpitations or pedal edema.     She has COPD and is on Advair, and albuterol.  She smokes 2 cigarettes/day.     She has sleep apnea and uses a CPAP regularly.     She had DVT on right lower ext and is on Xarelto 15 mg/day,      The following portions of the patient's history were reviewed and updated as appropriate: allergies, current medications, past family history, past medical history, past social history, past surgical history and problem list.    Review  "of Systems   Eyes: Negative for visual disturbance.   Respiratory: Negative for shortness of breath.    Cardiovascular: Negative for chest pain and palpitations.   Gastrointestinal: Negative for constipation and diarrhea.   Endocrine: Positive for cold intolerance. Negative for heat intolerance.   Musculoskeletal: Negative for myalgias.   Neurological: Negative for weakness.     Objective      Vitals:    11/16/21 1203   BP: 142/84   BP Location: Right arm   Patient Position: Sitting   Cuff Size: Large Adult   Pulse: 74   SpO2: 97%   Weight: 98.1 kg (216 lb 3.2 oz)   Height: 166.4 cm (65.51\")     Physical Exam  Constitutional:       General: She is not in acute distress.     Appearance: Normal appearance. She is not ill-appearing, toxic-appearing or diaphoretic.   HENT:      Nose: Nose normal. No congestion or rhinorrhea.   Eyes:      General: No scleral icterus.        Right eye: No discharge.         Left eye: No discharge.      Extraocular Movements: Extraocular movements intact.      Conjunctiva/sclera: Conjunctivae normal.   Neck:      Vascular: No carotid bruit.   Cardiovascular:      Rate and Rhythm: Normal rate and regular rhythm.      Pulses: Normal pulses.      Heart sounds: Normal heart sounds. No murmur heard.  No friction rub. No gallop.    Pulmonary:      Effort: No respiratory distress.      Breath sounds: Normal breath sounds. No stridor. No rales.   Chest:      Chest wall: No tenderness.   Abdominal:      General: Bowel sounds are normal. There is no distension.      Palpations: Abdomen is soft. There is no mass.      Tenderness: There is no right CVA tenderness or left CVA tenderness.   Musculoskeletal:         General: No swelling or tenderness. Normal range of motion.      Cervical back: Normal range of motion and neck supple. No rigidity or tenderness.   Lymphadenopathy:      Cervical: No cervical adenopathy.   Skin:     General: Skin is warm and dry.   Neurological:      General: No focal " deficit present.      Mental Status: She is alert and oriented to person, place, and time.       Results Encounter on 09/28/2021   Component Date Value Ref Range Status   • Glucose 09/27/2021 87  65 - 99 mg/dL Final   • BUN 09/27/2021 28* 8 - 23 mg/dL Final   • Creatinine 09/27/2021 1.15* 0.57 - 1.00 mg/dL Final   • eGFR Non  Am 09/27/2021 45* >60 mL/min/1.73 Final    Comment: The MDRD GFR formula is only valid for adults with stable  renal function between ages 18 and 70.     • eGFR  Am 09/27/2021 55* >60 mL/min/1.73 Final   • BUN/Creatinine Ratio 09/27/2021 24.3  7.0 - 25.0 Final   • Sodium 09/27/2021 145  136 - 145 mmol/L Final   • Potassium 09/27/2021 4.8  3.5 - 5.2 mmol/L Final   • Chloride 09/27/2021 106  98 - 107 mmol/L Final   • Total CO2 09/27/2021 27.2  22.0 - 29.0 mmol/L Final   • Calcium 09/27/2021 9.8  8.6 - 10.5 mg/dL Final   • Total Protein 09/27/2021 6.8  6.0 - 8.5 g/dL Final   • Albumin 09/27/2021 4.40  3.50 - 5.20 g/dL Final   • Globulin 09/27/2021 2.4  gm/dL Final   • A/G Ratio 09/27/2021 1.8  g/dL Final   • Total Bilirubin 09/27/2021 0.3  0.0 - 1.2 mg/dL Final   • Alkaline Phosphatase 09/27/2021 68  39 - 117 U/L Final   • AST (SGOT) 09/27/2021 15  1 - 32 U/L Final   • ALT (SGPT) 09/27/2021 10  1 - 33 U/L Final   • Total Cholesterol 09/27/2021 219* 0 - 200 mg/dL Final    Comment: Cholesterol Reference Ranges  (U.S. Department of Health and Human Services ATP III  Classifications)  Desirable          <200 mg/dL  Borderline High    200-239 mg/dL  High Risk          >240 mg/dL  Triglyceride Reference Ranges  (U.S. Department of Health and Human Services ATP III  Classifications)  Normal           <150 mg/dL  Borderline High  150-199 mg/dL  High             200-499 mg/dL  Very High        >500 mg/dL  HDL Reference Ranges  (U.S. Department of Health and Human Services ATP III  Classifcations)  Low     <40 mg/dl (major risk factor for CHD)  High    >60 mg/dl ('negative' risk factor for  CHD)  LDL Reference Ranges  (U.S. Department of Health and Human Services ATP III  Classifcations)  Optimal          <100 mg/dL  Near Optimal     100-129 mg/dL  Borderline High  130-159 mg/dL  High             160-189 mg/dL  Very High        >189 mg/dL     • Triglycerides 09/27/2021 115  0 - 150 mg/dL Final   • HDL Cholesterol 09/27/2021 61* 40 - 60 mg/dL Final   • VLDL Cholesterol Enio 09/27/2021 20  5 - 40 mg/dL Final   • LDL Chol Calc ([facility]) 09/27/2021 138* 0 - 100 mg/dL Final   • TSH 09/27/2021 60.000* 0.270 - 4.200 uIU/mL Final   • Free T4 09/27/2021 0.38* 0.93 - 1.70 ng/dL Final    Results may be falsely increased if patient taking Biotin.   • Uric Acid 09/27/2021 5.9* 2.4 - 5.7 mg/dL Final   • Interpretation 09/27/2021 Note   Final    Supplemental report is available.     Assessment/Plan   Diagnoses and all orders for this visit:    1. Primary hypothyroidism (Primary)  -     Comprehensive Metabolic Panel; Future  -     TSH; Future  -     T4, Free; Future    2. Mixed hyperlipidemia  -     Comprehensive Metabolic Panel; Future  -     Lipid Panel; Future    3. Essential hypertension  -     Comprehensive Metabolic Panel; Future    4. DELROY (obstructive sleep apnea)  -     TSH; Future  -     T4, Free; Future      Switch to Levoxyl 100 mcg/day.  Patient was advised not to stop taking Levoxyl without letting me know.  She may need GI evaluation for her intermittent nausea which she has been attributing to her thyroid medication.  Check thyroid function tests, and lipid panel in 4 weeks.  Continue low-fat diet.  Continue CPAP.  Will defer blood pressure control to Dr. Arec and PCP.  Will defer decision on treatment of DVT to PCP.      Copy of my note sent to Angie Bruno NP, Dr. Jeff Sands and Dr. Arce.    RTC 4 mos.

## 2021-11-16 ENCOUNTER — OFFICE VISIT (OUTPATIENT)
Dept: ENDOCRINOLOGY | Age: 81
End: 2021-11-16

## 2021-11-16 VITALS
OXYGEN SATURATION: 97 % | SYSTOLIC BLOOD PRESSURE: 142 MMHG | HEART RATE: 74 BPM | WEIGHT: 216.2 LBS | HEIGHT: 66 IN | BODY MASS INDEX: 34.75 KG/M2 | DIASTOLIC BLOOD PRESSURE: 84 MMHG

## 2021-11-16 DIAGNOSIS — E03.9 PRIMARY HYPOTHYROIDISM: Primary | ICD-10-CM

## 2021-11-16 DIAGNOSIS — E78.2 MIXED HYPERLIPIDEMIA: ICD-10-CM

## 2021-11-16 DIAGNOSIS — I10 ESSENTIAL HYPERTENSION: ICD-10-CM

## 2021-11-16 DIAGNOSIS — G47.33 OSA (OBSTRUCTIVE SLEEP APNEA): ICD-10-CM

## 2021-11-16 PROCEDURE — 99214 OFFICE O/P EST MOD 30 MIN: CPT | Performed by: INTERNAL MEDICINE

## 2021-11-16 RX ORDER — DEXAMETHASONE 1 MG
TABLET ORAL
COMMUNITY
Start: 2021-08-20 | End: 2021-11-16

## 2021-11-16 RX ORDER — LEVOTHYROXINE SODIUM 100 UG/1
100 TABLET ORAL DAILY
Qty: 30 TABLET | Refills: 5 | Status: SHIPPED | OUTPATIENT
Start: 2021-11-16 | End: 2022-05-02 | Stop reason: SDUPTHER

## 2021-11-17 ENCOUNTER — OFFICE VISIT (OUTPATIENT)
Dept: FAMILY MEDICINE CLINIC | Facility: CLINIC | Age: 81
End: 2021-11-17

## 2021-11-17 VITALS
BODY MASS INDEX: 35.22 KG/M2 | HEART RATE: 63 BPM | RESPIRATION RATE: 16 BRPM | DIASTOLIC BLOOD PRESSURE: 82 MMHG | OXYGEN SATURATION: 99 % | TEMPERATURE: 97.1 F | WEIGHT: 215 LBS | SYSTOLIC BLOOD PRESSURE: 120 MMHG

## 2021-11-17 DIAGNOSIS — I82.411 ACUTE DEEP VEIN THROMBOSIS (DVT) OF FEMORAL VEIN OF RIGHT LOWER EXTREMITY (HCC): ICD-10-CM

## 2021-11-17 DIAGNOSIS — J43.8 OTHER EMPHYSEMA (HCC): ICD-10-CM

## 2021-11-17 DIAGNOSIS — N18.32 STAGE 3B CHRONIC KIDNEY DISEASE (HCC): ICD-10-CM

## 2021-11-17 DIAGNOSIS — K21.9 GASTROESOPHAGEAL REFLUX DISEASE, UNSPECIFIED WHETHER ESOPHAGITIS PRESENT: Primary | ICD-10-CM

## 2021-11-17 DIAGNOSIS — I10 ESSENTIAL HYPERTENSION: ICD-10-CM

## 2021-11-17 DIAGNOSIS — E03.9 PRIMARY HYPOTHYROIDISM: ICD-10-CM

## 2021-11-17 DIAGNOSIS — S22.000A COMPRESSION FRACTURE OF BODY OF THORACIC VERTEBRA (HCC): ICD-10-CM

## 2021-11-17 DIAGNOSIS — E78.2 MIXED HYPERLIPIDEMIA: ICD-10-CM

## 2021-11-17 DIAGNOSIS — L98.9 ARM SKIN LESION, RIGHT: ICD-10-CM

## 2021-11-17 DIAGNOSIS — Z13.0 SCREENING, ANEMIA, DEFICIENCY, IRON: ICD-10-CM

## 2021-11-17 PROCEDURE — 99214 OFFICE O/P EST MOD 30 MIN: CPT

## 2021-11-17 NOTE — ASSESSMENT & PLAN NOTE
Continue Xarelto 15 mg.  Following up in January to reassess how long she needs to stay on the Xarelto and if we need follow-up ultrasound the legs.

## 2021-11-17 NOTE — PROGRESS NOTES
Chief Complaint  Heartburn (3 month f/u saw endo yesterday)    Subjective          Luann Frances presents to [facility]  History of Present Illness     Patient presents the clinic today for 3-month follow-up on heartburn, hypothyroidism, COPD, hypertension, hyperlipidemia and DVT.  Patient was last seen by me on 8/17/2021.  At that time was referred to endocrinology for further management of hypothyroidism due to having trouble finding medication that would not make her feel nauseated.  Patient was then able to see Dr. Garcia on 8/18/2021 to establish care.  He is now handling her hypothyroidism and also checking her cholesterol levels as well.  Patient said she just had a follow-up visit with him yesterday with started on new medication to see if this would help her hypothyroidism without causing any GI distress.  Patient is due to return in 4 weeks for follow-up thyroid labs and to recheck cholesterol.    Patient has a history of hyperlipidemia and previously was on Lipitor 20 mg.  However in the spring when she was in the hospital she was taken off the Lipitor was told she did not need it.  However on her follow-up visit with endocrinology they soto cholesterol levels on 8/18/2021 and showed total cholesterol of 269 and .  Patient has not been restarted on cholesterol medicine yet but has a recheck of her cholesterol per endocrinology in 1 month.  Counseled patient to follow-up with endocrinology and discuss if they are wanting to manage the cholesterol since they are drawing the levels at this time or if there are any need to manage it.  Counseled patient to maintain a diet that is high in vegetables, lean meats, whole grains and to stay away from fried and saturated fats.    GERD-patient said at this point she is only taking her Prilosec as needed about twice a week depending on if she is eating fattier foods or with red meat.  Otherwise reflux is well controlled and  tolerates medication well with no side effects.    COPD-patient is taking Advair daily and only has had use her albuterol inhaler about once a month.  Patient says her breathing is well controlled with no wheezing or shortness of breath.    Hypertension-patient was previously being treated for hypertension when she was in the hospital and they stopped her medication when she was hospitalized in the spring.  Today blood pressure is well controlled at 120/82.  No chest pain, headache, dizziness or shortness of breath.  Patient also has CKD but is being followed by nephrology and has a appointment Dr. Arce tomorrow.  Dr. Arce is managing patient's Lasix which she takes daily for lower extremity edema.    DVT-patient is currently on Xarelto 15mg due to DVT in the femoral vein of the right lower extremity.  Patient is tolerating this well with no side effects.  No signs of bleeding and no increase in swelling in the right leg.  We will follow up with patient when she returns in January to see how she is doing and if we need to repeat ultrasound to assess the DVT and how long she need to be on the Xarelto for.    Patient also has compression fractures in T9.  Patient see Dr. Sparks and last MRI was in August which confirmed the fractures.  Patient said that she is getting gel injections this coming Monday through Dr. Sparks who is managing her pain management.  Patient said that now she only sees a cane and does have to use the walker anymore and is feeling better with her mobility overall.    Patient also complained today of what appear to be moles on the patient's right arm that are scabbed over and have some discoloration.  Patient has 3 sites 1 on the lower arm and 2 on the upper right arm.  Patient does not see a dermatologist has not seen one.  Denies any redness or swelling associated with it.  Patient says she has been picking at them and so 1 is now fully scabbed over.  Probably due for dermatology  evaluation.    Objective   Vital Signs:   /82   Pulse 63   Temp 97.1 °F (36.2 °C)   Resp 16   Wt 97.5 kg (215 lb)   SpO2 99%   BMI 35.22 kg/m²     Physical Exam  Vitals reviewed.   Constitutional:       General: She is not in acute distress.  HENT:      Head: Normocephalic.   Cardiovascular:      Rate and Rhythm: Normal rate.      Pulses: Normal pulses.   Pulmonary:      Effort: Pulmonary effort is normal.      Breath sounds: Normal breath sounds.   Musculoskeletal:      Comments: Trace bilateral pedal edema nonpitting.    Skin:     General: Skin is warm and dry.      Comments: 3 nevi on right arm.  1 on forearm 2 mm in diameter with irregular borders and discoloration.  2 on right upper arm with symmetrical borders but slight discoloration.   Neurological:      Mental Status: She is alert.   Psychiatric:         Mood and Affect: Mood normal.        Result Review :   The following data was reviewed by: LIVIER Mathis on 11/17/2021:  CMP    CMP 8/18/21 8/28/21 9/27/21   Glucose 117 (A)  87   BUN 26 (A)  28 (A)   Creatinine 1.34 (A) 1.10 1.15 (A)   eGFR Non  Am 38 (A)  45 (A)   eGFR  Am 46 (A)  55 (A)   Sodium 140  145   Potassium 4.1  4.8   Chloride 103  106   Calcium 10.4  9.8   Total Protein 7.2  6.8   Albumin 4.50  4.40   Globulin 2.7  2.4   Total Bilirubin 0.5  0.3   Alkaline Phosphatase 70  68   AST (SGOT) 17  15   ALT (SGPT) 8  10   (A) Abnormal value       Comments are available for some flowsheets but are not being displayed.           CBC w/diff    CBC w/Diff 2/19/21 2/20/21 4/1/21   WBC 9.41 9.44 8.38   RBC 3.55 (A) 3.51 (A) 4.34   Hemoglobin 10.7 (A) 10.6 (A) 12.8   Hematocrit 33.0 (A) 32.1 (A) 38.9   MCV 93.0 91.5 89.6   MCH 30.1 30.2 29.5   MCHC 32.4 33.0 32.9   RDW 13.1 13.0 13.9   Platelets 208 256 278   Neutrophil Rel %  68.5 61.1   Immature Granulocyte Rel %  0.7 (A)    Lymphocyte Rel %  14.5 (A) 26.3   Monocyte Rel %  14.0 (A) 8.5   Eosinophil Rel %  1.7 2.3    Basophil Rel %  0.6 1.2   (A) Abnormal value            Lipid Panel    Lipid Panel 8/18/21 9/27/21   Total Cholesterol 269 (A) 219 (A)   Triglycerides 197 (A) 115   HDL Cholesterol 66 (A) 61 (A)   VLDL Cholesterol 36 20   LDL Cholesterol  167 (A) 138 (A)   (A) Abnormal value       Comments are available for some flowsheets but are not being displayed.           TSH    TSH 4/1/21 8/17/21 9/27/21   TSH 51.900 (A) 75.300 (A) 60.000 (A)   (A) Abnormal value                      Assessment and Plan    Diagnoses and all orders for this visit:    1. Gastroesophageal reflux disease, unspecified whether esophagitis present (Primary)  Assessment & Plan:  Well-controlled with only taking Prilosec as needed.  Continue current regimen.      2. Essential hypertension  Assessment & Plan:  Currently well controlled without any blood pressure medication.  Continue to monitor for now.      3. Mixed hyperlipidemia  Assessment & Plan:  Currently Dr. Garcia with endocrinology is following cholesterol labs.  Patient has a recheck in 1 month.  Patient to clarify with him as far as treatment goes and if they are planning to treat or deferring to me.  Continue to need to a healthy diet stay away from fried and saturated fats.      4. Acute deep vein thrombosis (DVT) of femoral vein of right lower extremity (HCC)  Assessment & Plan:  Continue Xarelto 15 mg.  Following up in January to reassess how long she needs to stay on the Xarelto and if we need follow-up ultrasound the legs.      5. Arm skin lesion, right  Assessment & Plan:  Patient with 3 nevi on right arm.  There is some discoloration and irregular borders.  Potential basal cell.  Sending to dermatology for further evaluation and treatment.    Orders:  -     Ambulatory Referral to Dermatology    6. Primary hypothyroidism  Assessment & Plan:  Patient now taking Levoxyl 100 MCG.  Continue current treatment and follow-up with endocrinology.      7. Stage 3b chronic kidney disease  (HCC)  Assessment & Plan:  Followed by Dr. Arce nephrologist and following up with him tomorrow.      8. Other emphysema (HCC)  Assessment & Plan:  Well-controlled on Advair discus 100-50 MCG.  Continue current regimen.          9. Compression fracture of body of thoracic vertebra (HCC)  Assessment & Plan:  Managed by Dr. Sparks and patient has gel injection on Monday.      10. Screening, anemia, deficiency, iron  -     CBC w AUTO Differential      Follow Up   Return in about 2 months (around 1/17/2022) for Recheck HTN / HLD / DVT.  Patient was given instructions and counseling regarding her condition or for health maintenance advice. Please see specific information pulled into the AVS if appropriate.     Medical assistant and I wore mask and eyewear protection during entire encounter.  Patient wore mask.      Electronically signed by LIVIER Mathis, 11/17/21, 1:43 PM EST.

## 2021-11-17 NOTE — ASSESSMENT & PLAN NOTE
Patient with 3 nevi on right arm.  There is some discoloration and irregular borders.  Potential basal cell.  Sending to dermatology for further evaluation and treatment.

## 2021-11-17 NOTE — ASSESSMENT & PLAN NOTE
Currently Dr. Garcia with endocrinology is following cholesterol labs.  Patient has a recheck in 1 month.  Patient to clarify with him as far as treatment goes and if they are planning to treat or deferring to me.  Continue to need to a healthy diet stay away from fried and saturated fats.

## 2021-11-18 LAB
BASOPHILS # BLD AUTO: 0.1 X10E3/UL (ref 0–0.2)
BASOPHILS NFR BLD AUTO: 1 %
EOSINOPHIL # BLD AUTO: 0.2 X10E3/UL (ref 0–0.4)
EOSINOPHIL NFR BLD AUTO: 2 %
ERYTHROCYTE [DISTWIDTH] IN BLOOD BY AUTOMATED COUNT: 13.6 % (ref 11.7–15.4)
HCT VFR BLD AUTO: 37.2 % (ref 34–46.6)
HGB BLD-MCNC: 11.7 G/DL (ref 11.1–15.9)
IMM GRANULOCYTES # BLD AUTO: 0 X10E3/UL (ref 0–0.1)
IMM GRANULOCYTES NFR BLD AUTO: 0 %
LYMPHOCYTES # BLD AUTO: 1.8 X10E3/UL (ref 0.7–3.1)
LYMPHOCYTES NFR BLD AUTO: 26 %
MCH RBC QN AUTO: 29.7 PG (ref 26.6–33)
MCHC RBC AUTO-ENTMCNC: 31.5 G/DL (ref 31.5–35.7)
MCV RBC AUTO: 94 FL (ref 79–97)
MONOCYTES # BLD AUTO: 0.5 X10E3/UL (ref 0.1–0.9)
MONOCYTES NFR BLD AUTO: 7 %
NEUTROPHILS # BLD AUTO: 4.3 X10E3/UL (ref 1.4–7)
NEUTROPHILS NFR BLD AUTO: 64 %
PLATELET # BLD AUTO: 290 X10E3/UL (ref 150–450)
RBC # BLD AUTO: 3.94 X10E6/UL (ref 3.77–5.28)
WBC # BLD AUTO: 6.9 X10E3/UL (ref 3.4–10.8)

## 2021-12-10 ENCOUNTER — TELEPHONE (OUTPATIENT)
Dept: FAMILY MEDICINE CLINIC | Facility: CLINIC | Age: 81
End: 2021-12-10

## 2021-12-10 NOTE — TELEPHONE ENCOUNTER
Can you let them know that I just reviewed the [employer] form and have filled it out.  Patient is scheduled for DEXA scan in March 2022.    Abigail-can you make sure the form gets fax back over to [employer]?  Thanks!

## 2021-12-10 NOTE — TELEPHONE ENCOUNTER
Caller: [facility]    Relationship: Provider    Best call back number: [phone]    What was the call regarding: [facility] SENT FAXES ON 12/3 REGARDING OSTEOPOROSIS AND BONE DENSITY SCANS. PLEASE ADVISE IF THESE HAVE BEEN RECEIVED AND REVIEWED AND SENT BACK TO [facility].     Do you require a callback: YES

## 2022-01-17 ENCOUNTER — OFFICE VISIT (OUTPATIENT)
Dept: FAMILY MEDICINE CLINIC | Facility: CLINIC | Age: 82
End: 2022-01-17

## 2022-01-17 VITALS
RESPIRATION RATE: 16 BRPM | DIASTOLIC BLOOD PRESSURE: 82 MMHG | BODY MASS INDEX: 34.89 KG/M2 | WEIGHT: 213 LBS | OXYGEN SATURATION: 98 % | TEMPERATURE: 96.9 F | SYSTOLIC BLOOD PRESSURE: 140 MMHG | HEART RATE: 55 BPM

## 2022-01-17 DIAGNOSIS — K21.9 GASTROESOPHAGEAL REFLUX DISEASE, UNSPECIFIED WHETHER ESOPHAGITIS PRESENT: ICD-10-CM

## 2022-01-17 DIAGNOSIS — E55.9 VITAMIN D DEFICIENCY: ICD-10-CM

## 2022-01-17 DIAGNOSIS — I82.411 ACUTE DEEP VEIN THROMBOSIS (DVT) OF FEMORAL VEIN OF RIGHT LOWER EXTREMITY: Primary | ICD-10-CM

## 2022-01-17 DIAGNOSIS — I10 ESSENTIAL HYPERTENSION: ICD-10-CM

## 2022-01-17 DIAGNOSIS — J43.8 OTHER EMPHYSEMA: ICD-10-CM

## 2022-01-17 DIAGNOSIS — E03.9 PRIMARY HYPOTHYROIDISM: ICD-10-CM

## 2022-01-17 DIAGNOSIS — E78.2 MIXED HYPERLIPIDEMIA: ICD-10-CM

## 2022-01-17 PROCEDURE — 99214 OFFICE O/P EST MOD 30 MIN: CPT

## 2022-01-17 RX ORDER — ALBUTEROL SULFATE 90 UG/1
2 AEROSOL, METERED RESPIRATORY (INHALATION) EVERY 4 HOURS PRN
Qty: 8 G | Refills: 1 | Status: SHIPPED | OUTPATIENT
Start: 2022-01-17

## 2022-01-17 NOTE — ASSESSMENT & PLAN NOTE
Encouraged to continue on Levoxyl per endocrinology.  If unable to tolerate let endocrinologist know.  Follow-up with them as scheduled.

## 2022-01-17 NOTE — ASSESSMENT & PLAN NOTE
Patient has been on adequate anticoagulation on Xarelto 15 mg.  Repeating ultrasounds to make sure DVT has resolved.  If resolved we will be able to come off the Xarelto.

## 2022-01-17 NOTE — ASSESSMENT & PLAN NOTE
Stable without medication.  Lasix ordered per nephrologist.  Continue to monitor blood pressure at home.

## 2022-01-17 NOTE — ASSESSMENT & PLAN NOTE
Need to have lipids rechecked to see if she needs to return to medication.  Patient has lab orders per endocrinologist.  Patient said she will go this week to get them complete and I will review afterward to see if we should restart medication.  Continue to maintain a healthy diet limiting fried food and saturated fats and exercise 150 minutes a week.

## 2022-01-17 NOTE — ASSESSMENT & PLAN NOTE
Patient currently takes omeprazole just as needed.  Patient's recent complaints of occasional nausea associated with the burning in her esophagus I believe point to uncontrolled reflux being the cause of her nausea.  Counseled to try taking omeprazole every day.  If this does not help resolve nausea let me know and we may need to send to GI for further evaluation.

## 2022-01-17 NOTE — ASSESSMENT & PLAN NOTE
Needing to repeat labs.  Orders placed today.  Patient to go to local [facility] to have this complete.

## 2022-01-17 NOTE — PROGRESS NOTES
Chief Complaint  Hyperlipidemia (Follow up), Hypertension, and Med Refill (Albuterol, vit D)    Subjective          Luann Frances presents to [facility]  History of Present Illness       Patient presents to the office for 2-month follow-up for hyperlipidemia, DVT, hypertension, and needing med refill for albuterol and vitamin D.    Hyperlipidemia- patient's last cholesterol was checked in September which showed total cholesterol of 219 and LDL of 138.  Patient has been off her cholesterol medicine since her hospital stay 6 months prior.  Today when to follow-up with the patient because she was post to have repeat lipids per her endocrinologist.  However patient said she was unable to get in to that lab appointment.  The orders are still pending.  Patient said she will go this week to her endocrinologist to get the labs ordered so that we can see what her cholesterol panel is.    DVT- patient was diagnosed with a right femoral DVT approximately 9 months ago.  Patient has been on Xarelto 50 mg tolerating it well with no side effects.  No signs or symptoms of bleeding.  Patient has no history of clotting disorders and this was following a hospital stay and immobility that led to the DVT.  Patient had been consulted to see vascular surgery but never followed up with them and the order has been canceled.  Patient has no complaints of right lower extremity pain, swelling, redness or warmth.  Patient has been on the blood thinner for an adequate amount of time.    Hypertension- patient checks her blood pressure at home and it runs 120-130/80.  Patient also sees Dr. Arce with nephrology and he has been updated on her blood pressures.  Last visit with him was 1 month ago.  Patient was not restarted on any blood pressure medicine such as controlled at home.  No chest pain, headache, dizziness or shortness of breath.  Patient does take Lasix as needed for lower extremity edema per   Yazmin.    COPD- patient uses an Advair inhaler daily for her COPD.  Patient uses an albuterol inhaler as needed when she is doing more vigorous activity.  Patient denies any worsening shortness of breath or wheezing.  Breathing is well controlled on Advair daily.  Patient is needing a albuterol refill.    Patient is complaining of some intermittent nausea.  Patient says she will get these episodes once every 6 months and usually goes away on its own.  When she has these episodes she said she has no associated lightheadedness, dizziness or chest pain.  She does feel like she has reflux at the base of her esophagus though.  Patient currently just takes omeprazole as needed.    Hypothyroidism- patient sees Dr. Garcia.  Currently spouse be on Levoxyl 100 MCG daily.  Patient says she still is not tolerating this due to GI distress.  Patient believes the nausea discussed above may be related to the thyroid medication.  Patient follows up with  in 2 months.    Patient also has a history of vitamin D deficiency and is currently taking 50,000 IU weekly.  Patient is needing her lab work rechecked.      Objective   Vital Signs:   /82   Pulse 55   Temp 96.9 °F (36.1 °C)   Resp 16   Wt 96.6 kg (213 lb)   SpO2 98%   BMI 34.89 kg/m²     Physical Exam  Vitals reviewed.   Constitutional:       General: She is not in acute distress.  HENT:      Head: Normocephalic.   Cardiovascular:      Rate and Rhythm: Normal rate and regular rhythm.      Pulses: Normal pulses.      Heart sounds: Normal heart sounds.   Pulmonary:      Effort: Pulmonary effort is normal.      Breath sounds: Normal breath sounds.   Abdominal:      General: Abdomen is flat. Bowel sounds are normal. There is no distension.      Palpations: Abdomen is soft. There is no mass.      Tenderness: There is no abdominal tenderness.   Skin:     General: Skin is warm and dry.   Neurological:      General: No focal deficit present.      Mental Status: She is  alert.   Psychiatric:         Mood and Affect: Mood normal.        Result Review :   The following data was reviewed by: LIVIER Mathis on 01/17/2022:  CMP    CMP 8/18/21 8/28/21 9/27/21   Glucose 117 (A)  87   BUN 26 (A)  28 (A)   Creatinine 1.34 (A) 1.10 1.15 (A)   eGFR Non  Am 38 (A)  45 (A)   eGFR  Am 46 (A)  55 (A)   Sodium 140  145   Potassium 4.1  4.8   Chloride 103  106   Calcium 10.4  9.8   Total Protein 7.2  6.8   Albumin 4.50  4.40   Globulin 2.7  2.4   Total Bilirubin 0.5  0.3   Alkaline Phosphatase 70  68   AST (SGOT) 17  15   ALT (SGPT) 8  10   (A) Abnormal value       Comments are available for some flowsheets but are not being displayed.           CBC w/diff    CBC w/Diff 2/20/21 4/1/21 11/17/21   WBC 9.44 8.38 6.9   RBC 3.51 (A) 4.34 3.94   Hemoglobin 10.6 (A) 12.8 11.7   Hematocrit 32.1 (A) 38.9 37.2   MCV 91.5 89.6 94   MCH 30.2 29.5 29.7   MCHC 33.0 32.9 31.5   RDW 13.0 13.9 13.6   Platelets 256 278 290   Neutrophil Rel % 68.5 61.1 64   Immature Granulocyte Rel % 0.7 (A)     Lymphocyte Rel % 14.5 (A) 26.3 26   Monocyte Rel % 14.0 (A) 8.5 7   Eosinophil Rel % 1.7 2.3 2   Basophil Rel % 0.6 1.2 1   (A) Abnormal value            Lipid Panel    Lipid Panel 8/18/21 9/27/21   Total Cholesterol 269 (A) 219 (A)   Triglycerides 197 (A) 115   HDL Cholesterol 66 (A) 61 (A)   VLDL Cholesterol 36 20   LDL Cholesterol  167 (A) 138 (A)   (A) Abnormal value       Comments are available for some flowsheets but are not being displayed.           TSH    TSH 4/1/21 8/17/21 9/27/21   TSH 51.900 (A) 75.300 (A) 60.000 (A)   (A) Abnormal value                      Assessment and Plan    Diagnoses and all orders for this visit:    1. Acute deep vein thrombosis (DVT) of femoral vein of right lower extremity (HCC) (Primary)  Assessment & Plan:  Patient has been on adequate anticoagulation on Xarelto 15 mg.  Repeating ultrasounds to make sure DVT has resolved.  If resolved we will be able to come off  the Xarelto.    Orders:  -     Duplex Venous Lower Extremity - Bilateral CAR; Future    2. Mixed hyperlipidemia  Assessment & Plan:  Need to have lipids rechecked to see if she needs to return to medication.  Patient has lab orders per endocrinologist.  Patient said she will go this week to get them complete and I will review afterward to see if we should restart medication.  Continue to maintain a healthy diet limiting fried food and saturated fats and exercise 150 minutes a week.      3. Essential hypertension  Assessment & Plan:  Stable without medication.  Lasix ordered per nephrologist.  Continue to monitor blood pressure at home.      4. Other emphysema (HCC)  Assessment & Plan:  Controlled on Advair and albuterol as needed.  Continue current regimen.      Orders:  -     albuterol sulfate  (90 Base) MCG/ACT inhaler; Inhale 2 puffs Every 4 (Four) Hours As Needed for Wheezing or Shortness of Air.  Dispense: 8 g; Refill: 1    5. Gastroesophageal reflux disease, unspecified whether esophagitis present  Assessment & Plan:  Patient currently takes omeprazole just as needed.  Patient's recent complaints of occasional nausea associated with the burning in her esophagus I believe point to uncontrolled reflux being the cause of her nausea.  Counseled to try taking omeprazole every day.  If this does not help resolve nausea let me know and we may need to send to GI for further evaluation.      6. Primary hypothyroidism  Assessment & Plan:  Encouraged to continue on Levoxyl per endocrinology.  If unable to tolerate let endocrinologist know.  Follow-up with them as scheduled.      7. Vitamin D deficiency  Assessment & Plan:  Needing to repeat labs.  Orders placed today.  Patient to go to local [facility] to have this complete.    Orders:  -     Vitamin D 25 hydroxy      Follow Up   Return in about 4 months (around 5/17/2022) for Medicare Wellness.  Patient was given instructions and counseling regarding her condition  or for health maintenance advice. Please see specific information pulled into the AVS if appropriate.     Medical assistant and I wore mask and eyewear protection during entire encounter.  Patient wore mask.      Electronically signed by LIVIER Mathis, 01/17/22, 3:54 PM EST.

## 2022-01-28 ENCOUNTER — APPOINTMENT (OUTPATIENT)
Dept: CARDIOLOGY | Facility: HOSPITAL | Age: 82
End: 2022-01-28

## 2022-02-03 ENCOUNTER — APPOINTMENT (OUTPATIENT)
Dept: CARDIOLOGY | Facility: HOSPITAL | Age: 82
End: 2022-02-03

## 2022-03-09 ENCOUNTER — APPOINTMENT (OUTPATIENT)
Dept: BONE DENSITY | Facility: HOSPITAL | Age: 82
End: 2022-03-09

## 2022-03-24 ENCOUNTER — HOSPITAL ENCOUNTER (OUTPATIENT)
Dept: BONE DENSITY | Facility: HOSPITAL | Age: 82
Discharge: HOME | End: 2022-03-24
Admitting: FAMILY MEDICINE

## 2022-03-24 DIAGNOSIS — Z78.0 POST-MENOPAUSAL: ICD-10-CM

## 2022-03-24 PROCEDURE — 77080 DXA BONE DENSITY AXIAL: CPT

## 2022-04-05 ENCOUNTER — TELEPHONE (OUTPATIENT)
Dept: ENDOCRINOLOGY | Age: 82
End: 2022-04-05

## 2022-04-12 ENCOUNTER — HOSPITAL ENCOUNTER (OUTPATIENT)
Dept: CARDIOLOGY | Facility: HOSPITAL | Age: 82
Discharge: HOME | End: 2022-04-12

## 2022-04-12 ENCOUNTER — HOSPITAL ENCOUNTER (EMERGENCY)
Facility: HOSPITAL | Age: 82
Discharge: HOME | End: 2022-04-12
Attending: EMERGENCY MEDICINE | Admitting: EMERGENCY MEDICINE

## 2022-04-12 VITALS
OXYGEN SATURATION: 98 % | HEART RATE: 80 BPM | TEMPERATURE: 96.9 F | DIASTOLIC BLOOD PRESSURE: 89 MMHG | SYSTOLIC BLOOD PRESSURE: 188 MMHG | RESPIRATION RATE: 16 BRPM

## 2022-04-12 DIAGNOSIS — M79.89 LEFT LEG SWELLING: ICD-10-CM

## 2022-04-12 DIAGNOSIS — I82.402 ACUTE DEEP VEIN THROMBOSIS (DVT) OF LEFT LOWER EXTREMITY, UNSPECIFIED VEIN: Primary | ICD-10-CM

## 2022-04-12 DIAGNOSIS — I82.411 ACUTE DEEP VEIN THROMBOSIS (DVT) OF FEMORAL VEIN OF RIGHT LOWER EXTREMITY: ICD-10-CM

## 2022-04-12 DIAGNOSIS — I82.452 ACUTE DEEP VEIN THROMBOSIS (DVT) OF LEFT PERONEAL VEIN: Primary | ICD-10-CM

## 2022-04-12 LAB
ALBUMIN SERPL-MCNC: 3.9 G/DL (ref 3.5–5.2)
ALBUMIN/GLOB SERPL: 1.3 G/DL
ALP SERPL-CCNC: 70 U/L (ref 39–117)
ALT SERPL W P-5'-P-CCNC: 9 U/L (ref 1–33)
ANION GAP SERPL CALCULATED.3IONS-SCNC: 10 MMOL/L (ref 5–15)
APTT PPP: 24.1 SECONDS (ref 22.7–35.4)
AST SERPL-CCNC: 13 U/L (ref 1–32)
BASOPHILS # BLD AUTO: 0.05 10*3/MM3 (ref 0–0.2)
BASOPHILS NFR BLD AUTO: 0.7 % (ref 0–1.5)
BH CV LOW VAS LEFT PERONEAL VESSEL: 1
BH CV LOW VAS LEFT POPLITEAL SPONT: 1
BH CV LOW VAS LEFT POSTERIOR TIBIAL VESSEL: 1
BH CV LOW VAS LEFT SOLEAL VESSEL: 1
BH CV LOWER VASCULAR LEFT COMMON FEMORAL AUGMENT: NORMAL
BH CV LOWER VASCULAR LEFT COMMON FEMORAL COMPETENT: NORMAL
BH CV LOWER VASCULAR LEFT COMMON FEMORAL COMPRESS: NORMAL
BH CV LOWER VASCULAR LEFT COMMON FEMORAL PHASIC: NORMAL
BH CV LOWER VASCULAR LEFT COMMON FEMORAL SPONT: NORMAL
BH CV LOWER VASCULAR LEFT DISTAL FEMORAL COMPRESS: NORMAL
BH CV LOWER VASCULAR LEFT GASTRONEMIUS COMPRESS: NORMAL
BH CV LOWER VASCULAR LEFT GREATER SAPH AK COMPRESS: NORMAL
BH CV LOWER VASCULAR LEFT GREATER SAPH BK COMPRESS: NORMAL
BH CV LOWER VASCULAR LEFT LESSER SAPH COMPRESS: NORMAL
BH CV LOWER VASCULAR LEFT MID FEMORAL AUGMENT: NORMAL
BH CV LOWER VASCULAR LEFT MID FEMORAL COMPETENT: NORMAL
BH CV LOWER VASCULAR LEFT MID FEMORAL COMPRESS: NORMAL
BH CV LOWER VASCULAR LEFT MID FEMORAL PHASIC: NORMAL
BH CV LOWER VASCULAR LEFT MID FEMORAL SPONT: NORMAL
BH CV LOWER VASCULAR LEFT PERONEAL COMPRESS: NORMAL
BH CV LOWER VASCULAR LEFT PERONEAL THROMBUS: NORMAL
BH CV LOWER VASCULAR LEFT POPLITEAL AUGMENT: NORMAL
BH CV LOWER VASCULAR LEFT POPLITEAL COMPETENT: NORMAL
BH CV LOWER VASCULAR LEFT POPLITEAL COMPRESS: NORMAL
BH CV LOWER VASCULAR LEFT POPLITEAL PHASIC: NORMAL
BH CV LOWER VASCULAR LEFT POPLITEAL SPONT: NORMAL
BH CV LOWER VASCULAR LEFT POPLITEAL THROMBUS: NORMAL
BH CV LOWER VASCULAR LEFT POSTERIOR TIBIAL COMPRESS: NORMAL
BH CV LOWER VASCULAR LEFT POSTERIOR TIBIAL THROMBUS: NORMAL
BH CV LOWER VASCULAR LEFT PROFUNDA FEMORAL COMPRESS: NORMAL
BH CV LOWER VASCULAR LEFT PROXIMAL FEMORAL COMPRESS: NORMAL
BH CV LOWER VASCULAR LEFT SAPHENOFEMORAL JUNCTION COMPRESS: NORMAL
BH CV LOWER VASCULAR LEFT SOLEAL COMPRESS: NORMAL
BH CV LOWER VASCULAR LEFT SOLEAL THROMBUS: NORMAL
BH CV LOWER VASCULAR RIGHT COMMON FEMORAL AUGMENT: NORMAL
BH CV LOWER VASCULAR RIGHT COMMON FEMORAL COMPETENT: NORMAL
BH CV LOWER VASCULAR RIGHT COMMON FEMORAL COMPRESS: NORMAL
BH CV LOWER VASCULAR RIGHT COMMON FEMORAL PHASIC: NORMAL
BH CV LOWER VASCULAR RIGHT COMMON FEMORAL SPONT: NORMAL
BH CV LOWER VASCULAR RIGHT DISTAL FEMORAL COMPRESS: NORMAL
BH CV LOWER VASCULAR RIGHT GASTRONEMIUS COMPRESS: NORMAL
BH CV LOWER VASCULAR RIGHT GREATER SAPH AK COMPRESS: NORMAL
BH CV LOWER VASCULAR RIGHT GREATER SAPH BK COMPRESS: NORMAL
BH CV LOWER VASCULAR RIGHT LESSER SAPH COMPRESS: NORMAL
BH CV LOWER VASCULAR RIGHT MID FEMORAL AUGMENT: NORMAL
BH CV LOWER VASCULAR RIGHT MID FEMORAL COMPETENT: NORMAL
BH CV LOWER VASCULAR RIGHT MID FEMORAL COMPRESS: NORMAL
BH CV LOWER VASCULAR RIGHT MID FEMORAL PHASIC: NORMAL
BH CV LOWER VASCULAR RIGHT MID FEMORAL SPONT: NORMAL
BH CV LOWER VASCULAR RIGHT PERONEAL COMPRESS: NORMAL
BH CV LOWER VASCULAR RIGHT POPLITEAL AUGMENT: NORMAL
BH CV LOWER VASCULAR RIGHT POPLITEAL COMPETENT: NORMAL
BH CV LOWER VASCULAR RIGHT POPLITEAL COMPRESS: NORMAL
BH CV LOWER VASCULAR RIGHT POPLITEAL PHASIC: NORMAL
BH CV LOWER VASCULAR RIGHT POPLITEAL SPONT: NORMAL
BH CV LOWER VASCULAR RIGHT POSTERIOR TIBIAL COMPRESS: NORMAL
BH CV LOWER VASCULAR RIGHT PROFUNDA FEMORAL COMPRESS: NORMAL
BH CV LOWER VASCULAR RIGHT PROXIMAL FEMORAL COMPRESS: NORMAL
BH CV LOWER VASCULAR RIGHT SAPHENOFEMORAL JUNCTION COMPRESS: NORMAL
BILIRUB SERPL-MCNC: 0.6 MG/DL (ref 0–1.2)
BUN SERPL-MCNC: 23 MG/DL (ref 8–23)
BUN/CREAT SERPL: 16.1 (ref 7–25)
CALCIUM SPEC-SCNC: 8.4 MG/DL (ref 8.6–10.5)
CHLORIDE SERPL-SCNC: 102 MMOL/L (ref 98–107)
CO2 SERPL-SCNC: 24 MMOL/L (ref 22–29)
CREAT SERPL-MCNC: 1.43 MG/DL (ref 0.57–1)
DEPRECATED RDW RBC AUTO: 50 FL (ref 37–54)
EGFRCR SERPLBLD CKD-EPI 2021: 36.9 ML/MIN/1.73
EOSINOPHIL # BLD AUTO: 0.03 10*3/MM3 (ref 0–0.4)
EOSINOPHIL NFR BLD AUTO: 0.4 % (ref 0.3–6.2)
ERYTHROCYTE [DISTWIDTH] IN BLOOD BY AUTOMATED COUNT: 15.1 % (ref 12.3–15.4)
GLOBULIN UR ELPH-MCNC: 2.9 GM/DL
GLUCOSE SERPL-MCNC: 99 MG/DL (ref 65–99)
HCT VFR BLD AUTO: 34.7 % (ref 34–46.6)
HGB BLD-MCNC: 11.4 G/DL (ref 12–15.9)
IMM GRANULOCYTES # BLD AUTO: 0.03 10*3/MM3 (ref 0–0.05)
IMM GRANULOCYTES NFR BLD AUTO: 0.4 % (ref 0–0.5)
INR PPP: 1.03 (ref 0.9–1.1)
LYMPHOCYTES # BLD AUTO: 1.17 10*3/MM3 (ref 0.7–3.1)
LYMPHOCYTES NFR BLD AUTO: 16 % (ref 19.6–45.3)
MAXIMAL PREDICTED HEART RATE: 139 BPM
MCH RBC QN AUTO: 30 PG (ref 26.6–33)
MCHC RBC AUTO-ENTMCNC: 32.9 G/DL (ref 31.5–35.7)
MCV RBC AUTO: 91.3 FL (ref 79–97)
MONOCYTES # BLD AUTO: 0.61 10*3/MM3 (ref 0.1–0.9)
MONOCYTES NFR BLD AUTO: 8.3 % (ref 5–12)
NEUTROPHILS NFR BLD AUTO: 5.43 10*3/MM3 (ref 1.7–7)
NEUTROPHILS NFR BLD AUTO: 74.2 % (ref 42.7–76)
NRBC BLD AUTO-RTO: 0 /100 WBC (ref 0–0.2)
PLATELET # BLD AUTO: 206 10*3/MM3 (ref 140–450)
PMV BLD AUTO: 10 FL (ref 6–12)
POTASSIUM SERPL-SCNC: 4.5 MMOL/L (ref 3.5–5.2)
PROT SERPL-MCNC: 6.8 G/DL (ref 6–8.5)
PROTHROMBIN TIME: 13.4 SECONDS (ref 11.7–14.2)
RBC # BLD AUTO: 3.8 10*6/MM3 (ref 3.77–5.28)
SODIUM SERPL-SCNC: 136 MMOL/L (ref 136–145)
STRESS TARGET HR: 118 BPM
WBC NRBC COR # BLD: 7.32 10*3/MM3 (ref 3.4–10.8)

## 2022-04-12 PROCEDURE — 36415 COLL VENOUS BLD VENIPUNCTURE: CPT

## 2022-04-12 PROCEDURE — 80053 COMPREHEN METABOLIC PANEL: CPT | Performed by: NURSE PRACTITIONER

## 2022-04-12 PROCEDURE — 93970 EXTREMITY STUDY: CPT

## 2022-04-12 PROCEDURE — 85610 PROTHROMBIN TIME: CPT | Performed by: NURSE PRACTITIONER

## 2022-04-12 PROCEDURE — 85025 COMPLETE CBC W/AUTO DIFF WBC: CPT | Performed by: NURSE PRACTITIONER

## 2022-04-12 PROCEDURE — 85730 THROMBOPLASTIN TIME PARTIAL: CPT | Performed by: NURSE PRACTITIONER

## 2022-04-12 PROCEDURE — 99282 EMERGENCY DEPT VISIT SF MDM: CPT

## 2022-04-12 NOTE — DISCHARGE INSTRUCTIONS
Start eliquis as directed  Follow up with your family doctor for refills and ongoing treatment as you will need to be on this medication indefinitely.   Return if worse or new concerns   Continue care with your primary care physician and have your blood pressure regularly checked and managed. Normal blood pressure is 120/80.

## 2022-04-12 NOTE — PROGRESS NOTES
[facility] Clinical Pharmacy Services: Pharmacy Education - Direct Oral Anticoagulant - Apixaban     Luann Frances has been ordered Apixaban for DVT.     Counseling points included the following:  Apixaban's indication, patient's need for the medication, and dosing/frequency of this medication.  Enforced the importance of taking their medication as instructed every day and the reason why the medication is dosed that way.  Explained possible side effects of anticoagulation therapy, including increased risk of bleeding, and s/sx of bleeding. Also talked about ways to control bleeding for minor cuts and scrapes.  Emphasized the importance of going to the emergency room if any of the following occur: Falling and hitting your head; noticing bright red blood in urine or dark/tarry stools; vomiting up blood or vomit has a coffee-ground like texture; coughing up blood.  Discussed the importance of informing any physician or dentist that they have been started on a DOAC, in case they need to be taken off for a procedure.  Discussed all important drug interactions, including over-the-counter medications and supplements.  Instructed the patient not to begin or discontinue any medications without informing his/her physician/pharmacist.     I also explained to the patient that this medication may have a high copay associated with it and to let the provider know if it is unaffordable. Test claim showed monthly co-pay of $47. Patient was provided first month free with free trial card. I personally delivered medication to patient.      Patient expressed understanding and had no further questions.      Campbell Trotter, [facility]  Clinical Pharmacist

## 2022-04-12 NOTE — ED PROVIDER NOTES
EMERGENCY DEPARTMENT ENCOUNTER    Room Number:  B01/01  Date of encounter:  4/14/2022  PCP: Angie Bruno APRN  Historian: patient   Full history not obtainable due to: none     HPI:  Chief Complaint: Left leg swelling     Context: Luann Frances is a 82 y.o. female who presents to the ED c/o left leg swelling onset 4 days prior. Reports constant swelling localized to the left calf. Non radiating. Nothing seems to improve it. No fall or injury. No recent prolonged immobilization. No recent surgery. Hx of DVT in the right leg for which she took Xarelto about 1 year ago but her PCP took her off of the medication. she had an US just pta and reportedly was told she had a dvt and was referred to the ED.      Medical record review:    4/12/2022 duplex venous BLE  Clinical Indication    DVT   Dx: Acute deep vein thrombosis (DVT) of femoral vein of right lower extremity ([facility]) [I82.411 (ICD-10-CM)]           Interpretation Summary    · Acute left lower extremity deep vein thrombosis noted in the popliteal, posterial tibial, peroneal and soleal.  · All other veins appeared normal bilaterally.       PAST MEDICAL HISTORY    Active Ambulatory Problems     Diagnosis Date Noted   • Atopic rhinitis 01/21/2016   • Chronic obstructive pulmonary disease (HCC) 01/21/2016   • Diverticulitis of colon 01/21/2016   • Acid reflux 01/21/2016   • Fatigue 01/21/2016   • Primary hypothyroidism 01/21/2016   • Insomnia 01/21/2016   • Knee pain 01/21/2016   • Pain of lower extremity 01/21/2016   • Shoulder pain 01/21/2016   • Ventricular premature beats 01/21/2016   • Weight gain 12/07/2016   • Cold intolerance 12/07/2016   • Post herpetic neuralgia 03/02/2017   • Insulin resistance 04/10/2017   • Vitamin D deficiency 02/19/2018   • CKD (chronic kidney disease) stage 3, GFR 30-59 ml/min (CMS/[facility]) 02/19/2018   • Mixed hyperlipidemia 02/19/2018   • Hypersomnia 10/16/2019   • Hyperuricemia 01/20/2020   • Essential hypertension 01/20/2020    • DELROY (obstructive sleep apnea) 12/08/2020   • Renal calculus 01/06/2021   • Multiple closed fractures of ribs of right side 02/12/2021   • Non-cardiac chest pain 02/14/2021   • Acute deep vein thrombosis (DVT) of femoral vein of right lower extremity (HCC) 04/15/2021   • Compression fracture of body of thoracic vertebra ([facility]) 08/17/2021   • Arm skin lesion, right 11/17/2021     Resolved Ambulatory Problems     Diagnosis Date Noted   • Acute sinusitis 01/21/2016   • Asthma 01/21/2016   • Candidiasis 01/21/2016   • Cough 01/21/2016   • Mild dehydration 01/21/2016   • Nausea 01/21/2016   • Chronic obstructive pulmonary disease with acute exacerbation ([facility]) 01/21/2016   • Elevated BP 10/25/2016   • Dyslipidemia 12/07/2016     Past Medical History:   Diagnosis Date   • Arthritis    • Asymptomatic PVCs    • Chronic bronchitis ([facility])    • Fracture of humerus    • GERD (gastroesophageal reflux disease)    • Hyperlipidemia    • Hypertension    • Hypothyroidism    • Pneumonia    • Pulmonary emphysema ([facility])    • Renal calculi    • Sleep apnea          PAST SURGICAL HISTORY  Past Surgical History:   Procedure Laterality Date   • APPENDECTOMY     • EXTRACORPOREAL SHOCK WAVE LITHOTRIPSY (ESWL) Left 1/6/2021    Procedure: EXTRACORPOREAL SHOCKWAVE LITHOTRIPSY, CYSTOSCOPY, RETROGRADE PYLEOGRAM;  Surgeon: Walter Schwartz MD;  Location: [facility];  Service: Urology;  Laterality: Left;   • EYE SURGERY      cataracts   • HUMERUS FRACTURE SURGERY     • RIB FRACTURE SURGERY  2021   • THORACOSCOPY Right 2/16/2021    Procedure: bronchoscopy, right video assisted THORACOSCOPY WITH PLATING OF 3, 4, 5, AND 6 RIBS;  Surgeon: Kelley Delong MD;  Location: [facility];  Service: Thoracic;  Laterality: Right;   • TOTAL KNEE ARTHROPLASTY Left 04/2015   • UMBILICAL HERNIA REPAIR           FAMILY HISTORY  Family History   Problem Relation Age of Onset   • Cancer Mother         breast   • Breast cancer Mother    • No Known Problems  Father    • Heart disease Maternal Grandmother    • Cancer Brother         esophageal , stomach    • Esophageal cancer Brother    • No Known Problems Son    • Breast cancer Maternal Aunt    • Heart disease Maternal Aunt    • No Known Problems Son    • Malig Hyperthermia Neg Hx          SOCIAL HISTORY  Social History     Socioeconomic History   • Marital status:    Tobacco Use   • Smoking status: Current Every Day Smoker     Packs/day: 0.25     Years: 50.00     Pack years: 12.50     Types: Cigarettes     Start date: 1970   • Smokeless tobacco: Never Used   Vaping Use   • Vaping Use: Never used   Substance and Sexual Activity   • Alcohol use: Not Currently   • Drug use: No         ALLERGIES  Ambien [zolpidem tartrate], Amoxicillin-pot clavulanate, Doxycycline, Levofloxacin, Metronidazole, Naproxen, Sulfamethoxazole-trimethoprim, Synthroid [levothyroxine sodium], and Zolpidem        REVIEW OF SYSTEMS  Review of Systems   All systems reviewed and marked as negative except as listed in HPI       PHYSICAL EXAM    I have reviewed the triage vital signs and nursing notes.    ED Triage Vitals [04/12/22 1507]   Temp Heart Rate Resp BP SpO2   96.9 °F (36.1 °C) 89 17 -- 98 %      Temp src Heart Rate Source Patient Position BP Location FiO2 (%)   Tympanic -- -- -- --       GENERAL: alert well developed, well nourished in no distress  HENT: NCAT, neck supple, trachea midline  EYES: no scleral icterus, PERRL, normal conjunctivae  CV: regular rhythm, regular rate, no murmur  RESPIRATORY: unlabored effort, CTAB  ABDOMEN: soft, nontender, nondistended, bowel sounds present  MUSCULOSKELETAL: no gross deformity. LLE: Swelling of the left calf, no erythema or warmth. No focal tenderness. No rash.   NEURO: alert,  sensory and motor function of extremities grossly intact, speech clear, mental status normal/baseline  SKIN: warm, dry, no rash  PSYCH:  Appropriate mood and affect    Vital signs and nursing notes reviewed.          LAB  RESULTS  Labs Reviewed   COMPREHENSIVE METABOLIC PANEL - Abnormal; Notable for the following components:       Result Value    Creatinine 1.43 (*)     Calcium 8.4 (*)     eGFR 36.9 (*)     All other components within normal limits    Narrative:     GFR Normal >60  Chronic Kidney Disease <60  Kidney Failure <15     CBC WITH AUTO DIFFERENTIAL - Abnormal; Notable for the following components:    Hemoglobin 11.4 (*)     Lymphocyte % 16.0 (*)     All other components within normal limits   PROTIME-INR - Normal   APTT - Normal   CBC AND DIFFERENTIAL    Narrative:     The following orders were created for panel order CBC & Differential.  Procedure                               Abnormality         Status                     ---------                               -----------         ------                     CBC Auto Differential[[id number]]        Abnormal            Final result                 Please view results for these tests on the individual orders.       Ordered the above labs and independently reviewed the results.        PROCEDURES    Procedures        MEDICATIONS GIVEN IN ER    Medications - No data to display      PROGRESS, DATA ANALYSIS, CONSULTS, AND MEDICAL DECISION MAKING    All labs have been independently reviewed by me.  All radiology studies have been reviewed by me.   EKG's independently reviewed by me.  Discussion below represents my analysis of pertinent findings related to patient's condition, differential diagnosis, treatment plan and final disposition.    DIFFERENTIAL DIAGNOSIS INCLUDE BUT NOT LIMITED TO: Abscess, phlegmon, cellulitis, erysipelas, sepsis, lyme disease, ehrlichiosis, insect bite, contact dermatitis, vasculitis, venous stasis, DVT, Superficial venous thrombosis, allergic reaction      ED Course    Tue Apr 12, 2022   1638 WBC: 7.32 [JS]   1638 Hemoglobin(!): 11.4 [JS]   1658 Creatinine(!): 1.43 [JS]      ED Course User Index  [JS] Kayla Schwartz, APRN     MDM: Pt presents with  cc of left leg swelling and positive doppler US. She presents to ED for further evaluation by her pcp recommendation. The thrombus is not proximal. There is no soa or cp. She is not hypoxic. I do not suspect concurrent PE. Discussed her case with pharmacist, Smita Navarro who recommends Eliquis as it would fare better with her CKD. The pt was given a starter pack of Eliquis and education provided by pharmacist in regards to the medication. She was advised the medication will need to be extended further than what was provided to her today and her pcp will need to continue it.     BP - (!) 188/89  HR - 80  TEMP - 96.9 °F (36.1 °C) (Tympanic)  O2 SATS - 98%         Medication List      New Prescriptions    Eliquis DVT/PE Starter Pack tablet therapy pack  Generic drug: Apixaban Starter Pack  Take two 5 mg tablets by mouth every 12 hours for 7 days. Followed by one 5 mg tablet every 12 hours.           Where to Get Your Medications      These medications were sent to [facility] [place] [id number]    Hours: 7:00 AM-6:00 PM Mon-Fri, 8:00 AM-4:30 PM Sat-Sun (Closed 12-12:30PM) Phone: [phone]   · Eliquis DVT/PE Starter Pack tablet therapy pack           DIAGNOSIS  Final diagnoses:   Acute deep vein thrombosis (DVT) of left peroneal vein (HCC)   Left leg swelling         DISPOSITION  Discharge     Pt masked in first look. I wore appropriate PPE throughout my encounters with the pt. I performed hand hygiene on entry into the pt room and upon exit.     Dictated utilizing Dragon dictation:  Much of this encounter note is an electronic transcription/translation of spoken language to printed text.      Kayla Schwartz, LIVIER  04/14/22 0714       Kayla Schwartz, LIVIER  04/14/22 0715

## 2022-04-12 NOTE — PROGRESS NOTES
Angie MAIER and I spoke regarding the preliminary positive findings of today's venous duplex. Angie spoke to the patient via telephone.

## 2022-04-12 NOTE — ED TRIAGE NOTES
Had outpatient doppler today and sent here for LLE DVT, denies SOA, no other SX.     Patient was placed in face mask during first look triage.  Patient was wearing a face mask throughout encounter.  I wore personal protective equipment throughout the encounter.  Hand hygiene was performed before and after patient encounter.

## 2022-04-13 ENCOUNTER — TELEPHONE (OUTPATIENT)
Dept: FAMILY MEDICINE CLINIC | Facility: CLINIC | Age: 82
End: 2022-04-13

## 2022-04-13 NOTE — TELEPHONE ENCOUNTER
Patient son called back and said, that his mom was sent home, he stated they took her off the old blood thinners and put her on the new one which he said Eliquis, I told him to call us and let us know.

## 2022-04-13 NOTE — TELEPHONE ENCOUNTER
----- Message from LIVIER Mathis sent at 4/13/2022  7:29 AM EDT -----  I sent this patient to the ER yesterday due to new acute DVT of the left lower extremity.  She had been on Xarelto for blood thinner for the right leg, but she told me she was not taking it every day.  It looks like in the ED they may have started her on Eliquis.  Can you make sure that she is not taking the Xarelto and is only taking the Eliquis now?    Thanks!

## 2022-04-13 NOTE — TELEPHONE ENCOUNTER
Called patient couldn't leave a message on her phone, I called her son left a message for him to call the office.

## 2022-04-14 NOTE — ED PROVIDER NOTES
MD ATTESTATION NOTE    The KYM and I have discussed this patient's history, physical exam, and treatment plan.  I have reviewed the documentation and personally had a face to face interaction with the patient. I affirm the documentation and agree with the treatment and plan.  The attached note describes my personal findings.      I provided a substantive portion of the care of the patient.  I personally performed the physical exam in its entirety, and below are my findings.  For this patient encounter, the patient wore surgical mask, I wore full protective PPE including N95 and eye protection.      Brief HPI: Elderly female patient who presents via the cardiology department after a venous duplex that showed a DVT in her left calf    PHYSICAL EXAM  ED Triage Vitals   Temp Heart Rate Resp BP SpO2   04/12/22 1507 04/12/22 1507 04/12/22 1507 04/12/22 1620 04/12/22 1507   96.9 °F (36.1 °C) 89 17 138/78 98 %      Temp src Heart Rate Source Patient Position BP Location FiO2 (%)   04/12/22 1507 04/12/22 1620 04/12/22 1620 04/12/22 1620 --   Tympanic Monitor Sitting Right arm          GENERAL: no acute distress  HENT: nares patent  EYES: no scleral icterus  CV: regular rhythm, normal rate  RESPIRATORY: normal effort  ABDOMEN: soft  MUSCULOSKELETAL: no deformity.  There is some tenderness to palpation and slight swelling of the left lower extremity to about the knee.  It is neurovascular intact however  NEURO: alert, moves all extremities, follows commands  PSYCH:  calm, cooperative  SKIN: warm, dry    Vital signs and nursing notes reviewed.        Plan: Patient has an acute calf vein thrombosis with no previous history.  We have checked her labs and consulted with pharmacy for anticoagulation dosing.  She has no chest pain or shortness of breath and her vital signs are stable with no hypoxia.  I do not believe the patient has any embolic VTE at this time.     Timmy Peace MD  04/13/22 4093

## 2022-04-15 ENCOUNTER — OFFICE VISIT (OUTPATIENT)
Dept: FAMILY MEDICINE CLINIC | Facility: CLINIC | Age: 82
End: 2022-04-15

## 2022-04-15 VITALS
DIASTOLIC BLOOD PRESSURE: 80 MMHG | BODY MASS INDEX: 35.88 KG/M2 | WEIGHT: 219 LBS | SYSTOLIC BLOOD PRESSURE: 130 MMHG | HEART RATE: 100 BPM | TEMPERATURE: 96.8 F | RESPIRATION RATE: 16 BRPM | OXYGEN SATURATION: 100 %

## 2022-04-15 DIAGNOSIS — N18.32 STAGE 3B CHRONIC KIDNEY DISEASE: ICD-10-CM

## 2022-04-15 DIAGNOSIS — I82.4Z2 DVT, LOWER EXTREMITY, DISTAL, ACUTE, LEFT: Primary | ICD-10-CM

## 2022-04-15 DIAGNOSIS — R11.0 NAUSEA: ICD-10-CM

## 2022-04-15 DIAGNOSIS — R60.0 LOWER EXTREMITY EDEMA: ICD-10-CM

## 2022-04-15 PROBLEM — I82.411 ACUTE DEEP VEIN THROMBOSIS (DVT) OF FEMORAL VEIN OF RIGHT LOWER EXTREMITY: Status: RESOLVED | Noted: 2021-04-15 | Resolved: 2022-04-15

## 2022-04-15 PROCEDURE — 99214 OFFICE O/P EST MOD 30 MIN: CPT

## 2022-04-15 RX ORDER — ONDANSETRON 4 MG/1
4 TABLET, FILM COATED ORAL EVERY 8 HOURS PRN
Qty: 20 TABLET | Refills: 0 | Status: SHIPPED | OUTPATIENT
Start: 2022-04-15 | End: 2022-05-02

## 2022-04-15 NOTE — PROGRESS NOTES
Chief Complaint  Deep Vein Thrombosis (Follow up, Patient said since she been taking Eliquis she been having nausea. )    Subjective          Luann Frances presents to [facility]  History of Present Illness     Patient presents the office to follow-up on new onset DVT of the left lower extremity.  Patient was last seen by myself on 1/17/2022 for follow-up and at that time we discussed her Xarelto due to her right lower extremity DVT.  On that day I ordered repeat Dopplers of bilateral lower extremities to make sure that the right lower extremity DVT had resolved before taking her off of the Xarelto but she was instructed to stay on the Xarelto until we knew for sure it had resolved.  Patient presented to have her lower extremities dopplered on 4/12/2022, 3 days ago.  At that visit it was noted that the right lower extremity DVT had resolved but the left leg had acute DVT in the popliteal, posterior tib, peroneal and soleal veins.  Patient had had new onset left lower extremity swelling and tenderness with palpation for the past 3 days prior to the Dopplers.  I was called in regards to these new findings and patient was instructed to go to the ER because she told me during that phone call that she had only been taking her Xarelto maybe every other day and was concerned about these new DVTs to make sure she would not be a candidate for CT scan.  Patient did present to [facility] ER.  At that time vitals are stable and she was not having any shortness of breath or chest pain.  They stopped the Xarelto and started her on an Eliquis starter pack since she had not been taking the Xarelto regularly.  She was not a candidate for CT scan since she was asymptomatic for signs of a PE.  Since patient was stable they determined that she could be discharged on a new starter pack for Eliquis and there is no need for admission.  Patient was discharged from the ED to follow-up with PCP.  Today patient  says she still is not having any shortness of breath or chest pain.  Patient at baseline has some bilateral lower extremity edema but the left leg has been slightly more swollen than the right leg recently and tender to palpation.  Patient was able to start the Eliquis and it has made her little bit nauseous.  Otherwise no side effects.  The swelling leg has not gotten any worse and she has been able to take the Eliquis 10 mg twice a day as instructed.  Patient knows after the 7-day bernadette that she will go down to 5 mg twice a day.  I have placed an order with hematology and she already has an appointment with them on the 27th of this month since she has had multiple DVTs over the past year.    CKD-patient was also supposed to follow-up with her nephrologist 2 days ago due to CKD.  Labs were obtained in the ER 3 days ago and her creatinine is stable at 1.4.  She was noted to also have high blood pressure with a systolic at 188.  However today her blood pressure is well controlled and she stated that her blood pressure fluctuates.  Patient denies any chest pain, headache, dizziness or shortness of breath.  Patient is on Lasix as needed per nephrology and she says she is going to start taking it again since she had more lower extremity swelling.  Patient is following up with her nephrologist in the next week or 2 she believes.        Objective   Vital Signs:   /80   Pulse 100   Temp 96.8 °F (36 °C)   Resp 16   Wt 99.3 kg (219 lb)   SpO2 100%   BMI 35.88 kg/m²            Physical Exam  Vitals reviewed.   Constitutional:       General: She is not in acute distress.  HENT:      Head: Normocephalic.   Cardiovascular:      Rate and Rhythm: Normal rate and regular rhythm.      Pulses: Normal pulses.      Heart sounds: Normal heart sounds.   Pulmonary:      Effort: Pulmonary effort is normal.      Breath sounds: Normal breath sounds.   Musculoskeletal:         General: Normal range of motion.      Right lower leg:  Swelling present. 1+ Edema present.      Left lower leg: Swelling and tenderness present. 2+ Edema present.   Skin:     General: Skin is dry.   Neurological:      General: No focal deficit present.      Mental Status: She is alert.   Psychiatric:         Mood and Affect: Mood normal.        Result Review :   The following data was reviewed by: LIVIER Mathis on 04/15/2022:  CMP    CMP 9/27/21 3/14/22 4/12/22   Glucose 87 112 (A) 99   BUN 28 (A) 27 23   Creatinine 1.15 (A) 1.37 (A) 1.43 (A)   eGFR Non  Am 45 (A)     eGFR African Am 55 (A)     Sodium 145 143 136   Potassium 4.8 4.9 4.5   Chloride 106 104 102   Calcium 9.8 9.6 8.4 (A)   Total Protein 6.8 7.0    Albumin 4.40 4.3 3.90   Globulin 2.4 2.7    Total Bilirubin 0.3 0.4 0.6   Alkaline Phosphatase 68 67 70   AST (SGOT) 15 15 13   ALT (SGPT) 10 10 9   (A) Abnormal value       Comments are available for some flowsheets but are not being displayed.           INR    Common Labsle 4/12/22   INR 1.03                     Assessment and Plan    Diagnoses and all orders for this visit:    1. DVT, lower extremity, distal, acute, left (HCC) (Primary)  Assessment & Plan:  Patient is tolerating the Eliquis with just some mild nausea.  Zofran's been added on to help her tolerate this better.  Continue with the starter pack for Eliquis 10 mg twice a day for 7 days and then decrease to 5 mg twice a day.  Patient has a follow-up with hematology to figure out what could be the cause of these DVTs on April 27, 2022.  Counseled patient if the swelling does not go down or gets worse, or if patient has shortness of breath or chest pain to immediately proceed to the ER.  Currently patient is stable and is asymptomatic aside from some left lower extremity swelling and tenderness.  Counseled with any of these red flags to immediately seek care and to be compliant with Eliquis.      2. Nausea  Assessment & Plan:  Patient has had issues with multiple medications causing  nausea.  The Eliquis is causing her nausea but she has been taking it regularly morning and evening as instructed.  Adding on Zofran to help with nausea.  If this did not help with symptoms counseled patient she would need to see gastroenterology for further management.    Orders:  -     ondansetron (Zofran) 4 MG tablet; Take 1 tablet by mouth Every 8 (Eight) Hours As Needed for Nausea or Vomiting.  Dispense: 20 tablet; Refill: 0    3. Lower extremity edema  Assessment & Plan:  Patient has Lasix per nephrology that she can take as needed for lower extremity edema.  Patient said she may try to take a dose of it since her legs have been swelling a little bit more recently.  With her history of CKD counseled her not to take more than the 20 mg for lower extremity edema and to follow-up with nephrology.      4. Stage 3b chronic kidney disease (HCC)  Assessment & Plan:  Creatinine from 3 days ago from the ER 1.43 which is stable.  Patient to follow-up in the next couple weeks with her nephrologist Dr. Arce.      For patient's new onset DVT of the left lower extremity she is to follow-up with hematology for further management and April 27, 2022 and then follow-up with myself in 1 month for her Medicare wellness visit.      Follow Up   Return if symptoms worsen or fail to improve, for already has appt with me in MAY.  Patient was given instructions and counseling regarding her condition or for health maintenance advice. Please see specific information pulled into the AVS if appropriate.     Medical assistant and I wore mask and eyewear protection during entire encounter.  Patient wore mask.      Electronically signed by LIVIER Mathis, 04/15/22, 4:03 PM EDT.

## 2022-04-15 NOTE — ASSESSMENT & PLAN NOTE
Patient has Lasix per nephrology that she can take as needed for lower extremity edema.  Patient said she may try to take a dose of it since her legs have been swelling a little bit more recently.  With her history of CKD counseled her not to take more than the 20 mg for lower extremity edema and to follow-up with nephrology.

## 2022-04-15 NOTE — ASSESSMENT & PLAN NOTE
Patient is tolerating the Eliquis with just some mild nausea.  Zofran's been added on to help her tolerate this better.  Continue with the starter pack for Eliquis 10 mg twice a day for 7 days and then decrease to 5 mg twice a day.  Patient has a follow-up with hematology to figure out what could be the cause of these DVTs on April 27, 2022.  Counseled patient if the swelling does not go down or gets worse, or if patient has shortness of breath or chest pain to immediately proceed to the ER.  Currently patient is stable and is asymptomatic aside from some left lower extremity swelling and tenderness.  Counseled with any of these red flags to immediately seek care and to be compliant with Eliquis.

## 2022-04-27 ENCOUNTER — APPOINTMENT (OUTPATIENT)
Dept: LAB | Facility: HOSPITAL | Age: 82
End: 2022-04-27

## 2022-04-27 ENCOUNTER — CONSULT (OUTPATIENT)
Dept: ONCOLOGY | Facility: CLINIC | Age: 82
End: 2022-04-27

## 2022-04-27 ENCOUNTER — LAB (OUTPATIENT)
Dept: OTHER | Facility: HOSPITAL | Age: 82
End: 2022-04-27

## 2022-04-27 VITALS
HEIGHT: 63 IN | BODY MASS INDEX: 38.88 KG/M2 | DIASTOLIC BLOOD PRESSURE: 88 MMHG | SYSTOLIC BLOOD PRESSURE: 142 MMHG | HEART RATE: 94 BPM | RESPIRATION RATE: 18 BRPM | TEMPERATURE: 96.9 F | WEIGHT: 219.4 LBS | OXYGEN SATURATION: 98 %

## 2022-04-27 DIAGNOSIS — R11.0 NAUSEA: ICD-10-CM

## 2022-04-27 DIAGNOSIS — I82.402 ACUTE DEEP VEIN THROMBOSIS (DVT) OF LEFT LOWER EXTREMITY, UNSPECIFIED VEIN: Primary | ICD-10-CM

## 2022-04-27 DIAGNOSIS — E53.8 LOW VITAMIN B12 LEVEL: ICD-10-CM

## 2022-04-27 DIAGNOSIS — I82.4Z2 DVT, LOWER EXTREMITY, DISTAL, ACUTE, LEFT: Primary | ICD-10-CM

## 2022-04-27 DIAGNOSIS — I82.411 ACUTE DEEP VEIN THROMBOSIS (DVT) OF FEMORAL VEIN OF RIGHT LOWER EXTREMITY: ICD-10-CM

## 2022-04-27 LAB
BASOPHILS # BLD AUTO: 0.06 10*3/MM3 (ref 0–0.2)
BASOPHILS NFR BLD AUTO: 0.8 % (ref 0–1.5)
DEPRECATED RDW RBC AUTO: 53.6 FL (ref 37–54)
EOSINOPHIL # BLD AUTO: 0.13 10*3/MM3 (ref 0–0.4)
EOSINOPHIL NFR BLD AUTO: 1.7 % (ref 0.3–6.2)
ERYTHROCYTE [DISTWIDTH] IN BLOOD BY AUTOMATED COUNT: 15.9 % (ref 12.3–15.4)
HCT VFR BLD AUTO: 38.1 % (ref 34–46.6)
HGB BLD-MCNC: 12.1 G/DL (ref 12–15.9)
IMM GRANULOCYTES # BLD AUTO: 0.04 10*3/MM3 (ref 0–0.05)
IMM GRANULOCYTES NFR BLD AUTO: 0.5 % (ref 0–0.5)
LYMPHOCYTES # BLD AUTO: 1.97 10*3/MM3 (ref 0.7–3.1)
LYMPHOCYTES NFR BLD AUTO: 25.4 % (ref 19.6–45.3)
MCH RBC QN AUTO: 29.4 PG (ref 26.6–33)
MCHC RBC AUTO-ENTMCNC: 31.8 G/DL (ref 31.5–35.7)
MCV RBC AUTO: 92.5 FL (ref 79–97)
MONOCYTES # BLD AUTO: 0.58 10*3/MM3 (ref 0.1–0.9)
MONOCYTES NFR BLD AUTO: 7.5 % (ref 5–12)
NEUTROPHILS NFR BLD AUTO: 4.98 10*3/MM3 (ref 1.7–7)
NEUTROPHILS NFR BLD AUTO: 64.1 % (ref 42.7–76)
NRBC BLD AUTO-RTO: 0 /100 WBC (ref 0–0.2)
PLATELET # BLD AUTO: 332 10*3/MM3 (ref 140–450)
PMV BLD AUTO: 11.3 FL (ref 6–12)
RBC # BLD AUTO: 4.12 10*6/MM3 (ref 3.77–5.28)
WBC NRBC COR # BLD: 7.76 10*3/MM3 (ref 3.4–10.8)

## 2022-04-27 PROCEDURE — 85025 COMPLETE CBC W/AUTO DIFF WBC: CPT | Performed by: INTERNAL MEDICINE

## 2022-04-27 PROCEDURE — 36415 COLL VENOUS BLD VENIPUNCTURE: CPT

## 2022-04-27 PROCEDURE — 99205 OFFICE O/P NEW HI 60 MIN: CPT | Performed by: INTERNAL MEDICINE

## 2022-04-27 RX ORDER — PROCHLORPERAZINE MALEATE 10 MG
10 TABLET ORAL EVERY 6 HOURS PRN
Qty: 100 TABLET | Refills: 5 | Status: SHIPPED | OUTPATIENT
Start: 2022-04-27 | End: 2022-09-12

## 2022-04-27 NOTE — PROGRESS NOTES
Subjective     REASON FOR CONSULTATION:  Provide an opinion on any further workup or treatment on:    Recurrent lower extremity deep vein thrombosis                       REQUESTING PHYSICIAN: Angie Bruno AP*    RECORDS OBTAINED: Records of the patients history including those obtained from the referring provider were reviewed and summarized in detail.    HISTORY OF PRESENT ILLNESS:    Luann Frances is a 82 y.o. patient who was referred for evaluation of lower extremity deep vein thrombosis.  The patient is a smoker currently smoking 0.25 pack a day.  She had surgery for placement of rib plates on 2/16/2021.  She was discharged to subacute rehab.  Few weeks after surgery, she started experiencing pain in the right thigh and popliteal area.  Venous Doppler on 4/1/2021 revealed acute DVT in the proximal femoral and mid femoral veins.  She was placed on Xarelto.  She was taking Xarelto regularly.  Her primary care recommended a follow-up venous Doppler but she cannot have it done.  In late March 2022, she cut down on Xarelto and started taking it every other day.  When she saw her nurse abdier, she reported swelling of the left leg that started in early April.  Venous Doppler was obtained for both lower extremities.  It showed resolution of the right lower extremity.  However, there was acute DVT in the left popliteal, peroneal posterior tibial and soleal veins.  She did not have chest pain or shortness of breath.  She was started on Eliquis 10 mg twice daily.  After 7 days, she reduced the dose to 5 mg twice a day.    Patient reports having nausea after taking Eliquis.  She was prescribed Zofran but she did not feel that it was helping her.  She felt that Zofran starts to work about 6 hours after taking it.    Patient is a chronic smoker.  She currently smokes quarter of a pack a day.    REVIEW OF SYSTEMS:  Review of Systems   Constitutional: Positive for fatigue. Negative for fever and unexpected  weight change.   HENT: Negative for nosebleeds and voice change.    Eyes: Negative for visual disturbance.   Respiratory: Negative for cough and shortness of breath.    Cardiovascular: Positive for leg swelling. Negative for chest pain.   Gastrointestinal: Positive for nausea. Negative for abdominal pain, blood in stool, constipation, diarrhea and vomiting.        Indigestion   Genitourinary: Negative for frequency and hematuria.   Musculoskeletal: Positive for back pain. Negative for joint swelling.   Skin: Negative for rash.   Neurological: Negative for dizziness and headaches.   Hematological: Negative for adenopathy. Bruises/bleeds easily.   Psychiatric/Behavioral: Negative for dysphoric mood. The patient is not nervous/anxious.      Past Medical History:   Diagnosis Date   • Acute deep vein thrombosis (DVT) of femoral vein of right lower extremity (HCC) 04/15/2021   • Arthritis    • Asymptomatic PVCs    • Backache    • Chronic bronchitis (HCC)    • CKD (chronic kidney disease)     Stage 3   • Deep vein thrombosis (DVT) of right lower extremity (HCC)    • Essential hypertension 01/20/2020   • Fracture of humerus    • GERD (gastroesophageal reflux disease)    • Hyperlipidemia    • Hypertension    • Hypothyroidism    • Pneumonia    • Pulmonary emphysema (HCC)    • Renal calculi    • Renal calculus 01/06/2021   • Sleep apnea     CPAP USED   • Thoracic vertebral fracture (HCC)      Past Surgical History:   Procedure Laterality Date   • APPENDECTOMY     • EXTRACORPOREAL SHOCK WAVE LITHOTRIPSY (ESWL) Left 1/6/2021    Procedure: EXTRACORPOREAL SHOCKWAVE LITHOTRIPSY, CYSTOSCOPY, RETROGRADE PYLEOGRAM;  Surgeon: Walter Schwartz MD;  [facility] [place] [facility];  Service: Urology;  Laterality: Left;   • EYE SURGERY      cataracts   • HUMERUS FRACTURE SURGERY     • RIB FRACTURE SURGERY  2021   • THORACOSCOPY Right 2/16/2021    Procedure: bronchoscopy, right video assisted THORACOSCOPY WITH PLATING OF 3, 4, 5, AND 6 RIBS;   Surgeon: Kelley Delong MD;  Location: [facility] MAIN OR;  Service: Thoracic;  Laterality: Right;   • TOTAL KNEE ARTHROPLASTY Left 04/2015   • UMBILICAL HERNIA REPAIR       Social History     Socioeconomic History   • Marital status:    Tobacco Use   • Smoking status: Current Every Day Smoker     Packs/day: 0.25     Years: 50.00     Pack years: 12.50     Types: Cigarettes     Start date: 1970   • Smokeless tobacco: Never Used   Vaping Use   • Vaping Use: Never used   Substance and Sexual Activity   • Alcohol use: Not Currently   • Drug use: No     Family History   Problem Relation Age of Onset   • Cancer Mother         breast   • Breast cancer Mother    • No Known Problems Father    • Heart disease Maternal Grandmother    • Cancer Brother         esophageal , stomach    • Esophageal cancer Brother    • No Known Problems Son    • Breast cancer Maternal Aunt    • Heart disease Maternal Aunt    • No Known Problems Son    • Malig Hyperthermia Neg Hx       MEDICATIONS:    Current Outpatient Medications:   •  Advair Diskus 100-50 MCG/DOSE DISKUS, Inhale 1 puff 2 (Two) Times a Day., Disp: 180 each, Rfl: 5  •  albuterol sulfate  (90 Base) MCG/ACT inhaler, Inhale 2 puffs Every 4 (Four) Hours As Needed for Wheezing or Shortness of Air., Disp: 8 g, Rfl: 1  •  Apixaban Starter Pack tablet therapy pack, Take two 5 mg tablets by mouth every 12 hours for 7 days. Followed by one 5 mg tablet every 12 hours., Disp: 74 tablet, Rfl: 0  •  [employer] ANTACID & ANTI-GAS 1000-60 MG chewable tablet, As Needed., Disp: , Rfl:   •  furosemide (Lasix) 20 MG tablet, Take 1 tablet by mouth Daily., Disp: 90 tablet, Rfl: 3  •  omeprazole OTC (PriLOSEC OTC) 20 MG EC tablet, Take 20 mg by mouth As Needed. Take 1-2 tablet, Disp: , Rfl:   •  ondansetron (Zofran) 4 MG tablet, Take 1 tablet by mouth Every 8 (Eight) Hours As Needed for Nausea or Vomiting., Disp: 20 tablet, Rfl: 0  •  traMADol (ULTRAM) 50 MG tablet, Take 50 mg by mouth As Needed.,  "Disp: , Rfl:   •  triamcinolone (KENALOG) 0.1 % ointment, Apply  topically to the appropriate area as directed 2 (Two) Times a Day., Disp: 30 g, Rfl: 0  •  vitamin D (ERGOCALCIFEROL) 1.25 MG (06801 UT) capsule capsule, Take 50,000 Units by mouth 1 (One) Time Per Week., Disp: , Rfl:   •  Levoxyl 100 MCG tablet, Take 1 tablet by mouth Daily., Disp: 30 tablet, Rfl: 5     ALLERGIES:  Allergies   Allergen Reactions   • Ambien [Zolpidem Tartrate] Nausea Only     nausea   • Amoxicillin-Pot Clavulanate Nausea Only   • Doxycycline Nausea Only   • Levofloxacin Nausea Only   • Metronidazole Nausea Only   • Naproxen GI Intolerance   • Sulfamethoxazole-Trimethoprim Nausea Only   • Synthroid [Levothyroxine Sodium] Nausea Only   • Zolpidem Nausea Only     nausea  Nausea-ambien      Objective   VITAL SIGNS:  Vitals:    04/27/22 1306   BP: 142/88   Pulse: 94   Resp: 18   Temp: 96.9 °F (36.1 °C)   TempSrc: Temporal   SpO2: 98%   Weight: 99.5 kg (219 lb 6.4 oz)   Height: 160 cm (62.99\")  Comment: new ht   PainSc: 0-No pain     Wt Readings from Last 3 Encounters:   04/27/22 99.5 kg (219 lb 6.4 oz)   04/15/22 99.3 kg (219 lb)   01/17/22 96.6 kg (213 lb)     PHYSICAL EXAMINATION  GENERAL:  The patient appears in good general condition, not in acute distress.  SKIN: No skin rashes. No ecchymosis.  HEAD:  Normocephalic.  EYES:  No Jaundice. No Pallor. Pupils equal. EOMI.  NECK:  Supple with Good ROM. No Masses.  LYMPHATICS:  No cervical or supraclavicular lymphadenopathy.  CHEST: Normal respiratory effort. Lungs clear to auscultation.   CARDIAC:  Normal S1 & S2. No murmur.  ABDOMEN:  Soft. No tenderness. No Hepatomegaly. No Splenomegaly. No masses.  EXTREMITIES: Left leg pitting edema+1 with erythema of the distal aspect of the leg.  There is left calf tenderness.  NEUROLOGICAL:  No Focal neurological deficits.     RESULT REVIEW:   Results from last 7 days   Lab Units 04/27/22  1253   WBC 10*3/mm3 7.76   NEUTROS ABS 10*3/mm3 4.98 "   HEMOGLOBIN g/dL 12.1   HEMATOCRIT % 38.1   PLATELETS 10*3/mm3 332     Component      Latest Ref Rng & Units 4/12/2022   Glucose      65 - 99 mg/dL 99   BUN      8 - 23 mg/dL 23   Creatinine      0.57 - 1.00 mg/dL 1.43 (H)   Sodium      136 - 145 mmol/L 136   Potassium      3.5 - 5.2 mmol/L 4.5   Chloride      98 - 107 mmol/L 102   CO2      22.0 - 29.0 mmol/L 24.0   Calcium      8.6 - 10.5 mg/dL 8.4 (L)   Total Protein      6.0 - 8.5 g/dL 6.8   Albumin      3.50 - 5.20 g/dL 3.90   ALT (SGPT)      1 - 33 U/L 9   AST (SGOT)      1 - 32 U/L 13   Alkaline Phosphatase      39 - 117 U/L 70   Total Bilirubin      0.0 - 1.2 mg/dL 0.6   Globulin      gm/dL 2.9   A/G Ratio      g/dL 1.3   BUN/Creatinine Ratio      7.0 - 25.0 16.1   Anion Gap      5.0 - 15.0 mmol/L 10.0   eGFR      >60.0 mL/min/1.73 36.9 (L)     Lab Results   Component Value Date    FERRITIN 113.00 09/24/2019    TIBC 357 09/24/2019     Lab Results   Component Value Date    FOLATE 18.30 09/24/2019     Lab Results   Component Value Date    XGNRPBWB19 266 09/24/2019     Venous Doppler bilateral lower extremities on 4/12/2022:  · Acute left lower extremity deep vein thrombosis noted in the popliteal, posterial tibial, peroneal and soleal.  · All other veins appeared normal bilaterally.    Venous Doppler right lower extremity on 4/15/2021:  · Acute right lower extremity deep vein thrombosis noted in the proximal femoral and mid femoral.  · All other right sided veins appeared normal.    Venous Doppler right lower extremity on 4/1/2021:  · Acute right lower extremity deep vein thrombosis noted in the proximal femoral and mid femoral.  · All other right sided veins appeared normal.      Assessment/Plan   *Acute left deep vein thrombosis in the popliteal, posterior tibial, peroneal and soleal veins diagnosed on 4/12/2022.  · No clear precipitating factor.  · Patient noticed swelling and some redness of the leg.  · The DVT was diagnosed when she had venous Doppler to  reevaluate the right lower extremity DVT.  · Patient was placed on Eliquis 10 mg twice daily x7 days  · She is currently taking Eliquis 5 mg twice daily.  · She is having nausea every time she takes Eliquis.  · Patient reports having nausea when she took other medicines.  · Exam today revealed swelling of the left lower extremity.  · She continues to have swelling of the left leg although there has been some improvement.    *History of right lower extremity DVT diagnosed on 4/1/2021.  · This was a provoked episode that developed after surgery to place rib plates.  · Patient was placed on Xarelto.  · Patient to Xarelto regularly until March 2022 when she started taking it every other day.  · Patient is no longer having pain in the right lower extremity.  · Venous Doppler on 4/12/2022 showed resolution of the DVT.    *Nausea.  · She is having nausea after taking Eliquis.  · She also developed nausea after taking other medicines.  · She is taking Zofran but without much improvement.    *History of low vitamin B12 level.  · B12 was 266 on 9/24/2019.  · Hemoglobin is currently normal at 12.1.    PLAN:    1.  Due to the recurrent thrombosis and the fact that the left lower extremity DVT was unprovoked, I recommended obtaining CT scan of the chest abdomen pelvis to evaluate for occult malignancy as the cause.  This especially important with the patient's prolonged history of smoking.  2.  Continue Eliquis 5 mg twice daily.  3.  I prescribed Compazine 10 mg every 6 hours as needed for nausea.  4.  If the nausea does not improve with using Compazine, I asked her to notify us and we will consider switching to Xarelto 20 mg daily since she was not taking Xarelto at the recommended dose of 20 mg daily when the new DVT developed.  5.  We discussed the need to quit smoking.  6.  I will see her in follow-up in 2 weeks.  CBC CMP and vitamin B12 will be obtained.    I spent 65 minutes caring for Luann on this date of service. This  time includes time spent by me in the following activities: preparing for the visit, reviewing tests, obtaining and/or reviewing a separately obtained history, performing a medically appropriate examination and/or evaluation, counseling and educating the patient/family/caregiver, ordering medications, tests, or procedures, documenting information in the medical record, independently interpreting results and communicating that information with the patient/family/caregiver and care coordination     Lita Dubon MD  04/27/22

## 2022-04-29 ENCOUNTER — TELEPHONE (OUTPATIENT)
Dept: ONCOLOGY | Facility: CLINIC | Age: 82
End: 2022-04-29

## 2022-04-29 NOTE — TELEPHONE ENCOUNTER
"Spoke with patient and let her know that per Dr. Dubon \"Since she did not tolerate Eliquis with taking the Compazine, I recommended switching from Eliquis to Xarelto 20 mg daily.\" this RN will send in the Xarelto prescription to her preferred pharmacy. Pt is going to start the Xarelto tomorrow morning and was informed to call our office if still experiencing nausea in 24 hours. She v/u.   "

## 2022-04-29 NOTE — TELEPHONE ENCOUNTER
Patient is reporting Compazine is not helping his nausea.  Per Dr. Dubon's note, patient was to contact our office and Dr. Dubon would consider switching the patient from Xarelto to something else.  Please advise.

## 2022-04-29 NOTE — TELEPHONE ENCOUNTER
Caller: Luann Frances    Relationship: Self    Best call back number: [phone]    What is the best time to reach you: ASAP    Who are you requesting to speak with (clinical staff, provider,  specific staff member): DR. CAREY'S NURSE    Do you know the name of the person who called: LUANN    What was the call regarding:  PATIENT STATES COMPAZINE IS NOT HELPING WITH NAUSEA, COULD SOMETHING ELSE BE CALLED IN TO [facility]?    Do you require a callback: YES, PLEASE

## 2022-05-02 ENCOUNTER — OFFICE VISIT (OUTPATIENT)
Dept: ENDOCRINOLOGY | Age: 82
End: 2022-05-02

## 2022-05-02 VITALS
SYSTOLIC BLOOD PRESSURE: 156 MMHG | WEIGHT: 219.8 LBS | BODY MASS INDEX: 38.95 KG/M2 | HEIGHT: 63 IN | DIASTOLIC BLOOD PRESSURE: 82 MMHG | HEART RATE: 85 BPM | OXYGEN SATURATION: 97 %

## 2022-05-02 DIAGNOSIS — E78.2 MIXED HYPERLIPIDEMIA: ICD-10-CM

## 2022-05-02 DIAGNOSIS — E03.9 PRIMARY HYPOTHYROIDISM: Primary | ICD-10-CM

## 2022-05-02 DIAGNOSIS — I10 ESSENTIAL HYPERTENSION: ICD-10-CM

## 2022-05-02 DIAGNOSIS — R11.0 CHRONIC NAUSEA: ICD-10-CM

## 2022-05-02 PROCEDURE — 99214 OFFICE O/P EST MOD 30 MIN: CPT | Performed by: NURSE PRACTITIONER

## 2022-05-02 RX ORDER — LEVOTHYROXINE SODIUM 100 UG/1
100 TABLET ORAL DAILY
Qty: 30 TABLET | Refills: 5 | Status: SHIPPED | OUTPATIENT
Start: 2022-05-02 | End: 2022-06-30 | Stop reason: DRUGHIGH

## 2022-05-02 NOTE — PROGRESS NOTES
"Chief Complaint  Chief Complaint   Patient presents with   • Hypothyroidism       Subjective          History of Present Illness    Luann Frances 82 y.o. 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     Denies hair loss, diarrhea, shortness of breath,chest pain, palpitations, heat intolerance, trouble swallowing, or change in voice.        Weight gain of 3 lbs since last visit.        Current treatment is Levoxyl 100 mcg daily was stopped due to nausea     Tirosint and [place] Thyroid caused nausea and increased tiredness.  Unithroid and Synthroid caused nausea     Pt has a habit of stopping her thyroid medication.  Dr. Garcia informed patient not to stop taking Levoxyl without letting him know     Last labs in 03/22 showed TSH 69.000 and FT4 0.22           Hyperlipidemia      She was taken off Lipitor 20 mg/day when she was admitted to the hospital in February 2021.  She has hepatic steatosis on CT of the abdomen done in February 2021.     Last lipid panel in 03/22 showed Total 249, Triglycerides 90, HDL 76 and               Hypertension     She was taken off amlodipine 5 mg/day and chlorthalidone 25 mg/day in February 2021     Pt denies any chest pain, palpitations, shortness of breath, or headache     Current regimen includes Lasix 20 mg daily              I have reviewed the patient's allergies, medicines, past medical hx, family hx and social hx.    Objective   Vital Signs:   /82   Pulse 85   Ht 160 cm (62.99\")   Wt 99.7 kg (219 lb 12.8 oz)   SpO2 97%   BMI 38.95 kg/m²       Physical Exam   Physical Exam  Constitutional:       General: She is not in acute distress.     Appearance: Normal appearance. She is not ill-appearing.   HENT:      Head: Normocephalic and atraumatic.   Eyes:     "  General:         Right eye: No discharge.         Left eye: No discharge.   Neck:      Thyroid: No thyroid mass, thyromegaly or thyroid tenderness.      Trachea: Trachea normal.   Cardiovascular:      Rate and Rhythm: Normal rate and regular rhythm.      Heart sounds: Normal heart sounds. No murmur heard.    No friction rub. No gallop.   Pulmonary:      Effort: Pulmonary effort is normal. No respiratory distress.      Breath sounds: Normal breath sounds. No wheezing.   Musculoskeletal:      Cervical back: Neck supple.   Skin:     General: Skin is warm and dry.   Neurological:      Mental Status: She is alert.   Psychiatric:         Mood and Affect: Mood normal.         Behavior: Behavior normal.                    Results Review:   TSH   Date Value Ref Range Status   03/14/2022 69.000 (H) 0.450 - 4.500 uIU/mL Final     T3, Free   Date Value Ref Range Status   08/20/2020 2.0 2.0 - 4.4 pg/mL Final     T3, Total   Date Value Ref Range Status   05/30/2019 91.8 80.0 - 200.0 ng/dl Final         Assessment and Plan    Diagnoses and all orders for this visit:    1. Primary hypothyroidism (Primary)  -     Levoxyl 100 MCG tablet; Take 1 tablet by mouth Daily.  Dispense: 30 tablet; Refill: 5  -     TSH; Future  -     T4, Free; Future    Restart Levoxyl at 100 mcg at bedtime to see if that helps with the nausea  Recheck labs in 8 weeks    2. Mixed hyperlipidemia    Total cholesterol and LDL are elevated, but was taken off statin  Dietary modification  Uncontrolled thyroid may be contributing      3. Essential hypertension    Continue with current medication regimen  Defer management to PCP      4. Chronic nausea  -     Ambulatory Referral to Gastroenterology    Referral to GI to help figure out the cause  Continue with compazine PRN from CBC        Refills sent to pharmacy        Labs in 8 weeks  RTC in 4 months with Dr. Garcia, if not available then can schedule with me.      Follow Up     Patient was given instructions and  counseling regarding her condition or for health maintenance advice. Please see specific information pulled into the AVS if appropriate.              LIVIER Edouard  05/02/22

## 2022-05-04 ENCOUNTER — TELEPHONE (OUTPATIENT)
Dept: ONCOLOGY | Facility: CLINIC | Age: 82
End: 2022-05-04

## 2022-05-04 NOTE — TELEPHONE ENCOUNTER
Caller: Luann Frances    Relationship: Self    Best call back number: [phone]    What is the best time to reach you: ANYTIME    Who are you requesting to speak with (clinical staff, provider,  specific staff member): DR CAREY OR NURSE    What was the call regarding: PTS CT SCAN WAS R/S FROM 5/5 TO 5/27. DOES HER 5/10 F/U APPT NEED TO BE R/S AS WELL?    PLEASE CALL PT TO ADVISE.     Do you require a callback: YES

## 2022-05-05 ENCOUNTER — APPOINTMENT (OUTPATIENT)
Dept: CT IMAGING | Facility: HOSPITAL | Age: 82
End: 2022-05-05

## 2022-05-10 ENCOUNTER — APPOINTMENT (OUTPATIENT)
Dept: LAB | Facility: HOSPITAL | Age: 82
End: 2022-05-10

## 2022-05-17 ENCOUNTER — OFFICE VISIT (OUTPATIENT)
Dept: FAMILY MEDICINE CLINIC | Facility: CLINIC | Age: 82
End: 2022-05-17

## 2022-05-17 VITALS
BODY MASS INDEX: 38.98 KG/M2 | WEIGHT: 220 LBS | SYSTOLIC BLOOD PRESSURE: 120 MMHG | DIASTOLIC BLOOD PRESSURE: 80 MMHG | HEART RATE: 67 BPM | TEMPERATURE: 97.7 F | OXYGEN SATURATION: 100 % | RESPIRATION RATE: 16 BRPM

## 2022-05-17 DIAGNOSIS — S81.811D SKIN TEAR OF RIGHT LOWER LEG WITHOUT COMPLICATION, SUBSEQUENT ENCOUNTER: ICD-10-CM

## 2022-05-17 DIAGNOSIS — I10 ESSENTIAL HYPERTENSION: ICD-10-CM

## 2022-05-17 DIAGNOSIS — M81.0 OSTEOPOROSIS, UNSPECIFIED OSTEOPOROSIS TYPE, UNSPECIFIED PATHOLOGICAL FRACTURE PRESENCE: ICD-10-CM

## 2022-05-17 DIAGNOSIS — E78.2 MIXED HYPERLIPIDEMIA: ICD-10-CM

## 2022-05-17 DIAGNOSIS — I82.4Z2 DVT, LOWER EXTREMITY, DISTAL, ACUTE, LEFT: ICD-10-CM

## 2022-05-17 DIAGNOSIS — J43.8 OTHER EMPHYSEMA: ICD-10-CM

## 2022-05-17 DIAGNOSIS — R60.0 LOWER EXTREMITY EDEMA: ICD-10-CM

## 2022-05-17 DIAGNOSIS — R63.5 WEIGHT GAIN: ICD-10-CM

## 2022-05-17 DIAGNOSIS — Z12.11 COLON CANCER SCREENING: ICD-10-CM

## 2022-05-17 DIAGNOSIS — Z72.0 TOBACCO ABUSE: ICD-10-CM

## 2022-05-17 DIAGNOSIS — E55.9 VITAMIN D DEFICIENCY: ICD-10-CM

## 2022-05-17 DIAGNOSIS — Z00.00 MEDICARE ANNUAL WELLNESS VISIT, SUBSEQUENT: Primary | ICD-10-CM

## 2022-05-17 PROBLEM — S81.811A SKIN TEAR OF RIGHT LOWER LEG WITHOUT COMPLICATION: Status: ACTIVE | Noted: 2022-05-17

## 2022-05-17 PROCEDURE — 1159F MED LIST DOCD IN RCRD: CPT

## 2022-05-17 PROCEDURE — 1170F FXNL STATUS ASSESSED: CPT

## 2022-05-17 PROCEDURE — 96160 PT-FOCUSED HLTH RISK ASSMT: CPT

## 2022-05-17 PROCEDURE — 1125F AMNT PAIN NOTED PAIN PRSNT: CPT

## 2022-05-17 PROCEDURE — G0439 PPPS, SUBSEQ VISIT: HCPCS

## 2022-05-17 PROCEDURE — 99214 OFFICE O/P EST MOD 30 MIN: CPT

## 2022-05-17 RX ORDER — MELATONIN
2000 DAILY
COMMUNITY

## 2022-05-17 NOTE — ASSESSMENT & PLAN NOTE
Stable.  Just on Lasix as needed for lower extremity swelling.  Hypertension being managed by Dr. Arce nephrology.  Patient has a follow-up with them in 2 days.

## 2022-05-17 NOTE — ASSESSMENT & PLAN NOTE
Discussed diet and exercise and exercising 150 minutes a week.  Also weightbearing exercises with history of osteoporosis.  Limiting carbohydrates and fats with history of hyperlipidemia and portion control.

## 2022-05-17 NOTE — ASSESSMENT & PLAN NOTE
Current everyday smoker.  Discussed the importance of quitting smoking including decreasing risk of stroke and heart attack.  At this time patient's not ready to quit.  Counseled to reach out to us when ready so that we can assist.

## 2022-05-17 NOTE — ASSESSMENT & PLAN NOTE
DEXA complete on 3/24/2022 showing osteoporosis of the lumbar spine and left hip.  Discussed taking calcium 1200 mg daily along with vitamin D and weightbearing exercises.  Patient has a lot of side effects with medications and would like to think about oral medication versus potential injections.  Patient is seeing gastroenterology to get her GI distress assessed and afterward we will follow-up at 1 month to discuss what type of therapy she may want to proceed with.

## 2022-05-17 NOTE — ASSESSMENT & PLAN NOTE
Stable.  Currently on Xarelto 20 mg daily since Eliquis caused nausea.  Being followed by hematology.  Patient is getting CT of the abdomen pelvis and chest done in 1 week to evaluate because of blood clots.  Patient denies any chest pain or shortness of breath.  Swelling of the left lower extremity has improved greatly.

## 2022-05-17 NOTE — PATIENT INSTRUCTIONS
Advance Care Planning and Advance Directives     You make decisions on a daily basis - decisions about where you want to live, your career, your home, your life. Perhaps one of the most important decisions you face is your choice for future medical care. Take time to talk with your family and your healthcare team and start planning today.  Advance Care Planning is a process that can help you:  Understand possible future healthcare decisions in light of your own experiences  Reflect on those decision in light of your goals and values  Discuss your decisions with those closest to you and the healthcare professionals that care for you  Make a plan by creating a document that reflects your wishes    Surrogate Decision Maker  In the event of a medical emergency, which has left you unable to communicate or to make your own decisions, you would need someone to make decisions for you.  It is important to discuss your preferences for medical treatment with this person while you are in good health.     Qualities of a surrogate decision maker:  Willing to take on this role and responsibility  Knows what you want for future medical care  Willing to follow your wishes even if they don't agree with them  Able to make difficult medical decisions under stressful circumstances    Advance Directives  These are legal documents you can create that will guide your healthcare team and decision maker(s) when needed. These documents can be stored in the electronic medical record.    Living Will - a legal document to guide your care if you have a terminal condition or a serious illness and are unable to communicate. States vary by statute in document names/types, but most forms may include one or more of the following:        -  Directions regarding life-prolonging treatments        -  Directions regarding artificially provided nutrition/hydration        -  Choosing a healthcare decision maker        -  Direction regarding organ/tissue  donation    Durable Power of  for Healthcare - this document names an -in-fact to make medical decisions for you, but it may also allow this person to make personal and financial decisions for you. Please seek the advice of an  if you need this type of document.    **Advance Directives are not required and no one may discriminate against you if you do not sign one.    Medical Orders  Many states allow specific forms/orders signed by your physician to record your wishes for medical treatment in your current state of health. This form, signed in personal communication with your physician, addresses resuscitation and other medical interventions that you may or may not want.      For more information or to schedule a time with a [facility] Advance Care Planning Facilitator contact: [email or web address] or call [phone] and someone will contact you directly.

## 2022-05-17 NOTE — ASSESSMENT & PLAN NOTE
Colon cancer screening ordered with [facility].  Patient declined mammogram at this time but would think about getting it ordered but has a lot on her plate right now.  DEXA up-to-date from 3/24/2022.  Patient is discussing advance care planning with her son and will give us a copy when it is complete.  Discussed going to pharmacy for shingles vaccine and COVID booster.  Otherwise up-to-date on immunizations.  Discussed moderate fall risk and use of cane and walker and fall safety at home including removing any rugs, loose articles, cords.

## 2022-05-17 NOTE — ASSESSMENT & PLAN NOTE
2 months ago and being monitored by endocrinology.  Discussed diet and exercise.  Patient was taken off her statin 1 year ago when she was hospitalized.  Endocrinology trying to manage her thyroid levels as these can cause elevation in cholesterol before restarting meds.

## 2022-05-17 NOTE — ASSESSMENT & PLAN NOTE
Patient skin tear from 5 days ago from hitting a car door.  Per patient it is healing well.  At urgent care they just cleaned the wound and gave tetanus vaccine and did not give any further treatment.  Patient instructed to continue to keep the area clean and dry and to apply antibiotic ointment.  If any signs of infection including redness, warmth, drainage occur immediately return to the office.

## 2022-05-17 NOTE — PROGRESS NOTES
The ABCs of the Annual Wellness Visit  Subsequent Medicare Wellness Visit    Chief Complaint   Patient presents with   • Medicare Wellness-subsequent     All over back pain. Left hip pain.   • Leg Injury     Right leg. 5/12      Subjective    History of Present Illness:  Luann Frances is a 82 y.o. female who presents for a Subsequent Medicare Wellness Visit.    DVT left lower extremity-currently being followed by hematology.  Tolerating Xarelto 20 mg daily with no side effects.  No chest pain or shortness of breath.  Initially seen by me for this on 4/15/2022 when she was on Eliquis.  However she saw hematology on 4/27/2022 and was then switched to Xarelto due to GI distress.  Patient is following up with hematology in 3 weeks after obtaining CT of the abdomen, pelvis and chest to evaluate cause of blood clots.  Patient was also started on Compazine to help with GI distress related to blood thinners.  Tolerating well.    COPD-well-controlled on Advair daily.  Continue current regimen.    Hypertension-controlled with just as needed Lasix.  Being followed by Dr. Arce for hypertension, lower extremity swelling, and CKD.  Patient is following up in 2 days.  No chest pain, headache, dizziness or shortness of breath.    Lower extremity edema- resolving with Lasix usage.  Continue current regimen.    Hyperlipidemia-last LDL 2 months ago 158.  Currently being treated and followed by endocrinology.  At this time they are emphasizing lifestyle changes and trying to get her thyroid levels under control before adding on any new medication.  However if this does not come back under control in the next few months may need to add back on statin.    Skin tear of right lower extremity- 5 days ago patient presented to urgent care due to hitting her right shin on a car door causing a skin tear and bleeding.  Bleeding resolved and area was cleaned and she was given a Tdap.  Patient said that the area has been healing well with no  signs of infection.  No redness, swelling, erythema, warmth, discharge.  Patient is continuing to clean with benzyl peroxide, water, and applying antibiotic ointment and keeping area clean and dry.    Osteoporosis-DEXA scan was complete on 3/24/2022 showing T score -3.3 of the left hip and -2.8 of the lumbar spine showing osteoporosis.  We discussed the need for calcium and vitamin D and weightbearing exercises.  Patient is switching from vitamin D 50,000 IU weekly to 2000 IU daily.  She will add on calcium as well.  Patient has had a lot of issues with medications causing GI distress.  Patient would like to see gastroenterology next week before starting any new medicine for the osteoporosis and she will follow-up in a month.    Tobacco abuse-current every day smoker.  Discussed quitting and options.  At this time not ready to quit.    The following portions of the patient's history were reviewed and   updated as appropriate: allergies, current medications, past family history, past medical history, past social history, past surgical history and problem list.    Compared to one year ago, the patient feels her physical   health is worse.    Compared to one year ago, the patient feels her mental   health is the same.    Recent Hospitalizations:  She was not admitted to the hospital during the last year.       Current Medical Providers:  Patient Care Team:  Angie Bruno APRN as PCP - General (Family Medicine)  Chas Garcia MD as Consulting Physician (Endocrinology)  Jeff Sands MD as Consulting Physician (Pulmonary Disease)  Leonardo Cochran MD as Consulting Physician (Nephrology)  Ruperto Sparks MD as Consulting Physician (Pain Medicine)  Angie Bruno APRN as Referring Physician (Family Medicine)  Lita Dubon MD as Consulting Physician (Hematology and Oncology)    Outpatient Medications Prior to Visit   Medication Sig Dispense Refill   • Advair Diskus 100-50 MCG/DOSE DISKUS Inhale 1  puff 2 (Two) Times a Day. 180 each 5   • albuterol sulfate  (90 Base) MCG/ACT inhaler Inhale 2 puffs Every 4 (Four) Hours As Needed for Wheezing or Shortness of Air. 8 g 1   • cholecalciferol (VITAMIN D3) 25 MCG (1000 UT) tablet Take 2,000 Units by mouth Daily.     • [employer] ANTACID & ANTI-GAS 1000-60 MG chewable tablet As Needed.     • furosemide (Lasix) 20 MG tablet Take 1 tablet by mouth Daily. 90 tablet 3   • Levoxyl 100 MCG tablet Take 1 tablet by mouth Daily. 30 tablet 5   • omeprazole OTC (PriLOSEC OTC) 20 MG EC tablet Take 20 mg by mouth As Needed. Take 1-2 tablet     • prochlorperazine (COMPAZINE) 10 MG tablet Take 1 tablet by mouth Every 6 (Six) Hours As Needed for Nausea or Vomiting. 100 tablet 5   • rivaroxaban (XARELTO) 20 MG tablet Take 1 tablet by mouth Daily. 30 tablet 2   • traMADol (ULTRAM) 50 MG tablet Take 50 mg by mouth As Needed.     • triamcinolone (KENALOG) 0.1 % ointment Apply  topically to the appropriate area as directed 2 (Two) Times a Day. (Patient taking differently: Apply  topically to the appropriate area as directed As Needed.) 30 g 0   • vitamin D (ERGOCALCIFEROL) 1.25 MG (59856 UT) capsule capsule Take 50,000 Units by mouth 1 (One) Time Per Week.       No facility-administered medications prior to visit.       Opioid medication/s are on active medication list.  and I have evaluated her active treatment plan and pain score trends (see table).  Vitals:    05/17/22 1319   PainSc:   6     I have reviewed the chart for potential of high risk medication and harmful drug interactions in the elderly.            Aspirin is not on active medication list.  Aspirin use is contraindicated for this patient due to: current use of Xarelto.  .    Patient Active Problem List   Diagnosis   • Atopic rhinitis   • Chronic obstructive pulmonary disease (HCC)   • Diverticulitis of colon   • Acid reflux   • Fatigue   • Primary hypothyroidism   • Insomnia   • Knee pain   • Pain of lower extremity   •  Chronic nausea   • Shoulder pain   • Ventricular premature beats   • Weight gain   • Cold intolerance   • Post herpetic neuralgia   • Insulin resistance   • Vitamin D deficiency   • CKD (chronic kidney disease) stage 3, GFR 30-59 ml/min (CMS/[facility])   • Mixed hyperlipidemia   • Hypersomnia   • Hyperuricemia   • Essential hypertension   • DELROY (obstructive sleep apnea)   • Renal calculus   • Multiple closed fractures of ribs of right side   • Non-cardiac chest pain   • Compression fracture of body of thoracic vertebra ([facility])   • Arm skin lesion, right   • DVT, lower extremity, distal, acute, left ([facility])   • Lower extremity edema   • Skin tear of right lower leg without complication   • Medicare annual wellness visit, subsequent   • Tobacco abuse   • Osteoporosis     Advance Care Planning  Advance Directive is not on file.  ACP discussion was held with the patient during this visit. Patient does not have an advance directive, information provided.          Objective    Vitals:    05/17/22 1319   BP: 120/80   Pulse: 67   Resp: 16   Temp: 97.7 °F (36.5 °C)   SpO2: 100%   Weight: 99.8 kg (220 lb)   PainSc:   6     Class 2 Severe Obesity (BMI >=35 and <=39.9). Obesity-related health conditions include the following: obstructive sleep apnea, hypertension and dyslipidemias. Obesity is unchanged. BMI is Discussed diet and exercise . We discussed portion control and increasing exercise.  Does the patient have evidence of cognitive impairment? No    Physical Exam  Lab Results   Component Value Date    CHLPL 249 (H) 03/14/2022    TRIG 90 03/14/2022    HDL 76 03/14/2022     (H) 03/14/2022    VLDL 15 03/14/2022            HEALTH RISK ASSESSMENT    Smoking Status:  Social History     Tobacco Use   Smoking Status Current Every Day Smoker   • Packs/day: 0.25   • Years: 50.00   • Pack years: 12.50   • Types: Cigarettes   • Start date: 1970   Smokeless Tobacco Never Used     Alcohol Consumption:  Social History     Substance and  Sexual Activity   Alcohol Use Not Currently     Fall Risk Screen:    THADADI Fall Risk Assessment was completed, and patient is at MODERATE risk for falls. Assessment completed on:5/17/2022    Depression Screening:  PHQ-2/PHQ-9 Depression Screening 5/17/2022   Retired PHQ-9 Total Score -   Retired Total Score -   Little Interest or Pleasure in Doing Things 0-->not at all   Feeling Down, Depressed or Hopeless 0-->not at all   Trouble Falling or Staying Asleep, or Sleeping Too Much 0-->not at all   Feeling Tired or Having Little Energy 0-->not at all   Poor Appetite or Overeating 0-->not at all   Feeling Bad about Yourself - or that You are a Failure or Have Let Yourself or Your Family Down 0-->not at all   Trouble Concentrating on Things, Such as Reading the Newspaper or Watching Television 0-->not at all   Moving or Speaking So Slowly that Other People Could Have Noticed? Or the Opposite - Being So Fidgety 0-->not at all   Thoughts that You Would be Better Off Dead or of Hurting Yourself in Some Way 0-->not at all   PHQ-9: Brief Depression Severity Measure Score 0   If You Checked Off Any Problems, How Difficult Have These Problems Made It For You to Do Your Work, Take Care of Things at Home, or Get Along with Other People? not difficult at all       Health Habits and Functional and Cognitive Screening:  Functional & Cognitive Status 5/17/2022   Do you have difficulty preparing food and eating? No   Do you have difficulty bathing yourself, getting dressed or grooming yourself? No   Do you have difficulty using the toilet? No   Do you have difficulty moving around from place to place? No   Do you have trouble with steps or getting out of a bed or a chair? No   Current Diet Other   Dental Exam Up to date   Eye Exam Not up to date   Exercise (times per week) 0 times per week   Current Exercises Include No Regular Exercise   Current Exercise Activities Include -   Do you need help using the phone?  No   Are you deaf or  do you have serious difficulty hearing?  No   Do you need help with transportation? Yes   Do you need help shopping? No   Do you need help preparing meals?  No   Do you need help with housework?  Yes   Do you need help with laundry? No   Do you need help taking your medications? No   Do you need help managing money? No   Do you ever drive or ride in a car without wearing a seat belt? No   Have you felt unusual stress, anger or loneliness in the last month? No   Who do you live with? Other   If you need help, do you have trouble finding someone available to you? No   Have you been bothered in the last four weeks by sexual problems? No   Do you have difficulty concentrating, remembering or making decisions? No       Age-appropriate Screening Schedule:  Refer to the list below for future screening recommendations based on patient's age, sex and/or medical conditions. Orders for these recommended tests are listed in the plan section. The patient has been provided with a written plan.    Health Maintenance   Topic Date Due   • ZOSTER VACCINE (1 of 2) Never done   • URINE MICROALBUMIN  08/20/2021   • MAMMOGRAM  08/17/2022 (Originally 1940)   • INFLUENZA VACCINE  08/01/2022   • LIPID PANEL  03/14/2023   • DXA SCAN  03/24/2024   • TDAP/TD VACCINES (2 - Tdap) 05/12/2032   • HEMOGLOBIN A1C  Discontinued   • DIABETIC EYE EXAM  Discontinued              Assessment & Plan   CMS Preventative Services Quick Reference  Risk Factors Identified During Encounter  Chronic Pain   Fall Risk-High or Moderate  Immunizations Discussed/Encouraged (specific Immunizations; Shingrix and COVID19  Obesity/Overweight   Tobacco Use/Dependance (use dotphrase .tobaccocessation for documentation)  The above risks/problems have been discussed with the patient.  Follow up actions/plans if indicated are seen below in the Assessment/Plan Section.  Pertinent information has been shared with the patient in the After Visit Summary.    Diagnoses and all  orders for this visit:    1. Medicare annual wellness visit, subsequent (Primary)  Assessment & Plan:  Colon cancer screening ordered with [facility].  Patient declined mammogram at this time but would think about getting it ordered but has a lot on her plate right now.  DEXA up-to-date from 3/24/2022.  Patient is discussing advance care planning with her son and will give us a copy when it is complete.  Discussed going to pharmacy for shingles vaccine and COVID booster.  Otherwise up-to-date on immunizations.  Discussed moderate fall risk and use of cane and walker and fall safety at home including removing any rugs, loose articles, cords.      2. Colon cancer screening  -     Cologuard - Stool, Per Rectum; Future    3. Other emphysema (HCC)  Assessment & Plan:  Controlled.  Continue Advair and albuterol inhaler as needed.          4. Weight gain  Assessment & Plan:  Discussed diet and exercise and exercising 150 minutes a week.  Also weightbearing exercises with history of osteoporosis.  Limiting carbohydrates and fats with history of hyperlipidemia and portion control.      5. Essential hypertension  Assessment & Plan:  Stable.  Just on Lasix as needed for lower extremity swelling.  Hypertension being managed by Dr. Arce nephrology.  Patient has a follow-up with them in 2 days.      6. DVT, lower extremity, distal, acute, left (HCC)  Assessment & Plan:  Stable.  Currently on Xarelto 20 mg daily since Eliquis caused nausea.  Being followed by hematology.  Patient is getting CT of the abdomen pelvis and chest done in 1 week to evaluate because of blood clots.  Patient denies any chest pain or shortness of breath.  Swelling of the left lower extremity has improved greatly.      7. Lower extremity edema  Assessment & Plan:  Managed by Dr. Arce.  Follow-up in 2 days.  Well-controlled with Lasix as needed.      8. Mixed hyperlipidemia  Assessment & Plan:   2 months ago and being monitored by endocrinology.   Discussed diet and exercise.  Patient was taken off her statin 1 year ago when she was hospitalized.  Endocrinology trying to manage her thyroid levels as these can cause elevation in cholesterol before restarting meds.      9. Skin tear of right lower leg without complication, subsequent encounter  Assessment & Plan:  Patient skin tear from 5 days ago from hitting a car door.  Per patient it is healing well.  At urgent care they just cleaned the wound and gave tetanus vaccine and did not give any further treatment.  Patient instructed to continue to keep the area clean and dry and to apply antibiotic ointment.  If any signs of infection including redness, warmth, drainage occur immediately return to the office.      10. Vitamin D deficiency  Assessment & Plan:  2 months ago vitamin D 35.  Counseled patient to take over-the-counter vitamin D3 2000 IU daily.      11. Tobacco abuse  Assessment & Plan:  Current everyday smoker.  Discussed the importance of quitting smoking including decreasing risk of stroke and heart attack.  At this time patient's not ready to quit.  Counseled to reach out to us when ready so that we can assist.      12. Osteoporosis, unspecified osteoporosis type, unspecified pathological fracture presence  Assessment & Plan:  DEXA complete on 3/24/2022 showing osteoporosis of the lumbar spine and left hip.  Discussed taking calcium 1200 mg daily along with vitamin D and weightbearing exercises.  Patient has a lot of side effects with medications and would like to think about oral medication versus potential injections.  Patient is seeing gastroenterology to get her GI distress assessed and afterward we will follow-up at 1 month to discuss what type of therapy she may want to proceed with.        Follow Up:   Return in about 1 month (around 6/17/2022) for Recheck discuss osteoporosis treatment. establish with Darcy MAIER .     An After Visit Summary and PPPS were made available to the  patient.                   Medical assistant and I wore mask and eyewear protection during entire encounter.  Patient wore mask.      Electronically signed by LIVIER Mathis, 05/17/22, 2:50 PM EDT.

## 2022-05-19 ENCOUNTER — TELEPHONE (OUTPATIENT)
Dept: FAMILY MEDICINE CLINIC | Facility: CLINIC | Age: 82
End: 2022-05-19

## 2022-05-19 NOTE — TELEPHONE ENCOUNTER
Please advise UC as imaging will likely need to be acquired before any scripts. Pt was seen 5/17 however this was not addressed at apt.

## 2022-05-19 NOTE — TELEPHONE ENCOUNTER
PATIENT STATES: THAT SHE IS HAVING LEFT HIP PAIN AND WOULD LIKE A CALL BACK TO SEE WHAT SHE CAN DO OR WHAT SHE CAN TAKE TO HELP HER WITH THE PAIN PLEASE ADVISE      PATIENT CAN BE REACHED ON: [phone]    PHARMACY [id number] [place], [place] - [address] AT [facility] - [phone] [facility] [phone]   [phone]

## 2022-05-20 ENCOUNTER — TELEPHONE (OUTPATIENT)
Dept: FAMILY MEDICINE CLINIC | Facility: CLINIC | Age: 82
End: 2022-05-20

## 2022-05-20 ENCOUNTER — OFFICE VISIT (OUTPATIENT)
Dept: FAMILY MEDICINE CLINIC | Facility: CLINIC | Age: 82
End: 2022-05-20

## 2022-05-20 ENCOUNTER — HOSPITAL ENCOUNTER (OUTPATIENT)
Dept: CARDIOLOGY | Facility: HOSPITAL | Age: 82
Discharge: HOME | End: 2022-05-20

## 2022-05-20 VITALS
WEIGHT: 220 LBS | HEART RATE: 50 BPM | TEMPERATURE: 97.2 F | OXYGEN SATURATION: 100 % | DIASTOLIC BLOOD PRESSURE: 84 MMHG | BODY MASS INDEX: 38.98 KG/M2 | SYSTOLIC BLOOD PRESSURE: 136 MMHG | RESPIRATION RATE: 16 BRPM

## 2022-05-20 DIAGNOSIS — M79.652 LEFT THIGH PAIN: ICD-10-CM

## 2022-05-20 DIAGNOSIS — M25.552 LEFT HIP PAIN: Primary | ICD-10-CM

## 2022-05-20 LAB
BH CV LOW VAS LEFT POPLITEAL SPONT: 1
BH CV LOWER VASCULAR LEFT COMMON FEMORAL AUGMENT: ABNORMAL
BH CV LOWER VASCULAR LEFT COMMON FEMORAL COMPETENT: ABNORMAL
BH CV LOWER VASCULAR LEFT COMMON FEMORAL COMPRESS: ABNORMAL
BH CV LOWER VASCULAR LEFT COMMON FEMORAL PHASIC: ABNORMAL
BH CV LOWER VASCULAR LEFT COMMON FEMORAL SPONT: ABNORMAL
BH CV LOWER VASCULAR LEFT DISTAL FEMORAL COMPRESS: ABNORMAL
BH CV LOWER VASCULAR LEFT GASTRONEMIUS COMPRESS: ABNORMAL
BH CV LOWER VASCULAR LEFT GREATER SAPH AK COMPRESS: ABNORMAL
BH CV LOWER VASCULAR LEFT GREATER SAPH BK COMPRESS: ABNORMAL
BH CV LOWER VASCULAR LEFT LESSER SAPH COMPRESS: ABNORMAL
BH CV LOWER VASCULAR LEFT MID FEMORAL AUGMENT: ABNORMAL
BH CV LOWER VASCULAR LEFT MID FEMORAL COMPETENT: ABNORMAL
BH CV LOWER VASCULAR LEFT MID FEMORAL COMPRESS: ABNORMAL
BH CV LOWER VASCULAR LEFT MID FEMORAL PHASIC: ABNORMAL
BH CV LOWER VASCULAR LEFT MID FEMORAL SPONT: ABNORMAL
BH CV LOWER VASCULAR LEFT PERONEAL COMPRESS: ABNORMAL
BH CV LOWER VASCULAR LEFT POPLITEAL AUGMENT: ABNORMAL
BH CV LOWER VASCULAR LEFT POPLITEAL COMPETENT: ABNORMAL
BH CV LOWER VASCULAR LEFT POPLITEAL COMPRESS: ABNORMAL
BH CV LOWER VASCULAR LEFT POPLITEAL PHASIC: ABNORMAL
BH CV LOWER VASCULAR LEFT POPLITEAL SPONT: ABNORMAL
BH CV LOWER VASCULAR LEFT POPLITEAL THROMBUS: ABNORMAL
BH CV LOWER VASCULAR LEFT POSTERIOR TIBIAL COMPRESS: ABNORMAL
BH CV LOWER VASCULAR LEFT PROFUNDA FEMORAL COMPRESS: ABNORMAL
BH CV LOWER VASCULAR LEFT PROXIMAL FEMORAL COMPRESS: ABNORMAL
BH CV LOWER VASCULAR LEFT SAPHENOFEMORAL JUNCTION COMPRESS: ABNORMAL
BH CV LOWER VASCULAR RIGHT COMMON FEMORAL AUGMENT: ABNORMAL
BH CV LOWER VASCULAR RIGHT COMMON FEMORAL COMPETENT: ABNORMAL
BH CV LOWER VASCULAR RIGHT COMMON FEMORAL COMPRESS: ABNORMAL
BH CV LOWER VASCULAR RIGHT COMMON FEMORAL PHASIC: ABNORMAL
BH CV LOWER VASCULAR RIGHT COMMON FEMORAL SPONT: ABNORMAL
BH CV LOWER VASCULAR RIGHT DISTAL FEMORAL COMPRESS: ABNORMAL
BH CV LOWER VASCULAR RIGHT GASTRONEMIUS COMPRESS: ABNORMAL
BH CV LOWER VASCULAR RIGHT GREATER SAPH AK COMPRESS: ABNORMAL
BH CV LOWER VASCULAR RIGHT GREATER SAPH BK COMPRESS: ABNORMAL
BH CV LOWER VASCULAR RIGHT LESSER SAPH COMPRESS: ABNORMAL
BH CV LOWER VASCULAR RIGHT MID FEMORAL AUGMENT: ABNORMAL
BH CV LOWER VASCULAR RIGHT MID FEMORAL COMPETENT: ABNORMAL
BH CV LOWER VASCULAR RIGHT MID FEMORAL COMPRESS: ABNORMAL
BH CV LOWER VASCULAR RIGHT MID FEMORAL PHASIC: ABNORMAL
BH CV LOWER VASCULAR RIGHT MID FEMORAL SPONT: ABNORMAL
BH CV LOWER VASCULAR RIGHT PERONEAL COMPRESS: ABNORMAL
BH CV LOWER VASCULAR RIGHT POPLITEAL AUGMENT: ABNORMAL
BH CV LOWER VASCULAR RIGHT POPLITEAL COMPETENT: ABNORMAL
BH CV LOWER VASCULAR RIGHT POPLITEAL COMPRESS: ABNORMAL
BH CV LOWER VASCULAR RIGHT POPLITEAL PHASIC: ABNORMAL
BH CV LOWER VASCULAR RIGHT POPLITEAL SPONT: ABNORMAL
BH CV LOWER VASCULAR RIGHT POSTERIOR TIBIAL COMPRESS: ABNORMAL
BH CV LOWER VASCULAR RIGHT PROFUNDA FEMORAL COMPRESS: ABNORMAL
BH CV LOWER VASCULAR RIGHT PROXIMAL FEMORAL COMPRESS: ABNORMAL
BH CV LOWER VASCULAR RIGHT SAPHENOFEMORAL JUNCTION COMPRESS: ABNORMAL
MAXIMAL PREDICTED HEART RATE: 139 BPM
STRESS TARGET HR: 118 BPM

## 2022-05-20 PROCEDURE — 93970 EXTREMITY STUDY: CPT

## 2022-05-20 PROCEDURE — 99214 OFFICE O/P EST MOD 30 MIN: CPT

## 2022-05-20 PROCEDURE — 73552 X-RAY EXAM OF FEMUR 2/>: CPT

## 2022-05-20 PROCEDURE — 73502 X-RAY EXAM HIP UNI 2-3 VIEWS: CPT

## 2022-05-20 NOTE — PROGRESS NOTES
Chief Complaint  Hip Pain (Left thigh also.) and Arm Pain    Subjective          Luann Frances presents to [facility]  History of Present Illness     Patient presents the office due to left hip and thigh pain.  Patient said for the past week she has had left hip pain in the posterior aspect that radiates to the front of her thigh.  Patient has a history of back pain and she sees pain management for steroid injections.  However this is a different kind of pain that is more in the hip area of the left side.  Patient said that 3 days ago the pain was at its highest intensity and she took tramadol which did help ease the pain.  Yesterday her pain had improved but today the pain has gotten worse again.  However she has not taken any medication for the pain.  Patient still is full range of motion and can still walk on her left leg without any issues.  Patient also has left lower extremity DVTs that she is currently being treated with Xarelto and is seeing hematology for.  Concern for possible migration of blood clot to the proximal area.    Patient denies any numbness or tingling in the groin area, loss of bowel or bladder function, or weakness associated.  Pain is pinpoint with palpation.  No redness, lesions, swelling noted      Objective   Vital Signs:  /84   Pulse 50   Temp 97.2 °F (36.2 °C)   Resp 16   Wt 99.8 kg (220 lb)   SpO2 100%   BMI 38.98 kg/m²         Physical Exam  Vitals reviewed.   Constitutional:       General: She is not in acute distress.  Pulmonary:      Effort: Pulmonary effort is normal.   Musculoskeletal:      Left hip: Tenderness present.      Left upper leg: Tenderness present. No swelling, edema or deformity.      Comments: Tenderness to the posterior aspect of the left hip near the trochanteric area.   Skin:     General: Skin is warm and dry.   Neurological:      General: No focal deficit present.      Mental Status: She is alert.   Psychiatric:          Mood and Affect: Mood normal.        Result Review :            X-ray of left hip and femur obtained.  No previous to compare with.  Arthritic changes of the left hip noted.  No acute findings.  Awaiting full radiology report.     Assessment and Plan    Diagnoses and all orders for this visit:    1. Left hip pain (Primary)  -     XR Hip With or Without Pelvis 2 - 3 View Left    2. Left thigh pain  -     XR Femur 2 View Left (In Office)  -     Duplex Venous Lower Extremity - Bilateral CAR; Future    Pleasant 82-year-old female presents to clinic due to new onset left hip and thigh pain for the past week.  Pain is relieved by tramadol and Tylenol and resting.  First concern is for migration of DVT of left lower extremity that she is being treated with Xarelto.  Obtaining stat Dopplers of the lower extremities to rule out migration of DVT.  X-ray of left hip and femur no acute findings aside from osteoarthritic changes.  Pain is very pinpoint in the posterior aspect of the hip and so I highly suspect trochanteric bursitis.  Also possibly radiating pain from her chronic low back pain that she sees pain management for.  Discussed following up with orthopedic specialist who she is already seeing for left hip pain and possibly needing injections for bursitis.  For now we will follow-up when the x-rays have resulted along with the Doppler of the lower extremities.  Patient was given exercises for trochanteric bursitis and discussed taking Tylenol for pain and using ice to help with pain as well.  We will follow-up when imaging has resulted.    Discussed that if she has any red flags of weakness, numbness and tingling in the groin, loss of bowel bladder function or if pain gets worse over the weekend to proceed to urgent care or ER.         Follow Up   Return if symptoms worsen or fail to improve.  Patient was given instructions and counseling regarding her condition or for health maintenance advice. Please see specific  information pulled into the AVS if appropriate.     Medical assistant and I wore mask and eyewear protection during entire encounter.  Patient wore mask.      Electronically signed by LIVIER Mathis, 05/20/22, 4:06 PM EDT.

## 2022-05-20 NOTE — TELEPHONE ENCOUNTER
Per patient's request called Dr. Sparks's office who gives patient's tramadol refills.  Discussed that I am unable to do any refills for tramadol but wanted to verify that they received her refill request.  The office said that they did not see request for tramadol but would put a  request for tramadol to be reordered for her.

## 2022-05-23 ENCOUNTER — OFFICE VISIT (OUTPATIENT)
Dept: GASTROENTEROLOGY | Facility: CLINIC | Age: 82
End: 2022-05-23

## 2022-05-23 VITALS
WEIGHT: 219 LBS | DIASTOLIC BLOOD PRESSURE: 95 MMHG | TEMPERATURE: 96.8 F | HEIGHT: 63 IN | SYSTOLIC BLOOD PRESSURE: 176 MMHG | HEART RATE: 81 BPM | BODY MASS INDEX: 38.8 KG/M2

## 2022-05-23 DIAGNOSIS — R11.0 CHRONIC NAUSEA: Primary | ICD-10-CM

## 2022-05-23 PROCEDURE — 99203 OFFICE O/P NEW LOW 30 MIN: CPT | Performed by: INTERNAL MEDICINE

## 2022-05-23 NOTE — PROGRESS NOTES
Chief Complaint   Patient presents with   • Nausea     Subjective   HPI  Luann Frances is a 82 y.o. female who presents today for new patient evaluation.   Long standing nausea which she has typically associated with various medications.  No emesis.  Most recently was having nausea after starting Eliquis due to LE DVT.  She reports since changing to Xarelto she has really had no symptoms over last 3 weeks.  Also has startign taking her thyroid replacement medication in the evenings which has helped.  She does still have gallbladder.  She does not report any association between nausea and food intake.  She has no RUQ pain.  She has CT scan A/P scheduled later this week to evaluate cause of her unprovoked DVT.          Objective   Vitals:    05/23/22 1546   BP: 176/95   Pulse: 81   Temp: 96.8 °F (36 °C)     Physical Exam  Vitals reviewed.   Constitutional:       Appearance: She is well-developed.   HENT:      Head: Normocephalic and atraumatic.   Abdominal:      General: Bowel sounds are normal. There is no distension.      Palpations: Abdomen is soft. There is no mass.      Tenderness: There is no abdominal tenderness.      Hernia: No hernia is present.   Skin:     General: Skin is warm and dry.   Neurological:      Mental Status: She is alert and oriented to person, place, and time.   Psychiatric:         Behavior: Behavior normal.         Thought Content: Thought content normal.         Judgment: Judgment normal.              Assessment & Plan   Assessment:     1. Chronic nausea      Plan:   Pleasant 82-year-old lady who has some nausea off and on for some time which she is always attributed to various medications.  More recently was having issues after starting Eliquis but this is significantly improved with change to Xarelto.  She is really not had any symptoms for the last few weeks.  She is of the mind to proceed with conservative management at this time which is probably reasonable.  She does have an  upcoming CT scan of the chest abdomen and pelvis we will await those results.  Certainly should she have any recurrence of her symptoms would have a low threshold to consider biliary evaluation although symptoms or not classic.  She will f/u in office in 6 weeks for reassessment.           Elton Ray M.D.  [facility]  [address] [place] [address]  Office: [phone]

## 2022-05-26 ENCOUNTER — TELEPHONE (OUTPATIENT)
Dept: ONCOLOGY | Facility: CLINIC | Age: 82
End: 2022-05-26

## 2022-05-26 NOTE — TELEPHONE ENCOUNTER
Caller: Luann Frances    Relationship to patient: Self    Best call back number: [phone]    Chief complaint: CANC./SALOME. DUE TO SALOME. CT SCAN.    Type of visit: LAB/FOLLOW UP 2    If rescheduling, when is the original appointment: 6/6/2022    Additional notes: PLEASE SALOME. AFTER 6/21/2022 WHEN SCAN IS YVONNE.

## 2022-05-27 ENCOUNTER — APPOINTMENT (OUTPATIENT)
Dept: CT IMAGING | Facility: HOSPITAL | Age: 82
End: 2022-05-27

## 2022-06-06 ENCOUNTER — APPOINTMENT (OUTPATIENT)
Dept: LAB | Facility: HOSPITAL | Age: 82
End: 2022-06-06

## 2022-06-09 NOTE — ANESTHESIA PREPROCEDURE EVALUATION
Anesthesia Evaluation     Patient summary reviewed and Nursing notes reviewed   no history of anesthetic complications:  NPO Solid Status: > 8 hours  NPO Liquid Status: > 2 hours           Airway   Mallampati: II  TM distance: >3 FB  Neck ROM: full  No difficulty expected  Dental      Pulmonary     breath sounds clear to auscultation  (+) a smoker Current Abstained day of surgery, COPD, sleep apnea,   Cardiovascular   Exercise tolerance: good (4-7 METS)    Rhythm: regular  Rate: normal    (+) hypertension, hyperlipidemia,       Neuro/Psych  GI/Hepatic/Renal/Endo    (+)  GERD,  renal disease stones and CRI, diabetes mellitus, thyroid problem hypothyroidism    Musculoskeletal     Abdominal    Substance History      OB/GYN          Other                      Anesthesia Plan    ASA 3     general     intravenous induction     Anesthetic plan, all risks, benefits, and alternatives have been provided, discussed and informed consent has been obtained with: patient.    Plan discussed with CRNA.       The patient has been re-examined and I agree with the above assessment or I updated with my findings.

## 2022-06-21 ENCOUNTER — HOSPITAL ENCOUNTER (OUTPATIENT)
Dept: CT IMAGING | Facility: HOSPITAL | Age: 82
Discharge: HOME | End: 2022-06-21
Admitting: INTERNAL MEDICINE

## 2022-06-21 DIAGNOSIS — I82.4Z2 DVT, LOWER EXTREMITY, DISTAL, ACUTE, LEFT: ICD-10-CM

## 2022-06-21 LAB — CREAT BLDA-MCNC: 1.3 MG/DL (ref 0.6–1.3)

## 2022-06-21 PROCEDURE — 71260 CT THORAX DX C+: CPT

## 2022-06-21 PROCEDURE — 82565 ASSAY OF CREATININE: CPT

## 2022-06-21 PROCEDURE — 25010000002 IOPAMIDOL 61 % SOLUTION: Performed by: INTERNAL MEDICINE

## 2022-06-21 PROCEDURE — 0 DIATRIZOATE MEGLUMINE & SODIUM PER 1 ML: Performed by: INTERNAL MEDICINE

## 2022-06-21 PROCEDURE — 74177 CT ABD & PELVIS W/CONTRAST: CPT

## 2022-06-21 RX ADMIN — DIATRIZOATE MEGLUMINE AND DIATRIZOATE SODIUM 30 ML: 660; 100 LIQUID ORAL; RECTAL at 16:49

## 2022-06-21 RX ADMIN — IOPAMIDOL 85 ML: 612 INJECTION, SOLUTION INTRAVENOUS at 16:49

## 2022-06-24 ENCOUNTER — APPOINTMENT (OUTPATIENT)
Dept: LAB | Facility: HOSPITAL | Age: 82
End: 2022-06-24

## 2022-06-24 ENCOUNTER — OFFICE VISIT (OUTPATIENT)
Dept: ONCOLOGY | Facility: CLINIC | Age: 82
End: 2022-06-24

## 2022-06-24 DIAGNOSIS — E53.8 LOW VITAMIN B12 LEVEL: ICD-10-CM

## 2022-06-24 DIAGNOSIS — I82.4Z2 DVT, LOWER EXTREMITY, DISTAL, ACUTE, LEFT: Primary | ICD-10-CM

## 2022-06-24 DIAGNOSIS — Z86.718 HISTORY OF DEEP VENOUS THROMBOSIS (DVT) OF DISTAL VEIN OF RIGHT LOWER EXTREMITY: ICD-10-CM

## 2022-06-24 DIAGNOSIS — R11.0 NAUSEA: ICD-10-CM

## 2022-06-24 PROCEDURE — 99441 PR PHYS/QHP TELEPHONE EVALUATION 5-10 MIN: CPT | Performed by: INTERNAL MEDICINE

## 2022-06-24 NOTE — PROGRESS NOTES
Subjective     CHIEF COMPLAINT:      Left lower extremity deep vein thrombosis    HISTORY OF PRESENT ILLNESS:     Luann Frances is a 82 y.o. female patient with left lower extremity deep vein thrombosis.  She is on Xarelto 20 mg daily.  She is tolerating it except for nausea.  She is not having problems with bleeding.  She reports noticing 1 bruise.  She is not having left leg pain or swelling.    Patient reports that the nausea is intermittent.  She takes all of her medicines at night to minimize the nausea.  She takes Compazine for the nausea and it usually helps.  She usually takes a total of 2 tablets of Compazine a day.    ROS:  Pertinent ROS is in the HPI.     Past medical, surgical, social and family history were reviewed.     MEDICATIONS:    Current Outpatient Medications:   •  Advair Diskus 100-50 MCG/DOSE DISKUS, Inhale 1 puff 2 (Two) Times a Day., Disp: 180 each, Rfl: 5  •  albuterol sulfate  (90 Base) MCG/ACT inhaler, Inhale 2 puffs Every 4 (Four) Hours As Needed for Wheezing or Shortness of Air., Disp: 8 g, Rfl: 1  •  CALCIUM PO, Take  by mouth Daily., Disp: , Rfl:   •  cholecalciferol (VITAMIN D3) 25 MCG (1000 UT) tablet, Take 2,000 Units by mouth Daily., Disp: , Rfl:   •  [employer] ANTACID & ANTI-GAS 1000-60 MG chewable tablet, As Needed., Disp: , Rfl:   •  furosemide (Lasix) 20 MG tablet, Take 1 tablet by mouth Daily., Disp: 90 tablet, Rfl: 3  •  Levoxyl 100 MCG tablet, Take 1 tablet by mouth Daily., Disp: 30 tablet, Rfl: 5  •  omeprazole OTC (PriLOSEC OTC) 20 MG EC tablet, Take 20 mg by mouth As Needed. Take 1-2 tablet, Disp: , Rfl:   •  prochlorperazine (COMPAZINE) 10 MG tablet, Take 1 tablet by mouth Every 6 (Six) Hours As Needed for Nausea or Vomiting., Disp: 100 tablet, Rfl: 5  •  rivaroxaban (XARELTO) 20 MG tablet, Take 1 tablet by mouth Daily., Disp: 30 tablet, Rfl: 2  •  traMADol (ULTRAM) 50 MG tablet, Take 50 mg by mouth As Needed., Disp: , Rfl:   •  triamcinolone (KENALOG) 0.1 %  ointment, Apply  topically to the appropriate area as directed 2 (Two) Times a Day. (Patient taking differently: Apply  topically to the appropriate area as directed As Needed.), Disp: 30 g, Rfl: 0  Objective     VITAL SIGNS:     Not obtained-telehealth visit    Wt Readings from Last 5 Encounters:   05/23/22 99.3 kg (219 lb)   05/20/22 99.8 kg (220 lb)   05/17/22 99.8 kg (220 lb)   05/02/22 99.7 kg (219 lb 12.8 oz)   04/27/22 99.5 kg (219 lb 6.4 oz)     PHYSICAL EXAMINATION:   Not performed-telehealth visit.      DIAGNOSTIC DATA:         Results from last 7 days   Lab Units 06/21/22  1647   CREATININE mg/dL 1.30     Venous Doppler of left lower extremity on 5/20/2022:  · Minimal chronic left lower extremity deep vein thrombosis in the popliteal.  · All other veins appeared normal bilaterally.    CT chest abdomen pelvis on 6/21/2022:  1. There is no convincing evidence for malignancy and there is no  lymphadenopathy. No acute abnormality is seen.  2. Extensive colonic diverticulosis.  3. Single 3 mm nonobstructing left renal stone.      Assessment & Plan    *Acute left deep vein thrombosis in the popliteal, posterior tibial, peroneal and soleal veins diagnosed on 4/12/2022.  · No clear precipitating factor.  · Patient noticed swelling and some redness of the leg.  · The DVT was diagnosed when she had venous Doppler to reevaluate the right lower extremity DVT.  · She was placed on Eliquis.  · She was having nausea after taking Eliquis.  · She was therefore switched to Xarelto 20 mg daily on 4/29/2022.  · Patient responded to Xarelto.  Venous Doppler on 5/20/2022 revealed minimal chronic DVT in the left popliteal vein.  The DVT is in the posterior tibial, peroneal and soleal veins dissolved.  · She is tolerating Xarelto except for nausea (has nausea with other medicines).  · CT chest abdomen pelvis on 6/21/2022 showed no evidence of underlying malignancy to explain the DVT.    *History of right lower extremity DVT  diagnosed on 4/1/2021.  · This was a provoked episode that developed after surgery to place rib plates.  · Patient was placed on Xarelto.  · Patient to Xarelto regularly until March 2022 when she started taking it every other day.  · Patient is no longer having pain in the right lower extremity.  · Venous Doppler on 4/12/2022 showed resolution of the DVT.    *Nausea.  · She is having nausea after taking Eliquis.  · She is on Compazine 10 mg every 6 hours as needed.  · Nausea also developed after she was switched to Xarelto.  · CT scan revealed gallbladder stone.  However, there were no changes of cholecystitis.  · The cause of the nausea is not clear.    *History of low vitamin B12 level.  · B12 was 266 on 9/24/2019.  · Hemoglobin was 12.1 on 4/27/2022.      PLAN:    1.  Continue Xarelto 20 mg daily.   2.  Continue Compazine 10 mg every 6 hours as needed for nausea.   3.  Obtain venous Doppler left lower extremity in October 2022.   4.  We will see her in follow-up 1 week after the venous Doppler.  CBC CMP and B12 levels will be obtained.    5.  If the venous doppler reveals resolution of the DVT, I would recommend reducing Xarelto to 10 mg daily and continuing with this dose long term given her risk for recurrent thrombosis.       You have chosen to receive care through a telephone visit today. Do you consent to use a telephone visit for your medical care today? Yes     This visit has been rescheduled as a phone visit to comply with patient safety concerns in accordance with CDC recommendations. Total time of discussion was 10 minutes.      Lita Dubon MD  06/24/22

## 2022-06-28 ENCOUNTER — TELEPHONE (OUTPATIENT)
Dept: FAMILY MEDICINE CLINIC | Facility: CLINIC | Age: 82
End: 2022-06-28

## 2022-06-28 NOTE — TELEPHONE ENCOUNTER
Called pt to change office visit to mychart video visit for the same time due to provider being out of the office.  Could not reach pt, could not leave VM.

## 2022-06-30 DIAGNOSIS — E03.9 PRIMARY HYPOTHYROIDISM: ICD-10-CM

## 2022-06-30 RX ORDER — LEVOTHYROXINE SODIUM 125 UG/1
125 TABLET ORAL DAILY
Qty: 90 TABLET | Refills: 0 | Status: SHIPPED | OUTPATIENT
Start: 2022-06-30 | End: 2022-06-30 | Stop reason: DRUGHIGH

## 2022-06-30 RX ORDER — LEVOTHYROXINE SODIUM 100 UG/1
TABLET ORAL
Qty: 35 TABLET | Refills: 5
Start: 2022-06-30 | End: 2022-08-16

## 2022-07-14 ENCOUNTER — OFFICE VISIT (OUTPATIENT)
Dept: GASTROENTEROLOGY | Facility: CLINIC | Age: 82
End: 2022-07-14

## 2022-07-14 VITALS
WEIGHT: 209 LBS | TEMPERATURE: 96.8 F | HEIGHT: 63 IN | DIASTOLIC BLOOD PRESSURE: 89 MMHG | SYSTOLIC BLOOD PRESSURE: 148 MMHG | BODY MASS INDEX: 37.03 KG/M2 | HEART RATE: 72 BPM

## 2022-07-14 DIAGNOSIS — K57.90 DIVERTICULOSIS: ICD-10-CM

## 2022-07-14 DIAGNOSIS — K21.9 GASTROESOPHAGEAL REFLUX DISEASE, UNSPECIFIED WHETHER ESOPHAGITIS PRESENT: ICD-10-CM

## 2022-07-14 DIAGNOSIS — R11.0 NAUSEA: Primary | ICD-10-CM

## 2022-07-14 PROCEDURE — 99214 OFFICE O/P EST MOD 30 MIN: CPT | Performed by: NURSE PRACTITIONER

## 2022-07-14 NOTE — PROGRESS NOTES
Chief Complaint   Patient presents with   • Nausea       Luann Frances is a  82 y.o. female here for a follow up visit for nausea.    HPI  82-year-old female presents today for follow-up visit for nausea.  She is a patient of Dr. Ray.  She is new to me today.  She was last seen in the office by him on 5/23/2022.  She has a history of chronic nausea and admits that as long she takes her omeprazole and really watches what she eats she does not have any nausea.  But on the days that she does not take her omeprazole and she eats whatever she wants that she has constant nausea from the time she gets up to the time she goes to bed.  She had a CT scan of the abdomen pelvis done recently that showed a possible tiny gallstone and some diverticulosis.  Otherwise negative.  She does have a history of hypothyroidism and her most recent TSH was very high at 50.400.  She tells me she has had elevated TSH as for the past 2 years.  She tells me she is working closely with her endocrinologist about this.  She keeps getting allergic reactions to her thyroid medication.  She denies any dysphagia, abdominal pain, vomiting, diarrhea, constipation, rectal bleeding or melena.  She admits her appetite is good and her weight has dropped 10 pounds since May of this year.  She has never had a screening colonoscopy before.  She tells me she does not want 1.  Her PCP ordered Cologuard but she has not done it yet.  She has never had an EGD before. Her brother has a history of esophageal and stomach cancer.  Past Medical History:   Diagnosis Date   • Acute deep vein thrombosis (DVT) of femoral vein of right lower extremity (HCC) 04/15/2021   • Arthritis    • Asymptomatic PVCs    • Backache    • Chronic bronchitis (HCC)    • CKD (chronic kidney disease)     Stage 3   • Deep vein thrombosis (DVT) of right lower extremity (HCC)    • Essential hypertension 01/20/2020   • Fracture of humerus    • GERD (gastroesophageal reflux disease)    •  Hyperlipidemia    • Hypertension    • Hypothyroidism    • Pneumonia    • Pulmonary emphysema (HCC)    • Renal calculi    • Renal calculus 01/06/2021   • Sleep apnea     CPAP USED   • Thoracic vertebral fracture (HCC)        Past Surgical History:   Procedure Laterality Date   • APPENDECTOMY     • EXTRACORPOREAL SHOCK WAVE LITHOTRIPSY (ESWL) Left 01/06/2021    Procedure: EXTRACORPOREAL SHOCKWAVE LITHOTRIPSY, CYSTOSCOPY, RETROGRADE PYLEOGRAM;  Surgeon: Walter Schwartz MD;  Location: [facility], [place];  Service: Urology;  Laterality: Left;   • EYE SURGERY      cataracts   • HUMERUS FRACTURE SURGERY     • RIB FRACTURE SURGERY  2021   • THORACOSCOPY Right 02/16/2021    Procedure: bronchoscopy, right video assisted THORACOSCOPY WITH PLATING OF 3, 4, 5, AND 6 RIBS;  Surgeon: Kelley Delong MD;  Location: [facility];  Service: Thoracic;  Laterality: Right;   • TOTAL KNEE ARTHROPLASTY Left 04/2015   • UMBILICAL HERNIA REPAIR         Scheduled Meds:    Continuous Infusions:No current facility-administered medications for this visit.      PRN Meds:.    Allergies   Allergen Reactions   • Ambien [Zolpidem Tartrate] Nausea Only     nausea   • Amoxicillin-Pot Clavulanate Nausea Only   • Doxycycline Nausea Only   • Eliquis [Apixaban] Nausea Only   • Levofloxacin Nausea Only   • Metronidazole Nausea Only   • Naproxen GI Intolerance   • Sulfamethoxazole-Trimethoprim Nausea Only   • Synthroid [Levothyroxine Sodium] Nausea Only   • Zolpidem Nausea Only     nausea  Nausea-ambien  nausea  nausea  Nausea-ambien       Social History     Socioeconomic History   • Marital status:    Tobacco Use   • Smoking status: Current Every Day Smoker     Packs/day: 0.25     Years: 50.00     Pack years: 12.50     Types: Cigarettes     Start date: 1970   • Smokeless tobacco: Never Used   Vaping Use   • Vaping Use: Never used   Substance and Sexual Activity   • Alcohol use: Not Currently   • Drug use: No       Family History   Problem  Relation Age of Onset   • Cancer Mother         breast   • Breast cancer Mother    • No Known Problems Father    • Heart disease Maternal Grandmother    • Cancer Brother         esophageal , stomach    • Esophageal cancer Brother    • No Known Problems Son    • Breast cancer Maternal Aunt    • Heart disease Maternal Aunt    • No Known Problems Son    • Malig Hyperthermia Neg Hx        Review of Systems   Constitutional: Negative for appetite change, chills, diaphoresis, fatigue, fever and unexpected weight change.   HENT: Negative for nosebleeds, postnasal drip, sore throat, trouble swallowing and voice change.    Respiratory: Negative for cough, choking, chest tightness, shortness of breath, wheezing and stridor.    Cardiovascular: Negative for chest pain, palpitations and leg swelling.   Gastrointestinal: Positive for nausea. Negative for abdominal distention, abdominal pain, anal bleeding, blood in stool, constipation, diarrhea, rectal pain and vomiting.   Endocrine: Negative for polydipsia, polyphagia and polyuria.   Musculoskeletal: Negative for gait problem.   Skin: Negative for rash and wound.   Allergic/Immunologic: Negative for food allergies.   Neurological: Negative for dizziness, speech difficulty and light-headedness.   Psychiatric/Behavioral: Negative for confusion, self-injury, sleep disturbance and suicidal ideas.       Vitals:    07/14/22 1352   BP: 148/89   Pulse: 72   Temp: 96.8 °F (36 °C)       Physical Exam  Constitutional:       General: She is not in acute distress.     Appearance: She is well-developed. She is not ill-appearing.   HENT:      Head: Normocephalic.   Eyes:      Pupils: Pupils are equal, round, and reactive to light.   Cardiovascular:      Rate and Rhythm: Normal rate and regular rhythm.      Heart sounds: Normal heart sounds.   Pulmonary:      Effort: Pulmonary effort is normal.      Breath sounds: Normal breath sounds.   Abdominal:      General: Bowel sounds are normal. There  is no distension.      Palpations: Abdomen is soft. There is no mass.      Tenderness: There is no abdominal tenderness. There is no guarding or rebound.      Hernia: No hernia is present.   Musculoskeletal:         General: Normal range of motion.   Skin:     General: Skin is warm and dry.   Neurological:      Mental Status: She is alert and oriented to person, place, and time.   Psychiatric:         Speech: Speech normal.         Behavior: Behavior normal.         Judgment: Judgment normal.         No radiology results for the last 7 days     Diagnoses and all orders for this visit:    1. Nausea (Primary)    2. Gastroesophageal reflux disease, unspecified whether esophagitis present    3. Diverticulosis       Still having nausea.  It seems much improved on the days that she remembers to take her omeprazole and watches what she eats.  Sounds reflux related.  For now continue omeprazole 20 mg daily.  Since she does have chronic kidney disease instead of increasing the omeprazole to twice daily will instead add Pepcid AC complete to her nightly routine.  Continue GERD precautions.  Bowels moving well.  Patient to call the office with any issues.  Patient to follow-up with [facility] physician assistant in 6 months.  Patient is agreeable to the plan.

## 2022-08-10 ENCOUNTER — TELEPHONE (OUTPATIENT)
Dept: ENDOCRINOLOGY | Age: 82
End: 2022-08-10

## 2022-08-11 ENCOUNTER — APPOINTMENT (OUTPATIENT)
Dept: CT IMAGING | Facility: HOSPITAL | Age: 82
End: 2022-08-11

## 2022-08-11 ENCOUNTER — HOSPITAL ENCOUNTER (EMERGENCY)
Facility: HOSPITAL | Age: 82
Discharge: HOME | End: 2022-08-11
Attending: EMERGENCY MEDICINE | Admitting: EMERGENCY MEDICINE

## 2022-08-11 VITALS
WEIGHT: 209 LBS | BODY MASS INDEX: 37.03 KG/M2 | RESPIRATION RATE: 16 BRPM | SYSTOLIC BLOOD PRESSURE: 203 MMHG | OXYGEN SATURATION: 99 % | DIASTOLIC BLOOD PRESSURE: 93 MMHG | TEMPERATURE: 96.9 F | HEIGHT: 63 IN | HEART RATE: 54 BPM

## 2022-08-11 DIAGNOSIS — N13.2 URETERAL STONE WITH HYDRONEPHROSIS: Primary | ICD-10-CM

## 2022-08-11 LAB
ALBUMIN SERPL-MCNC: 4.3 G/DL (ref 3.5–5.2)
ALBUMIN/GLOB SERPL: 1.4 G/DL
ALP SERPL-CCNC: 57 U/L (ref 39–117)
ALT SERPL W P-5'-P-CCNC: 9 U/L (ref 1–33)
ANION GAP SERPL CALCULATED.3IONS-SCNC: 12.1 MMOL/L (ref 5–15)
AST SERPL-CCNC: 11 U/L (ref 1–32)
BACTERIA UR QL AUTO: ABNORMAL /HPF
BASOPHILS # BLD AUTO: 0.02 10*3/MM3 (ref 0–0.2)
BASOPHILS NFR BLD AUTO: 0.2 % (ref 0–1.5)
BILIRUB SERPL-MCNC: 0.3 MG/DL (ref 0–1.2)
BILIRUB UR QL STRIP: NEGATIVE
BUN SERPL-MCNC: 31 MG/DL (ref 8–23)
BUN/CREAT SERPL: 22 (ref 7–25)
CALCIUM SPEC-SCNC: 9.3 MG/DL (ref 8.6–10.5)
CHLORIDE SERPL-SCNC: 104 MMOL/L (ref 98–107)
CLARITY UR: ABNORMAL
CO2 SERPL-SCNC: 23.9 MMOL/L (ref 22–29)
COLOR UR: ABNORMAL
CREAT SERPL-MCNC: 1.41 MG/DL (ref 0.57–1)
DEPRECATED RDW RBC AUTO: 48.5 FL (ref 37–54)
EGFRCR SERPLBLD CKD-EPI 2021: 37.6 ML/MIN/1.73
EOSINOPHIL # BLD AUTO: 0 10*3/MM3 (ref 0–0.4)
EOSINOPHIL NFR BLD AUTO: 0 % (ref 0.3–6.2)
ERYTHROCYTE [DISTWIDTH] IN BLOOD BY AUTOMATED COUNT: 14.9 % (ref 12.3–15.4)
GLOBULIN UR ELPH-MCNC: 3 GM/DL
GLUCOSE SERPL-MCNC: 107 MG/DL (ref 65–99)
GLUCOSE UR STRIP-MCNC: NEGATIVE MG/DL
HCT VFR BLD AUTO: 38.9 % (ref 34–46.6)
HGB BLD-MCNC: 12.8 G/DL (ref 12–15.9)
HGB UR QL STRIP.AUTO: ABNORMAL
HYALINE CASTS UR QL AUTO: ABNORMAL /LPF
IMM GRANULOCYTES # BLD AUTO: 0.05 10*3/MM3 (ref 0–0.05)
IMM GRANULOCYTES NFR BLD AUTO: 0.5 % (ref 0–0.5)
KETONES UR QL STRIP: NEGATIVE
LEUKOCYTE ESTERASE UR QL STRIP.AUTO: NEGATIVE
LIPASE SERPL-CCNC: 18 U/L (ref 13–60)
LYMPHOCYTES # BLD AUTO: 1.35 10*3/MM3 (ref 0.7–3.1)
LYMPHOCYTES NFR BLD AUTO: 12.9 % (ref 19.6–45.3)
MCH RBC QN AUTO: 29.8 PG (ref 26.6–33)
MCHC RBC AUTO-ENTMCNC: 32.9 G/DL (ref 31.5–35.7)
MCV RBC AUTO: 90.7 FL (ref 79–97)
MONOCYTES # BLD AUTO: 0.67 10*3/MM3 (ref 0.1–0.9)
MONOCYTES NFR BLD AUTO: 6.4 % (ref 5–12)
NEUTROPHILS NFR BLD AUTO: 8.38 10*3/MM3 (ref 1.7–7)
NEUTROPHILS NFR BLD AUTO: 80 % (ref 42.7–76)
NITRITE UR QL STRIP: POSITIVE
NRBC BLD AUTO-RTO: 0 /100 WBC (ref 0–0.2)
PH UR STRIP.AUTO: 5.5 [PH] (ref 5–8)
PLATELET # BLD AUTO: 285 10*3/MM3 (ref 140–450)
PMV BLD AUTO: 11.6 FL (ref 6–12)
POTASSIUM SERPL-SCNC: 4.3 MMOL/L (ref 3.5–5.2)
PROT SERPL-MCNC: 7.3 G/DL (ref 6–8.5)
PROT UR QL STRIP: ABNORMAL
RBC # BLD AUTO: 4.29 10*6/MM3 (ref 3.77–5.28)
RBC # UR STRIP: ABNORMAL /HPF
REF LAB TEST METHOD: ABNORMAL
SODIUM SERPL-SCNC: 140 MMOL/L (ref 136–145)
SP GR UR STRIP: >=1.03 (ref 1–1.03)
SQUAMOUS #/AREA URNS HPF: ABNORMAL /HPF
UROBILINOGEN UR QL STRIP: ABNORMAL
WBC # UR STRIP: ABNORMAL /HPF
WBC NRBC COR # BLD: 10.47 10*3/MM3 (ref 3.4–10.8)

## 2022-08-11 PROCEDURE — 96374 THER/PROPH/DIAG INJ IV PUSH: CPT

## 2022-08-11 PROCEDURE — 81001 URINALYSIS AUTO W/SCOPE: CPT | Performed by: EMERGENCY MEDICINE

## 2022-08-11 PROCEDURE — 25010000002 MORPHINE PER 10 MG: Performed by: EMERGENCY MEDICINE

## 2022-08-11 PROCEDURE — 25010000002 ONDANSETRON PER 1 MG: Performed by: EMERGENCY MEDICINE

## 2022-08-11 PROCEDURE — 83690 ASSAY OF LIPASE: CPT | Performed by: EMERGENCY MEDICINE

## 2022-08-11 PROCEDURE — 85025 COMPLETE CBC W/AUTO DIFF WBC: CPT | Performed by: EMERGENCY MEDICINE

## 2022-08-11 PROCEDURE — 63710000001 ONDANSETRON ODT 4 MG TABLET DISPERSIBLE: Performed by: EMERGENCY MEDICINE

## 2022-08-11 PROCEDURE — 99283 EMERGENCY DEPT VISIT LOW MDM: CPT

## 2022-08-11 PROCEDURE — 96375 TX/PRO/DX INJ NEW DRUG ADDON: CPT

## 2022-08-11 PROCEDURE — 80053 COMPREHEN METABOLIC PANEL: CPT | Performed by: EMERGENCY MEDICINE

## 2022-08-11 PROCEDURE — 74176 CT ABD & PELVIS W/O CONTRAST: CPT

## 2022-08-11 RX ORDER — MORPHINE SULFATE 2 MG/ML
2 INJECTION, SOLUTION INTRAMUSCULAR; INTRAVENOUS ONCE
Status: COMPLETED | OUTPATIENT
Start: 2022-08-11 | End: 2022-08-11

## 2022-08-11 RX ORDER — ONDANSETRON 2 MG/ML
4 INJECTION INTRAMUSCULAR; INTRAVENOUS ONCE
Status: COMPLETED | OUTPATIENT
Start: 2022-08-11 | End: 2022-08-11

## 2022-08-11 RX ORDER — HYDROCODONE BITARTRATE AND ACETAMINOPHEN 5; 325 MG/1; MG/1
1 TABLET ORAL EVERY 6 HOURS PRN
Qty: 12 TABLET | Refills: 0 | Status: ON HOLD | OUTPATIENT
Start: 2022-08-11 | End: 2022-08-23 | Stop reason: SDUPTHER

## 2022-08-11 RX ORDER — ONDANSETRON 4 MG/1
4 TABLET, ORALLY DISINTEGRATING ORAL ONCE
Status: COMPLETED | OUTPATIENT
Start: 2022-08-11 | End: 2022-08-11

## 2022-08-11 RX ORDER — ONDANSETRON 4 MG/1
4 TABLET, ORALLY DISINTEGRATING ORAL EVERY 8 HOURS PRN
Qty: 10 TABLET | Refills: 0 | Status: SHIPPED | OUTPATIENT
Start: 2022-08-11 | End: 2022-09-12

## 2022-08-11 RX ORDER — HYDROCODONE BITARTRATE AND ACETAMINOPHEN 5; 325 MG/1; MG/1
1 TABLET ORAL EVERY 6 HOURS PRN
Status: DISCONTINUED | OUTPATIENT
Start: 2022-08-11 | End: 2022-08-11 | Stop reason: HOSPADM

## 2022-08-11 RX ORDER — SODIUM CHLORIDE 0.9 % (FLUSH) 0.9 %
10 SYRINGE (ML) INJECTION AS NEEDED
Status: DISCONTINUED | OUTPATIENT
Start: 2022-08-11 | End: 2022-08-11 | Stop reason: HOSPADM

## 2022-08-11 RX ADMIN — HYDROCODONE BITARTRATE AND ACETAMINOPHEN 1 TABLET: 5; 325 TABLET ORAL at 12:44

## 2022-08-11 RX ADMIN — ONDANSETRON 4 MG: 2 INJECTION INTRAMUSCULAR; INTRAVENOUS at 11:00

## 2022-08-11 RX ADMIN — ONDANSETRON 4 MG: 4 TABLET, ORALLY DISINTEGRATING ORAL at 12:44

## 2022-08-11 RX ADMIN — MORPHINE SULFATE 2 MG: 2 INJECTION, SOLUTION INTRAMUSCULAR; INTRAVENOUS at 11:00

## 2022-08-11 NOTE — ED TRIAGE NOTES
Pt arrives to ED via PV. Pt reports having LLQ abdominal pain and nausea for 2 hours. Pt has a history of kidney stones.

## 2022-08-11 NOTE — ED PROVIDER NOTES
EMERGENCY DEPARTMENT ENCOUNTER    Room Number:  35/35  Date of encounter:  8/12/2022  PCP: Provider, No Known  Historian: Patient      HPI:  Chief Complaint: Abdominal pain  A complete HPI/ROS/PMH/PSH/SH/FH are unobtainable due to: Nothing    Context: Luann Frances is a 82 y.o. female who presents to the ED c/o very pleasant female who presents with severe left lower quadrant abdominal pain that is been worsening over the last 2 days.  Pain radiates to the low back and is associated with nausea but no vomiting.  She has no urinary frequency, urgency or hematuria that she is aware of.  She is had no fever.  She has a history of kidney stones and says this feels similar to that.  She has taken over-the-counter remedies without improvement.  Nothing seems to make it better or worse.      PAST MEDICAL HISTORY  Active Ambulatory Problems     Diagnosis Date Noted   • Atopic rhinitis 01/21/2016   • Chronic obstructive pulmonary disease (HCC) 01/21/2016   • Diverticulitis of colon 01/21/2016   • Acid reflux 01/21/2016   • Fatigue 01/21/2016   • Primary hypothyroidism 01/21/2016   • Insomnia 01/21/2016   • Knee pain 01/21/2016   • Pain of lower extremity 01/21/2016   • Chronic nausea 01/21/2016   • Shoulder pain 01/21/2016   • Ventricular premature beats 01/21/2016   • Weight gain 12/07/2016   • Cold intolerance 12/07/2016   • Post herpetic neuralgia 03/02/2017   • Insulin resistance 04/10/2017   • Vitamin D deficiency 02/19/2018   • CKD (chronic kidney disease) stage 3, GFR 30-59 ml/min (CMS/[facility]) 02/19/2018   • Mixed hyperlipidemia 02/19/2018   • Hypersomnia 10/16/2019   • Hyperuricemia 01/20/2020   • Essential hypertension 01/20/2020   • DELROY (obstructive sleep apnea) 12/08/2020   • Renal calculus 01/06/2021   • Multiple closed fractures of ribs of right side 02/12/2021   • Non-cardiac chest pain 02/14/2021   • Compression fracture of body of thoracic vertebra (HCC) 08/17/2021   • Arm skin lesion, right 11/17/2021   •  DVT, lower extremity, distal, acute, left (HCC) 04/15/2022   • Lower extremity edema 04/15/2022   • Skin tear of right lower leg without complication 05/17/2022   • Medicare annual wellness visit, subsequent 05/17/2022   • Tobacco abuse 05/17/2022   • Osteoporosis 05/17/2022     Resolved Ambulatory Problems     Diagnosis Date Noted   • Acute sinusitis 01/21/2016   • Asthma 01/21/2016   • Candidiasis 01/21/2016   • Cough 01/21/2016   • Mild dehydration 01/21/2016   • Chronic obstructive pulmonary disease with acute exacerbation (HCC) 01/21/2016   • Elevated BP 10/25/2016   • Dyslipidemia 12/07/2016   • Acute deep vein thrombosis (DVT) of femoral vein of right lower extremity ([facility]) 04/15/2021     Past Medical History:   Diagnosis Date   • Arthritis    • Asymptomatic PVCs    • Backache    • Chronic bronchitis ([facility])    • CKD (chronic kidney disease)    • Deep vein thrombosis (DVT) of right lower extremity ([facility])    • Fracture of humerus    • GERD (gastroesophageal reflux disease)    • Hyperlipidemia    • Hypertension    • Hypothyroidism    • Pneumonia    • Pulmonary emphysema ([facility])    • Renal calculi    • Sleep apnea    • Thoracic vertebral fracture ([facility])          PAST SURGICAL HISTORY  Past Surgical History:   Procedure Laterality Date   • APPENDECTOMY     • EXTRACORPOREAL SHOCK WAVE LITHOTRIPSY (ESWL) Left 01/06/2021    Procedure: EXTRACORPOREAL SHOCKWAVE LITHOTRIPSY, CYSTOSCOPY, RETROGRADE PYLEOGRAM;  Surgeon: Walter Schwartz MD;  Location: [facility], [place], operated by [facility];  Service: Urology;  Laterality: Left;   • EYE SURGERY      cataracts   • HUMERUS FRACTURE SURGERY     • RIB FRACTURE SURGERY  2021   • THORACOSCOPY Right 02/16/2021    Procedure: bronchoscopy, right video assisted THORACOSCOPY WITH PLATING OF 3, 4, 5, AND 6 RIBS;  Surgeon: Kelley Delong MD;  Location: [facility];  Service: Thoracic;  Laterality: Right;   • TOTAL KNEE ARTHROPLASTY Left 04/2015   • UMBILICAL HERNIA REPAIR           FAMILY HISTORY  Family  History   Problem Relation Age of Onset   • Cancer Mother         breast   • Breast cancer Mother    • No Known Problems Father    • Heart disease Maternal Grandmother    • Cancer Brother         esophageal , stomach    • Esophageal cancer Brother    • No Known Problems Son    • Breast cancer Maternal Aunt    • Heart disease Maternal Aunt    • No Known Problems Son    • Malig Hyperthermia Neg Hx          SOCIAL HISTORY  Social History     Socioeconomic History   • Marital status:    Tobacco Use   • Smoking status: Current Every Day Smoker     Packs/day: 0.25     Years: 50.00     Pack years: 12.50     Types: Cigarettes     Start date: 1970   • Smokeless tobacco: Never Used   Vaping Use   • Vaping Use: Never used   Substance and Sexual Activity   • Alcohol use: Not Currently   • Drug use: No         ALLERGIES  Ambien [zolpidem tartrate], Amoxicillin-pot clavulanate, Doxycycline, Eliquis [apixaban], Levofloxacin, Metronidazole, Naproxen, Sulfamethoxazole-trimethoprim, Synthroid [levothyroxine sodium], and Zolpidem        REVIEW OF SYSTEMS  Review of Systems     All systems reviewed and negative except for those discussed in HPI.       PHYSICAL EXAM    I have reviewed the triage vital signs and nursing notes.    ED Triage Vitals [08/11/22 0943]   Temp Heart Rate Resp BP SpO2   96.9 °F (36.1 °C) 66 16 -- 97 %      Temp src Heart Rate Source Patient Position BP Location FiO2 (%)   -- -- -- -- --       Physical Exam  GENERAL: Awake and alert, moderate distress due to pain  HENT: nares patent  EYES: no scleral icterus  CV: regular rhythm, regular rate  RESPIRATORY: normal effort  ABDOMEN: soft, nondistended with mild left lower quadrant tenderness.  No rebound or guarding and left CVA tenderness is present  MUSCULOSKELETAL: no deformity  NEURO: alert, moves all extremities, follows commands  SKIN: warm, dry        LAB RESULTS  Recent Results (from the past 24 hour(s))   Urinalysis With Microscopic If Indicated (No  Culture) - Urine, Clean Catch    Collection Time: 08/11/22 10:55 AM    Specimen: Urine, Clean Catch   Result Value Ref Range    Color, UA Other (A) Yellow, Straw    Appearance, UA Turbid (A) Clear    pH, UA 5.5 5.0 - 8.0    Specific Gravity, UA >=1.030 1.005 - 1.030    Glucose, UA Negative Negative    Ketones, UA Negative Negative    Bilirubin, UA Negative Negative    Blood, UA Large (3+) (A) Negative    Protein,  mg/dL (2+) (A) Negative    Leuk Esterase, UA Negative Negative    Nitrite, UA Positive (A) Negative    Urobilinogen, UA 1.0 E.U./dL 0.2 - 1.0 E.U./dL   Urinalysis, Microscopic Only - Urine, Clean Catch    Collection Time: 08/11/22 10:55 AM    Specimen: Urine, Clean Catch   Result Value Ref Range    RBC, UA Too Numerous to Count (A) None Seen, 0-2 /HPF    WBC, UA Unable to determine due to loaded field (A) None Seen, 0-2 /HPF    Bacteria, UA Unable to determine due to loaded field (A) None Seen /HPF    Squamous Epithelial Cells, UA Unable to determine due to loaded field (A) None Seen, 0-2 /HPF    Hyaline Casts, UA Unable to determine due to loaded field None Seen /LPF    Methodology Manual Light Microscopy    Comprehensive Metabolic Panel    Collection Time: 08/11/22 10:58 AM    Specimen: Blood   Result Value Ref Range    Glucose 107 (H) 65 - 99 mg/dL    BUN 31 (H) 8 - 23 mg/dL    Creatinine 1.41 (H) 0.57 - 1.00 mg/dL    Sodium 140 136 - 145 mmol/L    Potassium 4.3 3.5 - 5.2 mmol/L    Chloride 104 98 - 107 mmol/L    CO2 23.9 22.0 - 29.0 mmol/L    Calcium 9.3 8.6 - 10.5 mg/dL    Total Protein 7.3 6.0 - 8.5 g/dL    Albumin 4.30 3.50 - 5.20 g/dL    ALT (SGPT) 9 1 - 33 U/L    AST (SGOT) 11 1 - 32 U/L    Alkaline Phosphatase 57 39 - 117 U/L    Total Bilirubin 0.3 0.0 - 1.2 mg/dL    Globulin 3.0 gm/dL    A/G Ratio 1.4 g/dL    BUN/Creatinine Ratio 22.0 7.0 - 25.0    Anion Gap 12.1 5.0 - 15.0 mmol/L    eGFR 37.6 (L) >60.0 mL/min/1.73   Lipase    Collection Time: 08/11/22 10:58 AM    Specimen: Blood   Result  Value Ref Range    Lipase 18 13 - 60 U/L   CBC Auto Differential    Collection Time: 08/11/22 10:58 AM    Specimen: Blood   Result Value Ref Range    WBC 10.47 3.40 - 10.80 10*3/mm3    RBC 4.29 3.77 - 5.28 10*6/mm3    Hemoglobin 12.8 12.0 - 15.9 g/dL    Hematocrit 38.9 34.0 - 46.6 %    MCV 90.7 79.0 - 97.0 fL    MCH 29.8 26.6 - 33.0 pg    MCHC 32.9 31.5 - 35.7 g/dL    RDW 14.9 12.3 - 15.4 %    RDW-SD 48.5 37.0 - 54.0 fl    MPV 11.6 6.0 - 12.0 fL    Platelets 285 140 - 450 10*3/mm3    Neutrophil % 80.0 (H) 42.7 - 76.0 %    Lymphocyte % 12.9 (L) 19.6 - 45.3 %    Monocyte % 6.4 5.0 - 12.0 %    Eosinophil % 0.0 (L) 0.3 - 6.2 %    Basophil % 0.2 0.0 - 1.5 %    Immature Grans % 0.5 0.0 - 0.5 %    Neutrophils, Absolute 8.38 (H) 1.70 - 7.00 10*3/mm3    Lymphocytes, Absolute 1.35 0.70 - 3.10 10*3/mm3    Monocytes, Absolute 0.67 0.10 - 0.90 10*3/mm3    Eosinophils, Absolute 0.00 0.00 - 0.40 10*3/mm3    Basophils, Absolute 0.02 0.00 - 0.20 10*3/mm3    Immature Grans, Absolute 0.05 0.00 - 0.05 10*3/mm3    nRBC 0.0 0.0 - 0.2 /100 WBC       Ordered the above labs and independently reviewed the results.        RADIOLOGY  CT Abdomen Pelvis Without Contrast    Result Date: 8/11/2022  CT ABDOMEN AND PELVIS WITHOUT IV CONTRAST  HISTORY: Left lower quadrant pain  TECHNIQUE: Radiation dose reduction techniques were utilized, including automated exposure control and exposure modulation based on body size. 3 mm images were obtained through the abdomen and pelvis without IV contrast.  COMPARISON: CT abdomen pelvis 06/21/2022  FINDINGS: Fat-containing hiatal hernia is present. There are no findings of small bowel obstruction. The appendix is surgically absent. The liver, gallbladder, pancreas, spleen and adrenal glands have an unremarkable noncontrast CT appearance.  There is a 3 x 3 mm distal left ureteral calculus located approximately 10 mm from the left ureterovesical junction. There is mild upstream hydroureteronephrosis with  asymmetric stranding surrounding the left kidney and ureter.. The bladder is decompressed and not well evaluated.  No abdominopelvic adenopathy by size criteria there is colonic diverticulosis. No free intraperitoneal air is seen. There are no suspicious lytic or blastic osseous lesions. Levocurvature of the lumbar spine is present.      1.  3 x 3 mm distal left ureteral calculus approximately 10 mm from left ureterovesical junction with mild upstream hydroureteronephrosis. Asymmetric stranding surrounding the left kidney represents sequela of obstructive uropathy and/or coexistent infection in the appropriate clinical context and correlation with patient history an urinalysis is recommended. 2.  Other findings as above.  This report was finalized on 8/11/2022 11:56 AM by Dr. Thad Duque M.D.        I ordered the above noted radiological studies. Reviewed by me and discussed with radiologist.  See dictation for official radiology interpretation.      PROCEDURES    Procedures      MEDICATIONS GIVEN IN ER    Medications   morphine injection 2 mg (2 mg Intravenous Given 8/11/22 1100)   ondansetron (ZOFRAN) injection 4 mg (4 mg Intravenous Given 8/11/22 1100)   ondansetron ODT (ZOFRAN-ODT) disintegrating tablet 4 mg (4 mg Oral Given 8/11/22 1244)         PROGRESS, DATA ANALYSIS, CONSULTS, AND MEDICAL DECISION MAKING    All labs have been independently reviewed by me.  All radiology studies have been reviewed by me and discussed with radiologist dictating the report.   EKG's independently viewed and interpreted by me.  Discussion below represents my analysis of pertinent findings related to patient's condition, differential diagnosis, treatment plan and final disposition.        ED Course as of 08/12/22 0842   Fri Aug 12, 2022   0840 CBC is unremarkable [DP]   0840 Chemistry shows stable chronic kidney disease when compared to previous and lipase is normal [DP]   0841 Urinalysis has gross hematuria [DP]   0841 CT scan  of the abdomen pelvis shows a 3 mm stone in the distal ureter with mild obstruction. [DP]   0841 Patient's pain was improved with 1 dose of IV pain medication.  She was able to tolerate p.o. clears and a pain pill [DP]   0841 I discussed with her at length final disposition, and she clearly would prefer to try to go home and await spontaneous passage but has followed with Dr. Schwartz in the past from urology.  She knows to call them later today or first thing in the morning to schedule follow-up appointment.  I also advised her to return immediately if her pain worsens, she develops a fever, vomiting, or any other symptoms of concern that would indicate complication    Patient was given a strainer and advised her to strain all her urine until she sees the stone pass. [DP]      ED Course User Index  [DP] Timmy Peace MD           PPE: The patient wore a surgical mask throughout the entire patient encounter. I wore an N95.    AS OF 08:42 EDT VITALS:    BP - (!) 203/93  HR - 54  TEMP - 96.9 °F (36.1 °C)  O2 SATS - 99%        DIAGNOSIS  Final diagnoses:   Ureteral stone with hydronephrosis         DISPOSITION  Discharge           Timmy Peace MD  08/12/22 0842       Timmy Peace MD  08/12/22 0842

## 2022-08-16 ENCOUNTER — APPOINTMENT (OUTPATIENT)
Dept: CARDIOLOGY | Facility: HOSPITAL | Age: 82
End: 2022-08-16

## 2022-08-16 ENCOUNTER — HOSPITAL ENCOUNTER (INPATIENT)
Facility: HOSPITAL | Age: 82
LOS: 7 days | Discharge: NURSING, REHAB OR LONG-TERM CARE | End: 2022-08-23
Attending: EMERGENCY MEDICINE | Admitting: INTERNAL MEDICINE

## 2022-08-16 ENCOUNTER — APPOINTMENT (OUTPATIENT)
Dept: CT IMAGING | Facility: HOSPITAL | Age: 82
End: 2022-08-16

## 2022-08-16 DIAGNOSIS — I82.412 ACUTE DEEP VEIN THROMBOSIS (DVT) OF FEMORAL VEIN OF LEFT LOWER EXTREMITY: ICD-10-CM

## 2022-08-16 DIAGNOSIS — I82.4Z2 ACUTE DEEP VEIN THROMBOSIS (DVT) OF DISTAL VEIN OF LEFT LOWER EXTREMITY: ICD-10-CM

## 2022-08-16 DIAGNOSIS — M60.9 MYOSITIS OF LEFT LOWER EXTREMITY, UNSPECIFIED MYOSITIS TYPE: Primary | ICD-10-CM

## 2022-08-16 DIAGNOSIS — N13.2 URETERAL STONE WITH HYDRONEPHROSIS: ICD-10-CM

## 2022-08-16 PROBLEM — I80.202 PHLEGMASIA CERULEA DOLENS OF LEFT LOWER EXTREMITY: Status: ACTIVE | Noted: 2022-08-16

## 2022-08-16 LAB
ALBUMIN SERPL-MCNC: 4 G/DL (ref 3.5–5.2)
ALBUMIN/GLOB SERPL: 1.1 G/DL
ALP SERPL-CCNC: 60 U/L (ref 39–117)
ALT SERPL W P-5'-P-CCNC: 6 U/L (ref 1–33)
ANION GAP SERPL CALCULATED.3IONS-SCNC: 16 MMOL/L (ref 5–15)
APTT PPP: 21.6 SECONDS (ref 22.7–35.4)
AST SERPL-CCNC: 13 U/L (ref 1–32)
BASOPHILS # BLD AUTO: 0.03 10*3/MM3 (ref 0–0.2)
BASOPHILS NFR BLD AUTO: 0.2 % (ref 0–1.5)
BH CV LOW VAS LEFT COMMON FEMORAL SPONT: 1
BH CV LOW VAS LEFT DISTAL FEMORAL SPONT: 1
BH CV LOW VAS LEFT EXTERNAL ILIAC AUGMENT: NORMAL
BH CV LOW VAS LEFT EXTERNAL ILIAC COMPRESS: NORMAL
BH CV LOW VAS LEFT EXTERNAL ILIAC SPONT: 1
BH CV LOW VAS LEFT EXTERNAL ILIAC THROMBUS: NORMAL
BH CV LOW VAS LEFT GASTRONEMIUS VESSEL: 1
BH CV LOW VAS LEFT GREATER SAPH AK VESSEL: 1
BH CV LOW VAS LEFT MID FEMORAL SPONT: 1
BH CV LOW VAS LEFT PERONEAL VESSEL: 1
BH CV LOW VAS LEFT POPLITEAL SPONT: 1
BH CV LOW VAS LEFT POSTERIOR TIBIAL VESSEL: 1
BH CV LOW VAS LEFT PROFUNDA FEMORAL SPONT: 1
BH CV LOW VAS LEFT PROXIMAL FEMORAL SPONT: 1
BH CV LOW VAS LEFT SAPHENOFEMORAL JUNCTION SPONT: 1
BH CV LOWER VASCULAR LEFT COMMON FEMORAL AUGMENT: NORMAL
BH CV LOWER VASCULAR LEFT COMMON FEMORAL COMPETENT: NORMAL
BH CV LOWER VASCULAR LEFT COMMON FEMORAL COMPRESS: NORMAL
BH CV LOWER VASCULAR LEFT COMMON FEMORAL PHASIC: NORMAL
BH CV LOWER VASCULAR LEFT COMMON FEMORAL SPONT: NORMAL
BH CV LOWER VASCULAR LEFT COMMON FEMORAL THROMBUS: NORMAL
BH CV LOWER VASCULAR LEFT DISTAL FEMORAL AUGMENT: NORMAL
BH CV LOWER VASCULAR LEFT DISTAL FEMORAL COMPETENT: NORMAL
BH CV LOWER VASCULAR LEFT DISTAL FEMORAL COMPRESS: NORMAL
BH CV LOWER VASCULAR LEFT DISTAL FEMORAL PHASIC: NORMAL
BH CV LOWER VASCULAR LEFT DISTAL FEMORAL SPONT: NORMAL
BH CV LOWER VASCULAR LEFT DISTAL FEMORAL THROMBUS: NORMAL
BH CV LOWER VASCULAR LEFT EXTERNAL ILIAC COMPETENT: NORMAL
BH CV LOWER VASCULAR LEFT EXTERNAL ILIAC PHASIC: NORMAL
BH CV LOWER VASCULAR LEFT EXTERNAL ILIAC SPONT: NORMAL
BH CV LOWER VASCULAR LEFT GASTRONEMIUS COMPRESS: NORMAL
BH CV LOWER VASCULAR LEFT GASTRONEMIUS THROMBUS: NORMAL
BH CV LOWER VASCULAR LEFT GREATER SAPH AK COMPRESS: NORMAL
BH CV LOWER VASCULAR LEFT GREATER SAPH AK THROMBUS: NORMAL
BH CV LOWER VASCULAR LEFT GREATER SAPH BK COMPRESS: NORMAL
BH CV LOWER VASCULAR LEFT LESSER SAPH COMPRESS: NORMAL
BH CV LOWER VASCULAR LEFT MID FEMORAL AUGMENT: NORMAL
BH CV LOWER VASCULAR LEFT MID FEMORAL COMPETENT: NORMAL
BH CV LOWER VASCULAR LEFT MID FEMORAL COMPRESS: NORMAL
BH CV LOWER VASCULAR LEFT MID FEMORAL PHASIC: NORMAL
BH CV LOWER VASCULAR LEFT MID FEMORAL SPONT: NORMAL
BH CV LOWER VASCULAR LEFT MID FEMORAL THROMBUS: NORMAL
BH CV LOWER VASCULAR LEFT PERONEAL COMPRESS: NORMAL
BH CV LOWER VASCULAR LEFT PERONEAL THROMBUS: NORMAL
BH CV LOWER VASCULAR LEFT POPLITEAL AUGMENT: NORMAL
BH CV LOWER VASCULAR LEFT POPLITEAL COMPETENT: NORMAL
BH CV LOWER VASCULAR LEFT POPLITEAL COMPRESS: NORMAL
BH CV LOWER VASCULAR LEFT POPLITEAL PHASIC: NORMAL
BH CV LOWER VASCULAR LEFT POPLITEAL SPONT: NORMAL
BH CV LOWER VASCULAR LEFT POPLITEAL THROMBUS: NORMAL
BH CV LOWER VASCULAR LEFT POSTERIOR TIBIAL COMPRESS: NORMAL
BH CV LOWER VASCULAR LEFT POSTERIOR TIBIAL THROMBUS: NORMAL
BH CV LOWER VASCULAR LEFT PROFUNDA FEMORAL COMPRESS: NORMAL
BH CV LOWER VASCULAR LEFT PROFUNDA FEMORAL THROMBUS: NORMAL
BH CV LOWER VASCULAR LEFT PROXIMAL FEMORAL AUGMENT: NORMAL
BH CV LOWER VASCULAR LEFT PROXIMAL FEMORAL COMPETENT: NORMAL
BH CV LOWER VASCULAR LEFT PROXIMAL FEMORAL COMPRESS: NORMAL
BH CV LOWER VASCULAR LEFT PROXIMAL FEMORAL PHASIC: NORMAL
BH CV LOWER VASCULAR LEFT PROXIMAL FEMORAL SPONT: NORMAL
BH CV LOWER VASCULAR LEFT PROXIMAL FEMORAL THROMBUS: NORMAL
BH CV LOWER VASCULAR LEFT SAPHENOFEMORAL JUNCTION COMPRESS: NORMAL
BH CV LOWER VASCULAR LEFT SAPHENOFEMORAL JUNCTION THROMBUS: NORMAL
BH CV LOWER VASCULAR RIGHT COMMON FEMORAL AUGMENT: NORMAL
BH CV LOWER VASCULAR RIGHT COMMON FEMORAL COMPETENT: NORMAL
BH CV LOWER VASCULAR RIGHT COMMON FEMORAL COMPRESS: NORMAL
BH CV LOWER VASCULAR RIGHT COMMON FEMORAL PHASIC: NORMAL
BH CV LOWER VASCULAR RIGHT COMMON FEMORAL SPONT: NORMAL
BILIRUB SERPL-MCNC: 0.7 MG/DL (ref 0–1.2)
BUN SERPL-MCNC: 45 MG/DL (ref 8–23)
BUN/CREAT SERPL: 22.4 (ref 7–25)
CALCIUM SPEC-SCNC: 9.5 MG/DL (ref 8.6–10.5)
CHLORIDE SERPL-SCNC: 101 MMOL/L (ref 98–107)
CK SERPL-CCNC: 70 U/L (ref 20–180)
CO2 SERPL-SCNC: 16 MMOL/L (ref 22–29)
CREAT SERPL-MCNC: 2.01 MG/DL (ref 0.57–1)
DEPRECATED RDW RBC AUTO: 48.1 FL (ref 37–54)
EGFRCR SERPLBLD CKD-EPI 2021: 24.5 ML/MIN/1.73
EOSINOPHIL # BLD AUTO: 0.02 10*3/MM3 (ref 0–0.4)
EOSINOPHIL NFR BLD AUTO: 0.2 % (ref 0.3–6.2)
ERYTHROCYTE [DISTWIDTH] IN BLOOD BY AUTOMATED COUNT: 14.8 % (ref 12.3–15.4)
GLOBULIN UR ELPH-MCNC: 3.5 GM/DL
GLUCOSE SERPL-MCNC: 105 MG/DL (ref 65–99)
HCT VFR BLD AUTO: 40.6 % (ref 34–46.6)
HGB BLD-MCNC: 13.2 G/DL (ref 12–15.9)
IMM GRANULOCYTES # BLD AUTO: 0.09 10*3/MM3 (ref 0–0.05)
IMM GRANULOCYTES NFR BLD AUTO: 0.7 % (ref 0–0.5)
INR PPP: 1.18 (ref 0.9–1.1)
LYMPHOCYTES # BLD AUTO: 0.98 10*3/MM3 (ref 0.7–3.1)
LYMPHOCYTES NFR BLD AUTO: 7.8 % (ref 19.6–45.3)
MAXIMAL PREDICTED HEART RATE: 139 BPM
MCH RBC QN AUTO: 29.3 PG (ref 26.6–33)
MCHC RBC AUTO-ENTMCNC: 32.5 G/DL (ref 31.5–35.7)
MCV RBC AUTO: 90.2 FL (ref 79–97)
MONOCYTES # BLD AUTO: 1 10*3/MM3 (ref 0.1–0.9)
MONOCYTES NFR BLD AUTO: 8 % (ref 5–12)
NEUTROPHILS NFR BLD AUTO: 10.39 10*3/MM3 (ref 1.7–7)
NEUTROPHILS NFR BLD AUTO: 83.1 % (ref 42.7–76)
NRBC BLD AUTO-RTO: 0 /100 WBC (ref 0–0.2)
PLATELET # BLD AUTO: 192 10*3/MM3 (ref 140–450)
PMV BLD AUTO: 11.6 FL (ref 6–12)
POTASSIUM SERPL-SCNC: 6 MMOL/L (ref 3.5–5.2)
PROT SERPL-MCNC: 7.5 G/DL (ref 6–8.5)
PROTHROMBIN TIME: 14.8 SECONDS (ref 11.7–14.2)
RBC # BLD AUTO: 4.5 10*6/MM3 (ref 3.77–5.28)
SARS-COV-2 RNA RESP QL NAA+PROBE: NOT DETECTED
SODIUM SERPL-SCNC: 133 MMOL/L (ref 136–145)
STRESS TARGET HR: 118 BPM
WBC NRBC COR # BLD: 12.51 10*3/MM3 (ref 3.4–10.8)

## 2022-08-16 PROCEDURE — 93971 EXTREMITY STUDY: CPT

## 2022-08-16 PROCEDURE — 25010000002 FENTANYL CITRATE (PF) 50 MCG/ML SOLUTION: Performed by: EMERGENCY MEDICINE

## 2022-08-16 PROCEDURE — 85730 THROMBOPLASTIN TIME PARTIAL: CPT | Performed by: EMERGENCY MEDICINE

## 2022-08-16 PROCEDURE — 85025 COMPLETE CBC W/AUTO DIFF WBC: CPT | Performed by: EMERGENCY MEDICINE

## 2022-08-16 PROCEDURE — 94640 AIRWAY INHALATION TREATMENT: CPT

## 2022-08-16 PROCEDURE — 80053 COMPREHEN METABOLIC PANEL: CPT | Performed by: EMERGENCY MEDICINE

## 2022-08-16 PROCEDURE — 72192 CT PELVIS W/O DYE: CPT

## 2022-08-16 PROCEDURE — 94664 DEMO&/EVAL PT USE INHALER: CPT

## 2022-08-16 PROCEDURE — 85610 PROTHROMBIN TIME: CPT | Performed by: EMERGENCY MEDICINE

## 2022-08-16 PROCEDURE — 99284 EMERGENCY DEPT VISIT MOD MDM: CPT

## 2022-08-16 PROCEDURE — 82550 ASSAY OF CK (CPK): CPT | Performed by: EMERGENCY MEDICINE

## 2022-08-16 PROCEDURE — U0003 INFECTIOUS AGENT DETECTION BY NUCLEIC ACID (DNA OR RNA); SEVERE ACUTE RESPIRATORY SYNDROME CORONAVIRUS 2 (SARS-COV-2) (CORONAVIRUS DISEASE [COVID-19]), AMPLIFIED PROBE TECHNIQUE, MAKING USE OF HIGH THROUGHPUT TECHNOLOGIES AS DESCRIBED BY CMS-2020-01-R: HCPCS | Performed by: EMERGENCY MEDICINE

## 2022-08-16 PROCEDURE — 94799 UNLISTED PULMONARY SVC/PX: CPT

## 2022-08-16 PROCEDURE — 25010000002 HEPARIN (PORCINE) 25000-0.45 UT/250ML-% SOLUTION: Performed by: EMERGENCY MEDICINE

## 2022-08-16 RX ORDER — ACETAMINOPHEN 325 MG/1
650 TABLET ORAL EVERY 4 HOURS PRN
Status: DISCONTINUED | OUTPATIENT
Start: 2022-08-16 | End: 2022-08-23 | Stop reason: HOSPADM

## 2022-08-16 RX ORDER — SODIUM CHLORIDE 0.9 % (FLUSH) 0.9 %
10 SYRINGE (ML) INJECTION AS NEEDED
Status: DISCONTINUED | OUTPATIENT
Start: 2022-08-16 | End: 2022-08-23 | Stop reason: HOSPADM

## 2022-08-16 RX ORDER — MORPHINE SULFATE 2 MG/ML
2 INJECTION, SOLUTION INTRAMUSCULAR; INTRAVENOUS EVERY 4 HOURS PRN
Status: DISCONTINUED | OUTPATIENT
Start: 2022-08-16 | End: 2022-08-23 | Stop reason: HOSPADM

## 2022-08-16 RX ORDER — NALOXONE HCL 0.4 MG/ML
0.4 VIAL (ML) INJECTION
Status: DISCONTINUED | OUTPATIENT
Start: 2022-08-16 | End: 2022-08-23 | Stop reason: HOSPADM

## 2022-08-16 RX ORDER — HEPARIN SODIUM 10000 [USP'U]/100ML
15.8 INJECTION, SOLUTION INTRAVENOUS
Status: DISPENSED | OUTPATIENT
Start: 2022-08-16 | End: 2022-08-20

## 2022-08-16 RX ORDER — ACETAMINOPHEN 160 MG/5ML
650 SOLUTION ORAL EVERY 4 HOURS PRN
Status: DISCONTINUED | OUTPATIENT
Start: 2022-08-16 | End: 2022-08-23 | Stop reason: HOSPADM

## 2022-08-16 RX ORDER — BUDESONIDE AND FORMOTEROL FUMARATE DIHYDRATE 80; 4.5 UG/1; UG/1
2 AEROSOL RESPIRATORY (INHALATION)
Refills: 5 | Status: DISCONTINUED | OUTPATIENT
Start: 2022-08-16 | End: 2022-08-23 | Stop reason: HOSPADM

## 2022-08-16 RX ORDER — FENTANYL CITRATE 50 UG/ML
50 INJECTION, SOLUTION INTRAMUSCULAR; INTRAVENOUS ONCE
Status: COMPLETED | OUTPATIENT
Start: 2022-08-16 | End: 2022-08-16

## 2022-08-16 RX ORDER — HYDROCODONE BITARTRATE AND ACETAMINOPHEN 5; 325 MG/1; MG/1
1 TABLET ORAL EVERY 6 HOURS PRN
Status: DISCONTINUED | OUTPATIENT
Start: 2022-08-16 | End: 2022-08-17

## 2022-08-16 RX ORDER — SODIUM CHLORIDE 9 MG/ML
100 INJECTION, SOLUTION INTRAVENOUS CONTINUOUS
Status: DISCONTINUED | OUTPATIENT
Start: 2022-08-16 | End: 2022-08-18

## 2022-08-16 RX ORDER — ALBUTEROL SULFATE 2.5 MG/3ML
2.5 SOLUTION RESPIRATORY (INHALATION) EVERY 6 HOURS PRN
Refills: 1 | Status: DISCONTINUED | OUTPATIENT
Start: 2022-08-16 | End: 2022-08-23 | Stop reason: HOSPADM

## 2022-08-16 RX ORDER — PANTOPRAZOLE SODIUM 40 MG/1
40 TABLET, DELAYED RELEASE ORAL EVERY MORNING
Status: DISCONTINUED | OUTPATIENT
Start: 2022-08-17 | End: 2022-08-23 | Stop reason: HOSPADM

## 2022-08-16 RX ORDER — SODIUM CHLORIDE 0.9 % (FLUSH) 0.9 %
10 SYRINGE (ML) INJECTION EVERY 12 HOURS SCHEDULED
Status: DISCONTINUED | OUTPATIENT
Start: 2022-08-16 | End: 2022-08-23 | Stop reason: HOSPADM

## 2022-08-16 RX ORDER — ONDANSETRON 2 MG/ML
4 INJECTION INTRAMUSCULAR; INTRAVENOUS EVERY 6 HOURS PRN
Status: DISCONTINUED | OUTPATIENT
Start: 2022-08-16 | End: 2022-08-23 | Stop reason: HOSPADM

## 2022-08-16 RX ORDER — ACETAMINOPHEN 650 MG/1
650 SUPPOSITORY RECTAL EVERY 4 HOURS PRN
Status: DISCONTINUED | OUTPATIENT
Start: 2022-08-16 | End: 2022-08-23 | Stop reason: HOSPADM

## 2022-08-16 RX ORDER — NITROGLYCERIN 0.4 MG/1
0.4 TABLET SUBLINGUAL
Status: DISCONTINUED | OUTPATIENT
Start: 2022-08-16 | End: 2022-08-23 | Stop reason: HOSPADM

## 2022-08-16 RX ADMIN — Medication 10 ML: at 21:44

## 2022-08-16 RX ADMIN — BUDESONIDE AND FORMOTEROL FUMARATE DIHYDRATE 2 PUFF: 80; 4.5 AEROSOL RESPIRATORY (INHALATION) at 20:33

## 2022-08-16 RX ADMIN — HEPARIN SODIUM 15.8 UNITS/KG/HR: 10000 INJECTION, SOLUTION INTRAVENOUS at 16:33

## 2022-08-16 RX ADMIN — FENTANYL CITRATE 50 MCG: 50 INJECTION INTRAMUSCULAR; INTRAVENOUS at 13:53

## 2022-08-16 RX ADMIN — SODIUM CHLORIDE 100 ML/HR: 9 INJECTION, SOLUTION INTRAVENOUS at 21:39

## 2022-08-16 NOTE — PROGRESS NOTES
Clinical Pharmacy Services: Medication History    Luann Frances is a 82 y.o. female presenting to [facility] for   Chief Complaint   Patient presents with   • Groin Pain       She  has a past medical history of Acute deep vein thrombosis (DVT) of femoral vein of right lower extremity ([facility]) (04/15/2021), Arthritis, Asymptomatic PVCs, Backache, Chronic bronchitis ([facility]), CKD (chronic kidney disease), Deep vein thrombosis (DVT) of right lower extremity ([facility]), Essential hypertension (01/20/2020), Fracture of humerus, GERD (gastroesophageal reflux disease), Hyperlipidemia, Hypertension, Hypothyroidism, Pneumonia, Pulmonary emphysema ([facility]), Renal calculi, Renal calculus (01/06/2021), Sleep apnea, and Thoracic vertebral fracture ([facility]).    Allergies as of 08/16/2022 - Reviewed 08/16/2022   Allergen Reaction Noted   • Ambien [zolpidem tartrate] Nausea Only 11/13/2017   • Amoxicillin-pot clavulanate Nausea Only 01/19/2016   • Doxycycline Nausea Only 01/19/2016   • Eliquis [apixaban] Nausea Only 05/02/2022   • Levofloxacin Nausea Only 01/19/2016   • Metronidazole Nausea Only 01/19/2016   • Naproxen GI Intolerance 01/19/2016   • Sulfamethoxazole-trimethoprim Nausea Only 08/03/2017   • Synthroid [levothyroxine sodium] Nausea Only 10/25/2016   • Zolpidem Nausea Only 11/13/2017       Medication information was obtained from: son  Pharmacy and Phone Number:     Prior to Admission Medications     Prescriptions Last Dose Informant Patient Reported? Taking?    Advair Diskus 100-50 MCG/DOSE DISKUS  Child No Yes    Inhale 1 puff 2 (Two) Times a Day.    albuterol sulfate  (90 Base) MCG/ACT inhaler  Child No Yes    Inhale 2 puffs Every 4 (Four) Hours As Needed for Wheezing or Shortness of Air.    cholecalciferol (VITAMIN D3) 25 MCG (1000 UT) tablet  Child Yes Yes    Take 2,000 Units by mouth Daily.    [employer] ANTACID & ANTI-GAS 1000-60 MG chewable tablet  Child Yes Yes    Chew 1 tablet As Needed.    furosemide (Lasix)  20 MG tablet  Child No Yes    Take 1 tablet by mouth Daily.    HYDROcodone-acetaminophen (NORCO) 5-325 MG per tablet  Child No Yes    Take 1 tablet by mouth Every 6 (Six) Hours As Needed for Severe Pain .    omeprazole OTC (PriLOSEC OTC) 20 MG EC tablet  Child Yes Yes    Take 20-40 mg by mouth As Needed.    ondansetron ODT (ZOFRAN-ODT) 4 MG disintegrating tablet  Child No Yes    Place 1 tablet on the tongue Every 8 (Eight) Hours As Needed for Nausea or Vomiting.    prochlorperazine (COMPAZINE) 10 MG tablet  Child No Yes    Take 1 tablet by mouth Every 6 (Six) Hours As Needed for Nausea or Vomiting.    rivaroxaban (XARELTO) 20 MG tablet  Child No Yes    Take 1 tablet by mouth Daily.    traMADol (ULTRAM) 50 MG tablet  Child Yes Yes    Take 50 mg by mouth As Needed.            Medication notes: Takes 6 ml THC for nausea    This medication list is complete to the best of my knowledge as of 8/16/2022    Please call if questions.    Andry Bains  Medication History Technician  [phone]    8/16/2022 16:14 EDT

## 2022-08-16 NOTE — ED PROVIDER NOTES
EMERGENCY DEPARTMENT ENCOUNTER    Room Number:  33/33  Date of encounter:  8/16/2022  PCP: Provider, No Known  Historian: Patient    Patient was placed in face mask during triage process. Patient was wearing facemask when I entered the room and throughout our encounter. I wore full protective equipment throughout this patient encounter including a face mask, eye protection, and gloves. Hand hygiene was performed before donning protective equipment and again following doffing of PPE after leaving the room.    HPI:  Chief Complaint: Left groin pain  A complete HPI/ROS/PMH/PSH/SH/FH are unobtainable due to: N/A   Context: Luann Frances is a 82 y.o. female who presents to the ED c/o left groin pain for 2 days that is worse with standing to walk.  Unable to ambulate significantly secondary to pain in the last 2 days.  No known trauma.  Patient recently diagnosed with DVT left lower extremity but she was unaware of the color change of her left lower extremity compared to the right though she does note it has been more swollen than the right.  No pain distal to the left groin.  No abdominal pain, change in bowel habits, chest pain or shortness of breath reported.  Discomfort is moderate.  No other exacerbating or relieving factors identified.      MEDICAL HISTORY REVIEW  EMR reviewed:    PAST MEDICAL HISTORY  Active Ambulatory Problems     Diagnosis Date Noted   • Atopic rhinitis 01/21/2016   • Chronic obstructive pulmonary disease (HCC) 01/21/2016   • Diverticulitis of colon 01/21/2016   • Acid reflux 01/21/2016   • Fatigue 01/21/2016   • Primary hypothyroidism 01/21/2016   • Insomnia 01/21/2016   • Knee pain 01/21/2016   • Pain of lower extremity 01/21/2016   • Chronic nausea 01/21/2016   • Shoulder pain 01/21/2016   • Ventricular premature beats 01/21/2016   • Weight gain 12/07/2016   • Cold intolerance 12/07/2016   • Post herpetic neuralgia 03/02/2017   • Insulin resistance 04/10/2017   • Vitamin D deficiency  02/19/2018   • CKD (chronic kidney disease) stage 3, GFR 30-59 ml/min (CMS/[facility]) 02/19/2018   • Mixed hyperlipidemia 02/19/2018   • Hypersomnia 10/16/2019   • Hyperuricemia 01/20/2020   • Essential hypertension 01/20/2020   • DELROY (obstructive sleep apnea) 12/08/2020   • Renal calculus 01/06/2021   • Multiple closed fractures of ribs of right side 02/12/2021   • Non-cardiac chest pain 02/14/2021   • Compression fracture of body of thoracic vertebra ([facility]) 08/17/2021   • Arm skin lesion, right 11/17/2021   • DVT, lower extremity, distal, acute, left ([facility]) 04/15/2022   • Lower extremity edema 04/15/2022   • Skin tear of right lower leg without complication 05/17/2022   • Medicare annual wellness visit, subsequent 05/17/2022   • Tobacco abuse 05/17/2022   • Osteoporosis 05/17/2022     Resolved Ambulatory Problems     Diagnosis Date Noted   • Acute sinusitis 01/21/2016   • Asthma 01/21/2016   • Candidiasis 01/21/2016   • Cough 01/21/2016   • Mild dehydration 01/21/2016   • Chronic obstructive pulmonary disease with acute exacerbation ([facility]) 01/21/2016   • Elevated BP 10/25/2016   • Dyslipidemia 12/07/2016   • Acute deep vein thrombosis (DVT) of femoral vein of right lower extremity ([facility]) 04/15/2021     Past Medical History:   Diagnosis Date   • Arthritis    • Asymptomatic PVCs    • Backache    • Chronic bronchitis ([facility])    • CKD (chronic kidney disease)    • Deep vein thrombosis (DVT) of right lower extremity ([facility])    • Fracture of humerus    • GERD (gastroesophageal reflux disease)    • Hyperlipidemia    • Hypertension    • Hypothyroidism    • Pneumonia    • Pulmonary emphysema ([facility])    • Renal calculi    • Sleep apnea    • Thoracic vertebral fracture ([facility])          PAST SURGICAL HISTORY  Past Surgical History:   Procedure Laterality Date   • APPENDECTOMY     • EXTRACORPOREAL SHOCK WAVE LITHOTRIPSY (ESWL) Left 01/06/2021    Procedure: EXTRACORPOREAL SHOCKWAVE LITHOTRIPSY, CYSTOSCOPY, RETROGRADE PYLEOGRAM;  Surgeon: Efren  Walter GOMES MD;  Location: [facility];  Service: Urology;  Laterality: Left;   • EYE SURGERY      cataracts   • HUMERUS FRACTURE SURGERY     • RIB FRACTURE SURGERY  2021   • THORACOSCOPY Right 02/16/2021    Procedure: bronchoscopy, right video assisted THORACOSCOPY WITH PLATING OF 3, 4, 5, AND 6 RIBS;  Surgeon: Kelley Delong MD;  Location: [facility];  Service: Thoracic;  Laterality: Right;   • TOTAL KNEE ARTHROPLASTY Left 04/2015   • UMBILICAL HERNIA REPAIR           FAMILY HISTORY  Family History   Problem Relation Age of Onset   • Cancer Mother         breast   • Breast cancer Mother    • No Known Problems Father    • Heart disease Maternal Grandmother    • Cancer Brother         esophageal , stomach    • Esophageal cancer Brother    • No Known Problems Son    • Breast cancer Maternal Aunt    • Heart disease Maternal Aunt    • No Known Problems Son    • Malig Hyperthermia Neg Hx          SOCIAL HISTORY  Social History     Socioeconomic History   • Marital status:    Tobacco Use   • Smoking status: Current Every Day Smoker     Packs/day: 0.25     Years: 50.00     Pack years: 12.50     Types: Cigarettes     Start date: 1970   • Smokeless tobacco: Never Used   Vaping Use   • Vaping Use: Never used   Substance and Sexual Activity   • Alcohol use: Not Currently   • Drug use: No         ALLERGIES  Ambien [zolpidem tartrate], Amoxicillin-pot clavulanate, Doxycycline, Eliquis [apixaban], Levofloxacin, Metronidazole, Naproxen, Sulfamethoxazole-trimethoprim, Synthroid [levothyroxine sodium], and Zolpidem        REVIEW OF SYSTEMS  Review of Systems     All systems reviewed and negative except for those discussed in HPI.       PHYSICAL EXAM    I have reviewed the triage vital signs and nursing notes.    ED Triage Vitals [08/16/22 1122]   Temp Heart Rate Resp BP SpO2   97.2 °F (36.2 °C) 79 18 147/90 98 %      Temp src Heart Rate Source Patient Position BP Location FiO2 (%)   Tympanic Monitor Lying -- --        Physical Exam    Physical Exam   Constitutional: No distress.   HENT:  Head: Normocephalic and atraumatic.   Oropharynx: Mucous membranes are moist.   Eyes: No scleral icterus. No conjunctival pallor.  Neck: Painless range of motion noted. Neck supple.   Cardiovascular: Normal rate, regular rhythm and intact distal pulses.  Pulmonary/Chest: No respiratory distress. There are no wheezes, no rhonchi, and no rales.   Abdominal: Soft. There is no tenderness. There is no rebound and no guarding.   There is left inguinal discomfort with palpation with no palpable hernia.  Musculoskeletal: Moves all extremities equally.  There is no left hip discomfort with range of motion passive or active.  The left lower extremity is generally more swollen than the right with some discoloration of the left lower extremity.  Diminished bilateral dorsalis pedis pulses noted in both feet with equal cap refill.    Neurological: Alert.  Baseline strength and sensation noted.   Skin: Skin is pink, warm, and dry. No pallor.   Psychiatric: Mood and affect normal.   Nursing note and vitals reviewed.    LAB RESULTS  Recent Results (from the past 24 hour(s))   CBC Auto Differential    Collection Time: 08/16/22  1:51 PM    Specimen: Blood   Result Value Ref Range    WBC 12.51 (H) 3.40 - 10.80 10*3/mm3    RBC 4.50 3.77 - 5.28 10*6/mm3    Hemoglobin 13.2 12.0 - 15.9 g/dL    Hematocrit 40.6 34.0 - 46.6 %    MCV 90.2 79.0 - 97.0 fL    MCH 29.3 26.6 - 33.0 pg    MCHC 32.5 31.5 - 35.7 g/dL    RDW 14.8 12.3 - 15.4 %    RDW-SD 48.1 37.0 - 54.0 fl    MPV 11.6 6.0 - 12.0 fL    Platelets 192 140 - 450 10*3/mm3    Neutrophil % 83.1 (H) 42.7 - 76.0 %    Lymphocyte % 7.8 (L) 19.6 - 45.3 %    Monocyte % 8.0 5.0 - 12.0 %    Eosinophil % 0.2 (L) 0.3 - 6.2 %    Basophil % 0.2 0.0 - 1.5 %    Immature Grans % 0.7 (H) 0.0 - 0.5 %    Neutrophils, Absolute 10.39 (H) 1.70 - 7.00 10*3/mm3    Lymphocytes, Absolute 0.98 0.70 - 3.10 10*3/mm3    Monocytes, Absolute 1.00  (H) 0.10 - 0.90 10*3/mm3    Eosinophils, Absolute 0.02 0.00 - 0.40 10*3/mm3    Basophils, Absolute 0.03 0.00 - 0.20 10*3/mm3    Immature Grans, Absolute 0.09 (H) 0.00 - 0.05 10*3/mm3    nRBC 0.0 0.0 - 0.2 /100 WBC   Duplex Venous Lower Extremity - Left    Collection Time: 08/16/22  3:15 PM   Result Value Ref Range    Target HR (85%) 118 bpm    Max. Pred. HR (100%) 139 bpm    Left External Iliac Spont 1     Left External Iliac Augment N     Left External Iliac Compress N     Left External Iliac Thrombus A     Left Common Femoral Spont 1     Left Saphenofemoral Junction Spont 1     Left Profunda Femoral Spont 1     Left Proximal Femoral Spont 1     Left Mid Femoral Spont 1     Left Distal Femoral Spont 1     Left Popliteal Spont 1     Left Posterior Tibial Vessel 1     Left Peroneal Vessel 1     Left Gastronemius Vessel 1     Left Greater Saph AK Vessel 1     Right Common Femoral Spont Y     Right Common Femoral Phasic Y     Right Common Femoral Augment Y     Right Common Femoral Competent Y     Right Common Femoral Compress C     Left External Iliac Spont N     Left External Iliac Phasic N     Left External Iliac Competent N     Left Common Femoral Spont N     Left Common Femoral Phasic N     Left Common Femoral Augment N     Left Common Femoral Competent N     Left Common Femoral Compress N     Left Common Femoral Thrombus A     Left Saphenofemoral Junction Compress N     Left Saphenofemoral Junction Thrombus A     Left Profunda Femoral Compress N     Left Profunda Femoral Thrombus A     Left Proximal Femoral Spont N     Left Proximal Femoral Phasic N     Left Proximal Femoral Augment N     Left Proximal Femoral Competent N     Left Proximal Femoral Compress N     Left Proximal Femoral Thrombus A     Left Mid Femoral Spont N     Left Mid Femoral Phasic N     Left Mid Femoral Augment N     Left Mid Femoral Competent N     Left Mid Femoral Compress N     Left Mid Femoral Thrombus A     Left Distal Femoral Spont N      Left Distal Femoral Phasic N     Left Distal Femoral Augment N     Left Distal Femoral Competent N     Left Distal Femoral Compress N     Left Distal Femoral Thrombus A     Left Popliteal Spont N     Left Popliteal Phasic N     Left Popliteal Augment N     Left Popliteal Competent N     Left Popliteal Compress N     Left Popliteal Thrombus A     Left Posterior Tibial Compress N     Left Posterior Tibial Thrombus A     Left Peroneal Compress N     Left Peroneal Thrombus A     Left Gastronemius Compress N     Left Gastronemius Thrombus A     Left Greater Saph AK Compress N     Left Greater Saph AK Thrombus A     Left Greater Saph BK Compress C     Left Lesser Saph Compress C        Ordered the above labs and independently reviewed the results.        RADIOLOGY  CT Pelvis Without Contrast    Result Date: 8/16/2022  CT PELVIS WITHOUT CONTRAST  HISTORY: Left groin pain.  TECHNIQUE: Pelvic CT includes axial imaging through the pelvis and this data was reconstructed in coronal and sagittal planes.  COMPARISON 08/11/2022.  FINDINGS: When compared to the previous exam there is developed abnormal increase in size of the proximal left quadriceps musculature greatest involving the origins of the vastus intermedius and vastus lateralis oblique muscles. There is stranding within the fat extending along the deep margin of the left rectus femoris muscle and adjacent to the proximal left superficial femoral artery and vein. This is without clear etiology and may be due to an intramuscular hemorrhage or myositis or tear and extends off the field of view distally. No fracture plane is evident. There is mild facet arthritis in the lower lumbar spine. Atherosclerotic calcifications are present involving the abdominal aorta and iliac vasculature and the infrarenal abdominal aorta measures up to 2.5 cm transverse dimension. CT 5 days ago demonstrated a distal left ureteral stone and this has resolved.      1. New enlargement of the left  proximal quadriceps musculature with surrounding stranding in the intramuscular fat and overlying subcutaneous fat. This may be due to an intramuscular hemorrhage or myositis and extends off the field of view distally. No left hip fracture or osseous abnormality is evident. 2. Atherosclerotic disease with mild enlargement of the infrarenal abdominal aorta measuring 2.5 cm transverse dimension. 3. Sigmoid diverticulosis without evidence for diverticulitis.  Radiation dose reduction techniques were utilized, including automated exposure control and exposure modulation based on body size.  This report was finalized on 8/16/2022 1:48 PM by Dr. Darío Winslow M.D.        I ordered the above noted radiological studies. Reviewed by me and discussed with radiologist.  See dictation for official radiology interpretation.      PROCEDURES    Procedures        MEDICATIONS GIVEN IN ER    Medications   sodium chloride 0.9 % flush 10 mL (has no administration in time range)   heparin 94234 units/250 mL (100 units/mL) in 0.45 % NaCl infusion (has no administration in time range)   fentaNYL citrate (PF) (SUBLIMAZE) injection 50 mcg (50 mcg Intravenous Given 8/16/22 1353)         PROGRESS, DATA ANALYSIS, CONSULTS, AND MEDICAL DECISION MAKING    My diagnosis for lower extremity pain and injury includes but is not limited to hip fracture, femur fracture, hip dislocation, hip contusion, hip sprain, hip strain, pelvic fracture, ischio-tibial band pain, ischio-tibial band bursitis, knee sprain, patella dislocation, knee dislocation, internal derangement of knee, fractures of the femur, tibia, fibula, ankle, foot and digits, ankle sprain, ankle dislocation, Lisfranc fracture, fracture dislocations of the digits, pulmonary embolism, claudication, peripheral vascular disease, gout, osteoarthritis, rheumatoid arthritis, bursitis, septic joint, poly-rheumatica, polyarthralgia and other inflammatory or infectious disease processes.      All  labs have been independently reviewed by me.  All radiology studies have been reviewed by me and discussed with radiologist dictating the report.   EKG's independently viewed and interpreted by me.  Discussion below represents my analysis of pertinent findings related to patient's condition, differential diagnosis, treatment plan and final disposition.      ED Course as of 08/16/22 1608   Tue Aug 16, 2022   1515 Late entry:  Duplex ultrasound preliminary result reviewed with ultrasonographerStephy.  Extensive DVT throughout the left lower extremity extending proximally all the way into the iliac vessels. [RS]   1542 CONSULT        Provider: Dr. Arevalo[facility]    Discussion: Reviewed patient history, ED presentation and evaluation as well as pending studies.  He does recommend we get vascular surgery urgently.  He is agreeable to accept the patient for full admission for further evaluation and treatment.    Agreeable c treatment and planned disposition.         [RS]      ED Course User Index  [RS] Rafa Schwartz MD       AS OF 16:08 EDT VITALS:    BP - 147/80  HR - 64  TEMP - 97.2 °F (36.2 °C) (Tympanic)  O2 SATS - 96%        DIAGNOSIS  Final diagnoses:   Myositis of left lower extremity, unspecified myositis type   Acute deep vein thrombosis (DVT) of distal vein of left lower extremity (HCC)         DISPOSITION  ADMISSION    Discussed treatment plan and reason for admission with pt/family and admitting physician.  Pt/family voiced understanding of the plan for admission for further testing/treatment as needed.            Rafa Schwartz MD  08/16/22 160

## 2022-08-16 NOTE — H&P
[facility] Admission H&P    Patient Care Team:  Provider, No Known as PCP - General  Chas Garcia MD as Consulting Physician (Endocrinology)  Jeff Sands MD as Consulting Physician (Pulmonary Disease)  Leonardo Cochran MD as Consulting Physician (Nephrology)  Ruperto Sparks MD as Consulting Physician (Pain Medicine)  Angie Bruno APRN as Referring Physician (Family Medicine)  Lita Dubon MD as Consulting Physician (Hematology and Oncology)    Chief complaint: Left inner thigh pain, left leg swelling    History of Present Illness    This is an 82-year-old female with recent history of right sided acute DVT for which she was placed on anticoagulation followed by unprovoked left-sided DVT who presented to the emergency room with acute onset left inner thigh/groin pain starting yesterday with associated left leg swelling.  In the emergency room she is found to have extensive acute DVT in the left leg with changes on CT scan showing myositis versus hemorrhage in the left thigh musculature.  At present her main complaint is that of pain in the left inner groin.  She denies any chest pain or shortness of breath.  Pain is worse with standing or walking.  No pain further distal in the leg.  She denies any abdominal pain.  No changes in bladder or bowel habits.  She was initiated on a heparin drip, vascular surgery and hematology have been consulted and I have been asked to admit her for further care.    Past Medical History:   Diagnosis Date   • Acute deep vein thrombosis (DVT) of femoral vein of right lower extremity (HCC) 04/15/2021   • Arthritis    • Asymptomatic PVCs    • Backache    • Chronic bronchitis (HCC)    • CKD (chronic kidney disease)     Stage 3   • Deep vein thrombosis (DVT) of right lower extremity (HCC)    • Essential hypertension 01/20/2020   • Fracture of humerus    • GERD (gastroesophageal reflux disease)    • Hyperlipidemia    • Hypertension    • Hypothyroidism    • Pneumonia    •  Pulmonary emphysema (HCC)    • Renal calculi    • Renal calculus 01/06/2021   • Sleep apnea     CPAP USED   • Thoracic vertebral fracture (HCC)      Past Surgical History:   Procedure Laterality Date   • APPENDECTOMY     • EXTRACORPOREAL SHOCK WAVE LITHOTRIPSY (ESWL) Left 01/06/2021    Procedure: EXTRACORPOREAL SHOCKWAVE LITHOTRIPSY, CYSTOSCOPY, RETROGRADE PYLEOGRAM;  Surgeon: Walter Schwartz MD;  Location: [facility];  Service: Urology;  Laterality: Left;   • EYE SURGERY      cataracts   • HUMERUS FRACTURE SURGERY     • RIB FRACTURE SURGERY  2021   • THORACOSCOPY Right 02/16/2021    Procedure: bronchoscopy, right video assisted THORACOSCOPY WITH PLATING OF 3, 4, 5, AND 6 RIBS;  Surgeon: Kelley Delong MD;  Location: [facility];  Service: Thoracic;  Laterality: Right;   • TOTAL KNEE ARTHROPLASTY Left 04/2015   • UMBILICAL HERNIA REPAIR       Family History   Problem Relation Age of Onset   • Cancer Mother         breast   • Breast cancer Mother    • No Known Problems Father    • Heart disease Maternal Grandmother    • Cancer Brother         esophageal , stomach    • Esophageal cancer Brother    • No Known Problems Son    • Breast cancer Maternal Aunt    • Heart disease Maternal Aunt    • No Known Problems Son    • Malig Hyperthermia Neg Hx      Social History     Tobacco Use   • Smoking status: Current Every Day Smoker     Packs/day: 0.25     Years: 50.00     Pack years: 12.50     Types: Cigarettes     Start date: 1970   • Smokeless tobacco: Never Used   Vaping Use   • Vaping Use: Never used   Substance Use Topics   • Alcohol use: Not Currently   • Drug use: No     Medications reviewed    Allergies:  Ambien [zolpidem tartrate], Amoxicillin-pot clavulanate, Doxycycline, Eliquis [apixaban], Levofloxacin, Metronidazole, Naproxen, Sulfamethoxazole-trimethoprim, Synthroid [levothyroxine sodium], and Zolpidem    Review of Systems   Constitutional: Negative for chills and fever.   HENT: Negative for  congestion and sore throat.    Eyes: Negative for visual disturbance.   Respiratory: Negative for cough, chest tightness, shortness of breath and wheezing.    Cardiovascular: Positive for leg swelling. Negative for chest pain and palpitations.   Gastrointestinal: Negative for abdominal distention, abdominal pain, diarrhea, nausea and vomiting.   Endocrine: Negative for polydipsia and polyuria.   Genitourinary: Negative for difficulty urinating, dysuria, frequency and urgency.   Musculoskeletal: Negative for arthralgias and myalgias.        Pain pain in the left inner thigh/groin.  Swelling of the left leg   Skin: Negative for color change and rash.   Neurological: Negative for dizziness and light-headedness.        PHYSICAL EXAM    Vital Signs  tMax 24 hrs:  Temp (24hrs), Av.2 °F (36.2 °C), Min:97.2 °F (36.2 °C), Max:97.2 °F (36.2 °C)    Vitals Ranges:  Temp:  [97.2 °F (36.2 °C)] 97.2 °F (36.2 °C)  Heart Rate:  [61-79] 64  Resp:  [18] 18  BP: (147)/(80-90) 147/80    Physical Exam  Vitals and nursing note reviewed.   Constitutional:       General: She is not in acute distress.     Comments: Looks her stated age, weak and deconditioned.  Somewhat frail-appearing.   HENT:      Head: Normocephalic and atraumatic.   Eyes:      Extraocular Movements: Extraocular movements intact.      Pupils: Pupils are equal, round, and reactive to light.   Cardiovascular:      Rate and Rhythm: Normal rate and regular rhythm.   Pulmonary:      Effort: Pulmonary effort is normal. No respiratory distress.      Breath sounds: Normal breath sounds.   Abdominal:      General: There is no distension.      Palpations: Abdomen is soft.      Tenderness: There is no abdominal tenderness.   Musculoskeletal:         General: Swelling present. No deformity.      Cervical back: Normal range of motion.      Left lower leg: Edema present.   Skin:     General: Skin is warm and dry.   Neurological:      General: No focal deficit present.      Mental  Status: She is alert and oriented to person, place, and time.         Results Review:  Results from last 7 days   Lab Units 08/16/22  1351   WBC 10*3/mm3 12.51*   HEMOGLOBIN g/dL 13.2   HEMATOCRIT % 40.6   PLATELETS 10*3/mm3 192     Results from last 7 days   Lab Units 08/11/22  1058   SODIUM mmol/L 140   POTASSIUM mmol/L 4.3   CHLORIDE mmol/L 104   CO2 mmol/L 23.9   BUN mg/dL 31*   CREATININE mg/dL 1.41*   CALCIUM mg/dL 9.3   BILIRUBIN mg/dL 0.3   ALK PHOS U/L 57   ALT (SGPT) U/L 9   AST (SGOT) U/L 11   GLUCOSE mg/dL 107*        I reviewed the patient's new clinical results.  I reviewed the patient's new imaging results and agree with the interpretation.        Active Hospital Problems    Diagnosis  POA   • **Acute deep vein thrombosis (DVT) of femoral vein of left lower extremity ([facility]) [I82.412]  Unknown   • Myositis of left lower extremity [M60.9]  Yes   • DELROY (obstructive sleep apnea) [G47.33]  Yes   • Essential hypertension [I10]  Yes   • CKD (chronic kidney disease) stage 3, GFR 30-59 ml/min (CMS/[facility]) [N18.30]  Yes   • Chronic obstructive pulmonary disease ([facility]) [J44.9]  Yes      Resolved Hospital Problems   No resolved problems to display.       Assessment & Plan    The patient has been initiated on a heparin drip and admitted.  Vascular surgery and hematology has been consulted.  A contrasted CT scan of the abdomen/pelvis has been ordered for evaluation of further extension of her clot.  Anticipate she is going to need intervention from vascular and will make her n.p.o. after midnight tonight.  She most recently has been on Xarelto as an outpatient and this will be stopped for now while awaiting further input from hematology regarding anticoagulation moving forward.  We will provide supportive care with pain control and gentle IV fluids.  Renal function near her baseline.  COPD appears stable with no evidence of acute exacerbation.  Additional plans based on her clinical course.    I discussed the patients  findings and my recommendations with patient and family    I wore full protective equipment throughout the patient encounter including eye protection and facemask.  Hand hygiene was performed before donning protective equipment and after removal when leaving the room.    Jed Arevalo MD  08/16/22  17:18 EDT

## 2022-08-16 NOTE — ED TRIAGE NOTES
Pt complains of left groin pain that started yesterday after standing up from her chair. Denies urinary symptoms.     Patient masked at arrival and triage staff wore all appropriate PPE during entire encounter with patient.

## 2022-08-16 NOTE — ED NOTES
Pt still not back from Venous Duplex study-delay in recollect for lab work, heparin initiation  and admission COVID swab.

## 2022-08-16 NOTE — PROGRESS NOTES
Today's left lower venous doppler preliminary report is positive for acute deep vein thrombosis in the left thigh and calf. This preliminary report was given to Dr. Rafa Schwartz in the ER.

## 2022-08-17 ENCOUNTER — ANESTHESIA EVENT (OUTPATIENT)
Dept: PERIOP | Facility: HOSPITAL | Age: 82
End: 2022-08-17

## 2022-08-17 ENCOUNTER — ANESTHESIA (OUTPATIENT)
Dept: PERIOP | Facility: HOSPITAL | Age: 82
End: 2022-08-17

## 2022-08-17 ENCOUNTER — APPOINTMENT (OUTPATIENT)
Dept: GENERAL RADIOLOGY | Facility: HOSPITAL | Age: 82
End: 2022-08-17

## 2022-08-17 LAB
ALBUMIN SERPL-MCNC: 3.5 G/DL (ref 3.5–5.2)
ALBUMIN/GLOB SERPL: 1.3 G/DL
ALP SERPL-CCNC: 51 U/L (ref 39–117)
ALT SERPL W P-5'-P-CCNC: 5 U/L (ref 1–33)
ANION GAP SERPL CALCULATED.3IONS-SCNC: 12.2 MMOL/L (ref 5–15)
ANION GAP SERPL CALCULATED.3IONS-SCNC: 12.5 MMOL/L (ref 5–15)
APTT PPP: 101.2 SECONDS (ref 22.7–35.4)
APTT PPP: 105.5 SECONDS (ref 22.7–35.4)
APTT PPP: 107.6 SECONDS (ref 22.7–35.4)
AST SERPL-CCNC: 14 U/L (ref 1–32)
BACTERIA UR QL AUTO: ABNORMAL /HPF
BASOPHILS # BLD AUTO: 0.03 10*3/MM3 (ref 0–0.2)
BASOPHILS NFR BLD AUTO: 0.3 % (ref 0–1.5)
BILIRUB SERPL-MCNC: 0.5 MG/DL (ref 0–1.2)
BILIRUB UR QL STRIP: NEGATIVE
BUN SERPL-MCNC: 37 MG/DL (ref 8–23)
BUN SERPL-MCNC: 44 MG/DL (ref 8–23)
BUN/CREAT SERPL: 24.8 (ref 7–25)
BUN/CREAT SERPL: 30.3 (ref 7–25)
CALCIUM SPEC-SCNC: 8.2 MG/DL (ref 8.6–10.5)
CALCIUM SPEC-SCNC: 8.9 MG/DL (ref 8.6–10.5)
CHLORIDE SERPL-SCNC: 100 MMOL/L (ref 98–107)
CHLORIDE SERPL-SCNC: 103 MMOL/L (ref 98–107)
CLARITY UR: CLEAR
CO2 SERPL-SCNC: 21.8 MMOL/L (ref 22–29)
CO2 SERPL-SCNC: 24.5 MMOL/L (ref 22–29)
COLOR UR: YELLOW
CREAT SERPL-MCNC: 1.45 MG/DL (ref 0.57–1)
CREAT SERPL-MCNC: 1.49 MG/DL (ref 0.57–1)
DEPRECATED RDW RBC AUTO: 49.1 FL (ref 37–54)
EGFRCR SERPLBLD CKD-EPI 2021: 35.1 ML/MIN/1.73
EGFRCR SERPLBLD CKD-EPI 2021: 36.3 ML/MIN/1.73
EOSINOPHIL # BLD AUTO: 0.05 10*3/MM3 (ref 0–0.4)
EOSINOPHIL NFR BLD AUTO: 0.4 % (ref 0.3–6.2)
ERYTHROCYTE [DISTWIDTH] IN BLOOD BY AUTOMATED COUNT: 15.4 % (ref 12.3–15.4)
GLOBULIN UR ELPH-MCNC: 2.6 GM/DL
GLUCOSE SERPL-MCNC: 120 MG/DL (ref 65–99)
GLUCOSE SERPL-MCNC: 99 MG/DL (ref 65–99)
GLUCOSE UR STRIP-MCNC: NEGATIVE MG/DL
HCT VFR BLD AUTO: 38 % (ref 34–46.6)
HGB BLD-MCNC: 12.5 G/DL (ref 12–15.9)
HGB UR QL STRIP.AUTO: ABNORMAL
HYALINE CASTS UR QL AUTO: ABNORMAL /LPF
IMM GRANULOCYTES # BLD AUTO: 0.09 10*3/MM3 (ref 0–0.05)
IMM GRANULOCYTES NFR BLD AUTO: 0.8 % (ref 0–0.5)
KETONES UR QL STRIP: NEGATIVE
LEUKOCYTE ESTERASE UR QL STRIP.AUTO: ABNORMAL
LYMPHOCYTES # BLD AUTO: 1.41 10*3/MM3 (ref 0.7–3.1)
LYMPHOCYTES NFR BLD AUTO: 12.6 % (ref 19.6–45.3)
MCH RBC QN AUTO: 29.6 PG (ref 26.6–33)
MCHC RBC AUTO-ENTMCNC: 32.9 G/DL (ref 31.5–35.7)
MCV RBC AUTO: 90 FL (ref 79–97)
MONOCYTES # BLD AUTO: 0.97 10*3/MM3 (ref 0.1–0.9)
MONOCYTES NFR BLD AUTO: 8.7 % (ref 5–12)
NEUTROPHILS NFR BLD AUTO: 77.2 % (ref 42.7–76)
NEUTROPHILS NFR BLD AUTO: 8.64 10*3/MM3 (ref 1.7–7)
NITRITE UR QL STRIP: NEGATIVE
NRBC BLD AUTO-RTO: 0 /100 WBC (ref 0–0.2)
PH UR STRIP.AUTO: 5.5 [PH] (ref 5–8)
PLATELET # BLD AUTO: 149 10*3/MM3 (ref 140–450)
PMV BLD AUTO: 11.3 FL (ref 6–12)
POTASSIUM SERPL-SCNC: 4.5 MMOL/L (ref 3.5–5.2)
POTASSIUM SERPL-SCNC: 5.1 MMOL/L (ref 3.5–5.2)
PROT SERPL-MCNC: 6.1 G/DL (ref 6–8.5)
PROT UR QL STRIP: ABNORMAL
RBC # BLD AUTO: 4.22 10*6/MM3 (ref 3.77–5.28)
RBC # UR STRIP: ABNORMAL /HPF
REF LAB TEST METHOD: ABNORMAL
SODIUM SERPL-SCNC: 137 MMOL/L (ref 136–145)
SODIUM SERPL-SCNC: 137 MMOL/L (ref 136–145)
SP GR UR STRIP: >=1.03 (ref 1–1.03)
SQUAMOUS #/AREA URNS HPF: ABNORMAL /HPF
UROBILINOGEN UR QL STRIP: ABNORMAL
WBC # UR STRIP: ABNORMAL /HPF
WBC NRBC COR # BLD: 11.19 10*3/MM3 (ref 3.4–10.8)

## 2022-08-17 PROCEDURE — 25010000002 FENTANYL CITRATE (PF) 50 MCG/ML SOLUTION

## 2022-08-17 PROCEDURE — 94761 N-INVAS EAR/PLS OXIMETRY MLT: CPT

## 2022-08-17 PROCEDURE — 25010000002 PROPOFOL 10 MG/ML EMULSION: Performed by: ANESTHESIOLOGY

## 2022-08-17 PROCEDURE — C1769 GUIDE WIRE: HCPCS | Performed by: SURGERY

## 2022-08-17 PROCEDURE — 25010000002 HEPARIN (PORCINE) 25000-0.45 UT/250ML-% SOLUTION: Performed by: SURGERY

## 2022-08-17 PROCEDURE — C1757 CATH, THROMBECTOMY/EMBOLECT: HCPCS | Performed by: SURGERY

## 2022-08-17 PROCEDURE — 85730 THROMBOPLASTIN TIME PARTIAL: CPT | Performed by: SURGERY

## 2022-08-17 PROCEDURE — 25010000002 HYDROMORPHONE PER 4 MG

## 2022-08-17 PROCEDURE — B51C1ZZ FLUOROSCOPY OF LEFT LOWER EXTREMITY VEINS USING LOW OSMOLAR CONTRAST: ICD-10-PCS | Performed by: SURGERY

## 2022-08-17 PROCEDURE — 94799 UNLISTED PULMONARY SVC/PX: CPT

## 2022-08-17 PROCEDURE — 25010000002 ONDANSETRON PER 1 MG

## 2022-08-17 PROCEDURE — 99222 1ST HOSP IP/OBS MODERATE 55: CPT | Performed by: INTERNAL MEDICINE

## 2022-08-17 PROCEDURE — 047L3ZZ DILATION OF LEFT FEMORAL ARTERY, PERCUTANEOUS APPROACH: ICD-10-PCS | Performed by: SURGERY

## 2022-08-17 PROCEDURE — C1887 CATHETER, GUIDING: HCPCS | Performed by: SURGERY

## 2022-08-17 PROCEDURE — 0 IOPAMIDOL PER 1 ML: Performed by: HOSPITALIST

## 2022-08-17 PROCEDURE — C1876 STENT, NON-COA/NON-COV W/DEL: HCPCS | Performed by: SURGERY

## 2022-08-17 PROCEDURE — 04CD3ZZ EXTIRPATION OF MATTER FROM LEFT COMMON ILIAC ARTERY, PERCUTANEOUS APPROACH: ICD-10-PCS | Performed by: SURGERY

## 2022-08-17 PROCEDURE — C1894 INTRO/SHEATH, NON-LASER: HCPCS | Performed by: SURGERY

## 2022-08-17 PROCEDURE — 25010000002 ONDANSETRON PER 1 MG: Performed by: SURGERY

## 2022-08-17 PROCEDURE — 25010000002 CEFAZOLIN IN DEXTROSE 2-4 GM/100ML-% SOLUTION: Performed by: SURGERY

## 2022-08-17 PROCEDURE — 25010000002 HEPARIN (PORCINE) 25000-0.45 UT/250ML-% SOLUTION: Performed by: EMERGENCY MEDICINE

## 2022-08-17 PROCEDURE — 25010000002 HEPARIN (PORCINE) PER 1000 UNITS

## 2022-08-17 PROCEDURE — C1725 CATH, TRANSLUMIN NON-LASER: HCPCS | Performed by: SURGERY

## 2022-08-17 PROCEDURE — 81001 URINALYSIS AUTO W/SCOPE: CPT | Performed by: INTERNAL MEDICINE

## 2022-08-17 PROCEDURE — 25010000002 NEOSTIGMINE 5 MG/10ML SOLUTION

## 2022-08-17 PROCEDURE — 94664 DEMO&/EVAL PT USE INHALER: CPT

## 2022-08-17 PROCEDURE — 047D3DZ DILATION OF LEFT COMMON ILIAC ARTERY WITH INTRALUMINAL DEVICE, PERCUTANEOUS APPROACH: ICD-10-PCS | Performed by: SURGERY

## 2022-08-17 PROCEDURE — 82570 ASSAY OF URINE CREATININE: CPT | Performed by: INTERNAL MEDICINE

## 2022-08-17 PROCEDURE — 85025 COMPLETE CBC W/AUTO DIFF WBC: CPT | Performed by: EMERGENCY MEDICINE

## 2022-08-17 PROCEDURE — 25010000002 HYDROMORPHONE PER 4 MG: Performed by: SURGERY

## 2022-08-17 PROCEDURE — 25010000002 HEPARIN (PORCINE) PER 1000 UNITS: Performed by: SURGERY

## 2022-08-17 PROCEDURE — 80053 COMPREHEN METABOLIC PANEL: CPT | Performed by: INTERNAL MEDICINE

## 2022-08-17 PROCEDURE — 85730 THROMBOPLASTIN TIME PARTIAL: CPT | Performed by: INTERNAL MEDICINE

## 2022-08-17 PROCEDURE — 25010000002 PHENYLEPHRINE 10 MG/ML SOLUTION

## 2022-08-17 PROCEDURE — 84156 ASSAY OF PROTEIN URINE: CPT | Performed by: INTERNAL MEDICINE

## 2022-08-17 PROCEDURE — B5191ZZ FLUOROSCOPY OF INFERIOR VENA CAVA USING LOW OSMOLAR CONTRAST: ICD-10-PCS | Performed by: SURGERY

## 2022-08-17 PROCEDURE — 04CL3ZZ EXTIRPATION OF MATTER FROM LEFT FEMORAL ARTERY, PERCUTANEOUS APPROACH: ICD-10-PCS | Performed by: SURGERY

## 2022-08-17 DEVICE — IMPLANTABLE DEVICE: Type: IMPLANTABLE DEVICE | Site: VEIN ILIAC | Status: FUNCTIONAL

## 2022-08-17 RX ORDER — PROMETHAZINE HYDROCHLORIDE 25 MG/1
25 SUPPOSITORY RECTAL ONCE AS NEEDED
Status: DISCONTINUED | OUTPATIENT
Start: 2022-08-17 | End: 2022-08-17 | Stop reason: HOSPADM

## 2022-08-17 RX ORDER — IBUPROFEN 600 MG/1
600 TABLET ORAL ONCE AS NEEDED
Status: DISCONTINUED | OUTPATIENT
Start: 2022-08-17 | End: 2022-08-17 | Stop reason: HOSPADM

## 2022-08-17 RX ORDER — HYDROMORPHONE HYDROCHLORIDE 1 MG/ML
0.5 INJECTION, SOLUTION INTRAMUSCULAR; INTRAVENOUS; SUBCUTANEOUS
Status: DISCONTINUED | OUTPATIENT
Start: 2022-08-17 | End: 2022-08-17 | Stop reason: HOSPADM

## 2022-08-17 RX ORDER — NEOSTIGMINE METHYLSULFATE 0.5 MG/ML
INJECTION, SOLUTION INTRAVENOUS AS NEEDED
Status: DISCONTINUED | OUTPATIENT
Start: 2022-08-17 | End: 2022-08-17 | Stop reason: SURG

## 2022-08-17 RX ORDER — CEFAZOLIN SODIUM 2 G/100ML
2 INJECTION, SOLUTION INTRAVENOUS ONCE
Status: COMPLETED | OUTPATIENT
Start: 2022-08-17 | End: 2022-08-17

## 2022-08-17 RX ORDER — SODIUM CHLORIDE, SODIUM LACTATE, POTASSIUM CHLORIDE, CALCIUM CHLORIDE 600; 310; 30; 20 MG/100ML; MG/100ML; MG/100ML; MG/100ML
9 INJECTION, SOLUTION INTRAVENOUS CONTINUOUS
Status: DISCONTINUED | OUTPATIENT
Start: 2022-08-17 | End: 2022-08-23 | Stop reason: HOSPADM

## 2022-08-17 RX ORDER — DIPHENHYDRAMINE HYDROCHLORIDE 50 MG/ML
12.5 INJECTION INTRAMUSCULAR; INTRAVENOUS
Status: DISCONTINUED | OUTPATIENT
Start: 2022-08-17 | End: 2022-08-17 | Stop reason: HOSPADM

## 2022-08-17 RX ORDER — EPHEDRINE SULFATE 50 MG/ML
5 INJECTION, SOLUTION INTRAVENOUS ONCE AS NEEDED
Status: DISCONTINUED | OUTPATIENT
Start: 2022-08-17 | End: 2022-08-17 | Stop reason: HOSPADM

## 2022-08-17 RX ORDER — SODIUM CHLORIDE 0.9 % (FLUSH) 0.9 %
3 SYRINGE (ML) INJECTION EVERY 12 HOURS SCHEDULED
Status: DISCONTINUED | OUTPATIENT
Start: 2022-08-17 | End: 2022-08-17 | Stop reason: HOSPADM

## 2022-08-17 RX ORDER — POLYETHYLENE GLYCOL 3350 17 G/17G
17 POWDER, FOR SOLUTION ORAL DAILY
Status: DISCONTINUED | OUTPATIENT
Start: 2022-08-17 | End: 2022-08-23 | Stop reason: HOSPADM

## 2022-08-17 RX ORDER — HYDROCODONE BITARTRATE AND ACETAMINOPHEN 7.5; 325 MG/1; MG/1
1 TABLET ORAL ONCE AS NEEDED
Status: DISCONTINUED | OUTPATIENT
Start: 2022-08-17 | End: 2022-08-17 | Stop reason: HOSPADM

## 2022-08-17 RX ORDER — OXYCODONE AND ACETAMINOPHEN 7.5; 325 MG/1; MG/1
1 TABLET ORAL EVERY 4 HOURS PRN
Status: DISCONTINUED | OUTPATIENT
Start: 2022-08-17 | End: 2022-08-17 | Stop reason: HOSPADM

## 2022-08-17 RX ORDER — IPRATROPIUM BROMIDE AND ALBUTEROL SULFATE 2.5; .5 MG/3ML; MG/3ML
3 SOLUTION RESPIRATORY (INHALATION)
Status: DISPENSED | OUTPATIENT
Start: 2022-08-17 | End: 2022-08-19

## 2022-08-17 RX ORDER — HYDROCODONE BITARTRATE AND ACETAMINOPHEN 5; 325 MG/1; MG/1
1 TABLET ORAL EVERY 4 HOURS PRN
Status: DISCONTINUED | OUTPATIENT
Start: 2022-08-17 | End: 2022-08-23 | Stop reason: HOSPADM

## 2022-08-17 RX ORDER — LABETALOL HYDROCHLORIDE 5 MG/ML
5 INJECTION, SOLUTION INTRAVENOUS
Status: DISCONTINUED | OUTPATIENT
Start: 2022-08-17 | End: 2022-08-17 | Stop reason: HOSPADM

## 2022-08-17 RX ORDER — SODIUM CHLORIDE 0.9 % (FLUSH) 0.9 %
3-10 SYRINGE (ML) INJECTION AS NEEDED
Status: DISCONTINUED | OUTPATIENT
Start: 2022-08-17 | End: 2022-08-17 | Stop reason: HOSPADM

## 2022-08-17 RX ORDER — PHENYLEPHRINE HYDROCHLORIDE 10 MG/ML
INJECTION INTRAVENOUS AS NEEDED
Status: DISCONTINUED | OUTPATIENT
Start: 2022-08-17 | End: 2022-08-17 | Stop reason: SURG

## 2022-08-17 RX ORDER — NALOXONE HCL 0.4 MG/ML
0.4 VIAL (ML) INJECTION
Status: DISCONTINUED | OUTPATIENT
Start: 2022-08-17 | End: 2022-08-23 | Stop reason: HOSPADM

## 2022-08-17 RX ORDER — FENTANYL CITRATE 50 UG/ML
50 INJECTION, SOLUTION INTRAMUSCULAR; INTRAVENOUS
Status: DISCONTINUED | OUTPATIENT
Start: 2022-08-17 | End: 2022-08-17 | Stop reason: HOSPADM

## 2022-08-17 RX ORDER — NITROGLYCERIN 0.4 MG/1
0.4 TABLET SUBLINGUAL
Status: DISCONTINUED | OUTPATIENT
Start: 2022-08-17 | End: 2022-08-23 | Stop reason: HOSPADM

## 2022-08-17 RX ORDER — FLUMAZENIL 0.1 MG/ML
0.2 INJECTION INTRAVENOUS AS NEEDED
Status: DISCONTINUED | OUTPATIENT
Start: 2022-08-17 | End: 2022-08-17 | Stop reason: HOSPADM

## 2022-08-17 RX ORDER — FAMOTIDINE 10 MG/ML
20 INJECTION, SOLUTION INTRAVENOUS ONCE
Status: COMPLETED | OUTPATIENT
Start: 2022-08-17 | End: 2022-08-17

## 2022-08-17 RX ORDER — LIDOCAINE HYDROCHLORIDE 10 MG/ML
0.5 INJECTION, SOLUTION EPIDURAL; INFILTRATION; INTRACAUDAL; PERINEURAL ONCE AS NEEDED
Status: DISCONTINUED | OUTPATIENT
Start: 2022-08-17 | End: 2022-08-17 | Stop reason: HOSPADM

## 2022-08-17 RX ORDER — HYDROMORPHONE HYDROCHLORIDE 1 MG/ML
0.5 INJECTION, SOLUTION INTRAMUSCULAR; INTRAVENOUS; SUBCUTANEOUS
Status: DISCONTINUED | OUTPATIENT
Start: 2022-08-17 | End: 2022-08-23 | Stop reason: HOSPADM

## 2022-08-17 RX ORDER — NALOXONE HCL 0.4 MG/ML
0.2 VIAL (ML) INJECTION AS NEEDED
Status: DISCONTINUED | OUTPATIENT
Start: 2022-08-17 | End: 2022-08-17 | Stop reason: HOSPADM

## 2022-08-17 RX ORDER — HYDRALAZINE HYDROCHLORIDE 20 MG/ML
5 INJECTION INTRAMUSCULAR; INTRAVENOUS
Status: DISCONTINUED | OUTPATIENT
Start: 2022-08-17 | End: 2022-08-17 | Stop reason: HOSPADM

## 2022-08-17 RX ORDER — ROCURONIUM BROMIDE 10 MG/ML
INJECTION, SOLUTION INTRAVENOUS AS NEEDED
Status: DISCONTINUED | OUTPATIENT
Start: 2022-08-17 | End: 2022-08-17 | Stop reason: SURG

## 2022-08-17 RX ORDER — PROMETHAZINE HYDROCHLORIDE 25 MG/1
25 TABLET ORAL ONCE AS NEEDED
Status: DISCONTINUED | OUTPATIENT
Start: 2022-08-17 | End: 2022-08-17 | Stop reason: HOSPADM

## 2022-08-17 RX ORDER — DIPHENHYDRAMINE HCL 25 MG
25 CAPSULE ORAL
Status: DISCONTINUED | OUTPATIENT
Start: 2022-08-17 | End: 2022-08-17 | Stop reason: HOSPADM

## 2022-08-17 RX ORDER — PROPOFOL 10 MG/ML
VIAL (ML) INTRAVENOUS AS NEEDED
Status: DISCONTINUED | OUTPATIENT
Start: 2022-08-17 | End: 2022-08-17 | Stop reason: SURG

## 2022-08-17 RX ORDER — HEPARIN SODIUM 1000 [USP'U]/ML
INJECTION, SOLUTION INTRAVENOUS; SUBCUTANEOUS AS NEEDED
Status: DISCONTINUED | OUTPATIENT
Start: 2022-08-17 | End: 2022-08-17 | Stop reason: SURG

## 2022-08-17 RX ORDER — ONDANSETRON 4 MG/1
4 TABLET, FILM COATED ORAL EVERY 6 HOURS PRN
Status: DISCONTINUED | OUTPATIENT
Start: 2022-08-17 | End: 2022-08-23 | Stop reason: HOSPADM

## 2022-08-17 RX ORDER — GLYCOPYRROLATE 0.2 MG/ML
INJECTION INTRAMUSCULAR; INTRAVENOUS AS NEEDED
Status: DISCONTINUED | OUTPATIENT
Start: 2022-08-17 | End: 2022-08-17 | Stop reason: SURG

## 2022-08-17 RX ORDER — SODIUM CHLORIDE 9 MG/ML
INJECTION, SOLUTION INTRAVENOUS CONTINUOUS PRN
Status: DISCONTINUED | OUTPATIENT
Start: 2022-08-17 | End: 2022-08-17 | Stop reason: SURG

## 2022-08-17 RX ORDER — SODIUM CHLORIDE 9 MG/ML
75 INJECTION, SOLUTION INTRAVENOUS CONTINUOUS
Status: DISCONTINUED | OUTPATIENT
Start: 2022-08-17 | End: 2022-08-18

## 2022-08-17 RX ORDER — ONDANSETRON 2 MG/ML
4 INJECTION INTRAMUSCULAR; INTRAVENOUS ONCE AS NEEDED
Status: COMPLETED | OUTPATIENT
Start: 2022-08-17 | End: 2022-08-17

## 2022-08-17 RX ORDER — CEFAZOLIN SODIUM 2 G/100ML
2 INJECTION, SOLUTION INTRAVENOUS EVERY 8 HOURS
Status: COMPLETED | OUTPATIENT
Start: 2022-08-17 | End: 2022-08-18

## 2022-08-17 RX ORDER — FENTANYL CITRATE 50 UG/ML
INJECTION, SOLUTION INTRAMUSCULAR; INTRAVENOUS AS NEEDED
Status: DISCONTINUED | OUTPATIENT
Start: 2022-08-17 | End: 2022-08-17 | Stop reason: SURG

## 2022-08-17 RX ORDER — ONDANSETRON 2 MG/ML
4 INJECTION INTRAMUSCULAR; INTRAVENOUS EVERY 6 HOURS PRN
Status: DISCONTINUED | OUTPATIENT
Start: 2022-08-17 | End: 2022-08-23 | Stop reason: HOSPADM

## 2022-08-17 RX ORDER — ONDANSETRON 2 MG/ML
INJECTION INTRAMUSCULAR; INTRAVENOUS AS NEEDED
Status: DISCONTINUED | OUTPATIENT
Start: 2022-08-17 | End: 2022-08-17 | Stop reason: SURG

## 2022-08-17 RX ORDER — LIDOCAINE HYDROCHLORIDE 20 MG/ML
INJECTION, SOLUTION INFILTRATION; PERINEURAL AS NEEDED
Status: DISCONTINUED | OUTPATIENT
Start: 2022-08-17 | End: 2022-08-17 | Stop reason: SURG

## 2022-08-17 RX ADMIN — ROCURONIUM BROMIDE 10 MG: 50 INJECTION INTRAVENOUS at 10:25

## 2022-08-17 RX ADMIN — ONDANSETRON 4 MG: 2 INJECTION INTRAMUSCULAR; INTRAVENOUS at 18:01

## 2022-08-17 RX ADMIN — HEPARIN SODIUM 11.8 UNITS/KG/HR: 10000 INJECTION, SOLUTION INTRAVENOUS at 13:02

## 2022-08-17 RX ADMIN — SODIUM CHLORIDE 125 ML/HR: 9 INJECTION, SOLUTION INTRAVENOUS at 17:38

## 2022-08-17 RX ADMIN — PHENYLEPHRINE HYDROCHLORIDE 100 MCG: 10 INJECTION, SOLUTION INTRAVENOUS at 11:06

## 2022-08-17 RX ADMIN — POLYETHYLENE GLYCOL 3350 17 G: 17 POWDER, FOR SOLUTION ORAL at 17:38

## 2022-08-17 RX ADMIN — HEPARIN SODIUM 5000 UNITS: 1000 INJECTION INTRAVENOUS; SUBCUTANEOUS at 11:05

## 2022-08-17 RX ADMIN — ONDANSETRON 4 MG: 2 INJECTION INTRAMUSCULAR; INTRAVENOUS at 13:05

## 2022-08-17 RX ADMIN — CEFAZOLIN SODIUM 2 G: 2 INJECTION, SOLUTION INTRAVENOUS at 17:38

## 2022-08-17 RX ADMIN — PHENYLEPHRINE HYDROCHLORIDE 100 MCG: 10 INJECTION, SOLUTION INTRAVENOUS at 10:16

## 2022-08-17 RX ADMIN — PHENYLEPHRINE HYDROCHLORIDE 100 MCG: 10 INJECTION, SOLUTION INTRAVENOUS at 10:38

## 2022-08-17 RX ADMIN — HYDROCODONE BITARTRATE AND ACETAMINOPHEN 1 TABLET: 5; 325 TABLET ORAL at 20:02

## 2022-08-17 RX ADMIN — HEPARIN SODIUM 11.81 UNITS/KG/HR: 10000 INJECTION, SOLUTION INTRAVENOUS at 09:44

## 2022-08-17 RX ADMIN — PHENYLEPHRINE HYDROCHLORIDE 100 MCG: 10 INJECTION, SOLUTION INTRAVENOUS at 10:37

## 2022-08-17 RX ADMIN — HEPARIN SODIUM 13.8 UNITS/KG/HR: 10000 INJECTION, SOLUTION INTRAVENOUS at 01:58

## 2022-08-17 RX ADMIN — NEOSTIGMINE METHYLSULFATE 4 MG: 0.5 INJECTION INTRAVENOUS at 11:45

## 2022-08-17 RX ADMIN — IOPAMIDOL 115 ML: 510 INJECTION, SOLUTION INTRAVASCULAR at 11:56

## 2022-08-17 RX ADMIN — ONDANSETRON 4 MG: 2 INJECTION INTRAMUSCULAR; INTRAVENOUS at 11:40

## 2022-08-17 RX ADMIN — HYDROMORPHONE HYDROCHLORIDE 0.5 MG: 1 INJECTION, SOLUTION INTRAMUSCULAR; INTRAVENOUS; SUBCUTANEOUS at 13:30

## 2022-08-17 RX ADMIN — LIDOCAINE HYDROCHLORIDE 40 MG: 20 INJECTION, SOLUTION INFILTRATION; PERINEURAL at 10:06

## 2022-08-17 RX ADMIN — HYDROMORPHONE HYDROCHLORIDE 0.5 MG: 1 INJECTION, SOLUTION INTRAMUSCULAR; INTRAVENOUS; SUBCUTANEOUS at 21:17

## 2022-08-17 RX ADMIN — SODIUM CHLORIDE, POTASSIUM CHLORIDE, SODIUM LACTATE AND CALCIUM CHLORIDE 9 ML/HR: 600; 310; 30; 20 INJECTION, SOLUTION INTRAVENOUS at 09:49

## 2022-08-17 RX ADMIN — FENTANYL CITRATE 50 MCG: 50 INJECTION INTRAMUSCULAR; INTRAVENOUS at 13:01

## 2022-08-17 RX ADMIN — PROPOFOL 150 MG: 10 INJECTION, EMULSION INTRAVENOUS at 10:06

## 2022-08-17 RX ADMIN — BUDESONIDE AND FORMOTEROL FUMARATE DIHYDRATE 2 PUFF: 80; 4.5 AEROSOL RESPIRATORY (INHALATION) at 23:53

## 2022-08-17 RX ADMIN — ROCURONIUM BROMIDE 40 MG: 50 INJECTION INTRAVENOUS at 10:06

## 2022-08-17 RX ADMIN — FENTANYL CITRATE 25 MCG: 50 INJECTION INTRAMUSCULAR; INTRAVENOUS at 11:42

## 2022-08-17 RX ADMIN — FAMOTIDINE 20 MG: 10 INJECTION INTRAVENOUS at 09:50

## 2022-08-17 RX ADMIN — GLYCOPYRROLATE 0.6 MG: 0.2 INJECTION INTRAMUSCULAR; INTRAVENOUS at 11:45

## 2022-08-17 RX ADMIN — SODIUM CHLORIDE: 9 INJECTION, SOLUTION INTRAVENOUS at 10:16

## 2022-08-17 RX ADMIN — IPRATROPIUM BROMIDE AND ALBUTEROL SULFATE 3 ML: .5; 3 SOLUTION RESPIRATORY (INHALATION) at 15:25

## 2022-08-17 RX ADMIN — FENTANYL CITRATE 25 MCG: 50 INJECTION INTRAMUSCULAR; INTRAVENOUS at 10:45

## 2022-08-17 RX ADMIN — CEFAZOLIN SODIUM 2 G: 2 INJECTION, SOLUTION INTRAVENOUS at 09:49

## 2022-08-17 RX ADMIN — ROCURONIUM BROMIDE 10 MG: 50 INJECTION INTRAVENOUS at 11:01

## 2022-08-17 NOTE — CONSULTS
Subjective     REASON FOR CONSULTATION:  Provide an opinion on any further workup or treatment on:    Recurrent lower extremity deep vein thrombosis                       REQUESTING PHYSICIAN: Dr. Dangelo    HISTORY OF PRESENT ILLNESS:      Luann Frances is a 82 y.o. patient who was admitted on 8/16/2022.  She has left lower extremity deep vein thrombosis.  She is on Xarelto 20 mg daily.  She has been taking Xarelto regularly.  She does not remember missing any doses recently.  She recently had problem with kidney stone but she did not require any surgery.  Xarelto was not held.    On 8/16/2022, patient started having pain in the left foot.  She noticed that her left foot was black and blue.  It felt cold.  When she started walking on it, she had severe pain.  She therefore called EMS and was brought to the ER.  Venous Doppler revealed extensive left lower extremity deep vein thrombosis.  She was evaluated by vascular surgery.  Their impression was that the patient was developing phlegmasia cerulea dolens.  She was taken to the OR earlier today and underwent thrombectomy and angioplasty with placement of a stent in the left iliac vein.     Patient was seen after the procedure.  She complained of some soreness of her throat.    Past Medical History:   Diagnosis Date   • Acute deep vein thrombosis (DVT) of femoral vein of right lower extremity (HCC) 04/15/2021   • Arthritis    • Asymptomatic PVCs    • Backache    • Chronic bronchitis (HCC)    • CKD (chronic kidney disease)     Stage 3   • Deep vein thrombosis (DVT) of right lower extremity (HCC)    • Essential hypertension 01/20/2020   • Fracture of humerus    • GERD (gastroesophageal reflux disease)    • Hyperlipidemia    • Hypertension    • Hypothyroidism    • Pneumonia    • Pulmonary emphysema (HCC)    • Renal calculi    • Renal calculus 01/06/2021   • Sleep apnea     DOES NOT USE CPAP   • Thoracic vertebral fracture (HCC)      Past Surgical History:    Procedure Laterality Date   • APPENDECTOMY     • EXTRACORPOREAL SHOCK WAVE LITHOTRIPSY (ESWL) Left 01/06/2021    Procedure: EXTRACORPOREAL SHOCKWAVE LITHOTRIPSY, CYSTOSCOPY, RETROGRADE PYLEOGRAM;  Surgeon: Walter Schwartz MD;  Location: [facility];  Service: Urology;  Laterality: Left;   • EYE SURGERY      cataracts   • HUMERUS FRACTURE SURGERY     • RIB FRACTURE SURGERY  2021   • THORACOSCOPY Right 02/16/2021    Procedure: bronchoscopy, right video assisted THORACOSCOPY WITH PLATING OF 3, 4, 5, AND 6 RIBS;  Surgeon: Kelley Delong MD;  Location: [facility] OR;  Service: Thoracic;  Laterality: Right;   • TOTAL KNEE ARTHROPLASTY Left 04/2015   • UMBILICAL HERNIA REPAIR       SCHEDULED MEDS:  budesonide-formoterol, 2 puff, Inhalation, BID - RT  ceFAZolin, 2 g, Intravenous, Q8H  ipratropium-albuterol, 3 mL, Nebulization, Q8H - RT  pantoprazole, 40 mg, Oral, QAM  polyethylene glycol, 17 g, Oral, Daily  sodium chloride, 10 mL, Intravenous, Q12H      INFUSIONS:  heparin, 15.8 Units/kg/hr, Last Rate: 11.8 Units/kg/hr (08/17/22 1302)  lactated ringers, 9 mL/hr, Last Rate: Stopped (08/17/22 1015)  sodium chloride, 100 mL/hr, Last Rate: 100 mL/hr (08/16/22 2139)  sodium chloride, 125 mL/hr, Last Rate: 125 mL/hr (08/17/22 1738)      PRN MEDS:  •  acetaminophen **OR** acetaminophen **OR** acetaminophen  •  albuterol  •  HYDROcodone-acetaminophen  •  HYDROcodone-acetaminophen  •  HYDROmorphone **AND** naloxone  •  Morphine **AND** naloxone  •  nitroglycerin  •  nitroglycerin  •  ondansetron  •  ondansetron **OR** ondansetron  •  [COMPLETED] Insert peripheral IV **AND** sodium chloride  •  sodium chloride    ALLERGIES:  Allergies   Allergen Reactions   • Ambien [Zolpidem Tartrate] Nausea Only     nausea   • Amoxicillin-Pot Clavulanate Nausea Only   • Doxycycline Nausea Only   • Eliquis [Apixaban] Nausea Only   • Levofloxacin Nausea Only   • Metronidazole Nausea Only   • Naproxen GI Intolerance   •  Sulfamethoxazole-Trimethoprim Nausea Only   • Synthroid [Levothyroxine Sodium] Nausea Only   • Zolpidem Nausea Only      Social History     Socioeconomic History   • Marital status:    Tobacco Use   • Smoking status: Current Every Day Smoker     Packs/day: 0.25     Years: 50.00     Pack years: 12.50     Types: Cigarettes     Start date: 1970   • Smokeless tobacco: Never Used   Vaping Use   • Vaping Use: Never used   Substance and Sexual Activity   • Alcohol use: Not Currently   • Drug use: No     Family History   Problem Relation Age of Onset   • Cancer Mother         breast   • Breast cancer Mother    • No Known Problems Father    • Heart disease Maternal Grandmother    • Cancer Brother         esophageal , stomach    • Esophageal cancer Brother    • No Known Problems Son    • Breast cancer Maternal Aunt    • Heart disease Maternal Aunt    • No Known Problems Son    • Malig Hyperthermia Neg Hx       REVIEW OF SYSTEMS:   GENERAL:  Negative.   SKIN: Change in the color of the left foot to blue and black.  HEME/LYMPH: Negative.  EYES:  Negative.  ENT: Sore throat.  RESPIRATORY:  Negative.   CVS:  Negative.   GI:  Negative.   :  Negative.   MUSCULOSKELETAL: Left foot pain.  NEUROLOGICAL:  Negative.  PSYCHIATRIC:  Negative.     Objective   VITAL SIGNS:  Temp:  [97.3 °F (36.3 °C)-98.3 °F (36.8 °C)] 98 °F (36.7 °C)  Heart Rate:  [68-81] 75  Resp:  [15-20] 18  BP: (128-212)/() 152/71     Wt Readings from Last 3 Encounters:   08/17/22 94.8 kg (209 lb)   08/11/22 94.8 kg (209 lb)   07/14/22 94.8 kg (209 lb)     PHYSICAL EXAMINATION:   GENERAL:  The patient appears in fair general condition, not in acute distress.  SKIN: No skin rash. No ecchymosis.  HEAD:  Normocephalic.  EYES:  No Jaundice. No Pallor. Pupils equal. EOMI.  NECK:  Supple. No Thyromegaly. No Masses.  LYMPHATICS:  No cervical or supraclavicular lymphadenopathy.  CHEST: Normal respiratory effort. Lungs clear to auscultation.   CARDIAC:  Normal S1  & S2. No murmur.   ABDOMEN:  Soft.  Hypogastric tenderness. No Hepatomegaly. No Splenomegaly. No masses.  EXTREMITIES: Left leg swelling. No Calf tenderness.  Left foot is cold to touch.  NEUROLOGICAL:  No Focal neurological deficits.     RESULT REVIEW:   Results from last 7 days   Lab Units 08/17/22  0850 08/16/22  1351 08/11/22  1058   WBC 10*3/mm3 11.19* 12.51* 10.47   NEUTROS ABS 10*3/mm3 8.64* 10.39* 8.38*   HEMOGLOBIN g/dL 12.5 13.2 12.8   HEMATOCRIT % 38.0 40.6 38.9   PLATELETS 10*3/mm3 149 192 285     Results from last 7 days   Lab Units 08/17/22  0850 08/16/22  1632 08/11/22  1058   SODIUM mmol/L 137 133* 140   POTASSIUM mmol/L 5.1 6.0* 4.3   CHLORIDE mmol/L 100 101 104   CO2 mmol/L 24.5 16.0* 23.9   BUN mg/dL 44* 45* 31*   CREATININE mg/dL 1.45* 2.01* 1.41*   CALCIUM mg/dL 8.9 9.5 9.3   ALBUMIN g/dL  --  4.00 4.30   BILIRUBIN mg/dL  --  0.7 0.3   ALK PHOS U/L  --  60 57   ALT (SGPT) U/L  --  6 9   AST (SGOT) U/L  --  13 11     Results from last 7 days   Lab Units 08/17/22  0850 08/17/22  0055 08/16/22  1632   INR   --   --  1.18*   APTT seconds 107.6* 105.5* 21.6*     Venous Doppler left lower extremity on 8/16/2022:  · Acute left lower extremity deep vein thrombosis noted in the external iliac, common femoral, deep femoral, proximal femoral, mid femoral, distal femoral, popliteal, posterial tibial, peroneal and gastrocnemius.  · Acute left lower extremity superficial thrombophlebitis noted in the saphenofemoral junction and great saphenous (above knee).  · All other left sided veins appeared normal.    CT pelvis on 8/16/2022:  1. New enlargement of the left proximal quadriceps musculature with  surrounding stranding in the intramuscular fat and overlying  subcutaneous fat. This may be due to an intramuscular hemorrhage or  myositis and extends off the field of view distally. No left hip  fracture or osseous abnormality is evident.  2. Atherosclerotic disease with mild enlargement of the  infrarenal  abdominal aorta measuring 2.5 cm transverse dimension.  3. Sigmoid diverticulosis without evidence for diverticulitis.     CT abdomen pelvis on 8/11/2022:  Fat-containing hiatal hernia is present. There are no findings of small  bowel obstruction. The appendix is surgically absent. The liver,  gallbladder, pancreas, spleen and adrenal glands have an unremarkable  noncontrast CT appearance.     There is a 3 x 3 mm distal left ureteral calculus located approximately  10 mm from the left ureterovesical junction. There is mild upstream  hydroureteronephrosis with asymmetric stranding surrounding the left  kidney and ureter.. The bladder is decompressed and not well evaluated.     No abdominopelvic adenopathy by size criteria there is colonic  diverticulosis. No free intraperitoneal air is seen. There are no  suspicious lytic or blastic osseous lesions. Levocurvature of the lumbar  spine is present.    Assessment & Plan    *Acute extensive left lower extremity DVT in a patient with prior history of bilateral lower extremity DVT as below.  · Patient developed left foot pain and was unable to walk.  · The skin color changed and the foot became cold.  · She developed phlegmasia serial ambulance.  · She was seen by vascular surgery and taken to the OR earlier today.  · She underwent thrombectomy and angioplasty with placement of stent in the left iliac vein.  · She is currently on IV heparin.  · We will obtain thrombophilia work-up to evaluate the patient's recurrent thrombosis.    *Deep vein thrombosis in the left popliteal, posterior tibial, peroneal and soleal veins diagnosed on 4/12/2022.  · No clear precipitating factor.  · Patient noticed swelling and some redness of the leg.  · The DVT was diagnosed when she had venous Doppler to reevaluate the right lower extremity DVT.  · She was placed on Eliquis.  · She was having nausea after taking Eliquis.  · She was therefore switched to Xarelto 20 mg daily on  4/29/2022.  · Venous Doppler on 5/20/2022 revealed minimal chronic DVT in the left popliteal vein.  The DVT is in the posterior tibial, peroneal and soleal veins resolved.  · CT chest abdomen pelvis on 6/21/2022 showed no evidence of underlying malignancy to explain the DVT.  · CT on 8/11/2022 and 8/16/2022 showed no evidence of malignancy to explain the recurrent thrombosis.     *History of right lower extremity DVT diagnosed on 4/1/2021.  · This was a provoked episode that developed after surgery to place rib plates.  · Patient was placed on Xarelto.  · Venous Doppler on 4/12/2022 showed resolution of the right lower extremity DVT.     *Nausea.  · She was having nausea after taking Eliquis.  · She is on Compazine 10 mg every 6 hours as needed.  · Nausea also developed after she was switched to Xarelto.  · CT scan revealed gallbladder stone.  However, there were no changes of cholecystitis.  · CT abdomen pelvis on 8/11/2022 showed no liver or GI abnormality to explain the nausea.     *History of low vitamin B12 level.  · B12 was 266 on 9/24/2019.  · Hemoglobin is 12.5 today.     PLAN:    1.  I will obtain thrombophilia work-up including testing for lupus anticoagulant and antiphospholipid antibodies.  2.  I will also obtain testing for factor V Leiden and prothrombin gene mutation.  3.  Continue IV heparin for now.  4.  Since this represents Xarelto failure, I recommended switching her anticoagulation.  We will consider Pradaxa versus Coumadin depending on the results of her hyper coag work-up.      Lita Dubon MD  08/17/22

## 2022-08-17 NOTE — ANESTHESIA PROCEDURE NOTES
Airway  Urgency: elective    Date/Time: 8/17/2022 10:11 AM  Airway not difficult    General Information and Staff    Patient location during procedure: OR  Anesthesiologist: Royce Lopez MD    Indications and Patient Condition  Indications for airway management: airway protection    Preoxygenated: yes  MILS maintained throughout  Mask difficulty assessment: 1 - vent by mask    Final Airway Details  Final airway type: endotracheal airway      Successful airway: ETT  Cuffed: yes   Successful intubation technique: direct laryngoscopy  Endotracheal tube insertion site: oral  Blade: Shayla  Blade size: 3  ETT size (mm): 7.0  Cormack-Lehane Classification: grade I - full view of glottis  Placement verified by: chest auscultation and capnometry   Measured from: lips  ETT/EBT  to lips (cm): 21  Number of attempts at approach: 1  Assessment: lips, teeth, and gum same as pre-op and atraumatic intubation

## 2022-08-17 NOTE — PLAN OF CARE
Goal Outcome Evaluation:  Plan of Care Reviewed With: patient        Progress: no change  Outcome Evaluation: VSS. Alert and oriented x4. RLE dusky and cool. Palpable pulses. Pt reports RLE pain improvement with rest. Up to BSC x2 assist. Heparin gtt infusing per order. IVF infusing per order. NPO since midnight. Plans for OR this AM. Consent in chart. Son at bedside. Will continue to provide supportive care.

## 2022-08-17 NOTE — PLAN OF CARE
Problem: Adult Inpatient Plan of Care  Goal: Plan of Care Review  Outcome: Ongoing, Progressing  Flowsheets (Taken 8/17/2022 1528)  Progress: no change  Plan of Care Reviewed With: patient  Outcome Evaluation: vss. pt s/p thrombectomy and stent placement with dr. marie. pulses dopplerable, LLE cool at pt's toes. aguilar catheter in place.   Goal Outcome Evaluation:  Plan of Care Reviewed With: patient        Progress: no change  Outcome Evaluation: vss. pt s/p thrombectomy and stent placement with dr. marie. pulses dopplerable, LLE cool at pt's toes. aguilar catheter in place. consult called to nephrology

## 2022-08-17 NOTE — BRIEF OP NOTE
LYTIC THROMBIN THERAPY  Progress Note    Luann Frances  8/17/2022    Pre-op Diagnosis:   Left leg phlegmasia       Post-Op Diagnosis Codes:   same    Procedure/CPT® Codes:        Procedure(s):  LT. LEG THROMBECTOMY/EMBOLECTOMY AND ANGIOPLASTY STENT PLACEMENT OF LEFT ILIAC VEIN    Surgeon(s):  Theo Bustos MD    Anesthesia: General    Staff:   Circulator: Shelby Blancas RN; Alexandro Hawthorne RN; Asia Max RN  Scrub Person: Mercedes Sampson  Vascular Radiology Technician: Andria Medina         Estimated Blood Loss: 50 mL    Urine Voided: * No values recorded between 8/17/2022  9:57 AM and 8/17/2022 12:00 PM *    Specimens:                None          Drains:   Urethral Catheter Non-latex 16 Fr. (Active)       [REMOVED] External Urinary Catheter (Removed)       [REMOVED] External Urinary Catheter (Removed)       Findings: see dict        Complications: none          Theo Bustos MD     Date: 8/17/2022  Time: 12:06 EDT

## 2022-08-17 NOTE — CONSULTS
Patient Name: Luann Frances Account #: [id number]    MRN: [id number] Admission Date: 8/16/2022      Consulting Service: Vascular Surgery Date of Evaluation: August 16, 2022    Requesting Provider: Dr. Elton Schwartz MD    CHIEF COMPLAINT: Severe left lower extremity DVT with phlegmasia  HPI: Luann Frances is a 82 y.o. female is being seen for a consultation and evaluation/management of with severe left lower extremity DVT with early phlegmasia changes.  She has wound cyanotic coloring.  She has not yet lost sensation or pulses but her swelling is severe on the left leg.  This is her second DVT event with her first pain on the right leg that responded well to Xarelto and cleared the clot well.  She unfortunately developed a left leg clot that was popliteal and tibial nature despite full use Xarelto 20 mg daily she is now extended up to the iliofemoral level and has massive postphlebitic syndrome with once again early phlegmatic changes.  I discussed the situation with patient and her family and have recommended intervention.  I have also discussed the situation with hematology who will be seeing her tomorrow.  At this point time they agree with intervention but would stand down on the possibility of IVC filter placement.  Given her hypercoagulable state we are going to try to avoid IVC filter placement if possible.    PAST MEDICAL HISTORY:   Past Medical History:   Diagnosis Date   • Acute deep vein thrombosis (DVT) of femoral vein of right lower extremity (HCC) 04/15/2021   • Arthritis    • Asymptomatic PVCs    • Backache    • Chronic bronchitis (HCC)    • CKD (chronic kidney disease)     Stage 3   • Deep vein thrombosis (DVT) of right lower extremity (HCC)    • Essential hypertension 01/20/2020   • Fracture of humerus    • GERD (gastroesophageal reflux disease)    • Hyperlipidemia    • Hypertension    • Hypothyroidism    • Pneumonia    • Pulmonary emphysema (HCC)    • Renal calculi    • Renal calculus  01/06/2021   • Sleep apnea     CPAP USED   • Thoracic vertebral fracture (HCC)       PAST SURGICAL HISTORY:   Past Surgical History:   Procedure Laterality Date   • APPENDECTOMY     • EXTRACORPOREAL SHOCK WAVE LITHOTRIPSY (ESWL) Left 01/06/2021    Procedure: EXTRACORPOREAL SHOCKWAVE LITHOTRIPSY, CYSTOSCOPY, RETROGRADE PYLEOGRAM;  Surgeon: Walter Schwartz MD;  Location: [facility];  Service: Urology;  Laterality: Left;   • EYE SURGERY      cataracts   • HUMERUS FRACTURE SURGERY     • RIB FRACTURE SURGERY  2021   • THORACOSCOPY Right 02/16/2021    Procedure: bronchoscopy, right video assisted THORACOSCOPY WITH PLATING OF 3, 4, 5, AND 6 RIBS;  Surgeon: Kelley Delong MD;  Location: [facility];  Service: Thoracic;  Laterality: Right;   • TOTAL KNEE ARTHROPLASTY Left 04/2015   • UMBILICAL HERNIA REPAIR        FAMILY HISTORY:   Family History   Problem Relation Age of Onset   • Cancer Mother         breast   • Breast cancer Mother    • No Known Problems Father    • Heart disease Maternal Grandmother    • Cancer Brother         esophageal , stomach    • Esophageal cancer Brother    • No Known Problems Son    • Breast cancer Maternal Aunt    • Heart disease Maternal Aunt    • No Known Problems Son    • Malig Hyperthermia Neg Hx       SOCIAL HISTORY:   Social History     Tobacco Use   • Smoking status: Current Every Day Smoker     Packs/day: 0.25     Years: 50.00     Pack years: 12.50     Types: Cigarettes     Start date: 1970   • Smokeless tobacco: Never Used   Vaping Use   • Vaping Use: Never used   Substance Use Topics   • Alcohol use: Not Currently   • Drug use: No      MEDICATIONS:   No current facility-administered medications on file prior to encounter.     Current Outpatient Medications on File Prior to Encounter   Medication Sig Dispense Refill   • Advair Diskus 100-50 MCG/DOSE DISKUS Inhale 1 puff 2 (Two) Times a Day. 180 each 5   • albuterol sulfate  (90 Base) MCG/ACT inhaler Inhale 2 puffs  Every 4 (Four) Hours As Needed for Wheezing or Shortness of Air. 8 g 1   • cholecalciferol (VITAMIN D3) 25 MCG (1000 UT) tablet Take 2,000 Units by mouth Daily.     • [employer] ANTACID & ANTI-GAS 1000-60 MG chewable tablet Chew 1 tablet As Needed.     • furosemide (Lasix) 20 MG tablet Take 1 tablet by mouth Daily. 90 tablet 3   • HYDROcodone-acetaminophen (NORCO) 5-325 MG per tablet Take 1 tablet by mouth Every 6 (Six) Hours As Needed for Severe Pain . 12 tablet 0   • omeprazole OTC (PriLOSEC OTC) 20 MG EC tablet Take 20-40 mg by mouth As Needed.     • ondansetron ODT (ZOFRAN-ODT) 4 MG disintegrating tablet Place 1 tablet on the tongue Every 8 (Eight) Hours As Needed for Nausea or Vomiting. 10 tablet 0   • prochlorperazine (COMPAZINE) 10 MG tablet Take 1 tablet by mouth Every 6 (Six) Hours As Needed for Nausea or Vomiting. 100 tablet 5   • rivaroxaban (XARELTO) 20 MG tablet Take 1 tablet by mouth Daily. 30 tablet 2   • traMADol (ULTRAM) 50 MG tablet Take 50 mg by mouth As Needed.     • [DISCONTINUED] CALCIUM PO Take  by mouth Daily.     • [DISCONTINUED] Levoxyl 100 MCG tablet 1 tablet Monday through Saturday and 2 tablets on Sunday 35 tablet 5   • [DISCONTINUED] triamcinolone (KENALOG) 0.1 % ointment Apply  topically to the appropriate area as directed 2 (Two) Times a Day. (Patient taking differently: Apply  topically to the appropriate area as directed As Needed.) 30 g 0             ALLERGIES: Ambien [zolpidem tartrate], Amoxicillin-pot clavulanate, Doxycycline, Eliquis [apixaban], Levofloxacin, Metronidazole, Naproxen, Sulfamethoxazole-trimethoprim, Synthroid [levothyroxine sodium], and Zolpidem   COMPLETE REVIEW OF SYSTEMS:     ENT ROS: negative  Cardiovascular ROS: no chest pain or dyspnea on exertion  Respiratory ROS: no cough, shortness of breath, or wheezing  Gastrointestinal ROS: no abdominal pain, change in bowel habits, or black or bloody stools  Neurological ROS: no TIA or stroke symptoms  Genito-Urinary ROS:  no dysuria, trouble voiding, or hematuria  Musculoskeletal ROS: positive for - swelling in entire left leg  Dermatological ROS: negative  Psychological ROS: negative      PHYSICAL EXAM:   Patient Vitals for the past 24 hrs:   BP Temp Temp src Pulse Resp SpO2   08/16/22 1735 166/93 -- -- -- -- --   08/16/22 1733 -- 98.2 °F (36.8 °C) Oral 72 18 --   08/16/22 1412 -- -- -- 64 -- 96 %   08/16/22 1401 147/80 -- -- 61 -- 100 %   08/16/22 1224 -- -- -- 66 -- 100 %   08/16/22 1122 147/90 97.2 °F (36.2 °C) Tympanic 79 18 98 %        General appearance: oriented to person, place, and time, in mild to moderate distress and chronically ill appearing.  Neurological exam reveals alert, oriented, normal speech, no focal findings or movement disorder noted.  ENT exam reveals - ENT exam normal, no neck nodes or sinus tenderness.  CVS exam: normal rate, regular rhythm, normal S1, S2, no murmurs, rubs, clicks or gallops.  Chest: clear to auscultation, no wheezes, rales or rhonchi, symmetric air entry.  Abdominal exam: soft, nontender, nondistended, no masses or organomegaly.  Examination of the feet reveals warm, good capillary refill.  Severe swelling in the left leg with cyanotic tinge and tight tissue throughout the leg all the way through the groin and low buttock.  Neurologically she appears intact and she still has good signals at the foot.  Pulses are difficult to palpate due to the swelling.        LABS:      Results Review:       I reviewed the patient's new clinical results.  Results from last 7 days   Lab Units 08/16/22  1351 08/11/22  1058   WBC 10*3/mm3 12.51* 10.47   HEMOGLOBIN g/dL 13.2 12.8   PLATELETS 10*3/mm3 192 285     Results from last 7 days   Lab Units 08/16/22  1632 08/11/22  1058   SODIUM mmol/L 133* 140   POTASSIUM mmol/L 6.0* 4.3   CHLORIDE mmol/L 101 104   CO2 mmol/L 16.0* 23.9   BUN mg/dL 45* 31*   CREATININE mg/dL 2.01* 1.41*   GLUCOSE mg/dL 105* 107*   Estimated Creatinine Clearance: 24 mL/min (A) (by  C-G formula based on SCr of 2.01 mg/dL (H)).  Results from last 7 days   Lab Units 08/16/22  1632 08/11/22  1058   CALCIUM mg/dL 9.5 9.3   ALBUMIN g/dL 4.00 4.30     Results from last 7 days   Lab Units 08/16/22  1632   PROTIME Seconds 14.8*   INR  1.18*       The following radiologic or non-invasive studies have been reviewed by me: Lower extremity venous duplex reviewed and CT scan without contrast reviewed    Active Hospital Problems    Diagnosis  POA   • **Acute deep vein thrombosis (DVT) of femoral vein of left lower extremity ([facility]) [I82.412]  Unknown   • Myositis of left lower extremity [M60.9]  Yes   • 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         ASSESSMENT/PLAN: 82 y.o. female with severe left leg swelling with progression of DVT despite full dose anticoagulation with Xarelto.  Patient is currently in need of mechanical thrombectomy to prevent progression of the swelling to full phlegmasia cerulea dolens.  Her early phlegmasia is concerning enough that I have recommended intervention first thing tomorrow.  We will proceed with her first case.  I discussed with her the risk and complications of the procedure.  We will pursue this in a supine state and this will give us options to intervene at the vena caval level if needed.  Currently however after discussion with hematology we will not pursue filter placement unless necessary.  Concerned that she may thrombosed the filter given her severe hypercoagulability and refractory clotting despite full anticoagulation does give us a lot of pause.  We will plan thrombectomy and filter only if absolutely needed and follow along.  We will  also interrogate her for possibility of May Thurner type syndrome given this progression picture.        I discussed the plan with the patient and she and her son are  agreeable to the plan of care at this point. Thank you for this consult.     Theo Bustos MD   08/16/22

## 2022-08-17 NOTE — CONSULTS
[facility] Consult Note      Patient Name: Luann Frances  : 1940  MRN: [id number]  Primary Care Physician:  Provider, No Known  Referring Physician: No ref. provider found  Date of admission: 2022    Subjective     Reason for Consult:  CKD3    HPI:   Luann Frances is a 82 y.o. female with CKD 3 (baseline SCR 1.5 mg/dL) followed by Dr. Leonardo Lagos of our group, admitted  for further evaluation of abrupt-onset left leg swelling for the preceding 3-4 days.  SCR on arrival 2.0 with value 1.5 today.  Found to have extensive DVT left leg; venogram, thrombectomy, and angioplasty performed earlier today.  Full PMH outlined below; pertinent is prior bilateral DVT on Xarelto, COPD with active tobacco abuse, and hypertension.    · No urinary complaints; Hilton catheter placed prior to thrombectomy performed earlier today  · Weight stable for the past 6 months  · Reports chronic though mild lower extremity swelling for which she takes furosemide very sparingly (once per month); developed sudden-onset swelling and pain left leg roughly 4 days before admission  · No fever or chills  · Breathing is comfortable on room air; no orthopnea or chest pain  · Has had nausea for the past 1 week, and thus appetite has been mediocre during this time    Review of Systems:   14 point review of systems is otherwise negative except for mentioned above on HPI    Personal History     Past Medical History:   Diagnosis Date   • Acute deep vein thrombosis (DVT) of femoral vein of right lower extremity (HCC) 04/15/2021   • Arthritis    • Asymptomatic PVCs    • Backache    • Chronic bronchitis (HCC)    • CKD (chronic kidney disease)     Stage 3   • Deep vein thrombosis (DVT) of right lower extremity (HCC)    • Essential hypertension 2020   • Fracture of humerus    • GERD (gastroesophageal reflux disease)    • Hyperlipidemia    • Hypertension    • Hypothyroidism    • Pneumonia    • Pulmonary  emphysema (HCC)    • Renal calculi    • Renal calculus 01/06/2021   • Sleep apnea     DOES NOT USE CPAP   • Thoracic vertebral fracture (HCC)        Past Surgical History:   Procedure Laterality Date   • APPENDECTOMY     • EXTRACORPOREAL SHOCK WAVE LITHOTRIPSY (ESWL) Left 01/06/2021    Procedure: EXTRACORPOREAL SHOCKWAVE LITHOTRIPSY, CYSTOSCOPY, RETROGRADE PYLEOGRAM;  Surgeon: Walter Schwartz MD;  Location: [facility];  Service: Urology;  Laterality: Left;   • EYE SURGERY      cataracts   • HUMERUS FRACTURE SURGERY     • RIB FRACTURE SURGERY  2021   • THORACOSCOPY Right 02/16/2021    Procedure: bronchoscopy, right video assisted THORACOSCOPY WITH PLATING OF 3, 4, 5, AND 6 RIBS;  Surgeon: Kelley Delong MD;  Location: [facility];  Service: Thoracic;  Laterality: Right;   • TOTAL KNEE ARTHROPLASTY Left 04/2015   • UMBILICAL HERNIA REPAIR         Family History: family history includes Breast cancer in her maternal aunt and mother; Cancer in her brother and mother; Esophageal cancer in her brother; Heart disease in her maternal aunt and maternal grandmother; No Known Problems in her father, son, and son.    Social History:  reports that she has been smoking cigarettes. She started smoking about 52 years ago. She has a 12.50 pack-year smoking history. She has never used smokeless tobacco. She reports previous alcohol use. She reports that she does not use drugs.    Home Medications:  Prior to Admission medications    Medication Sig Start Date End Date Taking? Authorizing Provider   Advair Diskus 100-50 MCG/DOSE DISKUS Inhale 1 puff 2 (Two) Times a Day. 8/17/21  Yes Angie Bruno APRN   albuterol sulfate  (90 Base) MCG/ACT inhaler Inhale 2 puffs Every 4 (Four) Hours As Needed for Wheezing or Shortness of Air. 1/17/22  Yes Angie Bruno APRN   cholecalciferol (VITAMIN D3) 25 MCG (1000 UT) tablet Take 2,000 Units by mouth Daily.   Yes Provider, MD Hector   [employer] ANTACID & ANTI-GAS  1000-60 MG chewable tablet Chew 1 tablet As Needed. 7/26/16  Yes Hector Navarro MD   furosemide (Lasix) 20 MG tablet Take 1 tablet by mouth Daily. 4/21/21  Yes Keri Foreman MD   HYDROcodone-acetaminophen (NORCO) 5-325 MG per tablet Take 1 tablet by mouth Every 6 (Six) Hours As Needed for Severe Pain . 8/11/22  Yes Timmy Peace MD   omeprazole OTC (PriLOSEC OTC) 20 MG EC tablet Take 20-40 mg by mouth As Needed. 9/4/13  Yes Hector Navarro MD   ondansetron ODT (ZOFRAN-ODT) 4 MG disintegrating tablet Place 1 tablet on the tongue Every 8 (Eight) Hours As Needed for Nausea or Vomiting. 8/11/22  Yes Timmy Peace MD   prochlorperazine (COMPAZINE) 10 MG tablet Take 1 tablet by mouth Every 6 (Six) Hours As Needed for Nausea or Vomiting. 4/27/22  Yes Lita Dubon MD   rivaroxaban (XARELTO) 20 MG tablet Take 1 tablet by mouth Daily. 6/24/22  Yes Lita Dubon MD   traMADol (ULTRAM) 50 MG tablet Take 50 mg by mouth As Needed. 7/13/21  Yes Hector Navarro MD       Allergies:  Allergies   Allergen Reactions   • Ambien [Zolpidem Tartrate] Nausea Only     nausea   • Amoxicillin-Pot Clavulanate Nausea Only   • Doxycycline Nausea Only   • Eliquis [Apixaban] Nausea Only   • Levofloxacin Nausea Only   • Metronidazole Nausea Only   • Naproxen GI Intolerance   • Sulfamethoxazole-Trimethoprim Nausea Only   • Synthroid [Levothyroxine Sodium] Nausea Only   • Zolpidem Nausea Only       Objective     Vitals:   Temp:  [97.3 °F (36.3 °C)-98.3 °F (36.8 °C)] 98 °F (36.7 °C)  Heart Rate:  [68-81] 75  Resp:  [15-20] 18  BP: (128-212)/() 152/71  Flow (L/min):  [2-4] 2    Intake/Output Summary (Last 24 hours) at 8/17/2022 1846  Last data filed at 8/17/2022 1159  Gross per 24 hour   Intake 950 ml   Output 350 ml   Net 600 ml       Physical Exam:   Constitutional: Awake, alert, NAD, chronically ill, overweight  HEENT: Sclera anicteric, no conjunctival injection  Neck: Supple, no carotid bruit,  trachea at midline, no JVD  Respiratory: Coarse breath sounds, nonlabored on room air  Cardiovascular: RRR with ectopy, no rub  Gastrointestinal: + BS, soft, nontender and nondistended  : No palpable bladder  Musculoskeletal: +2 edema left leg, trace edema right leg; +clubbing; slightly dusky appearance of left leg though warm  Psychiatric: Appropriate affect, cooperative, oriented  Neurologic:  moving all extremities, normal speech and mental status  Skin: Warm and dry       Scheduled Meds:     budesonide-formoterol, 2 puff, Inhalation, BID - RT  ceFAZolin, 2 g, Intravenous, Q8H  ipratropium-albuterol, 3 mL, Nebulization, Q8H - RT  pantoprazole, 40 mg, Oral, QAM  polyethylene glycol, 17 g, Oral, Daily  sodium chloride, 10 mL, Intravenous, Q12H      IV Meds:   heparin, 15.8 Units/kg/hr, Last Rate: 11.8 Units/kg/hr (08/17/22 1302)  lactated ringers, 9 mL/hr, Last Rate: Stopped (08/17/22 1015)  sodium chloride, 100 mL/hr, Last Rate: 100 mL/hr (08/16/22 2139)  sodium chloride, 125 mL/hr, Last Rate: 125 mL/hr (08/17/22 1738)        Results Reviewed:   I have personally reviewed the results from the time of this admission to 8/17/2022 18:46 EDT     Lab Results   Component Value Date    GLUCOSE 99 08/17/2022    CALCIUM 8.9 08/17/2022     08/17/2022    K 5.1 08/17/2022    CO2 24.5 08/17/2022     08/17/2022    BUN 44 (H) 08/17/2022    CREATININE 1.45 (H) 08/17/2022    EGFRIFAFRI 55 (L) 09/27/2021    EGFRIFNONA 45 (L) 09/27/2021    BCR 30.3 (H) 08/17/2022    ANIONGAP 12.5 08/17/2022      Lab Results   Component Value Date    MG 2.3 02/20/2021    PHOS 4.2 02/21/2021    ALBUMIN 4.00 08/16/2022           Assessment / Plan     ASSESSMENT:  1.  MAILE on CKD 3, nonoliguric, resolved and back to baseline.  Central volume fine; discrepant edema.  High-normal potassium; normal anion gap.  Studies  2.  Extensive DVT left leg despite anticoagulation for prior bilateral DVTs, s/p thrombectomy and angioplasty left leg 8/17.   Hematology following and hypercoagulable work-up underway  3.  COPD with active tobacco abuse  4.  Obesity  5.  Hypertension, with acceptable control for now    PLAN:  1.  UA and Uprot:creat (significant proteinuria would be a risk factor for hypercoagulable state)  2.  Add 2-gram potassium restriction to diet  3.  Low-rate IVF for now    Thank you for involving us in the care of Luann Frances.  Please feel free to call with any questions.    Sidney Brown MD  08/17/22  18:46 EDT    [facility]  [phone]      Much of this encounter note is an electronic transcription/translation of spoken language to printed text. The electronic translation of spoken language may permit erroneous, or at times, nonsensical words or phrases to be inadvertently transcribed; Although I have reviewed the note for such errors, some may still exist.

## 2022-08-17 NOTE — ANESTHESIA POSTPROCEDURE EVALUATION
"Patient: Luann Frances    Procedure Summary     Date: 08/17/22 [facility] / Location[facility]    Anesthesia Start: 0959 Anesthesia Stop: 1204    Procedure: LT. LEG THROMBECTOMY/EMBOLECTOMY AND ANGIOPLASTY STENT PLACEMENT OF LEFT ILIAC VEIN (Left ) Diagnosis:     Surgeons: Theo Bustos MD Provider: Pallavi Xiong MD    Anesthesia Type: general ASA Status: 3          Anesthesia Type: general    Vitals  Vitals Value Taken Time   /89 08/17/22 1346   Temp     Pulse 75 08/17/22 1354   Resp 20 08/17/22 1215   SpO2 95 % 08/17/22 1354   Vitals shown include unvalidated device data.        Post Anesthesia Care and Evaluation    Patient location during evaluation: PACU  Patient participation: complete - patient participated  Level of consciousness: awake  Pain management: adequate    Airway patency: patent  Anesthetic complications: No anesthetic complications    Cardiovascular status: acceptable  Respiratory status: acceptable  Hydration status: acceptable    Comments: /86 (BP Location: Right arm, Patient Position: Lying)   Pulse 76   Temp 36.3 °C (97.3 °F) (Oral)   Resp 20   Ht 160 cm (63\")   Wt 94.8 kg (209 lb)   SpO2 99%   BMI 37.02 kg/m²       "

## 2022-08-17 NOTE — OP NOTE
Operative Note  Date of Admission:  8/16/2022  OR Date: 8/17/2022    Pre-op Diagnosis:   Left leg phlegmasia cerulea dolens    Post-Op Diagnosis Codes:  Same    Procedure:   1) duplex directed left popliteal venous access with left leg venogram and inferior venacavogram.  Left common iliac, external iliac, common femoral, femoral and popliteal vein Clotriever mechanical venous thrombectomy with venous angioplasty x5.  Left common iliac venous stent placement, right common femoral duplex directed access with right iliofemoral venogram    Surgeon: Sam Bustos MD    Assistant: Josephine CASTILLO CSA and they provided critical assistance during the case including suctioning, exposure, retraction, and reduction of blood loss.    Anesthesia: General    Staff:   Cell Saver : Sarkis Tejeda  Circulator: Shelby Blancas RN; Alexandro Hawthorne, ALIA; Asia Max RN  Scrub Person: Mercedes Sampson  Assistant: Josephine Naranjo CSA  Vascular Radiology Technician: Andria Medina    Estimated Blood Loss: 50 mL    Specimens: * No orders in the log *     Complications: None    Findings: See dictation    Indications:    The patient is an 82 y.o. female seen for evaluation severe left leg edema due to iliofemoral popliteal clot with progression despite Xarelto use.  Patient is undergoing mechanical thrombectomy and intervention.  Filter is felt to be contraindicated due to the failure of anticoagulant.  Patient understands risk benefits complications of the procedure and consents to the procedure.       Procedure:    Patient was prepped and draped sterilely.  Using duplex direction I accessed first the left popliteal vein from underneath the calf.  Wire was passed and using wires and catheters were able to manipulate up into the pelvis were initially wire went out one of the parapsoas veins but was redirected and stayed true lumen into this inferior vena cava.  The right femoral site was accessed with duplex  direction we Plast wire and catheter and 8 Setswana sheath through the right side.  Both sides were imaged with venogram as well as vena cava and the entire left lower extremity.  Prior to placing the INR he Clotriever sheath which was 16 Setswana I plan angioplasty of the popliteal and distal femoral veins to make room for the sheath.  This was performed with 10 mm balloon.  We then passed Clotriever device 5 times in different orientations to remove large amounts of clot.  Final passage showed very little return of clot.  Angioplasty of the iliac and femoral systems with a 10 mm balloon was then performed.  I then formed angiograms confirming removal of 90% plus of the clot.  There is May Thurner scar at the common iliac level and a 14 mm x 100 mm NextGame venous stent was chosen and deployed across the common iliac venous compression site and down through the scar in the distal common iliac vein and secured into the proximal external iliac vein.  Postdilatation was performed at all sites with a 12 mm balloon.  Final angiogram showed wide patency with excellent flows through the stent and the entirety of the recanalized lower extremity venous system down to the knee.  Catheters removed and bolster stitch was placed behind the knee and pressure was held in the right groin.  We did not reverse the heparin effect.  Patient was stable at completion.  Radiographic Findings:  Angiographic findings of procedure include a totally occluded left femoral-popliteal and iliac venous system with a fresh acute clot.  This is removed with multiple passes of the Inari Clotriever and all scars are treated with 10 mm balloon angioplasty proximally and distally throughout all veins.  Residual segment of May Thurner compression scar at the common iliac vein on the left is treated with stent placement with total resolution.  The stent is deployed slightly into the vena cava but not impacting the right iliac venous flow.  Right iliac venous  system has some chronic thrombus laminating the medial side of the vein but no significant flow limitation.  Vena cava is widely patent.      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      Theo Bustos MD     Date: 8/17/2022  Time: 15:00 EDT

## 2022-08-17 NOTE — PROGRESS NOTES
Dedicated to [facility] Care    [phone]   LOS: 1 day     Name: Luann Frances  Age/Sex: 82 y.o. female  :  1940        PCP: Provider, No Known  Chief Complaint   Patient presents with   • Groin Pain      Subjective   She feels okay right now tolerated her procedure today.  Is having some pain in her leg but this is not out of the ordinary following her procedure.  General: No Fever or Chills, Cardiac: No Chest Pain or Palpitations, Resp: No Cough or SOA, GI: No Nausea, Vomiting, or Diarrhea and Other: No bleeding    budesonide-formoterol, 2 puff, Inhalation, BID - RT  pantoprazole, 40 mg, Oral, QAM  sodium chloride, 10 mL, Intravenous, Q12H      heparin, 15.8 Units/kg/hr, Last Rate: 13.8 Units/kg/hr (22 0158)  sodium chloride, 100 mL/hr, Last Rate: 100 mL/hr (22)        Objective   Vital Signs  Temp:  [97.2 °F (36.2 °C)-98.3 °F (36.8 °C)] 98.3 °F (36.8 °C)  Heart Rate:  [61-79] 68  Resp:  [18] 18  BP: (128-166)/(71-93) 128/93  There is no height or weight on file to calculate BMI.    Intake/Output Summary (Last 24 hours) at 2022 0807  Last data filed at 2022 0446  Gross per 24 hour   Intake --   Output 300 ml   Net -300 ml       Physical Exam  Vitals and nursing note reviewed.   Constitutional:       Appearance: Normal appearance.   Cardiovascular:      Rate and Rhythm: Normal rate and regular rhythm.   Pulmonary:      Effort: No respiratory distress.      Breath sounds: Normal breath sounds.   Abdominal:      General: Bowel sounds are normal.      Palpations: Abdomen is soft.   Neurological:      Mental Status: She is alert.           Results Review:       I reviewed the patient's new clinical results.  Results from last 7 days   Lab Units 22  1351 22  1058   WBC 10*3/mm3 12.51* 10.47   HEMOGLOBIN g/dL 13.2 12.8   PLATELETS 10*3/mm3 192 285     Results from last 7 days   Lab Units 22  1632 22  1058   SODIUM mmol/L 133* 140   POTASSIUM mmol/L 6.0* 4.3    CHLORIDE mmol/L 101 104   CO2 mmol/L 16.0* 23.9   BUN mg/dL 45* 31*   CREATININE mg/dL 2.01* 1.41*   CALCIUM mg/dL 9.5 9.3   Estimated Creatinine Clearance: 24 mL/min (A) (by C-G formula based on SCr of 2.01 mg/dL (H)).      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       PLAN  Is an 82-year-old female who presented to the hospital with a progressing acute DVT in her left lower extremity failing outpatient direct oral anticoagulant  -Appreciate vascular surgery's assistance and did undergo intervention today  -Continue heparin drip with plans to transition to warfarin at discharge  -I am a little concerned about her renal dysfunction hyperkalemia and acidosis.  I would really like to recheck her labs but unfortunately this is proved to be a rather difficult task in the hospital today.  I have ordered a stat CMP but I am not hopeful this will be drawn anytime soon.  -Nephrology and hematology both consulted for assistance with her ongoing care during the hospitalization  -Full code    Disposition  To be determined      Noble Dangelo MD  [facility]  08/17/22  08:07 EDT

## 2022-08-17 NOTE — ANESTHESIA PREPROCEDURE EVALUATION
Anesthesia Evaluation     Patient summary reviewed and Nursing notes reviewed   no history of anesthetic complications:  NPO Solid Status: > 8 hours  NPO Liquid Status: > 8 hours           Airway   Mallampati: II  TM distance: >3 FB  Neck ROM: full  No difficulty expected  Dental - normal exam     Pulmonary - normal exam   (+) COPD mild, sleep apnea,   (-) pneumonia  Cardiovascular - normal exam  Exercise tolerance: good (4-7 METS)    ECG reviewed  PT is on anticoagulation therapy    (+) hypertension less than 2 medications, DVT current, hyperlipidemia,       Neuro/Psych- negative ROS  GI/Hepatic/Renal/Endo    (+)  GERD well controlled,  renal disease CRI and stones, diabetes mellitus type 2, thyroid problem hypothyroidism    Musculoskeletal     Abdominal    Substance History      OB/GYN          Other   arthritis,      ROS/Med Hx Other: K+ = 6.0 on 8/16.    Will wait for AM labs                    Anesthesia Plan    ASA 3     general     intravenous induction     Anesthetic plan, risks, benefits, and alternatives have been provided, discussed and informed consent has been obtained with: patient.  Pre-procedure education provided

## 2022-08-18 LAB
ALBUMIN SERPL-MCNC: 3.2 G/DL (ref 3.5–5.2)
ALBUMIN/GLOB SERPL: 1.2 G/DL
ALP SERPL-CCNC: 49 U/L (ref 39–117)
ALT SERPL W P-5'-P-CCNC: <5 U/L (ref 1–33)
ANION GAP SERPL CALCULATED.3IONS-SCNC: 13.2 MMOL/L (ref 5–15)
APTT PPP: 102 SECONDS (ref 22.7–35.4)
APTT PPP: 66.5 SECONDS (ref 22.7–35.4)
AST SERPL-CCNC: 13 U/L (ref 1–32)
BASOPHILS # BLD AUTO: 0.04 10*3/MM3 (ref 0–0.2)
BASOPHILS NFR BLD AUTO: 0.4 % (ref 0–1.5)
BILIRUB SERPL-MCNC: 0.3 MG/DL (ref 0–1.2)
BUN SERPL-MCNC: 34 MG/DL (ref 8–23)
BUN/CREAT SERPL: 23.1 (ref 7–25)
CALCIUM SPEC-SCNC: 7.9 MG/DL (ref 8.6–10.5)
CHLORIDE SERPL-SCNC: 103 MMOL/L (ref 98–107)
CO2 SERPL-SCNC: 18.8 MMOL/L (ref 22–29)
CREAT SERPL-MCNC: 1.47 MG/DL (ref 0.57–1)
CREAT UR-MCNC: 91.2 MG/DL
DEPRECATED RDW RBC AUTO: 48.2 FL (ref 37–54)
EGFRCR SERPLBLD CKD-EPI 2021: 35.7 ML/MIN/1.73
EOSINOPHIL # BLD AUTO: 0.06 10*3/MM3 (ref 0–0.4)
EOSINOPHIL NFR BLD AUTO: 0.6 % (ref 0.3–6.2)
ERYTHROCYTE [DISTWIDTH] IN BLOOD BY AUTOMATED COUNT: 14.6 % (ref 12.3–15.4)
FERRITIN SERPL-MCNC: 237 NG/ML (ref 13–150)
GLOBULIN UR ELPH-MCNC: 2.6 GM/DL
GLUCOSE SERPL-MCNC: 111 MG/DL (ref 65–99)
HCT VFR BLD AUTO: 31.4 % (ref 34–46.6)
HGB BLD-MCNC: 10.1 G/DL (ref 12–15.9)
IRON 24H UR-MRATE: 27 MCG/DL (ref 37–145)
IRON SATN MFR SERPL: 8 % (ref 20–50)
LYMPHOCYTES # BLD AUTO: 1.12 10*3/MM3 (ref 0.7–3.1)
LYMPHOCYTES NFR BLD AUTO: 11.8 % (ref 19.6–45.3)
MCH RBC QN AUTO: 29.1 PG (ref 26.6–33)
MCHC RBC AUTO-ENTMCNC: 32.2 G/DL (ref 31.5–35.7)
MCV RBC AUTO: 90.5 FL (ref 79–97)
MONOCYTES # BLD AUTO: 0.86 10*3/MM3 (ref 0.1–0.9)
MONOCYTES NFR BLD AUTO: 9 % (ref 5–12)
NEUTROPHILS NFR BLD AUTO: 7.33 10*3/MM3 (ref 1.7–7)
NEUTROPHILS NFR BLD AUTO: 76.9 % (ref 42.7–76)
PLATELET # BLD AUTO: 126 10*3/MM3 (ref 140–450)
PMV BLD AUTO: 12.1 FL (ref 6–12)
POTASSIUM SERPL-SCNC: 4.3 MMOL/L (ref 3.5–5.2)
PROT ?TM UR-MCNC: 21.5 MG/DL
PROT SERPL-MCNC: 5.8 G/DL (ref 6–8.5)
RBC # BLD AUTO: 3.47 10*6/MM3 (ref 3.77–5.28)
SODIUM SERPL-SCNC: 135 MMOL/L (ref 136–145)
TIBC SERPL-MCNC: 329 MCG/DL (ref 298–536)
TRANSFERRIN SERPL-MCNC: 221 MG/DL (ref 200–360)
WBC NRBC COR # BLD: 9.53 10*3/MM3 (ref 3.4–10.8)

## 2022-08-18 PROCEDURE — 85302 CLOT INHIBIT PROT C ANTIGEN: CPT | Performed by: INTERNAL MEDICINE

## 2022-08-18 PROCEDURE — 94799 UNLISTED PULMONARY SVC/PX: CPT

## 2022-08-18 PROCEDURE — 85705 THROMBOPLASTIN INHIBITION: CPT | Performed by: INTERNAL MEDICINE

## 2022-08-18 PROCEDURE — 85670 THROMBIN TIME PLASMA: CPT | Performed by: INTERNAL MEDICINE

## 2022-08-18 PROCEDURE — 85305 CLOT INHIBIT PROT S TOTAL: CPT | Performed by: INTERNAL MEDICINE

## 2022-08-18 PROCEDURE — 25010000002 HEPARIN (PORCINE) 25000-0.45 UT/250ML-% SOLUTION: Performed by: SURGERY

## 2022-08-18 PROCEDURE — 85306 CLOT INHIBIT PROT S FREE: CPT | Performed by: INTERNAL MEDICINE

## 2022-08-18 PROCEDURE — 83540 ASSAY OF IRON: CPT | Performed by: INTERNAL MEDICINE

## 2022-08-18 PROCEDURE — 85613 RUSSELL VIPER VENOM DILUTED: CPT | Performed by: INTERNAL MEDICINE

## 2022-08-18 PROCEDURE — 82728 ASSAY OF FERRITIN: CPT | Performed by: INTERNAL MEDICINE

## 2022-08-18 PROCEDURE — 85730 THROMBOPLASTIN TIME PARTIAL: CPT | Performed by: SURGERY

## 2022-08-18 PROCEDURE — 86146 BETA-2 GLYCOPROTEIN ANTIBODY: CPT | Performed by: INTERNAL MEDICINE

## 2022-08-18 PROCEDURE — 25010000002 CEFAZOLIN IN DEXTROSE 2-4 GM/100ML-% SOLUTION: Performed by: SURGERY

## 2022-08-18 PROCEDURE — 80053 COMPREHEN METABOLIC PANEL: CPT | Performed by: HOSPITALIST

## 2022-08-18 PROCEDURE — 85303 CLOT INHIBIT PROT C ACTIVITY: CPT | Performed by: INTERNAL MEDICINE

## 2022-08-18 PROCEDURE — 81241 F5 GENE: CPT | Performed by: INTERNAL MEDICINE

## 2022-08-18 PROCEDURE — 86147 CARDIOLIPIN ANTIBODY EA IG: CPT | Performed by: INTERNAL MEDICINE

## 2022-08-18 PROCEDURE — 81240 F2 GENE: CPT | Performed by: INTERNAL MEDICINE

## 2022-08-18 PROCEDURE — 85025 COMPLETE CBC W/AUTO DIFF WBC: CPT | Performed by: SURGERY

## 2022-08-18 PROCEDURE — 85732 THROMBOPLASTIN TIME PARTIAL: CPT | Performed by: INTERNAL MEDICINE

## 2022-08-18 PROCEDURE — 99232 SBSQ HOSP IP/OBS MODERATE 35: CPT | Performed by: INTERNAL MEDICINE

## 2022-08-18 PROCEDURE — 84466 ASSAY OF TRANSFERRIN: CPT | Performed by: INTERNAL MEDICINE

## 2022-08-18 RX ADMIN — PANTOPRAZOLE SODIUM 40 MG: 40 TABLET, DELAYED RELEASE ORAL at 06:17

## 2022-08-18 RX ADMIN — IPRATROPIUM BROMIDE AND ALBUTEROL SULFATE 3 ML: .5; 3 SOLUTION RESPIRATORY (INHALATION) at 13:37

## 2022-08-18 RX ADMIN — CEFAZOLIN SODIUM 2 G: 2 INJECTION, SOLUTION INTRAVENOUS at 02:12

## 2022-08-18 RX ADMIN — HEPARIN SODIUM 7.8 UNITS/KG/HR: 10000 INJECTION, SOLUTION INTRAVENOUS at 16:53

## 2022-08-18 RX ADMIN — HYDROCODONE BITARTRATE AND ACETAMINOPHEN 1 TABLET: 5; 325 TABLET ORAL at 06:36

## 2022-08-18 RX ADMIN — POLYETHYLENE GLYCOL 3350 17 G: 17 POWDER, FOR SOLUTION ORAL at 08:18

## 2022-08-18 RX ADMIN — SODIUM CHLORIDE 75 ML/HR: 9 INJECTION, SOLUTION INTRAVENOUS at 08:18

## 2022-08-18 RX ADMIN — HEPARIN SODIUM 7.8 UNITS/KG/HR: 10000 INJECTION, SOLUTION INTRAVENOUS at 06:21

## 2022-08-18 RX ADMIN — Medication 10 ML: at 21:01

## 2022-08-18 RX ADMIN — BUDESONIDE AND FORMOTEROL FUMARATE DIHYDRATE 2 PUFF: 80; 4.5 AEROSOL RESPIRATORY (INHALATION) at 19:18

## 2022-08-18 RX ADMIN — BUDESONIDE AND FORMOTEROL FUMARATE DIHYDRATE 2 PUFF: 80; 4.5 AEROSOL RESPIRATORY (INHALATION) at 08:02

## 2022-08-18 RX ADMIN — HYDROCODONE BITARTRATE AND ACETAMINOPHEN 1 TABLET: 5; 325 TABLET ORAL at 20:59

## 2022-08-18 NOTE — PROGRESS NOTES
Name: Luann Frances ADMIT: 2022   : 1940  PCP: Provider, No Known    MRN: [id number] LOS: 2 days   AGE/SEX: 82 y.o. female  ROOM: [address]    [place], Subsequent Hospital Care    Chief Complaint   Patient presents with   • Groin Pain     CC: Left leg phlegmasia  Subjective     82 y.o. female postop day 1 status post left mechanical thrombectomy and left iliac stent placement for severe left leg and angina.  Left leg has improved (probable midnight.  Suture removal.  We will replace Ace's until compression stockings daily for.  Continue heparin drip until anticoagulation is changed tomorrow per    Review of Systems left leg feels better    Objective     Scheduled Medications:   budesonide-formoterol, 2 puff, Inhalation, BID - RT  ipratropium-albuterol, 3 mL, Nebulization, Q8H - RT  pantoprazole, 40 mg, Oral, QAM  polyethylene glycol, 17 g, Oral, Daily  sodium chloride, 10 mL, Intravenous, Q12H        Active Infusions:  heparin, 15.8 Units/kg/hr, Last Rate: 7.8 Units/kg/hr (22 0621)  lactated ringers, 9 mL/hr, Last Rate: Stopped (22 1015)  sodium chloride, 100 mL/hr, Last Rate: 100 mL/hr (22 2329)  sodium chloride, 75 mL/hr, Last Rate: 75 mL/hr (22 0818)        As Needed Medications:  •  acetaminophen **OR** acetaminophen **OR** acetaminophen  •  albuterol  •  HYDROcodone-acetaminophen  •  HYDROmorphone **AND** naloxone  •  Morphine **AND** naloxone  •  nitroglycerin  •  nitroglycerin  •  ondansetron  •  ondansetron **OR** ondansetron  •  [COMPLETED] Insert peripheral IV **AND** sodium chloride  •  sodium chloride    Vital Signs  Vital Signs Patient Vitals for the past 24 hrs:   BP Temp Temp src Pulse Resp SpO2   22 0802 -- -- -- 55 -- 98 %   22 0700 144/69 98.1 °F (36.7 °C) Oral 58 18 100 %   08/18/22 0300 137/63 98.1 °F (36.7 °C) Oral 55 18 96 %   22 0200 130/67 -- -- 61 -- 95 %   22 0100 129/61 -- -- 68 -- 96 %   22 0016  "126/70 97.9 °F (36.6 °C) Oral 71 16 96 %   08/18/22 0000 126/70 -- -- 67 -- 94 %   08/17/22 2300 96/67 -- -- 74 -- 97 %   08/17/22 2200 117/73 -- -- 76 -- 95 %   08/1940 135/69 -- -- 77 18 99 %   08/17/22 1700 152/71 -- -- 75 -- 97 %   08/17/22 1600 144/68 -- -- 81 -- 100 %   08/17/22 1550 -- 98 °F (36.7 °C) Oral 76 18 97 %   08/17/22 1525 -- -- -- 80 18 95 %   08/17/22 1500 132/77 -- -- 77 -- 97 %   08/17/22 1430 138/75 -- -- 75 -- 96 %   08/17/22 1400 162/99 -- -- 73 -- 96 %   08/17/22 1215 172/86 -- -- 76 20 99 %   08/17/22 1210 (!) 187/93 -- -- 78 20 99 %   08/17/22 1209 (!) 212/107 -- Oral 81 16 100 %     Vital Signs (range)  Temp:  [97.9 °F (36.6 °C)-98.1 °F (36.7 °C)] 98.1 °F (36.7 °C)  Heart Rate:  [55-81] 55  Resp:  [16-20] 18  BP: ()/() 144/69  I/O:  I/O last 3 completed shifts:  In: 2690.5 [P.O.:320; I.V.:2370.5]  Out: 1450 [Urine:1400; Blood:50]  I/O:   Intake/Output Summary (Last 24 hours) at 8/18/2022 0820  Last data filed at 8/18/2022 0621  Gross per 24 hour   Intake 2690.49 ml   Output 1150 ml   Net 1540.49 ml     BMI:  Body mass index is 37.02 kg/m².    Physical Exam:  Physical Exam   Lungs clear\"   Heart regular rate  Abdomen benign  Left leg with moderate +2 edema.  Not tight and not cyanotic.    results Review:     CBC    Results from last 7 days   Lab Units 08/18/22  0324 08/17/22  0850 08/16/22  1351 08/11/22  1058   WBC 10*3/mm3 9.53 11.19* 12.51* 10.47   HEMOGLOBIN g/dL 10.1* 12.5 13.2 12.8   PLATELETS 10*3/mm3 126* 149 192 285     BMP   Results from last 7 days   Lab Units 08/18/22  0324 08/17/22  2027 08/17/22  0850 08/16/22  1632 08/11/22  1058   SODIUM mmol/L 135* 137 137 133* 140   POTASSIUM mmol/L 4.3 4.5 5.1 6.0* 4.3   CHLORIDE mmol/L 103 103 100 101 104   CO2 mmol/L 18.8* 21.8* 24.5 16.0* 23.9   BUN mg/dL 34* 37* 44* 45* 31*   CREATININE mg/dL 1.47* 1.49* 1.45* 2.01* 1.41*   GLUCOSE mg/dL 111* 120* 99 105* 107*     Cr Clearance Estimated Creatinine Clearance: 32.9 " mL/min (A) (by C-G formula based on SCr of 1.47 mg/dL (H)).  Coag   Results from last 7 days   Lab Units 08/18/22 0324 08/17/22 2027 08/17/22  0850 08/17/22  0055 08/16/22  1632   INR   --   --   --   --  1.18*   APTT seconds 102.0* 101.2* 107.6* 105.5* 21.6*     HbA1C   Lab Results   Component Value Date    HGBA1C 5.70 (H) 08/17/2021    HGBA1C 5.90 (H) 08/20/2020    HGBA1C 6.6 (H) 01/20/2020     Blood Glucose No results found for: POCGLU  Infection     CMP   Results from last 7 days   Lab Units 08/18/22 0324 08/17/22 2027 08/17/22  0850 08/16/22  1632 08/11/22  1058   SODIUM mmol/L 135* 137 137 133* 140   POTASSIUM mmol/L 4.3 4.5 5.1 6.0* 4.3   CHLORIDE mmol/L 103 103 100 101 104   CO2 mmol/L 18.8* 21.8* 24.5 16.0* 23.9   BUN mg/dL 34* 37* 44* 45* 31*   CREATININE mg/dL 1.47* 1.49* 1.45* 2.01* 1.41*   GLUCOSE mg/dL 111* 120* 99 105* 107*   ALBUMIN g/dL 3.20* 3.50  --  4.00 4.30   BILIRUBIN mg/dL 0.3 0.5  --  0.7 0.3   ALK PHOS U/L 49 51  --  60 57   AST (SGOT) U/L 13 14  --  13 11   ALT (SGPT) U/L <5 5  --  6 9   LIPASE U/L  --   --   --   --  18     Radiology(recent) CT Pelvis Without Contrast    Result Date: 8/16/2022  1. New enlargement of the left proximal quadriceps musculature with surrounding stranding in the intramuscular fat and overlying subcutaneous fat. This may be due to an intramuscular hemorrhage or myositis and extends off the field of view distally. No left hip fracture or osseous abnormality is evident. 2. Atherosclerotic disease with mild enlargement of the infrarenal abdominal aorta measuring 2.5 cm transverse dimension. 3. Sigmoid diverticulosis without evidence for diverticulitis.  Radiation dose reduction techniques were utilized, including automated exposure control and exposure modulation based on body size.  This report was finalized on 8/16/2022 1:48 PM by Dr. Darío Winslow M.D.        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 female doing well status post venous thrombectomy and stent placement.  Continue full anticoagulation with plan per  For oral agents.  Further work-up ongoing for severe hypercoagulability.  We will check again tomorrow.  We will order compression stockings and keep her legs wrapped until we get the compression stockings.    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

## 2022-08-18 NOTE — PLAN OF CARE
Problem: Skin Injury Risk Increased  Goal: Skin Health and Integrity  Outcome: Ongoing, Progressing  Intervention: Optimize Skin Protection  Recent Flowsheet Documentation  Taken 8/18/2022 1000 by Cristina Alejandro RN  Head of Bed (HOB) Positioning: HOB elevated     Problem: Fall Injury Risk  Goal: Absence of Fall and Fall-Related Injury  Outcome: Ongoing, Progressing   Goal Outcome Evaluation:  Plan of Care Reviewed With: patient        Progress: improving  Outcome Evaluation: VSS. Pulses dopplerable. RLE wrapped as ordered. Up with assist. No c/o pain. Will monitor.

## 2022-08-18 NOTE — DISCHARGE PLACEMENT REQUEST
"Luann Frances (82 y.o. Female)             Date of Birth   1940    Social Security Number       Address   [address] [place] [address]    Home Phone   [phone]    MRN   [id number]       P[employer]   None    Marital Status                               Admission Date   8/16/22    Admission Type   Emergency    Admitting Provider   Jed Arevalo MD    Attending Provider   Noble Dangelo MD    Department, Room/Bed   [address], [address]       Discharge Date       Discharge Disposition       Discharge Destination                               Attending Provider: Noble Dangelo MD    Allergies: Ambien [Zolpidem Tartrate], Amoxicillin-pot Clavulanate, Doxycycline, Eliquis [Apixaban], Levofloxacin, Metronidazole, Naproxen, Sulfamethoxazole-trimethoprim, Synthroid [Levothyroxine Sodium], Zolpidem    Isolation: None   Infection: None   Code Status: CPR   Advance Care Planning Activity    Ht: 160 cm (63\")   Wt: 94.8 kg (209 lb)    Admission Cmt: None   Principal Problem: Acute deep vein thrombosis (DVT) of femoral vein of left lower extremity (HCC) [I82.412]                 Active Insurance as of 8/16/2022     Primary Coverage     Payor Plan Insurance Group Employer/Plan Group    HUMANA MEDICARE REPLACEMENT HUMANA MEDICARE REPLACEMENT [id number]     Payor Plan Address Payor Plan Phone Number Payor Plan Fax Number Effective Dates    [address] [address] [phone]  1/1/2018 - None Entered    [facility] [address]       Subscriber Name Subscriber Birth Date Member ID       LUANN FRANCES 1940 [id number]                 Emergency Contacts      (Rel.) Home Phone Work Phone Mobile Phone    CED FRANCES (Son) [phone] -- --    Cooper Frances (Son) [phone] -- [phone]            {Outbreak/Travel/Exposure Documentation......;  Question Available Choices Patient Response   COVID-19 Outbreak Screen:  Do you currently have a " new onset of the following symptoms?        Fever/Chills, Cough, Shortness of air, Loss of taste or smell, No, Unknown  No (08/16/22 1122)   COVID-19 Outbreak Screen: In the last 14 days, have you had contact with anyone who is ill, has show any of the symptoms listed above and/or has been diagnosis with the 2019 Novel Coronavirus? This includes any immediate household members but excludes any patients with whom you have been in contact within your normal work duties wearing proper PPE, if you are a healthcare worker.  Yes, No, Unknown              No (08/16/22 1122)   COVID-19 Outbreak Screen: Who was notified? Free text (not recorded)   Ebola Screening Outbreak Screen: Have you traveled to the [place] the [place] or [place] within the past 21 days?  Yes, No, Unknown (not recorded)   Ebola Screening Outbreak Screen: Do you have ANY of the following symptoms: Fever/Chills, Vomiting, Diarrhea, Fatigue, Headache, Muscle pain, Unexplained bleeding, Abdominal (stomach) pain, No, Unknown (not recorded)   Ebola Screening Outbreak Screen: Name of Person notified Free text (not recorded)   Travel Screen: Have you traveled in the last month? If so, to what country have you traveled? If [place] what state? Yes, No, Unknown  List of all countries  List of all States No (08/16/22 1122)  (not recorded)  (not recorded)   Infection Risk: Do you currently have the following symptoms?  (If cough is selected, the Tuberculosis Screen is performed.) Cough, Fever, Rash, No No (08/16/22 1122)   Tuberculosis Screen: Do you have any of the following Tuberculosis Risks?  · Have you lived or spent time with anyone who had or may have TB?  · Have you lived in or visited any of the following areas for more than one month: [place], [place], [place], [place] or [place], the [place] or [place]?  · Do you have HIV/AIDS?  · Have you lived in or worked in a nursing home, homeless shelter, correctional facility, or substance abuse  treatment facility?   · No    If Yes do you have any of the following symptoms? Yes responses display to the right    If Yes, symptoms listed are:  Cough greater than or equal to 3 weeks, Loss of appetite, Unexplained weight loss, Night sweats, Bloody sputum or hemoptysis, Hoarseness, Fever, Fatigue, Chest pain, No (not recorded)  (not recorded)   Exposure Screen: Have you been exposed to any of these contagious diseases in the last month? Measles, Chickenpox, Meningitis, Pertussis, Whooping Cough, No No (08/16/22 8942)

## 2022-08-18 NOTE — PROGRESS NOTES
Ded[facility] to Hospital Care    [phone]   LOS: 2 days     Name: Luann Frances  Age/Sex: 82 y.o. female  :  1940        PCP: Provider, No Known  Chief Complaint   Patient presents with   • Groin Pain      Subjective   She feels much better today she slept well last night denies other new issues or complaints right now.  General: No Fever or Chills, Cardiac: No Chest Pain or Palpitations, Resp: No Cough or SOA, GI: No Nausea, Vomiting, or Diarrhea and Other: No bleeding    budesonide-formoterol, 2 puff, Inhalation, BID - RT  ipratropium-albuterol, 3 mL, Nebulization, Q8H - RT  pantoprazole, 40 mg, Oral, QAM  polyethylene glycol, 17 g, Oral, Daily  sodium chloride, 10 mL, Intravenous, Q12H      heparin, 15.8 Units/kg/hr, Last Rate: 7.8 Units/kg/hr (22 0621)  lactated ringers, 9 mL/hr, Last Rate: Stopped (22 1015)  sodium chloride, 100 mL/hr, Last Rate: 100 mL/hr (22 2329)  sodium chloride, 75 mL/hr, Last Rate: 75 mL/hr (22 0818)        Objective   Vital Signs  Temp:  [97.9 °F (36.6 °C)-98.1 °F (36.7 °C)] 98.1 °F (36.7 °C)  Heart Rate:  [55-81] 55  Resp:  [16-20] 18  BP: ()/() 144/69  Body mass index is 37.02 kg/m².    Intake/Output Summary (Last 24 hours) at 2022 1022  Last data filed at 2022 0900  Gross per 24 hour   Intake 2780.49 ml   Output 1150 ml   Net 1630.49 ml       Physical Exam  Vitals and nursing note reviewed.   Constitutional:       Appearance: Normal appearance.   Cardiovascular:      Rate and Rhythm: Normal rate and regular rhythm.   Pulmonary:      Effort: No respiratory distress.      Breath sounds: Normal breath sounds.   Abdominal:      General: Bowel sounds are normal.      Palpations: Abdomen is soft.   Musculoskeletal:      Comments: Left lower extremity currently dressed and Ace wrap   Neurological:      Mental Status: She is alert.           Results Review:       I reviewed the patient's new clinical results.  Results from last 7  days   Lab Units 08/18/22  0324 08/17/22  0850 08/16/22  1351 08/11/22  1058   WBC 10*3/mm3 9.53 11.19* 12.51* 10.47   HEMOGLOBIN g/dL 10.1* 12.5 13.2 12.8   PLATELETS 10*3/mm3 126* 149 192 285     Results from last 7 days   Lab Units 08/18/22  0324 08/17/22 2027 08/17/22  0850 08/16/22  1632 08/11/22  1058   SODIUM mmol/L 135* 137 137 133* 140   POTASSIUM mmol/L 4.3 4.5 5.1 6.0* 4.3   CHLORIDE mmol/L 103 103 100 101 104   CO2 mmol/L 18.8* 21.8* 24.5 16.0* 23.9   BUN mg/dL 34* 37* 44* 45* 31*   CREATININE mg/dL 1.47* 1.49* 1.45* 2.01* 1.41*   CALCIUM mg/dL 7.9* 8.2* 8.9 9.5 9.3   Estimated Creatinine Clearance: 32.9 mL/min (A) (by C-G formula based on SCr of 1.47 mg/dL (H)).      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       PLAN  Is an 82-year-old female who presented to the hospital with a progressing acute DVT in her left lower extremity failing outpatient direct oral anticoagulant  -Appreciate vascular surgery's assistance and did undergo intervention 8/17  -Continue heparin drip with plans to transition to warfarin at discharge  -Renal and electrolyte issues have stabilized appreciate nephrology's assistance  -Continue heparin drip for now with hypercoagulable work-up sent.  Planning for Coumadin or Pradaxa at discharge.  -Full code    Disposition  To be determined      Noble Dangelo MD  [facility]  08/18/22  10:22 EDT

## 2022-08-18 NOTE — PLAN OF CARE
Goal Outcome Evaluation:      VSS. Pulses dopplerable bilaterally. LLE edema +3. L posterior knee puncture dressing CDI, scant serous drainage. R groin dressing CDI, no s/s of swelling or hematoma. Pt complained of tenderness in R groin site, pain managed w/ PRN dilaudid & norco, both x1. R AC IV site infiltrated, IV therapy called & placed L upper arm IV, currently infusing NS @ 75 cc/hr. Heparin gtt infusing per protocol. Hilton catheter to be removed this AM per order.

## 2022-08-18 NOTE — CASE MANAGEMENT/SOCIAL WORK
Discharge Planning Assessment  [facility]     Patient Name: Luann Frances  MRN: [id number]  Today's Date: 8/18/2022    Admit Date: 8/16/2022     Discharge Needs Assessment     Row Name 08/18/22 1803       Living Environment    People in Home alone    Current Living Arrangements apartment    Primary Care Provided by self;child(carlo)    Provides Primary Care For no one, unable/limited ability to care for self    Family Caregiver if Needed child(carlo), adult    Family Caregiver Names son Dylan Frances [phone] and Cooper Frances [phone]    Quality of Family Relationships helpful;involved;supportive    Able to Return to Prior Arrangements yes       Resource/Environmental Concerns    Resource/Environmental Concerns home accessibility    Home Accessibility Concerns stairs to enter home  1 step to enter her 1st floor apartment through the back and 3 steps with 1 handrail to enter it through the front.    Transportation Concerns no car       Transition Planning    Patient/Family Anticipates Transition to home with family    Patient/Family Anticipated Services at Transition none    Transportation Anticipated family or friend will provide       Discharge Needs Assessment    Equipment Currently Used at Home walker, rolling    Concerns to be Addressed discharge planning    Anticipated Changes Related to Illness none    Equipment Needed After Discharge none    Current Discharge Risk physical impairment               Discharge Plan     Row Name 08/18/22 1802       Plan    Plan Plans home; follow to see if [facility] can accept.    Patient/Family in Agreement with Plan yes    Provided Post Acute Provider List? N/A    N/A Provider List Comment Requested a referral to [facility].    Provided Post Acute Provider Quality & Resource List? N/A    N/A Quality & Resource List Comment Requested a referral to [facility]    Plan Comments Met with the patient at bedside; explained role of CCP, verified facesheet, checked IMM and  discussed discharge planning needs. The patient plans to return home upon d/c where she resides alone but states that her son Dylan Frances [phone] and his partner live upstairs from her and can assist her as needed. The patient uses a walker, has 1 step to enter her 1st floor apartment through the back and 3 steps with 1 handrail to enter it through the front.  The patient stats that her PCP is Dinah Humphrey, pharmacy is [facility] on [address] and [address] and she is signed up for Meds to Beds.  The patient denies any trouble remembering to take her medication or with affording her medication. The patient has used [facility] in the past and would want to use them again upon d/c. Voicemail referral left for [facility].  The patient states that she has been to [facility] for rehab in the past, denies any POA documents, states that between her sons and friend her of hers they will transport her to her appointments and one of her sons will transport her home upon d/c. CCP will follow to see if [facility] can accept the patient, if she will need a nebulizer upon d/c and will follow to see if she is placed on Pradaxa (cost) or Coumadin (PT and INR).  JOSUE, SHALINI              Continued Care and Services - Admitted Since 8/16/2022     Home Medical Care     Service Provider Request Status Selected Services Address Phone Fax Patient Preferred    [facility]  Pending - Request Sent N/A [address] [address] [phone] --              Expected Discharge Date and Time     Expected Discharge Date Expected Discharge Time    Aug 20, 2022          Demographic Summary     Row Name 08/18/22 1806       General Information    Admission Type inpatient    Arrived From home    Referral Source admission list    Reason for Consult discharge planning    Preferred Language English               Functional Status     Row Name 08/18/22 1804       Functional Status    Usual Activity Tolerance  moderate    Current Activity Tolerance fair       Functional Status, IADL    Medications assistive equipment    Meal Preparation assistive equipment    Housekeeping assistive equipment    Laundry assistive equipment    Shopping assistive equipment       Mental Status    General Appearance WDL WDL       Mental Status Summary    Recent Changes in Mental Status/Cognitive Functioning no changes               Psychosocial    No documentation.                Abuse/Neglect    No documentation.                Legal    No documentation.                Substance Abuse    No documentation.                Patient Forms    No documentation.                   SHALINI Connolly

## 2022-08-18 NOTE — PROGRESS NOTES
Subjective     CHIEF COMPLAINT:     Recurrent lower extremity deep vein thrombosis    INTERVAL HISTORY:     Patient reports some improvement in the left leg symptoms.  Pain in the foot has improved.  No pain at the groin area.    REVIEW OF SYSTEMS:  Review of systems is significant for the following.  Rest of ROS is negative.    CVS: No chest pain.  RESP: No shortness of breath  MSK: Pain in the left leg and foot improved    SCHEDULED MEDS:  budesonide-formoterol, 2 puff, Inhalation, BID - RT  ipratropium-albuterol, 3 mL, Nebulization, Q8H - RT  pantoprazole, 40 mg, Oral, QAM  polyethylene glycol, 17 g, Oral, Daily  sodium chloride, 10 mL, Intravenous, Q12H      INFUSIONS:  heparin, 15.8 Units/kg/hr, Last Rate: 7.8 Units/kg/hr (08/18/22 0621)  lactated ringers, 9 mL/hr, Last Rate: Stopped (08/17/22 1015)  sodium chloride, 100 mL/hr, Last Rate: 100 mL/hr (08/17/22 2329)  sodium chloride, 75 mL/hr, Last Rate: 75 mL/hr (08/18/22 0818)      PRN MEDS:  •  acetaminophen **OR** acetaminophen **OR** acetaminophen  •  albuterol  •  HYDROcodone-acetaminophen  •  HYDROmorphone **AND** naloxone  •  Morphine **AND** naloxone  •  nitroglycerin  •  nitroglycerin  •  ondansetron  •  ondansetron **OR** ondansetron  •  [COMPLETED] Insert peripheral IV **AND** sodium chloride  •  sodium chloride       Objective   VITAL SIGNS:  Temp:  [97.9 °F (36.6 °C)-98.1 °F (36.7 °C)] 98.1 °F (36.7 °C)  Heart Rate:  [55-81] 55  Resp:  [16-20] 18  BP: ()/() 144/69       PHYSICAL EXAMINATION:  GENERAL:  The patient appears in fair general condition, not in acute distress.  SKIN: No skin rash. No ecchymosis.   HEAD:  Normocephalic.  EYES:  No Jaundice. Pallor.   NECK:  Supple. No Masses.  CHEST: Normal respiratory effort.  CARDIAC:  Normal S1 & S2. No murmur.   ABDOMEN:  Soft. No tenderness. No Hepatomegaly. No Splenomegaly.  Exam of the right groin revealed no signs of bleeding or hematoma.  EXTREMITIES: Left leg covered with Ace wrap.   Improvement in the skin temperature of the left foot.  PSYCH: Normal mood and affect.     RESULT REVIEW:   Results from last 7 days   Lab Units 08/18/22  0324 08/17/22  0850 08/16/22  1351 08/11/22  1058   WBC 10*3/mm3 9.53 11.19* 12.51* 10.47   NEUTROS ABS 10*3/mm3 7.33* 8.64* 10.39* 8.38*   HEMOGLOBIN g/dL 10.1* 12.5 13.2 12.8   HEMATOCRIT % 31.4* 38.0 40.6 38.9   PLATELETS 10*3/mm3 126* 149 192 285     Results from last 7 days   Lab Units 08/18/22  0324 08/17/22 2027 08/17/22  0850 08/16/22  1632 08/11/22  1058   SODIUM mmol/L 135* 137 137 133* 140   POTASSIUM mmol/L 4.3 4.5 5.1 6.0* 4.3   CHLORIDE mmol/L 103 103 100 101 104   CO2 mmol/L 18.8* 21.8* 24.5 16.0* 23.9   BUN mg/dL 34* 37* 44* 45* 31*   CREATININE mg/dL 1.47* 1.49* 1.45* 2.01* 1.41*   CALCIUM mg/dL 7.9* 8.2* 8.9 9.5 9.3   ALBUMIN g/dL 3.20* 3.50  --  4.00 4.30   BILIRUBIN mg/dL 0.3 0.5  --  0.7 0.3   ALK PHOS U/L 49 51  --  60 57   ALT (SGPT) U/L <5 5  --  6 9   AST (SGOT) U/L 13 14  --  13 11     Results from last 7 days   Lab Units 08/18/22  0324 08/17/22 2027 08/17/22  0850 08/17/22  0055 08/16/22  1632   INR   --   --   --   --  1.18*   APTT seconds 102.0* 101.2* 107.6* 105.5* 21.6*         Assessment & Plan   *Acute extensive left lower extremity DVT in a patient with prior history of bilateral lower extremity DVT as below.  · Patient developed left foot pain and was unable to walk.  · The skin color changed and the foot became cold.  · She developed phlegmasia serial ambulance.  · She was seen by vascular surgery and taken to the OR earlier today.  · She underwent thrombectomy and angioplasty with placement of stent in the left iliac vein.  · She is on IV heparin drip.  · Thrombophilia work-up was requested.     *Deep vein thrombosis in the left popliteal, posterior tibial, peroneal and soleal veins diagnosed on 4/12/2022.  · No clear precipitating factor.  · Patient noticed swelling and some redness of the leg.  · The DVT was diagnosed when  she had venous Doppler to reevaluate the right lower extremity DVT.  · She was placed on Eliquis.  · She was having nausea after taking Eliquis.  · She was therefore switched to Xarelto 20 mg daily on 4/29/2022.  · Venous Doppler on 5/20/2022 revealed minimal chronic DVT in the left popliteal vein.  The DVT is in the posterior tibial, peroneal and soleal veins resolved.  · CT chest abdomen pelvis on 6/21/2022 showed no evidence of underlying malignancy to explain the DVT.  · CT on 8/11/2022 and 8/16/2022 showed no evidence of malignancy to explain the recurrent thrombosis.     *History of right lower extremity DVT diagnosed on 4/1/2021.  · This was a provoked episode that developed after surgery to place rib plates.  · Patient was placed on Xarelto.  · Venous Doppler on 4/12/2022 showed resolution of the right lower extremity DVT.     *Nausea.  · She was having nausea after taking Eliquis.  · She is on Compazine 10 mg every 6 hours as needed.  · Nausea also developed after she was switched to Xarelto.  · CT scan revealed gallbladder stone.  However, there were no changes of cholecystitis.  · CT abdomen pelvis on 8/11/2022 showed no liver or GI abnormality to explain the nausea.     *Anemia.  · She has history of low vitamin B12 level.  · Vitamin B12 was 266 on 9/24/2019.  · Hemoglobin was 12.5 on 8/17/2022.  · Hemoglobin decreased to 10.1 on 8/18/2022.  · We will reevaluate the anemia with ferritin iron panel B12 and folate levels.     PLAN:     1.  Await results of thrombophilia work-up.     2.  Since she failed Xarelto, recommend changing anticoagulation-Pradaxa versus Coumadin.  If she has evidence of antiphospholipid syndrome, we would recommend Coumadin.        Lita Dubon MD  08/18/22

## 2022-08-18 NOTE — PROGRESS NOTES
[facility] Progress Note      Patient Name: Luann Frances  : 1940  MRN: [id number]  Primary Care Physician:  Provider, No Known  Date of admission: 2022    Subjective     Interval History:   Feels much stronger than yesterday; left leg pain less  Appetite improving  No shortness of breath on RA    Review of Systems:   As noted above    Objective     Vitals:   Temp:  [97.7 °F (36.5 °C)-98.1 °F (36.7 °C)] 97.7 °F (36.5 °C)  Heart Rate:  [55-81] 58  Resp:  [16-20] 18  BP: ()/() 130/56  Flow (L/min):  [2-4] 2 actually on room air    Intake/Output Summary (Last 24 hours) at 2022 1129  Last data filed at 2022 0900  Gross per 24 hour   Intake 2780.49 ml   Output 1150 ml   Net 1630.49 ml       Physical Exam:    Constitutional: Awake, alert, NAD, chronically ill, overweight  HEENT: Sclera anicteric, no conjunctival injection  Neck: Supple, no carotid bruit, trachea at midline, no JVD  Respiratory: Coarse breath sounds, nonlabored on room air  Cardiovascular: RRR with ectopy, no rub  Gastrointestinal: + BS, soft, nontender and nondistended  : No palpable bladder  Musculoskeletal: +1-2 edema left leg, which is wrapped; trace edema right leg; +clubbing; slightly dusky appearance of left leg though warm  Psychiatric: Appropriate affect, cooperative, oriented  Neurologic:  moving all extremities, normal speech and mental status  Skin: Warm and dry     Scheduled Meds:     budesonide-formoterol, 2 puff, Inhalation, BID - RT  ipratropium-albuterol, 3 mL, Nebulization, Q8H - RT  pantoprazole, 40 mg, Oral, QAM  polyethylene glycol, 17 g, Oral, Daily  sodium chloride, 10 mL, Intravenous, Q12H      IV Meds:   heparin, 15.8 Units/kg/hr, Last Rate: 7.8 Units/kg/hr (22 0621)  lactated ringers, 9 mL/hr, Last Rate: Stopped (22 1015)  sodium chloride, 100 mL/hr, Last Rate: 100 mL/hr (22 1826)  sodium chloride, 75 mL/hr, Last Rate: 75 mL/hr (22  0818)        Results Reviewed:   I have personally reviewed the results from the time of this admission to 8/18/2022 11:29 EDT     Results from last 7 days   Lab Units 08/18/22  0324 08/17/22 2027 08/17/22  0850 08/16/22  1632   SODIUM mmol/L 135* 137 137 133*   POTASSIUM mmol/L 4.3 4.5 5.1 6.0*   CHLORIDE mmol/L 103 103 100 101   CO2 mmol/L 18.8* 21.8* 24.5 16.0*   BUN mg/dL 34* 37* 44* 45*   CREATININE mg/dL 1.47* 1.49* 1.45* 2.01*   CALCIUM mg/dL 7.9* 8.2* 8.9 9.5   BILIRUBIN mg/dL 0.3 0.5  --  0.7   ALK PHOS U/L 49 51  --  60   ALT (SGPT) U/L <5 5  --  6   AST (SGOT) U/L 13 14  --  13   GLUCOSE mg/dL 111* 120* 99 105*       Estimated Creatinine Clearance: 32.9 mL/min (A) (by C-G formula based on SCr of 1.47 mg/dL (H)).                Results from last 7 days   Lab Units 08/18/22 0324 08/17/22  0850 08/16/22  1351   WBC 10*3/mm3 9.53 11.19* 12.51*   HEMOGLOBIN g/dL 10.1* 12.5 13.2   PLATELETS 10*3/mm3 126* 149 192       Results from last 7 days   Lab Units 08/16/22  1632   INR  1.18*       Assessment / Plan     ASSESSMENT:  1.  MAILE on CKD3, nonoliguric, resolved and back to baseline.  Central volume fine; discrepant edema.  Normal potassium; likely NAGMA.  Urine with only 30 mg/dL protein, and random urine ratio just 236 mg/g  2.  Extensive DVT left leg despite anticoagulation for prior bilateral DVTs, s/p thrombectomy and angioplasty left leg 8/17.  Hematology following and hypercoagulable work-up underway  3.  COPD with active tobacco abuse  4.  Obesity  5.  Hypertension, with acceptable control for now    PLAN:  1.  Now that she is eating and drinking better, will discontinue IVF  2.  Surveillance labs    Thank you for involving us in the care of Luann Frances.  Please feel free to call with any questions.    Sidney Brown MD  08/18/22  11:29 EDT    [facility]  [phone]      Much of this encounter note is an electronic transcription/translation of spoken language to  printed text. The electronic translation of spoken language may permit erroneous, or at times, nonsensical words or phrases to be inadvertently transcribed; Although I have reviewed the note for such errors, some may still exist.

## 2022-08-19 ENCOUNTER — HOME HEALTH ADMISSION (OUTPATIENT)
Dept: HOME HEALTH SERVICES | Facility: HOME HEALTHCARE | Age: 82
End: 2022-08-19

## 2022-08-19 LAB
ALBUMIN SERPL-MCNC: 3.1 G/DL (ref 3.5–5.2)
ANION GAP SERPL CALCULATED.3IONS-SCNC: 10.2 MMOL/L (ref 5–15)
APTT PPP: 62.9 SECONDS (ref 22.7–35.4)
APTT PPP: 67.3 SECONDS (ref 22.7–35.4)
APTT PPP: >200 SECONDS (ref 22.7–35.4)
BASOPHILS # BLD AUTO: 0.02 10*3/MM3 (ref 0–0.2)
BASOPHILS NFR BLD AUTO: 0.2 % (ref 0–1.5)
BUN SERPL-MCNC: 24 MG/DL (ref 8–23)
BUN/CREAT SERPL: 19.7 (ref 7–25)
CALCIUM SPEC-SCNC: 8.5 MG/DL (ref 8.6–10.5)
CARDIOLIPIN IGG SER IA-ACNC: <9 GPL U/ML (ref 0–14)
CARDIOLIPIN IGM SER IA-ACNC: 9 MPL U/ML (ref 0–12)
CHLORIDE SERPL-SCNC: 105 MMOL/L (ref 98–107)
CO2 SERPL-SCNC: 21.8 MMOL/L (ref 22–29)
CREAT SERPL-MCNC: 1.22 MG/DL (ref 0.57–1)
DEPRECATED RDW RBC AUTO: 47.4 FL (ref 37–54)
EGFRCR SERPLBLD CKD-EPI 2021: 44.7 ML/MIN/1.73
EOSINOPHIL # BLD AUTO: 0.08 10*3/MM3 (ref 0–0.4)
EOSINOPHIL NFR BLD AUTO: 0.9 % (ref 0.3–6.2)
ERYTHROCYTE [DISTWIDTH] IN BLOOD BY AUTOMATED COUNT: 14.9 % (ref 12.3–15.4)
F5 GENE MUT ANL BLD/T: NORMAL
FACTOR II, DNA ANALYSIS: NORMAL
FOLATE SERPL-MCNC: 3.89 NG/ML (ref 4.78–24.2)
GLUCOSE SERPL-MCNC: 136 MG/DL (ref 65–99)
HCT VFR BLD AUTO: 28.5 % (ref 34–46.6)
HGB BLD-MCNC: 9.4 G/DL (ref 12–15.9)
LYMPHOCYTES # BLD AUTO: 1.18 10*3/MM3 (ref 0.7–3.1)
LYMPHOCYTES NFR BLD AUTO: 12.7 % (ref 19.6–45.3)
MAGNESIUM SERPL-MCNC: 2.2 MG/DL (ref 1.6–2.4)
MCH RBC QN AUTO: 29.7 PG (ref 26.6–33)
MCHC RBC AUTO-ENTMCNC: 33 G/DL (ref 31.5–35.7)
MCV RBC AUTO: 89.9 FL (ref 79–97)
MONOCYTES # BLD AUTO: 0.9 10*3/MM3 (ref 0.1–0.9)
MONOCYTES NFR BLD AUTO: 9.7 % (ref 5–12)
NEUTROPHILS NFR BLD AUTO: 7 10*3/MM3 (ref 1.7–7)
NEUTROPHILS NFR BLD AUTO: 75.1 % (ref 42.7–76)
PHOSPHATE SERPL-MCNC: 2.4 MG/DL (ref 2.5–4.5)
PLATELET # BLD AUTO: 142 10*3/MM3 (ref 140–450)
PMV BLD AUTO: 11.8 FL (ref 6–12)
POTASSIUM SERPL-SCNC: 4.1 MMOL/L (ref 3.5–5.2)
PROT C ACT/NOR PPP: 122 % (ref 86–163)
PROT S ACT/NOR PPP: 39 % (ref 70–127)
PROT S FREE PPP-ACNC: 73 % (ref 49–138)
RBC # BLD AUTO: 3.17 10*6/MM3 (ref 3.77–5.28)
SODIUM SERPL-SCNC: 137 MMOL/L (ref 136–145)
VIT B12 BLD-MCNC: 229 PG/ML (ref 211–946)
WBC NRBC COR # BLD: 9.31 10*3/MM3 (ref 3.4–10.8)

## 2022-08-19 PROCEDURE — 80069 RENAL FUNCTION PANEL: CPT | Performed by: INTERNAL MEDICINE

## 2022-08-19 PROCEDURE — 99232 SBSQ HOSP IP/OBS MODERATE 35: CPT | Performed by: INTERNAL MEDICINE

## 2022-08-19 PROCEDURE — 25010000002 CYANOCOBALAMIN PER 1000 MCG: Performed by: INTERNAL MEDICINE

## 2022-08-19 PROCEDURE — 85025 COMPLETE CBC W/AUTO DIFF WBC: CPT | Performed by: SURGERY

## 2022-08-19 PROCEDURE — 82746 ASSAY OF FOLIC ACID SERUM: CPT | Performed by: INTERNAL MEDICINE

## 2022-08-19 PROCEDURE — 97530 THERAPEUTIC ACTIVITIES: CPT

## 2022-08-19 PROCEDURE — 82607 VITAMIN B-12: CPT | Performed by: INTERNAL MEDICINE

## 2022-08-19 PROCEDURE — 94799 UNLISTED PULMONARY SVC/PX: CPT

## 2022-08-19 PROCEDURE — 63710000001 DIPHENHYDRAMINE PER 50 MG: Performed by: INTERNAL MEDICINE

## 2022-08-19 PROCEDURE — 83735 ASSAY OF MAGNESIUM: CPT | Performed by: INTERNAL MEDICINE

## 2022-08-19 PROCEDURE — 25010000002 HEPARIN (PORCINE) 25000-0.45 UT/250ML-% SOLUTION: Performed by: SURGERY

## 2022-08-19 PROCEDURE — 85730 THROMBOPLASTIN TIME PARTIAL: CPT | Performed by: SURGERY

## 2022-08-19 PROCEDURE — 97162 PT EVAL MOD COMPLEX 30 MIN: CPT

## 2022-08-19 PROCEDURE — 25010000002 NA FERRIC GLUC CPLX PER 12.5 MG: Performed by: INTERNAL MEDICINE

## 2022-08-19 RX ORDER — DIPHENHYDRAMINE HCL 25 MG
25 CAPSULE ORAL DAILY
Status: COMPLETED | OUTPATIENT
Start: 2022-08-19 | End: 2022-08-21

## 2022-08-19 RX ORDER — CYANOCOBALAMIN 1000 UG/ML
1000 INJECTION, SOLUTION INTRAMUSCULAR; SUBCUTANEOUS DAILY
Status: DISPENSED | OUTPATIENT
Start: 2022-08-19 | End: 2022-08-22

## 2022-08-19 RX ORDER — FUROSEMIDE 20 MG/1
20 TABLET ORAL DAILY
Status: DISCONTINUED | OUTPATIENT
Start: 2022-08-20 | End: 2022-08-23 | Stop reason: HOSPADM

## 2022-08-19 RX ORDER — FOLIC ACID 1 MG/1
1 TABLET ORAL DAILY
Status: DISCONTINUED | OUTPATIENT
Start: 2022-08-19 | End: 2022-08-23 | Stop reason: HOSPADM

## 2022-08-19 RX ORDER — ACETAMINOPHEN 325 MG/1
650 TABLET ORAL DAILY
Status: COMPLETED | OUTPATIENT
Start: 2022-08-19 | End: 2022-08-21

## 2022-08-19 RX ADMIN — HEPARIN SODIUM 8.8 UNITS/KG/HR: 10000 INJECTION, SOLUTION INTRAVENOUS at 20:48

## 2022-08-19 RX ADMIN — DIPHENHYDRAMINE HYDROCHLORIDE 25 MG: 25 CAPSULE ORAL at 12:19

## 2022-08-19 RX ADMIN — SODIUM CHLORIDE 250 MG: 9 INJECTION, SOLUTION INTRAVENOUS at 13:41

## 2022-08-19 RX ADMIN — POLYETHYLENE GLYCOL 3350 17 G: 17 POWDER, FOR SOLUTION ORAL at 08:41

## 2022-08-19 RX ADMIN — BUDESONIDE AND FORMOTEROL FUMARATE DIHYDRATE 2 PUFF: 80; 4.5 AEROSOL RESPIRATORY (INHALATION) at 20:31

## 2022-08-19 RX ADMIN — HYDROCODONE BITARTRATE AND ACETAMINOPHEN 1 TABLET: 5; 325 TABLET ORAL at 22:14

## 2022-08-19 RX ADMIN — Medication 10 ML: at 20:00

## 2022-08-19 RX ADMIN — CYANOCOBALAMIN 1000 MCG: 1000 INJECTION, SOLUTION INTRAMUSCULAR; SUBCUTANEOUS at 12:19

## 2022-08-19 RX ADMIN — PANTOPRAZOLE SODIUM 40 MG: 40 TABLET, DELAYED RELEASE ORAL at 06:26

## 2022-08-19 RX ADMIN — BUDESONIDE AND FORMOTEROL FUMARATE DIHYDRATE 2 PUFF: 80; 4.5 AEROSOL RESPIRATORY (INHALATION) at 07:10

## 2022-08-19 RX ADMIN — IPRATROPIUM BROMIDE AND ALBUTEROL SULFATE 3 ML: .5; 3 SOLUTION RESPIRATORY (INHALATION) at 00:15

## 2022-08-19 RX ADMIN — HYDROCODONE BITARTRATE AND ACETAMINOPHEN 1 TABLET: 5; 325 TABLET ORAL at 17:56

## 2022-08-19 RX ADMIN — SODIUM PHOSPHATE, MONOBASIC, MONOHYDRATE AND SODIUM PHOSPHATE, DIBASIC, ANHYDROUS 15 MMOL: 276; 142 INJECTION, SOLUTION INTRAVENOUS at 20:00

## 2022-08-19 RX ADMIN — Medication 10 ML: at 08:42

## 2022-08-19 RX ADMIN — HYDROCODONE BITARTRATE AND ACETAMINOPHEN 1 TABLET: 5; 325 TABLET ORAL at 02:19

## 2022-08-19 RX ADMIN — ACETAMINOPHEN 650 MG: 325 TABLET, FILM COATED ORAL at 12:18

## 2022-08-19 RX ADMIN — Medication 1 MG: at 20:00

## 2022-08-19 NOTE — PLAN OF CARE
Goal Outcome Evaluation:  Plan of Care Reviewed With: patient           Outcome Evaluation: Pt is an 81 yo F admitted with L groin pain and found to have LLE DVT. S/p L leg thrombectomy/embolectomy and angioplasty stent placement. Pt lives alone and reports indepedence at [facility] with a rwx as needed. Pt has 1 THAD with no steps inside and denies hx of falls. Pt presents to PT with impaired strength, endurance, and pain limiting overall mobility. Pt transferred to EOB with SBA, STS with CGA, and took a few steps to the chair with CGA and rwx. Pt with improved ablility to WB through LLE, though c/o pain. Pt steady with rwx and noted feeling better sitting UIC. Pt encouraged to call out for assist back to bed, but to try to sit up for a while. Updated RN following session. Pt will continue to benefit from skilled PT to address functional deficits and improve overall mobility.

## 2022-08-19 NOTE — DISCHARGE PLACEMENT REQUEST
"Luann Frances (82 y.o. Female)             Date of Birth   1940    Social Security Number       Address   [address] [place] [address]    Home Phone   [phone]    MRN   [id number]       Sikhism   None    Marital Status                               Admission Date   8/16/22    Admission Type   Emergency    Admitting Provider   Jed Arevalo MD    Attending Provider   Noble Dangleo MD    Department, Room/Bed   [address], E557/1       Discharge Date       Discharge Disposition       Discharge Destination                               Attending Provider: Noble Dangelo MD    Allergies: Ambien [Zolpidem Tartrate], Amoxicillin-pot Clavulanate, Doxycycline, Eliquis [Apixaban], Levofloxacin, Metronidazole, Naproxen, Sulfamethoxazole-trimethoprim, Synthroid [Levothyroxine Sodium], Zolpidem    Isolation: None   Infection: None   Code Status: CPR   Advance Care Planning Activity    Ht: 160 cm (63\")   Wt: 94.8 kg (209 lb)    Admission Cmt: None   Principal Problem: Acute deep vein thrombosis (DVT) of femoral vein of left lower extremity (HCC) [I82.412]                 Active Insurance as of 8/16/2022     Primary Coverage     Payor Plan Insurance Group Employer/Plan Group    HUMANA MEDICARE REPLACEMENT HUMANA MEDICARE REPLACEMENT [id number]     Payor Plan Address Payor Plan Phone Number Payor Plan Fax Number Effective Dates    [address] [address] [phone]  1/1/2018 - None Entered    [facility] [id number]       Subscriber Name Subscriber Birth Date Member ID       LUANN FRANCES 1940 [id number]                 Emergency Contacts      (Rel.) Home Phone Work Phone Mobile Phone    CED FRANCES (Son) [phone] -- --    Cooper Frances (Son) [phone] -- [phone]            {Outbreak/Travel/Exposure Documentation......;  Question Available Choices Patient Response   COVID-19 Outbreak Screen:  Do you currently have a " new onset of the following symptoms?        Fever/Chills, Cough, Shortness of air, Loss of taste or smell, No, Unknown  No (08/16/22 1122)   COVID-19 Outbreak Screen: In the last 14 days, have you had contact with anyone who is ill, has show any of the symptoms listed above and/or has been diagnosis with the 2019 Novel Coronavirus? This includes any immediate household members but excludes any patients with whom you have been in contact within your normal work duties wearing proper PPE, if you are a healthcare worker.  Yes, No, Unknown              No (08/16/22 1122)   COVID-19 Outbreak Screen: Who was notified? Free text (not recorded)   Ebola Screening Outbreak Screen: Have you traveled to the [place] the [place] or [place] within the past 21 days?  Yes, No, Unknown (not recorded)   Ebola Screening Outbreak Screen: Do you have ANY of the following symptoms: Fever/Chills, Vomiting, Diarrhea, Fatigue, Headache, Muscle pain, Unexplained bleeding, Abdominal (stomach) pain, No, Unknown (not recorded)   Ebola Screening Outbreak Screen: Name of Person notified Free text (not recorded)   Travel Screen: Have you traveled in the last month? If so, to what country have you traveled? If [place] what state? Yes, No, Unknown  List of all countries  List of all States No (08/16/22 1122)  (not recorded)  (not recorded)   Infection Risk: Do you currently have the following symptoms?  (If cough is selected, the Tuberculosis Screen is performed.) Cough, Fever, Rash, No No (08/16/22 1122)   Tuberculosis Screen: Do you have any of the following Tuberculosis Risks?  · Have you lived or spent time with anyone who had or may have TB?  · Have you lived in or visited any of the following areas for more than one month: [place], [place], [place], [place] or [place], the [place] or [place]?  · Do you have HIV/AIDS?  · Have you lived in or worked in a nursing home, homeless shelter, correctional facility, or substance abuse  treatment facility?   · No    If Yes do you have any of the following symptoms? Yes responses display to the right    If Yes, symptoms listed are:  Cough greater than or equal to 3 weeks, Loss of appetite, Unexplained weight loss, Night sweats, Bloody sputum or hemoptysis, Hoarseness, Fever, Fatigue, Chest pain, No (not recorded)  (not recorded)   Exposure Screen: Have you been exposed to any of these contagious diseases in the last month? Measles, Chickenpox, Meningitis, Pertussis, Whooping Cough, No No (08/16/22 9862)

## 2022-08-19 NOTE — THERAPY EVALUATION
Patient Name: Luann Frances  : 1940    MRN: [id number]                              Today's Date: 2022       Admit Date: 2022    Visit Dx:     ICD-10-CM ICD-9-CM   1. Myositis of left lower extremity, unspecified myositis type  M60.9 729.1   2. Acute deep vein thrombosis (DVT) of distal vein of left lower extremity ([facility])  I82.4Z2 453.42   3. Acute deep vein thrombosis (DVT) of femoral vein of left lower extremity ([facility])  I82.412 453.41     Patient Active Problem List   Diagnosis   • Atopic rhinitis   • Chronic obstructive pulmonary disease ([facility])   • Diverticulitis of colon   • Acid reflux   • Fatigue   • Primary hypothyroidism   • Insomnia   • Knee pain   • Pain of lower extremity   • Chronic nausea   • Shoulder pain   • Ventricular premature beats   • Weight gain   • Cold intolerance   • Post herpetic neuralgia   • Insulin resistance   • Vitamin D deficiency   • CKD (chronic kidney disease) stage 3, GFR 30-59 ml/min (CMS/[facility])   • Mixed hyperlipidemia   • Hypersomnia   • Hyperuricemia   • Essential hypertension   • DELROY (obstructive sleep apnea)   • Renal calculus   • Multiple closed fractures of ribs of right side   • Non-cardiac chest pain   • Compression fracture of body of thoracic vertebra ([facility])   • Arm skin lesion, right   • DVT, lower extremity, distal, acute, left ([facility])   • Lower extremity edema   • Skin tear of right lower leg without complication   • Medicare annual wellness visit, subsequent   • Tobacco abuse   • Osteoporosis   • Myositis of left lower extremity   • Acute deep vein thrombosis (DVT) of femoral vein of left lower extremity ([facility])   • 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[facility])     Past Medical History:   Diagnosis Date   • Acute deep vein thrombosis (DVT) of femoral vein of right lower extremity ([facility]) 04/15/2021   • Arthritis    • Asymptomatic PVCs    • Backache    • Chronic bronchitis ([facility])    • CKD (chronic kidney disease)     Stage 3   • Deep vein thrombosis  (DVT) of right lower extremity (HCC)    • Essential hypertension 01/20/2020   • Fracture of humerus    • GERD (gastroesophageal reflux disease)    • Hyperlipidemia    • Hypertension    • Hypothyroidism    • Pneumonia    • Pulmonary emphysema (HCC)    • Renal calculi    • Renal calculus 01/06/2021   • Sleep apnea     DOES NOT USE CPAP   • Thoracic vertebral fracture (HCC)      Past Surgical History:   Procedure Laterality Date   • APPENDECTOMY     • EXTRACORPOREAL SHOCK WAVE LITHOTRIPSY (ESWL) Left 01/06/2021    Procedure: EXTRACORPOREAL SHOCKWAVE LITHOTRIPSY, CYSTOSCOPY, RETROGRADE PYLEOGRAM;  Surgeon: Walter Schwartz MD;  [facility]: [facility];  Service: Urology;  Laterality: Left;   • EYE SURGERY      cataracts   • HUMERUS FRACTURE SURGERY     • LYTIC THROMBIN THERAPY Left 8/17/2022    Procedure: LT. LEG THROMBECTOMY/EMBOLECTOMY AND ANGIOPLASTY STENT PLACEMENT OF LEFT ILIAC VEIN;  Surgeon: Theo Bustos MD;  Location: [facility] OR 18/19;  Service: Vascular;  Laterality: Left;   • RIB FRACTURE SURGERY  2021   • THORACOSCOPY Right 02/16/2021    Procedure: bronchoscopy, right video assisted THORACOSCOPY WITH PLATING OF 3, 4, 5, AND 6 RIBS;  Surgeon: Kelley Delong MD;  Location: [facility] OR;  Service: Thoracic;  Laterality: Right;   • TOTAL KNEE ARTHROPLASTY Left 04/2015   • UMBILICAL HERNIA REPAIR        General Information     [facility] Name 08/19/22 1706          Physical Therapy Time and Intention    Document Type evaluation  -     Mode of Treatment individual therapy;physical therapy  -     Row Name 08/19/22 1706          General Information    Patient Profile Reviewed yes  -     Prior Level of Function independent:;transfer;gait;bed mobility  -     Existing Precautions/Restrictions fall  -     Barriers to Rehab none identified  -     Row Name 08/19/22 1706          Living Environment    People in Home alone  -     Row Name 08/19/22 1706          Home Main Entrance    Number of  Stairs, Main Entrance one  -[facility] Name 08/19/22 1706          Stairs Within Home, Primary    Number of Stairs, Within Home, Primary none  -[facility] Name 08/19/22 1706          Cognition    Orientation Status (Cognition) oriented x 4  -[facility] Name 08/19/22 1706          Safety Issues, Functional Mobility    Impairments Affecting Function (Mobility) balance;endurance/activity tolerance;strength;pain  -           User Key  (r) = Recorded By, (t) = Taken By, (c) = Cosigned By    Initials Name Provider Type     Constance Pace PT Physical Therapist               Mobility     [facility] Name 08/19/22 1706          Bed Mobility    Bed Mobility supine-sit  -     Supine-Sit [place] (Bed Mobility) standby assist;verbal cues  -     Assistive Device (Bed Mobility) bed rails;head of bed elevated  -[facility] Name 08/19/22 1706          Sit-Stand Transfer    Sit-Stand [place] (Transfers) contact guard;verbal cues  -     Assistive Device (Sit-Stand Transfers) walker, front-wheeled  -[facility] Name 08/19/22 1706          Gait/Stairs (Locomotion)    [place] Level (Gait) contact guard;verbal cues  -     Assistive Device (Gait) walker, front-wheeled  -     Distance in Feet (Gait) 3ft  -     Deviations/Abnormal Patterns (Gait) antalgic;moraima decreased;gait speed decreased;festinating/shuffling;weight shifting decreased;stride length decreased  -     Bilateral Gait Deviations forward flexed posture;heel strike decreased  -     Comment, (Gait/Stairs) Pain with WBing on LLE  -           User Key  (r) = Recorded By, (t) = Taken By, (c) = Cosigned By    Initials Name Provider Type     Constance Pace PT Physical Therapist               Obj/Interventions     Row Name 08/19/22 1707          Range of Motion Comprehensive    General Range of Motion bilateral lower extremity ROM WFL  -[facility] Name 08/19/22 1707          Strength Comprehensive (MMT)    General Manual Muscle Testing (MMT)  Assessment lower extremity strength deficits identified  -     Comment, General Manual Muscle Testing (MMT) Assessment Generalized weakness, BLE grossly 4-/5  -[facility] Name 08/19/22 1707          Balance    Balance Assessment sitting static balance;sitting dynamic balance;standing static balance;standing dynamic balance  -     Static Sitting Balance modified independence  -     Dynamic Sitting Balance modified independence  -     Position, Sitting Balance unsupported;sitting edge of bed  -     Static Standing Balance standby assist  -     Dynamic Standing Balance standby assist  -     Position/Device Used, Standing Balance supported;walker, front-wheeled  -[facility] Name 08/19/22 1707          Sensory Assessment (Somatosensory)    Sensory Assessment (Somatosensory) LE sensation intact  -           User Key  (r) = Recorded By, (t) = Taken By, (c) = Cosigned By    Initials Name Provider Type     Constance Pace, PT Physical Therapist               Goals/Plan     [facility] Name 08/19/22 1714          Bed Mobility Goal 1 (PT)    Activity/Assistive Device (Bed Mobility Goal 1, PT) bed mobility activities, all  -     [place] Level/Cues Needed (Bed Mobility Goal 1, PT) modified independence  -     Time Frame (Bed Mobility Goal 1, PT) 1 week  -BH     Row Name 08/19/22 1714          Transfer Goal 1 (PT)    Activity/Assistive Device (Transfer Goal 1, PT) transfers, all  -     [place] Level/Cues Needed (Transfer Goal 1, PT) modified independence  -     Time Frame (Transfer Goal 1, PT) 1 week  -BH     Row Name 08/19/22 1714          Gait Training Goal 1 (PT)    Activity/Assistive Device (Gait Training Goal 1, PT) gait (walking locomotion)  -     [place] Level (Gait Training Goal 1, PT) modified independence  -     Distance (Gait Training Goal 1, PT) 100ft  -     Time Frame (Gait Training Goal 1, PT) 1 week  -[facility] Name 08/19/22 1714          Therapy Assessment/Plan (PT)     Planned Therapy Interventions (PT) balance training;bed mobility training;home exercise program;gait training;patient/family education;strengthening;transfer training  -           User Key  (r) = Recorded By, (t) = Taken By, (c) = Cosigned By    Initials Name Provider Type     Constance Pace, PT Physical Therapist               Clinical Impression     Row Name 08/19/22 1707          Pain    Pretreatment Pain Rating 2/10  -     Posttreatment Pain Rating 5/10  -     Pain Location - Side/Orientation Left  -     Pain Location incisional  -     Pain Intervention(s) Repositioned;Rest;Ambulation/increased activity  -     Row Name 08/19/22 1707          Plan of Care Review    Plan of Care Reviewed With patient  -     Outcome Evaluation Pt is an 81 yo F admitted with L groin pain and found to have LLE DVT. S/p L leg thrombectomy/embolectomy and angioplasty stent placement. Pt lives alone and reports indepedence at [facility] with a rwx as needed. Pt has 1 THAD with no steps inside and denies hx of falls. Pt presents to PT with impaired strength, endurance, and pain limiting overall mobility. Pt transferred to EOB with SBA, STS with CGA, and took a few steps to the chair with CGA and rwx. Pt with improved ablility to WB through LLE, though c/o pain. Pt steady with rwx and noted feeling better sitting UIC. Pt encouraged to call out for assist back to bed, but to try to sit up for a while. Updated RN following session. Pt will continue to benefit from skilled PT to address functional deficits and improve overall mobility.  -     Row Name 08/19/22 1707          Therapy Assessment/Plan (PT)    Patient/Family Therapy Goals Statement (PT) Return to PLOF  -     Rehab Potential (PT) good, to achieve stated therapy goals  -     Criteria for Skilled Interventions Met (PT) yes  -     Therapy Frequency (PT) 6 times/wk  -     Row Name 08/19/22 5217          Vital Signs    O2 Delivery Pre Treatment room air  -     O2  Delivery Intra Treatment room air  -     O2 Delivery Post Treatment room air  -     Row Name 08/19/22 1707          Positioning and Restraints    Pre-Treatment Position in bed  -     Post Treatment Position chair  -     In Chair reclined;call light within reach;encouraged to call for assist;exit alarm on;notified ns  -           User Key  (r) = Recorded By, (t) = Taken By, (c) = Cosigned By    Initials Name Provider Type     Constance Pace PT Physical Therapist               Outcome Measures     Row Name 08/19/22 1714          How much help from another person do you currently need...    Turning from your back to your side while in flat bed without using bedrails? 3  -BH     Moving from lying on back to sitting on the side of a flat bed without bedrails? 3  -BH     Moving to and from a bed to a chair (including a wheelchair)? 3  -BH     Standing up from a chair using your arms (e.g., wheelchair, bedside chair)? 3  -BH     Climbing 3-5 steps with a railing? 2  -BH     To walk in hospital room? 2  -     AM-PAC 6 Clicks Score (PT) 16  -     Highest level of mobility 5 --> Static standing  -     Row Name 08/19/22 1714          Functional Assessment    Outcome Measure Options AM-PAC 6 Clicks Basic Mobility (PT)  -           User Key  (r) = Recorded By, (t) = Taken By, (c) = Cosigned By    Initials Name Provider Type     Constance Pace PT Physical Therapist                             Physical Therapy Education                 Title: PT OT SLP Therapies (In Progress)     Topic: Physical Therapy (In Progress)     Point: Mobility training (Done)     Learning Progress Summary           Patient Acceptance, E,TB,D, VU,NR by  at 8/19/2022 1715                   Point: Home exercise program (Not Started)     Learner Progress:  Not documented in this visit.          Point: Body mechanics (Done)     Learning Progress Summary           Patient Acceptance, E,TB,D, VU,NR by  at 8/19/2022 1715                    Point: Precautions (Done)     Learning Progress Summary           Patient Acceptance, E,TB,D, VU,NR by  at 8/19/2022 1715                               User Key     Initials Effective Dates Name Provider Type Discipline     04/08/22 -  Constance Pace, PT Physical Therapist PT              PT Recommendation and Plan  Planned Therapy Interventions (PT): balance training, bed mobility training, home exercise program, gait training, patient/family education, strengthening, transfer training  Plan of Care Reviewed With: patient  Outcome Evaluation: Pt is an 81 yo F admitted with L groin pain and found to have LLE DVT. S/p L leg thrombectomy/embolectomy and angioplasty stent placement. Pt lives alone and reports indepedence at  with a rwx as needed. Pt has 1 THAD with no steps inside and denies hx of falls. Pt presents to PT with impaired strength, endurance, and pain limiting overall mobility. Pt transferred to EOB with SBA, STS with CGA, and took a few steps to the chair with CGA and rwx. Pt with improved ablility to WB through LLE, though c/o pain. Pt steady with rwx and noted feeling better sitting UIC. Pt encouraged to call out for assist back to bed, but to try to sit up for a while. Updated RN following session. Pt will continue to benefit from skilled PT to address functional deficits and improve overall mobility.     Time Calculation:    PT Charges     Row Name 08/19/22 1715             Time Calculation    Start Time 1431  -      Stop Time 1450  -      Time Calculation (min) 19 min  -      PT Received On 08/19/22  -      PT - Next Appointment 08/20/22  [facility]      PT Goal Re-Cert Due Date 08/26/22  -              Time Calculation- PT    Total Timed Code Minutes- PT 15 minute(s)  -              Timed Charges    85286 - Gait Training Minutes  7  -      09164 - PT Therapeutic Activity Minutes 8  -              Untimed Charges    PT Eval/Re-eval Minutes 5  -              Total Minutes     Timed Charges Total Minutes 15  -BH      Untimed Charges Total Minutes 5  -BH       Total Minutes 20  -BH            User Key  (r) = Recorded By, (t) = Taken By, (c) = Cosigned By    Initials Name Provider Type     Constance Pace, PT Physical Therapist              Therapy Charges for Today     Code Description Service Date Service Provider Modifiers Qty    05712002840  PT THERAPEUTIC ACT EA 15 MIN 8/19/2022 Constance Pace, PT GP 1    [id number]  PT EVAL MOD COMPLEXITY 3 8/19/2022 Constance Pace, PT GP 1          PT G-Codes  Outcome Measure Options: AM-PAC 6 Clicks Basic Mobility (PT)  AM-PAC 6 Clicks Score (PT): 16    Constance Pace PT  8/19/2022

## 2022-08-19 NOTE — PROGRESS NOTES
Name: Luann Frances ADMIT: 2022   : 1940  PCP: Dinah Humphrey MD    MRN: [id number] LOS: 3 days   AGE/SEX: 82 y.o. female  ROOM: [address]    [place], Subsequent Hospital Care    Chief Complaint   Patient presents with   • Groin Pain     CC: Left leg phlegmasia  Subjective     82 y.o. female postop day 2 status post left mechanical thrombectomy and left iliac stent placement for severe left leg and angina.  Left leg has improved.  Continue compression with aces and compression stocking when available.  Plan for 6 to 8-week follow-up scan after discharge.  Will defer anticoagulant to heme-onc.    Review of Systems left leg feels better    Objective     Scheduled Medications:   acetaminophen, 650 mg, Oral, Daily   And  diphenhydrAMINE, 25 mg, Oral, Daily  budesonide-formoterol, 2 puff, Inhalation, BID - RT  cyanocobalamin, 1,000 mcg, Intramuscular, Daily  ferric gluconate, 250 mg, Intravenous, Daily  folic acid, 1 mg, Oral, Daily  ipratropium-albuterol, 3 mL, Nebulization, Q8H - RT  pantoprazole, 40 mg, Oral, QAM  polyethylene glycol, 17 g, Oral, Daily  sodium chloride, 10 mL, Intravenous, Q12H        Active Infusions:  heparin, 15.8 Units/kg/hr, Last Rate: 11.8 Units/kg/hr (22 0949)  lactated ringers, 9 mL/hr, Last Rate: Stopped (22 1015)        As Needed Medications:  •  acetaminophen **OR** acetaminophen **OR** acetaminophen  •  albuterol  •  HYDROcodone-acetaminophen  •  HYDROmorphone **AND** naloxone  •  Morphine **AND** naloxone  •  nitroglycerin  •  nitroglycerin  •  ondansetron  •  ondansetron **OR** ondansetron  •  [COMPLETED] Insert peripheral IV **AND** sodium chloride  •  sodium chloride    Vital Signs  Vital Signs   Patient Vitals for the past 24 hrs:   BP Temp Temp src Pulse Resp SpO2   22 0751 134/64 98.8 °F (37.1 °C) Oral 62 18 99 %   22 0710 -- -- -- 66 -- 98 %   22 0015 -- -- -- 68 18 97 %   22 133/57 98.1 °F (36.7 °C)  "Oral 66 18 100 %   08/18/22 1918 -- -- -- 66 18 94 %   08/18/22 1500 137/68 98.2 °F (36.8 °C) Oral 65 18 97 %     Vital Signs (range)  Temp:  [98.1 °F (36.7 °C)-98.8 °F (37.1 °C)] 98.8 °F (37.1 °C)  Heart Rate:  [62-68] 62  Resp:  [18] 18  BP: (133-137)/(57-68) 134/64  I/O:  I/O last 3 completed shifts:  In: 2260.5 [P.O.:840; I.V.:1420.5]  Out: 2150 [Urine:2150]  I/O:     Intake/Output Summary (Last 24 hours) at 8/19/2022 1347  Last data filed at 8/19/2022 1155  Gross per 24 hour   Intake 240 ml   Output 2100 ml   Net -1860 ml     BMI:  Body mass index is 37.02 kg/m².    Physical Exam:  Physical Exam   Lungs clear\"   Heart regular rate  Abdomen benign  Left leg with moderate +2 edema.  Not tight and not cyanotic.    results Review:     CBC    Results from last 7 days   Lab Units 08/19/22  0115 08/18/22  0324 08/17/22  0850 08/16/22  1351   WBC 10*3/mm3 9.31 9.53 11.19* 12.51*   HEMOGLOBIN g/dL 9.4* 10.1* 12.5 13.2   PLATELETS 10*3/mm3 142 126* 149 192     BMP   Results from last 7 days   Lab Units 08/19/22  0115 08/18/22  0324 08/17/22 2027 08/17/22  0850 08/16/22  1632   SODIUM mmol/L 137 135* 137 137 133*   POTASSIUM mmol/L 4.1 4.3 4.5 5.1 6.0*   CHLORIDE mmol/L 105 103 103 100 101   CO2 mmol/L 21.8* 18.8* 21.8* 24.5 16.0*   BUN mg/dL 24* 34* 37* 44* 45*   CREATININE mg/dL 1.22* 1.47* 1.49* 1.45* 2.01*   GLUCOSE mg/dL 136* 111* 120* 99 105*   MAGNESIUM mg/dL 2.2  --   --   --   --    PHOSPHORUS mg/dL 2.4*  --   --   --   --      Cr Clearance Estimated Creatinine Clearance: 39.6 mL/min (A) (by C-G formula based on SCr of 1.22 mg/dL (H)).  Coag   Results from last 7 days   Lab Units 08/19/22  0838 08/19/22  0115 08/18/22  1212 08/18/22  0324 08/17/22 2027 08/17/22  0850 08/17/22  0055 08/16/22  1632   INR   --   --   --   --   --   --   --  1.18*   APTT seconds 67.3* 62.9* 66.5* 102.0* 101.2* 107.6* 105.5* 21.6*     HbA1C   Lab Results   Component Value Date    HGBA1C 5.70 (H) 08/17/2021    HGBA1C 5.90 (H) " 08/20/2020    HGBA1C 6.6 (H) 01/20/2020     Blood Glucose No results found for: POCGLU  Infection     CMP   Results from last 7 days   Lab Units 08/19/22  0115 08/18/22  0324 08/17/22 2027 08/17/22  0850 08/16/22  1632   SODIUM mmol/L 137 135* 137 137 133*   POTASSIUM mmol/L 4.1 4.3 4.5 5.1 6.0*   CHLORIDE mmol/L 105 103 103 100 101   CO2 mmol/L 21.8* 18.8* 21.8* 24.5 16.0*   BUN mg/dL 24* 34* 37* 44* 45*   CREATININE mg/dL 1.22* 1.47* 1.49* 1.45* 2.01*   GLUCOSE mg/dL 136* 111* 120* 99 105*   ALBUMIN g/dL 3.10* 3.20* 3.50  --  4.00   BILIRUBIN mg/dL  --  0.3 0.5  --  0.7   ALK PHOS U/L  --  49 51  --  60   AST (SGOT) U/L  --  13 14  --  13   ALT (SGPT) U/L  --  <5 5  --  6     Radiology(recent) No radiology results for the last day    Assessment & Plan     Assessment & Plan      Acute deep vein thrombosis (DVT) of femoral vein of left lower extremity (HCC)    Chronic obstructive pulmonary disease (HCC)    CKD (chronic kidney disease) stage 3, GFR 30-59 ml/min (CMS/HCC)    Essential hypertension    DELROY (obstructive sleep apnea)    Myositis of left lower extremity    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 female doing well status post venous thrombectomy and stent placement.  Continue full anticoagulation with plan per hematology for oral anticoagulant.  Recommend compression with aces and compression stocking when available.  We will follow-up in 6 to 8 weeks with duplex scan in the office.  We will sign off at this time.  Please call for issues.    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

## 2022-08-19 NOTE — PROGRESS NOTES
[facility]    [phone]   LOS: 3 days     Name: Luann Frances  Age/Sex: 82 y.o. female  :  1940        PCP: Dinah Humphrey MD  Chief Complaint   Patient presents with   • Groin Pain      Subjective   She feels much better today she slept well last night denies other new issues or complaints right now.  General: No Fever or Chills, Cardiac: No Chest Pain or Palpitations, Resp: No Cough or SOA, GI: No Nausea, Vomiting, or Diarrhea and Other: No bleeding    acetaminophen, 650 mg, Oral, Daily   And  diphenhydrAMINE, 25 mg, Oral, Daily  budesonide-formoterol, 2 puff, Inhalation, BID - RT  cyanocobalamin, 1,000 mcg, Intramuscular, Daily  ferric gluconate, 250 mg, Intravenous, Daily  folic acid, 1 mg, Oral, Daily  ipratropium-albuterol, 3 mL, Nebulization, Q8H - RT  pantoprazole, 40 mg, Oral, QAM  polyethylene glycol, 17 g, Oral, Daily  sodium chloride, 10 mL, Intravenous, Q12H      heparin, 15.8 Units/kg/hr, Last Rate: 11.8 Units/kg/hr (22 0949)  lactated ringers, 9 mL/hr, Last Rate: Stopped (22 1015)        Objective   Vital Signs  Temp:  [98.1 °F (36.7 °C)-98.8 °F (37.1 °C)] 98.8 °F (37.1 °C)  Heart Rate:  [62-68] 62  Resp:  [18] 18  BP: (133-137)/(57-68) 134/64  Body mass index is 37.02 kg/m².    Intake/Output Summary (Last 24 hours) at 2022 1219  Last data filed at 2022 1155  Gross per 24 hour   Intake 480 ml   Output 2100 ml   Net -1620 ml       Physical Exam  Vitals and nursing note reviewed.   Constitutional:       Appearance: Normal appearance.   Cardiovascular:      Rate and Rhythm: Normal rate and regular rhythm.   Pulmonary:      Effort: No respiratory distress.      Breath sounds: Normal breath sounds.   Abdominal:      General: Bowel sounds are normal.      Palpations: Abdomen is soft.   Musculoskeletal:      Comments: Left lower extremity currently dressed and Ace wrap   Neurological:      Mental Status: She is alert.           Results Review:       I  reviewed the patient's new clinical results.  Results from last 7 days   Lab Units 08/19/22  0115 08/18/22  0324 08/17/22  0850 08/16/22  1351   WBC 10*3/mm3 9.31 9.53 11.19* 12.51*   HEMOGLOBIN g/dL 9.4* 10.1* 12.5 13.2   PLATELETS 10*3/mm3 142 126* 149 192     Results from last 7 days   Lab Units 08/19/22  0115 08/18/22  0324 08/17/22 2027 08/17/22  0850 08/16/22  1632   SODIUM mmol/L 137 135* 137 137 133*   POTASSIUM mmol/L 4.1 4.3 4.5 5.1 6.0*   CHLORIDE mmol/L 105 103 103 100 101   CO2 mmol/L 21.8* 18.8* 21.8* 24.5 16.0*   BUN mg/dL 24* 34* 37* 44* 45*   CREATININE mg/dL 1.22* 1.47* 1.49* 1.45* 2.01*   CALCIUM mg/dL 8.5* 7.9* 8.2* 8.9 9.5   MAGNESIUM mg/dL 2.2  --   --   --   --    PHOSPHORUS mg/dL 2.4*  --   --   --   --    Estimated Creatinine Clearance: 39.6 mL/min (A) (by C-G formula based on SCr of 1.22 mg/dL (H)).      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       PLAN  Is an 82-year-old female who presented to the hospital with a progressing acute DVT in her left lower extremity failing outpatient direct oral anticoagulant  -Appreciate vascular surgery's assistance and did undergo intervention 8/17, awaiting there input on restrictions etc to being DC planning  -Continue heparin drip with plans to transition to warfarin  Or pradaxa at discharge watign antiphospholipid ab  -Renal and electrolyte issues have stabilized appreciate nephrology's  assistance  -Continue heparin drip for now with hypercoagulable work-up sent.  Planning for Coumadin or Pradaxa at discharge.  -Full code    Disposition  To be determined      Noble Dangelo MD  [facility]  08/19/22  12:19 EDT

## 2022-08-19 NOTE — PROGRESS NOTES
Subjective     CHIEF COMPLAINT:     Recurrent lower extremity deep vein thrombosis    INTERVAL HISTORY:     Patient reports decrease in the pain in the left foot.  There is pain in the left leg/calf area.    REVIEW OF SYSTEMS:  Review of systems is significant for the following.  Rest of ROS is negative.    CVS: No chest pain.  RESP: No shortness of breath  MSK: Improvement in the pain in the left leg.    SCHEDULED MEDS:  budesonide-formoterol, 2 puff, Inhalation, BID - RT  ipratropium-albuterol, 3 mL, Nebulization, Q8H - RT  pantoprazole, 40 mg, Oral, QAM  polyethylene glycol, 17 g, Oral, Daily  sodium chloride, 10 mL, Intravenous, Q12H      INFUSIONS:  heparin, 15.8 Units/kg/hr, Last Rate: 11.8 Units/kg/hr (08/19/22 0949)  lactated ringers, 9 mL/hr, Last Rate: Stopped (08/17/22 1015)      PRN MEDS:  •  acetaminophen **OR** acetaminophen **OR** acetaminophen  •  albuterol  •  HYDROcodone-acetaminophen  •  HYDROmorphone **AND** naloxone  •  Morphine **AND** naloxone  •  nitroglycerin  •  nitroglycerin  •  ondansetron  •  ondansetron **OR** ondansetron  •  [COMPLETED] Insert peripheral IV **AND** sodium chloride  •  sodium chloride       Objective   VITAL SIGNS:  Temp:  [97.7 °F (36.5 °C)-98.8 °F (37.1 °C)] 98.8 °F (37.1 °C)  Heart Rate:  [58-68] 62  Resp:  [18] 18  BP: (130-137)/(56-68) 134/64     PHYSICAL EXAMINATION:   GENERAL:  The patient appears in fair general condition, not in acute distress.  SKIN: No skin rash. No ecchymosis.   HEAD:  Normocephalic.  EYES:  Pallor.  No jaundice.  NECK:  Supple. No Masses.  CHEST: Normal respiratory effort.  CARDIAC:  Normal S1 & S2. No murmur.   ABDOMEN: Nondistended  EXTREMITIES: Left calf tenderness.  PSYCH: Normal mood and affect.     RESULT REVIEW:   Results from last 7 days   Lab Units 08/19/22  0115 08/18/22  0324 08/17/22  0850 08/16/22  1351   WBC 10*3/mm3 9.31 9.53 11.19* 12.51*   NEUTROS ABS 10*3/mm3 7.00 7.33* 8.64* 10.39*   HEMOGLOBIN g/dL 9.4* 10.1* 12.5 13.2    HEMATOCRIT % 28.5* 31.4* 38.0 40.6   PLATELETS 10*3/mm3 142 126* 149 192     Results from last 7 days   Lab Units 08/19/22  0115 08/18/22  0324 08/17/22 2027 08/17/22  0850 08/16/22  1632   SODIUM mmol/L 137 135* 137 137 133*   POTASSIUM mmol/L 4.1 4.3 4.5 5.1 6.0*   CHLORIDE mmol/L 105 103 103 100 101   CO2 mmol/L 21.8* 18.8* 21.8* 24.5 16.0*   BUN mg/dL 24* 34* 37* 44* 45*   CREATININE mg/dL 1.22* 1.47* 1.49* 1.45* 2.01*   CALCIUM mg/dL 8.5* 7.9* 8.2* 8.9 9.5   ALBUMIN g/dL 3.10* 3.20* 3.50  --  4.00   BILIRUBIN mg/dL  --  0.3 0.5  --  0.7   ALK PHOS U/L  --  49 51  --  60   ALT (SGPT) U/L  --  <5 5  --  6   AST (SGOT) U/L  --  13 14  --  13   MAGNESIUM mg/dL 2.2  --   --   --   --      Results from last 7 days   Lab Units 08/19/22  0838 08/19/22  0115 08/18/22  1212 08/18/22  0324 08/17/22 2027 08/17/22  0055 08/16/22  1632   INR   --   --   --   --   --   --  1.18*   APTT seconds 67.3* 62.9* 66.5* 102.0* 101.2*   < > 21.6*    < > = values in this interval not displayed.     Component      Latest Ref Rng & Units 8/18/2022 8/19/2022   Iron      37 - 145 mcg/dL 27 (L)    Iron Saturation      20 - 50 % 8 (L)    Transferrin      200 - 360 mg/dL 221    TIBC      298 - 536 mcg/dL 329    Ferritin      13.00 - 150.00 ng/mL 237.00 (H)    Vitamin B-12      211 - 946 pg/mL  229   Folate      4.78 - 24.20 ng/mL  3.89 (L)     Component      Latest Ref Rng & Units 8/18/2022   Factor V Leiden      Normal Normal   Factor II, DNA Analysis      Normal Normal     Component      Latest Ref Rng & Units 8/18/2022   Anticardiolipin IgG      0 - 14 GPL U/mL <9   Anticardiolipin IgM      0 - 12 MPL U/mL 9     Component      Latest Ref Rng & Units 8/18/2022   Protein C Activity      86 - 163 % 122   Protein S Functional      70 - 127 % 39 (L)   Protein S Ag, Free      49.0 - 138.0 % 73.0     Assessment & Plan   *Acute extensive left lower extremity DVT in a patient with prior history of bilateral lower extremity DVT as  below.  · Patient developed left foot pain and was unable to walk.  · The skin color changed and the foot became cold.  · She developed phlegmasia serial ambulance.  · She was seen by vascular surgery and taken to the OR earlier today.  · She underwent thrombectomy and angioplasty with placement of stent in the left iliac vein.  · She is on IV heparin drip.  · Thrombophilia work-up was obtained.  · Testing for factor V Leiden and prothrombin mutation was negative.  · Anticardiolipin IgG and IgM antibodies are negative.  · Protein S was 39%-consumption versus deficiency.  · We will plan to repeat protein S activity in the future.     *Deep vein thrombosis in the left popliteal, posterior tibial, peroneal and soleal veins diagnosed on 4/12/2022.  · No clear precipitating factor.  · Patient noticed swelling and some redness of the leg.  · The DVT was diagnosed when she had venous Doppler to reevaluate the right lower extremity DVT.  · She was placed on Eliquis.  · She was having nausea after taking Eliquis.  · She was therefore switched to Xarelto 20 mg daily on 4/29/2022.  · Venous Doppler on 5/20/2022 revealed minimal chronic DVT in the left popliteal vein.  The DVT is in the posterior tibial, peroneal and soleal veins resolved.  · CT chest abdomen pelvis on 6/21/2022 showed no evidence of underlying malignancy to explain the DVT.  · CT on 8/11/2022 and 8/16/2022 showed no evidence of malignancy to explain the recurrent thrombosis.     *History of right lower extremity DVT diagnosed on 4/1/2021.  · This was a provoked episode that developed after surgery to place rib plates.  · Patient was placed on Xarelto.  · Venous Doppler on 4/12/2022 showed resolution of the right lower extremity DVT.     *Nausea.  · She was having nausea after taking Eliquis.  · She is on Compazine 10 mg every 6 hours as needed.  · Nausea also developed after she was switched to Xarelto.  · CT scan revealed gallbladder stone.  However, there  were no changes of cholecystitis.  · CT abdomen pelvis on 8/11/2022 showed no liver or GI abnormality to explain the nausea.     *Anemia secondary to iron, vitamin B12 and folate deficiency.  · Vitamin B12 was 266 on 9/24/2019.  · Hemoglobin was 12.5 on 8/17/2022.  · Hemoglobin decreased to 10.1 on 8/18/2022.  · Anemia work-up revealed iron deficiency based on transferrin saturation of 8%.  · B12 was low normal at 229.  · Folate was low at 3.89.    PLAN:     1.  We will start IV Ferrlecit.  2.  Start vitamin B12 IM daily while inpatient.  3.  Start folic acid 1 mg daily.  4.  Continue IV heparin for now. We will switch to Pradaxa in 1-2 days.       Lita Dubon MD  08/19/22

## 2022-08-19 NOTE — PLAN OF CARE
Goal Outcome Evaluation:  Plan of Care Reviewed With: patient        Progress: no change  Outcome Evaluation: VSS, heparin gtt infusing. Pain treated with PRN meds. Pulses dopplerable.

## 2022-08-19 NOTE — PLAN OF CARE
Goal Outcome Evaluation:  Plan of Care Reviewed With: patient        Progress: no change  Outcome Evaluation: VSS. Alert and oriented x4. Dopplerable BLE pulses. RLE wrapped per order. Up x2 assist. Pt c/o cramping in LLE and stabbing in RLE. PRN PO pain meds x2 this shift. Weight shifting encouraged. Heparin gtt infusing per order. Recheck PTT @ 0815. Awaiting d/c plans. Will continue to provide supportive care.

## 2022-08-19 NOTE — CASE MANAGEMENT/SOCIAL WORK
Continued Stay Note  [facility]     Patient Name: Luann Frances  MRN: [id number]  Today's Date: 8/19/2022    Admit Date: 8/16/2022     Discharge Plan     Row Name 08/19/22 1646       Plan    Plan Follow up on SNF referrals.    Plan Comments Per Ashley Sanchez with [facility] they are not able to accept due to staffing issues.  MD recommendations for SNF.  Spoke to the patient who requested that CCP contact her son Dylan Frances [phone] to discuss SNF options.  Dylan requested referrals to [facility]- referral made to [facility]- referral made to [facility]- referral made to [facility].  Pre-cert will be needed for SNF. CCP will follow for P.T. eval and recommendations, will follow for accepting SNF’s, will discuss accepting facilities with the patient and her son and will discuss transportation upon d/c pending P.T. eval. SHALINI SALAS               Discharge Codes    No documentation.               Expected Discharge Date and Time     Expected Discharge Date Expected Discharge Time    Aug 20, 2022             SHALINI Connolly

## 2022-08-19 NOTE — PROGRESS NOTES
[facility] Progress Note      Patient Name: Luann Frances  : 1940  MRN: [id number]  Primary Care Physician:  Dinah Humphrey MD  Date of admission: 2022    Subjective     Interval History:   Feels stronger; sitting in a chair  No shortness of breath on room air  Appetite improving; no N/V  Worried about going home and not being able to care for herself    Review of Systems:   As noted above    Objective     Vitals:   Temp:  [98.1 °F (36.7 °C)-98.8 °F (37.1 °C)] 98.8 °F (37.1 °C)  Heart Rate:  [62-68] 67  Resp:  [18] 18  BP: (133-140)/(57-72) 138/72     Intake/Output Summary (Last 24 hours) at 2022 1732  Last data filed at 2022 1300  Gross per 24 hour   Intake 420 ml   Output 1450 ml   Net -1030 ml       Physical Exam:    Constitutional: Awake, alert, NAD, chronically ill, overweight  HEENT: Sclera anicteric, no conjunctival injection  Neck: Supple, no carotid bruit, trachea at midline, no JVD  Respiratory: Coarse breath sounds, nonlabored on room air  Cardiovascular: RRR with ectopy, no rub  Gastrointestinal: + BS, soft, nontender and nondistended  : No palpable bladder  Musc-skel: +1-2 edema left leg, which is wrapped; trace edema right; +clubbing  Psychiatric: Appropriate affect, cooperative, oriented  Neurologic:  moving all extremities, normal speech and mental status  Skin: Warm and dry     Scheduled Meds:     acetaminophen, 650 mg, Oral, Daily   And  diphenhydrAMINE, 25 mg, Oral, Daily  budesonide-formoterol, 2 puff, Inhalation, BID - RT  cyanocobalamin, 1,000 mcg, Intramuscular, Daily  ferric gluconate, 250 mg, Intravenous, Daily  folic acid, 1 mg, Oral, Daily  pantoprazole, 40 mg, Oral, QAM  polyethylene glycol, 17 g, Oral, Daily  sodium chloride, 10 mL, Intravenous, Q12H      IV Meds:   heparin, 15.8 Units/kg/hr, Last Rate: 11.8 Units/kg/hr (22 0949)  lactated ringers, 9 mL/hr, Last Rate: Stopped (22 1015)        Results Reviewed:   I  have personally reviewed the results from the time of this admission to 8/19/2022 17:32 EDT     Results from last 7 days   Lab Units 08/19/22  0115 08/18/22  0324 08/17/22 2027 08/17/22  0850 08/16/22  1632   SODIUM mmol/L 137 135* 137   < > 133*   POTASSIUM mmol/L 4.1 4.3 4.5   < > 6.0*   CHLORIDE mmol/L 105 103 103   < > 101   CO2 mmol/L 21.8* 18.8* 21.8*   < > 16.0*   BUN mg/dL 24* 34* 37*   < > 45*   CREATININE mg/dL 1.22* 1.47* 1.49*   < > 2.01*   CALCIUM mg/dL 8.5* 7.9* 8.2*   < > 9.5   BILIRUBIN mg/dL  --  0.3 0.5  --  0.7   ALK PHOS U/L  --  49 51  --  60   ALT (SGPT) U/L  --  <5 5  --  6   AST (SGOT) U/L  --  13 14  --  13   GLUCOSE mg/dL 136* 111* 120*   < > 105*    < > = values in this interval not displayed.       Estimated Creatinine Clearance: 39.6 mL/min (A) (by C-G formula based on SCr of 1.22 mg/dL (H)).    Results from last 7 days   Lab Units 08/19/22  0115   MAGNESIUM mg/dL 2.2   PHOSPHORUS mg/dL 2.4*             Results from last 7 days   Lab Units 08/19/22  0115 08/18/22  0324 08/17/22  0850 08/16/22  1351   WBC 10*3/mm3 9.31 9.53 11.19* 12.51*   HEMOGLOBIN g/dL 9.4* 10.1* 12.5 13.2   PLATELETS 10*3/mm3 142 126* 149 192       Results from last 7 days   Lab Units 08/16/22  1632   INR  1.18*       Assessment / Plan     ASSESSMENT:  1.  MAILE on CKD3, nonoliguric, resolved and back to baseline.  Central volume fine; discrepant edema.  Low phosphorus; likely NAGMA.  Urine with only 30 mg/dL protein, and random urine ratio just 236 mg/g  2.  Extensive DVT left leg despite anticoagulation for prior bilateral DVTs, s/p thrombectomy and angioplasty left leg 8/17.  Hematology following and hypercoagulable work-up underway  3.  COPD with active tobacco abuse  4.  Obesity  5.  Hypertension, with acceptable control for now    PLAN:  1.  Resume home dose of furosemide 20 mg daily  2.  Na phosphate IV    Thank you for involving us in the care of Luann Frances.  Please feel free to call with any  questions.    Sidney Brown MD  08/19/22  17:32 EDT    [facility]  [phone]      Much of this encounter note is an electronic transcription/translation of spoken language to printed text. The electronic translation of spoken language may permit erroneous, or at times, nonsensical words or phrases to be inadvertently transcribed; Although I have reviewed the note for such errors, some may still exist.

## 2022-08-20 LAB
ALBUMIN SERPL-MCNC: 3 G/DL (ref 3.5–5.2)
ANION GAP SERPL CALCULATED.3IONS-SCNC: 12 MMOL/L (ref 5–15)
APTT PPP: 118.6 SECONDS (ref 22.7–35.4)
APTT PPP: 36.3 SECONDS (ref 22.7–35.4)
APTT PPP: 39.8 SECONDS (ref 22.7–35.4)
B2 GLYCOPROT1 IGA SER-ACNC: <9 GPI IGA UNITS (ref 0–25)
B2 GLYCOPROT1 IGG SER-ACNC: <9 GPI IGG UNITS (ref 0–20)
B2 GLYCOPROT1 IGM SER-ACNC: <9 GPI IGM UNITS (ref 0–32)
BASOPHILS # BLD AUTO: 0.04 10*3/MM3 (ref 0–0.2)
BASOPHILS NFR BLD AUTO: 0.4 % (ref 0–1.5)
BUN SERPL-MCNC: 21 MG/DL (ref 8–23)
BUN/CREAT SERPL: 20.2 (ref 7–25)
CALCIUM SPEC-SCNC: 8.2 MG/DL (ref 8.6–10.5)
CHLORIDE SERPL-SCNC: 107 MMOL/L (ref 98–107)
CO2 SERPL-SCNC: 19 MMOL/L (ref 22–29)
CREAT SERPL-MCNC: 1.04 MG/DL (ref 0.57–1)
DEPRECATED RDW RBC AUTO: 51.9 FL (ref 37–54)
EGFRCR SERPLBLD CKD-EPI 2021: 54.1 ML/MIN/1.73
EOSINOPHIL # BLD AUTO: 0.14 10*3/MM3 (ref 0–0.4)
EOSINOPHIL NFR BLD AUTO: 1.4 % (ref 0.3–6.2)
ERYTHROCYTE [DISTWIDTH] IN BLOOD BY AUTOMATED COUNT: 14.9 % (ref 12.3–15.4)
GLUCOSE SERPL-MCNC: 120 MG/DL (ref 65–99)
HCT VFR BLD AUTO: 30.4 % (ref 34–46.6)
HGB BLD-MCNC: 9.6 G/DL (ref 12–15.9)
IMM GRANULOCYTES # BLD AUTO: 0.13 10*3/MM3 (ref 0–0.05)
IMM GRANULOCYTES NFR BLD AUTO: 1.3 % (ref 0–0.5)
LYMPHOCYTES # BLD AUTO: 1.13 10*3/MM3 (ref 0.7–3.1)
LYMPHOCYTES NFR BLD AUTO: 10.9 % (ref 19.6–45.3)
MCH RBC QN AUTO: 30 PG (ref 26.6–33)
MCHC RBC AUTO-ENTMCNC: 31.6 G/DL (ref 31.5–35.7)
MCV RBC AUTO: 95 FL (ref 79–97)
MONOCYTES # BLD AUTO: 0.89 10*3/MM3 (ref 0.1–0.9)
MONOCYTES NFR BLD AUTO: 8.6 % (ref 5–12)
NEUTROPHILS NFR BLD AUTO: 77.4 % (ref 42.7–76)
NEUTROPHILS NFR BLD AUTO: 8 10*3/MM3 (ref 1.7–7)
NRBC BLD AUTO-RTO: 0.1 /100 WBC (ref 0–0.2)
PHOSPHATE SERPL-MCNC: 3.1 MG/DL (ref 2.5–4.5)
PLATELET # BLD AUTO: 157 10*3/MM3 (ref 140–450)
PMV BLD AUTO: 12.2 FL (ref 6–12)
POTASSIUM SERPL-SCNC: 4.6 MMOL/L (ref 3.5–5.2)
PROT C AG ACT/NOR PPP IA: 121 % (ref 60–150)
RBC # BLD AUTO: 3.2 10*6/MM3 (ref 3.77–5.28)
SODIUM SERPL-SCNC: 138 MMOL/L (ref 136–145)
WBC NRBC COR # BLD: 10.33 10*3/MM3 (ref 3.4–10.8)

## 2022-08-20 PROCEDURE — 85025 COMPLETE CBC W/AUTO DIFF WBC: CPT | Performed by: SURGERY

## 2022-08-20 PROCEDURE — 94664 DEMO&/EVAL PT USE INHALER: CPT

## 2022-08-20 PROCEDURE — 94799 UNLISTED PULMONARY SVC/PX: CPT

## 2022-08-20 PROCEDURE — 63710000001 DIPHENHYDRAMINE PER 50 MG: Performed by: INTERNAL MEDICINE

## 2022-08-20 PROCEDURE — 85730 THROMBOPLASTIN TIME PARTIAL: CPT

## 2022-08-20 PROCEDURE — 25010000002 HYDROMORPHONE PER 4 MG: Performed by: SURGERY

## 2022-08-20 PROCEDURE — 25010000002 NA FERRIC GLUC CPLX PER 12.5 MG: Performed by: INTERNAL MEDICINE

## 2022-08-20 PROCEDURE — 85730 THROMBOPLASTIN TIME PARTIAL: CPT | Performed by: INTERNAL MEDICINE

## 2022-08-20 PROCEDURE — 85730 THROMBOPLASTIN TIME PARTIAL: CPT | Performed by: SURGERY

## 2022-08-20 PROCEDURE — 80069 RENAL FUNCTION PANEL: CPT | Performed by: INTERNAL MEDICINE

## 2022-08-20 PROCEDURE — 99232 SBSQ HOSP IP/OBS MODERATE 35: CPT | Performed by: INTERNAL MEDICINE

## 2022-08-20 PROCEDURE — 97530 THERAPEUTIC ACTIVITIES: CPT

## 2022-08-20 RX ORDER — DABIGATRAN ETEXILATE 150 MG/1
150 CAPSULE ORAL EVERY 12 HOURS SCHEDULED
Status: DISCONTINUED | OUTPATIENT
Start: 2022-08-20 | End: 2022-08-23 | Stop reason: HOSPADM

## 2022-08-20 RX ADMIN — Medication 10 ML: at 20:30

## 2022-08-20 RX ADMIN — PANTOPRAZOLE SODIUM 40 MG: 40 TABLET, DELAYED RELEASE ORAL at 06:33

## 2022-08-20 RX ADMIN — BUDESONIDE AND FORMOTEROL FUMARATE DIHYDRATE 2 PUFF: 80; 4.5 AEROSOL RESPIRATORY (INHALATION) at 07:36

## 2022-08-20 RX ADMIN — HYDROCODONE BITARTRATE AND ACETAMINOPHEN 1 TABLET: 5; 325 TABLET ORAL at 10:22

## 2022-08-20 RX ADMIN — SODIUM CHLORIDE 250 MG: 9 INJECTION, SOLUTION INTRAVENOUS at 10:20

## 2022-08-20 RX ADMIN — Medication 1 MG: at 08:11

## 2022-08-20 RX ADMIN — HYDROMORPHONE HYDROCHLORIDE 0.5 MG: 1 INJECTION, SOLUTION INTRAMUSCULAR; INTRAVENOUS; SUBCUTANEOUS at 20:46

## 2022-08-20 RX ADMIN — ACETAMINOPHEN 650 MG: 325 TABLET, FILM COATED ORAL at 08:11

## 2022-08-20 RX ADMIN — FUROSEMIDE 20 MG: 20 TABLET ORAL at 08:11

## 2022-08-20 RX ADMIN — POLYETHYLENE GLYCOL 3350 17 G: 17 POWDER, FOR SOLUTION ORAL at 08:11

## 2022-08-20 RX ADMIN — HYDROCODONE BITARTRATE AND ACETAMINOPHEN 1 TABLET: 5; 325 TABLET ORAL at 18:13

## 2022-08-20 RX ADMIN — DIPHENHYDRAMINE HYDROCHLORIDE 25 MG: 25 CAPSULE ORAL at 08:11

## 2022-08-20 RX ADMIN — Medication 10 ML: at 10:20

## 2022-08-20 RX ADMIN — DABIGATRAN ETEXILATE MESYLATE 150 MG: 150 CAPSULE ORAL at 20:30

## 2022-08-20 RX ADMIN — BUDESONIDE AND FORMOTEROL FUMARATE DIHYDRATE 2 PUFF: 80; 4.5 AEROSOL RESPIRATORY (INHALATION) at 21:22

## 2022-08-20 NOTE — PLAN OF CARE
Goal Outcome Evaluation:  Plan of Care Reviewed With: patient        Progress: improving  Outcome Evaluation: VSS, pain treated with PRN meds. Heparin gtt infusing.

## 2022-08-20 NOTE — PLAN OF CARE
Goal Outcome Evaluation:  Plan of Care Reviewed With: patient        Progress: improving  Outcome Evaluation: Pt showing improvement w/ ambulation tolerance this date. Pt able to ambulate to just outside of her door and back to bedside chair w/ CG-SBA and use of RW. Pt demoing SOB, however once closer to bed to apply oxygen monitor, pt stating at 96% while on RA and returned to 100% once sitting in chair. Pt was given cues for PLB which seemed to help. Pt fatigued at end of gait and having increased R hip pain, no LLE pain. Pt cont to benefit most from SNF upon DC to further progress strength and endurance.      Patient was intermittently wearing a face mask during this therapy encounter. Therapist used appropriate personal protective equipment including eye protection, mask, and gloves.  Mask used was standard procedure mask. Appropriate PPE was worn during the entire therapy session. Hand hygiene was completed before and after therapy session. Patient is not in enhanced droplet precautions.

## 2022-08-20 NOTE — PROGRESS NOTES
Subjective     CHIEF COMPLAINT:     Recurrent lower extremity deep vein thrombosis    INTERVAL HISTORY:     Patient reports improvement in the pain in the left leg.  She has occasional pain in the right leg.  No problem with bleeding.  However, PTT was significantly elevated and heparin had to be held.    REVIEW OF SYSTEMS:  Review of systems is significant for the following.  Rest of ROS is negative.    CVS: No chest pain.  RESP: No shortness of breath.  MSK: Pain in the right leg.    SCHEDULED MEDS:  acetaminophen, 650 mg, Oral, Daily   And  diphenhydrAMINE, 25 mg, Oral, Daily  budesonide-formoterol, 2 puff, Inhalation, BID - RT  cyanocobalamin, 1,000 mcg, Intramuscular, Daily  ferric gluconate, 250 mg, Intravenous, Daily  folic acid, 1 mg, Oral, Daily  furosemide, 20 mg, Oral, Daily  pantoprazole, 40 mg, Oral, QAM  polyethylene glycol, 17 g, Oral, Daily  sodium chloride, 10 mL, Intravenous, Q12H      INFUSIONS:  heparin, 15.8 Units/kg/hr, Last Rate: Stopped (08/20/22 0423)  lactated ringers, 9 mL/hr, Last Rate: Stopped (08/17/22 1015)      Objective   VITAL SIGNS:  Temp:  [97.3 °F (36.3 °C)-98.8 °F (37.1 °C)] 97.3 °F (36.3 °C)  Heart Rate:  [55-70] 62  Resp:  [16-18] 18  BP: (121-150)/(54-85) 144/66     PHYSICAL EXAMINATION:    GENERAL:  The patient appears in fair general condition, not in acute distress.  SKIN: No skin rash. No ecchymosis.   HEAD:  Normocephalic.  EYES: Pallor.  No jaundice.  NECK:  Supple. No Masses.  CHEST: Normal respiratory effort.  CARDIAC:  Normal S1 & S2. No murmur.   ABDOMEN: Nondistended.  EXTREMITIES: Improvement in the left calf tenderness.  Left lower extremity in Ace bandage.  No right calf tenderness, erythema or warmth.  PSYCH: Normal mood and affect.     RESULT REVIEW:   Results from last 7 days   Lab Units 08/20/22  0252 08/19/22  0115 08/18/22  0324 08/17/22  0850 08/16/22  1351   WBC 10*3/mm3 10.33 9.31 9.53 11.19* 12.51*   NEUTROS ABS 10*3/mm3 8.00* 7.00 7.33* 8.64* 10.39*    HEMOGLOBIN g/dL 9.6* 9.4* 10.1* 12.5 13.2   HEMATOCRIT % 30.4* 28.5* 31.4* 38.0 40.6   PLATELETS 10*3/mm3 157 142 126* 149 192     Results from last 7 days   Lab Units 08/20/22  0252 08/19/22  0115 08/18/22  0324 08/17/22 2027 08/17/22  0850 08/16/22  1632   SODIUM mmol/L 138 137 135* 137 137 133*   POTASSIUM mmol/L 4.6 4.1 4.3 4.5 5.1 6.0*   CHLORIDE mmol/L 107 105 103 103 100 101   CO2 mmol/L 19.0* 21.8* 18.8* 21.8* 24.5 16.0*   BUN mg/dL 21 24* 34* 37* 44* 45*   CREATININE mg/dL 1.04* 1.22* 1.47* 1.49* 1.45* 2.01*   CALCIUM mg/dL 8.2* 8.5* 7.9* 8.2* 8.9 9.5   ALBUMIN g/dL 3.00* 3.10* 3.20* 3.50  --  4.00   BILIRUBIN mg/dL  --   --  0.3 0.5  --  0.7   ALK PHOS U/L  --   --  49 51  --  60   ALT (SGPT) U/L  --   --  <5 5  --  6   AST (SGOT) U/L  --   --  13 14  --  13   MAGNESIUM mg/dL  --  2.2  --   --   --   --      Results from last 7 days   Lab Units 08/20/22  0748 08/20/22  0252 08/19/22  1802 08/19/22  0838 08/19/22  0115 08/17/22  0055 08/16/22  1632   INR   --   --   --   --   --   --  1.18*   APTT seconds 36.3* 118.6* >200.0* 67.3* 62.9*   < > 21.6*    < > = values in this interval not displayed.     Component      Latest Ref Rng & Units 8/18/2022   Factor V Leiden      Normal Normal   Factor II, DNA Analysis      Normal Normal     Component      Latest Ref Rng & Units 8/18/2022   Beta-2 Glyco 1 IgG      0 - 20 GPI IgG units <9   Beta-2 Glyco 1 IgA      0 - 25 GPI IgA units <9   Beta-2 Glyco 1 IgM      0 - 32 GPI IgM units <9   Anticardiolipin IgG      0 - 14 GPL U/mL <9   Anticardiolipin IgM      0 - 12 MPL U/mL 9     Component      Latest Ref Rng & Units 8/18/2022   Protein C Activity      86 - 163 % 122   Protein S Functional      70 - 127 % 39 (L)   Protein S Ag, Free      49.0 - 138.0 % 73.0     Assessment & Plan   *Acute extensive left lower extremity DVT in a patient with prior history of bilateral lower extremity DVT as below.  · Patient developed left foot pain and was unable to walk.  · The  skin color changed and the foot became cold.  · She developed phlegmasia serial ambulance.  · She was seen by vascular surgery and taken to the OR earlier today.  · She underwent thrombectomy and angioplasty with placement of stent in the left iliac vein.  · She is on IV heparin drip.  · Thrombophilia work-up was obtained.  · Testing for factor V Leiden and prothrombin mutation was negative.  · Protein S was 39%-consumption versus deficiency - will be repeated in the future.  · Anticardiolipin and B2 glycoprotein antibodies are negative. Lupus anticoagulant is pending.  · With 2 antiphospholipid antibodies negative, antiphospholipid syndrome less likely.   · Therefore, a DOAC is an option for the patient.     *Deep vein thrombosis in the left popliteal, posterior tibial, peroneal and soleal veins diagnosed on 4/12/2022.  · No clear precipitating factor.  · Patient noticed swelling and some redness of the leg.  · The DVT was diagnosed when she had venous Doppler to reevaluate the right lower extremity DVT.  · She was placed on Eliquis.  · She was having nausea after taking Eliquis.  · She was therefore switched to Xarelto 20 mg daily on 4/29/2022.  · Venous Doppler on 5/20/2022 revealed minimal chronic DVT in the left popliteal vein.  The DVT is in the posterior tibial, peroneal and soleal veins resolved.  · CT chest abdomen pelvis on 6/21/2022 showed no evidence of underlying malignancy to explain the DVT.  · CT on 8/11/2022 and 8/16/2022 showed no evidence of malignancy to explain the recurrent thrombosis.     *History of right lower extremity DVT diagnosed on 4/1/2021.  · This was a provoked episode that developed after surgery to place rib plates.  · Patient was placed on Xarelto.  · Venous Doppler on 4/12/2022 showed resolution of the right lower extremity DVT.     *Iron deficiency anemia.    · Hemoglobin decreased to 9.4 on 8/19/2022.  · Ferrlecit 250 mg daily was started on 8/19/2022.    Vitamin B12 deficiency  anemia.  · Vitamin B12 was 266 on 9/24/2019.  · B12 was low normal at 229.  · Patient was started on vitamin B12 IM on 8/19/2022.    Folate deficiency anemia.  · Folate was low at 3.89.  · Patient was started on folic acid 1 mg daily.    PLAN:     1.  Dose 2 of Ferrlecit today.  2.  Hold vitamin B12 IM for now due to PTT being significantly elevated..  3.  Plan to switch to Pradaxa tonight.  4.  Patient will need lifelong anticoagulation.    Discussed with RN.      Lita Dubon MD  08/20/22

## 2022-08-20 NOTE — THERAPY TREATMENT NOTE
Patient Name: Luann Frances  : 1940    MRN: [id number]                              Today's Date: 2022       Admit Date: 2022    Visit Dx:     ICD-10-CM ICD-9-CM   1. Myositis of left lower extremity, unspecified myositis type  M60.9 729.1   2. Acute deep vein thrombosis (DVT) of distal vein of left lower extremity ([facility])  I82.4Z2 453.42   3. Acute deep vein thrombosis (DVT) of femoral vein of left lower extremity ([facility])  I82.412 453.41     Patient Active Problem List   Diagnosis   • Atopic rhinitis   • Chronic obstructive pulmonary disease ([facility])   • Diverticulitis of colon   • Acid reflux   • Fatigue   • Primary hypothyroidism   • Insomnia   • Knee pain   • Pain of lower extremity   • Chronic nausea   • Shoulder pain   • Ventricular premature beats   • Weight gain   • Cold intolerance   • Post herpetic neuralgia   • Insulin resistance   • Vitamin D deficiency   • CKD (chronic kidney disease) stage 3, GFR 30-59 ml/min (CMS/[facility])   • Mixed hyperlipidemia   • Hypersomnia   • Hyperuricemia   • Essential hypertension   • DELROY (obstructive sleep apnea)   • Renal calculus   • Multiple closed fractures of ribs of right side   • Non-cardiac chest pain   • Compression fracture of body of thoracic vertebra ([facility])   • Arm skin lesion, right   • DVT, lower extremity, distal, acute, left ([facility])   • Lower extremity edema   • Skin tear of right lower leg without complication   • Medicare annual wellness visit, subsequent   • Tobacco abuse   • Osteoporosis   • Myositis of left lower extremity   • Acute deep vein thrombosis (DVT) of femoral vein of left lower extremity ([facility])   • 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[facility])     Past Medical History:   Diagnosis Date   • Acute deep vein thrombosis (DVT) of femoral vein of right lower extremity ([facility]) 04/15/2021   • Arthritis    • Asymptomatic PVCs    • Backache    • Chronic bronchitis ([facility])    • CKD (chronic kidney disease)     Stage 3   • Deep vein thrombosis  (DVT) of right lower extremity (HCC)    • Essential hypertension 01/20/2020   • Fracture of humerus    • GERD (gastroesophageal reflux disease)    • Hyperlipidemia    • Hypertension    • Hypothyroidism    • Pneumonia    • Pulmonary emphysema (HCC)    • Renal calculi    • Renal calculus 01/06/2021   • Sleep apnea     DOES NOT USE CPAP   • Thoracic vertebral fracture (HCC)      Past Surgical History:   Procedure Laterality Date   • APPENDECTOMY     • EXTRACORPOREAL SHOCK WAVE LITHOTRIPSY (ESWL) Left 01/06/2021    Procedure: EXTRACORPOREAL SHOCKWAVE LITHOTRIPSY, CYSTOSCOPY, RETROGRADE PYLEOGRAM;  Surgeon: Walter Schwartz MD;  Location: [facility] OR [facility];  Service: Urology;  Laterality: Left;   • EYE SURGERY      cataracts   • HUMERUS FRACTURE SURGERY     • LYTIC THROMBIN THERAPY Left 8/17/2022    Procedure: LT. LEG THROMBECTOMY/EMBOLECTOMY AND ANGIOPLASTY STENT PLACEMENT OF LEFT ILIAC VEIN;  Surgeon: Theo Bustos MD;  [facility];  Service: Vascular;  Laterality: Left;   • RIB FRACTURE SURGERY  2021   • THORACOSCOPY Right 02/16/2021    Procedure: bronchoscopy, right video assisted THORACOSCOPY WITH PLATING OF 3, 4, 5, AND 6 RIBS;  Surgeon: Kelley Delong MD;  Location: [facility] MAIN OR;  Service: Thoracic;  Laterality: Right;   • TOTAL KNEE ARTHROPLASTY Left 04/2015   • UMBILICAL HERNIA REPAIR        General Information     Row Name 08/20/22 1158          Physical Therapy Time and Intention    Document Type therapy note (daily note)  -MG     Mode of Treatment physical therapy  -MG     Row Name 08/20/22 1158          General Information    Patient Profile Reviewed yes  -MG     Existing Precautions/Restrictions fall;oxygen therapy device and L/min  Was on 0.25L in room, stating at 100%. NC removed at beginning and pt stating %.  -MG     Row Name 08/20/22 1158          Cognition    Orientation Status (Cognition) oriented x 4  -MG     Row Name 08/20/22 1158          Safety Issues,  Functional Mobility    Impairments Affecting Function (Mobility) balance;endurance/activity tolerance;strength;pain  -MG     Comment, Safety Issues/Impairments (Mobility) Non-skid socks and gait belt donned.  -MG           User Key  (r) = Recorded By, (t) = Taken By, (c) = Cosigned By    Initials Name Provider Type    Smita Panchal, PT Physical Therapist               Mobility     Row Name 08/20/22 1200          Sit-Stand Transfer    Sit-Stand Barre (Transfers) contact guard;verbal cues  -MG     Assistive Device (Sit-Stand Transfers) walker, front-wheeled  -MG     Comment, (Sit-Stand Transfer) Pt [facility] on PT arrival, requiring CGA for STS to RW. Pt c/o R hip pain throughout session. VCs for hand placement.  -MG     Row Name 08/20/22 1200          Gait/Stairs (Locomotion)    Barre Level (Gait) contact guard;verbal cues;standby assist  -MG     Assistive Device (Gait) walker, front-wheeled  -MG     Distance in Feet (Gait) 20  -MG     Deviations/Abnormal Patterns (Gait) antalgic;moraima decreased;gait speed decreased;festinating/shuffling;weight shifting decreased;stride length decreased  -MG     Bilateral Gait Deviations forward flexed posture;heel strike decreased  -MG     Comment, (Gait/Stairs) Pt amb 20' from chair to just outside of door and back. Pt requiring CG-SBA w/ use of RW. Increased pain in R hip during ambulation, no pain in LLE.  -MG           User Key  (r) = Recorded By, (t) = Taken By, (c) = Cosigned By    Initials Name Provider Type    Smita Panchal, PT Physical Therapist               Obj/Interventions     Row Name 08/20/22 1220          Motor Skills    Therapeutic Exercise ankle;hip;knee  -MG     Row Name 08/20/22 1220          Knee (Therapeutic Exercise)    Knee (Therapeutic Exercise) AROM (active range of motion)  -MG     Knee AROM (Therapeutic Exercise) SLR (straight leg raise);bilateral;3 repetitions  small ROM  -MG     Row Name 08/20/22 1220          Ankle (Therapeutic  Exercise)    Ankle (Therapeutic Exercise) AROM (active range of motion)  -MG     Ankle AROM (Therapeutic Exercise) bilateral;dorsiflexion;plantarflexion;10 repetitions  -MG     Row Name 08/20/22 1220          Balance    Static Sitting Balance modified independence  -MG     Dynamic Sitting Balance modified independence  -MG     Position, Sitting Balance unsupported  EOC  -MG     Static Standing Balance standby assist  -MG     Dynamic Standing Balance standby assist;contact guard  -MG     Position/Device Used, Standing Balance supported;walker, front-wheeled  -MG           User Key  (r) = Recorded By, (t) = Taken By, (c) = Cosigned By    Initials Name Provider Type    MG Smita Zamora, PT Physical Therapist               Goals/Plan    No documentation.                Clinical Impression     Row Name 08/20/22 1222          Pain    Pretreatment Pain Rating 0/10 - no pain  -MG     Posttreatment Pain Rating 5/10  -MG     Pain Location - Side/Orientation Right  -MG     Pain Location - hip  -MG     Pre/Posttreatment Pain Comment Pt denies pain in LLE this session. Pt states all pain in R hip w/ mvmt.  -MG     Pain Intervention(s) Repositioned;Rest;Ambulation/increased activity  -MG     Row Name 08/20/22 1222          Plan of Care Review    Plan of Care Reviewed With patient  -MG     Progress improving  -MG     Outcome Evaluation Pt showing improvement w/ ambulation tolerance this date. Pt able to ambulate to just outside of her door and back to bedside chair w/ CG-SBA and use of RW. Pt demoing SOB, however once closer to bed to apply oxygen monitor, pt stating at 96% while on RA and returned to 100% once sitting in chair. Pt was given cues for PLB which seemed to help. Pt fatigued at end of gait and having increased R hip pain, no LLE pain. Pt cont to benefit most from SNF upon DC to further progress strength and endurance.  -MG     Row Name 08/20/22 1222          Vital Signs    Pre SpO2 (%) 100  -MG     O2 Delivery Pre  Treatment other (see comments)  Pt O2 at ~0.25L via NC, stating at 100%. NC removed while talking w/ pt and remained % for ~3 min.  -MG     Intra SpO2 (%) 96  -MG     O2 Delivery Intra Treatment room air  -MG     Post SpO2 (%) 100  -MG     O2 Delivery Post Treatment room air  -MG     Pre Patient Position Sitting  -MG     Intra Patient Position Standing  -MG     Post Patient Position Sitting  -MG     Row Name 08/20/22 1222          Positioning and Restraints    Pre-Treatment Position sitting in chair/recliner  -MG     Post Treatment Position chair  -MG     In Chair notified nsg;reclined;call light within reach;encouraged to call for assist;exit alarm on  -MG           User Key  (r) = Recorded By, (t) = Taken By, (c) = Cosigned By    Initials Name Provider Type    Smita Panchal PT Physical Therapist               Outcome Measures     Row Name 08/20/22 1229          How much help from another person do you currently need...    Turning from your back to your side while in flat bed without using bedrails? 3  -MG     Moving from lying on back to sitting on the side of a flat bed without bedrails? 3  -MG     Moving to and from a bed to a chair (including a wheelchair)? 3  -MG     Standing up from a chair using your arms (e.g., wheelchair, bedside chair)? 3  -MG     Climbing 3-5 steps with a railing? 1  -MG     To walk in hospital room? 3  -MG     AM-PAC 6 Clicks Score (PT) 16  -MG     Highest level of mobility 5 --> Static standing  -MG     Row Name 08/20/22 1229          Functional Assessment    Outcome Measure Options AM-PAC 6 Clicks Basic Mobility (PT)  -MG           User Key  (r) = Recorded By, (t) = Taken By, (c) = Cosigned By    Initials Name Provider Type    Smita Panchal PT Physical Therapist                             Physical Therapy Education                 Title: PT OT SLP Therapies (Done)     Topic: Physical Therapy (Done)     Point: Mobility training (Done)     Learning Progress Summary            Patient Acceptance, E, VU,NR by  at 8/20/2022 1230    Acceptance, E,TB,D, VU,NR by  at 8/19/2022 1715                   Point: Home exercise program (Done)     Learning Progress Summary           Patient Acceptance, E, VU,NR by  at 8/20/2022 1230                   Point: Body mechanics (Done)     Learning Progress Summary           Patient Acceptance, E, VU,NR by  at 8/20/2022 1230    Acceptance, E,TB,D, VU,NR by  at 8/19/2022 1715                   Point: Precautions (Done)     Learning Progress Summary           Patient Acceptance, E, VU,NR by  at 8/20/2022 1230    Acceptance, E,TB,D, VU,NR by  at 8/19/2022 1715                               User Key     Initials Effective Dates Name Provider Type Discipline     05/24/22 -  Smita Zamora, PT Physical Therapist PT     04/08/22 -  Constance Pace PT Physical Therapist PT              PT Recommendation and Plan     Plan of Care Reviewed With: patient  Progress: improving  Outcome Evaluation: Pt showing improvement w/ ambulation tolerance this date. Pt able to ambulate to just outside of her door and back to bedside chair w/ CG-SBA and use of RW. Pt demoing SOB, however once closer to bed to apply oxygen monitor, pt stating at 96% while on RA and returned to 100% once sitting in chair. Pt was given cues for PLB which seemed to help. Pt fatigued at end of gait and having increased R hip pain, no LLE pain. Pt cont to benefit most from SNF upon DC to further progress strength and endurance.     Time Calculation:    PT Charges     Row Name 08/20/22 1230             Time Calculation    Start Time 1121  -MG      Stop Time 1151  -MG      Time Calculation (min) 30 min  -MG      PT Received On 08/20/22  -MG      PT - Next Appointment 08/22/22  -MG            User Key  (r) = Recorded By, (t) = Taken By, (c) = Cosigned By    Initials Name Provider Type    Smita Panchal, PT Physical Therapist              Therapy Charges for Today     Code  Description Service Date Service Provider Modifiers Qty    [id number]  PT THERAPEUTIC ACT EA 15 MIN 8/20/2022 Smita Zamora, PT GP 2          PT G-Codes  Outcome Measure Options: AM-PAC 6 Clicks Basic Mobility (PT)  AM-PAC 6 Clicks Score (PT): 16    Smita Zamora, PT  8/20/2022

## 2022-08-20 NOTE — PROGRESS NOTES
" LOS: 4 days     Name: Luann Frances  Age: 82 y.o.  Sex: female  :  1940  MRN: [id number]         Primary Care Physician: Dinah Humphrey MD    Subjective   Subjective  No new complaints or acute overnight events.  Denies chest pain or shortness of breath.  Worried that she would not be able to take care of her self at home and would like to go to SNF.    Objective   Vital Signs  Temp:  [97.3 °F (36.3 °C)-98.8 °F (37.1 °C)] 97.4 °F (36.3 °C)  Heart Rate:  [55-70] 69  Resp:  [16-18] 16  BP: (121-158)/(54-85) 158/84  Body mass index is 37.02 kg/m².    Objective:  General Appearance:  Comfortable and in no acute distress (Appears roughly her stated age, weak and deconditioned).    Vital signs: (most recent): Blood pressure 158/84, pulse 69, temperature 97.4 °F (36.3 °C), temperature source Oral, resp. rate 16, height 160 cm (63\"), weight 94.8 kg (209 lb), SpO2 99 %.    Lungs:  Normal effort and normal respiratory rate.    Heart: Normal rate.  Regular rhythm.    Abdomen: Abdomen is soft.  Bowel sounds are normal.   There is no abdominal tenderness.     Extremities: There is no dependent edema or local swelling.    Neurological: Patient is alert and oriented to person, place and time.    Skin:  Warm and dry.              Results Review:       I reviewed the patient's new clinical results.    Results from last 7 days   Lab Units 22  02522  0115 22  0324 22  0850 22  1351   WBC 10*3/mm3 10.33 9.31 9.53 11.19* 12.51*   HEMOGLOBIN g/dL 9.6* 9.4* 10.1* 12.5 13.2   PLATELETS 10*3/mm3 157 142 126* 149 192     Results from last 7 days   Lab Units 22  0252 22  0115 22  0324 22  2027 22  0850 22  1632   SODIUM mmol/L 138 137 135* 137 137 133*   POTASSIUM mmol/L 4.6 4.1 4.3 4.5 5.1 6.0*   CHLORIDE mmol/L 107 105 103 103 100 101   CO2 mmol/L 19.0* 21.8* 18.8* 21.8* 24.5 16.0*   BUN mg/dL 21 24* 34* 37* 44* 45*   CREATININE mg/dL 1.04* 1.22* 1.47* 1.49* " 1.45* 2.01*   CALCIUM mg/dL 8.2* 8.5* 7.9* 8.2* 8.9 9.5   GLUCOSE mg/dL 120* 136* 111* 120* 99 105*     Results from last 7 days   Lab Units 08/16/22  1632   INR  1.18*             Scheduled Meds:   acetaminophen, 650 mg, Oral, Daily   And  diphenhydrAMINE, 25 mg, Oral, Daily  budesonide-formoterol, 2 puff, Inhalation, BID - RT  cyanocobalamin, 1,000 mcg, Intramuscular, Daily  ferric gluconate, 250 mg, Intravenous, Daily  folic acid, 1 mg, Oral, Daily  furosemide, 20 mg, Oral, Daily  pantoprazole, 40 mg, Oral, QAM  polyethylene glycol, 17 g, Oral, Daily  sodium chloride, 10 mL, Intravenous, Q12H      PRN Meds:   •  acetaminophen **OR** acetaminophen **OR** acetaminophen  •  albuterol  •  HYDROcodone-acetaminophen  •  HYDROmorphone **AND** naloxone  •  Morphine **AND** naloxone  •  nitroglycerin  •  nitroglycerin  •  ondansetron  •  ondansetron **OR** ondansetron  •  [COMPLETED] Insert peripheral IV **AND** sodium chloride  •  sodium chloride  Continuous Infusions:  heparin, 15.8 Units/kg/hr, Last Rate: 8.8 Units/kg/hr (08/20/22 0942)  lactated ringers, 9 mL/hr, Last Rate: Stopped (08/17/22 1015)        Assessment & Plan   Active Hospital Problems    Diagnosis  POA   • **Acute deep vein thrombosis (DVT) of femoral vein of left lower extremity ([facility]) [I82.412]  Unknown   • Myositis of left lower extremity [M60.9]  Yes   • 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       Assessment & Plan    This is an 82-year-old female who presented to the hospital with a progressing acute DVT in her left lower extremity failing outpatient direct oral anticoagulant  -Appreciate vascular surgery's assistance and  did undergo intervention 8/17.  She will follow-up in 6 to 8 weeks with a duplex scan in the office at that time.  -Continue heparin drip with plans to transition to Pradaxa.  Timing as per hematology.  -Renal and electrolyte issues have stabilized appreciate nephrology's assistance  -Full code     Disposition  SNF      I wore full protective equipment throughout the patient encounter including eye protection and facemask.  Hand hygiene was performed before donning protective equipment and after removal when leaving the room.    Jed Arevalo MD  [facility]  08/20/22  12:23 EDT

## 2022-08-20 NOTE — PLAN OF CARE
Goal Outcome Evaluation:           Progress: improving  Outcome Evaluation: pt is alert and oriented, room air, 2L o2 nasal cannula placed while sleeping, heparin gtt, PTT high x2, APRN made aware, heparin held and PTT recheck at 0720, no falls, bed alarm on, pt did ambulate in to her bedroom door and back with walker and RN assist, medicated PRN for pain

## 2022-08-21 LAB
ANION GAP SERPL CALCULATED.3IONS-SCNC: 11 MMOL/L (ref 5–15)
BASOPHILS # BLD AUTO: 0.04 10*3/MM3 (ref 0–0.2)
BASOPHILS NFR BLD AUTO: 0.5 % (ref 0–1.5)
BUN SERPL-MCNC: 20 MG/DL (ref 8–23)
BUN/CREAT SERPL: 20.6 (ref 7–25)
CALCIUM SPEC-SCNC: 8.6 MG/DL (ref 8.6–10.5)
CHLORIDE SERPL-SCNC: 106 MMOL/L (ref 98–107)
CO2 SERPL-SCNC: 22 MMOL/L (ref 22–29)
CREAT SERPL-MCNC: 0.97 MG/DL (ref 0.57–1)
DEPRECATED RDW RBC AUTO: 50.8 FL (ref 37–54)
EGFRCR SERPLBLD CKD-EPI 2021: 58.8 ML/MIN/1.73
EOSINOPHIL # BLD AUTO: 0.15 10*3/MM3 (ref 0–0.4)
EOSINOPHIL NFR BLD AUTO: 1.7 % (ref 0.3–6.2)
ERYTHROCYTE [DISTWIDTH] IN BLOOD BY AUTOMATED COUNT: 15.5 % (ref 12.3–15.4)
GLUCOSE SERPL-MCNC: 100 MG/DL (ref 65–99)
HCT VFR BLD AUTO: 29.5 % (ref 34–46.6)
HGB BLD-MCNC: 9.5 G/DL (ref 12–15.9)
IMM GRANULOCYTES # BLD AUTO: 0.16 10*3/MM3 (ref 0–0.05)
IMM GRANULOCYTES NFR BLD AUTO: 1.8 % (ref 0–0.5)
LYMPHOCYTES # BLD AUTO: 1.27 10*3/MM3 (ref 0.7–3.1)
LYMPHOCYTES NFR BLD AUTO: 14.5 % (ref 19.6–45.3)
MCH RBC QN AUTO: 29.9 PG (ref 26.6–33)
MCHC RBC AUTO-ENTMCNC: 32.2 G/DL (ref 31.5–35.7)
MCV RBC AUTO: 92.8 FL (ref 79–97)
MONOCYTES # BLD AUTO: 0.78 10*3/MM3 (ref 0.1–0.9)
MONOCYTES NFR BLD AUTO: 8.9 % (ref 5–12)
NEUTROPHILS NFR BLD AUTO: 6.33 10*3/MM3 (ref 1.7–7)
NEUTROPHILS NFR BLD AUTO: 72.6 % (ref 42.7–76)
NRBC BLD AUTO-RTO: 0.1 /100 WBC (ref 0–0.2)
PLATELET # BLD AUTO: 173 10*3/MM3 (ref 140–450)
PMV BLD AUTO: 11.4 FL (ref 6–12)
POTASSIUM SERPL-SCNC: 4.2 MMOL/L (ref 3.5–5.2)
PROT S AG ACT/NOR PPP IA: 120 % (ref 60–150)
RBC # BLD AUTO: 3.18 10*6/MM3 (ref 3.77–5.28)
SODIUM SERPL-SCNC: 139 MMOL/L (ref 136–145)
WBC NRBC COR # BLD: 8.73 10*3/MM3 (ref 3.4–10.8)

## 2022-08-21 PROCEDURE — 94799 UNLISTED PULMONARY SVC/PX: CPT

## 2022-08-21 PROCEDURE — 99232 SBSQ HOSP IP/OBS MODERATE 35: CPT | Performed by: INTERNAL MEDICINE

## 2022-08-21 PROCEDURE — 94664 DEMO&/EVAL PT USE INHALER: CPT

## 2022-08-21 PROCEDURE — 63710000001 DIPHENHYDRAMINE PER 50 MG: Performed by: INTERNAL MEDICINE

## 2022-08-21 PROCEDURE — 80048 BASIC METABOLIC PNL TOTAL CA: CPT | Performed by: INTERNAL MEDICINE

## 2022-08-21 PROCEDURE — 85025 COMPLETE CBC W/AUTO DIFF WBC: CPT | Performed by: SURGERY

## 2022-08-21 PROCEDURE — 25010000002 NA FERRIC GLUC CPLX PER 12.5 MG: Performed by: INTERNAL MEDICINE

## 2022-08-21 PROCEDURE — 25010000002 CYANOCOBALAMIN PER 1000 MCG: Performed by: INTERNAL MEDICINE

## 2022-08-21 PROCEDURE — 94761 N-INVAS EAR/PLS OXIMETRY MLT: CPT

## 2022-08-21 RX ORDER — CYANOCOBALAMIN 1000 UG/ML
1000 INJECTION, SOLUTION INTRAMUSCULAR; SUBCUTANEOUS DAILY
Status: COMPLETED | OUTPATIENT
Start: 2022-08-22 | End: 2022-08-22

## 2022-08-21 RX ORDER — CHOLECALCIFEROL (VITAMIN D3) 125 MCG
5 CAPSULE ORAL NIGHTLY PRN
Status: DISCONTINUED | OUTPATIENT
Start: 2022-08-21 | End: 2022-08-23 | Stop reason: HOSPADM

## 2022-08-21 RX ADMIN — BUDESONIDE AND FORMOTEROL FUMARATE DIHYDRATE 2 PUFF: 80; 4.5 AEROSOL RESPIRATORY (INHALATION) at 19:45

## 2022-08-21 RX ADMIN — Medication 10 ML: at 08:11

## 2022-08-21 RX ADMIN — Medication 1 MG: at 08:10

## 2022-08-21 RX ADMIN — PANTOPRAZOLE SODIUM 40 MG: 40 TABLET, DELAYED RELEASE ORAL at 05:12

## 2022-08-21 RX ADMIN — HYDROCODONE BITARTRATE AND ACETAMINOPHEN 1 TABLET: 5; 325 TABLET ORAL at 05:13

## 2022-08-21 RX ADMIN — ACETAMINOPHEN 650 MG: 325 TABLET, FILM COATED ORAL at 08:10

## 2022-08-21 RX ADMIN — DABIGATRAN ETEXILATE MESYLATE 150 MG: 150 CAPSULE ORAL at 20:40

## 2022-08-21 RX ADMIN — HYDROCODONE BITARTRATE AND ACETAMINOPHEN 1 TABLET: 5; 325 TABLET ORAL at 20:46

## 2022-08-21 RX ADMIN — DIPHENHYDRAMINE HYDROCHLORIDE 25 MG: 25 CAPSULE ORAL at 08:10

## 2022-08-21 RX ADMIN — BUDESONIDE AND FORMOTEROL FUMARATE DIHYDRATE 2 PUFF: 80; 4.5 AEROSOL RESPIRATORY (INHALATION) at 07:53

## 2022-08-21 RX ADMIN — DABIGATRAN ETEXILATE MESYLATE 150 MG: 150 CAPSULE ORAL at 08:10

## 2022-08-21 RX ADMIN — Medication 10 ML: at 20:40

## 2022-08-21 RX ADMIN — SODIUM CHLORIDE 250 MG: 9 INJECTION, SOLUTION INTRAVENOUS at 08:11

## 2022-08-21 RX ADMIN — FUROSEMIDE 20 MG: 20 TABLET ORAL at 08:10

## 2022-08-21 RX ADMIN — Medication 5 MG: at 23:56

## 2022-08-21 RX ADMIN — POLYETHYLENE GLYCOL 3350 17 G: 17 POWDER, FOR SOLUTION ORAL at 08:10

## 2022-08-21 RX ADMIN — CYANOCOBALAMIN 1000 MCG: 1000 INJECTION, SOLUTION INTRAMUSCULAR; SUBCUTANEOUS at 08:10

## 2022-08-21 NOTE — PROGRESS NOTES
Subjective     CHIEF COMPLAINT:     Recurrent lower extremity deep vein thrombosis    INTERVAL HISTORY:     Patient reports improvement of the pain in the left leg. She reports more bruises over the upper extremities.     REVIEW OF SYSTEMS:  Review of systems is significant for the following.  Rest of ROS is negative.    CVS: No chest pain.  HEME: Easy bruising.  MSK: Pain in the right leg has improved.    SCHEDULED MEDS:  budesonide-formoterol, 2 puff, Inhalation, BID - RT  cyanocobalamin, 1,000 mcg, Intramuscular, Daily  dabigatran etexilate, 150 mg, Oral, Q12H  ferric gluconate, 250 mg, Intravenous, Daily  folic acid, 1 mg, Oral, Daily  furosemide, 20 mg, Oral, Daily  pantoprazole, 40 mg, Oral, QAM  polyethylene glycol, 17 g, Oral, Daily  sodium chloride, 10 mL, Intravenous, Q12H      Objective   VITAL SIGNS:  Temp:  [97.3 °F (36.3 °C)-98.7 °F (37.1 °C)] 98.2 °F (36.8 °C)  Heart Rate:  [55-72] 58  Resp:  [16-18] 18  BP: (125-158)/(54-84) 136/70     PHYSICAL EXAMINATION:    GENERAL:  The patient appears in fair general condition, not in acute distress.  SKIN: Ecchymosis over the left forearm > right forearm.  HEAD:  Normocephalic.  EYES: Pallor. No jaundice.  NECK:  Supple. No Masses.  CHEST: Normal respiratory effort.  ABDOMEN: Non-distended.  EXTREMITIES: Decrease in the left leg swelling and tenderness.  PSYCH: Normal mood and affect.     RESULT REVIEW:   Results from last 7 days   Lab Units 08/21/22  0535 08/20/22  0252 08/19/22  0115 08/18/22  0324 08/17/22  0850   WBC 10*3/mm3 8.73 10.33 9.31 9.53 11.19*   NEUTROS ABS 10*3/mm3 6.33 8.00* 7.00 7.33* 8.64*   HEMOGLOBIN g/dL 9.5* 9.6* 9.4* 10.1* 12.5   HEMATOCRIT % 29.5* 30.4* 28.5* 31.4* 38.0   PLATELETS 10*3/mm3 173 157 142 126* 149     Results from last 7 days   Lab Units 08/21/22  0535 08/20/22  0252 08/19/22  0115 08/18/22  0324 08/17/22  2027 08/17/22  0850 08/16/22  1632   SODIUM mmol/L 139 138 137 135* 137   < > 133*   POTASSIUM mmol/L 4.2 4.6 4.1 4.3  4.5   < > 6.0*   CHLORIDE mmol/L 106 107 105 103 103   < > 101   CO2 mmol/L 22.0 19.0* 21.8* 18.8* 21.8*   < > 16.0*   BUN mg/dL 20 21 24* 34* 37*   < > 45*   CREATININE mg/dL 0.97 1.04* 1.22* 1.47* 1.49*   < > 2.01*   CALCIUM mg/dL 8.6 8.2* 8.5* 7.9* 8.2*   < > 9.5   ALBUMIN g/dL  --  3.00* 3.10* 3.20* 3.50  --  4.00   BILIRUBIN mg/dL  --   --   --  0.3 0.5  --  0.7   ALK PHOS U/L  --   --   --  49 51  --  60   ALT (SGPT) U/L  --   --   --  <5 5  --  6   AST (SGOT) U/L  --   --   --  13 14  --  13   MAGNESIUM mg/dL  --   --  2.2  --   --   --   --     < > = values in this interval not displayed.     Component      Latest Ref Rng & Units 8/18/2022   Beta-2 Glyco 1 IgG      0 - 20 GPI IgG units <9   Beta-2 Glyco 1 IgA      0 - 25 GPI IgA units <9   Beta-2 Glyco 1 IgM      0 - 32 GPI IgM units <9   Anticardiolipin IgG      0 - 14 GPL U/mL <9   Anticardiolipin IgM      0 - 12 MPL U/mL 9     Assessment & Plan   *Acute extensive left lower extremity DVT in a patient with prior history of bilateral lower extremity DVT as below.  · Patient developed left foot pain and was unable to walk.  · The skin color changed and the foot became cold.  · She developed phlegmasia serial ambulance.  · She was seen by vascular surgery and taken to the OR earlier today.  · She underwent thrombectomy and angioplasty with placement of stent in the left iliac vein.  · Was initially placed on IV heparin.  · Thrombophilia work-up was obtained.  · Testing for factor V Leiden and prothrombin mutation was negative.  · Protein S was 39%-consumption versus deficiency - will be repeated in the future.  · Anticardiolipin and B2 glycoprotein antibodies are negative. Lupus anticoagulant is pending.  · With 2 antiphospholipid antibodies negative, antiphospholipid syndrome less likely.   · On 8/20/2022, patient was switched to Pradaxa 150 mg twice daily.  · She is tolerating Pradaxa. Pain in the left leg is improving.     *Deep vein thrombosis in the left  popliteal, posterior tibial, peroneal and soleal veins diagnosed on 4/12/2022.  · No clear precipitating factor.  · Patient noticed swelling and some redness of the leg.  · The DVT was diagnosed when she had venous Doppler to reevaluate the right lower extremity DVT.  · She was placed on Eliquis.  · She was having nausea after taking Eliquis.  · She was therefore switched to Xarelto 20 mg daily on 4/29/2022.  · Venous Doppler on 5/20/2022 revealed minimal chronic DVT in the left popliteal vein.  The DVT is in the posterior tibial, peroneal and soleal veins resolved.  · CT chest abdomen pelvis on 6/21/2022 showed no evidence of underlying malignancy to explain the DVT.  · CT on 8/11/2022 and 8/16/2022 showed no evidence of malignancy to explain the recurrent thrombosis.     *History of right lower extremity DVT diagnosed on 4/1/2021.  · This was a provoked episode that developed after surgery to place rib plates.  · Patient was placed on Xarelto.  · Venous Doppler on 4/12/2022 showed resolution of the right lower extremity DVT.  · Exam of the RLE is unremarkable today.     *Iron deficiency anemia.    · Hemoglobin decreased to 9.4 on 8/19/2022.  · Ferrlecit 250 mg daily was started on 8/19/2022.  · Hemoglobin is 9.5 today.    Vitamin B12 deficiency anemia.  · Vitamin B12 was 266 on 9/24/2019.  · B12 was low normal at 229.  · Patient was started on vitamin B12 IM on 8/19/2022.    Folate deficiency anemia.  · Folate was low at 3.89.  · Patient was started on folic acid 1 mg daily.    PLAN:     1.  Dose #3 of Ferrlecit today.  2.  We will give vitamin B12 IM x 2 more doses. Start PO vitamin B12 tomorrow.  3.  Continue Pradaxa.  4.  I explained to the patient that she will need lifelong anticoagulation.      We will see the patient in follow up at our office in 2-3 weeks.   I will sign off. Please call if questions arise.       Lita Dubon MD  08/21/22

## 2022-08-21 NOTE — PLAN OF CARE
Goal Outcome Evaluation:  Plan of Care Reviewed With: patient        Progress: improving  Outcome Evaluation: VSS, pain treated with PRN meds. PO pradaxa given.

## 2022-08-21 NOTE — PLAN OF CARE
Goal Outcome Evaluation:  Plan of Care Reviewed With: patient        Progress: improving  Outcome Evaluation: patient vss. pain well controlled. patient switched to pradaxa this shift. likely to rehab soon

## 2022-08-21 NOTE — PROGRESS NOTES
[facility] Progress Note      Patient Name: Luann Frances  : 1940  MRN: [id number]  Primary Care Physician:  Dinah Humphrey MD  Date of admission: 2022    Subjective     Interval History:   No shortness of breath or CP  Appetite fair; no N/V  Was walking earlier today in the room; was not dizzy    Review of Systems:   As noted above    Objective     Vitals:   Temp:  [97.3 °F (36.3 °C)-98.7 °F (37.1 °C)] 98.1 °F (36.7 °C)  Heart Rate:  [55-72] 67  Resp:  [16-18] 18  BP: (121-158)/(54-84) 141/79  Flow (L/min):  [2] 2     Intake/Output Summary (Last 24 hours) at 2022 2233  Last data filed at 2022 1823  Gross per 24 hour   Intake 780 ml   Output 1600 ml   Net -820 ml       Physical Exam:    Constitutional: Awake, alert, NAD, chronically ill, overweight  HEENT: Sclera anicteric, no conjunctival injection  Neck: Supple, no carotid bruit, trachea at midline, no JVD  Respiratory: Coarse breath sounds, nonlabored  Cardiovascular: RRR with ectopy, no rub  Gastrointestinal: + BS, soft, nontender and nondistended  : No palpable bladder  Musc-skel: +1 edema left leg, which is wrapped; trace edema right; +clubbing  Psychiatric: Appropriate affect, cooperative, oriented  Neurologic:  moving all extremities, normal speech and mental status  Skin: Warm and dry     Scheduled Meds:     acetaminophen, 650 mg, Oral, Daily   And  diphenhydrAMINE, 25 mg, Oral, Daily  budesonide-formoterol, 2 puff, Inhalation, BID - RT  cyanocobalamin, 1,000 mcg, Intramuscular, Daily  dabigatran etexilate, 150 mg, Oral, Q12H  ferric gluconate, 250 mg, Intravenous, Daily  folic acid, 1 mg, Oral, Daily  furosemide, 20 mg, Oral, Daily  pantoprazole, 40 mg, Oral, QAM  polyethylene glycol, 17 g, Oral, Daily  sodium chloride, 10 mL, Intravenous, Q12H      IV Meds:   lactated ringers, 9 mL/hr, Last Rate: Stopped (22 1015)        Results Reviewed:   I have personally reviewed the results from the  time of this admission to 8/20/2022 22:33 EDT     Results from last 7 days   Lab Units 08/20/22  0252 08/19/22  0115 08/18/22  0324 08/17/22 2027 08/17/22  0850 08/16/22  1632   SODIUM mmol/L 138 137 135* 137   < > 133*   POTASSIUM mmol/L 4.6 4.1 4.3 4.5   < > 6.0*   CHLORIDE mmol/L 107 105 103 103   < > 101   CO2 mmol/L 19.0* 21.8* 18.8* 21.8*   < > 16.0*   BUN mg/dL 21 24* 34* 37*   < > 45*   CREATININE mg/dL 1.04* 1.22* 1.47* 1.49*   < > 2.01*   CALCIUM mg/dL 8.2* 8.5* 7.9* 8.2*   < > 9.5   BILIRUBIN mg/dL  --   --  0.3 0.5  --  0.7   ALK PHOS U/L  --   --  49 51  --  60   ALT (SGPT) U/L  --   --  <5 5  --  6   AST (SGOT) U/L  --   --  13 14  --  13   GLUCOSE mg/dL 120* 136* 111* 120*   < > 105*    < > = values in this interval not displayed.       Estimated Creatinine Clearance: 46.5 mL/min (A) (by C-G formula based on SCr of 1.04 mg/dL (H)).    Results from last 7 days   Lab Units 08/20/22 0252 08/19/22  0115   MAGNESIUM mg/dL  --  2.2   PHOSPHORUS mg/dL 3.1 2.4*             Results from last 7 days   Lab Units 08/20/22  0252 08/19/22  0115 08/18/22 0324 08/17/22  0850 08/16/22  1351   WBC 10*3/mm3 10.33 9.31 9.53 11.19* 12.51*   HEMOGLOBIN g/dL 9.6* 9.4* 10.1* 12.5 13.2   PLATELETS 10*3/mm3 157 142 126* 149 192       Results from last 7 days   Lab Units 08/16/22  1632   INR  1.18*       Assessment / Plan     ASSESSMENT:  1.  MAILE on CKD2, nonoliguric, resolved and back to baseline.  Central volume fine; discrepant edema.  Electrolytes fine other than likely NAGMA.  Urine with only 30 mg/dL protein, and random urine ratio just 236 mg/g  2.  Extensive DVT left leg despite anticoagulation for prior bilateral DVTs, s/p thrombectomy and angioplasty left leg 8/17.  Hematology following and hypercoagulable work-up underway  3.  COPD with active tobacco abuse  4.  Obesity  5.  Hypertension, with acceptable control for now    PLAN:  1.  Continue home dose of furosemide 20 mg daily  2.  Will sign off, but please call  if any questions    Thank you for involving us in the care of Luann Frances.  Please feel free to call with any questions.    Sidney Brown MD  08/20/22  22:33 EDT    [facility]  [phone]      Much of this encounter note is an electronic transcription/translation of spoken language to printed text. The electronic translation of spoken language may permit erroneous, or at times, nonsensical words or phrases to be inadvertently transcribed; Although I have reviewed the note for such errors, some may still exist.

## 2022-08-21 NOTE — PROGRESS NOTES
" LOS: 5 days     Name: Luann Frances  Age: 82 y.o.  Sex: female  :  1940  MRN: [id number]         Primary Care Physician: Dinah Humphrey MD    Subjective   Subjective  States that this morning with breakfast she had a little difficulty swallowing solid food.  Did fine with liquids and medications in applesauce.  Otherwise no new complaints or acute overnight events.    Objective   Vital Signs  Temp:  [97.3 °F (36.3 °C)-98.7 °F (37.1 °C)] 97.6 °F (36.4 °C)  Heart Rate:  [55-72] 60  Resp:  [16-18] 16  BP: (125-141)/(54-79) 137/68  Body mass index is 37.02 kg/m².    Objective:  General Appearance:  Comfortable and in no acute distress (Chronically ill-appearing, looks weak and deconditioned).    Vital signs: (most recent): Blood pressure 137/68, pulse 60, temperature 97.6 °F (36.4 °C), temperature source Oral, resp. rate 16, height 160 cm (63\"), weight 94.8 kg (209 lb), SpO2 98 %.    Lungs:  Normal effort and normal respiratory rate.  She is not in respiratory distress.  There are decreased breath sounds.    Heart: Normal rate.  Regular rhythm.    Abdomen: Abdomen is soft.  Bowel sounds are normal.   There is no abdominal tenderness.     Extremities: There is no dependent edema or local swelling.    Neurological: Patient is alert and oriented to person, place and time.    Skin:  Warm and dry.              Results Review:       I reviewed the patient's new clinical results.    Results from last 7 days   Lab Units 22  0535 22  0252 22  0115 22  0324 22  0850 22  1351   WBC 10*3/mm3 8.73 10.33 9.31 9.53 11.19* 12.51*   HEMOGLOBIN g/dL 9.5* 9.6* 9.4* 10.1* 12.5 13.2   PLATELETS 10*3/mm3 173 157 142 126* 149 192     Results from last 7 days   Lab Units 22  0535 22  0252 22  0115 22  0324 227 22  0850 22  1632   SODIUM mmol/L 139 138 137 135* 137 137 133*   POTASSIUM mmol/L 4.2 4.6 4.1 4.3 4.5 5.1 6.0*   CHLORIDE mmol/L 106 107 " 105 103 103 100 101   CO2 mmol/L 22.0 19.0* 21.8* 18.8* 21.8* 24.5 16.0*   BUN mg/dL 20 21 24* 34* 37* 44* 45*   CREATININE mg/dL 0.97 1.04* 1.22* 1.47* 1.49* 1.45* 2.01*   CALCIUM mg/dL 8.6 8.2* 8.5* 7.9* 8.2* 8.9 9.5   GLUCOSE mg/dL 100* 120* 136* 111* 120* 99 105*     Results from last 7 days   Lab Units 08/16/22  1632   INR  1.18*       Scheduled Meds:   budesonide-formoterol, 2 puff, Inhalation, BID - RT  cyanocobalamin, 1,000 mcg, Intramuscular, Daily  dabigatran etexilate, 150 mg, Oral, Q12H  folic acid, 1 mg, Oral, Daily  furosemide, 20 mg, Oral, Daily  pantoprazole, 40 mg, Oral, QAM  polyethylene glycol, 17 g, Oral, Daily  sodium chloride, 10 mL, Intravenous, Q12H      PRN Meds:   •  acetaminophen **OR** acetaminophen **OR** acetaminophen  •  albuterol  •  HYDROcodone-acetaminophen  •  HYDROmorphone **AND** naloxone  •  Morphine **AND** naloxone  •  nitroglycerin  •  nitroglycerin  •  ondansetron  •  ondansetron **OR** ondansetron  •  [COMPLETED] Insert peripheral IV **AND** sodium chloride  •  sodium chloride  Continuous Infusions:  lactated ringers, 9 mL/hr, Last Rate: Stopped (08/17/22 1015)        Assessment & Plan   Active Hospital Problems    Diagnosis  POA   • **Acute deep vein thrombosis (DVT) of femoral vein of left lower extremity ([facility]) [I82.412]  Unknown   • Myositis of left lower extremity [M60.9]  Yes   • 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       Assessment & Plan    This is an 82-year-old female who presented to the hospital with a progressing acute DVT in her left lower extremity failing outpatient direct oral anticoagulant  -Appreciate vascular surgery's  assistance and did undergo intervention 8/17.  She will follow-up in 6 to 8 weeks with a duplex scan in the office at that time.  -She has been transitioned to Pradaxa at this point.  -Renal and electrolyte issues have stabilized appreciate nephrology's assistance  -Full code     Disposition  Discharge to SNF once bed arranged.    I wore full protective equipment throughout the patient encounter including eye protection and facemask.  Hand hygiene was performed before donning protective equipment and after removal when leaving the room.    Jed Arevalo MD  [facility]  08/21/22  11:28 EDT

## 2022-08-22 ENCOUNTER — APPOINTMENT (OUTPATIENT)
Dept: GENERAL RADIOLOGY | Facility: HOSPITAL | Age: 82
End: 2022-08-22

## 2022-08-22 DIAGNOSIS — I82.4Z2 DVT, LOWER EXTREMITY, DISTAL, ACUTE, LEFT: Primary | ICD-10-CM

## 2022-08-22 DIAGNOSIS — Z86.718 HISTORY OF DEEP VENOUS THROMBOSIS (DVT) OF DISTAL VEIN OF RIGHT LOWER EXTREMITY: ICD-10-CM

## 2022-08-22 LAB
BASOPHILS # BLD AUTO: 0.06 10*3/MM3 (ref 0–0.2)
BASOPHILS NFR BLD AUTO: 0.6 % (ref 0–1.5)
DEPRECATED RDW RBC AUTO: 51.8 FL (ref 37–54)
EOSINOPHIL # BLD AUTO: 0.21 10*3/MM3 (ref 0–0.4)
EOSINOPHIL NFR BLD AUTO: 2 % (ref 0.3–6.2)
ERYTHROCYTE [DISTWIDTH] IN BLOOD BY AUTOMATED COUNT: 15.3 % (ref 12.3–15.4)
HCT VFR BLD AUTO: 33.2 % (ref 34–46.6)
HGB BLD-MCNC: 10.5 G/DL (ref 12–15.9)
IMM GRANULOCYTES # BLD AUTO: 0.29 10*3/MM3 (ref 0–0.05)
IMM GRANULOCYTES NFR BLD AUTO: 2.8 % (ref 0–0.5)
LYMPHOCYTES # BLD AUTO: 1.69 10*3/MM3 (ref 0.7–3.1)
LYMPHOCYTES NFR BLD AUTO: 16.2 % (ref 19.6–45.3)
MCH RBC QN AUTO: 29.6 PG (ref 26.6–33)
MCHC RBC AUTO-ENTMCNC: 31.6 G/DL (ref 31.5–35.7)
MCV RBC AUTO: 93.5 FL (ref 79–97)
MONOCYTES # BLD AUTO: 0.95 10*3/MM3 (ref 0.1–0.9)
MONOCYTES NFR BLD AUTO: 9.1 % (ref 5–12)
NEUTROPHILS NFR BLD AUTO: 69.3 % (ref 42.7–76)
NEUTROPHILS NFR BLD AUTO: 7.23 10*3/MM3 (ref 1.7–7)
NRBC BLD AUTO-RTO: 0.1 /100 WBC (ref 0–0.2)
PLATELET # BLD AUTO: 209 10*3/MM3 (ref 140–450)
PMV BLD AUTO: 12 FL (ref 6–12)
RBC # BLD AUTO: 3.55 10*6/MM3 (ref 3.77–5.28)
WBC NRBC COR # BLD: 10.43 10*3/MM3 (ref 3.4–10.8)

## 2022-08-22 PROCEDURE — 25010000002 CYANOCOBALAMIN PER 1000 MCG: Performed by: INTERNAL MEDICINE

## 2022-08-22 PROCEDURE — 94799 UNLISTED PULMONARY SVC/PX: CPT

## 2022-08-22 PROCEDURE — 94664 DEMO&/EVAL PT USE INHALER: CPT

## 2022-08-22 PROCEDURE — 73502 X-RAY EXAM HIP UNI 2-3 VIEWS: CPT

## 2022-08-22 PROCEDURE — 97530 THERAPEUTIC ACTIVITIES: CPT

## 2022-08-22 PROCEDURE — 94761 N-INVAS EAR/PLS OXIMETRY MLT: CPT

## 2022-08-22 PROCEDURE — 85025 COMPLETE CBC W/AUTO DIFF WBC: CPT | Performed by: SURGERY

## 2022-08-22 PROCEDURE — 91300 COVID-19 MRNA VACCINE (PFIZER) 30 MCG/0.3ML SUSPENSION: CPT | Performed by: SURGERY

## 2022-08-22 PROCEDURE — 25010000002 COVID-19 MRNA VACCINE (PFIZER) 30 MCG/0.3ML SUSPENSION: Performed by: SURGERY

## 2022-08-22 RX ADMIN — DABIGATRAN ETEXILATE MESYLATE 150 MG: 150 CAPSULE ORAL at 08:14

## 2022-08-22 RX ADMIN — Medication 5 MG: at 21:23

## 2022-08-22 RX ADMIN — PANTOPRAZOLE SODIUM 40 MG: 40 TABLET, DELAYED RELEASE ORAL at 07:35

## 2022-08-22 RX ADMIN — RNA INGREDIENT BNT-162B2 0.3 ML: 0.23 INJECTION, SUSPENSION INTRAMUSCULAR at 17:04

## 2022-08-22 RX ADMIN — Medication 1 MG: at 08:14

## 2022-08-22 RX ADMIN — Medication 10 ML: at 08:15

## 2022-08-22 RX ADMIN — CYANOCOBALAMIN 1000 MCG: 1000 INJECTION, SOLUTION INTRAMUSCULAR; SUBCUTANEOUS at 08:14

## 2022-08-22 RX ADMIN — FUROSEMIDE 20 MG: 20 TABLET ORAL at 08:14

## 2022-08-22 RX ADMIN — HYDROCODONE BITARTRATE AND ACETAMINOPHEN 1 TABLET: 5; 325 TABLET ORAL at 08:14

## 2022-08-22 RX ADMIN — HYDROCODONE BITARTRATE AND ACETAMINOPHEN 1 TABLET: 5; 325 TABLET ORAL at 21:23

## 2022-08-22 RX ADMIN — BUDESONIDE AND FORMOTEROL FUMARATE DIHYDRATE 2 PUFF: 80; 4.5 AEROSOL RESPIRATORY (INHALATION) at 19:49

## 2022-08-22 RX ADMIN — BUDESONIDE AND FORMOTEROL FUMARATE DIHYDRATE 2 PUFF: 80; 4.5 AEROSOL RESPIRATORY (INHALATION) at 09:10

## 2022-08-22 RX ADMIN — HYDROCODONE BITARTRATE AND ACETAMINOPHEN 1 TABLET: 5; 325 TABLET ORAL at 13:48

## 2022-08-22 RX ADMIN — Medication 10 ML: at 21:23

## 2022-08-22 RX ADMIN — DABIGATRAN ETEXILATE MESYLATE 150 MG: 150 CAPSULE ORAL at 21:23

## 2022-08-22 RX ADMIN — POLYETHYLENE GLYCOL 3350 17 G: 17 POWDER, FOR SOLUTION ORAL at 08:14

## 2022-08-22 NOTE — CASE MANAGEMENT/SOCIAL WORK
Continued Stay Note  [facility]     Patient Name: Luann Frances  MRN: [id number]  Today's Date: 8/22/2022    Admit Date: 8/16/2022     Discharge Plan     Row Name 08/22/22 1634       Plan    Plan [facility]    Patient/Family in Agreement with Plan yes    Plan Comments Precert obtained for [facility].  Anticipate discharge tomorrow.  ARIANA Patterson RN               Discharge Codes    No documentation.               Expected Discharge Date and Time     Expected Discharge Date Expected Discharge Time    Aug 23, 2022             Crystal Patterson, RN

## 2022-08-22 NOTE — PLAN OF CARE
Problem: Adult Inpatient Plan of Care  Goal: Plan of Care Review  Flowsheets (Taken 8/22/2022 0352)  Progress: improving  Plan of Care Reviewed With: patient  Outcome Evaluation: ace wrap changed to left leg pulses in lower extremities with doppler medicated for pain x1 with po pain med with good results dressing intact to right groin and left poplital area bruising noteed to left posterior thigh complains of pain in right hip states has had it but it is getting worse   Goal Outcome Evaluation:  Plan of Care Reviewed With: patient        Progress: improving  Outcome Evaluation: ace wrap changed to left leg pulses in lower extremities with doppler medicated for pain x1 with po pain med with good results dressing intact to right groin and left poplital area bruising noteed to left posterior thigh complains of pain in right hip states has had it but it is getting worse

## 2022-08-22 NOTE — PLAN OF CARE
Goal Outcome Evaluation:  Plan of Care Reviewed With: patient        Progress: improving  Outcome Evaluation: patient vss. hip x ray ordered today to assess cause of right hip pain. hopefully to rehab tomorrow if x ray ok

## 2022-08-22 NOTE — PLAN OF CARE
Goal Outcome Evaluation:  Plan of Care Reviewed With: patient           Outcome Evaluation: Upon entering room, pt. sitting up in chair, awake/alert, and agreeable to work with P.T. this date. Pt. able to ambulate 40 feet, CGA x 1, with use of Rwx this AM. Pt. requires CGA x 1 for sit <-> stand transfers.  Pt. performed sit <-> stand transfers x 6 reps for functional strength training.  BLE ther. ex. program x 10 reps completed for general strengthening. x 1 seated rest break during ambulation required due to fatigue/weakness. Verbal/tactile cues given for posture correction throughout upright mobility. Will continue to progress functional mobility as tolerated.    Patient was wearing a face mask during this therapy encounter. Therapist used appropriate personal protective equipment including eye protection, mask, and gloves.  Mask used was standard procedure mask. Appropriate PPE was worn during the entire therapy session. Hand hygiene was completed before and after therapy session. Patient is not in enhanced droplet precautions.

## 2022-08-22 NOTE — CASE MANAGEMENT/SOCIAL WORK
Continued Stay Note  [facility]     Patient Name: Luann Frances  MRN: [id number]  Today's Date: 8/22/2022    Admit Date: 8/16/2022     Discharge Plan     Row Name 08/22/22 1059       Plan    Plan [facility] pending precert    Patient/Family in Agreement with Plan yes    Plan Comments Plan is rehab.  Spoke alex Myrick/Connor who states she can accept at [facility].  Discussed with pt and son who are agreeable to [facility].  Elen states precert initiated. Await determination from [employer].  Elen states pt will need to be in isolation for 10 days if not fully updated on COVID vaccine. Pt agreeable to COVID booster prior to discharge.  Referral to pharmacy place in [facility].  CCP continues to follow.  ARIANA Patterson RN               Discharge Codes    No documentation.               Expected Discharge Date and Time     Expected Discharge Date Expected Discharge Time    Aug 22, 2022             Crystal Patterson RN

## 2022-08-22 NOTE — NURSING NOTE
Spoke with Dr. Arevalo. Patient complaining of intense right hip pain that she reports has been happening intermittently since Saturday. Nothing noted externally upon assessment. Patient does have a right groin puncture site from a previous surgery this admission with vascular. Site is soft and c/d/i. Md going to order right hip x ray to assess patient at this time

## 2022-08-22 NOTE — THERAPY TREATMENT NOTE
Patient Name: Luann Frances  : 1940    MRN: [id number]                              Today's Date: 2022       Admit Date: 2022    Visit Dx:     ICD-10-CM ICD-9-CM   1. Myositis of left lower extremity, unspecified myositis type  M60.9 729.1   2. Acute deep vein thrombosis (DVT) of distal vein of left lower extremity ([facility])  I82.4Z2 453.42   3. Acute deep vein thrombosis (DVT) of femoral vein of left lower extremity ([facility])  I82.412 453.41     Patient Active Problem List   Diagnosis   • Atopic rhinitis   • Chronic obstructive pulmonary disease ([facility])   • Diverticulitis of colon   • Acid reflux   • Fatigue   • Primary hypothyroidism   • Insomnia   • Knee pain   • Pain of lower extremity   • Chronic nausea   • Shoulder pain   • Ventricular premature beats   • Weight gain   • Cold intolerance   • Post herpetic neuralgia   • Insulin resistance   • Vitamin D deficiency   • CKD (chronic kidney disease) stage 3, GFR 30-59 ml/min (CMS/[facility])   • Mixed hyperlipidemia   • Hypersomnia   • Hyperuricemia   • Essential hypertension   • DELROY (obstructive sleep apnea)   • Renal calculus   • Multiple closed fractures of ribs of right side   • Non-cardiac chest pain   • Compression fracture of body of thoracic vertebra ([facility])   • Arm skin lesion, right   • DVT, lower extremity, distal, acute, left ([facility])   • Lower extremity edema   • Skin tear of right lower leg without complication   • Medicare annual wellness visit, subsequent   • Tobacco abuse   • Osteoporosis   • Myositis of left lower extremity   • Acute deep vein thrombosis (DVT) of femoral vein of left lower extremity ([facility])   • 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[facility])     Past Medical History:   Diagnosis Date   • Acute deep vein thrombosis (DVT) of femoral vein of right lower extremity ([facility]) 04/15/2021   • Arthritis    • Asymptomatic PVCs    • Backache    • Chronic bronchitis ([facility])    • CKD (chronic kidney disease)     Stage 3   • Deep vein thrombosis  (DVT) of right lower extremity (HCC)    • Essential hypertension 01/20/2020   • Fracture of humerus    • GERD (gastroesophageal reflux disease)    • Hyperlipidemia    • Hypertension    • Hypothyroidism    • Pneumonia    • Pulmonary emphysema (HCC)    • Renal calculi    • Renal calculus 01/06/2021   • Sleep apnea     DOES NOT USE CPAP   • Thoracic vertebral fracture (HCC)      Past Surgical History:   Procedure Laterality Date   • APPENDECTOMY     • EXTRACORPOREAL SHOCK WAVE LITHOTRIPSY (ESWL) Left 01/06/2021    Procedure: EXTRACORPOREAL SHOCKWAVE LITHOTRIPSY, CYSTOSCOPY, RETROGRADE PYLEOGRAM;  Surgeon: Walter Schwartz MD;  Location: [facility] OR [facility];  Service: Urology;  Laterality: Left;   • EYE SURGERY      cataracts   • HUMERUS FRACTURE SURGERY     • LYTIC THROMBIN THERAPY Left 8/17/2022    Procedure: LT. LEG THROMBECTOMY/EMBOLECTOMY AND ANGIOPLASTY STENT PLACEMENT OF LEFT ILIAC VEIN;  Surgeon: Theo Bustos MD;  Location: [facility] OR 18/19;  Service: Vascular;  Laterality: Left;   • RIB FRACTURE SURGERY  2021   • THORACOSCOPY Right 02/16/2021    Procedure: bronchoscopy, right video assisted THORACOSCOPY WITH PLATING OF 3, 4, 5, AND 6 RIBS;  Surgeon: Kelley Delong MD;  Location: [facility] OR;  Service: Thoracic;  Laterality: Right;   • TOTAL KNEE ARTHROPLASTY Left 04/2015   • UMBILICAL HERNIA REPAIR        General Information     Row Name 08/22/22 1133          Physical Therapy Time and Intention    Document Type therapy note (daily note)  -MS     Mode of Treatment physical therapy;individual therapy  -MS     Row Name 08/22/22 1133          General Information    Patient Profile Reviewed yes  -MS     Existing Precautions/Restrictions fall   Exit alarm  -MS     Barriers to Rehab none identified  -MS     Row Name 08/22/22 1133          Cognition    Orientation Status (Cognition) oriented x 3  -MS     Row Name 08/22/22 1133          Safety Issues, Functional Mobility    Comment, Safety  "Issues/Impairments (Mobility) Gait belt used for safety.  -MS           User Key  (r) = Recorded By, (t) = Taken By, (c) = Cosigned By    Initials Name Provider Type    Theo Rodriguez, PT Physical Therapist               Mobility     Row Name 08/22/22 1133          Bed Mobility    Bed Mobility supine-sit;sit-supine  -MS     Comment, (Bed Mobility) up in chair this AM.  -MS     Row Name 08/22/22 1133          Sit-Stand Transfer    Sit-Stand [place] (Transfers) contact guard  -MS     Assistive Device (Sit-Stand Transfers) walker, front-wheeled  -MS     Row Name 08/22/22 1133          Gait/Stairs (Locomotion)    [place] Level (Gait) contact guard  -MS     Assistive Device (Gait) walker, front-wheeled  -MS     Distance in Feet (Gait) 40 feet  -MS     Deviations/Abnormal Patterns (Gait) moraima decreased;stride length decreased  -MS     Bilateral Gait Deviations forward flexed posture  -MS     Comment, (Gait/Stairs) Verbal/tactile cues given for posture correction. x 1 seated rest break due to fatigue  -MS           User Key  (r) = Recorded By, (t) = Taken By, (c) = Cosigned By    Initials Name Provider Type    Theo Rodriguez, PT Physical Therapist               Obj/Interventions     Row Name 08/22/22 1135          Motor Skills    Therapeutic Exercise --  BLE ther. ex. progam x 10 reps completed (Ankle pumps, Hip Flexion, LAQ's)  -MS           User Key  (r) = Recorded By, (t) = Taken By, (c) = Cosigned By    Initials Name Provider Type    Theo Rodriguez, PT Physical Therapist               Goals/Plan    No documentation.                Clinical Impression     Row Name 08/22/22 1136          Pain    Pre/Posttreatment Pain Comment Pt. reports intermittent Left hip pain (sharp in nature) that \"comes and goes\" randomly.  Pt. rates it at a 5/10 when she feels it.  -MS     Pain Intervention(s) Medication (See MAR);Elevated;Nursing Notified;Repositioned  -MS     Row Name 08/22/22 1136          " Positioning and Restraints    Pre-Treatment Position sitting in chair/recliner  -MS     Post Treatment Position chair  -MS     In Chair notified nsg;sitting;reclined;call light within reach;encouraged to call for assist;exit alarm on;with family/caregiver  All lines intact.  -MS           User Key  (r) = Recorded By, (t) = Taken By, (c) = Cosigned By    Initials Name Provider Type    MS Harris Theo TORIBIO, PT Physical Therapist               Outcome Measures     Row Name 08/22/22 1138          How much help from another person do you currently need...    Turning from your back to your side while in flat bed without using bedrails? 3  -MS     Moving from lying on back to sitting on the side of a flat bed without bedrails? 3  -MS     Moving to and from a bed to a chair (including a wheelchair)? 3  -MS     Standing up from a chair using your arms (e.g., wheelchair, bedside chair)? 3  -MS     Climbing 3-5 steps with a railing? 2  -MS     To walk in hospital room? 3  -MS     AM-PAC 6 Clicks Score (PT) 17  -MS     Highest level of mobility 5 --> Static standing  -MS     Row Name 08/22/22 1138          Functional Assessment    Outcome Measure Options AM-PAC 6 Clicks Basic Mobility (PT)  -MS           User Key  (r) = Recorded By, (t) = Taken By, (c) = Cosigned By    Initials Name Provider Type    MS HarrisTheo, PT Physical Therapist                             Physical Therapy Education                 Title: PT OT SLP Therapies (Done)     Topic: Physical Therapy (Done)     Point: Mobility training (Done)     Learning Progress Summary           Patient Acceptance, E,D, VU,NR by MS at 8/22/2022 1138    Acceptance, E, VU,NR by MG at 8/20/2022 1230    Acceptance, E,TB,D, VU,NR by  at 8/19/2022 1715                   Point: Home exercise program (Done)     Learning Progress Summary           Patient Acceptance, E,D, VU,NR by MS at 8/22/2022 1138    Acceptance, E, VU,NR by MG at 8/20/2022 1230                   Point:  Body mechanics (Done)     Learning Progress Summary           Patient Acceptance, E,D, VU,NR by MS at 8/22/2022 1138    Acceptance, E, VU,NR by  at 8/20/2022 1230    Acceptance, E,TB,D, VU,NR by  at 8/19/2022 1715                   Point: Precautions (Done)     Learning Progress Summary           Patient Acceptance, E,D, VU,NR by MS at 8/22/2022 1138    Acceptance, E, VU,NR by  at 8/20/2022 1230    Acceptance, E,TB,D, VU,NR by  at 8/19/2022 1715                               User Key     Initials Effective Dates Name Provider Type Discipline    MS 06/16/21 -  Teho Harris, PT Physical Therapist PT     05/24/22 -  Smita Zamora, PT Physical Therapist PT     04/08/22 -  Constance Pace, PT Physical Therapist PT              PT Recommendation and Plan     Plan of Care Reviewed With: patient  Outcome Evaluation: Upon entering room, pt. sitting up in chair, awake/alert, and agreeable to work with P.T. this date. Pt. able to ambulate 40 feet, CGA x 1, with use of Rwx this AM. Pt. requires CGA x 1 for sit <-> stand transfers.  Pt. performed sit <-> stand transfers x 6 reps for functional strength training.  BLE ther. ex. program x 10 reps completed for general strengthening. x 1 seated rest break during ambulation required due to fatigue/weakness. Verbal/tactile cues given for posture correction throughout upright mobility. Will continue to progress functional mobility as tolerated.     Time Calculation:    PT Charges     Row Name 08/22/22 1142             Time Calculation    Start Time 1028  -MS      Stop Time 1042  -MS      Time Calculation (min) 14 min  -MS      PT Received On 08/22/22  -MS      PT - Next Appointment 08/23/22  -MS              Time Calculation- PT    Total Timed Code Minutes- PT 13 minute(s)  -MS            User Key  (r) = Recorded By, (t) = Taken By, (c) = Cosigned By    Initials Name Provider Type    MS HarrisTheo, PT Physical Therapist              Therapy Charges for Today      Code Description Service Date Service Provider Modifiers Qty    01060523822  PT THERAPEUTIC ACT EA 15 MIN 8/22/2022 Theo Harris, PT GP 1          PT G-Codes  Outcome Measure Options: AM-PAC 6 Clicks Basic Mobility (PT)  AM-PAC 6 Clicks Score (PT): 17    Theo Harris, PT  8/22/2022

## 2022-08-22 NOTE — PROGRESS NOTES
" LOS: 6 days     Name: Luann Frances  Age: 82 y.o.  Sex: female  :  1940  MRN: [id number]         Primary Care Physician: Dinah Humphrey MD    Subjective   Subjective  No new complaints or acute overnight events.    Objective   Vital Signs  Temp:  [97.8 °F (36.6 °C)-98.2 °F (36.8 °C)] 98.2 °F (36.8 °C)  Heart Rate:  [64-76] 65  Resp:  [16-20] 20  BP: (115-164)/(72-78) 115/75  Body mass index is 37.02 kg/m².    Objective:  General Appearance:  Comfortable and in no acute distress (Weak and deconditioned appearing).    Vital signs: (most recent): Blood pressure 115/75, pulse 65, temperature 98.2 °F (36.8 °C), temperature source Oral, resp. rate 20, height 160 cm (63\"), weight 94.8 kg (209 lb), SpO2 96 %.    Lungs:  Normal effort and normal respiratory rate.    Heart: Normal rate.  Regular rhythm.    Abdomen: Abdomen is soft.  Bowel sounds are normal.   There is no abdominal tenderness.     Extremities: There is no dependent edema or local swelling.    Neurological: Patient is alert and oriented to person, place and time.    Skin:  Warm and dry.              Results Review:       I reviewed the patient's new clinical results.    Results from last 7 days   Lab Units 22  0613 22  0535 22  0252 22  0115 22  0324 22  0850 22  1351   WBC 10*3/mm3 10.43 8.73 10.33 9.31 9.53 11.19* 12.51*   HEMOGLOBIN g/dL 10.5* 9.5* 9.6* 9.4* 10.1* 12.5 13.2   PLATELETS 10*3/mm3 209 173 157 142 126* 149 192     Results from last 7 days   Lab Units 22  0535 22  0252 22  0115 22  0324 22  2027 22  0850 22  1632   SODIUM mmol/L 139 138 137 135* 137 137 133*   POTASSIUM mmol/L 4.2 4.6 4.1 4.3 4.5 5.1 6.0*   CHLORIDE mmol/L 106 107 105 103 103 100 101   CO2 mmol/L 22.0 19.0* 21.8* 18.8* 21.8* 24.5 16.0*   BUN mg/dL 20 21 24* 34* 37* 44* 45*   CREATININE mg/dL 0.97 1.04* 1.22* 1.47* 1.49* 1.45* 2.01*   CALCIUM mg/dL 8.6 8.2* 8.5* 7.9* 8.2* 8.9 9.5 "   GLUCOSE mg/dL 100* 120* 136* 111* 120* 99 105*     Results from last 7 days   Lab Units 08/16/22  1632   INR  1.18*             Scheduled Meds:   budesonide-formoterol, 2 puff, Inhalation, BID - RT  dabigatran etexilate, 150 mg, Oral, Q12H  folic acid, 1 mg, Oral, Daily  furosemide, 20 mg, Oral, Daily  pantoprazole, 40 mg, Oral, QAM  polyethylene glycol, 17 g, Oral, Daily  sodium chloride, 10 mL, Intravenous, Q12H      PRN Meds:   •  acetaminophen **OR** acetaminophen **OR** acetaminophen  •  albuterol  •  HYDROcodone-acetaminophen  •  HYDROmorphone **AND** naloxone  •  melatonin  •  Morphine **AND** naloxone  •  nitroglycerin  •  nitroglycerin  •  ondansetron  •  ondansetron **OR** ondansetron  •  [COMPLETED] Insert peripheral IV **AND** sodium chloride  •  sodium chloride  Continuous Infusions:  lactated ringers, 9 mL/hr, Last Rate: Stopped (08/17/22 1015)        Assessment & Plan   Active Hospital Problems    Diagnosis  POA   • **Acute deep vein thrombosis (DVT) of femoral vein of left lower extremity ([facility]) [I82.412]  Unknown   • Myositis of left lower extremity [M60.9]  Yes   • 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       Assessment & Plan    This is an 82-year-old female who presented to the hospital with a progressing acute DVT in her left lower extremity failing outpatient direct oral anticoagulant  -Appreciate vascular surgery's assistance and did undergo intervention 8/17.  She will follow-up in 6 to 8 weeks with a duplex scan in the office at that time.  -She has been transitioned to Pradaxa at this point.  -Renal and electrolyte issues have stabilized appreciate nephrology's  assistance  -Full code     Disposition  Discharge to SNF once bed arranged.      I wore full protective equipment throughout the patient encounter including eye protection and facemask.  Hand hygiene was performed before donning protective equipment and after removal when leaving the room.    Jed Arevalo MD  [facility]  08/22/22  11:41 EDT

## 2022-08-22 NOTE — PROGRESS NOTES
[facility]: Pharmacy Consult for COVID-19 Vaccine    Luann Frances is a 82 y.o. female for whom Pharmacy has been consulted to consent for inpatient COVID-19 vaccine (Pfizer).    Consulting provider: Dr. Theo Bustos  Date consult ordered: 8/22/22    Previous COVID-19 Vaccination History (includes dates and ): Unknown    Planned dose in series (1st, 2nd, 3rd, 1st booster, 2nd booster): booster    Does the patient have a history of anaphylaxis to any medications (Y/N): N  Note: If the patient has a history of anaphylaxis, they require closer monitoring (e.g. 30 minutes instead of 15 minutes)    Pharmacy has reviewed the Patient Fact Sheet/VIS and consent form with the patient or caregiver (POA), and the patient meets the following criteria:  Patient is clinically stable and not acutely ill (afebrile > 24 hours).  Patient does not have a history of allergic reaction to the Pfizer COVID-19 vaccine or any of its components (e.g. polyethylene glycol [PEG]).  Patient does not have a history of a bleeding disorder that would contraindicate them for an intramuscular injection.  Patient has not been treated with COVID-19 monoclonal antibodies (e.g. bamlanivimab with or without etesevimab, casirivimab/imdevimab, or sotrovimab) or convalescent plasma in the past 90 days.  Immunization history and [facility] records (if applicable) have been reviewed. If patient has previously received a COVID-19 vaccine, patient is appropriate for vaccination with Pfizer COVID-19 vaccine (meets one of the below criteria):  If patient received Pfizer vaccine for 1st dose and is presenting for 2nd dose, it has been at least 21 days following first dose.  If patient received Pfizer for 1st and 2nd dose and is presenting for 3rd dose (immunocompromised), it has been at least 28 days following 2nd dose  If patient is presenting for first booster dose (regardless of primary series ), it has been at  least 5 months (or 3 months,  if immunocompromised) following 2nd or 3rd dose of mRNA vaccine (e.g. Pfizer or Moderna) or 2 months following initial dose of Singh & Singh vaccine.  If patient is presenting for second booster dose, they are either 50 years of age or older OR are immunocompromised AND it has been at least 4 months following 1st booster dose  Patient is not presenting for a 2nd or 3rd dose (for immunocompromised patient) in primary series if their primary series consisted of Moderna COVID-19 vaccine. These patients do no qualify for a Pfizer COVID-19 vaccine.  Patient has not been diagnosed with myocarditis following a COVID-19 vaccine and is not in an acute episode of myocarditis or pericarditis.    The consent form has been signed and physical copy placed in the patient's chart. The COVID-19 vaccine and emergency medications panel orders have been placed.    Thank you for this consult. Please reach out to pharmacy with any questions.    Verito Montero, Pharm.D., [facility]   Clinical Pharmacist

## 2022-08-23 VITALS
HEART RATE: 67 BPM | RESPIRATION RATE: 20 BRPM | BODY MASS INDEX: 37.03 KG/M2 | TEMPERATURE: 98.2 F | SYSTOLIC BLOOD PRESSURE: 155 MMHG | HEIGHT: 63 IN | WEIGHT: 209 LBS | DIASTOLIC BLOOD PRESSURE: 69 MMHG | OXYGEN SATURATION: 99 %

## 2022-08-23 LAB
BASOPHILS # BLD AUTO: 0.03 10*3/MM3 (ref 0–0.2)
BASOPHILS NFR BLD AUTO: 0.3 % (ref 0–1.5)
DEPRECATED RDW RBC AUTO: 52.3 FL (ref 37–54)
EOSINOPHIL # BLD AUTO: 0.15 10*3/MM3 (ref 0–0.4)
EOSINOPHIL NFR BLD AUTO: 1.6 % (ref 0.3–6.2)
ERYTHROCYTE [DISTWIDTH] IN BLOOD BY AUTOMATED COUNT: 15.4 % (ref 12.3–15.4)
HCT VFR BLD AUTO: 30.8 % (ref 34–46.6)
HGB BLD-MCNC: 9.9 G/DL (ref 12–15.9)
IMM GRANULOCYTES # BLD AUTO: 0.12 10*3/MM3 (ref 0–0.05)
IMM GRANULOCYTES NFR BLD AUTO: 1.3 % (ref 0–0.5)
LYMPHOCYTES # BLD AUTO: 1.42 10*3/MM3 (ref 0.7–3.1)
LYMPHOCYTES NFR BLD AUTO: 15.5 % (ref 19.6–45.3)
MCH RBC QN AUTO: 30.2 PG (ref 26.6–33)
MCHC RBC AUTO-ENTMCNC: 32.1 G/DL (ref 31.5–35.7)
MCV RBC AUTO: 93.9 FL (ref 79–97)
MONOCYTES # BLD AUTO: 0.63 10*3/MM3 (ref 0.1–0.9)
MONOCYTES NFR BLD AUTO: 6.9 % (ref 5–12)
NEUTROPHILS NFR BLD AUTO: 6.83 10*3/MM3 (ref 1.7–7)
NEUTROPHILS NFR BLD AUTO: 74.4 % (ref 42.7–76)
NRBC BLD AUTO-RTO: 0.1 /100 WBC (ref 0–0.2)
PLATELET # BLD AUTO: 212 10*3/MM3 (ref 140–450)
PMV BLD AUTO: 11.2 FL (ref 6–12)
RBC # BLD AUTO: 3.28 10*6/MM3 (ref 3.77–5.28)
WBC NRBC COR # BLD: 9.18 10*3/MM3 (ref 3.4–10.8)

## 2022-08-23 PROCEDURE — 94761 N-INVAS EAR/PLS OXIMETRY MLT: CPT

## 2022-08-23 PROCEDURE — 94664 DEMO&/EVAL PT USE INHALER: CPT

## 2022-08-23 PROCEDURE — 94799 UNLISTED PULMONARY SVC/PX: CPT

## 2022-08-23 PROCEDURE — 85025 COMPLETE CBC W/AUTO DIFF WBC: CPT | Performed by: SURGERY

## 2022-08-23 RX ORDER — POLYETHYLENE GLYCOL 3350 17 G/17G
17 POWDER, FOR SOLUTION ORAL DAILY
Start: 2022-08-23 | End: 2022-09-12

## 2022-08-23 RX ORDER — FOLIC ACID 1 MG/1
1 TABLET ORAL DAILY
Start: 2022-08-23 | End: 2022-09-09 | Stop reason: SDUPTHER

## 2022-08-23 RX ORDER — HYDROCODONE BITARTRATE AND ACETAMINOPHEN 5; 325 MG/1; MG/1
1 TABLET ORAL EVERY 6 HOURS PRN
Qty: 12 TABLET | Refills: 0 | Status: SHIPPED | OUTPATIENT
Start: 2022-08-23 | End: 2022-09-12

## 2022-08-23 RX ORDER — DABIGATRAN ETEXILATE 150 MG/1
150 CAPSULE ORAL EVERY 12 HOURS SCHEDULED
Qty: 60 CAPSULE
Start: 2022-08-23 | End: 2022-09-29 | Stop reason: SDUPTHER

## 2022-08-23 RX ORDER — ACETAMINOPHEN 325 MG/1
650 TABLET ORAL EVERY 4 HOURS PRN
Start: 2022-08-23

## 2022-08-23 RX ORDER — CHOLECALCIFEROL (VITAMIN D3) 125 MCG
5 CAPSULE ORAL NIGHTLY PRN
Start: 2022-08-23

## 2022-08-23 RX ADMIN — Medication 10 ML: at 08:07

## 2022-08-23 RX ADMIN — Medication 1 MG: at 08:07

## 2022-08-23 RX ADMIN — POLYETHYLENE GLYCOL 3350 17 G: 17 POWDER, FOR SOLUTION ORAL at 08:07

## 2022-08-23 RX ADMIN — FUROSEMIDE 20 MG: 20 TABLET ORAL at 08:07

## 2022-08-23 RX ADMIN — PANTOPRAZOLE SODIUM 40 MG: 40 TABLET, DELAYED RELEASE ORAL at 07:18

## 2022-08-23 RX ADMIN — DABIGATRAN ETEXILATE MESYLATE 150 MG: 150 CAPSULE ORAL at 08:07

## 2022-08-23 RX ADMIN — BUDESONIDE AND FORMOTEROL FUMARATE DIHYDRATE 2 PUFF: 80; 4.5 AEROSOL RESPIRATORY (INHALATION) at 08:54

## 2022-08-23 NOTE — PLAN OF CARE
Problem: Adult Inpatient Plan of Care  Goal: Plan of Care Review  Flowsheets (Taken 8/23/2022 0502)  Progress: improving  Plan of Care Reviewed With: patient  Outcome Evaluation: resting well during the night increased pain noted in right hip with ambulation bilateral jobst stockings in place pedal pulses with doppler pain controlled with pain pill   Goal Outcome Evaluation:  Plan of Care Reviewed With: patient        Progress: improving  Outcome Evaluation: resting well during the night increased pain noted in right hip with ambulation bilateral jobst stockings in place pedal pulses with doppler pain controlled with pain pill

## 2022-08-23 NOTE — PLAN OF CARE
Goal Outcome Evaluation:  Plan of Care Reviewed With: patient        Progress: improving  Outcome Evaluation: VSS, pain treated with PRN meds. Compression stockings on. Plan to DC to rehab

## 2022-08-23 NOTE — DISCHARGE SUMMARY
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       DETAILS OF HOSPITAL STAY     Pertinent Test Results and Procedures Performed    CT scan of the abdomen and pelvis without contrast:  1.  3 x 3 mm distal left ureteral calculus approximately 10 mm from left   ureterovesical junction with mild upstream hydroureteronephrosis.   Asymmetric stranding surrounding the left kidney represents sequela of   obstructive uropathy and/or coexistent infection in the appropriate   clinical context and correlation with patient history an urinalysis is   recommended.     CT pelvis with contrast:  1. New enlargement of the left proximal quadriceps musculature with   surrounding stranding in the intramuscular fat and overlying   subcutaneous fat. This may be due to an intramuscular hemorrhage or   myositis and extends off the field of view distally. No left hip   fracture or osseous abnormality is evident.   2. Atherosclerotic disease with mild enlargement of the infrarenal   abdominal aorta measuring 2.5 cm transverse dimension.   3. Sigmoid diverticulosis without evidence for diverticulitis.    Bilateral lower extremity Doppler:  · Acute left lower extremity deep vein thrombosis noted in the external   iliac, common femoral, deep femoral, proximal femoral, mid femoral, distal    femoral, popliteal, posterial tibial, peroneal and gastrocnemius.   · Acute left lower extremity superficial thrombophlebitis noted in the   saphenofemoral junction and great saphenous (above knee).   · All other left sided veins appeared normal.     X-ray right hip and pelvis:  There are moderately advanced osteoarthritic changes at both   hip joints. There is no evidence for an acute fracture and there is no   malalignment. Left common iliac artery stent is noted. Visualized bowel   gas pattern is unremarkable.     Hospital course  This a very pleasant 82-year-old female who presented with an extensive left lower extremity DVT.  Please see H&P for full details.  Vascular surgery was consulted and she went for mechanical thrombectomy and venous angioplasty on 8/17/2022.  She tolerated the procedure well.  This was felt to be a failure of her Eliquis.  She was maintained on a heparin drip initially and transition to Pradaxa per hematology recommendations.  Nephrology managed her renal function and electrolyte abnormalities.  She is now medically stable and has been cleared by all for discharge.  She will follow-up in the vascular surgery office in 6 to 8 weeks with a repeat duplex at that time.  She will follow-up with hematology in 2 to 3 weeks.  She will go to SNF today.        Physical Exam at Discharge:  General: No acute distress, AAOx3  HEENT: EOMI, PERRL  Cardiovascular: +s1 and s2, RRR  Lungs: No rhonchi or wheezing  Abdomen: soft, nontender    Consults:   Consults     Date and Time Order Name Status Description    8/17/2022  8:09 AM Inpatient Nephrology Consult      8/16/2022  5:26 PM Hematology and Oncology (on-call MD unless specified) Completed     8/16/2022  3:48 PM Inpatient Vascular Surgery Consult Completed     8/16/2022  3:33 PM [facility] (on-call MD unless specified) Details              Condition on Discharge: Stable    Discharge Disposition  Skilled Nursing Facility (DC - External)    Discharge  Medications     Discharge Medications      New Medications      Instructions Start Date   acetaminophen 325 MG tablet  Commonly known as: TYLENOL   650 mg, Oral, Every 4 Hours PRN      dabigatran etexilate 150 MG capsu  Commonly known as: PRADAXA   150 mg, Oral, Every 12 Hours Scheduled      folic acid 1 MG tablet  Commonly known as: FOLVITE   1 mg, Oral, Daily      melatonin 5 MG tablet tablet   5 mg, Oral, Nightly PRN      polyethylene glycol 17 g packet  Commonly known as: MIRALAX   17 g, Oral, Daily         Continue These Medications      Instructions Start Date   Advair Diskus 100-50 MCG/ACT DISKUS  Generic drug: Fluticasone-Salmeterol   1 puff, Inhalation, 2 Times Daily      albuterol sulfate  (90 Base) MCG/ACT inhaler  Commonly known as: PROVENTIL HFA;VENTOLIN HFA;PROAIR HFA   2 puffs, Inhalation, Every 4 Hours PRN      cholecalciferol 25 MCG (1000 UT) tablet  Commonly known as: VITAMIN D3   2,000 Units, Oral, Daily      furosemide 20 MG tablet  Commonly known as: Lasix   20 mg, Oral, Daily      HYDROcodone-acetaminophen 5-325 MG per tablet  Commonly known as: NORCO   1 tablet, Oral, Every 6 Hours PRN      omeprazole OTC 20 MG EC tablet  Commonly known as: PriLOSEC OTC   20-40 mg, Oral, As Needed      ondansetron ODT 4 MG disintegrating tablet  Commonly known as: ZOFRAN-ODT   4 mg, Translingual, Every 8 Hours PRN      prochlorperazine 10 MG tablet  Commonly known as: COMPAZINE   10 mg, Oral, Every 6 Hours PRN         Stop These Medications    [employer] Antacid & Anti-Gas 1000-60 MG chewable tablet  Generic drug: Calcium Carbonate-Simethicone     rivaroxaban 20 MG tablet  Commonly known as: XARELTO     traMADol 50 MG tablet  Commonly known as: ULTRAM            Discharge Diet:   Diet Instructions     Diet: Regular, Consistent Carbohydrate, Cardiac      Discharge Diet:  Regular  Consistent Carbohydrate  Cardiac             Activity at Discharge:   Activity Instructions     Activity as Tolerated             Follow-up Appointments  Future Appointments   Date Time Provider Department Center   9/9/2022  9:10 AM LAB CHAIR 5 [facility]  [facility]   9/9/2022  9:40 AM Lita Dubon MD [facility]   9/12/2022  3:45 PM Dinah Humphrey MD [facility]   10/14/2022 10:15 AM [facility] ARTERIAL CRT 1 [facility]   10/21/2022 10:30 AM LAB CHAIR 6 [facility]  [facility]   10/21/2022 11:00 AM Lita Dubon MD [facility]   10/24/2022  2:15 PM Isabelle Kitchen APRN [facility] None     Additional Instructions for the Follow-ups that You Need to Schedule     Discharge Follow-up with PCP   As directed       Currently Documented PCP:    Dinah Humphrey MD    PCP Phone Number:    [phone]     Follow Up Details: 1 week         Discharge Follow-up with Specified Provider: Dr. Dubon of hematology in 2-3 weeks   As directed      To: Dr. Dubon of hematology in 2-3 weeks         Discharge Follow-up with Specified Provider: Dr. Bustos of vascular surgery in 6-8 weeks   As directed      To: Dr. Bustos of vascular surgery in 6-8 weeks               Test Results Pending at Discharge  Pending Labs     Order Current Status    Lupus Anticoagulant In process          I have examined and discussed discharge planning with the patient today.    I wore full protective equipment throughout the patient encounter including eye protection and facemask.  Hand hygiene was performed before donning protective equipment and after removal when leaving the room.     Jed Arevalo MD  08/23/22  09:38 EDT    Time: Discharge greater than 30 min

## 2022-08-24 NOTE — CASE MANAGEMENT/SOCIAL WORK
Case Management Discharge Note      Final Note: Pt discharged to [facility] [place].  Transported by son    Provided Post Acute Provider List?: N/A  N/A Provider List Comment: Requested a referral to [facility].  Provided Post Acute Provider Quality & Resource List?: N/A  N/A Quality & Resource List Comment: Requested a referral to [facility]    Selected Continued Care - Discharged on 8/23/2022 Admission date: 8/16/2022 - Discharge disposition: Skilled Nursing Facility (DC - External)    Destination Coordination complete.    Service Provider Selected Services Address Phone Fax Patient Preferred    [facility] [address] , [facility] [address] [phone] --          Durable Medical Equipment    No services have been selected for the patient.              Dialysis/Infusion    No services have been selected for the patient.              Home Medical Care    No services have been selected for the patient.              Therapy    No services have been selected for the patient.              Community Resources    No services have been selected for the patient.              Community & DME    No services have been selected for the patient.                  Transportation Services  Private: Car    Final Discharge Disposition Code: 03 - skilled nursing facility (SNF)

## 2022-08-27 LAB
APTT SCREEN TO CONFIRM RATIO: 1.04 RATIO (ref 0–1.34)
CONFIRM APTT/NORMAL: 43.1 SEC (ref 0–47.6)
LA 2 SCREEN W REFLEX-IMP: ABNORMAL
SCREEN APTT: 39.4 SEC (ref 0–51.9)
SCREEN DRVVT: 40.2 SEC (ref 0–47)
THROMBIN TIME: >150 SEC (ref 0–23)
TT IMM NP PPP: >150 SEC (ref 0–23)
TT P HPASE PPP: 19.3 SEC (ref 0–23)

## 2022-09-06 ENCOUNTER — HOME HEALTH ADMISSION (OUTPATIENT)
Dept: HOME HEALTH SERVICES | Facility: HOME HEALTHCARE | Age: 82
End: 2022-09-06

## 2022-09-06 ENCOUNTER — TRANSCRIBE ORDERS (OUTPATIENT)
Dept: HOME HEALTH SERVICES | Facility: HOME HEALTHCARE | Age: 82
End: 2022-09-06

## 2022-09-06 ENCOUNTER — READMISSION MANAGEMENT (OUTPATIENT)
Dept: CALL CENTER | Facility: HOSPITAL | Age: 82
End: 2022-09-06

## 2022-09-06 DIAGNOSIS — I82.4Y2 DEEP VEIN THROMBOSIS (DVT) OF PROXIMAL VEIN OF LEFT LOWER EXTREMITY, UNSPECIFIED CHRONICITY: Primary | ICD-10-CM

## 2022-09-06 NOTE — OUTREACH NOTE
Prep Survey    Flowsheet Row Responses   [facility] discharged [facility]? Non-[facility]   Is LACE score < 7 ? Non-BH Discharge   Emergency Room discharge w/ pulse ox? No   Eligibility [facility]    Date of Discharge 09/06/22   Discharge Disposition Home or Self Care   Discharge diagnosis Unknown   Does the patient have one of the following disease processes/diagnoses(primary or secondary)? Other   Prep survey completed? Yes          SY CLEMENTE - Registered Nurse

## 2022-09-07 ENCOUNTER — TRANSITIONAL CARE MANAGEMENT TELEPHONE ENCOUNTER (OUTPATIENT)
Dept: CALL CENTER | Facility: HOSPITAL | Age: 82
End: 2022-09-07

## 2022-09-07 ENCOUNTER — HOME CARE VISIT (OUTPATIENT)
Dept: HOME HEALTH SERVICES | Facility: HOME HEALTHCARE | Age: 82
End: 2022-09-07

## 2022-09-07 PROCEDURE — G0151 HHCP-SERV OF PT,EA 15 MIN: HCPCS

## 2022-09-07 NOTE — OUTREACH NOTE
Call Center TCM Note    Flowsheet Row Responses   [facility] patient discharged from? Non-BH   Does the patient have one of the following disease processes/diagnoses(primary or secondary)? Other   TCM attempt successful? No   Unsuccessful attempts Attempt 1          Josephine Crum LPN    9/7/2022, 13:30 EDT

## 2022-09-08 ENCOUNTER — HOME CARE VISIT (OUTPATIENT)
Dept: HOME HEALTH SERVICES | Facility: HOME HEALTHCARE | Age: 82
End: 2022-09-08

## 2022-09-08 ENCOUNTER — TRANSITIONAL CARE MANAGEMENT TELEPHONE ENCOUNTER (OUTPATIENT)
Dept: CALL CENTER | Facility: HOSPITAL | Age: 82
End: 2022-09-08

## 2022-09-08 VITALS
SYSTOLIC BLOOD PRESSURE: 120 MMHG | OXYGEN SATURATION: 98 % | DIASTOLIC BLOOD PRESSURE: 80 MMHG | HEART RATE: 73 BPM | TEMPERATURE: 97.7 F

## 2022-09-08 NOTE — HOME HEALTH
Patient went to the ED on 8/11 for apparent kidney stones.   She went back to the ED on 8/16 for left leg pain.  She was found to have a DVT and on 8/17 had a thrombectomy.  She went to subacute rehab from the hospital were she was found to be COVID positive and was put in isolation (she claims minimal symptoms).  She had to do her rehab in her room and was taken out of isolation after 10 days (the last day of her stay).  PMH/PLOF:  She has used a walker for several months.  She stated she fell a couple of years ago and broke some ribs and was using a cane but eventually went to her walker.  Her PMH includes chronic back pain (under pain management), left TKR, previous right LE DVT, + smoker.  She lives on the bottom floor of a renovated older home with her son living in the upstairs unit.      Assessment:  Overall, she seems to be close to her baseline function.  She states feeling weaker still.  Her lungs sounds hint of continued congestion, but she denies a cough and O2 saturation is good.  Home PT to work on pulmonary hygiene and general strengthening exercises to aid in her home recovery and reduce her re-hospitalization risk.     Plan for next visit  1.  Review, amend and progress HEP  2.  Check lung sounds and continue pulmonary hygiene activities.

## 2022-09-08 NOTE — OUTREACH NOTE
Call Center TCM Note    Flowsheet Row Responses   [facility] patient discharged from? Non-BH   Does the patient have one of the following disease processes/diagnoses(primary or secondary)? Other   TCM attempt successful? Yes   Call start time 1415   Call end time 1425   Discharge diagnosis Rehab   Person spoke with today (if not patient) and relationship Patient   Meds reviewed with patient/caregiver? Yes   Is the patient having any side effects they believe may be caused by any medication additions or changes? No   Does the patient have all medications ordered at discharge? N/A   Is the patient taking all medications as directed (includes completed medication regime)? Yes   Psychosocial issues? No   What is the patient's perception of their health status since discharge? Improving   Is the patient/caregiver able to teach back signs and symptoms related to disease process for when to call PCP? Yes   Is the patient/caregiver able to teach back signs and symptoms related to disease process for when to call 911? Yes   Is the patient/caregiver able to teach back the hierarchy of who to call/visit for symptoms/problems? PCP, Specialist, Home health nurse, Urgent Care, ED, 911 Yes   TCM call completed? Yes   Wrap up additional comments Pt states she is doing a little better, but feels weak. Pt reports she was dx with covid when at Rehab, so she was in quarantine in her room, and had to do PT in room. Pt did not feel she got much PT since she had to remain in room r/t covid. Pt advised to add protein drinks to diet, and follow PT instructions to do at home. Pt verbalized understanding. Pt verified PCP fu appt on 9/12/22.          Clarita Rodriguez RN    9/8/2022, 14:30 EDT

## 2022-09-09 ENCOUNTER — SPECIALTY PHARMACY (OUTPATIENT)
Dept: PHARMACY | Facility: HOSPITAL | Age: 82
End: 2022-09-09

## 2022-09-09 ENCOUNTER — OFFICE VISIT (OUTPATIENT)
Dept: ONCOLOGY | Facility: CLINIC | Age: 82
End: 2022-09-09

## 2022-09-09 ENCOUNTER — LAB (OUTPATIENT)
Dept: LAB | Facility: HOSPITAL | Age: 82
End: 2022-09-09

## 2022-09-09 VITALS
HEIGHT: 63 IN | RESPIRATION RATE: 18 BRPM | WEIGHT: 198.5 LBS | DIASTOLIC BLOOD PRESSURE: 68 MMHG | TEMPERATURE: 97.1 F | SYSTOLIC BLOOD PRESSURE: 120 MMHG | BODY MASS INDEX: 35.17 KG/M2 | HEART RATE: 82 BPM | OXYGEN SATURATION: 98 %

## 2022-09-09 DIAGNOSIS — E53.8 LOW VITAMIN B12 LEVEL: ICD-10-CM

## 2022-09-09 DIAGNOSIS — Z79.01 CHRONIC ANTICOAGULATION: ICD-10-CM

## 2022-09-09 DIAGNOSIS — D50.9 IRON DEFICIENCY ANEMIA, UNSPECIFIED IRON DEFICIENCY ANEMIA TYPE: ICD-10-CM

## 2022-09-09 DIAGNOSIS — D52.0 DIETARY FOLATE DEFICIENCY ANEMIA: ICD-10-CM

## 2022-09-09 DIAGNOSIS — Z86.718 HISTORY OF DEEP VENOUS THROMBOSIS (DVT) OF DISTAL VEIN OF RIGHT LOWER EXTREMITY: ICD-10-CM

## 2022-09-09 DIAGNOSIS — I82.412 ACUTE DEEP VEIN THROMBOSIS (DVT) OF FEMORAL VEIN OF LEFT LOWER EXTREMITY: Primary | ICD-10-CM

## 2022-09-09 DIAGNOSIS — I82.4Z2 DVT, LOWER EXTREMITY, DISTAL, ACUTE, LEFT: ICD-10-CM

## 2022-09-09 LAB
ALBUMIN SERPL-MCNC: 4.1 G/DL (ref 3.5–5.2)
ALBUMIN/GLOB SERPL: 1.3 G/DL (ref 1.1–2.4)
ALP SERPL-CCNC: 60 U/L (ref 38–116)
ALT SERPL W P-5'-P-CCNC: 11 U/L (ref 0–33)
ANION GAP SERPL CALCULATED.3IONS-SCNC: 12.3 MMOL/L (ref 5–15)
AST SERPL-CCNC: 14 U/L (ref 0–32)
BASOPHILS # BLD AUTO: 0.07 10*3/MM3 (ref 0–0.2)
BASOPHILS NFR BLD AUTO: 1.2 % (ref 0–1.5)
BILIRUB SERPL-MCNC: 0.4 MG/DL (ref 0.2–1.2)
BUN SERPL-MCNC: 46 MG/DL (ref 6–20)
BUN/CREAT SERPL: 27.1 (ref 7.3–30)
CALCIUM SPEC-SCNC: 10 MG/DL (ref 8.5–10.2)
CHLORIDE SERPL-SCNC: 107 MMOL/L (ref 98–107)
CO2 SERPL-SCNC: 24.7 MMOL/L (ref 22–29)
CREAT SERPL-MCNC: 1.7 MG/DL (ref 0.6–1.1)
DEPRECATED RDW RBC AUTO: 64 FL (ref 37–54)
EGFRCR SERPLBLD CKD-EPI 2021: 30 ML/MIN/1.73
EOSINOPHIL # BLD AUTO: 0.1 10*3/MM3 (ref 0–0.4)
EOSINOPHIL NFR BLD AUTO: 1.7 % (ref 0.3–6.2)
ERYTHROCYTE [DISTWIDTH] IN BLOOD BY AUTOMATED COUNT: 18.3 % (ref 12.3–15.4)
FERRITIN SERPL-MCNC: 521.2 NG/ML (ref 13–150)
GLOBULIN UR ELPH-MCNC: 3.2 GM/DL (ref 1.8–3.5)
GLUCOSE SERPL-MCNC: 108 MG/DL (ref 74–124)
HCT VFR BLD AUTO: 33.3 % (ref 34–46.6)
HGB BLD-MCNC: 10.4 G/DL (ref 12–15.9)
IMM GRANULOCYTES # BLD AUTO: 0.06 10*3/MM3 (ref 0–0.05)
IMM GRANULOCYTES NFR BLD AUTO: 1 % (ref 0–0.5)
IRON 24H UR-MRATE: 107 MCG/DL (ref 37–145)
IRON SATN MFR SERPL: 30 % (ref 14–48)
LYMPHOCYTES # BLD AUTO: 1.65 10*3/MM3 (ref 0.7–3.1)
LYMPHOCYTES NFR BLD AUTO: 28.7 % (ref 19.6–45.3)
MCH RBC QN AUTO: 30.2 PG (ref 26.6–33)
MCHC RBC AUTO-ENTMCNC: 31.2 G/DL (ref 31.5–35.7)
MCV RBC AUTO: 96.8 FL (ref 79–97)
MONOCYTES # BLD AUTO: 0.45 10*3/MM3 (ref 0.1–0.9)
MONOCYTES NFR BLD AUTO: 7.8 % (ref 5–12)
NEUTROPHILS NFR BLD AUTO: 3.42 10*3/MM3 (ref 1.7–7)
NEUTROPHILS NFR BLD AUTO: 59.6 % (ref 42.7–76)
NRBC BLD AUTO-RTO: 0 /100 WBC (ref 0–0.2)
PLATELET # BLD AUTO: 213 10*3/MM3 (ref 140–450)
PMV BLD AUTO: 10.5 FL (ref 6–12)
POTASSIUM SERPL-SCNC: 4.3 MMOL/L (ref 3.5–4.7)
PROT SERPL-MCNC: 7.3 G/DL (ref 6.3–8)
RBC # BLD AUTO: 3.44 10*6/MM3 (ref 3.77–5.28)
SODIUM SERPL-SCNC: 144 MMOL/L (ref 134–145)
TIBC SERPL-MCNC: 351 MCG/DL (ref 249–505)
TRANSFERRIN SERPL-MCNC: 251 MG/DL (ref 200–360)
WBC NRBC COR # BLD: 5.75 10*3/MM3 (ref 3.4–10.8)

## 2022-09-09 PROCEDURE — 36415 COLL VENOUS BLD VENIPUNCTURE: CPT

## 2022-09-09 PROCEDURE — 99215 OFFICE O/P EST HI 40 MIN: CPT | Performed by: INTERNAL MEDICINE

## 2022-09-09 PROCEDURE — 84466 ASSAY OF TRANSFERRIN: CPT | Performed by: INTERNAL MEDICINE

## 2022-09-09 PROCEDURE — 82728 ASSAY OF FERRITIN: CPT | Performed by: INTERNAL MEDICINE

## 2022-09-09 PROCEDURE — 85025 COMPLETE CBC W/AUTO DIFF WBC: CPT

## 2022-09-09 PROCEDURE — 80053 COMPREHEN METABOLIC PANEL: CPT

## 2022-09-09 PROCEDURE — 83540 ASSAY OF IRON: CPT | Performed by: INTERNAL MEDICINE

## 2022-09-09 RX ORDER — LANOLIN ALCOHOL/MO/W.PET/CERES
1000 CREAM (GRAM) TOPICAL DAILY
Start: 2022-09-09

## 2022-09-09 RX ORDER — FOLIC ACID 1 MG/1
1 TABLET ORAL DAILY
Qty: 30 TABLET | Refills: 5 | Status: SHIPPED | OUTPATIENT
Start: 2022-09-09 | End: 2022-10-11 | Stop reason: SDUPTHER

## 2022-09-09 RX ORDER — FERROUS SULFATE 325(65) MG
325 TABLET ORAL DAILY
Start: 2022-09-09 | End: 2022-12-29

## 2022-09-09 NOTE — PROGRESS NOTES
MT Encounter-Re: Adherence and side effects (Pradaxa)    Today's encounter was conducted in person, face-to-face.     Medication:  Pradaxa 150 mg po bid  - Reason for outreach: Unaffordable medication.  - Administration: Patient is taking every day at the same times.  - Missed doses: Patient reports missing 0 doses in the last 30 days.  - Self-administration: Patient demonstrates ability to self-administer medication. No barriers to adherence identified.   - Diagnosis/Indication: DVT. Progress toward achieving therapeutic goals reviewed.   - Patient denies side effects.    - Medication availability/affordability: Patient has had no issues obtaining medication from pharmacy.   - Questions/concerns about medications: Her copay is $100 and is not affordable       Completed medication reconciliation today to assess for drug interactions.   Reviewed allergies, medical history, labs, quality of life( she report that she received epidural injections for pain- we discussed the need to hold Pradaxa prior to such procedures and she was counseled to ask Dr Dubon or pain doctor for directives on holding- this information was shared with Dr Dubon today as well), and medication history with patient.   Patient denies starting or stopping any medications.  Assessed medication list for interactions, no significant drug interactions noted.   Advised pt to call the clinic if any new medications are started so we can assess for drug-drug interactions.     All questions addressed. Patient had no additional concerns for MT office.     She was counseled to call the office for excessive bruising, blood in urine or stool, nosebleeds, or signs of CVA such as weakness on one side of body, headaches or slurred speech.    She was also instructed to call [employer] [facility] with her financial information so that we could apply for [facility] karan.     Educational hand out from [employer] on Pradaxa was also given to the patient.     Luisana  Irais [facility]  9/9/2022  10:19 EDT

## 2022-09-09 NOTE — PROGRESS NOTES
Subjective     CHIEF COMPLAINT:      Chief Complaint   Patient presents with   • Follow-up     Hospital return/weakness/fatigue/shortness of breath/Pradaxa cost       HISTORY OF PRESENT ILLNESS:     Luann Frances is a 82 y.o. female patient who returns today for follow up on her recurrent lower extremity deep vein thrombosis and anemia.  She was hospitalized at [facility] between 8/16/2022 and 8/23/2022.  She had severe pain in the left lower extremity and was unable to walk.  She was taking Xarelto prior to that and did not miss any doses.  She was taken to the OR by vascular surgery and had thrombectomy done and had iliac stent placed.  She was placed on IV heparin drip and transition to Pradaxa.    Patient reports having some bruises over the forearms.  It developed during the hospital stay and some of them may be due to the IV lines.    ROS:  Pertinent ROS is in the HPI.     Past medical, surgical, social and family history were reviewed.     MEDICATIONS:    Current Outpatient Medications:   •  acetaminophen (TYLENOL) 325 MG tablet, Take 2 tablets by mouth Every 4 (Four) Hours As Needed for Mild Pain ., Disp: , Rfl:   •  Advair Diskus 100-50 MCG/DOSE DISKUS, Inhale 1 puff 2 (Two) Times a Day., Disp: 180 each, Rfl: 5  •  albuterol sulfate  (90 Base) MCG/ACT inhaler, Inhale 2 puffs Every 4 (Four) Hours As Needed for Wheezing or Shortness of Air., Disp: 8 g, Rfl: 1  •  cholecalciferol (VITAMIN D3) 25 MCG (1000 UT) tablet, Take 2,000 Units by mouth Daily., Disp: , Rfl:   •  dabigatran etexilate (PRADAXA) 150 MG capsu, Take 1 capsule by mouth Every 12 (Twelve) Hours. Indications: DVT/PE (active thrombosis), Disp: 60 capsule, Rfl:   •  folic acid (FOLVITE) 1 MG tablet, Take 1 tablet by mouth Daily., Disp: , Rfl:   •  furosemide (Lasix) 20 MG tablet, Take 1 tablet by mouth Daily., Disp: 90 tablet, Rfl: 3  •  HYDROcodone-acetaminophen (NORCO) 5-325 MG per tablet, Take 1 tablet by mouth Every 6  "(Six) Hours As Needed for Severe Pain ., Disp: 12 tablet, Rfl: 0  •  melatonin 5 MG tablet tablet, Take 1 tablet by mouth At Night As Needed (sleep)., Disp: , Rfl:   •  omeprazole OTC (PriLOSEC OTC) 20 MG EC tablet, Take 20-40 mg by mouth As Needed., Disp: , Rfl:   •  ondansetron ODT (ZOFRAN-ODT) 4 MG disintegrating tablet, Place 1 tablet on the tongue Every 8 (Eight) Hours As Needed for Nausea or Vomiting., Disp: 10 tablet, Rfl: 0  •  polyethylene glycol (polyethylene glycol) 17 g packet, Take 17 g by mouth Daily., Disp: , Rfl:   •  prochlorperazine (COMPAZINE) 10 MG tablet, Take 1 tablet by mouth Every 6 (Six) Hours As Needed for Nausea or Vomiting., Disp: 100 tablet, Rfl: 5  Objective     VITAL SIGNS:     Vitals:    09/09/22 0945   BP: 120/68   Pulse: 82   Resp: 18   Temp: 97.1 °F (36.2 °C)   TempSrc: Temporal   SpO2: 98%   Weight: 90 kg (198 lb 8 oz)   Height: 160 cm (62.99\")   PainSc: 0-No pain     Body mass index is 35.17 kg/m².     Wt Readings from Last 5 Encounters:   09/09/22 90 kg (198 lb 8 oz)   08/17/22 94.8 kg (209 lb)   08/11/22 94.8 kg (209 lb)   07/14/22 94.8 kg (209 lb)   05/23/22 99.3 kg (219 lb)     PHYSICAL EXAMINATION:   GENERAL: The patient appears in fair general condition, not in acute distress.   SKIN: Ecchymosis over the forearms bilaterally.  EYES: No jaundice. Pallor.  LYMPHATICS: No cervical lymphadenopathy.  CHEST: Normal respiratory effort.    CVS: No edema.  EXTREMITIES: Improvement in the left foot cyanotic changes.  Left dorsalis pedis pulse was +2.  No calf tenderness.    DIAGNOSTIC DATA:     Results from last 7 days   Lab Units 09/09/22  0918   WBC 10*3/mm3 5.75   NEUTROS ABS 10*3/mm3 3.42   HEMOGLOBIN g/dL 10.4*   HEMATOCRIT % 33.3*   PLATELETS 10*3/mm3 213     Results from last 7 days   Lab Units 09/09/22  0918   SODIUM mmol/L 144   POTASSIUM mmol/L 4.3   CHLORIDE mmol/L 107   CO2 mmol/L 24.7   BUN mg/dL 46*   CREATININE mg/dL 1.70*   CALCIUM mg/dL 10.0   ALBUMIN g/dL 4.10 "   BILIRUBIN mg/dL 0.4   ALK PHOS U/L 60   ALT (SGPT) U/L 11   AST (SGOT) U/L 14   GLUCOSE mg/dL 108         Lab 09/09/22  1039   IRON 107   IRON SATURATION 30   TIBC 351   TRANSFERRIN 251   FERRITIN 521.20*        Assessment & Plan    *Acute extensive left lower extremity DVT in a patient with prior history of bilateral lower extremity DVT  · Patient developed right lower extremity DVT on 4/1/2021.  · Patient developed left DVT in the popliteal, posterior tibial, peroneal and soleal veins on 4/12/2022.  · She was on Xarelto and was taking it regularly.  · On 8/16/2022, she developed left foot pain and was unable to walk.  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  She was taken to the OR on 8/17/2022.  · She underwent thrombectomy and angioplasty with placement of stent in the left iliac vein.  · Was initially placed on IV heparin.  · Thrombophilia work-up was obtained.  · Testing for factor V Leiden and prothrombin mutation was negative.  · Protein S was 39%-consumption versus deficiency - will be repeated in the future.  · Anticardiolipin and B2 glycoprotein antibodies are negative. Lupus anticoagulant is pending.  · With 2 antiphospholipid antibodies negative, antiphospholipid syndrome less likely.   · On 8/20/2022, patient was switched to Pradaxa 150 mg twice daily.  · She is tolerating Pradaxa except for bruising.  · No symptoms or signs of recurrent thrombosis.    *Iron deficiency anemia.    · Hemoglobin decreased to 9.4 on 8/19/2022.  · IV Ferrlecit 250 mg x 3 was given between 8/19/2022 and 8/21/2022.  · Hemoglobin is 10.4 today.  · Iron stores improved with ferritin at 521 and transferrin saturation at 30%.     Vitamin B12 deficiency anemia.  · Vitamin B12 was 266 on 9/24/2019.  · B12 was 229 on on 8/19/2022.   · Vitamin B12 IM was given x3 in August 2022.  · I recommended starting vitamin B12 1000 mcg daily.     Folate deficiency anemia.  · Folate was low at  3.89.  · Patient was started on folic acid 1 mg daily.     PLAN:     1.  Continue Pradaxa 150 mg twice daily.  I explained that she will need to continue anticoagulation lifelong.  2.  Ferrous sulfate 325 mg daily.  3.  Continue folic acid 1 mg daily.  4.  Start vitamin B12 1000 mcg daily.  5.  Return in 2 months for CBC ferritin iron panel with RN review.  6.  I will see her in follow-up in 4 months with CBC ferritin iron panel B12 and folate levels.  7.  Patient states that she used to have epidural injections done.  I recommended avoiding any interventions for the upcoming 5 months.  I explained that interruption in anticoagulation to allow for surgical interventions carries the risk of recurrent thrombosis specially with her history of developing extensive left lower extremity DVT while on Xarelto.  The interruption to allow epidural injections carries a high risk for the patient for developing recurrent thrombosis.    Discussed with the patient's son.    I spent 42 minutes caring for Luann on this date of service. This time includes time spent by me in the following activities: preparing for the visit, reviewing tests, obtaining and/or reviewing a separately obtained history, performing a medically appropriate examination and/or evaluation, counseling and educating the patient/family/caregiver, ordering medications, tests, or procedures, documenting information in the medical record, independently interpreting results and communicating that information with the patient/family/caregiver and care coordination        Lita Dubon MD  09/09/22

## 2022-09-12 ENCOUNTER — OFFICE VISIT (OUTPATIENT)
Dept: FAMILY MEDICINE CLINIC | Facility: CLINIC | Age: 82
End: 2022-09-12

## 2022-09-12 VITALS
WEIGHT: 200 LBS | SYSTOLIC BLOOD PRESSURE: 156 MMHG | HEART RATE: 98 BPM | DIASTOLIC BLOOD PRESSURE: 74 MMHG | OXYGEN SATURATION: 98 % | BODY MASS INDEX: 35.44 KG/M2 | HEIGHT: 63 IN | TEMPERATURE: 97.3 F

## 2022-09-12 DIAGNOSIS — I10 ESSENTIAL HYPERTENSION: ICD-10-CM

## 2022-09-12 DIAGNOSIS — E03.9 PRIMARY HYPOTHYROIDISM: ICD-10-CM

## 2022-09-12 DIAGNOSIS — J43.8 OTHER EMPHYSEMA: ICD-10-CM

## 2022-09-12 DIAGNOSIS — N18.32 STAGE 3B CHRONIC KIDNEY DISEASE: ICD-10-CM

## 2022-09-12 DIAGNOSIS — R11.0 CHRONIC NAUSEA: ICD-10-CM

## 2022-09-12 DIAGNOSIS — I82.412 ACUTE DEEP VEIN THROMBOSIS (DVT) OF FEMORAL VEIN OF LEFT LOWER EXTREMITY: Primary | ICD-10-CM

## 2022-09-12 PROCEDURE — 99215 OFFICE O/P EST HI 40 MIN: CPT | Performed by: STUDENT IN AN ORGANIZED HEALTH CARE EDUCATION/TRAINING PROGRAM

## 2022-09-12 RX ORDER — MIRTAZAPINE 15 MG/1
15 TABLET, FILM COATED ORAL NIGHTLY
COMMUNITY
End: 2022-10-11 | Stop reason: SDUPTHER

## 2022-09-12 RX ORDER — LEVOTHYROXINE SODIUM 0.1 MG/1
100 TABLET ORAL DAILY
COMMUNITY

## 2022-09-13 PROBLEM — E88.819 INSULIN RESISTANCE: Status: RESOLVED | Noted: 2017-04-10 | Resolved: 2022-09-13

## 2022-09-13 PROBLEM — E88.81 INSULIN RESISTANCE: Status: RESOLVED | Noted: 2017-04-10 | Resolved: 2022-09-13

## 2022-09-13 PROBLEM — L98.9 ARM SKIN LESION, RIGHT: Status: RESOLVED | Noted: 2021-11-17 | Resolved: 2022-09-13

## 2022-09-13 PROBLEM — R07.89 NON-CARDIAC CHEST PAIN: Status: RESOLVED | Noted: 2021-02-14 | Resolved: 2022-09-13

## 2022-09-13 PROBLEM — M60.9 MYOSITIS OF LEFT LOWER EXTREMITY: Status: RESOLVED | Noted: 2022-08-16 | Resolved: 2022-09-13

## 2022-09-13 PROBLEM — S81.811A SKIN TEAR OF RIGHT LOWER LEG WITHOUT COMPLICATION: Status: RESOLVED | Noted: 2022-05-17 | Resolved: 2022-09-13

## 2022-09-13 NOTE — ASSESSMENT & PLAN NOTE
Patient with longstanding history of chronic nausea for which she has seen gastroenterology in the past.  This was thought to be due to Eliquis at that time and was the reason that she was switched to Xarelto.  Since being off Eliquis nausea has completely resolved.

## 2022-09-13 NOTE — ASSESSMENT & PLAN NOTE
/74 today.  This is above goal of 140/90.  Patient currently on Lasix 20 mg as needed.  She is euvolemic on exam today.  I am hesitant to start a new antihypertensive given her very high fall risk due to deconditioning from hospitalization.  She is asymptomatic.  Patient was instructed to take blood pressure regularly at home and call if BP consistently above 140/90 or she develops symptoms related to blood pressure.  We will reevaluate at next appointment and consider initiation of antihypertensive at that time if needed.

## 2022-09-13 NOTE — ASSESSMENT & PLAN NOTE
Patient with history of DVTs in the past for which she was on Eliquis, then transition to Xarelto.  On Xarelto she experienced new left lower extremity DVT that required mechanical thrombectomy with vascular surgery on 8/17.  Patient was admitted from 8/16-8/23.  Following thrombectomy she was on heparin and then transition to Pradaxa.   No concerns with bleeding or bruising at this time.  She has seen hematology since discharge who recommended continuing Pradaxa.  So far, hypercoagulable work-up negative.  Patient is to continue Pradaxa lifelong.  Per hematology, there should be no disruptions of Pradaxa for the next 5 months minimum.

## 2022-09-13 NOTE — ASSESSMENT & PLAN NOTE
Patient follows with Dr. Arce and nephrology.  Etiology thought to be due to nephrosclerosis. sCr 1.7 on 9/9/2022.   On Lasix 20 mg as needed.  Patient euvolemic on exam today.  She does have associated anemia that required IV iron while inpatient. Hgb 10.4 on 9/9/2022.   Monitor with labs every 6 months.  Avoid nephrotoxins including NSAIDs, dehydration.  Caution with IV contrast.

## 2022-09-13 NOTE — ASSESSMENT & PLAN NOTE
Patient currently on 100 mcg of Levoxyl daily.  She has been intolerant to many formulations of thyroid hormone in the past due to various side effects.  Per patient, she had not been on thyroid hormone during hospitalization or prior.  She restarted on 9/6/2023.  Symptoms include fatigue, cold intolerance, muscle aches.  Last labs revealed TSH of 50 on 6/2022.  Continue Levoxyl 100 mcg daily and will recheck TSH at next appointment.

## 2022-09-13 NOTE — PROGRESS NOTES
"Chief Complaint  Establish Care (New pt - Ortho, hemo, PT, pain med, endo, neph. ), Hypothyroidism (Pt was not given levothyroxine while in hospital or rehab. Resumed taking 9/6 when discharged from rehab ), and blood clot (LT. 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  She has family at appointment with her today.    Patient states that she is doing well.  Working with physical therapy after rehab discharge.  Using a walker for stability.  No recent falls.  She also has assistance with bathing and household chores.  Incision site is well-healing without drainage or purulence no excess pain.    She is taking all medications as prescribed.      Objective   Vital Signs:  /74   Pulse 98   Temp 97.3 °F (36.3 °C)   Ht 160 cm (63\")   Wt 90.7 kg (200 lb)   SpO2 98%   BMI 35.43 kg/m²   Estimated body mass index is 35.43 kg/m² as calculated from the following:    Height as of this encounter: 160 cm (63\").    Weight as of this encounter: 90.7 kg (200 lb).    Class 2 Severe Obesity (BMI >=35 and <=39.9). Obesity-related health conditions include the following: obstructive sleep apnea, hypertension, dyslipidemias, GERD and osteoarthritis. Obesity is unchanged. BMI is is above average; BMI management plan is completed. We discussed portion control and increasing exercise.      Physical Exam  Constitutional:       General: She is not in acute distress.  Eyes:      Conjunctiva/sclera: Conjunctivae normal.   Cardiovascular:      Rate and Rhythm: Normal rate and regular rhythm.   Pulmonary:      Effort: Pulmonary effort is normal. No respiratory distress.      Breath sounds: Normal " breath sounds.   Musculoskeletal:      Comments: 1+ nonpitting edema bilaterally   Skin:     General: Skin is warm and dry.   Neurological:      Mental Status: She is alert and oriented to person, place, and time.   Psychiatric:         Mood and Affect: Mood normal.         Behavior: Behavior normal.        Result Review :  The following data was reviewed by: Dinah Humphrey MD on 09/12/2022:  Common labs    Common Labsle 8/22/22 8/23/22 9/9/22 9/9/22      0918 0918   Glucose    108   BUN    46 (A)   Creatinine    1.70 (A)   Sodium    144   Potassium    4.3   Chloride    107   Calcium    10.0   Albumin    4.10   Total Bilirubin    0.4   Alkaline Phosphatase    60   AST (SGOT)    14   ALT (SGPT)    11   WBC 10.43 9.18 5.75    Hemoglobin 10.5 (A) 9.9 (A) 10.4 (A)    Hematocrit 33.2 (A) 30.8 (A) 33.3 (A)    Platelets 209 212 213    (A) Abnormal value            Data reviewed: Recent hospitalization notes - Admission from 8/16-8/23 for left lower extremity DVT which required mechanical thrombectomy.  Patient switched from Xarelto to Pradaxa.  She was discharged to rehab.          Assessment and Plan   Diagnoses and all orders for this visit:    1. Acute deep vein thrombosis (DVT) of femoral vein of left lower extremity (HCC) (Primary)  Assessment & Plan:  Patient with history of DVTs in the past for which she was on Eliquis, then transition to Xarelto.  On Xarelto she experienced new left lower extremity DVT that required mechanical thrombectomy with vascular surgery on 8/17.  Patient was admitted from 8/16-8/23.  Following thrombectomy she was on heparin and then transition to Pradaxa.   No concerns with bleeding or bruising at this time.  She has seen hematology since discharge who recommended continuing Pradaxa.  So far, hypercoagulable work-up negative.  Patient is to continue Pradaxa lifelong.  Per hematology, there should be no disruptions of Pradaxa for the next 5 months minimum.      2. Essential  hypertension  Assessment & Plan:  /74 today.  This is above goal of 140/90.  Patient currently on Lasix 20 mg as needed.  She is euvolemic on exam today.  I am hesitant to start a new antihypertensive given her very high fall risk due to deconditioning from hospitalization.  She is asymptomatic.  Patient was instructed to take blood pressure regularly at home and call if BP consistently above 140/90 or she develops symptoms related to blood pressure.  We will reevaluate at next appointment and consider initiation of antihypertensive at that time if needed.      3. Primary hypothyroidism  Assessment & Plan:  Patient currently on 100 mcg of Levoxyl daily.  She has been intolerant to many formulations of thyroid hormone in the past due to various side effects.  Per patient, she had not been on thyroid hormone during hospitalization or prior.  She restarted on 9/6/2023.  Symptoms include fatigue, cold intolerance, muscle aches.  Last labs revealed TSH of 50 on 6/2022.  Continue Levoxyl 100 mcg daily and will recheck TSH at next appointment.      4. Chronic nausea  Assessment & Plan:  Patient with longstanding history of chronic nausea for which she has seen gastroenterology in the past.  This was thought to be due to Eliquis at that time and was the reason that she was switched to Xarelto.  Since being off Eliquis nausea has completely resolved.      5. Stage 3b chronic kidney disease (HCC)  Assessment & Plan:  Patient follows with Dr. Arce and nephrology.  Etiology thought to be due to nephrosclerosis. sCr 1.7 on 9/9/2022.   On Lasix 20 mg as needed.  Patient euvolemic on exam today.  She does have associated anemia that required IV iron while inpatient. Hgb 10.4 on 9/9/2022.   Monitor with labs every 6 months.  Avoid nephrotoxins including NSAIDs, dehydration.  Caution with IV contrast.      6. Other emphysema (HCC)         I spent 55 minutes caring for Luann on this date of service. This time includes time  spent by me in the following activities:preparing for the visit, performing a medically appropriate examination and/or evaluation , counseling and educating the patient/family/caregiver and ordering medications, tests, or procedures  Follow Up   Return in about 4 months (around 1/12/2023) for HTN.  Patient was given instructions and counseling regarding her condition or for health maintenance advice. Please see specific information pulled into the AVS if appropriate.

## 2022-09-14 ENCOUNTER — HOME CARE VISIT (OUTPATIENT)
Dept: HOME HEALTH SERVICES | Facility: HOME HEALTHCARE | Age: 82
End: 2022-09-14

## 2022-09-14 PROCEDURE — G0151 HHCP-SERV OF PT,EA 15 MIN: HCPCS

## 2022-09-15 VITALS
DIASTOLIC BLOOD PRESSURE: 66 MMHG | SYSTOLIC BLOOD PRESSURE: 120 MMHG | TEMPERATURE: 98.3 F | HEART RATE: 81 BPM | OXYGEN SATURATION: 99 %

## 2022-09-15 NOTE — HOME HEALTH
"Patient stated she fell yesterday getting up from her toilet.  She was unsure how she fell, but did state she did not have her walker in front of her.  She denies LOC and did not hit her head.  She claims no injury.  She continue to complain of being tired: \"I just can't get going.\"  She had labs down last week per blood doctor.  Her Hgb is still low (but over 10) and she was prescibed oral iron and B12 (she has not started them yet and will be today or tomorrow).      Plan for next visit  Continue home safety/falls reduction measures as needed.   Progress/amend HEP as needed."

## 2022-09-16 ENCOUNTER — PATIENT OUTREACH (OUTPATIENT)
Dept: CASE MANAGEMENT | Facility: OTHER | Age: 82
End: 2022-09-16

## 2022-09-16 NOTE — OUTREACH NOTE
AMBULATORY CASE MANAGEMENT NOTE    Name and Relationship of Patient/Support Person: Luann Frances - Fermin    SNF Follow-up    Questions/Answers    Flowsheet Row Responses   Acute Facility Discharged From [facility]   Acute Discharge Date 08/23/22   Name of the Skilled Nursing Facility? [facility]   Purpose of SNF Admission PT, OT, SN   Who is the insurance provider or payor of patient stay? Medicare   Progression of Patient? discharged   Skilled Nursing Discharge Date? 09/06/22   Where was the patient discharged to? Home   Home Health Agency at discharge? Yes   Name of Home Health Agency? [facility]   DME Needed at Discharge? No  [Using walker and cane]   Are there any medication concerns at Discharge No   Does the patient have a follow-up appointment? Yes   Comments Regarding F/U Appt. ? completed with Kam on 9/12/22      Patient Outreach    Introduced self, explained ACM RN role and provided contact information. Spoke with patient regarding transition home. Patient states she is tired, but is getting up for walks and to complete PT exercises. Using walker and hopes to transition to cane. Followed up with PCP this week. No other needs at this time. Follow up scheduled in 2 weeks for needs review.     Education Documentation  No documentation found.        GUERO ALMAGUER  Ambulatory Case Management    9/16/2022, 18:06 EDT

## 2022-09-21 ENCOUNTER — HOME CARE VISIT (OUTPATIENT)
Dept: HOME HEALTH SERVICES | Facility: HOME HEALTHCARE | Age: 82
End: 2022-09-21

## 2022-09-21 PROCEDURE — G0151 HHCP-SERV OF PT,EA 15 MIN: HCPCS

## 2022-09-22 VITALS
OXYGEN SATURATION: 97 % | DIASTOLIC BLOOD PRESSURE: 76 MMHG | TEMPERATURE: 98.3 F | SYSTOLIC BLOOD PRESSURE: 126 MMHG | HEART RATE: 77 BPM

## 2022-09-22 NOTE — HOME HEALTH
"Patient stated she \"slept all day yesterday.\"  Overall, she feels somewhat better but having general malaise still.  When trying to assess possible reasons for such continued fatigue, patient stated she has not been using her CPAP machine for several months (since there was a recall on her machine).  She has been taking the iron and B12 as prescribed.     Plan for next visit  Reassess for planned dc vs extended home care."

## 2022-09-27 ENCOUNTER — DOCUMENTATION (OUTPATIENT)
Dept: PHARMACY | Facility: HOSPITAL | Age: 82
End: 2022-09-27

## 2022-09-27 NOTE — PROGRESS NOTES
I spoke with the patient today and asked if she is still interested in pursuing PAP through [facility] pharmacy to assist with her co-pay cost. She is still interested so I asked that she call me back with her income info by 9/29 so that I can investigate. She v/u and agreed to call me back. She currently has 18 tablets on-hand from her previous fill.    Unique Crowe - Care Coordinator   9/27/2022  14:07 EDT

## 2022-09-28 ENCOUNTER — HOME CARE VISIT (OUTPATIENT)
Dept: HOME HEALTH SERVICES | Facility: HOME HEALTHCARE | Age: 82
End: 2022-09-28

## 2022-09-28 PROCEDURE — G0151 HHCP-SERV OF PT,EA 15 MIN: HCPCS

## 2022-09-29 ENCOUNTER — SPECIALTY PHARMACY (OUTPATIENT)
Dept: PHARMACY | Facility: HOSPITAL | Age: 82
End: 2022-09-29

## 2022-09-29 VITALS
TEMPERATURE: 97.4 F | HEART RATE: 78 BPM | OXYGEN SATURATION: 100 % | DIASTOLIC BLOOD PRESSURE: 70 MMHG | SYSTOLIC BLOOD PRESSURE: 130 MMHG

## 2022-09-29 RX ORDER — DABIGATRAN ETEXILATE 150 MG/1
150 CAPSULE ORAL 2 TIMES DAILY
Qty: 60 CAPSULE | Refills: 5 | Status: SHIPPED | OUTPATIENT
Start: 2022-09-29

## 2022-09-29 NOTE — PROGRESS NOTES
Specialty Pharmacy Initial Fill Coordination Note     Luann is a 82 y.o. female contacted today regarding refills of Pradaxa specialty medication(s).  This is the Initial Fill for [facility] pharmacy    Refill Questions    Flowsheet Row Most Recent Value   Changes to allergies? No   Changes to medications? No   New conditions since last clinic visit No   Unplanned office visit, urgent care, ED, or hospital admission in the last 4 weeks  No   How does patient/caregiver feel medication is working? Good   Financial problems or insurance changes  No   Since the previous refill, were any specialty medication doses or scheduled injections missed or delayed?  No   Does this patient require a clinical escalation to a pharmacist? No          Delivery Questions    Flowsheet Row Most Recent Value   Delivery method Other (Comment)  [Pls beeline to the patient's home to arrive 9/30. Address confirmed $0 co-pay.]   Delivery address correct? Yes   Delivery phone number [phone]   Preferred delivery time? Anytime   Number of medications in delivery 1   Medication being filled and delivered PRADAXA   Doses left of specialty medications 16   Is there any medication that is due not being filled? No   Supplies needed? No supplies needed   Cooler needed? No   Do any medications need mixed or dated? No   Copay form of payment Payment plan already set up   Additional comments N/A   Questions or concerns for the pharmacist? No   Explain any questions or concerns for the pharmacist N/A   Are any medications first time fills? No                 Follow-up: 23 day(s)     Unique Crowe, Pharmacy Technician  Specialty Pharmacy Technician

## 2022-09-29 NOTE — PROGRESS NOTES
Specialty Note ( Pradaxa )    New rx to [facility] for Pradax 150 mg po bid- cr cl is 37ml/min

## 2022-09-29 NOTE — HOME HEALTH
She was able to go to a wedding and reception on Friday. She states she still feels tired a lot but is trying to force herself to get up and move.  She still needs to call the sleep doctor's office and get advice on a new CPAP (she has an appt in October).  Her lungs were clear to auscultation.  Last BM 9/27/22.  Reports no other falls.

## 2022-09-30 ENCOUNTER — PATIENT OUTREACH (OUTPATIENT)
Dept: CASE MANAGEMENT | Facility: OTHER | Age: 82
End: 2022-09-30

## 2022-09-30 NOTE — OUTREACH NOTE
AMBULATORY CASE MANAGEMENT NOTE    Name and Relationship of Patient/Support Person: Luann Frances    SDOH updated and reviewed with the patient during this program:  Financial Resource Strain: Low Risk    • Difficulty of Paying Living Expenses: Not hard at all      Food Insecurity: No Food Insecurity   • Worried About Running Out of Food in the Last Year: Never true   • Ran Out of Food in the Last Year: Never true      Transportation Needs: No Transportation Needs   • Lack of Transportation (Medical): No   • Lack of Transportation (Non-Medical): No     Patient Outreach    Introduced self, explained ACM RN role and provided contact information. Spoke with patient regarding health and wellness. Patient states she si still tired but getting stronger. Son and partner cooking for patient. No concerns with SDOH at this time. Reviewed with patient to drink plenty of water, decrease salt intake, focus on lean meats and vegetables, and to continue her exercises. Patient states PT signed off yesterday. Her BP yesterday was 130/80. No other needs at this time. Follow up scheduled in 2 weeks.     Education Documentation  Self-Care, taught by Adele Reis RN at 9/30/2022  4:41 PM.  Learner: Patient  Readiness: Acceptance  Method: Teach Back  Response: Verbalizes Understanding    Blood Pressure Monitoring, taught by Adele Reis RN at 9/30/2022  4:41 PM.  Learner: Patient  Readiness: Acceptance  Method: Teach Back  Response: Verbalizes Understanding    Healthy Food Choices, taught by Adele Reis RN at 9/30/2022  4:41 PM.  Learner: Patient  Readiness: Acceptance  Method: Teach Back  Response: Verbalizes Understanding    food/fluid intake, taught by Adele Reis RN at 9/30/2022  4:41 PM.  Learner: Patient  Readiness: Acceptance  Method: Teach Back  Response: Verbalizes Understanding    family planning overview, taught by Adele Reis RN at 9/30/2022  4:41 PM.  Learner: Patient  Readiness:  Acceptance  Method: Teach Back  Response: Verbalizes Understanding    activity, taught by Adele Reis RN at 9/30/2022  4:41 PM.  Learner: Patient  Readiness: Acceptance  Method: Teach Back  Response: Verbalizes Understanding          ADELE ALMAGUER  Ambulatory Case Management    9/30/2022, 16:45 EDT

## 2022-10-10 ENCOUNTER — DOCUMENTATION (OUTPATIENT)
Dept: PHARMACY | Facility: HOSPITAL | Age: 82
End: 2022-10-10

## 2022-10-10 ENCOUNTER — TELEPHONE (OUTPATIENT)
Dept: FAMILY MEDICINE CLINIC | Facility: CLINIC | Age: 82
End: 2022-10-10

## 2022-10-10 NOTE — PROGRESS NOTES
I received a phone call from Ms. Frances inquiring on whether she should continue taking Vitamin D and Folic Acid. I told the patient that I'd forward this question to Dr. Dubon's nurse and someone from the office will give her a call back. She v/u.     I sent the following staff message to Millie Echols, Clinic Rn, who's covering for Dr. Ling Avitia's nurse:    Unique Crowe, Pharmacy Technician  Millie Echols, ALIA  Hi April,     Ms. Frances is a patient of Dr. Dubon and she's asking if she should continue taking Vitamin D & Folic Acid.     I told her that I'd ask and we'd give her a call back to let her know.     Thank you,         Unique Crowe - Care Coordinator   10/10/2022  15:28 EDT

## 2022-10-10 NOTE — TELEPHONE ENCOUNTER
Caller: Luann Frances    Relationship: Self    Best call back number:    [phone]          What is the best time to reach you: ANYTIME     Who are you requesting to speak with (clinical staff, provider,  specific staff member): CLINICAL STAFF     What was the call regarding: PATIENT STATES THAT SHE FINISHED HER 30 DAY SUPPLY OF MEDICATIONS THAT WERE PRESCRIBED TO HER WHILE IN THE HOSPITAL.     PATIENT WOULD LIKE TO KNOW IF SHE NEEDS TO CONTINUE TAKING THE furosemide (Lasix) 20 MG tablet AND THE mirtazapine (REMERON) 15 MG tablet    PLEASE ADVISE    Do you require a callback: YES

## 2022-10-11 ENCOUNTER — TELEPHONE (OUTPATIENT)
Dept: ONCOLOGY | Facility: CLINIC | Age: 82
End: 2022-10-11

## 2022-10-11 DIAGNOSIS — M79.89 LEG SWELLING: ICD-10-CM

## 2022-10-11 DIAGNOSIS — G47.00 INSOMNIA, UNSPECIFIED TYPE: Primary | ICD-10-CM

## 2022-10-11 RX ORDER — FUROSEMIDE 20 MG/1
20 TABLET ORAL DAILY
Qty: 90 TABLET | Refills: 3 | Status: ON HOLD | OUTPATIENT
Start: 2022-10-11 | End: 2023-03-13

## 2022-10-11 RX ORDER — FOLIC ACID 1 MG/1
1 TABLET ORAL DAILY
Qty: 30 TABLET | Refills: 5 | Status: SHIPPED | OUTPATIENT
Start: 2022-10-11

## 2022-10-11 RX ORDER — MIRTAZAPINE 15 MG/1
15 TABLET, FILM COATED ORAL NIGHTLY
Qty: 90 TABLET | Refills: 0 | Status: SHIPPED | OUTPATIENT
Start: 2022-10-11 | End: 2023-01-10

## 2022-10-11 NOTE — TELEPHONE ENCOUNTER
----- Message from Lita Dubon MD sent at 10/10/2022 10:25 PM EDT -----  Regarding: RE: Folic Acid & Vitamin D  Please ask her to continue vitamin D and folic acid.    Thank you    ----- Message -----  From: Millie Echols RN  Sent: 10/10/2022   3:57 PM EDT  To: Lita Dubon MD  Subject: FW: Folic Acid & Vitamin D                         ----- Message -----  From: Unique Crowe, Pharmacy Technician  Sent: 10/10/2022   3:25 PM EDT  To: Millie Echols RN  Subject: Folic Acid & Vitamin D                           Hi April,    Ms. Frances is a patient of Dr. Dubon and she's asking if she should continue taking Vitamin D & Folic Acid.    I told her that I'd ask and we'd give her a call back to let her know.    Thank you,

## 2022-10-11 NOTE — TELEPHONE ENCOUNTER
Phoned pt and informed her to continue her Vit D and folic acid. She stated she needed a refill for folic acid. Sent to [facility].

## 2022-10-11 NOTE — TELEPHONE ENCOUNTER
Have her continue until I see her in January. If she would like to reschedule to a sooner appt to address these medications that is okay with me as well. I can refill until she next sees me.

## 2022-10-14 ENCOUNTER — APPOINTMENT (OUTPATIENT)
Dept: CARDIOLOGY | Facility: HOSPITAL | Age: 82
End: 2022-10-14

## 2022-10-24 ENCOUNTER — SPECIALTY PHARMACY (OUTPATIENT)
Dept: PHARMACY | Facility: HOSPITAL | Age: 82
End: 2022-10-24

## 2022-10-24 ENCOUNTER — APPOINTMENT (OUTPATIENT)
Dept: SLEEP MEDICINE | Facility: HOSPITAL | Age: 82
End: 2022-10-24

## 2022-10-24 NOTE — PROGRESS NOTES
Specialty Pharmacy Refill Coordination Note     Luann is a 82 y.o. female contacted today regarding refills of  Pradaxa specialty medication(s).    Reviewed and verified with patient:       Specialty medication(s) and dose(s) confirmed: yes    Refill Questions    Flowsheet Row Most Recent Value   Changes to allergies? No   Changes to medications? No   New conditions since last clinic visit No   Unplanned office visit, urgent care, ED, or hospital admission in the last 4 weeks  No   How does patient/caregiver feel medication is working? Good   Financial problems or insurance changes  No   Since the previous refill, were any specialty medication doses or scheduled injections missed or delayed?  No   Does this patient require a clinical escalation to a pharmacist? No          Delivery Questions    Flowsheet Row Most Recent Value   Delivery method [employer]  [[facility] on 10/26 to arrive 10/27. Address confirmed. $0 co-pay.]   Delivery address correct? Yes   Delivery phone number [phone]   Preferred delivery time? Anytime   Number of medications in delivery 1   Medication being filled and delivered PRADAXA   Doses left of specialty medications 14   Is there any medication that is due not being filled? No   Supplies needed? No supplies needed   Cooler needed? No   Do any medications need mixed or dated? No   Copay form of payment Payment plan already set up   Additional comments N/A   Questions or concerns for the pharmacist? No   Explain any questions or concerns for the pharmacist N/A   Are any medications first time fills? No                 Follow-up: 23 day(s)     Unique Crowe, Pharmacy Technician  Specialty Pharmacy Technician

## 2022-10-27 ENCOUNTER — PATIENT OUTREACH (OUTPATIENT)
Dept: CASE MANAGEMENT | Facility: OTHER | Age: 82
End: 2022-10-27

## 2022-10-27 NOTE — OUTREACH NOTE
AMBULATORY CASE MANAGEMENT NOTE    Name and Relationship of Patient/Support Person: Luann Frances L - Self    *Patient Outreach    Introduced self, explained ACM RN role and provided contact information. Patient reviewed continuing exercise and using walker for safety. Patient feels well supported with caregiver and family support. She would like assistance with added transportation  Resources. SW referral placed.       Education Documentation  safety, taught by Adele Reis RN at 10/27/2022  3:28 PM.  Learner: Patient  Readiness: Acceptance  Method: Teach Back, Explanation  Response: Verbalizes Understanding    activity, taught by Adele Reis RN at 10/27/2022  3:28 PM.  Learner: Patient  Readiness: Acceptance  Method: Teach Back, Explanation  Response: Verbalizes Understanding    Safety, taught by Adele Reis RN at 10/27/2022  3:28 PM.  Learner: Patient  Readiness: Acceptance  Method: Teach Back, Explanation  Response: Verbalizes Understanding    Medication Management, taught by Adele Reis RN at 10/27/2022  3:28 PM.  Learner: Patient  Readiness: Acceptance  Method: Teach Back, Explanation  Response: Verbalizes Understanding    Home Safety, taught by Adele Reis RN at 10/27/2022  3:28 PM.  Learner: Patient  Readiness: Acceptance  Method: Teach Back, Explanation  Response: Verbalizes Understanding    Energy Conservation, taught by Adele Reis RN at 10/27/2022  3:28 PM.  Learner: Patient  Readiness: Acceptance  Method: Teach Back, Explanation  Response: Verbalizes Understanding    Risk Factors, taught by Adele Reis RN at 10/27/2022  3:28 PM.  Learner: Patient  Readiness: Acceptance  Method: Teach Back, Explanation  Response: Verbalizes Understanding          ADELE ALMAGUER  Ambulatory Case Management    10/27/2022, 15:28 EDT

## 2022-11-02 ENCOUNTER — PATIENT OUTREACH (OUTPATIENT)
Dept: CASE MANAGEMENT | Facility: OTHER | Age: 82
End: 2022-11-02

## 2022-11-02 NOTE — OUTREACH NOTE
Social Work Assessment  Questions/Answers    Flowsheet Row Most Recent Value   Referral Source physician   Reason for Consult community resources, transportation   Preferred Language English   People in Home alone   Current Living Arrangements apartment   Primary Care Provided by self   Quality of Family Relationships helpful, supportive, involved   Usual Activity Tolerance moderate   Current Activity Tolerance moderate   Transportation Anticipated agency   Patient's Choice of Community Agency(s) [facility] updated and reviewed with the patient during this program:  Financial Resource Strain: Low Risk    • Difficulty of Paying Living Expenses: Not hard at all      Food Insecurity: No Food Insecurity   • Worried About Running Out of Food in the Last Year: Never true   • Ran Out of Food in the Last Year: Never true      Transportation Needs: No Transportation Needs   • Lack of Transportation (Medical): No   • Lack of Transportation (Non-Medical): No      Housing Stability: Low Risk    • Unable to Pay for Housing in the Last Year: No   • Number of Places Lived in the Last Year: 1   • Unstable Housing in the Last Year: No      Continuing Care   Community [facility]ELOP[facility] OR [facility]    [id number], [place] [id number]    Phone: [phone]    Resource for: Transportation Needs      Patient Outreach    MSW outreach to discuss community resources available to assist patient. Patient states that her children transport her to medical appointments currently but due to work schedules she is looking for additional options for transportation. MSW discussed option of [facility] transportation through [employer]. MSW provided phone number and patient provided approval to send referral to [facility] for transportation.    Care Coordination    MSW sent referral to [facility] regarding transportation resources for patient.    JOANNA GOMEZ -   Ambulatory Case  Management    11/2/2022, 14:18 EDT

## 2022-11-04 ENCOUNTER — TELEPHONE (OUTPATIENT)
Dept: ONCOLOGY | Facility: CLINIC | Age: 82
End: 2022-11-04

## 2022-11-04 ENCOUNTER — CLINICAL SUPPORT (OUTPATIENT)
Dept: ONCOLOGY | Facility: HOSPITAL | Age: 82
End: 2022-11-04

## 2022-11-04 ENCOUNTER — LAB (OUTPATIENT)
Dept: LAB | Facility: HOSPITAL | Age: 82
End: 2022-11-04

## 2022-11-04 DIAGNOSIS — I82.412 ACUTE DEEP VEIN THROMBOSIS (DVT) OF FEMORAL VEIN OF LEFT LOWER EXTREMITY: ICD-10-CM

## 2022-11-04 DIAGNOSIS — D50.9 IRON DEFICIENCY ANEMIA, UNSPECIFIED IRON DEFICIENCY ANEMIA TYPE: ICD-10-CM

## 2022-11-04 PROBLEM — E61.1 IRON DEFICIENCY: Status: ACTIVE | Noted: 2022-11-04

## 2022-11-04 LAB
BASOPHILS # BLD AUTO: 0.07 10*3/MM3 (ref 0–0.2)
BASOPHILS NFR BLD AUTO: 1.2 % (ref 0–1.5)
DEPRECATED RDW RBC AUTO: 57.1 FL (ref 37–54)
EOSINOPHIL # BLD AUTO: 0.22 10*3/MM3 (ref 0–0.4)
EOSINOPHIL NFR BLD AUTO: 3.9 % (ref 0.3–6.2)
ERYTHROCYTE [DISTWIDTH] IN BLOOD BY AUTOMATED COUNT: 14.9 % (ref 12.3–15.4)
FERRITIN SERPL-MCNC: 156.2 NG/ML (ref 13–150)
HCT VFR BLD AUTO: 35.8 % (ref 34–46.6)
HGB BLD-MCNC: 11.1 G/DL (ref 12–15.9)
IMM GRANULOCYTES # BLD AUTO: 0.01 10*3/MM3 (ref 0–0.05)
IMM GRANULOCYTES NFR BLD AUTO: 0.2 % (ref 0–0.5)
IRON 24H UR-MRATE: 48 MCG/DL (ref 37–145)
IRON SATN MFR SERPL: 14 % (ref 14–48)
LYMPHOCYTES # BLD AUTO: 1.64 10*3/MM3 (ref 0.7–3.1)
LYMPHOCYTES NFR BLD AUTO: 29.2 % (ref 19.6–45.3)
MCH RBC QN AUTO: 31.8 PG (ref 26.6–33)
MCHC RBC AUTO-ENTMCNC: 31 G/DL (ref 31.5–35.7)
MCV RBC AUTO: 102.6 FL (ref 79–97)
MONOCYTES # BLD AUTO: 0.66 10*3/MM3 (ref 0.1–0.9)
MONOCYTES NFR BLD AUTO: 11.7 % (ref 5–12)
NEUTROPHILS NFR BLD AUTO: 3.02 10*3/MM3 (ref 1.7–7)
NEUTROPHILS NFR BLD AUTO: 53.8 % (ref 42.7–76)
NRBC BLD AUTO-RTO: 0 /100 WBC (ref 0–0.2)
PLATELET # BLD AUTO: 257 10*3/MM3 (ref 140–450)
PMV BLD AUTO: 10.3 FL (ref 6–12)
RBC # BLD AUTO: 3.49 10*6/MM3 (ref 3.77–5.28)
TIBC SERPL-MCNC: 339 MCG/DL (ref 249–505)
TRANSFERRIN SERPL-MCNC: 242 MG/DL (ref 200–360)
WBC NRBC COR # BLD: 5.62 10*3/MM3 (ref 3.4–10.8)

## 2022-11-04 PROCEDURE — 82728 ASSAY OF FERRITIN: CPT

## 2022-11-04 PROCEDURE — G0463 HOSPITAL OUTPT CLINIC VISIT: HCPCS

## 2022-11-04 PROCEDURE — 84466 ASSAY OF TRANSFERRIN: CPT

## 2022-11-04 PROCEDURE — 85025 COMPLETE CBC W/AUTO DIFF WBC: CPT

## 2022-11-04 PROCEDURE — 83540 ASSAY OF IRON: CPT

## 2022-11-04 PROCEDURE — 36415 COLL VENOUS BLD VENIPUNCTURE: CPT

## 2022-11-04 NOTE — TELEPHONE ENCOUNTER
Reviewed Dr. Dubon's note and instructions with pt, she v/u and will wait for a call from scheduling.

## 2022-11-04 NOTE — NURSING NOTE
Pt here for RN review, she is without complaints.  She continues on Pradaxa, folic acid, vit D and iron supplement.   Lab Results   Component Value Date    WBC 5.62 11/04/2022    HGB 11.1 (L) 11/04/2022    HCT 35.8 11/04/2022    .6 (H) 11/04/2022     11/04/2022         Hgb stable and improved since last visit, informed pt and son that her iron labs were still processing but I could call them with results if they are abnormal, they v/u.  Labs printed and provided, no further questions

## 2022-11-04 NOTE — TELEPHONE ENCOUNTER
----- Message from Adore Singh RN sent at 11/4/2022  1:32 PM EDT -----  Please call this pt to schedule Venofer 300 mg weekly x 5 doses starting late next wek. She is aware.    Thanks!    ----- Message -----  From: Lita Dubon MD  Sent: 11/4/2022  12:44 PM EDT  To: Adore Singh RN    Based on her iron levels and persistent anemia, recommend IV Venofer 300 mg weekly x5 doses.    Thank you    ----- Message -----  From: Adore Singh RN  Sent: 11/4/2022  12:36 PM EDT  To: Lita Dubon MD    Would you like this pt to receive IV iron?    ----- Message -----  From: Juan C Crowell RN  Sent: 11/4/2022  10:48 AM EDT  To: Adore Singh RN    This is a [facility] patient, but her Iron Sat is <15 so I think she'll qualify for iron.    ----- Message -----  From: Smita Anderson RN  Sent: 11/4/2022  10:44 AM EDT  To: Juan C Crowlel RN    Pt was here today for RN review, she didn't wait for her iron labs.  They have resulted and her iron sat has dropped, not sure if she will require IV iron

## 2022-11-04 NOTE — TELEPHONE ENCOUNTER
Caller: Luann Frances    Relationship: Self    Best call back number: [phone]    What is the best time to reach you: ANYTIME    Who are you requesting to speak with (clinical staff, provider,  specific staff member): MAURIZIO DENNY    What was the call regarding: PLEASE CALL PT TO SCHED HER NEXT IRON INF APPT.     Do you require a callback: YES

## 2022-11-04 NOTE — TELEPHONE ENCOUNTER
----- Message from Lita Dubon MD sent at 11/4/2022 12:43 PM EDT -----  Based on her iron levels and persistent anemia, recommend IV Venofer 300 mg weekly x5 doses.    Thank you    ----- Message -----  From: Adore iSngh RN  Sent: 11/4/2022  12:36 PM EDT  To: Lita Dubon MD    Would you like this pt to receive IV iron?    ----- Message -----  From: Juan C Crowell RN  Sent: 11/4/2022  10:48 AM EDT  To: Adore Singh RN    This is a [facility] patient, but her Iron Sat is <15 so I think she'll qualify for iron.    ----- Message -----  From: Smita Anderson RN  Sent: 11/4/2022  10:44 AM EDT  To: Juan C Crowell RN    Pt was here today for RN review, she didn't wait for her iron labs.  They have resulted and her iron sat has dropped, not sure if she will require IV iron

## 2022-11-11 ENCOUNTER — INFUSION (OUTPATIENT)
Dept: ONCOLOGY | Facility: HOSPITAL | Age: 82
End: 2022-11-11

## 2022-11-11 ENCOUNTER — PATIENT OUTREACH (OUTPATIENT)
Dept: CASE MANAGEMENT | Facility: OTHER | Age: 82
End: 2022-11-11

## 2022-11-11 VITALS
TEMPERATURE: 97 F | DIASTOLIC BLOOD PRESSURE: 68 MMHG | WEIGHT: 206 LBS | RESPIRATION RATE: 16 BRPM | SYSTOLIC BLOOD PRESSURE: 158 MMHG | HEART RATE: 80 BPM | BODY MASS INDEX: 36.49 KG/M2 | OXYGEN SATURATION: 96 %

## 2022-11-11 DIAGNOSIS — D50.9 IRON DEFICIENCY ANEMIA, UNSPECIFIED IRON DEFICIENCY ANEMIA TYPE: Primary | ICD-10-CM

## 2022-11-11 PROCEDURE — 25010000002 IRON SUCROSE PER 1 MG: Performed by: INTERNAL MEDICINE

## 2022-11-11 PROCEDURE — 63710000001 DIPHENHYDRAMINE PER 50 MG: Performed by: INTERNAL MEDICINE

## 2022-11-11 PROCEDURE — 96365 THER/PROPH/DIAG IV INF INIT: CPT

## 2022-11-11 PROCEDURE — 96375 TX/PRO/DX INJ NEW DRUG ADDON: CPT

## 2022-11-11 RX ORDER — DIPHENHYDRAMINE HCL 25 MG
25 CAPSULE ORAL ONCE
Status: COMPLETED | OUTPATIENT
Start: 2022-11-11 | End: 2022-11-11

## 2022-11-11 RX ORDER — SODIUM CHLORIDE 9 MG/ML
250 INJECTION, SOLUTION INTRAVENOUS ONCE
Status: COMPLETED | OUTPATIENT
Start: 2022-11-11 | End: 2022-11-11

## 2022-11-11 RX ORDER — FAMOTIDINE 10 MG/ML
20 INJECTION, SOLUTION INTRAVENOUS ONCE
Status: COMPLETED | OUTPATIENT
Start: 2022-11-11 | End: 2022-11-11

## 2022-11-11 RX ADMIN — FAMOTIDINE 20 MG: 10 INJECTION INTRAVENOUS at 14:20

## 2022-11-11 RX ADMIN — IRON SUCROSE 300 MG: 20 INJECTION, SOLUTION INTRAVENOUS at 14:33

## 2022-11-11 RX ADMIN — SODIUM CHLORIDE 250 ML: 9 INJECTION, SOLUTION INTRAVENOUS at 14:20

## 2022-11-11 RX ADMIN — DIPHENHYDRAMINE HYDROCHLORIDE 25 MG: 25 CAPSULE ORAL at 14:00

## 2022-11-11 NOTE — OUTREACH NOTE
AMBULATORY CASE MANAGEMENT NOTE    Name and Relationship of Patient/Support Person: Luann Frances - Self    Patient Outreach    Introduced self, explained ACM RN role and provided contact information. Spoke with patient regarding health and wellness. Patient is currently receiving an iron infusion. Patient requested ACM follow up regarding pending transportation resources. Nilsa notified with request for update.     Education Documentation  No documentation found.        GUERO ALMAGUER  Ambulatory Case Management    11/11/2022, 14:32 EST

## 2022-11-14 ENCOUNTER — INFUSION (OUTPATIENT)
Dept: ONCOLOGY | Facility: HOSPITAL | Age: 82
End: 2022-11-14

## 2022-11-14 ENCOUNTER — PATIENT OUTREACH (OUTPATIENT)
Dept: CASE MANAGEMENT | Facility: OTHER | Age: 82
End: 2022-11-14

## 2022-11-14 VITALS
BODY MASS INDEX: 36.99 KG/M2 | HEART RATE: 96 BPM | WEIGHT: 208.8 LBS | RESPIRATION RATE: 18 BRPM | SYSTOLIC BLOOD PRESSURE: 186 MMHG | TEMPERATURE: 97.2 F | DIASTOLIC BLOOD PRESSURE: 78 MMHG | OXYGEN SATURATION: 97 %

## 2022-11-14 DIAGNOSIS — D50.9 IRON DEFICIENCY ANEMIA, UNSPECIFIED IRON DEFICIENCY ANEMIA TYPE: Primary | ICD-10-CM

## 2022-11-14 PROCEDURE — 63710000001 DIPHENHYDRAMINE PER 50 MG: Performed by: INTERNAL MEDICINE

## 2022-11-14 PROCEDURE — 25010000002 IRON SUCROSE PER 1 MG: Performed by: INTERNAL MEDICINE

## 2022-11-14 PROCEDURE — 96366 THER/PROPH/DIAG IV INF ADDON: CPT

## 2022-11-14 PROCEDURE — 96375 TX/PRO/DX INJ NEW DRUG ADDON: CPT

## 2022-11-14 PROCEDURE — 96365 THER/PROPH/DIAG IV INF INIT: CPT

## 2022-11-14 RX ORDER — DIPHENHYDRAMINE HCL 25 MG
25 CAPSULE ORAL ONCE
Status: COMPLETED | OUTPATIENT
Start: 2022-11-14 | End: 2022-11-14

## 2022-11-14 RX ORDER — FAMOTIDINE 10 MG/ML
20 INJECTION, SOLUTION INTRAVENOUS ONCE
Status: COMPLETED | OUTPATIENT
Start: 2022-11-14 | End: 2022-11-14

## 2022-11-14 RX ORDER — SODIUM CHLORIDE 9 MG/ML
250 INJECTION, SOLUTION INTRAVENOUS ONCE
Status: COMPLETED | OUTPATIENT
Start: 2022-11-14 | End: 2022-11-14

## 2022-11-14 RX ADMIN — DIPHENHYDRAMINE HYDROCHLORIDE 25 MG: 25 CAPSULE ORAL at 14:33

## 2022-11-14 RX ADMIN — FAMOTIDINE 20 MG: 10 INJECTION INTRAVENOUS at 14:32

## 2022-11-14 RX ADMIN — SODIUM CHLORIDE 250 ML: 9 INJECTION, SOLUTION INTRAVENOUS at 14:32

## 2022-11-14 RX ADMIN — IRON SUCROSE 300 MG: 20 INJECTION, SOLUTION INTRAVENOUS at 15:02

## 2022-11-14 NOTE — OUTREACH NOTE
Care Coordination    MSW follow-up on referral placed to [facility] via [facility] for transportation and referral was accepted. MSW called Jael Bowers with [facility] and left message to check on status of referral.     JOANNA GOMEZ -   Ambulatory Case Management    11/14/2022, 15:39 EST

## 2022-11-16 DIAGNOSIS — J43.8 OTHER EMPHYSEMA: ICD-10-CM

## 2022-11-16 RX ORDER — FLUTICASONE PROPIONATE AND SALMETEROL 100; 50 UG/1; UG/1
1 POWDER RESPIRATORY (INHALATION) 2 TIMES DAILY
Qty: 180 EACH | Refills: 5 | Status: SHIPPED | OUTPATIENT
Start: 2022-11-16 | End: 2023-02-28 | Stop reason: SDUPTHER

## 2022-11-17 ENCOUNTER — SPECIALTY PHARMACY (OUTPATIENT)
Dept: PHARMACY | Facility: HOSPITAL | Age: 82
End: 2022-11-17

## 2022-11-17 NOTE — PROGRESS NOTES
Specialty Pharmacy Refill Coordination Note     Luann is a 82 y.o. female contacted today regarding refills of  PRADAXA specialty medication(s).    Reviewed and verified with patient:       Specialty medication(s) and dose(s) confirmed: yes    Refill Questions    Flowsheet Row Most Recent Value   Changes to allergies? No   Changes to medications? No   New conditions since last clinic visit No   Unplanned office visit, urgent care, ED, or hospital admission in the last 4 weeks  No   How does patient/caregiver feel medication is working? Good   Financial problems or insurance changes  No   Since the previous refill, were any specialty medication doses or scheduled injections missed or delayed?  No   Does this patient require a clinical escalation to a pharmacist? No          Delivery Questions    Flowsheet Row Most Recent Value   Delivery method [employer]  [[facility] on 11/22 to arrive 11/23. Address confirmed. $0 co-pay.]   Delivery address correct? Yes   Delivery phone number [phone]   Preferred delivery time? Anytime   Number of medications in delivery 1   Medication being filled and delivered PRADAXA   Doses left of specialty medications 20   Is there any medication that is due not being filled? No   Supplies needed? No supplies needed   Cooler needed? No   Do any medications need mixed or dated? No   Copay form of payment Payment plan already set up   Additional comments N/A   Questions or concerns for the pharmacist? No   Explain any questions or concerns for the pharmacist N/A   Are any medications first time fills? No                 Follow-up: 25 day(s)     Unique Crowe, Pharmacy Technician  Specialty Pharmacy Technician

## 2022-11-21 ENCOUNTER — INFUSION (OUTPATIENT)
Dept: ONCOLOGY | Facility: HOSPITAL | Age: 82
End: 2022-11-21

## 2022-11-21 ENCOUNTER — PATIENT OUTREACH (OUTPATIENT)
Dept: CASE MANAGEMENT | Facility: OTHER | Age: 82
End: 2022-11-21

## 2022-11-21 VITALS
OXYGEN SATURATION: 96 % | HEART RATE: 88 BPM | DIASTOLIC BLOOD PRESSURE: 71 MMHG | SYSTOLIC BLOOD PRESSURE: 106 MMHG | BODY MASS INDEX: 36.53 KG/M2 | WEIGHT: 206.2 LBS | RESPIRATION RATE: 18 BRPM | TEMPERATURE: 97.2 F

## 2022-11-21 DIAGNOSIS — D50.9 IRON DEFICIENCY ANEMIA, UNSPECIFIED IRON DEFICIENCY ANEMIA TYPE: Primary | ICD-10-CM

## 2022-11-21 PROCEDURE — 63710000001 DIPHENHYDRAMINE PER 50 MG: Performed by: INTERNAL MEDICINE

## 2022-11-21 PROCEDURE — 25010000002 IRON SUCROSE PER 1 MG: Performed by: INTERNAL MEDICINE

## 2022-11-21 PROCEDURE — 96375 TX/PRO/DX INJ NEW DRUG ADDON: CPT

## 2022-11-21 PROCEDURE — 96365 THER/PROPH/DIAG IV INF INIT: CPT

## 2022-11-21 RX ORDER — SODIUM CHLORIDE 9 MG/ML
250 INJECTION, SOLUTION INTRAVENOUS ONCE
Status: COMPLETED | OUTPATIENT
Start: 2022-11-21 | End: 2022-11-21

## 2022-11-21 RX ORDER — DIPHENHYDRAMINE HCL 25 MG
25 CAPSULE ORAL ONCE
Status: COMPLETED | OUTPATIENT
Start: 2022-11-21 | End: 2022-11-21

## 2022-11-21 RX ORDER — FAMOTIDINE 10 MG/ML
20 INJECTION, SOLUTION INTRAVENOUS ONCE
Status: COMPLETED | OUTPATIENT
Start: 2022-11-21 | End: 2022-11-21

## 2022-11-21 RX ADMIN — FAMOTIDINE 20 MG: 10 INJECTION INTRAVENOUS at 12:57

## 2022-11-21 RX ADMIN — DIPHENHYDRAMINE HYDROCHLORIDE 25 MG: 25 CAPSULE ORAL at 12:57

## 2022-11-21 RX ADMIN — IRON SUCROSE 300 MG: 20 INJECTION, SOLUTION INTRAVENOUS at 13:25

## 2022-11-21 RX ADMIN — SODIUM CHLORIDE 250 ML: 9 INJECTION, SOLUTION INTRAVENOUS at 12:59

## 2022-11-21 NOTE — OUTREACH NOTE
Care Coordination    MSW received incoming call from [facility] with [facility] that patient referral was received via [facility] and accepted. [facility] staff member Unique has scheduled outreach with patient for tomorrow 11/22. MSW follow-up with patient after discussion with [facility] for transportation benefits.     JOANNA GOMEZ -   Ambulatory Case Management    11/21/2022, 11:44 EST

## 2022-11-28 ENCOUNTER — INFUSION (OUTPATIENT)
Dept: ONCOLOGY | Facility: HOSPITAL | Age: 82
End: 2022-11-28

## 2022-11-28 VITALS
TEMPERATURE: 96.8 F | WEIGHT: 207 LBS | DIASTOLIC BLOOD PRESSURE: 71 MMHG | RESPIRATION RATE: 16 BRPM | SYSTOLIC BLOOD PRESSURE: 165 MMHG | OXYGEN SATURATION: 97 % | BODY MASS INDEX: 36.67 KG/M2 | HEART RATE: 72 BPM

## 2022-11-28 DIAGNOSIS — D50.9 IRON DEFICIENCY ANEMIA, UNSPECIFIED IRON DEFICIENCY ANEMIA TYPE: Primary | ICD-10-CM

## 2022-11-28 PROCEDURE — 96375 TX/PRO/DX INJ NEW DRUG ADDON: CPT

## 2022-11-28 PROCEDURE — 25010000002 IRON SUCROSE PER 1 MG: Performed by: INTERNAL MEDICINE

## 2022-11-28 PROCEDURE — 63710000001 DIPHENHYDRAMINE PER 50 MG: Performed by: INTERNAL MEDICINE

## 2022-11-28 PROCEDURE — 96365 THER/PROPH/DIAG IV INF INIT: CPT

## 2022-11-28 RX ORDER — DIPHENHYDRAMINE HCL 25 MG
25 CAPSULE ORAL ONCE
Status: COMPLETED | OUTPATIENT
Start: 2022-11-28 | End: 2022-11-28

## 2022-11-28 RX ORDER — FAMOTIDINE 10 MG/ML
20 INJECTION, SOLUTION INTRAVENOUS ONCE
Status: COMPLETED | OUTPATIENT
Start: 2022-11-28 | End: 2022-11-28

## 2022-11-28 RX ORDER — SODIUM CHLORIDE 9 MG/ML
250 INJECTION, SOLUTION INTRAVENOUS ONCE
Status: COMPLETED | OUTPATIENT
Start: 2022-11-28 | End: 2022-11-28

## 2022-11-28 RX ADMIN — FAMOTIDINE 20 MG: 10 INJECTION INTRAVENOUS at 13:12

## 2022-11-28 RX ADMIN — IRON SUCROSE 300 MG: 20 INJECTION, SOLUTION INTRAVENOUS at 13:31

## 2022-11-28 RX ADMIN — SODIUM CHLORIDE 250 ML: 9 INJECTION, SOLUTION INTRAVENOUS at 13:12

## 2022-11-28 RX ADMIN — DIPHENHYDRAMINE HYDROCHLORIDE 25 MG: 25 CAPSULE ORAL at 13:12

## 2022-11-29 ENCOUNTER — PATIENT OUTREACH (OUTPATIENT)
Dept: CASE MANAGEMENT | Facility: OTHER | Age: 82
End: 2022-11-29

## 2022-11-29 NOTE — OUTREACH NOTE
Care Coordination    MSW outreach to [facility] for follow-up on [facility] referral for patient's transportation benefit. MSW left message and call back number. MSW to await call back from [facility] or [facility] staff member.    JOANNA GOMEZ -   Ambulatory Case Management    11/29/2022, 10:49 EST

## 2022-11-30 ENCOUNTER — PATIENT OUTREACH (OUTPATIENT)
Dept: CASE MANAGEMENT | Facility: OTHER | Age: 82
End: 2022-11-30

## 2022-11-30 NOTE — OUTREACH NOTE
Care Coordination    MSW call back from [facility] with [facility]. Patient unable to be reached by [facility] staff Unique and message was left for patient. [facility] staff member will continue to reach out to patient regarding transportation services.    JOANNA GOMEZ -   Ambulatory Case Management    11/30/2022, 14:37 EST

## 2022-12-01 ENCOUNTER — PATIENT OUTREACH (OUTPATIENT)
Dept: CASE MANAGEMENT | Facility: OTHER | Age: 82
End: 2022-12-01

## 2022-12-01 NOTE — OUTREACH NOTE
AMBULATORY CASE MANAGEMENT NOTE    Name and Relationship of Patient/Support Person: Luann Frances - Self    Patient Outreach    Introduced self, explained ACM RN role and provided contact information. [facility]. She states she screens numbers and has missed a few 502 numbers in the past few days. Patient [facility] a [facility] or to watch form. [facility] sent message to Nilsa BARRON with request. Follow up scheduled next week.     Education Documentation  No documentation found.        GUERO ALMAGUER  Ambulatory Case Management    12/1/2022, 16:51 EST

## 2022-12-05 ENCOUNTER — INFUSION (OUTPATIENT)
Dept: ONCOLOGY | Facility: HOSPITAL | Age: 82
End: 2022-12-05
Payer: MEDICARE

## 2022-12-05 ENCOUNTER — PATIENT OUTREACH (OUTPATIENT)
Dept: CASE MANAGEMENT | Facility: OTHER | Age: 82
End: 2022-12-05

## 2022-12-05 VITALS
TEMPERATURE: 97.6 F | OXYGEN SATURATION: 98 % | BODY MASS INDEX: 36.77 KG/M2 | RESPIRATION RATE: 16 BRPM | HEART RATE: 74 BPM | DIASTOLIC BLOOD PRESSURE: 91 MMHG | SYSTOLIC BLOOD PRESSURE: 155 MMHG | WEIGHT: 207.6 LBS

## 2022-12-05 DIAGNOSIS — D50.9 IRON DEFICIENCY ANEMIA, UNSPECIFIED IRON DEFICIENCY ANEMIA TYPE: Primary | ICD-10-CM

## 2022-12-05 PROCEDURE — 96365 THER/PROPH/DIAG IV INF INIT: CPT

## 2022-12-05 PROCEDURE — 96366 THER/PROPH/DIAG IV INF ADDON: CPT

## 2022-12-05 PROCEDURE — 25010000002 IRON SUCROSE PER 1 MG: Performed by: INTERNAL MEDICINE

## 2022-12-05 PROCEDURE — 96375 TX/PRO/DX INJ NEW DRUG ADDON: CPT

## 2022-12-05 PROCEDURE — 63710000001 DIPHENHYDRAMINE PER 50 MG: Performed by: INTERNAL MEDICINE

## 2022-12-05 RX ORDER — SODIUM CHLORIDE 9 MG/ML
250 INJECTION, SOLUTION INTRAVENOUS ONCE
Status: COMPLETED | OUTPATIENT
Start: 2022-12-05 | End: 2022-12-05

## 2022-12-05 RX ORDER — FAMOTIDINE 10 MG/ML
20 INJECTION, SOLUTION INTRAVENOUS ONCE
Status: COMPLETED | OUTPATIENT
Start: 2022-12-05 | End: 2022-12-05

## 2022-12-05 RX ORDER — DIPHENHYDRAMINE HCL 25 MG
25 CAPSULE ORAL ONCE
Status: COMPLETED | OUTPATIENT
Start: 2022-12-05 | End: 2022-12-05

## 2022-12-05 RX ADMIN — IRON SUCROSE 300 MG: 20 INJECTION, SOLUTION INTRAVENOUS at 12:53

## 2022-12-05 RX ADMIN — FAMOTIDINE 20 MG: 10 INJECTION INTRAVENOUS at 12:48

## 2022-12-05 RX ADMIN — DIPHENHYDRAMINE HYDROCHLORIDE 25 MG: 25 CAPSULE ORAL at 12:45

## 2022-12-05 RX ADMIN — SODIUM CHLORIDE 250 ML: 9 INJECTION, SOLUTION INTRAVENOUS at 12:50

## 2022-12-05 NOTE — OUTREACH NOTE
Patient Outreach    MSW received incoming call from patient regarding voicemail left earlier today. MSW discussed [facility] referral with patient and how to follow-up regarding transportation services. Patient states she received brochure from [facility] regarding transportation services and would like to review. Patient [facility] once she reviews the [facility]ure.  Patient to call back to MSW with additional questions.     JOANNA GOMEZ -   Ambulatory Case Management    12/5/2022, 11:21 EST

## 2022-12-12 ENCOUNTER — HOSPITAL ENCOUNTER (EMERGENCY)
Facility: HOSPITAL | Age: 82
Discharge: HOME | End: 2022-12-12
Attending: EMERGENCY MEDICINE | Admitting: EMERGENCY MEDICINE

## 2022-12-12 ENCOUNTER — APPOINTMENT (OUTPATIENT)
Dept: CT IMAGING | Facility: HOSPITAL | Age: 82
End: 2022-12-12

## 2022-12-12 VITALS
HEART RATE: 64 BPM | HEIGHT: 63 IN | WEIGHT: 207 LBS | RESPIRATION RATE: 18 BRPM | BODY MASS INDEX: 36.68 KG/M2 | SYSTOLIC BLOOD PRESSURE: 168 MMHG | TEMPERATURE: 97.8 F | OXYGEN SATURATION: 99 % | DIASTOLIC BLOOD PRESSURE: 79 MMHG

## 2022-12-12 DIAGNOSIS — R10.9 ABDOMINAL PAIN, UNSPECIFIED ABDOMINAL LOCATION: Primary | ICD-10-CM

## 2022-12-12 LAB
ALBUMIN SERPL-MCNC: 4.4 G/DL (ref 3.5–5.2)
ALBUMIN/GLOB SERPL: 1.5 G/DL
ALP SERPL-CCNC: 72 U/L (ref 39–117)
ALT SERPL W P-5'-P-CCNC: 20 U/L (ref 1–33)
ANION GAP SERPL CALCULATED.3IONS-SCNC: 10.7 MMOL/L (ref 5–15)
AST SERPL-CCNC: 18 U/L (ref 1–32)
BASOPHILS # BLD AUTO: 0.08 10*3/MM3 (ref 0–0.2)
BASOPHILS NFR BLD AUTO: 1.2 % (ref 0–1.5)
BILIRUB SERPL-MCNC: 0.3 MG/DL (ref 0–1.2)
BILIRUB UR QL STRIP: NEGATIVE
BUN SERPL-MCNC: 35 MG/DL (ref 8–23)
BUN/CREAT SERPL: 25.7 (ref 7–25)
CALCIUM SPEC-SCNC: 9.9 MG/DL (ref 8.6–10.5)
CHLORIDE SERPL-SCNC: 104 MMOL/L (ref 98–107)
CLARITY UR: ABNORMAL
CO2 SERPL-SCNC: 27.3 MMOL/L (ref 22–29)
COLOR UR: YELLOW
CREAT SERPL-MCNC: 1.36 MG/DL (ref 0.57–1)
D-LACTATE SERPL-SCNC: 0.7 MMOL/L (ref 0.5–2)
DEPRECATED RDW RBC AUTO: 46.3 FL (ref 37–54)
EGFRCR SERPLBLD CKD-EPI 2021: 39.2 ML/MIN/1.73
EOSINOPHIL # BLD AUTO: 0.29 10*3/MM3 (ref 0–0.4)
EOSINOPHIL NFR BLD AUTO: 4.3 % (ref 0.3–6.2)
ERYTHROCYTE [DISTWIDTH] IN BLOOD BY AUTOMATED COUNT: 13.2 % (ref 12.3–15.4)
GLOBULIN UR ELPH-MCNC: 2.9 GM/DL
GLUCOSE SERPL-MCNC: 98 MG/DL (ref 65–99)
GLUCOSE UR STRIP-MCNC: NEGATIVE MG/DL
HCT VFR BLD AUTO: 40.1 % (ref 34–46.6)
HGB BLD-MCNC: 13 G/DL (ref 12–15.9)
HGB UR QL STRIP.AUTO: NEGATIVE
HOLD SPECIMEN: NORMAL
HOLD SPECIMEN: NORMAL
IMM GRANULOCYTES # BLD AUTO: 0.03 10*3/MM3 (ref 0–0.05)
IMM GRANULOCYTES NFR BLD AUTO: 0.4 % (ref 0–0.5)
KETONES UR QL STRIP: NEGATIVE
LEUKOCYTE ESTERASE UR QL STRIP.AUTO: NEGATIVE
LIPASE SERPL-CCNC: 25 U/L (ref 13–60)
LYMPHOCYTES # BLD AUTO: 1.48 10*3/MM3 (ref 0.7–3.1)
LYMPHOCYTES NFR BLD AUTO: 22.1 % (ref 19.6–45.3)
MCH RBC QN AUTO: 31.8 PG (ref 26.6–33)
MCHC RBC AUTO-ENTMCNC: 32.4 G/DL (ref 31.5–35.7)
MCV RBC AUTO: 98 FL (ref 79–97)
MONOCYTES # BLD AUTO: 0.58 10*3/MM3 (ref 0.1–0.9)
MONOCYTES NFR BLD AUTO: 8.7 % (ref 5–12)
NEUTROPHILS NFR BLD AUTO: 4.24 10*3/MM3 (ref 1.7–7)
NEUTROPHILS NFR BLD AUTO: 63.3 % (ref 42.7–76)
NITRITE UR QL STRIP: NEGATIVE
NRBC BLD AUTO-RTO: 0 /100 WBC (ref 0–0.2)
PH UR STRIP.AUTO: <=5 [PH] (ref 5–8)
PLATELET # BLD AUTO: 227 10*3/MM3 (ref 140–450)
PMV BLD AUTO: 11 FL (ref 6–12)
POTASSIUM SERPL-SCNC: 4.4 MMOL/L (ref 3.5–5.2)
PROT SERPL-MCNC: 7.3 G/DL (ref 6–8.5)
PROT UR QL STRIP: NEGATIVE
RBC # BLD AUTO: 4.09 10*6/MM3 (ref 3.77–5.28)
SODIUM SERPL-SCNC: 142 MMOL/L (ref 136–145)
SP GR UR STRIP: 1.01 (ref 1–1.03)
UROBILINOGEN UR QL STRIP: ABNORMAL
WBC NRBC COR # BLD: 6.7 10*3/MM3 (ref 3.4–10.8)
WHOLE BLOOD HOLD COAG: NORMAL
WHOLE BLOOD HOLD SPECIMEN: NORMAL

## 2022-12-12 PROCEDURE — 96375 TX/PRO/DX INJ NEW DRUG ADDON: CPT

## 2022-12-12 PROCEDURE — 83690 ASSAY OF LIPASE: CPT

## 2022-12-12 PROCEDURE — 96361 HYDRATE IV INFUSION ADD-ON: CPT

## 2022-12-12 PROCEDURE — 85025 COMPLETE CBC W/AUTO DIFF WBC: CPT

## 2022-12-12 PROCEDURE — 74176 CT ABD & PELVIS W/O CONTRAST: CPT

## 2022-12-12 PROCEDURE — 99284 EMERGENCY DEPT VISIT MOD MDM: CPT

## 2022-12-12 PROCEDURE — 36415 COLL VENOUS BLD VENIPUNCTURE: CPT

## 2022-12-12 PROCEDURE — 25010000002 ONDANSETRON PER 1 MG: Performed by: NURSE PRACTITIONER

## 2022-12-12 PROCEDURE — 96376 TX/PRO/DX INJ SAME DRUG ADON: CPT

## 2022-12-12 PROCEDURE — 81003 URINALYSIS AUTO W/O SCOPE: CPT | Performed by: EMERGENCY MEDICINE

## 2022-12-12 PROCEDURE — 80053 COMPREHEN METABOLIC PANEL: CPT

## 2022-12-12 PROCEDURE — 83605 ASSAY OF LACTIC ACID: CPT

## 2022-12-12 PROCEDURE — 25010000002 MORPHINE PER 10 MG: Performed by: NURSE PRACTITIONER

## 2022-12-12 PROCEDURE — 96374 THER/PROPH/DIAG INJ IV PUSH: CPT

## 2022-12-12 PROCEDURE — 99283 EMERGENCY DEPT VISIT LOW MDM: CPT

## 2022-12-12 RX ORDER — MORPHINE SULFATE 2 MG/ML
4 INJECTION, SOLUTION INTRAMUSCULAR; INTRAVENOUS ONCE
Status: COMPLETED | OUTPATIENT
Start: 2022-12-12 | End: 2022-12-12

## 2022-12-12 RX ORDER — ONDANSETRON 2 MG/ML
4 INJECTION INTRAMUSCULAR; INTRAVENOUS ONCE
Status: COMPLETED | OUTPATIENT
Start: 2022-12-12 | End: 2022-12-12

## 2022-12-12 RX ORDER — OXYCODONE HYDROCHLORIDE AND ACETAMINOPHEN 5; 325 MG/1; MG/1
1 TABLET ORAL ONCE
Status: COMPLETED | OUTPATIENT
Start: 2022-12-12 | End: 2022-12-12

## 2022-12-12 RX ORDER — ONDANSETRON 4 MG/1
4 TABLET, ORALLY DISINTEGRATING ORAL EVERY 8 HOURS PRN
Qty: 30 TABLET | Refills: 0 | Status: SHIPPED | OUTPATIENT
Start: 2022-12-12 | End: 2023-03-10 | Stop reason: SDUPTHER

## 2022-12-12 RX ORDER — OXYCODONE HYDROCHLORIDE AND ACETAMINOPHEN 5; 325 MG/1; MG/1
1 TABLET ORAL EVERY 4 HOURS PRN
Qty: 12 TABLET | Refills: 0 | Status: SHIPPED | OUTPATIENT
Start: 2022-12-12 | End: 2023-01-10

## 2022-12-12 RX ORDER — SODIUM CHLORIDE 0.9 % (FLUSH) 0.9 %
10 SYRINGE (ML) INJECTION AS NEEDED
Status: DISCONTINUED | OUTPATIENT
Start: 2022-12-12 | End: 2022-12-13 | Stop reason: HOSPADM

## 2022-12-12 RX ADMIN — OXYCODONE AND ACETAMINOPHEN 1 TABLET: 5; 325 TABLET ORAL at 22:48

## 2022-12-12 RX ADMIN — Medication 10 ML: at 20:13

## 2022-12-12 RX ADMIN — MORPHINE SULFATE 4 MG: 2 INJECTION, SOLUTION INTRAMUSCULAR; INTRAVENOUS at 20:11

## 2022-12-12 RX ADMIN — SODIUM CHLORIDE 1000 ML: 9 INJECTION, SOLUTION INTRAVENOUS at 20:03

## 2022-12-12 RX ADMIN — ONDANSETRON 4 MG: 2 INJECTION INTRAMUSCULAR; INTRAVENOUS at 20:11

## 2022-12-12 RX ADMIN — MORPHINE SULFATE 4 MG: 2 INJECTION, SOLUTION INTRAMUSCULAR; INTRAVENOUS at 22:08

## 2022-12-12 NOTE — ED TRIAGE NOTES
Pt comes to ER for abd pain with left sided flank pain starting around 12 today. Denies pain or changes with urination. Pt states she does have hx of kidney stones.     Pt and RN wearing mask upon triage.

## 2022-12-13 NOTE — ED PROVIDER NOTES
"MD ATTESTATION NOTE    The KYM and I have discussed this patient's history, physical exam, and treatment plan.    I provided a substantive portion of the care of this patient. I personally performed the physical exam, in its entirety. The attached note describes my personal findings.      Luann Frances is a 82 y.o. female who presents to the ED c/o: Left flank pain that radiates to left lower abdomen.  Patient states it felt like when she had kidney stones in the past.  States that the pain started today and resolved while she was waiting in the waiting room.  No nausea vomiting no fever chills.  Patient thinks she probably \"passed a kidney stone \".        On exam:  GENERAL: not distressed  HENT: nares patent  EYES: no scleral icterus  CV: regular rhythm, regular rate  RESPIRATORY: normal effort  ABDOMEN: soft, nontender nondistended no flank tenderness  MUSCULOSKELETAL: no deformity  NEURO: alert, moves all extremities, follows commands  SKIN: warm, dry    Labs  Recent Results (from the past 24 hour(s))   Comprehensive Metabolic Panel    Collection Time: 12/12/22  5:36 PM    Specimen: Blood   Result Value Ref Range    Glucose 98 65 - 99 mg/dL    BUN 35 (H) 8 - 23 mg/dL    Creatinine 1.36 (H) 0.57 - 1.00 mg/dL    Sodium 142 136 - 145 mmol/L    Potassium 4.4 3.5 - 5.2 mmol/L    Chloride 104 98 - 107 mmol/L    CO2 27.3 22.0 - 29.0 mmol/L    Calcium 9.9 8.6 - 10.5 mg/dL    Total Protein 7.3 6.0 - 8.5 g/dL    Albumin 4.40 3.50 - 5.20 g/dL    ALT (SGPT) 20 1 - 33 U/L    AST (SGOT) 18 1 - 32 U/L    Alkaline Phosphatase 72 39 - 117 U/L    Total Bilirubin 0.3 0.0 - 1.2 mg/dL    Globulin 2.9 gm/dL    A/G Ratio 1.5 g/dL    BUN/Creatinine Ratio 25.7 (H) 7.0 - 25.0    Anion Gap 10.7 5.0 - 15.0 mmol/L    eGFR 39.2 (L) >60.0 mL/min/1.73   Lipase    Collection Time: 12/12/22  5:36 PM    Specimen: Blood   Result Value Ref Range    Lipase 25 13 - 60 U/L   Lactic Acid, Plasma    Collection Time: 12/12/22  5:36 PM    Specimen: " Blood   Result Value Ref Range    Lactate 0.7 0.5 - 2.0 mmol/L   Green Top (Gel)    Collection Time: 12/12/22  5:36 PM   Result Value Ref Range    Extra Tube Hold for add-ons.    Lavender Top    Collection Time: 12/12/22  5:36 PM   Result Value Ref Range    Extra Tube hold for add-on    Gold Top - SST    Collection Time: 12/12/22  5:36 PM   Result Value Ref Range    Extra Tube Hold for add-ons.    Light Blue Top    Collection Time: 12/12/22  5:36 PM   Result Value Ref Range    Extra Tube Hold for add-ons.    CBC Auto Differential    Collection Time: 12/12/22  5:36 PM    Specimen: Blood   Result Value Ref Range    WBC 6.70 3.40 - 10.80 10*3/mm3    RBC 4.09 3.77 - 5.28 10*6/mm3    Hemoglobin 13.0 12.0 - 15.9 g/dL    Hematocrit 40.1 34.0 - 46.6 %    MCV 98.0 (H) 79.0 - 97.0 fL    MCH 31.8 26.6 - 33.0 pg    MCHC 32.4 31.5 - 35.7 g/dL    RDW 13.2 12.3 - 15.4 %    RDW-SD 46.3 37.0 - 54.0 fl    MPV 11.0 6.0 - 12.0 fL    Platelets 227 140 - 450 10*3/mm3    Neutrophil % 63.3 42.7 - 76.0 %    Lymphocyte % 22.1 19.6 - 45.3 %    Monocyte % 8.7 5.0 - 12.0 %    Eosinophil % 4.3 0.3 - 6.2 %    Basophil % 1.2 0.0 - 1.5 %    Immature Grans % 0.4 0.0 - 0.5 %    Neutrophils, Absolute 4.24 1.70 - 7.00 10*3/mm3    Lymphocytes, Absolute 1.48 0.70 - 3.10 10*3/mm3    Monocytes, Absolute 0.58 0.10 - 0.90 10*3/mm3    Eosinophils, Absolute 0.29 0.00 - 0.40 10*3/mm3    Basophils, Absolute 0.08 0.00 - 0.20 10*3/mm3    Immature Grans, Absolute 0.03 0.00 - 0.05 10*3/mm3    nRBC 0.0 0.0 - 0.2 /100 WBC   Urinalysis With Microscopic If Indicated (No Culture) - Urine, Clean Catch    Collection Time: 12/12/22 10:05 PM    Specimen: Urine, Clean Catch   Result Value Ref Range    Color, UA Yellow Yellow, Straw    Appearance, UA Cloudy (A) Clear    pH, UA <=5.0 5.0 - 8.0    Specific Gravity, UA 1.012 1.005 - 1.030    Glucose, UA Negative Negative    Ketones, UA Negative Negative    Bilirubin, UA Negative Negative    Blood, UA Negative Negative    Protein,  UA Negative Negative    Leuk Esterase, UA Negative Negative    Nitrite, UA Negative Negative    Urobilinogen, UA 0.2 E.U./dL 0.2 - 1.0 E.U./dL       Radiology  CT Abdomen Pelvis Without Contrast    Result Date: 12/12/2022  CT ABDOMEN PELVIS WO CONTRAST-  INDICATIONS: Left flank pain  TECHNIQUE: Radiation dose reduction techniques were utilized, including automated exposure control and exposure modulation based on body size. Unenhanced ABDOMEN AND PELVIS CT  COMPARISON: 08/11/2022  FINDINGS:  The pancreas is thinned.  A left renal cyst is noted. No urolithiasis or hydronephrosis. A stable 1 cm right adrenal nodule shows low density, compatible with myelolipoma or adenoma  Otherwise unremarkable unenhanced appearance of the liver, gallbladder, spleen, adrenal glands, pancreas, kidneys, bladder.  No bowel obstruction or abnormal bowel thickening is identified. Numerous colonic diverticula are seen that do not appear inflamed. No appendix is noted, compatible with stated history of prior appendectomy.  No free intraperitoneal gas or free fluid.  Scattered small mesenteric and para-aortic lymph nodes are seen that are not significant by size criteria.  Abdominal aorta shows distal bulging, 2.7 cm, axial image 50. Aortic and other arterial calcifications are present. Left common iliac vein stent is seen.  The lung bases show small likely scarring or atelectasis.  Degenerative changes are seen in the spine. No acute fracture is identified.        1. Colonic diverticulosis. No acute inflammatory process of bowel is identified, follow up as indications persist. 2. No urolithiasis or hydronephrosis.  This report was finalized on 12/12/2022 8:37 PM by Dr. Alec Arcos M.D.        Medical Decision Making:  ED Course as of 12/13/22 0223   Mon Dec 12, 2022   2149 Creatinine(!): 1.36  3 months ago was 1.7, improved   []   2201 My differential diagnosis for abdominal pain includes but is not limited to:  Gastritis,  gastroenteritis, peptic ulcer disease, GERD, esophageal perforation, acute appendicitis, mesenteric adenitis, Meckel's diverticulum, epiploic appendagitis, diverticulitis, colon cancer, ulcerative colitis, Crohn's disease, intussusception, small bowel obstruction, adhesions, ischemic bowel, perforated viscus, ileus, obstipation, biliary colic, cholecystitis, cholelithiasis, Saul-Robert Lauri, hepatitis, pancreatitis, common bile duct obstruction, cholangitis, bile leak, splenic trauma, splenic rupture, splenic infarction, splenic abscess, abdominal abscess, ascites, spontaneous bacterial peritonitis, hernia, UTI, cystitis, prostatitis, ureterolithiasis, urinary obstruction, ovarian cyst, torsion, pregnancy, ectopic pregnancy, PID, pelvic abscess, mittelschmerz, endometriosis, AAA, myocardial infarction, pneumonia, cancer, porphyria, DKA, medications, sickle cell, viral syndrome, zoster    []   2236 Pt reports pain feeling better, states she urinated while in the waiting room and then her pain improved, I suspect she passed a kidney stone then based on her pain being improved and no evidence of stone on CT []      ED Course User Index  [] Luba Bhatia, APRN           PPE: Both the patient and I wore a surgical mask throughout the entire patient encounter. I wore protective goggles.     Diagnosis  Final diagnoses:   Abdominal pain, unspecified abdominal location        Bernard Bethea MD  12/13/22 0226

## 2022-12-13 NOTE — ED PROVIDER NOTES
EMERGENCY DEPARTMENT ENCOUNTER    Room Number:  06/06  Date seen:  12/12/2022  PCP: Dinah Hmuphrey MD  Historian: PATIENT      HPI:  Chief Complaint: Left lower quadrant abdominal pain  A complete HPI/ROS/PMH/PSH/SH/FH are unobtainable due to: Nothing  Context: Luann Frances is a 82 y.o. female with a past medical history of COPD, hypothyroidism, CKD, left lower extremity DVT on Pradaxa and remote kidney stones.    She presents to the emergency department complaining of left lower quadrant abdominal pain that started at noon today.  She denies any nausea, vomiting, dysuria or hematuria.  States it feels consistent with prior episodes of kidney stones.  She follows with Dr. Dylan Schwartz with the first urology service reports she has had to have a procedure for her kidney stones before.  States while she was waiting a few hours in the waiting room her pain markedly improved.          PAST MEDICAL HISTORY  Active Ambulatory Problems     Diagnosis Date Noted   • Chronic obstructive pulmonary disease (HCC) 01/21/2016   • Diverticulitis of colon 01/21/2016   • Acid reflux 01/21/2016   • Primary hypothyroidism 01/21/2016   • Insomnia 01/21/2016   • Chronic nausea 01/21/2016   • Post herpetic neuralgia 03/02/2017   • Vitamin D deficiency 02/19/2018   • CKD (chronic kidney disease) stage 3, GFR 30-59 ml/min (CMS/[facility]) 02/19/2018   • Mixed hyperlipidemia 02/19/2018   • Hypersomnia 10/16/2019   • Hyperuricemia 01/20/2020   • Essential hypertension 01/20/2020   • DELROY (obstructive sleep apnea) 12/08/2020   • Renal calculus 01/06/2021   • Multiple closed fractures of ribs of right side 02/12/2021   • Compression fracture of body of thoracic vertebra ([facility]) 08/17/2021   • DVT, lower extremity, distal, acute, left ([facility]) 04/15/2022   • Lower extremity edema 04/15/2022   • Medicare annual wellness visit, subsequent 05/17/2022   • Tobacco abuse 05/17/2022   • Osteoporosis 05/17/2022   • Acute deep vein thrombosis (DVT) of femoral  vein of left lower extremity (HCC) 08/16/2022   • 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   • Iron deficiency 11/04/2022   • Iron deficiency anemia 11/04/2022     Resolved Ambulatory Problems     Diagnosis Date Noted   • Acute sinusitis 01/21/2016   • Atopic rhinitis 01/21/2016   • Asthma 01/21/2016   • Candidiasis 01/21/2016   • Cough 01/21/2016   • Mild dehydration 01/21/2016   • Fatigue 01/21/2016   • Knee pain 01/21/2016   • Pain of lower extremity 01/21/2016   • Chronic obstructive pulmonary disease with acute exacerbation (HCC) 01/21/2016   • Shoulder pain 01/21/2016   • Ventricular premature beats 01/21/2016   • Elevated BP 10/25/2016   • Weight gain 12/07/2016   • Cold intolerance 12/07/2016   • Dyslipidemia 12/07/2016   • Insulin resistance 04/10/2017   • Non-cardiac chest pain 02/14/2021   • Acute deep vein thrombosis (DVT) of femoral vein of right lower extremity ([facility]) 04/15/2021   • Arm skin lesion, right 11/17/2021   • Skin tear of right lower leg without complication 05/17/2022   • Myositis of left lower extremity 08/16/2022     Past Medical History:   Diagnosis Date   • Arthritis    • Asymptomatic PVCs    • Backache    • Chronic bronchitis (HCC)    • CKD (chronic kidney disease)    • Deep vein thrombosis (DVT) of right lower extremity ([facility])    • Fracture of humerus    • GERD (gastroesophageal reflux disease)    • Hyperlipidemia    • Hypertension    • Hypothyroidism    • Pneumonia    • Pulmonary emphysema ([facility])    • Renal calculi    • Sleep apnea    • Thoracic vertebral fracture ([facility])          PAST SURGICAL HISTORY  Past Surgical History:   Procedure Laterality Date   • APPENDECTOMY     • EXTRACORPOREAL SHOCK WAVE LITHOTRIPSY (ESWL) Left 01/06/2021    Procedure: EXTRACORPOREAL SHOCKWAVE LITHOTRIPSY, CYSTOSCOPY, RETROGRADE PYLEOGRAM;  Surgeon: Walter Schwartz MD;  Location: [facility] OR [facility];  Service: Urology;  Laterality: Left;   • EYE SURGERY      cataracts   • HUMERUS  FRACTURE SURGERY     • LYTIC THROMBIN THERAPY Left 8/17/2022    Procedure: LT. LEG THROMBECTOMY/EMBOLECTOMY AND ANGIOPLASTY STENT PLACEMENT OF LEFT ILIAC VEIN;  Surgeon: Theo Bustos MD;  Location: [facility] OR 18/19;  Service: Vascular;  Laterality: Left;   • RIB FRACTURE SURGERY  2021   • THORACOSCOPY Right 02/16/2021    Procedure: bronchoscopy, right video assisted THORACOSCOPY WITH PLATING OF 3, 4, 5, AND 6 RIBS;  Surgeon: Kelley Delong MD;  Location: [facility];  Service: Thoracic;  Laterality: Right;   • TOTAL KNEE ARTHROPLASTY Left 04/2015   • UMBILICAL HERNIA REPAIR           FAMILY HISTORY  Family History   Problem Relation Age of Onset   • Cancer Mother         breast   • Breast cancer Mother    • No Known Problems Father    • Heart disease Maternal Grandmother    • Cancer Brother         esophageal , stomach    • Esophageal cancer Brother    • No Known Problems Son    • Breast cancer Maternal Aunt    • Heart disease Maternal Aunt    • No Known Problems Son    • Malig Hyperthermia Neg Hx          SOCIAL HISTORY  Social History     Socioeconomic History   • Marital status:    Tobacco Use   • Smoking status: Every Day     Packs/day: 0.25     Years: 50.00     Pack years: 12.50     Types: Cigarettes     Start date: 1970   • Smokeless tobacco: Never   Vaping Use   • Vaping Use: Never used   Substance and Sexual Activity   • Alcohol use: Not Currently   • Drug use: No         ALLERGIES  Ambien [zolpidem tartrate], Amoxicillin-pot clavulanate, Doxycycline, Eliquis [apixaban], Levofloxacin, Metronidazole, Naproxen, Sulfamethoxazole-trimethoprim, Synthroid [levothyroxine sodium], and Zolpidem        REVIEW OF SYSTEMS  Review of Systems   Constitutional: Negative.    HENT: Negative.    Eyes: Negative.    Respiratory: Negative.    Gastrointestinal: Positive for abdominal pain.   Endocrine: Negative.    Musculoskeletal: Negative.    Skin: Negative.    Allergic/Immunologic: Negative.     Neurological: Negative.    Hematological: Negative.    Psychiatric/Behavioral: Negative.             PHYSICAL EXAM  ED Triage Vitals   Temp Heart Rate Resp BP SpO2   12/12/22 1712 12/12/22 1645 12/12/22 1645 12/12/22 1715 12/12/22 1645   97.8 °F (36.6 °C) 81 18 170/75 95 %      Temp src Heart Rate Source Patient Position BP Location FiO2 (%)   -- -- -- -- --              Physical Exam      GENERAL: Alert and oriented x4, no acute distress  HENT: nares patent, mucous membranes are moist and intact  EYES: no scleral icterus no injection  CV: regular rhythm, normal rate, no murmurs or peripheral edema  RESPIRATORY: normal effort, breath sounds clear to auscultation diffusely, able to speak in full sentences without head to distress  ABDOMEN: soft and nontender diffusely to anterior abdomen bilateral CVA, no rebound or guarding  MUSCULOSKELETAL: no deformity, normal active range of motion of all extremities  NEURO: alert, moves all extremities, follows commands  PSYCH:  calm, cooperative, nonsuicidal  SKIN: warm, dry and intact      Vital signs and nursing notes reviewed.          LAB RESULTS  Recent Results (from the past 24 hour(s))   Comprehensive Metabolic Panel    Collection Time: 12/12/22  5:36 PM    Specimen: Blood   Result Value Ref Range    Glucose 98 65 - 99 mg/dL    BUN 35 (H) 8 - 23 mg/dL    Creatinine 1.36 (H) 0.57 - 1.00 mg/dL    Sodium 142 136 - 145 mmol/L    Potassium 4.4 3.5 - 5.2 mmol/L    Chloride 104 98 - 107 mmol/L    CO2 27.3 22.0 - 29.0 mmol/L    Calcium 9.9 8.6 - 10.5 mg/dL    Total Protein 7.3 6.0 - 8.5 g/dL    Albumin 4.40 3.50 - 5.20 g/dL    ALT (SGPT) 20 1 - 33 U/L    AST (SGOT) 18 1 - 32 U/L    Alkaline Phosphatase 72 39 - 117 U/L    Total Bilirubin 0.3 0.0 - 1.2 mg/dL    Globulin 2.9 gm/dL    A/G Ratio 1.5 g/dL    BUN/Creatinine Ratio 25.7 (H) 7.0 - 25.0    Anion Gap 10.7 5.0 - 15.0 mmol/L    eGFR 39.2 (L) >60.0 mL/min/1.73   Lipase    Collection Time: 12/12/22  5:36 PM    Specimen: Blood    Result Value Ref Range    Lipase 25 13 - 60 U/L   Lactic Acid, Plasma    Collection Time: 12/12/22  5:36 PM    Specimen: Blood   Result Value Ref Range    Lactate 0.7 0.5 - 2.0 mmol/L   Green Top (Gel)    Collection Time: 12/12/22  5:36 PM   Result Value Ref Range    Extra Tube Hold for add-ons.    Lavender Top    Collection Time: 12/12/22  5:36 PM   Result Value Ref Range    Extra Tube hold for add-on    Gold Top - SST    Collection Time: 12/12/22  5:36 PM   Result Value Ref Range    Extra Tube Hold for add-ons.    Light Blue Top    Collection Time: 12/12/22  5:36 PM   Result Value Ref Range    Extra Tube Hold for add-ons.    CBC Auto Differential    Collection Time: 12/12/22  5:36 PM    Specimen: Blood   Result Value Ref Range    WBC 6.70 3.40 - 10.80 10*3/mm3    RBC 4.09 3.77 - 5.28 10*6/mm3    Hemoglobin 13.0 12.0 - 15.9 g/dL    Hematocrit 40.1 34.0 - 46.6 %    MCV 98.0 (H) 79.0 - 97.0 fL    MCH 31.8 26.6 - 33.0 pg    MCHC 32.4 31.5 - 35.7 g/dL    RDW 13.2 12.3 - 15.4 %    RDW-SD 46.3 37.0 - 54.0 fl    MPV 11.0 6.0 - 12.0 fL    Platelets 227 140 - 450 10*3/mm3    Neutrophil % 63.3 42.7 - 76.0 %    Lymphocyte % 22.1 19.6 - 45.3 %    Monocyte % 8.7 5.0 - 12.0 %    Eosinophil % 4.3 0.3 - 6.2 %    Basophil % 1.2 0.0 - 1.5 %    Immature Grans % 0.4 0.0 - 0.5 %    Neutrophils, Absolute 4.24 1.70 - 7.00 10*3/mm3    Lymphocytes, Absolute 1.48 0.70 - 3.10 10*3/mm3    Monocytes, Absolute 0.58 0.10 - 0.90 10*3/mm3    Eosinophils, Absolute 0.29 0.00 - 0.40 10*3/mm3    Basophils, Absolute 0.08 0.00 - 0.20 10*3/mm3    Immature Grans, Absolute 0.03 0.00 - 0.05 10*3/mm3    nRBC 0.0 0.0 - 0.2 /100 WBC       Ordered the above labs and reviewed the results.        RADIOLOGY  CT Abdomen Pelvis Without Contrast    Result Date: 12/12/2022  CT ABDOMEN PELVIS WO CONTRAST-  INDICATIONS: Left flank pain  TECHNIQUE: Radiation dose reduction techniques were utilized, including automated exposure control and exposure modulation based on  body size. Unenhanced ABDOMEN AND PELVIS CT  COMPARISON: 08/11/2022  FINDINGS:  The pancreas is thinned.  A left renal cyst is noted. No urolithiasis or hydronephrosis. A stable 1 cm right adrenal nodule shows low density, compatible with myelolipoma or adenoma  Otherwise unremarkable unenhanced appearance of the liver, gallbladder, spleen, adrenal glands, pancreas, kidneys, bladder.  No bowel obstruction or abnormal bowel thickening is identified. Numerous colonic diverticula are seen that do not appear inflamed. No appendix is noted, compatible with stated history of prior appendectomy.  No free intraperitoneal gas or free fluid.  Scattered small mesenteric and para-aortic lymph nodes are seen that are not significant by size criteria.  Abdominal aorta shows distal bulging, 2.7 cm, axial image 50. Aortic and other arterial calcifications are present. Left common iliac vein stent is seen.  The lung bases show small likely scarring or atelectasis.  Degenerative changes are seen in the spine. No acute fracture is identified.        1. Colonic diverticulosis. No acute inflammatory process of bowel is identified, follow up as indications persist. 2. No urolithiasis or hydronephrosis.  This report was finalized on 12/12/2022 8:37 PM by Dr. Alec Arcos M.D.        Ordered the above noted radiological studies. Reviewed by me in PACS.            PROCEDURES  Procedures          MEDICATIONS GIVEN IN ER  Medications   sodium chloride 0.9 % flush 10 mL (has no administration in time range)                   MEDICAL DECISION MAKING, PROGRESS, and CONSULTS    All labs have been independently reviewed by me.  All radiology studies have been reviewed by me and discussed with radiologist dictating the report.   EKG's independently viewed and interpreted by me.  Discussion below represents my analysis of pertinent findings related to patient's condition, differential diagnosis, treatment plan and final  disposition.      Additional sources:  - Discussed/ obtained information from independent historians: Discussed with patient    - External (non-ED) record review: ED visit 8/11/2022 showed 3 x 3 mm distal left ureteral calculus with asymmetric stranding around the kidney consistent with obstructive uropathy    Hospitalization 8/16/2022 for left lower extremity myositis related to acute DVT    - Chronic or social conditions impacting care: lives at home with her son and his partner, has good social support      Orders placed during this visit:  Orders Placed This Encounter   Procedures   • [place] Draw   • Comprehensive Metabolic Panel   • Lipase   • Urinalysis With Microscopic If Indicated (No Culture) - Urine, Clean Catch   • Lactic Acid, Plasma   • CBC Auto Differential   • NPO Diet NPO Type: Strict NPO   • Undress & Gown   • Insert Peripheral IV   • CBC & Differential   • Green Top (Gel)   • Lavender Top   • Gold Top - SST   • Light Blue Top         Additional orders considered but not ordered:  I considered blood cultures and lactic acid levels but due to lack of fever, wbc count and urinalysis unremarkable thought not needed        Differential diagnosis:    See below in ED cpourse      Independent interpretation of labs, radiology studies, and discussions with consultants:  ED Course as of 12/12/22 2241   Mon Dec 12, 2022   2149 Creatinine(!): 1.36  3 months ago was 1.7, improved   [AH]   2201 My differential diagnosis for abdominal pain includes but is not limited to:  Gastritis, gastroenteritis, peptic ulcer disease, GERD, esophageal perforation, acute appendicitis, mesenteric adenitis, Meckel's diverticulum, epiploic appendagitis, diverticulitis, colon cancer, ulcerative colitis, Crohn's disease, intussusception, small bowel obstruction, adhesions, ischemic bowel, perforated viscus, ileus, obstipation, biliary colic, cholecystitis, cholelithiasis, Saul-Robert Lauri, hepatitis, pancreatitis, common bile duct  obstruction, cholangitis, bile leak, splenic trauma, splenic rupture, splenic infarction, splenic abscess, abdominal abscess, ascites, spontaneous bacterial peritonitis, hernia, UTI, cystitis, prostatitis, ureterolithiasis, urinary obstruction, ovarian cyst, torsion, pregnancy, ectopic pregnancy, PID, pelvic abscess, mittelschmerz, endometriosis, AAA, myocardial infarction, pneumonia, cancer, porphyria, DKA, medications, sickle cell, viral syndrome, zoster    []   2236 Pt reports pain feeling better, states she urinated while in the waiting room and then her pain improved, I suspect she passed a kidney stone then based on her pain being improved and no evidence of stone on CT []      ED Course User Index  [] Luba Bhatia APRN              I have worn appropriate PPE during this patient encounter, sanitized my hands both with entering and exiting patient's room.      DIAGNOSIS  Final diagnoses:   Abdominal pain, unspecified abdominal location         DISPOSITION  home            Latest Documented Vital Signs:  As of 19:51 EST  BP- 170/75 HR- 81 Temp- 97.8 °F (36.6 °C) O2 sat- 95%        --    Please note that portions of this were completed with a voice recognition program.       Note Disclaimer: At [facility], we believe that sharing information builds trust and better relationships. You are receiving this note because you are receiving care at [facility] or recently visited. It is possible you will see health information before a provider has talked with you about it. This kind of information can be easy to misunderstand. To help you fully understand what it means for your health, we urge you to discuss this note with your provider.           Luba Bhatia APRN  12/12/22 0020

## 2022-12-20 ENCOUNTER — PATIENT OUTREACH (OUTPATIENT)
Dept: CASE MANAGEMENT | Facility: OTHER | Age: 82
End: 2022-12-20

## 2022-12-20 NOTE — OUTREACH NOTE
AMBULATORY CASE MANAGEMENT NOTE    Name and Relationship of Patient/Support Person: Juan DanielLuann - Self    Adult Patient Profile  Questions/Answers    Flowsheet Row Most Recent Value   Symptoms/Conditions Managed at Home genitourinary   Genitourinary Symptoms/Conditions other (see comments)  [pain felt similar to prior kidney stones]   Genitourinary Management Strategies medication therapy  [Patient received pain medication and pain resolved within 1 day]   Genitourinary Self-Management Outcome 4 (good)   Genitourinary Comment No other needs at this time.      Patient Outreach    Introduced self, explained ACM RN role and provided contact information. Reviewed patient's health. Patient doing well. No needs at this time. Patient recently in ED for pain that resembled kidney stones. Pain resolved mostly in waiting room. No other needs at this time. She has the [employer] information but has not reviewed it yet. ACM to follow up next month for concerns or needs.     Education Documentation  No documentation found.        GUERO ALMAGUER  Ambulatory Case Management    12/20/2022, 17:11 EST

## 2022-12-27 ENCOUNTER — SPECIALTY PHARMACY (OUTPATIENT)
Dept: PHARMACY | Facility: HOSPITAL | Age: 82
End: 2022-12-27

## 2022-12-27 NOTE — PROGRESS NOTES
Specialty Pharmacy Refill Coordination Note     Luann is a 82 y.o. female contacted today regarding refills of  PRADAXA specialty medication(s).    Reviewed and verified with patient:       Specialty medication(s) and dose(s) confirmed: yes    Refill Questions    Flowsheet Row Most Recent Value   Changes to allergies? No   Changes to medications? No   New conditions since last clinic visit No   Unplanned office visit, urgent care, ED, or hospital admission in the last 4 weeks  No   How does patient/caregiver feel medication is working? Good   Financial problems or insurance changes  No   Since the previous refill, were any specialty medication doses or scheduled injections missed or delayed?  No   Does this patient require a clinical escalation to a pharmacist? No          Delivery Questions    Flowsheet Row Most Recent Value   Delivery method [employer]  [[facility] on 12/29 to arrive 12/30. Address confirmed.]   Delivery address correct? Yes   Delivery phone number [phone]   Preferred delivery time? Anytime   Number of medications in delivery 1   Medication being filled and delivered PRADAXA   Doses left of specialty medications 16   Is there any medication that is due not being filled? No   Supplies needed? No supplies needed   Cooler needed? No   Do any medications need mixed or dated? No   Copay form of payment Payment plan already set up   Additional comments N/A   Questions or concerns for the pharmacist? No   Explain any questions or concerns for the pharmacist N/A   Are any medications first time fills? No                 Follow-up: 23 day(s)     Unique Crowe, Pharmacy Technician  Specialty Pharmacy Technician

## 2022-12-29 ENCOUNTER — LAB (OUTPATIENT)
Dept: LAB | Facility: HOSPITAL | Age: 82
End: 2022-12-29
Payer: MEDICARE

## 2022-12-29 ENCOUNTER — OFFICE VISIT (OUTPATIENT)
Dept: ONCOLOGY | Facility: CLINIC | Age: 82
End: 2022-12-29

## 2022-12-29 VITALS
HEIGHT: 63 IN | SYSTOLIC BLOOD PRESSURE: 173 MMHG | RESPIRATION RATE: 18 BRPM | HEART RATE: 60 BPM | DIASTOLIC BLOOD PRESSURE: 80 MMHG | OXYGEN SATURATION: 98 % | BODY MASS INDEX: 36.86 KG/M2 | WEIGHT: 208 LBS | TEMPERATURE: 96.9 F

## 2022-12-29 DIAGNOSIS — D52.0 DIETARY FOLATE DEFICIENCY ANEMIA: ICD-10-CM

## 2022-12-29 DIAGNOSIS — Z79.01 CHRONIC ANTICOAGULATION: ICD-10-CM

## 2022-12-29 DIAGNOSIS — E53.8 LOW VITAMIN B12 LEVEL: ICD-10-CM

## 2022-12-29 DIAGNOSIS — I82.412 ACUTE DEEP VEIN THROMBOSIS (DVT) OF FEMORAL VEIN OF LEFT LOWER EXTREMITY: ICD-10-CM

## 2022-12-29 DIAGNOSIS — D50.9 IRON DEFICIENCY ANEMIA, UNSPECIFIED IRON DEFICIENCY ANEMIA TYPE: ICD-10-CM

## 2022-12-29 DIAGNOSIS — I82.412 ACUTE DEEP VEIN THROMBOSIS (DVT) OF FEMORAL VEIN OF LEFT LOWER EXTREMITY: Primary | ICD-10-CM

## 2022-12-29 LAB
BASOPHILS # BLD AUTO: 0.09 10*3/MM3 (ref 0–0.2)
BASOPHILS NFR BLD AUTO: 1.4 % (ref 0–1.5)
DEPRECATED RDW RBC AUTO: 50 FL (ref 37–54)
EOSINOPHIL # BLD AUTO: 0.27 10*3/MM3 (ref 0–0.4)
EOSINOPHIL NFR BLD AUTO: 4.3 % (ref 0.3–6.2)
ERYTHROCYTE [DISTWIDTH] IN BLOOD BY AUTOMATED COUNT: 13.6 % (ref 12.3–15.4)
FERRITIN SERPL-MCNC: 1133.7 NG/ML (ref 13–150)
FOLATE SERPL-MCNC: >20 NG/ML (ref 4.78–24.2)
HCT VFR BLD AUTO: 41.4 % (ref 34–46.6)
HGB BLD-MCNC: 13.1 G/DL (ref 12–15.9)
IMM GRANULOCYTES # BLD AUTO: 0.01 10*3/MM3 (ref 0–0.05)
IMM GRANULOCYTES NFR BLD AUTO: 0.2 % (ref 0–0.5)
IRON 24H UR-MRATE: 99 MCG/DL (ref 37–145)
IRON SATN MFR SERPL: 33 % (ref 14–48)
LYMPHOCYTES # BLD AUTO: 1.54 10*3/MM3 (ref 0.7–3.1)
LYMPHOCYTES NFR BLD AUTO: 24.8 % (ref 19.6–45.3)
MCH RBC QN AUTO: 31.6 PG (ref 26.6–33)
MCHC RBC AUTO-ENTMCNC: 31.6 G/DL (ref 31.5–35.7)
MCV RBC AUTO: 100 FL (ref 79–97)
MONOCYTES # BLD AUTO: 0.56 10*3/MM3 (ref 0.1–0.9)
MONOCYTES NFR BLD AUTO: 9 % (ref 5–12)
NEUTROPHILS NFR BLD AUTO: 3.74 10*3/MM3 (ref 1.7–7)
NEUTROPHILS NFR BLD AUTO: 60.3 % (ref 42.7–76)
NRBC BLD AUTO-RTO: 0 /100 WBC (ref 0–0.2)
PLATELET # BLD AUTO: 217 10*3/MM3 (ref 140–450)
PMV BLD AUTO: 10.6 FL (ref 6–12)
RBC # BLD AUTO: 4.14 10*6/MM3 (ref 3.77–5.28)
TIBC SERPL-MCNC: 300 MCG/DL (ref 249–505)
TRANSFERRIN SERPL-MCNC: 214 MG/DL (ref 200–360)
VIT B12 BLD-MCNC: 536 PG/ML (ref 211–946)
WBC NRBC COR # BLD: 6.21 10*3/MM3 (ref 3.4–10.8)

## 2022-12-29 PROCEDURE — 84466 ASSAY OF TRANSFERRIN: CPT

## 2022-12-29 PROCEDURE — 83540 ASSAY OF IRON: CPT

## 2022-12-29 PROCEDURE — 82607 VITAMIN B-12: CPT | Performed by: INTERNAL MEDICINE

## 2022-12-29 PROCEDURE — 85025 COMPLETE CBC W/AUTO DIFF WBC: CPT

## 2022-12-29 PROCEDURE — 82728 ASSAY OF FERRITIN: CPT

## 2022-12-29 PROCEDURE — 82746 ASSAY OF FOLIC ACID SERUM: CPT | Performed by: INTERNAL MEDICINE

## 2022-12-29 PROCEDURE — 99214 OFFICE O/P EST MOD 30 MIN: CPT | Performed by: INTERNAL MEDICINE

## 2022-12-29 PROCEDURE — 36415 COLL VENOUS BLD VENIPUNCTURE: CPT

## 2022-12-29 RX ORDER — FERROUS SULFATE 325(65) MG
325 TABLET ORAL EVERY OTHER DAY
Start: 2022-12-29 | End: 2023-03-31

## 2022-12-29 RX ORDER — TRAMADOL HYDROCHLORIDE 50 MG/1
TABLET ORAL
COMMUNITY
Start: 2022-12-07

## 2022-12-29 NOTE — PROGRESS NOTES
Subjective     CHIEF COMPLAINT:      Chief Complaint   Patient presents with   • Follow-up       HISTORY OF PRESENT ILLNESS:     Luann Frances is a 82 y.o. female patient who returns today for follow up on her lower extremity DVT and anemia.  She is on Pradaxa 150 mg twice daily.  She is taking it regularly.  She did not miss any doses recently.  She is taking the oral iron, vitamin B12 and folic acid regularly.    Patient reports intermittent swelling of the legs.  It usually develops if she skips taking the furosemide when she goes for doctor visits.    Patient reports improvement in her back pain after she was started on tramadol.  She is not considering having epidural injections in the near future.    ROS:  Pertinent ROS is in the HPI.     Past medical, surgical, social and family history were reviewed.     MEDICATIONS:    Current Outpatient Medications:   •  acetaminophen (TYLENOL) 325 MG tablet, Take 2 tablets by mouth Every 4 (Four) Hours As Needed for Mild Pain ., Disp: , Rfl:   •  albuterol sulfate  (90 Base) MCG/ACT inhaler, Inhale 2 puffs Every 4 (Four) Hours As Needed for Wheezing or Shortness of Air., Disp: 8 g, Rfl: 1  •  cholecalciferol (VITAMIN D3) 25 MCG (1000 UT) tablet, Take 2,000 Units by mouth Daily., Disp: , Rfl:   •  dabigatran etexilate (Pradaxa) 150 MG capsu, Take 1 capsule by mouth 2 (Two) Times a Day., Disp: 60 capsule, Rfl: 5  •  ferrous sulfate 325 (65 FE) MG tablet, Take 1 tablet by mouth Daily., Disp: , Rfl:   •  Fluticasone-Salmeterol (Advair Diskus) 100-50 MCG/ACT DISKUS, Inhale 1 puff 2 (Two) Times a Day., Disp: 180 each, Rfl: 5  •  folic acid (FOLVITE) 1 MG tablet, Take 1 tablet by mouth Daily., Disp: 30 tablet, Rfl: 5  •  furosemide (Lasix) 20 MG tablet, Take 1 tablet by mouth Daily., Disp: 90 tablet, Rfl: 3  •  levothyroxine (SYNTHROID, LEVOTHROID) 100 MCG tablet, Take 100 mcg by mouth Daily., Disp: , Rfl:   •  melatonin 5 MG tablet tablet, Take 1 tablet by mouth At  "Night As Needed (sleep)., Disp: , Rfl:   •  mirtazapine (REMERON) 15 MG tablet, Take 1 tablet by mouth Every Night., Disp: 90 tablet, Rfl: 0  •  omeprazole OTC (PriLOSEC OTC) 20 MG EC tablet, Take 20-40 mg by mouth As Needed., Disp: , Rfl:   •  ondansetron ODT (ZOFRAN-ODT) 4 MG disintegrating tablet, Place 1 tablet on the tongue Every 8 (Eight) Hours As Needed for Nausea or Vomiting., Disp: 30 tablet, Rfl: 0  •  oxyCODONE-acetaminophen (PERCOCET) 5-325 MG per tablet, Take 1 tablet by mouth Every 4 (Four) Hours As Needed for Moderate Pain or Severe Pain., Disp: 12 tablet, Rfl: 0  •  traMADol (ULTRAM) 50 MG tablet, Pt says she takes this as needed, Disp: , Rfl:   •  vitamin B-12 (CYANOCOBALAMIN) 1000 MCG tablet, Take 1 tablet by mouth Daily., Disp: , Rfl:   Objective     VITAL SIGNS:     Vitals:    12/29/22 1047   BP: 173/80   Pulse: 60   Resp: 18   Temp: 96.9 °F (36.1 °C)   TempSrc: Temporal   SpO2: 98%   Weight: 94.3 kg (208 lb)   Height: 160 cm (62.99\")   PainSc: 0-No pain     Body mass index is 36.85 kg/m².     Wt Readings from Last 5 Encounters:   12/29/22 94.3 kg (208 lb)   12/12/22 93.9 kg (207 lb)   12/05/22 94.2 kg (207 lb 9.6 oz)   11/28/22 93.9 kg (207 lb)   11/21/22 93.5 kg (206 lb 3.2 oz)     PHYSICAL EXAMINATION:   GENERAL: The patient appears in fair general condition, not in acute distress.   SKIN: No ecchymosis.  EYES: No jaundice. No pallor.  CHEST: Normal respiratory effort.  Lungs clear bilaterally.  No added sounds.  CVS: Normal S1-S2.  No murmurs.  ABDOMEN: Nondistended  EXTREMITIES: Swelling of both lower extremities with right the leg larger than the left at the level above the ankle.  Left calf tenderness.    DIAGNOSTIC DATA:     Results from last 7 days   Lab Units 12/29/22  1030   WBC 10*3/mm3 6.21   NEUTROS ABS 10*3/mm3 3.74   HEMOGLOBIN g/dL 13.1   HEMATOCRIT % 41.4   PLATELETS 10*3/mm3 217         Lab 12/29/22  1030   IRON 99   IRON SATURATION 33   TIBC 300   TRANSFERRIN 214   FERRITIN " 1,133.70*      CT abdomen pelvis on 12/12/2022:  1. Colonic diverticulosis. No acute inflammatory process of bowel is  identified, follow up as indications persist.  2. No urolithiasis or hydronephrosis.    Assessment & Plan    *Acute extensive left lower extremity DVT in a patient with prior history of bilateral lower extremity DVT  · Patient developed right lower extremity DVT on 4/1/2021.  · Patient developed left DVT in the popliteal, posterior tibial, peroneal and soleal veins on 4/12/2022.  · She was on Xarelto and was taking it regularly.  · On 8/16/2022, she developed left foot pain and was unable to walk.  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  She was taken to the OR on 8/17/2022.  · She underwent thrombectomy and angioplasty with placement of stent in the left iliac vein.  · Was initially placed on IV heparin.  · Thrombophilia work-up was obtained.  · Testing for factor V Leiden and prothrombin mutation was negative.  · Protein S was 39%-consumption versus deficiency - will be repeated in the future.  · Testing for anticardiolipin, B2 glycoprotein antibodies and lupus anticoagulant was negative.  · On 8/20/2022, patient was switched to Pradaxa 150 mg twice daily.  · She is tolerating Pradaxa well.  She is not having problems with bleeding.  · She has some swelling in the legs secondary to the DVT and due to fluid retention.  · She is unable to wear prescription strength compression stockings.  · I recommended the over-the-counter compression stockings that have lower degree of pressure.    *Iron deficiency anemia.    · Hemoglobin decreased to 9.4 on 8/19/2022.  · IV Ferrlecit 250 mg x 3 was given between 8/19/2022 and 8/21/2022.  · Hemoglobin was 10.4 on 9/9/2022.  · Iron stores improved with ferritin at 521 and transferrin saturation at 30%.  · She was continued on oral iron daily.  · Hemoglobin improved to 13.1 today.  · She is tolerating oral  iron.     Vitamin B12 deficiency anemia.  · Vitamin B12 was 266 on 9/24/2019.  · B12 was 229 on on 8/19/2022.   · Vitamin B12 IM was given x3 in August 2022.  · She was started on vitamin B12 1000 mcg daily on 9/9/2022.     Folate deficiency anemia.  · Folate was low at 3.89.  · Patient was started on folic acid 1 mg daily.  · She is taking folic acid daily regularly.     PLAN:     1.  Continue Pradaxa 150 mg twice daily.    2.  I recommended continuing anticoagulation indefinitely.  3.  Reduce ferrous sulfate to 4 days a week.   4.  Continue vitamin B12 1000 mcg daily.   5.  Continue folic acid 1 mg daily.   6.  Follow-up in 6 months with CBC CMP ferritin iron panel B12 folate levels.        Lita Dubon MD  12/29/22

## 2023-01-10 ENCOUNTER — OFFICE VISIT (OUTPATIENT)
Dept: FAMILY MEDICINE CLINIC | Facility: CLINIC | Age: 83
End: 2023-01-10
Payer: MEDICARE

## 2023-01-10 VITALS
DIASTOLIC BLOOD PRESSURE: 82 MMHG | WEIGHT: 208 LBS | SYSTOLIC BLOOD PRESSURE: 162 MMHG | BODY MASS INDEX: 36.86 KG/M2 | OXYGEN SATURATION: 98 % | HEART RATE: 56 BPM | HEIGHT: 63 IN | TEMPERATURE: 97.1 F

## 2023-01-10 DIAGNOSIS — I10 ESSENTIAL HYPERTENSION: Primary | ICD-10-CM

## 2023-01-10 DIAGNOSIS — E03.9 PRIMARY HYPOTHYROIDISM: ICD-10-CM

## 2023-01-10 PROCEDURE — 99214 OFFICE O/P EST MOD 30 MIN: CPT | Performed by: STUDENT IN AN ORGANIZED HEALTH CARE EDUCATION/TRAINING PROGRAM

## 2023-01-10 RX ORDER — LISINOPRIL 5 MG/1
5 TABLET ORAL DAILY
Qty: 90 TABLET | Refills: 0 | Status: SHIPPED | OUTPATIENT
Start: 2023-01-10 | End: 2023-01-13

## 2023-01-10 NOTE — ASSESSMENT & PLAN NOTE
Currently on 100 mcg of levothyroxine daily.  Last TSH was in June 2022 and was 50 -she was not on medication at this time.  Repeat TSH with lab work in 2 weeks.

## 2023-01-10 NOTE — ASSESSMENT & PLAN NOTE
Uncontrolled.  Goal less than 140/90.  We will start lisinopril 5 mg daily.  We will check BMP in 2 weeks to ensure potassium and kidney function are maintained.  Continue to check BP at home.

## 2023-01-10 NOTE — PROGRESS NOTES
Chief Complaint  Hypertension (Recheck )    Subjective        Luann Frances presents to [facility]  History of Present Illness  82-year-old female with hypertension and hypothyroidism, history of lower extremity DVT with stent placement, CKD, COPD presents for follow-up of blood pressure.    Hypertension -BP today 162/82.  At prior appointment she is consistently above 150s over 90s.  She states at home blood pressure ranges from 126 systolic to 160 systolic.  She is not symptomatic.  She is only on Lasix 20 mg daily but no primary antihypertensives.    Hypothyroidism - TSH in June was 50.4.  She had not been on thyroid hormone during this time but restarted it in September.  She started on 100 mcg daily.  She has been on this dose since that time.  She takes the medication at night.  She does not take with food but she does take it with her Pradaxa.  She has noticed changes in her skin, nails and hair.  Has no other symptoms.      Objective   Vital Signs:  /82 (BP Location: Right arm, Patient Position: Sitting, Cuff Size: Adult)   Pulse 56   Temp 97.1 °F (36.2 °C) (Infrared)   Ht 160 cm (63\")   Wt 94.3 kg (208 lb)   SpO2 98%   BMI 36.85 kg/m²   Estimated body mass index is 36.85 kg/m² as calculated from the following:    Height as of this encounter: 160 cm (63\").    Weight as of this encounter: 94.3 kg (208 lb).       Physical Exam  Constitutional:       General: She is not in acute distress.  Eyes:      Conjunctiva/sclera: Conjunctivae normal.   Cardiovascular:      Rate and Rhythm: Normal rate and regular rhythm.   Pulmonary:      Effort: Pulmonary effort is normal. No respiratory distress.      Breath sounds: Normal breath sounds.   Skin:     General: Skin is warm and dry.   Neurological:      Mental Status: She is alert and oriented to person, place, and time.   Psychiatric:         Mood and Affect: Mood normal.         Behavior: Behavior normal.        Result  Review :  The following data was reviewed by: Dinah Humphrey MD on 01/10/2023:  Common labs    Common Labs 11/4/22 12/12/22 12/12/22 12/29/22     1736 1736    Glucose   98    BUN   35 (A)    Creatinine   1.36 (A)    Sodium   142    Potassium   4.4    Chloride   104    Calcium   9.9    Albumin   4.40    Total Bilirubin   0.3    Alkaline Phosphatase   72    AST (SGOT)   18    ALT (SGPT)   20    WBC 5.62 6.70  6.21   Hemoglobin 11.1 (A) 13.0  13.1   Hematocrit 35.8 40.1  41.4   Platelets 257 227  217   (A) Abnormal value            Data reviewed: none             Assessment and Plan   Diagnoses and all orders for this visit:    1. Essential hypertension (Primary)  Assessment & Plan:  Uncontrolled.  Goal less than 140/90.  We will start lisinopril 5 mg daily.  We will check BMP in 2 weeks to ensure potassium and kidney function are maintained.  Continue to check BP at home.    Orders:  -     lisinopril (PRINIVIL,ZESTRIL) 5 MG tablet; Take 1 tablet by mouth Daily.  Dispense: 90 tablet; Refill: 0    2. Primary hypothyroidism  Assessment & Plan:  Currently on 100 mcg of levothyroxine daily.  Last TSH was in June 2022 and was 50 -she was not on medication at this time.  Repeat TSH with lab work in 2 weeks.    Orders:  -     TSH; Future  -     Basic metabolic panel; Future           Follow Up   No follow-ups on file.  Patient was given instructions and counseling regarding her condition or for health maintenance advice. Please see specific information pulled into the AVS if appropriate.

## 2023-01-11 ENCOUNTER — TELEPHONE (OUTPATIENT)
Dept: FAMILY MEDICINE CLINIC | Facility: CLINIC | Age: 83
End: 2023-01-11

## 2023-01-11 DIAGNOSIS — E03.9 PRIMARY HYPOTHYROIDISM: ICD-10-CM

## 2023-01-11 NOTE — TELEPHONE ENCOUNTER
Caller: Luann Frances    Relationship: Self    Best call back number: [phone]    What medications are you currently taking:   Current Outpatient Medications on File Prior to Visit   Medication Sig Dispense Refill   • acetaminophen (TYLENOL) 325 MG tablet Take 2 tablets by mouth Every 4 (Four) Hours As Needed for Mild Pain .     • albuterol sulfate  (90 Base) MCG/ACT inhaler Inhale 2 puffs Every 4 (Four) Hours As Needed for Wheezing or Shortness of Air. 8 g 1   • cholecalciferol (VITAMIN D3) 25 MCG (1000 UT) tablet Take 2,000 Units by mouth Daily.     • dabigatran etexilate (Pradaxa) 150 MG capsu Take 1 capsule by mouth 2 (Two) Times a Day. 60 capsule 5   • ferrous sulfate 325 (65 FE) MG tablet Take 1 tablet by mouth Every Other Day.     • Fluticasone-Salmeterol (Advair Diskus) 100-50 MCG/ACT DISKUS Inhale 1 puff 2 (Two) Times a Day. 180 each 5   • folic acid (FOLVITE) 1 MG tablet Take 1 tablet by mouth Daily. 30 tablet 5   • furosemide (Lasix) 20 MG tablet Take 1 tablet by mouth Daily. 90 tablet 3   • levothyroxine (SYNTHROID, LEVOTHROID) 100 MCG tablet Take 100 mcg by mouth Daily.     • lisinopril (PRINIVIL,ZESTRIL) 5 MG tablet Take 1 tablet by mouth Daily. 90 tablet 0   • melatonin 5 MG tablet tablet Take 1 tablet by mouth At Night As Needed (sleep).     • omeprazole OTC (PriLOSEC OTC) 20 MG EC tablet Take 20-40 mg by mouth As Needed.     • ondansetron ODT (ZOFRAN-ODT) 4 MG disintegrating tablet Place 1 tablet on the tongue Every 8 (Eight) Hours As Needed for Nausea or Vomiting. 30 tablet 0   • traMADol (ULTRAM) 50 MG tablet Pt says she takes this as needed     • vitamin B-12 (CYANOCOBALAMIN) 1000 MCG tablet Take 1 tablet by mouth Daily.       No current facility-administered medications on file prior to visit.          When did you start taking these medications: 7PM 1/10/23    Which medication are you concerned about: lisinopril (PRINIVIL,ZESTRIL) 5 MG tablet    Who prescribed you this  medication:PCP SUSHMA MORGAN    What are your concerns: FREQUENT URINATION STARTING ON 1/10/23  9PM,11PM,12AM,2AM,4AM,5AM,6AM,8:30AM,10:30AM,12PM.PATIENT IS ALSO HAVING SOME SEVERE STOMACH PAINS STARTED AT 11AM ON 1/10/23. PATIENT WOULD LIKE TO KNOW IF SHE SHOULD STOP TAKING THE MEDICATION. PLEASE CALL AND ADVISE PATIENT WHAT TO DO.    How long have you had these concerns:

## 2023-01-13 ENCOUNTER — OFFICE VISIT (OUTPATIENT)
Dept: FAMILY MEDICINE CLINIC | Facility: CLINIC | Age: 83
End: 2023-01-13
Payer: MEDICARE

## 2023-01-13 VITALS
WEIGHT: 207 LBS | OXYGEN SATURATION: 98 % | SYSTOLIC BLOOD PRESSURE: 152 MMHG | HEART RATE: 56 BPM | DIASTOLIC BLOOD PRESSURE: 76 MMHG | TEMPERATURE: 96.9 F | HEIGHT: 63 IN | BODY MASS INDEX: 36.68 KG/M2

## 2023-01-13 DIAGNOSIS — R35.0 URINARY FREQUENCY: ICD-10-CM

## 2023-01-13 DIAGNOSIS — I10 ESSENTIAL HYPERTENSION: Primary | ICD-10-CM

## 2023-01-13 DIAGNOSIS — E03.9 PRIMARY HYPOTHYROIDISM: ICD-10-CM

## 2023-01-13 LAB
BILIRUB BLD-MCNC: NEGATIVE MG/DL
CLARITY, POC: CLEAR
COLOR UR: ABNORMAL
EXPIRATION DATE: ABNORMAL
GLUCOSE UR STRIP-MCNC: NEGATIVE MG/DL
KETONES UR QL: NEGATIVE
LEUKOCYTE EST, POC: NEGATIVE
Lab: ABNORMAL
NITRITE UR-MCNC: NEGATIVE MG/ML
PH UR: 5.5 [PH] (ref 5–8)
PROT UR STRIP-MCNC: NEGATIVE MG/DL
RBC # UR STRIP: ABNORMAL /UL
SP GR UR: 1.03 (ref 1–1.03)
TSH SERPL DL<=0.005 MIU/L-ACNC: 59.5 UIU/ML (ref 0.27–4.2)
UROBILINOGEN UR QL: ABNORMAL

## 2023-01-13 PROCEDURE — 99214 OFFICE O/P EST MOD 30 MIN: CPT | Performed by: STUDENT IN AN ORGANIZED HEALTH CARE EDUCATION/TRAINING PROGRAM

## 2023-01-13 PROCEDURE — 81003 URINALYSIS AUTO W/O SCOPE: CPT | Performed by: STUDENT IN AN ORGANIZED HEALTH CARE EDUCATION/TRAINING PROGRAM

## 2023-01-13 RX ORDER — AMLODIPINE BESYLATE 5 MG/1
5 TABLET ORAL DAILY
Qty: 30 TABLET | Refills: 1 | Status: ON HOLD | OUTPATIENT
Start: 2023-01-13 | End: 2023-03-13

## 2023-01-13 NOTE — ASSESSMENT & PLAN NOTE
Currently on 100mcg levothyroxine daily.   Last TSH in 6/22 with TSH 50 - not on medication.  Will check TSH today.

## 2023-01-13 NOTE — PROGRESS NOTES
"Chief Complaint  Hypertension (Pt says the med gave her terrible abdominal pain/-pain started the same day 4 hours after taking med (she stopped taking med)) and Urinary Frequency (Frequent urination started 1/10/-no color or odor )    Subjective        Luann Frances presents to [facility]  History of Present Illness  82-year-old female with hypertension, hyperlipidemia, CKD stage III, history of nephrolithiasis who presents for abdominal pain and urinary frequency after starting antihypertensive.    She was seen on January 10 and lisinopril 5 mg daily was started for uncontrolled hypertension.  She took her first dose that night and then noticed severe suprapubic pain that was constant and sharp.  It lasted through the night and was associated with frequent urination.  No dysuria or back pain.  No fevers or nausea and vomiting.  Pain improved throughout the day on January 11.  She did not take any further doses of her medication.  Blood pressure remains elevated on home readings in the 150s systolic.  Today she is asymptomatic and has no complaints.      Objective   Vital Signs:  /76 (BP Location: Right arm, Patient Position: Sitting, Cuff Size: Adult)   Pulse 56   Temp 96.9 °F (36.1 °C) (Infrared)   Ht 160 cm (63\")   Wt 93.9 kg (207 lb)   SpO2 98%   BMI 36.67 kg/m²   Estimated body mass index is 36.67 kg/m² as calculated from the following:    Height as of this encounter: 160 cm (63\").    Weight as of this encounter: 93.9 kg (207 lb).         Physical Exam  Constitutional:       General: She is not in acute distress.  Eyes:      Conjunctiva/sclera: Conjunctivae normal.   Cardiovascular:      Rate and Rhythm: Normal rate and regular rhythm.   Pulmonary:      Effort: Pulmonary effort is normal. No respiratory distress.      Breath sounds: Normal breath sounds.   Skin:     General: Skin is warm and dry.   Neurological:      Mental Status: She is alert and oriented to " person, place, and time.   Psychiatric:         Mood and Affect: Mood normal.         Behavior: Behavior normal.        Result Review :  The following data was reviewed by: Dinah Humphrey MD on 01/13/2023:  Common labs    Common Labs 11/4/22 12/12/22 12/12/22 12/29/22     1736 1736    Glucose   98    BUN   35 (A)    Creatinine   1.36 (A)    Sodium   142    Potassium   4.4    Chloride   104    Calcium   9.9    Albumin   4.40    Total Bilirubin   0.3    Alkaline Phosphatase   72    AST (SGOT)   18    ALT (SGPT)   20    WBC 5.62 6.70  6.21   Hemoglobin 11.1 (A) 13.0  13.1   Hematocrit 35.8 40.1  41.4   Platelets 257 227  217   (A) Abnormal value            UA    Urinalysis 8/17/22 8/17/22 12/12/22 1/13/23    2337 2337     Squamous Epithelial Cells, UA  0-2     Specific Gravity, UA >=1.030  1.012    Ketones, UA Negative  Negative Negative   Blood, UA Moderate (2+) (A)  Negative    Leukocytes, UA Trace (A)  Negative Negative   Nitrite, UA Negative  Negative    RBC, UA  21-30 (A)     WBC, UA  13-20 (A)     Bacteria, UA  None Seen     (A) Abnormal value            Data reviewed: none             Assessment and Plan   Diagnoses and all orders for this visit:    1. Essential hypertension (Primary)  Assessment & Plan:  Uncontrolled.  Office and home readings are in the 150s/70-80s.   Prescribed lisinopril 5mg daily but experienced severe abd pain and frequent urination after first dose. Based on history and urinalysis, I presume this was due to a kidney stone and not side effect of medication. However, patient would prefer to try different antihypertensive.  She has done well with amlodipine in the past. Will start amlodipine 5mg daily in place of lisinopril 5mg daily.   Continue home BP monitoring.    Orders:  -     amLODIPine (NORVASC) 5 MG tablet; Take 1 tablet by mouth Daily.  Dispense: 30 tablet; Refill: 1    2. Urinary frequency  Comments:  I presume symptoms occured from small kidney stone as she has trace blood in  u/a and history consistent. Less likely medication side effect. Symptoms have resolved. Counseled on hydration.   Orders:  -     POC Urinalysis Dipstick, Automated    3. Primary hypothyroidism  Assessment & Plan:  Currently on 100mcg levothyroxine daily.   Last TSH in 6/22 with TSH 50 - not on medication.  Will check TSH today.    Orders:  -     TSH         Follow Up   Return in about 4 months (around 5/18/2023) for Medicare Wellness.  Patient was given instructions and counseling regarding her condition or for health maintenance advice. Please see specific information pulled into the AVS if appropriate.

## 2023-01-13 NOTE — ASSESSMENT & PLAN NOTE
Uncontrolled.  Office and home readings are in the 150s/70-80s.   Prescribed lisinopril 5mg daily but experienced severe abd pain and frequent urination after first dose. Based on history and urinalysis, I presume this was due to a kidney stone and not side effect of medication. However, patient would prefer to try different antihypertensive.  She has done well with amlodipine in the past. Will start amlodipine 5mg daily in place of lisinopril 5mg daily.   Continue home BP monitoring.

## 2023-01-17 DIAGNOSIS — E78.2 MIXED HYPERLIPIDEMIA: ICD-10-CM

## 2023-01-17 DIAGNOSIS — E03.9 PRIMARY HYPOTHYROIDISM: Primary | ICD-10-CM

## 2023-01-19 ENCOUNTER — SPECIALTY PHARMACY (OUTPATIENT)
Dept: PHARMACY | Facility: HOSPITAL | Age: 83
End: 2023-01-19
Payer: MEDICARE

## 2023-01-19 NOTE — PROGRESS NOTES
Specialty Pharmacy Refill Coordination Note     Luann is a 82 y.o. female contacted today regarding refills of  PRADAXA specialty medication(s).    Reviewed and verified with patient:       Specialty medication(s) and dose(s) confirmed: yes    Refill Questions    Flowsheet Row Most Recent Value   Changes to allergies? No   Changes to medications? No   New conditions since last clinic visit No   Unplanned office visit, urgent care, ED, or hospital admission in the last 4 weeks  No   How does patient/caregiver feel medication is working? Good   Financial problems or insurance changes  Yes   If yes, describe changes in insurance or financial issues. PPAF d/c'd and may cause a financial hardship with future refills.   Since the previous refill, were any specialty medication doses or scheduled injections missed or delayed?  No   Does this patient require a clinical escalation to a pharmacist? No          Delivery Questions    Flowsheet Row Most Recent Value   Delivery method [employer]  [[facility] on 1/23 to arrive 1/24. Address confirmed. $0 co-pay.]   Delivery address correct? Yes   Delivery phone number [phone]   Preferred delivery time? Anytime   Number of medications in delivery 1   Medication being filled and delivered PRADAXA   Doses left of specialty medications ABOUT 2 WEEKS WORTH   Is there any medication that is due not being filled? No   Supplies needed? No supplies needed   Cooler needed? No   Do any medications need mixed or dated? No   Copay form of payment Payment plan already set up   Additional comments N/A   Questions or concerns for the pharmacist? No   Explain any questions or concerns for the pharmacist N/A   Are any medications first time fills? No                 Follow-up: 23 day(s)     Unique Crowe, Pharmacy Technician  Specialty Pharmacy Technician

## 2023-01-27 ENCOUNTER — PATIENT OUTREACH (OUTPATIENT)
Dept: CASE MANAGEMENT | Facility: OTHER | Age: 83
End: 2023-01-27
Payer: MEDICARE

## 2023-01-27 NOTE — OUTREACH NOTE
AMBULATORY CASE MANAGEMENT NOTE    Name and Relationship of Patient/Support Person: Juan Daniel Luann CATARINO - Self    Adult Patient Profile  Questions/Answers    Flowsheet Row Most Recent Value   Advance Directive Status Patient does not have advance directive   Literature Provided on Advance Directives Other (Comment)  [Patient requested literature for Advanced Directives.]   Patient Requests Assistance on Advance Directives --  [Patient requested information regarding Advanced Directives.]      Patient Outreach    Introduced self, explained ACM RN role and provided contact information. Spoke with patient regarding health and wellness. Patient states she is doing okay. No needs regarding medications or appointments. Patient would like information on Advanced Directives. She Does not have a living will.     Education Documentation  No documentation found.        GUERO ALMAGUER  Ambulatory Case Management    1/27/2023, 16:24 EST

## 2023-02-15 ENCOUNTER — DOCUMENTATION (OUTPATIENT)
Dept: PHARMACY | Facility: HOSPITAL | Age: 83
End: 2023-02-15
Payer: MEDICARE

## 2023-02-15 NOTE — PROGRESS NOTES
I contacted Ms. Frances to coordinate a refill shipment of Pradaxa. She has 48 tablets on-hand.    She would like to apply for free Pradaxa directly from the  because her co-pay is now unaffordable since the [facility] Internal karan is no longer an option.     I have submitted the application and I'm waiting on a determination.     Unique Crowe - Care Coordinator   2/15/2023  13:37 EST

## 2023-02-17 ENCOUNTER — DOCUMENTATION (OUTPATIENT)
Dept: PHARMACY | Facility: HOSPITAL | Age: 83
End: 2023-02-17
Payer: MEDICARE

## 2023-02-17 NOTE — PROGRESS NOTES
I have received a fax notice stating that [facility] has removed Pradaxa from their Patient Assistance Program therefore, applying for Free Pradaxa is not an option as a form of assistance.     Unique Crowe - Care Coordinator   2/17/2023  08:59 EST

## 2023-02-20 ENCOUNTER — TELEPHONE (OUTPATIENT)
Dept: ONCOLOGY | Facility: CLINIC | Age: 83
End: 2023-02-20
Payer: MEDICARE

## 2023-02-20 DIAGNOSIS — R60.0 LOWER EXTREMITY EDEMA: ICD-10-CM

## 2023-02-20 DIAGNOSIS — Z86.718 HISTORY OF DVT (DEEP VEIN THROMBOSIS): ICD-10-CM

## 2023-02-20 DIAGNOSIS — I82.4Z2 DVT, LOWER EXTREMITY, DISTAL, ACUTE, LEFT: Primary | ICD-10-CM

## 2023-02-20 NOTE — TELEPHONE ENCOUNTER
Returned call to patient who is reporting that she has BLE edema for the past 10 days, the left leg is worse than the right.  She is on Pradaxa and Lasix.  She has had a blood clot in her left leg in the past.  I reviewed with Dr. Dubon and orders for BLE dopplers for today or tomorrow will be ordered and an appointment with NP will be scheduled for tomorrow.

## 2023-02-20 NOTE — TELEPHONE ENCOUNTER
Caller: Luann Frances    Relationship: Self    Best call back number: [phone]    What is the best time to reach you: ANYTIME     Who are you requesting to speak with (clinical staff, provider,  specific staff member): SCHEDULING     What was the call regarding: PT WOULD LIKE TO SCHEDULE F/U WITH DR CAREY, PT HAVING SWELLING IN ANKLES AND LEGS    Do you require a callback: YES

## 2023-02-21 ENCOUNTER — DOCUMENTATION (OUTPATIENT)
Dept: PHARMACY | Facility: HOSPITAL | Age: 83
End: 2023-02-21
Payer: MEDICARE

## 2023-02-21 ENCOUNTER — HOSPITAL ENCOUNTER (OUTPATIENT)
Dept: CARDIOLOGY | Facility: HOSPITAL | Age: 83
Discharge: HOME | End: 2023-02-21
Admitting: INTERNAL MEDICINE
Payer: MEDICARE

## 2023-02-21 DIAGNOSIS — Z86.718 HISTORY OF DVT (DEEP VEIN THROMBOSIS): ICD-10-CM

## 2023-02-21 DIAGNOSIS — R60.0 LOWER EXTREMITY EDEMA: ICD-10-CM

## 2023-02-21 LAB
BH CV LOW VAS LEFT POPLITEAL SPONT: 1
BH CV LOWER VASCULAR LEFT COMMON FEMORAL AUGMENT: NORMAL
BH CV LOWER VASCULAR LEFT COMMON FEMORAL COMPETENT: NORMAL
BH CV LOWER VASCULAR LEFT COMMON FEMORAL COMPRESS: NORMAL
BH CV LOWER VASCULAR LEFT COMMON FEMORAL PHASIC: NORMAL
BH CV LOWER VASCULAR LEFT COMMON FEMORAL SPONT: NORMAL
BH CV LOWER VASCULAR LEFT DISTAL FEMORAL COMPRESS: NORMAL
BH CV LOWER VASCULAR LEFT GASTRONEMIUS COMPRESS: NORMAL
BH CV LOWER VASCULAR LEFT GREATER SAPH AK COMPRESS: NORMAL
BH CV LOWER VASCULAR LEFT GREATER SAPH BK COMPRESS: NORMAL
BH CV LOWER VASCULAR LEFT LESSER SAPH COMPRESS: NORMAL
BH CV LOWER VASCULAR LEFT MID FEMORAL AUGMENT: NORMAL
BH CV LOWER VASCULAR LEFT MID FEMORAL COMPETENT: NORMAL
BH CV LOWER VASCULAR LEFT MID FEMORAL COMPRESS: NORMAL
BH CV LOWER VASCULAR LEFT MID FEMORAL PHASIC: NORMAL
BH CV LOWER VASCULAR LEFT MID FEMORAL SPONT: NORMAL
BH CV LOWER VASCULAR LEFT PERONEAL COMPRESS: NORMAL
BH CV LOWER VASCULAR LEFT POPLITEAL AUGMENT: NORMAL
BH CV LOWER VASCULAR LEFT POPLITEAL COMPETENT: NORMAL
BH CV LOWER VASCULAR LEFT POPLITEAL COMPRESS: NORMAL
BH CV LOWER VASCULAR LEFT POPLITEAL PHASIC: NORMAL
BH CV LOWER VASCULAR LEFT POPLITEAL SPONT: NORMAL
BH CV LOWER VASCULAR LEFT POSTERIOR TIBIAL COMPRESS: NORMAL
BH CV LOWER VASCULAR LEFT PROFUNDA FEMORAL COMPRESS: NORMAL
BH CV LOWER VASCULAR LEFT PROXIMAL FEMORAL COMPRESS: NORMAL
BH CV LOWER VASCULAR LEFT SAPHENOFEMORAL JUNCTION COMPRESS: NORMAL
BH CV LOWER VASCULAR RIGHT COMMON FEMORAL AUGMENT: NORMAL
BH CV LOWER VASCULAR RIGHT COMMON FEMORAL COMPETENT: NORMAL
BH CV LOWER VASCULAR RIGHT COMMON FEMORAL COMPRESS: NORMAL
BH CV LOWER VASCULAR RIGHT COMMON FEMORAL PHASIC: NORMAL
BH CV LOWER VASCULAR RIGHT COMMON FEMORAL SPONT: NORMAL
BH CV LOWER VASCULAR RIGHT DISTAL FEMORAL COMPRESS: NORMAL
BH CV LOWER VASCULAR RIGHT GASTRONEMIUS COMPRESS: NORMAL
BH CV LOWER VASCULAR RIGHT GREATER SAPH AK COMPRESS: NORMAL
BH CV LOWER VASCULAR RIGHT GREATER SAPH BK COMPRESS: NORMAL
BH CV LOWER VASCULAR RIGHT LESSER SAPH COMPRESS: NORMAL
BH CV LOWER VASCULAR RIGHT MID FEMORAL AUGMENT: NORMAL
BH CV LOWER VASCULAR RIGHT MID FEMORAL COMPETENT: NORMAL
BH CV LOWER VASCULAR RIGHT MID FEMORAL COMPRESS: NORMAL
BH CV LOWER VASCULAR RIGHT MID FEMORAL PHASIC: NORMAL
BH CV LOWER VASCULAR RIGHT MID FEMORAL SPONT: NORMAL
BH CV LOWER VASCULAR RIGHT PERONEAL COMPRESS: NORMAL
BH CV LOWER VASCULAR RIGHT POPLITEAL AUGMENT: NORMAL
BH CV LOWER VASCULAR RIGHT POPLITEAL COMPETENT: NORMAL
BH CV LOWER VASCULAR RIGHT POPLITEAL COMPRESS: NORMAL
BH CV LOWER VASCULAR RIGHT POPLITEAL PHASIC: NORMAL
BH CV LOWER VASCULAR RIGHT POPLITEAL SPONT: NORMAL
BH CV LOWER VASCULAR RIGHT POSTERIOR TIBIAL COMPRESS: NORMAL
BH CV LOWER VASCULAR RIGHT PROFUNDA FEMORAL COMPRESS: NORMAL
BH CV LOWER VASCULAR RIGHT PROXIMAL FEMORAL COMPRESS: NORMAL
BH CV LOWER VASCULAR RIGHT SAPHENOFEMORAL JUNCTION COMPRESS: NORMAL
MAXIMAL PREDICTED HEART RATE: 138 BPM
STRESS TARGET HR: 117 BPM

## 2023-02-21 PROCEDURE — 93970 EXTREMITY STUDY: CPT

## 2023-02-21 NOTE — PROGRESS NOTES
Bilateral leg venous Doppler study preliminary report: negative for deep vein thrombosis.  Called report to Dr. Lita Dubon and his instruction is to send patient home.

## 2023-02-21 NOTE — PROGRESS NOTES
I spoke with the patient today regarding refilling her Pradaxa. She states that she is going to check with her Insurance plan to determine whether filling her prescription with her mail order pharmacy my offer some savings and she will call me back tomorrow to discuss.     Unique Crowe - Care Coordinator   2/21/2023  10:28 EST

## 2023-02-22 ENCOUNTER — OFFICE VISIT (OUTPATIENT)
Dept: ONCOLOGY | Facility: CLINIC | Age: 83
End: 2023-02-22
Payer: MEDICARE

## 2023-02-22 ENCOUNTER — LAB (OUTPATIENT)
Dept: LAB | Facility: HOSPITAL | Age: 83
End: 2023-02-22
Payer: MEDICARE

## 2023-02-22 ENCOUNTER — TELEPHONE (OUTPATIENT)
Dept: FAMILY MEDICINE CLINIC | Facility: CLINIC | Age: 83
End: 2023-02-22
Payer: MEDICARE

## 2023-02-22 VITALS
TEMPERATURE: 97.1 F | RESPIRATION RATE: 18 BRPM | HEIGHT: 63 IN | BODY MASS INDEX: 35.67 KG/M2 | OXYGEN SATURATION: 98 % | DIASTOLIC BLOOD PRESSURE: 78 MMHG | HEART RATE: 67 BPM | SYSTOLIC BLOOD PRESSURE: 118 MMHG | WEIGHT: 201.3 LBS

## 2023-02-22 DIAGNOSIS — Z86.718 HISTORY OF DEEP VENOUS THROMBOSIS (DVT) OF DISTAL VEIN OF LEFT LOWER EXTREMITY: ICD-10-CM

## 2023-02-22 DIAGNOSIS — Z79.01 CURRENT USE OF LONG TERM ANTICOAGULATION: ICD-10-CM

## 2023-02-22 DIAGNOSIS — E03.9 PRIMARY HYPOTHYROIDISM: ICD-10-CM

## 2023-02-22 DIAGNOSIS — I82.4Z2 DVT, LOWER EXTREMITY, DISTAL, ACUTE, LEFT: ICD-10-CM

## 2023-02-22 DIAGNOSIS — M79.89 SWELLING OF LOWER EXTREMITY: Primary | ICD-10-CM

## 2023-02-22 DIAGNOSIS — I13.0 HYPERTENSIVE HEART AND CHRONIC KIDNEY DISEASE WITH HEART FAILURE AND STAGE 1 THROUGH STAGE 4 CHRONIC KIDNEY DISEASE, OR UNSPECIFIED CHRONIC KIDNEY DISEASE: ICD-10-CM

## 2023-02-22 DIAGNOSIS — N18.31 STAGE 3A CHRONIC KIDNEY DISEASE: ICD-10-CM

## 2023-02-22 DIAGNOSIS — E78.2 MIXED HYPERLIPIDEMIA: ICD-10-CM

## 2023-02-22 LAB
ALBUMIN SERPL-MCNC: 4 G/DL (ref 3.5–5.2)
ALBUMIN/GLOB SERPL: 1.2 G/DL
ALP SERPL-CCNC: 64 U/L (ref 39–117)
ALT SERPL W P-5'-P-CCNC: 13 U/L (ref 1–33)
ANION GAP SERPL CALCULATED.3IONS-SCNC: 10.6 MMOL/L (ref 5–15)
AST SERPL-CCNC: 16 U/L (ref 1–32)
BASOPHILS # BLD AUTO: 0.06 10*3/MM3 (ref 0–0.2)
BASOPHILS NFR BLD AUTO: 1 % (ref 0–1.5)
BILIRUB SERPL-MCNC: 0.4 MG/DL (ref 0–1.2)
BUN SERPL-MCNC: 28 MG/DL (ref 8–23)
BUN/CREAT SERPL: 20.4 (ref 7–25)
CALCIUM SPEC-SCNC: 9.8 MG/DL (ref 8.6–10.5)
CHLORIDE SERPL-SCNC: 105 MMOL/L (ref 98–107)
CO2 SERPL-SCNC: 28.4 MMOL/L (ref 22–29)
CREAT SERPL-MCNC: 1.37 MG/DL (ref 0.57–1)
DEPRECATED RDW RBC AUTO: 48.5 FL (ref 37–54)
EGFRCR SERPLBLD CKD-EPI 2021: 38.6 ML/MIN/1.73
EOSINOPHIL # BLD AUTO: 0.28 10*3/MM3 (ref 0–0.4)
EOSINOPHIL NFR BLD AUTO: 4.7 % (ref 0.3–6.2)
ERYTHROCYTE [DISTWIDTH] IN BLOOD BY AUTOMATED COUNT: 13.6 % (ref 12.3–15.4)
GLOBULIN UR ELPH-MCNC: 3.4 GM/DL
GLUCOSE SERPL-MCNC: 88 MG/DL (ref 65–99)
HCT VFR BLD AUTO: 40.5 % (ref 34–46.6)
HGB BLD-MCNC: 13.2 G/DL (ref 12–15.9)
IMM GRANULOCYTES # BLD AUTO: 0.02 10*3/MM3 (ref 0–0.05)
IMM GRANULOCYTES NFR BLD AUTO: 0.3 % (ref 0–0.5)
LYMPHOCYTES # BLD AUTO: 1.46 10*3/MM3 (ref 0.7–3.1)
LYMPHOCYTES NFR BLD AUTO: 24.3 % (ref 19.6–45.3)
MCH RBC QN AUTO: 31.3 PG (ref 26.6–33)
MCHC RBC AUTO-ENTMCNC: 32.6 G/DL (ref 31.5–35.7)
MCV RBC AUTO: 96 FL (ref 79–97)
MONOCYTES # BLD AUTO: 0.54 10*3/MM3 (ref 0.1–0.9)
MONOCYTES NFR BLD AUTO: 9 % (ref 5–12)
NEUTROPHILS NFR BLD AUTO: 3.66 10*3/MM3 (ref 1.7–7)
NEUTROPHILS NFR BLD AUTO: 60.7 % (ref 42.7–76)
NRBC BLD AUTO-RTO: 0 /100 WBC (ref 0–0.2)
NT-PROBNP SERPL-MCNC: 225 PG/ML (ref 0–1800)
PLATELET # BLD AUTO: 218 10*3/MM3 (ref 140–450)
PMV BLD AUTO: 10.2 FL (ref 6–12)
POTASSIUM SERPL-SCNC: 4.3 MMOL/L (ref 3.5–5.2)
PROT SERPL-MCNC: 7.4 G/DL (ref 6–8.5)
RBC # BLD AUTO: 4.22 10*6/MM3 (ref 3.77–5.28)
SODIUM SERPL-SCNC: 144 MMOL/L (ref 136–145)
VIT B12 BLD-MCNC: 1163 PG/ML (ref 211–946)
WBC NRBC COR # BLD: 6.02 10*3/MM3 (ref 3.4–10.8)

## 2023-02-22 PROCEDURE — 85025 COMPLETE CBC W/AUTO DIFF WBC: CPT

## 2023-02-22 PROCEDURE — 83880 ASSAY OF NATRIURETIC PEPTIDE: CPT | Performed by: INTERNAL MEDICINE

## 2023-02-22 PROCEDURE — 82607 VITAMIN B-12: CPT | Performed by: INTERNAL MEDICINE

## 2023-02-22 PROCEDURE — 80053 COMPREHEN METABOLIC PANEL: CPT | Performed by: INTERNAL MEDICINE

## 2023-02-22 PROCEDURE — 99215 OFFICE O/P EST HI 40 MIN: CPT | Performed by: NURSE PRACTITIONER

## 2023-02-22 PROCEDURE — 36415 COLL VENOUS BLD VENIPUNCTURE: CPT

## 2023-02-22 NOTE — PATIENT INSTRUCTIONS
Make appt with ortho to discuss epidural injections. Ask how long they want you off the Pradaxa for the procedure. Call us once you have a date and we can discuss if you need to bridge with Lovenox injections for blood thinner.    Call your PCP and make appointment for early next week.    Take Lasix 40 mg (two 20 mg tabs) x3 days. Monitor for increased urine output which is what we want. Monitor for legs to go down.    Keep legs elevated when resting.

## 2023-02-22 NOTE — PROGRESS NOTES
Subjective     CHIEF COMPLAINT:      Chief Complaint   Patient presents with   • Follow-up     Discuss doppler       HISTORY OF PRESENT ILLNESS:     Luann Frances is a 83 y.o. female patient who returns today for follow up on her lower extremity DVT and anemia.  She continues on Pradaxa 150 mg twice daily.      Patient called a few days ago noting worsening lower extremity edema.  We sent her for a Doppler study which thankfully was negative for new DVT but showed valvular insufficiency on the left.    As she is reviewed back today her legs are indeed quite swollen.  Images taken today, under media.    She continues on Lasix 20 mg daily and has been on this for a long time.  She does have underlying CKD, previously followed by Dr. Arce due to hospitalization over the holidays she missed her follow-up appointment with him.  Her PCP has been refilling her Lasix.  Patient is not short of breath.  We did order a proBNP which thankfully returned normal at 225.  Thankfully her kidney function remains very stable as well.    Patient is asking about potential epidural injections in her back.  She has been taking tramadol to try to help the pain but states her back pain is becoming unbearable.  As she has been on anticoagulation now for some time and also with negative Doppler studies yesterday we discussed that it would be reasonable to at least discuss with orthopedics potential epidural injections and determine if she needs bridging with this.    Otherwise she denies other concerns at this time    ROS:  Pertinent ROS is in the HPI.     Past medical, surgical, social and family history were reviewed.     MEDICATIONS:    Current Outpatient Medications:   •  acetaminophen (TYLENOL) 325 MG tablet, Take 2 tablets by mouth Every 4 (Four) Hours As Needed for Mild Pain ., Disp: , Rfl:   •  albuterol sulfate  (90 Base) MCG/ACT inhaler, Inhale 2 puffs Every 4 (Four) Hours As Needed for Wheezing or Shortness of Air.,  "Disp: 8 g, Rfl: 1  •  amLODIPine (NORVASC) 5 MG tablet, Take 1 tablet by mouth Daily., Disp: 30 tablet, Rfl: 1  •  cholecalciferol (VITAMIN D3) 25 MCG (1000 UT) tablet, Take 2,000 Units by mouth Daily., Disp: , Rfl:   •  dabigatran etexilate (Pradaxa) 150 MG capsu, Take 1 capsule by mouth 2 (Two) Times a Day., Disp: 60 capsule, Rfl: 5  •  ferrous sulfate 325 (65 FE) MG tablet, Take 1 tablet by mouth Every Other Day., Disp: , Rfl:   •  Fluticasone-Salmeterol (Advair Diskus) 100-50 MCG/ACT DISKUS, Inhale 1 puff 2 (Two) Times a Day., Disp: 180 each, Rfl: 5  •  folic acid (FOLVITE) 1 MG tablet, Take 1 tablet by mouth Daily., Disp: 30 tablet, Rfl: 5  •  furosemide (Lasix) 20 MG tablet, Take 1 tablet by mouth Daily., Disp: 90 tablet, Rfl: 3  •  levothyroxine (SYNTHROID, LEVOTHROID) 100 MCG tablet, Take 100 mcg by mouth Daily., Disp: , Rfl:   •  melatonin 5 MG tablet tablet, Take 1 tablet by mouth At Night As Needed (sleep)., Disp: , Rfl:   •  omeprazole OTC (PriLOSEC OTC) 20 MG EC tablet, Take 20-40 mg by mouth As Needed., Disp: , Rfl:   •  ondansetron ODT (ZOFRAN-ODT) 4 MG disintegrating tablet, Place 1 tablet on the tongue Every 8 (Eight) Hours As Needed for Nausea or Vomiting., Disp: 30 tablet, Rfl: 0  •  traMADol (ULTRAM) 50 MG tablet, Pt says she takes this as needed, Disp: , Rfl:   •  vitamin B-12 (CYANOCOBALAMIN) 1000 MCG tablet, Take 1 tablet by mouth Daily., Disp: , Rfl:   Objective     VITAL SIGNS:     Vitals:    02/22/23 1349   BP: 118/78   Pulse: 67   Resp: 18   Temp: 97.1 °F (36.2 °C)   TempSrc: Temporal   SpO2: 98%   Weight: 91.3 kg (201 lb 4.8 oz)   Height: 160 cm (62.99\")   PainSc:   6     Body mass index is 35.67 kg/m².     Wt Readings from Last 5 Encounters:   02/22/23 91.3 kg (201 lb 4.8 oz)   01/13/23 93.9 kg (207 lb)   01/10/23 94.3 kg (208 lb)   12/29/22 94.3 kg (208 lb)   12/12/22 93.9 kg (207 lb)     PHYSICAL EXAMINATION:   GENERAL: The patient appears in fair general condition, not in acute " distress.   SKIN: No ecchymosis.  EYES: No jaundice. No pallor.  CHEST: Normal respiratory effort.  Lungs clear bilaterally.  No added sounds.  CVS: Normal S1-S2.  No murmurs.  ABDOMEN: Nondistended  EXTREMITIES: Bilateral lower extremity swelling significant, left slightly greater than right with faint discoloration though no heat.  See pictures taken today.  No calf tenderness     DIAGNOSTIC DATA:     Results from last 7 days   Lab Units 02/22/23  1316   WBC 10*3/mm3 6.02   NEUTROS ABS 10*3/mm3 3.66   HEMOGLOBIN g/dL 13.2   HEMATOCRIT % 40.5   PLATELETS 10*3/mm3 218          CT abdomen pelvis on 12/12/2022:  1. Colonic diverticulosis. No acute inflammatory process of bowel is  identified, follow up as indications persist.  2. No urolithiasis or hydronephrosis.    Assessment & Plan    *Acute extensive left lower extremity DVT in a patient with prior history of bilateral lower extremity DVT  · Patient developed right lower extremity DVT on 4/1/2021.  · Patient developed left DVT in the popliteal, posterior tibial, peroneal and soleal veins on 4/12/2022.  · She was on Xarelto and was taking it regularly.  · On 8/16/2022, she developed left foot pain and was unable to walk.  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  She was taken to the OR on 8/17/2022.  · She underwent thrombectomy and angioplasty with placement of stent in the left iliac vein.  · Was initially placed on IV heparin.  · Thrombophilia work-up was obtained.  · Testing for factor V Leiden and prothrombin mutation was negative.  · Protein S was 39%-consumption versus deficiency - will be repeated in the future.  · Testing for anticardiolipin, B2 glycoprotein antibodies and lupus anticoagulant was negative.  · On 8/20/2022, patient was switched to Pradaxa 150 mg twice daily.  · She is tolerating Pradaxa well.  She is not having problems with bleeding.  · She is unable to wear prescription strength  compression stockings.  · 2/22/2023 Patient with worsening lower extremity edema prompting Doppler study done yesterday that was negative for DVT but noting valvular insufficiency on the left.  She does have significant increase of lower extremity edema compared to her baseline per review today.  proBNP checked, normal.  Discussed with Dr. Dubon and we will have patient increase her Lasix to 40 mg daily for 3 days and also follow-up with her PCP.  I also recommended patient get back in with her nephrologist as she missed her appointment in December.  We will help arrange this.    *Iron deficiency anemia.    · Hemoglobin decreased to 9.4 on 8/19/2022.  · IV Ferrlecit 250 mg x 3 was given between 8/19/2022 and 8/21/2022.  · Hemoglobin was 10.4 on 9/9/2022.  · Iron stores improved with ferritin at 521 and transferrin saturation at 30%.  · She was continued on oral iron daily.  · Hemoglobin improved to 13.2 today.  · She is tolerating oral iron.     *Vitamin B12 deficiency anemia.  · Vitamin B12 was 266 on 9/24/2019.  · B12 was 229 on on 8/19/2022.   · Vitamin B12 IM was given x3 in August 2022.  · She was started on vitamin B12 1000 mcg daily on 9/9/2022.     *Folate deficiency anemia.  · Folate was low at 3.89.  · Patient was started on folic acid 1 mg daily.  · She is taking folic acid daily regularly.     *Chronic back pain  · Patient has been utilizing tramadol but pain is getting worse.  · We had discouraged her from proceeding with epidural injections previously due to being on Pradaxa however she has been on this over a year now with negative Dopplers yesterday.  I instructed her to go ahead and talk with Ortho about potential epidural injections and find out timing of how long they would want her off Pradaxa.  She may require Lovenox bridging depending on the answer.  She will let us know when she has talked with them.    PLAN:   1.  Continue Pradaxa 150 mg twice daily.  Our pharmacy team is working with her  to help continue karan money.  2.  We recommend continuing anticoagulation indefinitely.  3.  Continue ferrous sulfate 4 days a week.   4.  Continue vitamin B12 1000 mcg daily.   5.  Continue folic acid 1 mg daily.   6.  Patient instructed to increase Lasix to 40 mg daily for 3 days.  She is also instructed to follow-up with her PCP early next week to reevaluate this and determine further recommendations if any.  7.  Patient is overdue for follow-up with her nephrologist, Dr. Arce.  We will help arrange getting her back in sooner in light of worsening swelling.  Notably her renal function is stable.  8.  Encourage patient to keep her legs elevated when resting.   9.  She cannot tolerate medicated support hose but does have some lower strength stockings that she will try to wear.  10.  Patient will call orthopedics to discuss epidural injections and let us know about timing of this.  We will determine if she needs Lovenox bridging as outlined above.  11.  She will otherwise follow-up with Dr. Dubon as already scheduled in 4 months with CBC CMP ferritin iron panel B12 folate levels.    I spent 65 minutes caring for Luann on this date of service. This time includes time spent by me in the following activities: preparing for the visit, reviewing tests, obtaining and/or reviewing a separately obtained history, performing a medically appropriate examination and/or evaluation, counseling and educating the patient/family/caregiver, ordering medications, tests, or procedures, referring and communicating with other health care professionals, documenting information in the medical record, independently interpreting results and communicating that information with the patient/family/caregiver and care coordination      Monica Melchor, APRN  02/22/23

## 2023-02-22 NOTE — TELEPHONE ENCOUNTER
[facility] staff attempted to follow warm transfer process and was unsuccessful     Caller: Luann Frances    Relationship to patient: Self    Best call back number: [phone]    Patient is needing: PATIENT STATES THAT SHE CANNOT COME TO HER LAB APPOINTMENT TODAY 2/22/23 BECAUSE SHE HAS ANOTHER DOCTOR APPOINTMENT.  PLEASE CALL HER TO RESCHEDULE.  [facility] DOES NOT SCHEDULE LAB APPOINTMENTS.    PLEASE ADVISE.

## 2023-02-23 ENCOUNTER — TELEPHONE (OUTPATIENT)
Dept: ONCOLOGY | Facility: CLINIC | Age: 83
End: 2023-02-23

## 2023-02-23 LAB
T4 FREE SERPL-MCNC: 1.14 NG/DL (ref 0.82–1.77)
TSH SERPL DL<=0.005 MIU/L-ACNC: 4.82 UIU/ML (ref 0.45–4.5)

## 2023-02-23 NOTE — TELEPHONE ENCOUNTER
Caller: VAIBHAV    Relationship to patient: SELF    Best call back number: [phone]    Patient is needing: TO RETURN PHONE CALL. SHE DOES NOT HAVE VOICEMAIL BOX THAT WILL ACCEPT MESSAGES AT THE MOMENT.

## 2023-02-24 ENCOUNTER — SPECIALTY PHARMACY (OUTPATIENT)
Dept: PHARMACY | Facility: HOSPITAL | Age: 83
End: 2023-02-24
Payer: MEDICARE

## 2023-02-24 NOTE — PROGRESS NOTES
Specialty Pharmacy Refill Coordination Note     Luann is a 83 y.o. female contacted today regarding refills of  PRADAXA specialty medication(s).    Reviewed and verified with patient:       Specialty medication(s) and dose(s) confirmed: yes    Refill Questions    Flowsheet Row Most Recent Value   Changes to allergies? No   Changes to medications? No   New conditions since last clinic visit No   Unplanned office visit, urgent care, ED, or hospital admission in the last 4 weeks  No   How does patient/caregiver feel medication is working? Good   Financial problems or insurance changes  No   If yes, describe changes in insurance or financial issues. N/A   Since the previous refill, were any specialty medication doses or scheduled injections missed or delayed?  No   Does this patient require a clinical escalation to a pharmacist? No          Delivery Questions    Flowsheet Row Most Recent Value   Delivery method [employer]  [[facility] on 2/27 to arrive 2/28. Address confirmed. [facility].]   Delivery address correct? Yes   Delivery phone number [phone]   Preferred delivery time? Anytime   Number of medications in delivery 1   Medication being filled and delivered DABIGATRAN   Doses left of specialty medications 28   Is there any medication that is due not being filled? No   Supplies needed? No supplies needed   Cooler needed? No   Do any medications need mixed or dated? No   Copay form of payment Credit card on file   Additional comments N/A   Questions or concerns for the pharmacist? No   Explain any questions or concerns for the pharmacist N/A   Are any medications first time fills? No                 Follow-up: 25 day(s)     Unique Crowe, Pharmacy Technician  Specialty Pharmacy Technician

## 2023-02-27 NOTE — PROGRESS NOTES
Please let patient know her thyroid tests look MUCH improved! Her TSH is just slightly above goal at 4.82 (normal range is under 4.5). However, I believe this will normalize with time and we should continue her on current dose of levothyroxine. We can make adjustment in future and can discuss at next appt in May if it does not normalize.

## 2023-02-28 DIAGNOSIS — J43.8 OTHER EMPHYSEMA: ICD-10-CM

## 2023-02-28 RX ORDER — FLUTICASONE PROPIONATE AND SALMETEROL 100; 50 UG/1; UG/1
1 POWDER RESPIRATORY (INHALATION) 2 TIMES DAILY
Qty: 180 EACH | Refills: 5 | Status: SHIPPED | OUTPATIENT
Start: 2023-02-28

## 2023-03-09 ENCOUNTER — SPECIALTY PHARMACY (OUTPATIENT)
Dept: PHARMACY | Facility: HOSPITAL | Age: 83
End: 2023-03-09
Payer: MEDICARE

## 2023-03-10 ENCOUNTER — SPECIALTY PHARMACY (OUTPATIENT)
Dept: PHARMACY | Facility: HOSPITAL | Age: 83
End: 2023-03-10
Payer: MEDICARE

## 2023-03-10 RX ORDER — ONDANSETRON HYDROCHLORIDE 8 MG/1
8 TABLET, FILM COATED ORAL EVERY 8 HOURS PRN
Qty: 30 TABLET | Refills: 1 | Status: SHIPPED | OUTPATIENT
Start: 2023-03-10

## 2023-03-10 RX ORDER — TORSEMIDE 20 MG/1
20 TABLET ORAL EVERY OTHER DAY
COMMUNITY

## 2023-03-10 RX ORDER — LOSARTAN POTASSIUM 50 MG/1
25 TABLET ORAL DAILY
COMMUNITY

## 2023-03-10 NOTE — PROGRESS NOTES
Lita Dubon MD Kolb, Kimberley, [facility]; Adore Singh, RN  Ok to order Zofran 8 mg Q 8 hrs PRN.     Ok to hold Iron for a few days and monitor if the nausea improves.     Thank you           Previous Messages       ----- Message -----   [facility]   Sent: 3/10/2023   2:18 PM EST   To: Lita Dubon MD, Adore Singh, RN   Subject: FW: DDI                                           Good afternoon,     I called her and advised her there are no drug interactions.  She has called nephrology to tell him she feels nauseated after adding these meds.  She is asking if I can send in a refill on zofran for her.  Would that be ok with you?     Also she is asking if she can hold iron for a few days to see if that helps reduce nausea as well.     Thanks,   Nereida     ----- Message -----   From: Unique Crowe, Pharmacy Technician   Sent: 3/10/2023  12:41 PM EST   To: Luisana Braxton [facility]   Subject: GEETAI                                               Nereida,     Ms. Frances just called and asked that we run  DDI report to see if her new meds of Losartan 50mg & Toresemide 20mg (both qd) interfere with her pradaxa. She said that she's been feeling nauseous since she started taking these two new meds.     I can call her with the results if you'd like.     Thank you.        Rx for ondansetron has been escribed to [facility]. Called Luann with the above directives and she verbalized understanding.

## 2023-03-13 ENCOUNTER — APPOINTMENT (OUTPATIENT)
Dept: GENERAL RADIOLOGY | Facility: HOSPITAL | Age: 83
End: 2023-03-13
Payer: MEDICARE

## 2023-03-13 ENCOUNTER — HOSPITAL ENCOUNTER (OUTPATIENT)
Facility: HOSPITAL | Age: 83
LOS: 3 days | Discharge: HOME | End: 2023-03-16
Attending: EMERGENCY MEDICINE | Admitting: INTERNAL MEDICINE
Payer: MEDICARE

## 2023-03-13 DIAGNOSIS — D68.9 COAGULOPATHY: ICD-10-CM

## 2023-03-13 DIAGNOSIS — K92.1 MELENA: ICD-10-CM

## 2023-03-13 DIAGNOSIS — D62 ACUTE BLOOD LOSS ANEMIA: ICD-10-CM

## 2023-03-13 DIAGNOSIS — E61.1 IRON DEFICIENCY: Primary | ICD-10-CM

## 2023-03-13 LAB
ABO GROUP BLD: NORMAL
ALBUMIN SERPL-MCNC: 3.2 G/DL (ref 3.5–5.2)
ALBUMIN/GLOB SERPL: 1.3 G/DL
ALP SERPL-CCNC: 45 U/L (ref 39–117)
ALT SERPL W P-5'-P-CCNC: 8 U/L (ref 1–33)
ANION GAP SERPL CALCULATED.3IONS-SCNC: 8 MMOL/L (ref 5–15)
AST SERPL-CCNC: 14 U/L (ref 1–32)
BASOPHILS # BLD AUTO: 0.08 10*3/MM3 (ref 0–0.2)
BASOPHILS NFR BLD AUTO: 1 % (ref 0–1.5)
BILIRUB SERPL-MCNC: 0.2 MG/DL (ref 0–1.2)
BLD GP AB SCN SERPL QL: NEGATIVE
BUN SERPL-MCNC: 38 MG/DL (ref 8–23)
BUN/CREAT SERPL: 30.6 (ref 7–25)
CALCIUM SPEC-SCNC: 9.3 MG/DL (ref 8.6–10.5)
CHLORIDE SERPL-SCNC: 110 MMOL/L (ref 98–107)
CO2 SERPL-SCNC: 24 MMOL/L (ref 22–29)
CREAT SERPL-MCNC: 1.24 MG/DL (ref 0.57–1)
DEPRECATED RDW RBC AUTO: 47 FL (ref 37–54)
EGFRCR SERPLBLD CKD-EPI 2021: 43.5 ML/MIN/1.73
EOSINOPHIL # BLD AUTO: 0.27 10*3/MM3 (ref 0–0.4)
EOSINOPHIL NFR BLD AUTO: 3.4 % (ref 0.3–6.2)
ERYTHROCYTE [DISTWIDTH] IN BLOOD BY AUTOMATED COUNT: 13.5 % (ref 12.3–15.4)
FERRITIN SERPL-MCNC: 575 NG/ML (ref 13–150)
GEN 5 2HR TROPONIN T REFLEX: 28 NG/L
GLOBULIN UR ELPH-MCNC: 2.4 GM/DL
GLUCOSE SERPL-MCNC: 106 MG/DL (ref 65–99)
HCT VFR BLD AUTO: 19 % (ref 34–46.6)
HCT VFR BLD AUTO: 29.1 % (ref 34–46.6)
HGB BLD-MCNC: 10 G/DL (ref 12–15.9)
HGB BLD-MCNC: 6.2 G/DL (ref 12–15.9)
HOLD SPECIMEN: NORMAL
HOLD SPECIMEN: NORMAL
IMM GRANULOCYTES # BLD AUTO: 0.11 10*3/MM3 (ref 0–0.05)
IMM GRANULOCYTES NFR BLD AUTO: 1.4 % (ref 0–0.5)
INR PPP: 1.41 (ref 0.9–1.1)
IRON 24H UR-MRATE: 111 MCG/DL (ref 37–145)
IRON SATN MFR SERPL: 40 % (ref 20–50)
LYMPHOCYTES # BLD AUTO: 1.89 10*3/MM3 (ref 0.7–3.1)
LYMPHOCYTES NFR BLD AUTO: 23.9 % (ref 19.6–45.3)
MAGNESIUM SERPL-MCNC: 2.4 MG/DL (ref 1.6–2.4)
MCH RBC QN AUTO: 31.2 PG (ref 26.6–33)
MCHC RBC AUTO-ENTMCNC: 32.6 G/DL (ref 31.5–35.7)
MCV RBC AUTO: 95.5 FL (ref 79–97)
MONOCYTES # BLD AUTO: 0.65 10*3/MM3 (ref 0.1–0.9)
MONOCYTES NFR BLD AUTO: 8.2 % (ref 5–12)
NEUTROPHILS NFR BLD AUTO: 4.91 10*3/MM3 (ref 1.7–7)
NEUTROPHILS NFR BLD AUTO: 62.1 % (ref 42.7–76)
NRBC BLD AUTO-RTO: 0.3 /100 WBC (ref 0–0.2)
NT-PROBNP SERPL-MCNC: 301 PG/ML (ref 0–1800)
PLATELET # BLD AUTO: 209 10*3/MM3 (ref 140–450)
PMV BLD AUTO: 11.8 FL (ref 6–12)
POTASSIUM SERPL-SCNC: 4.4 MMOL/L (ref 3.5–5.2)
PROT SERPL-MCNC: 5.6 G/DL (ref 6–8.5)
PROTHROMBIN TIME: 17.4 SECONDS (ref 11.7–14.2)
QT INTERVAL: 572 MS
RBC # BLD AUTO: 1.99 10*6/MM3 (ref 3.77–5.28)
RETICS # AUTO: 0.1 10*6/MM3 (ref 0.02–0.13)
RETICS/RBC NFR AUTO: 5.15 % (ref 0.7–1.9)
RH BLD: NEGATIVE
SODIUM SERPL-SCNC: 142 MMOL/L (ref 136–145)
T&S EXPIRATION DATE: NORMAL
TIBC SERPL-MCNC: 277 MCG/DL (ref 298–536)
TRANSFERRIN SERPL-MCNC: 186 MG/DL (ref 200–360)
TROPONIN T DELTA: 5 NG/L
TROPONIN T SERPL HS-MCNC: 23 NG/L
VIT B12 BLD-MCNC: 896 PG/ML (ref 211–946)
WBC NRBC COR # BLD: 7.91 10*3/MM3 (ref 3.4–10.8)
WHOLE BLOOD HOLD COAG: NORMAL
WHOLE BLOOD HOLD SPECIMEN: NORMAL

## 2023-03-13 PROCEDURE — 99222 1ST HOSP IP/OBS MODERATE 55: CPT | Performed by: PHYSICIAN ASSISTANT

## 2023-03-13 PROCEDURE — 36415 COLL VENOUS BLD VENIPUNCTURE: CPT

## 2023-03-13 PROCEDURE — 85045 AUTOMATED RETICULOCYTE COUNT: CPT | Performed by: INTERNAL MEDICINE

## 2023-03-13 PROCEDURE — 86850 RBC ANTIBODY SCREEN: CPT | Performed by: EMERGENCY MEDICINE

## 2023-03-13 PROCEDURE — 83540 ASSAY OF IRON: CPT | Performed by: INTERNAL MEDICINE

## 2023-03-13 PROCEDURE — 96366 THER/PROPH/DIAG IV INF ADDON: CPT

## 2023-03-13 PROCEDURE — 85610 PROTHROMBIN TIME: CPT | Performed by: EMERGENCY MEDICINE

## 2023-03-13 PROCEDURE — 86900 BLOOD TYPING SEROLOGIC ABO: CPT

## 2023-03-13 PROCEDURE — 93005 ELECTROCARDIOGRAM TRACING: CPT | Performed by: EMERGENCY MEDICINE

## 2023-03-13 PROCEDURE — 84484 ASSAY OF TROPONIN QUANT: CPT | Performed by: EMERGENCY MEDICINE

## 2023-03-13 PROCEDURE — P9016 RBC LEUKOCYTES REDUCED: HCPCS

## 2023-03-13 PROCEDURE — 82728 ASSAY OF FERRITIN: CPT | Performed by: INTERNAL MEDICINE

## 2023-03-13 PROCEDURE — 93010 ELECTROCARDIOGRAM REPORT: CPT | Performed by: INTERNAL MEDICINE

## 2023-03-13 PROCEDURE — 83735 ASSAY OF MAGNESIUM: CPT | Performed by: EMERGENCY MEDICINE

## 2023-03-13 PROCEDURE — 36430 TRANSFUSION BLD/BLD COMPNT: CPT

## 2023-03-13 PROCEDURE — 99285 EMERGENCY DEPT VISIT HI MDM: CPT

## 2023-03-13 PROCEDURE — 82607 VITAMIN B-12: CPT | Performed by: INTERNAL MEDICINE

## 2023-03-13 PROCEDURE — 94799 UNLISTED PULMONARY SVC/PX: CPT

## 2023-03-13 PROCEDURE — 84466 ASSAY OF TRANSFERRIN: CPT | Performed by: INTERNAL MEDICINE

## 2023-03-13 PROCEDURE — 80053 COMPREHEN METABOLIC PANEL: CPT | Performed by: EMERGENCY MEDICINE

## 2023-03-13 PROCEDURE — 94761 N-INVAS EAR/PLS OXIMETRY MLT: CPT

## 2023-03-13 PROCEDURE — 96365 THER/PROPH/DIAG IV INF INIT: CPT

## 2023-03-13 PROCEDURE — 99222 1ST HOSP IP/OBS MODERATE 55: CPT | Performed by: INTERNAL MEDICINE

## 2023-03-13 PROCEDURE — 86901 BLOOD TYPING SEROLOGIC RH(D): CPT | Performed by: EMERGENCY MEDICINE

## 2023-03-13 PROCEDURE — 85014 HEMATOCRIT: CPT | Performed by: INTERNAL MEDICINE

## 2023-03-13 PROCEDURE — 86900 BLOOD TYPING SEROLOGIC ABO: CPT | Performed by: EMERGENCY MEDICINE

## 2023-03-13 PROCEDURE — 83880 ASSAY OF NATRIURETIC PEPTIDE: CPT | Performed by: EMERGENCY MEDICINE

## 2023-03-13 PROCEDURE — 86923 COMPATIBILITY TEST ELECTRIC: CPT

## 2023-03-13 PROCEDURE — 85018 HEMOGLOBIN: CPT | Performed by: INTERNAL MEDICINE

## 2023-03-13 PROCEDURE — 85025 COMPLETE CBC W/AUTO DIFF WBC: CPT | Performed by: EMERGENCY MEDICINE

## 2023-03-13 PROCEDURE — 94640 AIRWAY INHALATION TREATMENT: CPT

## 2023-03-13 PROCEDURE — 96376 TX/PRO/DX INJ SAME DRUG ADON: CPT

## 2023-03-13 PROCEDURE — 71046 X-RAY EXAM CHEST 2 VIEWS: CPT

## 2023-03-13 RX ORDER — ACETAMINOPHEN 160 MG/5ML
650 SOLUTION ORAL EVERY 4 HOURS PRN
Status: DISCONTINUED | OUTPATIENT
Start: 2023-03-13 | End: 2023-03-16 | Stop reason: HOSPADM

## 2023-03-13 RX ORDER — ONDANSETRON 4 MG/1
4 TABLET, FILM COATED ORAL EVERY 6 HOURS PRN
Status: DISCONTINUED | OUTPATIENT
Start: 2023-03-13 | End: 2023-03-16 | Stop reason: HOSPADM

## 2023-03-13 RX ORDER — SODIUM CHLORIDE 0.9 % (FLUSH) 0.9 %
10 SYRINGE (ML) INJECTION AS NEEDED
Status: DISCONTINUED | OUTPATIENT
Start: 2023-03-13 | End: 2023-03-16 | Stop reason: HOSPADM

## 2023-03-13 RX ORDER — FERROUS SULFATE 325(65) MG
325 TABLET ORAL EVERY OTHER DAY
Status: DISCONTINUED | OUTPATIENT
Start: 2023-03-13 | End: 2023-03-15

## 2023-03-13 RX ORDER — ACETAMINOPHEN 325 MG/1
650 TABLET ORAL EVERY 4 HOURS PRN
Status: DISCONTINUED | OUTPATIENT
Start: 2023-03-13 | End: 2023-03-16 | Stop reason: HOSPADM

## 2023-03-13 RX ORDER — ACETAMINOPHEN 650 MG/1
650 SUPPOSITORY RECTAL EVERY 4 HOURS PRN
Status: DISCONTINUED | OUTPATIENT
Start: 2023-03-13 | End: 2023-03-16 | Stop reason: HOSPADM

## 2023-03-13 RX ORDER — PANTOPRAZOLE SODIUM 40 MG/10ML
80 INJECTION, POWDER, LYOPHILIZED, FOR SOLUTION INTRAVENOUS ONCE
Status: COMPLETED | OUTPATIENT
Start: 2023-03-13 | End: 2023-03-13

## 2023-03-13 RX ORDER — LEVOTHYROXINE SODIUM 0.1 MG/1
100 TABLET ORAL DAILY
Status: DISCONTINUED | OUTPATIENT
Start: 2023-03-13 | End: 2023-03-16 | Stop reason: HOSPADM

## 2023-03-13 RX ORDER — LOSARTAN POTASSIUM 25 MG/1
25 TABLET ORAL DAILY
Status: DISCONTINUED | OUTPATIENT
Start: 2023-03-13 | End: 2023-03-16 | Stop reason: HOSPADM

## 2023-03-13 RX ORDER — FOLIC ACID 1 MG/1
1 TABLET ORAL DAILY
Status: DISCONTINUED | OUTPATIENT
Start: 2023-03-13 | End: 2023-03-16 | Stop reason: HOSPADM

## 2023-03-13 RX ORDER — CHOLECALCIFEROL (VITAMIN D3) 125 MCG
5 CAPSULE ORAL NIGHTLY PRN
Status: DISCONTINUED | OUTPATIENT
Start: 2023-03-13 | End: 2023-03-16 | Stop reason: HOSPADM

## 2023-03-13 RX ORDER — MELATONIN
2000 DAILY
Status: DISCONTINUED | OUTPATIENT
Start: 2023-03-13 | End: 2023-03-16 | Stop reason: HOSPADM

## 2023-03-13 RX ORDER — TRAMADOL HYDROCHLORIDE 50 MG/1
50 TABLET ORAL EVERY 12 HOURS PRN
Status: DISCONTINUED | OUTPATIENT
Start: 2023-03-13 | End: 2023-03-16 | Stop reason: HOSPADM

## 2023-03-13 RX ORDER — ALBUTEROL SULFATE 2.5 MG/3ML
2.5 SOLUTION RESPIRATORY (INHALATION) EVERY 6 HOURS PRN
Status: DISCONTINUED | OUTPATIENT
Start: 2023-03-13 | End: 2023-03-16 | Stop reason: HOSPADM

## 2023-03-13 RX ORDER — BUDESONIDE AND FORMOTEROL FUMARATE DIHYDRATE 160; 4.5 UG/1; UG/1
1 AEROSOL RESPIRATORY (INHALATION)
Status: DISCONTINUED | OUTPATIENT
Start: 2023-03-13 | End: 2023-03-16 | Stop reason: HOSPADM

## 2023-03-13 RX ORDER — ONDANSETRON 2 MG/ML
4 INJECTION INTRAMUSCULAR; INTRAVENOUS EVERY 6 HOURS PRN
Status: DISCONTINUED | OUTPATIENT
Start: 2023-03-13 | End: 2023-03-16 | Stop reason: HOSPADM

## 2023-03-13 RX ORDER — CHOLECALCIFEROL (VITAMIN D3) 125 MCG
1000 CAPSULE ORAL DAILY
Status: DISCONTINUED | OUTPATIENT
Start: 2023-03-13 | End: 2023-03-16 | Stop reason: HOSPADM

## 2023-03-13 RX ADMIN — PANTOPRAZOLE SODIUM 8 MG/HR: 40 INJECTION, POWDER, FOR SOLUTION INTRAVENOUS at 14:31

## 2023-03-13 RX ADMIN — FERROUS SULFATE TAB 325 MG (65 MG ELEMENTAL FE) 325 MG: 325 (65 FE) TAB at 16:18

## 2023-03-13 RX ADMIN — PANTOPRAZOLE SODIUM 8 MG/HR: 40 INJECTION, POWDER, FOR SOLUTION INTRAVENOUS at 10:32

## 2023-03-13 RX ADMIN — BUDESONIDE AND FORMOTEROL FUMARATE DIHYDRATE 1 PUFF: 160; 4.5 AEROSOL RESPIRATORY (INHALATION) at 20:36

## 2023-03-13 RX ADMIN — Medication 1000 MCG: at 16:18

## 2023-03-13 RX ADMIN — Medication 2000 UNITS: at 16:17

## 2023-03-13 RX ADMIN — LOSARTAN POTASSIUM 25 MG: 25 TABLET, FILM COATED ORAL at 16:18

## 2023-03-13 RX ADMIN — PANTOPRAZOLE SODIUM 80 MG: 40 INJECTION, POWDER, FOR SOLUTION INTRAVENOUS at 10:27

## 2023-03-13 RX ADMIN — TRAMADOL HYDROCHLORIDE 50 MG: 50 TABLET, COATED ORAL at 23:42

## 2023-03-13 RX ADMIN — PANTOPRAZOLE SODIUM 8 MG/HR: 40 INJECTION, POWDER, FOR SOLUTION INTRAVENOUS at 20:06

## 2023-03-13 RX ADMIN — FOLIC ACID 1 MG: 1 TABLET ORAL at 16:18

## 2023-03-13 RX ADMIN — Medication 5 MG: at 21:16

## 2023-03-13 RX ADMIN — LEVOTHYROXINE SODIUM 100 MCG: 0.1 TABLET ORAL at 16:17

## 2023-03-13 NOTE — CONSULTS
[facility]  Initial Inpatient Consult Note    Referring Provider: Dr. Landeros    Reason for Consultation: Acute blood loss anemia    Subjective     History of present illness:      Thank you for allowing us to participate in the care of this patient.    83 y.o. female patient of Dr. Ray with a past medical history significant for GERD, COPD, CKD stage III, DVT for which she takes Pradaxa, HTN, HLD, hypothyroidism who presented with complaints of acute onset shortness of breath prompting her to proceed to the emergency department.    She reports black stools but states they have been like this since she has been on oral iron supplementation.  She denies any changes in her bowel habits and notes occasional constipation which she associates with iron use.  No evidence of loretta blood.  She suffers from chronic intermittent nausea which has been worse here lately.  Heartburn occurs roughly once a month.  No vomiting, dysphagia, odynophagia, abdominal pain.    No previous EGD or colonoscopy.  Cologuard was ordered by PCP but never completed.    Family history significant for a brother with esophageal/gastric cancer.    Last dose of Pradaxa roughly 9 PM last evening.    Hemoglobin 6.2, previously 13 g/dL 2 weeks ago.  She just finished receiving her first unit of packed cells with the second 1 ordered.      Past Medical History:  Past Medical History:   Diagnosis Date   • Acute deep vein thrombosis (DVT) of femoral vein of right lower extremity (HCC) 04/15/2021   • Arthritis    • Asymptomatic PVCs    • Backache    • Chronic bronchitis (HCC)    • CKD (chronic kidney disease)     Stage 3   • Deep vein thrombosis (DVT) of right lower extremity (HCC)    • Essential hypertension 01/20/2020   • Fracture of humerus    • GERD (gastroesophageal reflux disease)    • Hyperlipidemia    • Hypertension    • Hypothyroidism    • Pneumonia    • Pulmonary emphysema (HCC)    • Renal calculi    • Renal calculus  01/06/2021   • Sleep apnea     DOES NOT USE CPAP   • Thoracic vertebral fracture (HCC)      Past Surgical History:  Past Surgical History:   Procedure Laterality Date   • APPENDECTOMY     • EXTRACORPOREAL SHOCK WAVE LITHOTRIPSY (ESWL) Left 01/06/2021    Procedure: EXTRACORPOREAL SHOCKWAVE LITHOTRIPSY, CYSTOSCOPY, RETROGRADE PYLEOGRAM;  Surgeon: Walter Schwartz MD;  Location: [facility];  Service: Urology;  Laterality: Left;   • EYE SURGERY      cataracts   • HUMERUS FRACTURE SURGERY     • LYTIC THROMBIN THERAPY Left 8/17/2022    Procedure: LT. LEG THROMBECTOMY/EMBOLECTOMY AND ANGIOPLASTY STENT PLACEMENT OF LEFT ILIAC VEIN;  Surgeon: Theo Bustos MD;  Location: [facility] OR 18/19;  Service: Vascular;  Laterality: Left;   • RIB FRACTURE SURGERY  2021   • THORACOSCOPY Right 02/16/2021    Procedure: bronchoscopy, right video assisted THORACOSCOPY WITH PLATING OF 3, 4, 5, AND 6 RIBS;  Surgeon: Kelley Delong MD;  Location: [facility] OR;  Service: Thoracic;  Laterality: Right;   • TOTAL KNEE ARTHROPLASTY Left 04/2015   • UMBILICAL HERNIA REPAIR        Social History:   Social History     Tobacco Use   • Smoking status: Every Day     Packs/day: 0.25     Years: 50.00     Pack years: 12.50     Types: Cigarettes     Start date: 1970   • Smokeless tobacco: Never   Substance Use Topics   • Alcohol use: Not Currently      Family History:  Family History   Problem Relation Age of Onset   • Cancer Mother         breast   • Breast cancer Mother    • No Known Problems Father    • Heart disease Maternal Grandmother    • Cancer Brother         esophageal , stomach    • Esophageal cancer Brother    • No Known Problems Son    • Breast cancer Maternal Aunt    • Heart disease Maternal Aunt    • No Known Problems Son    • Malig Hyperthermia Neg Hx        Home Meds:  Medications Prior to Admission   Medication Sig Dispense Refill Last Dose   • acetaminophen (TYLENOL) 325 MG tablet Take 2 tablets by mouth Every 4 (Four)  Hours As Needed for Mild Pain .   Past Week   • albuterol sulfate  (90 Base) MCG/ACT inhaler Inhale 2 puffs Every 4 (Four) Hours As Needed for Wheezing or Shortness of Air. 8 g 1 3/12/2023   • cholecalciferol (VITAMIN D3) 25 MCG (1000 UT) tablet Take 2 tablets by mouth Daily.   3/12/2023   • dabigatran etexilate (Pradaxa) 150 MG capsu Take 1 capsule by mouth 2 (Two) Times a Day. 60 capsule 5 3/12/2023   • ferrous sulfate 325 (65 FE) MG tablet Take 1 tablet by mouth Every Other Day.   3/12/2023   • Fluticasone-Salmeterol (Advair Diskus) 100-50 MCG/ACT DISKUS Inhale 1 puff 2 (Two) Times a Day. 180 each 5 3/12/2023   • folic acid (FOLVITE) 1 MG tablet Take 1 tablet by mouth Daily. 30 tablet 5 3/12/2023   • levothyroxine (SYNTHROID, LEVOTHROID) 100 MCG tablet Take 1 tablet by mouth Daily.   3/12/2023   • losartan (COZAAR) 50 MG tablet Take 25 mg by mouth Daily. 1/2 tab   3/12/2023   • melatonin 5 MG tablet tablet Take 1 tablet by mouth At Night As Needed (sleep).   3/12/2023   • omeprazole OTC (PriLOSEC OTC) 20 MG EC tablet Take 1-2 tablets by mouth As Needed.   3/12/2023   • ondansetron (ZOFRAN) 8 MG tablet Take 1 tablet by mouth Every 8 (Eight) Hours As Needed for Nausea or Vomiting. 30 tablet 1 Past Week   • torsemide (DEMADEX) 20 MG tablet Take 1 tablet by mouth Every Other Day.   3/12/2023   • traMADol (ULTRAM) 50 MG tablet Pt says she takes this as needed   3/12/2023   • vitamin B-12 (CYANOCOBALAMIN) 1000 MCG tablet Take 1 tablet by mouth Daily.   3/12/2023   • amLODIPine (NORVASC) 5 MG tablet Take 1 tablet by mouth Daily. 30 tablet 1    • furosemide (Lasix) 20 MG tablet Take 1 tablet by mouth Daily. 90 tablet 3      Current Meds:      Allergies:  Allergies   Allergen Reactions   • Ambien [Zolpidem Tartrate] Nausea Only     nausea   • Amoxicillin-Pot Clavulanate Nausea Only   • Doxycycline Nausea Only   • Eliquis [Apixaban] Nausea Only   • Levofloxacin Nausea Only   • Metronidazole Nausea Only   • Naproxen  GI Intolerance   • Sulfamethoxazole-Trimethoprim Nausea Only   • Synthroid [Levothyroxine Sodium] Nausea Only   • Zolpidem Nausea Only     Review of Systems   Constitutional:   No fevers, chills, sweats   Eye:   No recent visual problems, eye discharge, eye pain, redness   HENT:   No ear pain, nasal congestion, sore throat, voice changes   Respiratory:   No cough, pain on breathing, sputum production +shortness of breath  Cardiovascular:   No Chest pain, palpitations, syncope, shortness of breath while laying flat   Gastrointestinal:   No vomiting, diarrhea, + nausea, occasional constipation  Genitourinary:   No hematuria, dysuria, incontinence   Hema/Lymph:   Negative for bruising tendency, swollen lymph glands, nosebleeds + history of dvt-on Pradaxa  Endocrine:   Negative for excessive thirst, excessive hunger, excessive urination,   Musculoskeletal:   No back pain, neck pain, joint pain, muscle pain, decreased range of motion   Integumentary:   No rash, pruritus, abrasions   Neurologic: No weakness, numbness, frequent headaches   Psychiatric:   No anxiety, depression, mood change    Objective     Vital Signs  Temp:  [97.7 °F (36.5 °C)-98.1 °F (36.7 °C)] 98.1 °F (36.7 °C)  Heart Rate:  [58-84] 58  Resp:  [16-20] 19  BP: (120-150)/(47-74) 150/67  Physical Exam:   Constitutional:   Well developed, well nourished. No acute distress.   Head:   Atraumatic, normocephalic.   Neck:   Supple and symmetric. Trachea midline.   ENT:   External ears and nose without lesion. Hearing grossly intact.   Eyes:   Pupils equal and round. Sclerae anicteric. Conjunctivae pale.   Respiratory:   Quiet, even, non-labored breathing. Clear to auscultation bilaterally.   Cardiovascular:   Regular rate and rhythm. No murmur, gallop or rub appreciated.   Gastrointestinal:   Soft, nondistended, nontender. No rebound or guarding present. Bowel sounds present in all 4 quadrants.   Integumentary:   Skin warm and dry, not jaundiced.  Pale.  Neurological:   Alert and oriented to person, place and time. Fluid of speech.   Musculoskeletal:   Active range of motion all 4 extremities. No rashes or edema.   Psychological:   Appropriate mood and affect. Cooperative.    Results Review:   I reviewed the patient's new clinical results.    Results from last 7 days   Lab Units 03/13/23  0916   WBC 10*3/mm3 7.91   HEMOGLOBIN g/dL 6.2*   HEMATOCRIT % 19.0*   PLATELETS 10*3/mm3 209     Results from last 7 days   Lab Units 03/13/23  0916   SODIUM mmol/L 142   POTASSIUM mmol/L 4.4   CHLORIDE mmol/L 110*   CO2 mmol/L 24.0   BUN mg/dL 38*   CREATININE mg/dL 1.24*   CALCIUM mg/dL 9.3   BILIRUBIN mg/dL 0.2   ALK PHOS U/L 45   ALT (SGPT) U/L 8   AST (SGOT) U/L 14   GLUCOSE mg/dL 106*     Results from last 7 days   Lab Units 03/13/23  0916   INR  1.41*     Lab Results   Lab Value Date/Time    LIPASE 25 12/12/2022 1736    LIPASE 18 08/11/2022 1058    LIPASE 28 12/16/2020 1351    LIPASE 24 12/10/2015 0950       Radiology:  XR Chest 2 View   Final Result   No focal pulmonary consolidation. Borderline heart size.   Follow-up as clinical indications persist.       This report was finalized on 3/13/2023 10:12 AM by Dr. Alec Arcos M.D.              Assessment & Plan   Patient Active Problem List   Diagnosis   • Chronic obstructive pulmonary disease (HCC)   • Diverticulitis of colon   • Acid reflux   • Primary hypothyroidism   • Insomnia   • Chronic nausea   • Post herpetic neuralgia   • Vitamin D deficiency   • CKD (chronic kidney disease) stage 3, GFR 30-59 ml/min (CMS/[facility])   • Mixed hyperlipidemia   • Hypersomnia   • Hyperuricemia   • Essential hypertension   • DELROY (obstructive sleep apnea)   • Renal calculus   • Multiple closed fractures of ribs of right side   • Compression fracture of body of thoracic vertebra ([facility])   • DVT, lower extremity, distal, acute, left ([facility])   • Lower extremity edema   • Medicare annual wellness visit, subsequent   • Tobacco abuse   •  Osteoporosis   • Acute deep vein thrombosis (DVT) of femoral vein of left lower extremity (HCC)   • 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 Acute on chronic anemia without overt bleed  2. Left lower extremity DVT-on Pradaxa with last dose 3/12 at 2100  3. COPD  4. Hypertension  5. CKD stage III  6. Chronic nausea attributed to medications    Plan:  · Continue PPI gtt.  · Agree with transfusion.  · Closely follow H&H and stool output, transfuse as necessary.  · As needed antiemetics.  · Okay for clear liquids.  · Hold anticoagulation if possible.  · N.p.o. after midnight in anticipation of EGD tomorrow with Dr. Ford.      I discussed the patient's findings and my recommendations with patient and Dr. Ford.    Dragon dictation used throughout this note.            MARZENA Ward  [facility], [place], operated by [facility]  [address] [place] [address]  Office: [phone]

## 2023-03-13 NOTE — H&P
Patient Name:  Luann Frances  YOB: 1940  MRN:  [id number]  Admit Date:  3/13/2023  Patient Care Team:  Dinah Humphrey MD as PCP - General (Family Medicine)  Chas Garcia MD as Consulting Physician (Endocrinology)  Jeff Sands MD as Consulting Physician (Pulmonary Disease)  Leonardo Cochran MD as Consulting Physician (Nephrology)  Ruperto Sparks MD as Consulting Physician (Pain Medicine)  Angie Bruno APRN as Referring Physician (Family Medicine)  Lita Dubon MD as Consulting Physician (Hematology and Oncology)      Subjective   History Present Illness     Chief Complaint   Patient presents with   • Shortness of Breath   • Weakness - Generalized       Ms. Frances is a 83 y.o. female former smoker with a history of DVT on AC, CKD, HTN, hypothyroidism, HLD,  that presents to [facility] complaining of soa, fatigue. She has been soa w/minimal exertion as well as mild orthopnea. Denies chest pain, palpitations. She has chronic LE swelling that is better than 2 weeks ago. Denies fever, cough, wheezing, abd pain, n/v/d, dysuria, dizziness/lightheadedness. She has chronic black colored stools, takes iron supplements.     Afebrile. HR controlled. BP stable. On room air. WBC 7.91, Hgb 6.2, plts 209. Mg 2.4. HS troponin 23-->28. .0. Gluc 106, BUN/Cr 38/1.24, Cl 110, Total protein 3.6, Alb 3.2. CXR: no pulm consolidation; borderline heart size. EKG prelim SR, nonspecific T abnormalities, prolonged QT      Review of Systems   Constitutional: Positive for fatigue.   HENT: Negative for congestion.    Respiratory: Positive for shortness of breath.    Cardiovascular: Positive for leg swelling. Negative for chest pain.   Gastrointestinal: Negative for abdominal pain and nausea.        Melena   Genitourinary: Negative for dysuria.   Musculoskeletal: Negative for arthralgias.   Skin: Negative for rash.   Neurological: Negative for dizziness, light-headedness and  headaches.   Psychiatric/Behavioral: Negative for sleep disturbance.        Personal History     Past Medical History:   Diagnosis Date   • Acute deep vein thrombosis (DVT) of femoral vein of right lower extremity (HCC) 04/15/2021   • Arthritis    • Asymptomatic PVCs    • Backache    • Chronic bronchitis (HCC)    • CKD (chronic kidney disease)     Stage 3   • Deep vein thrombosis (DVT) of right lower extremity (HCC)    • Essential hypertension 01/20/2020   • Fracture of humerus    • GERD (gastroesophageal reflux disease)    • Hyperlipidemia    • Hypertension    • Hypothyroidism    • Pneumonia    • Pulmonary emphysema (HCC)    • Renal calculi    • Renal calculus 01/06/2021   • Sleep apnea     DOES NOT USE CPAP   • Thoracic vertebral fracture (HCC)      Past Surgical History:   Procedure Laterality Date   • APPENDECTOMY     • EXTRACORPOREAL SHOCK WAVE LITHOTRIPSY (ESWL) Left 01/06/2021    Procedure: EXTRACORPOREAL SHOCKWAVE LITHOTRIPSY, CYSTOSCOPY, RETROGRADE PYLEOGRAM;  Surgeon: Walter Schwartz MD;  Location: [facility];  Service: Urology;  Laterality: Left;   • EYE SURGERY      cataracts   • HUMERUS FRACTURE SURGERY     • LYTIC THROMBIN THERAPY Left 8/17/2022    Procedure: LT. LEG THROMBECTOMY/EMBOLECTOMY AND ANGIOPLASTY STENT PLACEMENT OF LEFT ILIAC VEIN;  Surgeon: Theo Bustos MD;  Location: [facility] OR 18/19;  Service: Vascular;  Laterality: Left;   • RIB FRACTURE SURGERY  2021   • THORACOSCOPY Right 02/16/2021    Procedure: bronchoscopy, right video assisted THORACOSCOPY WITH PLATING OF 3, 4, 5, AND 6 RIBS;  Surgeon: Kelley Delong MD;  Location: [facility] OR;  Service: Thoracic;  Laterality: Right;   • TOTAL KNEE ARTHROPLASTY Left 04/2015   • UMBILICAL HERNIA REPAIR       Family History   Problem Relation Age of Onset   • Cancer Mother         breast   • Breast cancer Mother    • No Known Problems Father    • Heart disease Maternal Grandmother    • Cancer Brother         esophageal ,  stomach    • Esophageal cancer Brother    • No Known Problems Son    • Breast cancer Maternal Aunt    • Heart disease Maternal Aunt    • No Known Problems Son    • Malig Hyperthermia Neg Hx      Social History     Tobacco Use   • Smoking status: Every Day     Packs/day: 0.25     Years: 50.00     Pack years: 12.50     Types: Cigarettes     Start date: 1970   • Smokeless tobacco: Never   Vaping Use   • Vaping Use: Never used   Substance Use Topics   • Alcohol use: Not Currently   • Drug use: No     No current facility-administered medications on file prior to encounter.     Current Outpatient Medications on File Prior to Encounter   Medication Sig Dispense Refill   • acetaminophen (TYLENOL) 325 MG tablet Take 2 tablets by mouth Every 4 (Four) Hours As Needed for Mild Pain .     • albuterol sulfate  (90 Base) MCG/ACT inhaler Inhale 2 puffs Every 4 (Four) Hours As Needed for Wheezing or Shortness of Air. 8 g 1   • cholecalciferol (VITAMIN D3) 25 MCG (1000 UT) tablet Take 2 tablets by mouth Daily.     • dabigatran etexilate (Pradaxa) 150 MG capsu Take 1 capsule by mouth 2 (Two) Times a Day. 60 capsule 5   • ferrous sulfate 325 (65 FE) MG tablet Take 1 tablet by mouth Every Other Day.     • Fluticasone-Salmeterol (Advair Diskus) 100-50 MCG/ACT DISKUS Inhale 1 puff 2 (Two) Times a Day. 180 each 5   • folic acid (FOLVITE) 1 MG tablet Take 1 tablet by mouth Daily. 30 tablet 5   • levothyroxine (SYNTHROID, LEVOTHROID) 100 MCG tablet Take 1 tablet by mouth Daily.     • losartan (COZAAR) 50 MG tablet Take 25 mg by mouth Daily. 1/2 tab     • melatonin 5 MG tablet tablet Take 1 tablet by mouth At Night As Needed (sleep).     • omeprazole OTC (PriLOSEC OTC) 20 MG EC tablet Take 1-2 tablets by mouth As Needed.     • ondansetron (ZOFRAN) 8 MG tablet Take 1 tablet by mouth Every 8 (Eight) Hours As Needed for Nausea or Vomiting. 30 tablet 1   • torsemide (DEMADEX) 20 MG tablet Take 1 tablet by mouth Every Other Day.     •  traMADol (ULTRAM) 50 MG tablet Pt says she takes this as needed     • vitamin B-12 (CYANOCOBALAMIN) 1000 MCG tablet Take 1 tablet by mouth Daily.     • amLODIPine (NORVASC) 5 MG tablet Take 1 tablet by mouth Daily. 30 tablet 1   • furosemide (Lasix) 20 MG tablet Take 1 tablet by mouth Daily. 90 tablet 3     Allergies   Allergen Reactions   • Ambien [Zolpidem Tartrate] Nausea Only     nausea   • Amoxicillin-Pot Clavulanate Nausea Only   • Doxycycline Nausea Only   • Eliquis [Apixaban] Nausea Only   • Levofloxacin Nausea Only   • Metronidazole Nausea Only   • Naproxen GI Intolerance   • Sulfamethoxazole-Trimethoprim Nausea Only   • Synthroid [Levothyroxine Sodium] Nausea Only   • Zolpidem Nausea Only       Objective    Objective     Vital Signs  Temp:  [97.7 °F (36.5 °C)-98.1 °F (36.7 °C)] 98.1 °F (36.7 °C)  Heart Rate:  [58-84] 58  Resp:  [16-20] 19  BP: (120-150)/(47-74) 150/67  SpO2:  [93 %-100 %] 100 %  on  Flow (L/min):  [2] 2;   Device (Oxygen Therapy): room air  Body mass index is 36.56 kg/m².    Physical Exam  Vitals and nursing note reviewed.   Constitutional:       General: She is not in acute distress.     Appearance: She is obese.   HENT:      Head: Normocephalic.      Mouth/Throat:      Mouth: Mucous membranes are moist.   Eyes:      Conjunctiva/sclera: Conjunctivae normal.   Cardiovascular:      Rate and Rhythm: Normal rate and regular rhythm.   Pulmonary:      Effort: Pulmonary effort is normal. No respiratory distress.      Breath sounds: Normal breath sounds.   Abdominal:      General: Bowel sounds are normal. There is no distension.      Palpations: Abdomen is soft.   Musculoskeletal:      Cervical back: Neck supple.      Right lower leg: Edema present.      Left lower leg: Edema present.   Skin:     General: Skin is warm and dry.   Neurological:      Mental Status: She is alert and oriented to person, place, and time.   Psychiatric:         Mood and Affect: Mood normal.         Behavior: Behavior  normal.         Results Review:  I reviewed the patient's new clinical results.  I reviewed the patient's new imaging results and agree with the interpretation.  I reviewed the patient's other test results and agree with the interpretation  I personally viewed and interpreted the patient's EKG/Telemetry data  Discussed with ED provider.    Lab Results (last 24 hours)     Procedure Component Value Units Date/Time    CBC & Differential [[id number]]  (Abnormal) Collected: 03/13/23 0916    Specimen: Blood Updated: 03/13/23 1000    Narrative:      The following orders were created for panel order CBC & Differential.  Procedure                               Abnormality         Status                     ---------                               -----------         ------                     CBC Auto Differential[[id number]]        Abnormal            Final result                 Please view results for these tests on the individual orders.    Comprehensive Metabolic Panel [[id number]]  (Abnormal) Collected: 03/13/23 0916    Specimen: Blood Updated: 03/13/23 1015     Glucose 106 mg/dL      BUN 38 mg/dL      Creatinine 1.24 mg/dL      Sodium 142 mmol/L      Potassium 4.4 mmol/L      Chloride 110 mmol/L      CO2 24.0 mmol/L      Calcium 9.3 mg/dL      Total Protein 5.6 g/dL      Albumin 3.2 g/dL      ALT (SGPT) 8 U/L      AST (SGOT) 14 U/L      Alkaline Phosphatase 45 U/L      Total Bilirubin 0.2 mg/dL      Globulin 2.4 gm/dL      A/G Ratio 1.3 g/dL      BUN/Creatinine Ratio 30.6     Anion Gap 8.0 mmol/L      eGFR 43.5 mL/min/1.73     Narrative:      GFR Normal >60  Chronic Kidney Disease <60  Kidney Failure <15    The GFR formula is only valid for adults with stable renal function between ages 18 and 70.    Protime-INR [[id number]]  (Abnormal) Collected: 03/13/23 0916    Specimen: Blood Updated: 03/13/23 0951     Protime 17.4 Seconds      INR 1.41    BNP [[id number]]  (Normal) Collected: 03/13/23 0916    Specimen: Blood Updated:  03/13/23 1014     proBNP 301.0 pg/mL     Narrative:      Among patients with dyspnea, NT-proBNP is highly sensitive for the detection of acute congestive heart failure. In addition NT-proBNP of <300 pg/ml effectively rules out acute congestive heart failure with 99% negative predictive value.    Results may be falsely decreased if patient taking Biotin.      High Sensitivity Troponin T [[id number]]  (Abnormal) Collected: 03/13/23 0916    Specimen: Blood Updated: 03/13/23 1015     HS Troponin T 23 ng/L     Narrative:      High Sensitive Troponin T Reference Range:  <10.0 ng/L- Negative Female for AMI  <15.0 ng/L- Negative Male for AMI  >=10 - Abnormal Female indicating possible myocardial injury.  >=15 - Abnormal Male indicating possible myocardial injury.   Clinicians would have to utilize clinical acumen, EKG, Troponin, and serial changes to determine if it is an Acute Myocardial Infarction or myocardial injury due to an underlying chronic condition.         Magnesium [[id number]]  (Normal) Collected: 03/13/23 0916    Specimen: Blood Updated: 03/13/23 1015     Magnesium 2.4 mg/dL     CBC Auto Differential [[id number]]  (Abnormal) Collected: 03/13/23 0916    Specimen: Blood Updated: 03/13/23 1000     WBC 7.91 10*3/mm3      RBC 1.99 10*6/mm3      Hemoglobin 6.2 g/dL      Hematocrit 19.0 %      MCV 95.5 fL      MCH 31.2 pg      MCHC 32.6 g/dL      RDW 13.5 %      RDW-SD 47.0 fl      MPV 11.8 fL      Platelets 209 10*3/mm3      Neutrophil % 62.1 %      Lymphocyte % 23.9 %      Monocyte % 8.2 %      Eosinophil % 3.4 %      Basophil % 1.0 %      Immature Grans % 1.4 %      Neutrophils, Absolute 4.91 10*3/mm3      Lymphocytes, Absolute 1.89 10*3/mm3      Monocytes, Absolute 0.65 10*3/mm3      Eosinophils, Absolute 0.27 10*3/mm3      Basophils, Absolute 0.08 10*3/mm3      Immature Grans, Absolute 0.11 10*3/mm3      nRBC 0.3 /100 WBC     High Sensitivity Troponin T 2Hr [[id number]]  (Abnormal) Collected: 03/13/23 1100     Specimen: Blood Updated: 03/13/23 1131     HS Troponin T 28 ng/L      Troponin T Delta 5 ng/L     Narrative:      High Sensitive Troponin T Reference Range:  <10.0 ng/L- Negative Female for AMI  <15.0 ng/L- Negative Male for AMI  >=10 - Abnormal Female indicating possible myocardial injury.  >=15 - Abnormal Male indicating possible myocardial injury.   Clinicians would have to utilize clinical acumen, EKG, Troponin, and serial changes to determine if it is an Acute Myocardial Infarction or myocardial injury due to an underlying chronic condition.               Imaging Results (Last 24 Hours)     Procedure Component Value Units Date/Time    XR Chest 2 View [[id number]] Collected: 03/13/23 0955     Updated: 03/13/23 1015    Narrative:      XR CHEST 2 VW-     HISTORY: Female who is 83 years-old,  short of breath     TECHNIQUE: Frontal and lateral views of the chest     COMPARISON: 3/8/2021     FINDINGS: The heart size is borderline. Pulmonary vasculature is  unremarkable. Aorta is calcified. No focal pulmonary consolidation,  pleural effusion, or pneumothorax. Chronic lower thoracic compression  deformity appears similar from 06/21/2022. No acute osseous process.       Impression:      No focal pulmonary consolidation. Borderline heart size.  Follow-up as clinical indications persist.     This report was finalized on 3/13/2023 10:12 AM by Dr. Alec Arcos M.D.             Results for orders placed in visit on 04/08/15    SCANNED - ECHOCARDIOGRAM      ECG 12 Lead Dyspnea   Preliminary Result   HEART RATE= 63  bpm   RR Interval= 952  ms   ME Interval= 193  ms   P Horizontal Axis= -3  deg   P Front Axis= 72  deg   QRSD Interval= 90  ms   QT Interval= 572  ms   QRS Axis= 39  deg   T Wave Axis= 59  deg   - ABNORMAL ECG -   Sinus rhythm   Nonspecific T abnormalities, anterior leads   Prolonged QT interval   Electronically Signed By:    Date and Time of Study: 2023-03-13 09:04:14           Assessment/Plan     Active  Hospital Problems    Diagnosis  POA   • **Acute blood loss anemia [D62]  Yes   • History of deep venous thrombosis (DVT) of distal vein of left lower extremity [Z86.718]  Not Applicable   • Current use of long term anticoagulation [Z79.01]  Not Applicable   • Iron deficiency [E61.1]  Yes   • Essential hypertension [I10]  Yes   • CKD (chronic kidney disease) stage 3, GFR 30-59 ml/min (CMS/HCC) [N18.30]  Yes   • Chronic obstructive pulmonary disease (HCC) [J44.9]  Yes   • Primary hypothyroidism [E03.9]  Yes      Resolved Hospital Problems   No resolved problems to display.       Ms. Frances is a 83 y.o. former smoker with a history of DVT on AC, CKD, HTN, hypothyroidism, HLD who is admitted for symptomatic anemia due to acute blood loss      ALBA:  -+melena in ED. Hgb 6.2 on arrival. 2 units PRBC's. Hold AC. PPI gtt. GI consult. Serial H&H. Clear liquid diet for now    DVT/Chronic AC:  -Prescribed pradaxa, held as above. F/U Doppler BLE 2/21/23 deep venous valvular incompetence lt popliteal; all other veins normal bilaterally    Iron deficiency:  -PO iron supplementation. Followed by Hematology outpatient. Check anemia/iron labs    HTN:  -BP acceptable acutely. Hold diuretics for now. Continue losartan    CKD:  -Cr stable. Avoid nephrotoxic meds. Monitor trends    COPD:  -No wheezing on exam. CXR without acute findings. Encourage IS. Continue ICS/LABA, prn albuterol    Hypothyroidism:  -levothyroxine. TSH 4.820 2/22/23, prev 59.5000. continue same dose      · I discussed the patient's findings and my recommendations with patient and nursing staff.    VTE Prophylaxis - SCDs.  Code Status - Full code.       LIVIER Mathias  [facility]  03/13/23  14:39 EDT

## 2023-03-13 NOTE — ED PROVIDER NOTES
EMERGENCY DEPARTMENT ENCOUNTER    Room Number:  S415/1  Date of encounter:  3/13/2023  PCP: Dinah Humphrey MD  Historian: Patient and son  Relevant information and history provided by sources other than the patient will be included below and in the ED Course.  Review of pertinent past medical records may also be included in record below and ED Course.    HPI:  Chief Complaint: Shortness of breath  A complete HPI/ROS/PMH/PSH/SH/FH are unobtainable due to: Not applicable  Context: Luann Frances is a 83 y.o. female who presents to the ED c/o patient presents with shortness of breath that started last night.  She noticed when she got up very early this morning in the middle the night had some shortness of breath when she ambulated to the bathroom and then when she returned to lay down had some shortness of breath.  She had to sit up.  When she went to get up several hours later later in the morning the symptoms had returned.  Denies any chest pain.  Currently right now sitting up in bed in room 35 denies any shortness of breath.  She denies any fevers or chills, coughs or colds.  She does always have chronic shortness of breath with exertion.  She ambulates only a short distance around the house with a walker.  She also has chronic swelling to her lower extremities.  She and her son reports that the edema to her lower extremities was worse 2 weeks ago and better today.  Denies any abdominal pain.  She states that she has black stools but that is chronic.  She is on iron.  Her last bowel movement was yesterday afternoon she believes.  There is no gross rectal bleeding.  Patient is on Pradaxa for DVTs.  Again reports no new swelling or pain to her lower extremities.  Her last dose of Pradaxa was about 9 PM last night.        Previous Episodes: No  Current Symptoms: Currently asymptomatic sitting in the bed.    MEDICAL HISTORY REVIEWED  I looked at patient's old records.  Patient has a history of DVTs in the past and  history of significant swelling to lower extremities.  She history of COPD, hypertension, chronic kidney disease, hyperlipidemia patient is on Pradaxa as well as multiple other medicines which I reviewed that are currently in epic.    Patient had a venous Doppler February 20, 2023 of her bilateral lower extremities that was ordered by her oncologist.  There was deep venous valvular incompetence noted in the left popliteal region there was no signs of DVT.  Patient did have an echo and a stress test in 2014.  I cannot see any records of the test results.    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  She does have a history of obstructive sleep apnea and hypertension    In May 2022 patient had a venous Doppler of bilateral lower extremities and it showed minimal chronic left lower extremity DVT in the popliteal region.  Again this was on the left.  All other veins appeared normal.  PAST MEDICAL HISTO  Active Ambulatory Problems     Diagnosis Date Noted   • Chronic obstructive pulmonary disease (HCC) 01/21/2016   • Diverticulitis of colon 01/21/2016   • Acid reflux 01/21/2016   • Primary hypothyroidism 01/21/2016   • Insomnia 01/21/2016   • Chronic nausea 01/21/2016   • Post herpetic neuralgia 03/02/2017   • Vitamin D deficiency 02/19/2018   • CKD (chronic kidney disease) stage 3, GFR 30-59 ml/min (CMS/[facility]) 02/19/2018   • Mixed hyperlipidemia 02/19/2018   • Hypersomnia 10/16/2019   • Hyperuricemia 01/20/2020   • Essential hypertension 01/20/2020   • DELROY (obstructive sleep apnea) 12/08/2020   • Renal calculus 01/06/2021   • Multiple closed fractures of ribs of right side 02/12/2021   • Compression fracture of body of thoracic vertebra ([facility]) 08/17/2021   • DVT, lower extremity, distal, acute, left (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   • Iron deficiency 11/04/2022   • Iron deficiency anemia 11/04/2022   • History of deep venous thrombosis (DVT) of distal vein of left lower extremity 02/22/2023   • Current use of long term anticoagulation 02/22/2023     Resolved Ambulatory Problems     Diagnosis Date Noted   • Acute sinusitis 01/21/2016   • Atopic rhinitis 01/21/2016   • Asthma 01/21/2016   • Candidiasis 01/21/2016   • Cough 01/21/2016   • Mild dehydration 01/21/2016   • Fatigue 01/21/2016   • Knee pain 01/21/2016   • Pain of lower extremity 01/21/2016   • Chronic obstructive pulmonary disease with acute exacerbation (HCC) 01/21/2016   • Shoulder pain 01/21/2016   • Ventricular premature beats 01/21/2016   • Elevated BP 10/25/2016   • Weight gain 12/07/2016   • Cold intolerance 12/07/2016   • Dyslipidemia 12/07/2016   • Insulin resistance 04/10/2017   • Non-cardiac chest pain 02/14/2021   • Acute deep vein thrombosis (DVT) of femoral vein of right lower extremity (HCC) 04/15/2021   • Arm skin lesion, right 11/17/2021   • Skin tear of right lower leg without complication 05/17/2022   • Myositis of left lower extremity 08/16/2022     Past Medical History:   Diagnosis Date   • Arthritis    • Asymptomatic PVCs    • Backache    • Chronic bronchitis (HCC)    • CKD (chronic kidney disease)    • Deep vein thrombosis (DVT) of right lower extremity (HCC)    • Fracture of humerus    • GERD (gastroesophageal reflux disease)    • Hyperlipidemia    • Hypertension    • Hypothyroidism    • Pneumonia    • Pulmonary emphysema (HCC)    • Renal calculi    • Sleep apnea    • Thoracic vertebral fracture (HCC)          PAST SURGICAL HISTORY  Past  Surgical History:   Procedure Laterality Date   • APPENDECTOMY     • EXTRACORPOREAL SHOCK WAVE LITHOTRIPSY (ESWL) Left 01/06/2021    Procedure: EXTRACORPOREAL SHOCKWAVE LITHOTRIPSY, CYSTOSCOPY, RETROGRADE PYLEOGRAM;  Surgeon: Walter Schwartz MD;  Location: [facility];  Service: Urology;  Laterality: Left;   • EYE SURGERY      cataracts   • HUMERUS FRACTURE SURGERY     • LYTIC THROMBIN THERAPY Left 8/17/2022    Procedure: LT. LEG THROMBECTOMY/EMBOLECTOMY AND ANGIOPLASTY STENT PLACEMENT OF LEFT ILIAC VEIN;  Surgeon: Theo Bustos MD;  Location: [facility] OR 18/19;  Service: Vascular;  Laterality: Left;   • RIB FRACTURE SURGERY  2021   • THORACOSCOPY Right 02/16/2021    Procedure: bronchoscopy, right video assisted THORACOSCOPY WITH PLATING OF 3, 4, 5, AND 6 RIBS;  Surgeon: Kelley Delong MD;  Location: [facility] OR;  Service: Thoracic;  Laterality: Right;   • TOTAL KNEE ARTHROPLASTY Left 04/2015   • UMBILICAL HERNIA REPAIR           FAMILY HISTORY  Family History   Problem Relation Age of Onset   • Cancer Mother         breast   • Breast cancer Mother    • No Known Problems Father    • Heart disease Maternal Grandmother    • Cancer Brother         esophageal , stomach    • Esophageal cancer Brother    • No Known Problems Son    • Breast cancer Maternal Aunt    • Heart disease Maternal Aunt    • No Known Problems Son    • Malig Hyperthermia Neg Hx          SOCIAL HISTORY  Social History     Socioeconomic History   • Marital status:    Tobacco Use   • Smoking status: Every Day     Packs/day: 0.25     Years: 50.00     Pack years: 12.50     Types: Cigarettes     Start date: 1970   • Smokeless tobacco: Never   Vaping Use   • Vaping Use: Never used   Substance and Sexual Activity   • Alcohol use: Not Currently   • Drug use: No         ALLERGIES  Ambien [zolpidem tartrate], Amoxicillin-pot clavulanate, Doxycycline, Eliquis [apixaban], Levofloxacin, Metronidazole, Naproxen,  Sulfamethoxazole-trimethoprim, Synthroid [levothyroxine sodium], and Zolpidem        REVIEW OF SYSTEMS  Review of Systems     All systems reviewed and negative except for those discussed in HPI.       PHYSICAL EXAM    I have reviewed the triage vital signs and nursing notes.    ED Triage Vitals [03/13/23 0840]   Temp Heart Rate Resp BP SpO2   97.7 °F (36.5 °C) 66 16 120/52 100 %      Temp src Heart Rate Source Patient Position BP Location FiO2 (%)   -- -- -- -- --       GENERAL: Elderly female that is frail and chronically ill.  No acute distress.Vital signs on my initial evaluation O2 sats 100% on room air  HENT: nares patent  Head/neck/ face are symmetric without gross deformity, signs of trauma, or swelling  EYES: no scleral icterus, pale conjunctive a bilaterally  NECK: Supple, no meningismus  CV: regular rhythm, regular rate with intact distal pulses.  Soft systolic murmur 2 out of 6  RESPIRATORY: normal effort and no respiratory distress.  Clear to auscultation bilaterally  ABDOMEN: soft and nontender.  Morbidly obese.  MUSCULOSKELETAL: no deformity.  2+ edema to bilateral lower extremities that appear symmetric.  No coolness or pallor or cyanosis.  Intact distal pulses.  NEURO: alert and appropriate, moves all extremities, follows commands.  No focal motor or sensory changes  SKIN: warm, dry    Vital signs and nursing notes reviewed.        LAB RESULTS  Recent Results (from the past 24 hour(s))   ECG 12 Lead Dyspnea    Collection Time: 03/13/23  9:04 AM   Result Value Ref Range    QT Interval 572 ms   Green Top (Gel)    Collection Time: 03/13/23  9:16 AM   Result Value Ref Range    Extra Tube Hold for add-ons.    Lavender Top    Collection Time: 03/13/23  9:16 AM   Result Value Ref Range    Extra Tube hold for add-on    Gold Top - SST    Collection Time: 03/13/23  9:16 AM   Result Value Ref Range    Extra Tube Hold for add-ons.    Light Blue Top    Collection Time: 03/13/23  9:16 AM   Result Value Ref Range     Extra Tube Hold for add-ons.    Comprehensive Metabolic Panel    Collection Time: 03/13/23  9:16 AM    Specimen: Blood   Result Value Ref Range    Glucose 106 (H) 65 - 99 mg/dL    BUN 38 (H) 8 - 23 mg/dL    Creatinine 1.24 (H) 0.57 - 1.00 mg/dL    Sodium 142 136 - 145 mmol/L    Potassium 4.4 3.5 - 5.2 mmol/L    Chloride 110 (H) 98 - 107 mmol/L    CO2 24.0 22.0 - 29.0 mmol/L    Calcium 9.3 8.6 - 10.5 mg/dL    Total Protein 5.6 (L) 6.0 - 8.5 g/dL    Albumin 3.2 (L) 3.5 - 5.2 g/dL    ALT (SGPT) 8 1 - 33 U/L    AST (SGOT) 14 1 - 32 U/L    Alkaline Phosphatase 45 39 - 117 U/L    Total Bilirubin 0.2 0.0 - 1.2 mg/dL    Globulin 2.4 gm/dL    A/G Ratio 1.3 g/dL    BUN/Creatinine Ratio 30.6 (H) 7.0 - 25.0    Anion Gap 8.0 5.0 - 15.0 mmol/L    eGFR 43.5 (L) >60.0 mL/min/1.73   Protime-INR    Collection Time: 03/13/23  9:16 AM    Specimen: Blood   Result Value Ref Range    Protime 17.4 (H) 11.7 - 14.2 Seconds    INR 1.41 (H) 0.90 - 1.10   BNP    Collection Time: 03/13/23  9:16 AM    Specimen: Blood   Result Value Ref Range    proBNP 301.0 0.0 - 1,800.0 pg/mL   High Sensitivity Troponin T    Collection Time: 03/13/23  9:16 AM    Specimen: Blood   Result Value Ref Range    HS Troponin T 23 (H) <10 ng/L   Magnesium    Collection Time: 03/13/23  9:16 AM    Specimen: Blood   Result Value Ref Range    Magnesium 2.4 1.6 - 2.4 mg/dL   CBC Auto Differential    Collection Time: 03/13/23  9:16 AM    Specimen: Blood   Result Value Ref Range    WBC 7.91 3.40 - 10.80 10*3/mm3    RBC 1.99 (L) 3.77 - 5.28 10*6/mm3    Hemoglobin 6.2 (C) 12.0 - 15.9 g/dL    Hematocrit 19.0 (C) 34.0 - 46.6 %    MCV 95.5 79.0 - 97.0 fL    MCH 31.2 26.6 - 33.0 pg    MCHC 32.6 31.5 - 35.7 g/dL    RDW 13.5 12.3 - 15.4 %    RDW-SD 47.0 37.0 - 54.0 fl    MPV 11.8 6.0 - 12.0 fL    Platelets 209 140 - 450 10*3/mm3    Neutrophil % 62.1 42.7 - 76.0 %    Lymphocyte % 23.9 19.6 - 45.3 %    Monocyte % 8.2 5.0 - 12.0 %    Eosinophil % 3.4 0.3 - 6.2 %    Basophil % 1.0 0.0 -  1.5 %    Immature Grans % 1.4 (H) 0.0 - 0.5 %    Neutrophils, Absolute 4.91 1.70 - 7.00 10*3/mm3    Lymphocytes, Absolute 1.89 0.70 - 3.10 10*3/mm3    Monocytes, Absolute 0.65 0.10 - 0.90 10*3/mm3    Eosinophils, Absolute 0.27 0.00 - 0.40 10*3/mm3    Basophils, Absolute 0.08 0.00 - 0.20 10*3/mm3    Immature Grans, Absolute 0.11 (H) 0.00 - 0.05 10*3/mm3    nRBC 0.3 (H) 0.0 - 0.2 /100 WBC   Type & Screen    Collection Time: 03/13/23 10:12 AM    Specimen: Blood   Result Value Ref Range    ABO Type A     RH type Negative     Antibody Screen Negative     T&S Expiration Date 3/16/2023 11:59:59 PM    High Sensitivity Troponin T 2Hr    Collection Time: 03/13/23 11:00 AM    Specimen: Blood   Result Value Ref Range    HS Troponin T 28 (H) <10 ng/L    Troponin T Delta 5 (C) >=-4 - <+4 ng/L   Prepare RBC, 2 Units    Collection Time: 03/13/23  3:26 PM   Result Value Ref Range    Product Code R5952B88     Unit Number [id number]     UNIT  ABO A     UNIT  RH NEG     Crossmatch Interpretation Compatible     Dispense Status IS     Blood Expiration Date [id number]     Blood Type Barcode 0600     Product Code E5102L96     Unit Number [id number]     UNIT  ABO A     UNIT  RH NEG     Crossmatch Interpretation Compatible     Dispense Status IS     Blood Expiration Date [id number]     Blood Type Barcode 0600        Ordered the above labs and independently reviewed the results.        RADIOLOGY  XR Chest 2 View    Result Date: 3/13/2023  XR CHEST 2 VW-  HISTORY: Female who is 83 years-old,  short of breath  TECHNIQUE: Frontal and lateral views of the chest  COMPARISON: 3/8/2021  FINDINGS: The heart size is borderline. Pulmonary vasculature is unremarkable. Aorta is calcified. No focal pulmonary consolidation, pleural effusion, or pneumothorax. Chronic lower thoracic compression deformity appears similar from 06/21/2022. No acute osseous process.      No focal pulmonary consolidation. Borderline heart size. Follow-up as clinical  indications persist.  This report was finalized on 3/13/2023 10:12 AM by Dr. Alec Arcos M.D.        I ordered the above noted radiological studies. Reviewed by me and discussed with radiologist.  See dictation for official radiology interpretation.      PROCEDURES    Critical Care  Performed by: Josue Amador MD  Authorized by: Josue Amador MD     Critical care provider statement:     Critical care time (minutes): 45.    Critical care time was exclusive of:  Separately billable procedures and treating other patients    Critical care was necessary to treat or prevent imminent or life-threatening deterioration of the following conditions:  Circulatory failure (Acute GI bleed with hemoglobin of 6.1 that is symptomatic and needing blood transfusions.)    Critical care was time spent personally by me on the following activities:  Blood draw for specimens, development of treatment plan with patient or surrogate, discussions with consultants, evaluation of patient's response to treatment, obtaining history from patient or surrogate, review of old charts, re-evaluation of patient's condition, pulse oximetry, ordering and review of radiographic studies, ordering and review of laboratory studies and ordering and performing treatments and interventions    I assumed direction of critical care for this patient from another provider in my specialty: no      Care discussed with: admitting provider            MEDICATIONS GIVEN IN ER    Medications   sodium chloride 0.9 % flush 10 mL (has no administration in time range)   pantoprazole (PROTONIX) 40 mg in 100 mL NS (VTB) (8 mg/hr Intravenous New Bag 3/13/23 1431)   acetaminophen (TYLENOL) tablet 650 mg (has no administration in time range)     Or   acetaminophen (TYLENOL) 160 MG/5ML solution 650 mg (has no administration in time range)     Or   acetaminophen (TYLENOL) suppository 650 mg (has no administration in time range)   ondansetron (ZOFRAN) tablet 4 mg (has no  administration in time range)     Or   ondansetron (ZOFRAN) injection 4 mg (has no administration in time range)   albuterol (PROVENTIL) nebulizer solution 0.083% 2.5 mg/3mL (has no administration in time range)   cholecalciferol (VITAMIN D3) tablet 2,000 Units (has no administration in time range)   ferrous sulfate tablet 325 mg (has no administration in time range)   budesonide-formoterol (SYMBICORT) 160-4.5 MCG/ACT inhaler 1 puff (has no administration in time range)   folic acid (FOLVITE) tablet 1 mg (has no administration in time range)   levothyroxine (SYNTHROID, LEVOTHROID) tablet 100 mcg (has no administration in time range)   melatonin tablet 5 mg (has no administration in time range)   vitamin B-12 (CYANOCOBALAMIN) tablet 1,000 mcg (has no administration in time range)   losartan (COZAAR) tablet 25 mg (has no administration in time range)   traMADol (ULTRAM) tablet 50 mg (has no administration in time range)   pantoprazole (PROTONIX) injection 80 mg (80 mg Intravenous Given 3/13/23 1027)         All labs have been independently reviewed by me.  All radiology studies have been reviewed by me and I discussed with radiologist dictating the report when indicated below.  All EKG's independently viewed and interpreted by me.  Discussion below represents my analysis of pertinent findings related to patient's condition, differential diagnosis, treatment plan and final disposition.        PROGRESS, DATA ANALYSIS, CONSULTS, AND MEDICAL DECISION MAKING    DDx includes CHF, acute coronary syndrome, pulmonary embolism, pneumothorax, pneumonia, asthma/COPD,aspiration,  pulmonary hypertension, metabolic acidosis, deconditioning, anemia, other hematologic etiologies such as CO poisoning, anxiety.     Currently hemodynamically stable oxygen saturations 100% on room air.  Anticipate this patient will likely need to be admitted to the hospital.  At this time she is stable and is asymptomatic.  She has multiple comorbidities  with a history of DVTs and PEs in the past.  But she had a Doppler recently about 3 weeks ago that was normal and no DVTs to bilateral lower extremities.  Has no recent procedure to upper extremities or any lines placed.  Reports no new pain or swelling to her lower extremities bilaterally.  This makes DVT and PE very unlikely.  The shortness of breath is worse with exertion and laying flat.  Definitely could be a component of congestive heart failure.  She does look pale on exam and does report chronic black stool as she is on iron.  We have to make sure that she does not have a GI bleed and anemia as a source of her shortness of breath.  All questions answered at this time.    We are currently under a pandemic from the COVID19 infection.  The patient presented to the emergency department by ambulance or personal vehicle. I followed the current protocols required by Infection Control at [facility] in my evaluation and treatment of the patient. The patient was wearing a face mask during my evaluation and throughout my encounter. During my whole encounter with this patient I used appropriate personal protective equipment.  This equipment consisted of eye protection, facemask, gown, and gloves.  I applied this equipment before entering the room.        ED Course as of 03/13/23 1546   Mon Mar 13, 2023   1006 Hemoglobin(!!): 6.2  Patient's hemoglobin was 13.22 weeks ago and was 13.12 months ago. [MM]   1019 Since hemoglobin has come back low.  Patient reports that she has black stool.  She has had black stool for a long period of time as she takes iron.  I went back and reevaluated her informed her of the hemoglobin and the change in the hemoglobin from 2 weeks ago.  She states her last bowel movement was yesterday afternoon.  It was black typical of her previous bowel movements.  She again denies any chest pain or shortness of breath currently.  Blood pressure is normal is in the 130 systolic.  Heart  rate is normal as well.  On rectal exam patient does have black stools consistent with melena and is heme positive.  We will go ahead and start her on transfusions of blood that I have ordered.  I think we could give her typed specific blood as she is very stable and asymptomatic at this time.  Her last dose of Pradaxa was at 9 PM last night.  I did discuss the case with the clinical pharmacist [place].  Do not think there is can be much benefit in giving Pradaxa.  I will consult hematology oncology as well. [MM]   1020 HS Troponin T(!): 23  Likely elevated from acute anemia as well as some chronic renal insufficiency.  We will check a second repeat troponin.  She has no chest pain or shortness of breath currently.  And no EKG changes. [MM]   1058 I did discuss the case with Dr. Landeros who is the hospitalist on for A.  Informed him of the patient's presenting symptoms and results of test.  He agrees to admit the patient to the hospital.  He also agrees that she is hemodynamically stable in no distress that she is good for a monitored bed at this time. [MM]   1059 I have talked with the desk tech.  She has tried multiple times to get in touch with GI.  She has left a message.  GI has not returned phone call as of yet. [MM]   1117 I have just reevaluated the patient again at this time.  Blood pressure is 127/54.  Heart rate is in the 60s.  Oxygen saturation is 100% on room air.  She states she feels little tired and weak.  Denies any chest pain or shortness of breath.  I did clarify with her again about the results of the test and the fact that her hemoglobin is low and that we need to transfuse blood.  All questions answered.  Patient's son who is initially present on initial exam has left.  All questions answered [MM]   1118 I did discuss the case with the hematologist oncologist Dr. Ervin and informed him of the the patient's presenting symptoms results of hemoglobin and initial treatment plan.  Patient's last dose  of Pradaxa was at 9 PM.  He does not recommend giving any reversal at this time.  She is hemodynamically stable and we are going to give her blood very shortly.  All questions answered [MM]   1126 My own independent interpretation of the EKG  EKG was done at 9:04 AM I looked at the EKG at 9:06 AM  Its rate of 63 it is sinus rhythm it is narrow complex  Normal axis  I do not appreciate any acute injury pattern  I see diffuse nonspecific T wave changes  QT looks unremarkable on my evaluation.  I think the baby will baseline is why the interpretation on the EKG is saying is prolonged  I compared to the previous EKG from February 12, 2021 and I do not see any acute changes. [MM]   1222 I did discuss the case with Dr. Ford who is the gastroenterologist that is on-call.  Informed him of the patient presenting symptoms and results of test.  He will consult. [MM]      ED Course User Index  [MM] Josue Amador MD       AS OF 15:46 EDT VITALS:    BP - 155/55  HR - 72  TEMP - 98.4 °F (36.9 °C) (Oral)  02 SATS - 100%    SOCIAL DETERMINANTS OF HEALTH THAT IMPACT OR LIMIT CARE (For example..Homelessness,safe discharge, inability to obtain care, follow up, or prescriptions):      DIAGNOSIS  Final diagnoses:   Acute blood loss anemia: Symptomatic anemia   Melena   Coagulopathy (HCC): Pradaxa induced   Acute blood loss anemia         DISPOSITION  I have reviewed the test results with my patient and explained the current treatment plan.  I answered all of the patient's questions.  The patient will be admitted to monitor bed at this time.  The patient is not hypotensive and is tolerating their current disease condition well enough for a monitored bed at this time.  The patient's current condition does not require intensive care treatment at this time.            DICTATED UTILIZING DRAGON DICTATION    Note Disclaimer: [facility], we believe that sharing information builds trust and better relationships. You are receiving this  note because you recently visited [facility]. It is possible you will see health information before a provider has talked with you about it. This kind of information can be easy to misunderstand. To help you fully understand what it means for your health, we urge you to discuss this note with your provider.     Josue Amador MD  03/13/23 2973

## 2023-03-13 NOTE — H&P (VIEW-ONLY)
[facility]  Initial Inpatient Consult Note    Referring Provider: Dr. Landeros    Reason for Consultation: Acute blood loss anemia    Subjective     History of present illness:      Thank you for allowing us to participate in the care of this patient.    83 y.o. female patient of Dr. Ray with a past medical history significant for GERD, COPD, CKD stage III, DVT for which she takes Pradaxa, HTN, HLD, hypothyroidism who presented with complaints of acute onset shortness of breath prompting her to proceed to the emergency department.    She reports black stools but states they have been like this since she has been on oral iron supplementation.  She denies any changes in her bowel habits and notes occasional constipation which she associates with iron use.  No evidence of loretta blood.  She suffers from chronic intermittent nausea which has been worse here lately.  Heartburn occurs roughly once a month.  No vomiting, dysphagia, odynophagia, abdominal pain.    No previous EGD or colonoscopy.  Cologuard was ordered by PCP but never completed.    Family history significant for a brother with esophageal/gastric cancer.    Last dose of Pradaxa roughly 9 PM last evening.    Hemoglobin 6.2, previously 13 g/dL 2 weeks ago.  She just finished receiving her first unit of packed cells with the second 1 ordered.      Past Medical History:  Past Medical History:   Diagnosis Date   • Acute deep vein thrombosis (DVT) of femoral vein of right lower extremity (HCC) 04/15/2021   • Arthritis    • Asymptomatic PVCs    • Backache    • Chronic bronchitis (HCC)    • CKD (chronic kidney disease)     Stage 3   • Deep vein thrombosis (DVT) of right lower extremity (HCC)    • Essential hypertension 01/20/2020   • Fracture of humerus    • GERD (gastroesophageal reflux disease)    • Hyperlipidemia    • Hypertension    • Hypothyroidism    • Pneumonia    • Pulmonary emphysema (HCC)    • Renal calculi    • Renal calculus  01/06/2021   • Sleep apnea     DOES NOT USE CPAP   • Thoracic vertebral fracture (HCC)      Past Surgical History:  Past Surgical History:   Procedure Laterality Date   • APPENDECTOMY     • EXTRACORPOREAL SHOCK WAVE LITHOTRIPSY (ESWL) Left 01/06/2021    Procedure: EXTRACORPOREAL SHOCKWAVE LITHOTRIPSY, CYSTOSCOPY, RETROGRADE PYLEOGRAM;  Surgeon: Walter Schwartz MD;  Location: [facility];  Service: Urology;  Laterality: Left;   • EYE SURGERY      cataracts   • HUMERUS FRACTURE SURGERY     • LYTIC THROMBIN THERAPY Left 8/17/2022    Procedure: LT. LEG THROMBECTOMY/EMBOLECTOMY AND ANGIOPLASTY STENT PLACEMENT OF LEFT ILIAC VEIN;  Surgeon: Theo Bustos MD;  Location: [facility] OR 18/19;  Service: Vascular;  Laterality: Left;   • RIB FRACTURE SURGERY  2021   • THORACOSCOPY Right 02/16/2021    Procedure: bronchoscopy, right video assisted THORACOSCOPY WITH PLATING OF 3, 4, 5, AND 6 RIBS;  Surgeon: Kelley Delong MD;  Location: [facility] OR;  Service: Thoracic;  Laterality: Right;   • TOTAL KNEE ARTHROPLASTY Left 04/2015   • UMBILICAL HERNIA REPAIR        Social History:   Social History     Tobacco Use   • Smoking status: Every Day     Packs/day: 0.25     Years: 50.00     Pack years: 12.50     Types: Cigarettes     Start date: 1970   • Smokeless tobacco: Never   Substance Use Topics   • Alcohol use: Not Currently      Family History:  Family History   Problem Relation Age of Onset   • Cancer Mother         breast   • Breast cancer Mother    • No Known Problems Father    • Heart disease Maternal Grandmother    • Cancer Brother         esophageal , stomach    • Esophageal cancer Brother    • No Known Problems Son    • Breast cancer Maternal Aunt    • Heart disease Maternal Aunt    • No Known Problems Son    • Malig Hyperthermia Neg Hx        Home Meds:  Medications Prior to Admission   Medication Sig Dispense Refill Last Dose   • acetaminophen (TYLENOL) 325 MG tablet Take 2 tablets by mouth Every 4 (Four)  Hours As Needed for Mild Pain .   Past Week   • albuterol sulfate  (90 Base) MCG/ACT inhaler Inhale 2 puffs Every 4 (Four) Hours As Needed for Wheezing or Shortness of Air. 8 g 1 3/12/2023   • cholecalciferol (VITAMIN D3) 25 MCG (1000 UT) tablet Take 2 tablets by mouth Daily.   3/12/2023   • dabigatran etexilate (Pradaxa) 150 MG capsu Take 1 capsule by mouth 2 (Two) Times a Day. 60 capsule 5 3/12/2023   • ferrous sulfate 325 (65 FE) MG tablet Take 1 tablet by mouth Every Other Day.   3/12/2023   • Fluticasone-Salmeterol (Advair Diskus) 100-50 MCG/ACT DISKUS Inhale 1 puff 2 (Two) Times a Day. 180 each 5 3/12/2023   • folic acid (FOLVITE) 1 MG tablet Take 1 tablet by mouth Daily. 30 tablet 5 3/12/2023   • levothyroxine (SYNTHROID, LEVOTHROID) 100 MCG tablet Take 1 tablet by mouth Daily.   3/12/2023   • losartan (COZAAR) 50 MG tablet Take 25 mg by mouth Daily. 1/2 tab   3/12/2023   • melatonin 5 MG tablet tablet Take 1 tablet by mouth At Night As Needed (sleep).   3/12/2023   • omeprazole OTC (PriLOSEC OTC) 20 MG EC tablet Take 1-2 tablets by mouth As Needed.   3/12/2023   • ondansetron (ZOFRAN) 8 MG tablet Take 1 tablet by mouth Every 8 (Eight) Hours As Needed for Nausea or Vomiting. 30 tablet 1 Past Week   • torsemide (DEMADEX) 20 MG tablet Take 1 tablet by mouth Every Other Day.   3/12/2023   • traMADol (ULTRAM) 50 MG tablet Pt says she takes this as needed   3/12/2023   • vitamin B-12 (CYANOCOBALAMIN) 1000 MCG tablet Take 1 tablet by mouth Daily.   3/12/2023   • amLODIPine (NORVASC) 5 MG tablet Take 1 tablet by mouth Daily. 30 tablet 1    • furosemide (Lasix) 20 MG tablet Take 1 tablet by mouth Daily. 90 tablet 3      Current Meds:      Allergies:  Allergies   Allergen Reactions   • Ambien [Zolpidem Tartrate] Nausea Only     nausea   • Amoxicillin-Pot Clavulanate Nausea Only   • Doxycycline Nausea Only   • Eliquis [Apixaban] Nausea Only   • Levofloxacin Nausea Only   • Metronidazole Nausea Only   • Naproxen  GI Intolerance   • Sulfamethoxazole-Trimethoprim Nausea Only   • Synthroid [Levothyroxine Sodium] Nausea Only   • Zolpidem Nausea Only     Review of Systems   Constitutional:   No fevers, chills, sweats   Eye:   No recent visual problems, eye discharge, eye pain, redness   HENT:   No ear pain, nasal congestion, sore throat, voice changes   Respiratory:   No cough, pain on breathing, sputum production +shortness of breath  Cardiovascular:   No Chest pain, palpitations, syncope, shortness of breath while laying flat   Gastrointestinal:   No vomiting, diarrhea, + nausea, occasional constipation  Genitourinary:   No hematuria, dysuria, incontinence   Hema/Lymph:   Negative for bruising tendency, swollen lymph glands, nosebleeds + history of dvt-on Pradaxa  Endocrine:   Negative for excessive thirst, excessive hunger, excessive urination,   Musculoskeletal:   No back pain, neck pain, joint pain, muscle pain, decreased range of motion   Integumentary:   No rash, pruritus, abrasions   Neurologic: No weakness, numbness, frequent headaches   Psychiatric:   No anxiety, depression, mood change    Objective     Vital Signs  Temp:  [97.7 °F (36.5 °C)-98.1 °F (36.7 °C)] 98.1 °F (36.7 °C)  Heart Rate:  [58-84] 58  Resp:  [16-20] 19  BP: (120-150)/(47-74) 150/67  Physical Exam:   Constitutional:   Well developed, well nourished. No acute distress.   Head:   Atraumatic, normocephalic.   Neck:   Supple and symmetric. Trachea midline.   ENT:   External ears and nose without lesion. Hearing grossly intact.   Eyes:   Pupils equal and round. Sclerae anicteric. Conjunctivae pale.   Respiratory:   Quiet, even, non-labored breathing. Clear to auscultation bilaterally.   Cardiovascular:   Regular rate and rhythm. No murmur, gallop or rub appreciated.   Gastrointestinal:   Soft, nondistended, nontender. No rebound or guarding present. Bowel sounds present in all 4 quadrants.   Integumentary:   Skin warm and dry, not jaundiced.  Pale.  Neurological:   Alert and oriented to person, place and time. Fluid of speech.   Musculoskeletal:   Active range of motion all 4 extremities. No rashes or edema.   Psychological:   Appropriate mood and affect. Cooperative.    Results Review:   I reviewed the patient's new clinical results.    Results from last 7 days   Lab Units 03/13/23  0916   WBC 10*3/mm3 7.91   HEMOGLOBIN g/dL 6.2*   HEMATOCRIT % 19.0*   PLATELETS 10*3/mm3 209     Results from last 7 days   Lab Units 03/13/23  0916   SODIUM mmol/L 142   POTASSIUM mmol/L 4.4   CHLORIDE mmol/L 110*   CO2 mmol/L 24.0   BUN mg/dL 38*   CREATININE mg/dL 1.24*   CALCIUM mg/dL 9.3   BILIRUBIN mg/dL 0.2   ALK PHOS U/L 45   ALT (SGPT) U/L 8   AST (SGOT) U/L 14   GLUCOSE mg/dL 106*     Results from last 7 days   Lab Units 03/13/23  0916   INR  1.41*     Lab Results   Lab Value Date/Time    LIPASE 25 12/12/2022 1736    LIPASE 18 08/11/2022 1058    LIPASE 28 12/16/2020 1351    LIPASE 24 12/10/2015 0950       Radiology:  XR Chest 2 View   Final Result   No focal pulmonary consolidation. Borderline heart size.   Follow-up as clinical indications persist.       This report was finalized on 3/13/2023 10:12 AM by Dr. Alec Arcos M.D.              Assessment & Plan   Patient Active Problem List   Diagnosis   • Chronic obstructive pulmonary disease (HCC)   • Diverticulitis of colon   • Acid reflux   • Primary hypothyroidism   • Insomnia   • Chronic nausea   • Post herpetic neuralgia   • Vitamin D deficiency   • CKD (chronic kidney disease) stage 3, GFR 30-59 ml/min (CMS/[facility])   • Mixed hyperlipidemia   • Hypersomnia   • Hyperuricemia   • Essential hypertension   • DELROY (obstructive sleep apnea)   • Renal calculus   • Multiple closed fractures of ribs of right side   • Compression fracture of body of thoracic vertebra ([facility])   • DVT, lower extremity, distal, acute, left ([facility])   • Lower extremity edema   • Medicare annual wellness visit, subsequent   • Tobacco abuse   •  Osteoporosis   • Acute deep vein thrombosis (DVT) of femoral vein of left lower extremity (HCC)   • 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 Acute on chronic anemia without overt bleed  2. Left lower extremity DVT-on Pradaxa with last dose 3/12 at 2100  3. COPD  4. Hypertension  5. CKD stage III  6. Chronic nausea attributed to medications    Plan:  · Continue PPI gtt.  · Agree with transfusion.  · Closely follow H&H and stool output, transfuse as necessary.  · As needed antiemetics.  · Okay for clear liquids.  · Hold anticoagulation if possible.  · N.p.o. after midnight in anticipation of EGD tomorrow with Dr. Ford.      I discussed the patient's findings and my recommendations with patient and Dr. Ford.    Dragon dictation used throughout this note.            MARZENA Ward  [facility]  [address] [place] [address]  Office: [phone]

## 2023-03-13 NOTE — ED NOTES
Nursing report ED to floor  Luann Frances  83 y.o.  female    HPI :   Chief Complaint   Patient presents with    Shortness of Breath    Weakness - Generalized       Admitting doctor:   Braxton Landeros MD    Admitting diagnosis:   The primary encounter diagnosis was Acute blood loss anemia: Symptomatic anemia. Diagnoses of Melena and Coagulopathy (HCC): Pradaxa induced were also pertinent to this visit.    Code status:   Current Code Status       Date Active Code Status Order ID Comments User Context       Prior            Allergies:   Ambien [zolpidem tartrate], Amoxicillin-pot clavulanate, Doxycycline, Eliquis [apixaban], Levofloxacin, Metronidazole, Naproxen, Sulfamethoxazole-trimethoprim, Synthroid [levothyroxine sodium], and Zolpidem    Isolation:   No active isolations    Intake and Output  No intake or output data in the 24 hours ending 03/13/23 1137    Weight:   There were no vitals filed for this visit.    Most recent vitals:   Vitals:    03/13/23 1032 03/13/23 1114 03/13/23 1114 03/13/23 1132   BP:  127/54 127/54 126/50   Pulse: 65 70 68 66   Resp:   16 20   Temp:   97.8 °F (36.6 °C)    TempSrc:   Oral    SpO2: 99% 100% 100% 100%       Active LDAs/IV Access:   Lines, Drains & Airways       Active LDAs       Name Placement date Placement time Site Days    Peripheral IV 12/12/22 2002 Right Antecubital 12/12/22 2002  Antecubital  90    Peripheral IV 03/13/23 1027 Left Antecubital 03/13/23  1027  Antecubital  less than 1                    Labs (abnormal labs have a star):   Labs Reviewed   COMPREHENSIVE METABOLIC PANEL - Abnormal; Notable for the following components:       Result Value    Glucose 106 (*)     BUN 38 (*)     Creatinine 1.24 (*)     Chloride 110 (*)     Total Protein 5.6 (*)     Albumin 3.2 (*)     BUN/Creatinine Ratio 30.6 (*)     eGFR 43.5 (*)     All other components within normal limits    Narrative:     GFR Normal >60  Chronic Kidney Disease <60  Kidney Failure <15    The GFR formula is  only valid for adults with stable renal function between ages 18 and 70.   PROTIME-INR - Abnormal; Notable for the following components:    Protime 17.4 (*)     INR 1.41 (*)     All other components within normal limits   TROPONIN - Abnormal; Notable for the following components:    HS Troponin T 23 (*)     All other components within normal limits    Narrative:     High Sensitive Troponin T Reference Range:  <10.0 ng/L- Negative Female for AMI  <15.0 ng/L- Negative Male for AMI  >=10 - Abnormal Female indicating possible myocardial injury.  >=15 - Abnormal Male indicating possible myocardial injury.   Clinicians would have to utilize clinical acumen, EKG, Troponin, and serial changes to determine if it is an Acute Myocardial Infarction or myocardial injury due to an underlying chronic condition.        CBC WITH AUTO DIFFERENTIAL - Abnormal; Notable for the following components:    RBC 1.99 (*)     Hemoglobin 6.2 (*)     Hematocrit 19.0 (*)     Immature Grans % 1.4 (*)     Immature Grans, Absolute 0.11 (*)     nRBC 0.3 (*)     All other components within normal limits   HIGH SENSITIVITIY TROPONIN T 2HR - Abnormal; Notable for the following components:    HS Troponin T 28 (*)     Troponin T Delta 5 (*)     All other components within normal limits    Narrative:     High Sensitive Troponin T Reference Range:  <10.0 ng/L- Negative Female for AMI  <15.0 ng/L- Negative Male for AMI  >=10 - Abnormal Female indicating possible myocardial injury.  >=15 - Abnormal Male indicating possible myocardial injury.   Clinicians would have to utilize clinical acumen, EKG, Troponin, and serial changes to determine if it is an Acute Myocardial Infarction or myocardial injury due to an underlying chronic condition.        BNP (IN-HOUSE) - Normal    Narrative:     Among patients with dyspnea, NT-proBNP is highly sensitive for the detection of acute congestive heart failure. In addition NT-proBNP of <300 pg/ml effectively rules out acute  congestive heart failure with 99% negative predictive value.    Results may be falsely decreased if patient taking Biotin.     MAGNESIUM - Normal   RAINBOW DRAW    Narrative:     The following orders were created for panel order Prue Draw.  Procedure                               Abnormality         Status                     ---------                               -----------         ------                     Green Top (Gel)[[id number]]                                  Final result               Lavender Top[[id number]]                                     Final result               Gold Top - SST[[id number]]                                   Final result               Light Blue Top[[id number]]                                   Final result                 Please view results for these tests on the individual orders.   PREPARE RBC   TYPE AND SCREEN   GREEN TOP   LAVENDER TOP   GOLD TOP - SST   LIGHT BLUE TOP   CBC AND DIFFERENTIAL    Narrative:     The following orders were created for panel order CBC & Differential.  Procedure                               Abnormality         Status                     ---------                               -----------         ------                     CBC Auto Differential[[id number]]        Abnormal            Final result                 Please view results for these tests on the individual orders.       EKG:   ECG 12 Lead Dyspnea   Preliminary Result   HEART RATE= 63  bpm   RR Interval= 952  ms   NE Interval= 193  ms   P Horizontal Axis= -3  deg   P Front Axis= 72  deg   QRSD Interval= 90  ms   QT Interval= 572  ms   QRS Axis= 39  deg   T Wave Axis= 59  deg   - ABNORMAL ECG -   Sinus rhythm   Nonspecific T abnormalities, anterior leads   Prolonged QT interval   Electronically Signed By:    Date and Time of Study: 2023-03-13 09:04:14      ECG 12 Lead Dyspnea    (Results Pending)       Meds given in ED:   Medications   sodium chloride 0.9 % flush 10 mL (has no administration in  time range)   pantoprazole (PROTONIX) 40 mg in 100 mL NS (VTB) (8 mg/hr Intravenous New Bag 3/13/23 1032)   pantoprazole (PROTONIX) injection 80 mg (80 mg Intravenous Given 3/13/23 1027)       Imaging results:  XR Chest 2 View    Result Date: 3/13/2023  No focal pulmonary consolidation. Borderline heart size. Follow-up as clinical indications persist.  This report was finalized on 3/13/2023 10:12 AM by Dr. Alec Arcos M.D.       Ambulatory status:   - ax1    Social issues:   Social History     Socioeconomic History    Marital status:    Tobacco Use    Smoking status: Every Day     Packs/day: 0.25     Years: 50.00     Pack years: 12.50     Types: Cigarettes     Start date: 1970    Smokeless tobacco: Never   Vaping Use    Vaping Use: Never used   Substance and Sexual Activity    Alcohol use: Not Currently    Drug use: No       NIH Stroke Scale:         Jed Schwartz RN  03/13/23 11:37 EDT

## 2023-03-13 NOTE — PLAN OF CARE
Goal Outcome Evaluation:   Pt came in to ED today for SOA. Labs were drawn and HGB was 6.2. 2 units PRBCs ordered. Plan is for an EGD in AM. Pt has had multiple black tarry Bms this shift. VSS, Will CTM closely.

## 2023-03-13 NOTE — CONSULTS
[facility] INITIAL INPATIENT CONSULTATION NOTE    REASON FOR CONSULTATION:  Anemia, blood loss    HISTORY OF PRESENT ILLNESS:  Luann Frances is a 83 y.o. female who we are asked to see today in consultation for anemia.    The patient has a past medical history of CKD3, HTN, hypothyroidism, acute extensive LLE DVT with a history of BLE DVT. She is on Pradaxa and she follows with Dr. Dubon. History of JOVANNI requiring oral iron and s/p IV Ferrlecit in August 2022. History of vitamin B12 and folic acid deficiency s/p IM and then oral B12. She has been on ferrous sulfate four days weekly and daily B12 and folic acid.      The patient presented with dyspnea and fatigue, mild orthopnea, and lightheadedness but no syncope.     Hgb 6.2, down from 13.2 on 2/22/23. 2nd unit pRBCs being transfused. Anemia labs pending.      She has chronically black stool but denies any change recently. No loretta melana. No BRBPR. No hematemesis. No other bleeding.     Past Medical History:   Diagnosis Date   • Acute deep vein thrombosis (DVT) of femoral vein of right lower extremity (HCC) 04/15/2021   • Arthritis    • Asymptomatic PVCs    • Backache    • Chronic bronchitis (HCC)    • CKD (chronic kidney disease)     Stage 3   • Deep vein thrombosis (DVT) of right lower extremity (HCC)    • Essential hypertension 01/20/2020   • Fracture of humerus    • GERD (gastroesophageal reflux disease)    • Hyperlipidemia    • Hypertension    • Hypothyroidism    • Pneumonia    • Pulmonary emphysema (HCC)    • Renal calculi    • Renal calculus 01/06/2021   • Sleep apnea     DOES NOT USE CPAP   • Thoracic vertebral fracture (HCC)        Past Surgical History:   Procedure Laterality Date   • APPENDECTOMY     • EXTRACORPOREAL SHOCK WAVE LITHOTRIPSY (ESWL) Left 01/06/2021    Procedure: EXTRACORPOREAL SHOCKWAVE LITHOTRIPSY, CYSTOSCOPY, RETROGRADE PYLEOGRAM;  Surgeon: Walter Schwartz MD;  Location: [facility] OR [facility];  Service: Urology;   Laterality: Left;   • EYE SURGERY      cataracts   • HUMERUS FRACTURE SURGERY     • LYTIC THROMBIN THERAPY Left 8/17/2022    Procedure: LT. LEG THROMBECTOMY/EMBOLECTOMY AND ANGIOPLASTY STENT PLACEMENT OF LEFT ILIAC VEIN;  Surgeon: Theo Bustos MD;  Location: [facility] OR 18/19;  Service: Vascular;  Laterality: Left;   • RIB FRACTURE SURGERY  2021   • THORACOSCOPY Right 02/16/2021    Procedure: bronchoscopy, right video assisted THORACOSCOPY WITH PLATING OF 3, 4, 5, AND 6 RIBS;  Surgeon: Kelley Delong MD;  Location: [facility];  Service: Thoracic;  Laterality: Right;   • TOTAL KNEE ARTHROPLASTY Left 04/2015   • UMBILICAL HERNIA REPAIR         SOCIAL HISTORY:   reports that she has been smoking cigarettes. She started smoking about 53 years ago. She has a 12.50 pack-year smoking history. She has never used smokeless tobacco. She reports that she does not currently use alcohol. She reports that she does not use drugs.    FAMILY HISTORY:  family history includes Breast cancer in her maternal aunt and mother; Cancer in her brother and mother; Esophageal cancer in her brother; Heart disease in her maternal aunt and maternal grandmother; No Known Problems in her father, son, and son.    ALLERGIES:  Allergies   Allergen Reactions   • Ambien [Zolpidem Tartrate] Nausea Only     nausea   • Amoxicillin-Pot Clavulanate Nausea Only   • Doxycycline Nausea Only   • Eliquis [Apixaban] Nausea Only   • Levofloxacin Nausea Only   • Metronidazole Nausea Only   • Naproxen GI Intolerance   • Sulfamethoxazole-Trimethoprim Nausea Only   • Synthroid [Levothyroxine Sodium] Nausea Only   • Zolpidem Nausea Only       MEDICATIONS:  As listed in the electronic medical record.    Review of Systems   Constitutional: Positive for fatigue.   Respiratory: Positive for shortness of breath.    Neurological: Positive for light-headedness.   All other systems reviewed and are negative.      Vitals:    03/13/23 1144 03/13/23 1259 03/13/23  "1333 03/13/23 1515   BP: 141/54 150/67 150/67 155/55   BP Location:  Left arm Left arm    Patient Position:  Lying Sitting    Pulse: 61 60 58 72   Resp: 16 18 19 18   Temp: 97.9 °F (36.6 °C) 97.8 °F (36.6 °C) 98.1 °F (36.7 °C) 98.4 °F (36.9 °C)   TempSrc:  Oral Oral Oral   SpO2: 100% 94% 100% 100%   Weight:   95.1 kg (209 lb 10.5 oz)    Height:   161.3 cm (63.5\")        Physical Exam  Vitals reviewed.   Constitutional:       Appearance: She is well-developed.   HENT:      Head: Normocephalic and atraumatic.      Nose: Nose normal.   Eyes:      Conjunctiva/sclera: Conjunctivae normal.      Pupils: Pupils are equal, round, and reactive to light.   Cardiovascular:      Rate and Rhythm: Normal rate and regular rhythm.      Heart sounds: Normal heart sounds, S1 normal and S2 normal. No murmur heard.    No friction rub. No gallop.   Pulmonary:      Effort: Pulmonary effort is normal. No respiratory distress.      Breath sounds: Normal breath sounds. No stridor. No wheezing, rhonchi or rales.   Chest:      Chest wall: No tenderness.   Abdominal:      General: Bowel sounds are normal. There is no distension.      Palpations: Abdomen is soft. There is no mass.      Tenderness: There is no abdominal tenderness. There is no guarding or rebound.   Musculoskeletal:         General: Normal range of motion.      Cervical back: Neck supple.   Lymphadenopathy:      Cervical: No cervical adenopathy.      Upper Body:      Right upper body: No supraclavicular adenopathy.      Left upper body: No supraclavicular adenopathy.   Skin:     General: Skin is warm and dry.      Findings: No erythema or rash.   Neurological:      Mental Status: She is alert and oriented to person, place, and time.      Cranial Nerves: No cranial nerve deficit.      Sensory: No sensory deficit.   Psychiatric:         Behavior: Behavior normal.         Thought Content: Thought content normal.         Judgment: Judgment normal.         DIAGNOSTIC DATA:  Results " from last 7 days   Lab Units 03/13/23  0916   WBC 10*3/mm3 7.91   HEMOGLOBIN g/dL 6.2*   HEMATOCRIT % 19.0*   PLATELETS 10*3/mm3 209     Lab Results   Component Value Date    NEUTROABS 4.91 03/13/2023     Results from last 7 days   Lab Units 03/13/23  0916   SODIUM mmol/L 142   POTASSIUM mmol/L 4.4   CHLORIDE mmol/L 110*   CO2 mmol/L 24.0   BUN mg/dL 38*   CREATININE mg/dL 1.24*   GLUCOSE mg/dL 106*   CALCIUM mg/dL 9.3     Results from last 7 days   Lab Units 03/13/23  0916   INR  1.41*     Results from last 7 days   Lab Units 03/13/23  0916   MAGNESIUM mg/dL 2.4       IMAGING:  None reviewed    ASSESSMENT:  This is a 83 y.o. female with:    *Normocytic anemia  • Hgb 6.2, down from 13.2 on 2/22/23.   • Suspect acute blood loss  • Anemia labs pending (on 2/22 the iron was 99, ferritin 1133, B12 536, folate >20)  • GI plans EGD in the AM    *CKD3    *h/o BLE DVT  • On chronic therapy with Pradaxa    RECOMMENDATIONS/PLAN:   1. pRBC transfusion ongoing  2. Anemia labs pending but they were normal a few weeks ago  3. Serial H/H  4. PPI drip  5. Hold Pradaxa  6. EGD in the AM    Will follow. Discussed with her nurse, the patient and her son.    Olivier Pace MD

## 2023-03-13 NOTE — ED TRIAGE NOTES
Patient to ER via ems from home for generalized weakness  Today around 4am patient started feeling SOA more with movement  Patient has hx of COPD and blood clots    Patient placed on 2L nc patient room air was 100%    Patient wearing mask this RN in PPE

## 2023-03-14 ENCOUNTER — ANESTHESIA EVENT (OUTPATIENT)
Dept: GASTROENTEROLOGY | Facility: HOSPITAL | Age: 83
End: 2023-03-14
Payer: MEDICARE

## 2023-03-14 ENCOUNTER — ANESTHESIA (OUTPATIENT)
Dept: GASTROENTEROLOGY | Facility: HOSPITAL | Age: 83
End: 2023-03-14
Payer: MEDICARE

## 2023-03-14 PROBLEM — K92.1 MELENA: Status: ACTIVE | Noted: 2023-03-13

## 2023-03-14 LAB
ANION GAP SERPL CALCULATED.3IONS-SCNC: 10.4 MMOL/L (ref 5–15)
BH BB BLOOD EXPIRATION DATE: NORMAL
BH BB BLOOD EXPIRATION DATE: NORMAL
BH BB BLOOD TYPE BARCODE: 600
BH BB BLOOD TYPE BARCODE: 600
BH BB DISPENSE STATUS: NORMAL
BH BB DISPENSE STATUS: NORMAL
BH BB PRODUCT CODE: NORMAL
BH BB PRODUCT CODE: NORMAL
BH BB UNIT NUMBER: NORMAL
BH BB UNIT NUMBER: NORMAL
BUN SERPL-MCNC: 28 MG/DL (ref 8–23)
BUN/CREAT SERPL: 26.4 (ref 7–25)
CALCIUM SPEC-SCNC: 8.8 MG/DL (ref 8.6–10.5)
CHLORIDE SERPL-SCNC: 112 MMOL/L (ref 98–107)
CO2 SERPL-SCNC: 20.6 MMOL/L (ref 22–29)
CREAT SERPL-MCNC: 1.06 MG/DL (ref 0.57–1)
CROSSMATCH INTERPRETATION: NORMAL
CROSSMATCH INTERPRETATION: NORMAL
DEPRECATED RDW RBC AUTO: 48 FL (ref 37–54)
EGFRCR SERPLBLD CKD-EPI 2021: 52.6 ML/MIN/1.73
ERYTHROCYTE [DISTWIDTH] IN BLOOD BY AUTOMATED COUNT: 14.4 % (ref 12.3–15.4)
FOLATE SERPL-MCNC: >20 NG/ML (ref 4.78–24.2)
GLUCOSE SERPL-MCNC: 81 MG/DL (ref 65–99)
HCT VFR BLD AUTO: 27.7 % (ref 34–46.6)
HCT VFR BLD AUTO: 27.9 % (ref 34–46.6)
HCT VFR BLD AUTO: 28.7 % (ref 34–46.6)
HCT VFR BLD AUTO: 32 % (ref 34–46.6)
HGB BLD-MCNC: 10.3 G/DL (ref 12–15.9)
HGB BLD-MCNC: 9.2 G/DL (ref 12–15.9)
HGB BLD-MCNC: 9.3 G/DL (ref 12–15.9)
HGB BLD-MCNC: 9.8 G/DL (ref 12–15.9)
MCH RBC QN AUTO: 31.9 PG (ref 26.6–33)
MCHC RBC AUTO-ENTMCNC: 34.1 G/DL (ref 31.5–35.7)
MCV RBC AUTO: 93.5 FL (ref 79–97)
PLATELET # BLD AUTO: 191 10*3/MM3 (ref 140–450)
PMV BLD AUTO: 11.3 FL (ref 6–12)
POTASSIUM SERPL-SCNC: 4 MMOL/L (ref 3.5–5.2)
RBC # BLD AUTO: 3.07 10*6/MM3 (ref 3.77–5.28)
SODIUM SERPL-SCNC: 143 MMOL/L (ref 136–145)
UNIT  ABO: NORMAL
UNIT  ABO: NORMAL
UNIT  RH: NORMAL
UNIT  RH: NORMAL
WBC NRBC COR # BLD: 8.26 10*3/MM3 (ref 3.4–10.8)

## 2023-03-14 PROCEDURE — 25010000002 PROPOFOL 10 MG/ML EMULSION: Performed by: NURSE ANESTHETIST, CERTIFIED REGISTERED

## 2023-03-14 PROCEDURE — 43255 EGD CONTROL BLEEDING ANY: CPT | Performed by: INTERNAL MEDICINE

## 2023-03-14 PROCEDURE — 82746 ASSAY OF FOLIC ACID SERUM: CPT | Performed by: NURSE PRACTITIONER

## 2023-03-14 PROCEDURE — 97165 OT EVAL LOW COMPLEX 30 MIN: CPT

## 2023-03-14 PROCEDURE — 97535 SELF CARE MNGMENT TRAINING: CPT

## 2023-03-14 PROCEDURE — 43239 EGD BIOPSY SINGLE/MULTIPLE: CPT | Performed by: INTERNAL MEDICINE

## 2023-03-14 PROCEDURE — 94799 UNLISTED PULMONARY SVC/PX: CPT

## 2023-03-14 PROCEDURE — 85014 HEMATOCRIT: CPT | Performed by: NURSE PRACTITIONER

## 2023-03-14 PROCEDURE — 85014 HEMATOCRIT: CPT | Performed by: INTERNAL MEDICINE

## 2023-03-14 PROCEDURE — 96366 THER/PROPH/DIAG IV INF ADDON: CPT

## 2023-03-14 PROCEDURE — 88305 TISSUE EXAM BY PATHOLOGIST: CPT | Performed by: INTERNAL MEDICINE

## 2023-03-14 PROCEDURE — 85027 COMPLETE CBC AUTOMATED: CPT | Performed by: NURSE PRACTITIONER

## 2023-03-14 PROCEDURE — 85018 HEMOGLOBIN: CPT | Performed by: INTERNAL MEDICINE

## 2023-03-14 PROCEDURE — 99232 SBSQ HOSP IP/OBS MODERATE 35: CPT | Performed by: INTERNAL MEDICINE

## 2023-03-14 PROCEDURE — 85018 HEMOGLOBIN: CPT | Performed by: NURSE PRACTITIONER

## 2023-03-14 PROCEDURE — 80048 BASIC METABOLIC PNL TOTAL CA: CPT | Performed by: NURSE PRACTITIONER

## 2023-03-14 RX ORDER — DROPERIDOL 2.5 MG/ML
0.62 INJECTION, SOLUTION INTRAMUSCULAR; INTRAVENOUS
Status: DISCONTINUED | OUTPATIENT
Start: 2023-03-14 | End: 2023-03-14 | Stop reason: HOSPADM

## 2023-03-14 RX ORDER — PROMETHAZINE HYDROCHLORIDE 25 MG/1
25 TABLET ORAL ONCE AS NEEDED
Status: DISCONTINUED | OUTPATIENT
Start: 2023-03-14 | End: 2023-03-14 | Stop reason: HOSPADM

## 2023-03-14 RX ORDER — EPHEDRINE SULFATE 50 MG/ML
5 INJECTION, SOLUTION INTRAVENOUS ONCE AS NEEDED
Status: DISCONTINUED | OUTPATIENT
Start: 2023-03-14 | End: 2023-03-14 | Stop reason: HOSPADM

## 2023-03-14 RX ORDER — NALOXONE HCL 0.4 MG/ML
0.2 VIAL (ML) INJECTION AS NEEDED
Status: DISCONTINUED | OUTPATIENT
Start: 2023-03-14 | End: 2023-03-14 | Stop reason: HOSPADM

## 2023-03-14 RX ORDER — LABETALOL HYDROCHLORIDE 5 MG/ML
5 INJECTION, SOLUTION INTRAVENOUS
Status: DISCONTINUED | OUTPATIENT
Start: 2023-03-14 | End: 2023-03-14 | Stop reason: HOSPADM

## 2023-03-14 RX ORDER — HYDROMORPHONE HCL 110MG/55ML
0.25 PATIENT CONTROLLED ANALGESIA SYRINGE INTRAVENOUS
Status: DISCONTINUED | OUTPATIENT
Start: 2023-03-14 | End: 2023-03-14 | Stop reason: HOSPADM

## 2023-03-14 RX ORDER — IPRATROPIUM BROMIDE AND ALBUTEROL SULFATE 2.5; .5 MG/3ML; MG/3ML
3 SOLUTION RESPIRATORY (INHALATION) ONCE AS NEEDED
Status: DISCONTINUED | OUTPATIENT
Start: 2023-03-14 | End: 2023-03-14 | Stop reason: HOSPADM

## 2023-03-14 RX ORDER — SODIUM CHLORIDE 9 MG/ML
50 INJECTION, SOLUTION INTRAVENOUS ONCE
Status: COMPLETED | OUTPATIENT
Start: 2023-03-14 | End: 2023-03-14

## 2023-03-14 RX ORDER — LIDOCAINE HYDROCHLORIDE 20 MG/ML
INJECTION, SOLUTION INFILTRATION; PERINEURAL AS NEEDED
Status: DISCONTINUED | OUTPATIENT
Start: 2023-03-14 | End: 2023-03-14 | Stop reason: SURG

## 2023-03-14 RX ORDER — PROPOFOL 10 MG/ML
VIAL (ML) INTRAVENOUS AS NEEDED
Status: DISCONTINUED | OUTPATIENT
Start: 2023-03-14 | End: 2023-03-14 | Stop reason: SURG

## 2023-03-14 RX ORDER — PROMETHAZINE HYDROCHLORIDE 25 MG/1
25 SUPPOSITORY RECTAL ONCE AS NEEDED
Status: DISCONTINUED | OUTPATIENT
Start: 2023-03-14 | End: 2023-03-14 | Stop reason: HOSPADM

## 2023-03-14 RX ORDER — FLUMAZENIL 0.1 MG/ML
0.2 INJECTION INTRAVENOUS AS NEEDED
Status: DISCONTINUED | OUTPATIENT
Start: 2023-03-14 | End: 2023-03-14 | Stop reason: HOSPADM

## 2023-03-14 RX ORDER — DIPHENHYDRAMINE HYDROCHLORIDE 50 MG/ML
12.5 INJECTION INTRAMUSCULAR; INTRAVENOUS
Status: DISCONTINUED | OUTPATIENT
Start: 2023-03-14 | End: 2023-03-14 | Stop reason: HOSPADM

## 2023-03-14 RX ORDER — SODIUM CHLORIDE 9 MG/ML
INJECTION, SOLUTION INTRAVENOUS CONTINUOUS PRN
Status: DISCONTINUED | OUTPATIENT
Start: 2023-03-14 | End: 2023-03-14 | Stop reason: SURG

## 2023-03-14 RX ORDER — HYDRALAZINE HYDROCHLORIDE 20 MG/ML
5 INJECTION INTRAMUSCULAR; INTRAVENOUS
Status: DISCONTINUED | OUTPATIENT
Start: 2023-03-14 | End: 2023-03-14 | Stop reason: HOSPADM

## 2023-03-14 RX ORDER — HYDROCODONE BITARTRATE AND ACETAMINOPHEN 5; 325 MG/1; MG/1
1 TABLET ORAL ONCE AS NEEDED
Status: DISCONTINUED | OUTPATIENT
Start: 2023-03-14 | End: 2023-03-14 | Stop reason: HOSPADM

## 2023-03-14 RX ORDER — ONDANSETRON 2 MG/ML
4 INJECTION INTRAMUSCULAR; INTRAVENOUS ONCE AS NEEDED
Status: DISCONTINUED | OUTPATIENT
Start: 2023-03-14 | End: 2023-03-14 | Stop reason: HOSPADM

## 2023-03-14 RX ORDER — HYDROCODONE BITARTRATE AND ACETAMINOPHEN 7.5; 325 MG/1; MG/1
1 TABLET ORAL EVERY 4 HOURS PRN
Status: DISCONTINUED | OUTPATIENT
Start: 2023-03-14 | End: 2023-03-14 | Stop reason: HOSPADM

## 2023-03-14 RX ORDER — PROPOFOL 10 MG/ML
VIAL (ML) INTRAVENOUS CONTINUOUS PRN
Status: DISCONTINUED | OUTPATIENT
Start: 2023-03-14 | End: 2023-03-14 | Stop reason: SURG

## 2023-03-14 RX ORDER — FENTANYL CITRATE 50 UG/ML
25 INJECTION, SOLUTION INTRAMUSCULAR; INTRAVENOUS
Status: DISCONTINUED | OUTPATIENT
Start: 2023-03-14 | End: 2023-03-14 | Stop reason: HOSPADM

## 2023-03-14 RX ADMIN — TRAMADOL HYDROCHLORIDE 50 MG: 50 TABLET, COATED ORAL at 20:37

## 2023-03-14 RX ADMIN — SODIUM CHLORIDE: 9 INJECTION, SOLUTION INTRAVENOUS at 13:21

## 2023-03-14 RX ADMIN — LOSARTAN POTASSIUM 25 MG: 25 TABLET, FILM COATED ORAL at 09:05

## 2023-03-14 RX ADMIN — Medication 2000 UNITS: at 09:05

## 2023-03-14 RX ADMIN — PANTOPRAZOLE SODIUM 8 MG/HR: 40 INJECTION, POWDER, FOR SOLUTION INTRAVENOUS at 06:14

## 2023-03-14 RX ADMIN — BUDESONIDE AND FORMOTEROL FUMARATE DIHYDRATE 1 PUFF: 160; 4.5 AEROSOL RESPIRATORY (INHALATION) at 08:32

## 2023-03-14 RX ADMIN — PANTOPRAZOLE SODIUM 8 MG/HR: 40 INJECTION, POWDER, FOR SOLUTION INTRAVENOUS at 01:32

## 2023-03-14 RX ADMIN — PROPOFOL 100 MG: 10 INJECTION, EMULSION INTRAVENOUS at 13:30

## 2023-03-14 RX ADMIN — LIDOCAINE HYDROCHLORIDE 100 MG: 20 INJECTION, SOLUTION INFILTRATION; PERINEURAL at 13:30

## 2023-03-14 RX ADMIN — SODIUM CHLORIDE 50 ML/HR: 9 INJECTION, SOLUTION INTRAVENOUS at 12:16

## 2023-03-14 RX ADMIN — Medication 180 MCG/KG/MIN: at 13:30

## 2023-03-14 RX ADMIN — POLYETHYLENE GLYCOL 3350, SODIUM SULFATE ANHYDROUS, SODIUM BICARBONATE, SODIUM CHLORIDE, POTASSIUM CHLORIDE 2000 ML: 236; 22.74; 6.74; 5.86; 2.97 POWDER, FOR SOLUTION ORAL at 20:37

## 2023-03-14 RX ADMIN — Medication 1000 MCG: at 09:05

## 2023-03-14 RX ADMIN — LEVOTHYROXINE SODIUM 100 MCG: 0.1 TABLET ORAL at 09:05

## 2023-03-14 RX ADMIN — BUDESONIDE AND FORMOTEROL FUMARATE DIHYDRATE 1 PUFF: 160; 4.5 AEROSOL RESPIRATORY (INHALATION) at 19:04

## 2023-03-14 RX ADMIN — FOLIC ACID 1 MG: 1 TABLET ORAL at 09:05

## 2023-03-14 NOTE — CASE MANAGEMENT/SOCIAL WORK
Discharge Planning Assessment  [facility]     Patient Name: Luann Frances  MRN: [id number]  Today's Date: 3/14/2023    Admit Date: 3/13/2023    Plan: Home with family to transport- PT eval pending   Discharge Needs Assessment     Row Name 03/14/23 1021       Living Environment    Name(s) of People in Home Dylan Banda [phone]    Current Living Arrangements home    Potentially Unsafe Housing Conditions none    Primary Care Provided by self    Provides Primary Care For no one    Family Caregiver if Needed child(carlo), adult    Family Caregiver Names SonDylan [phone] and Cooper Frances [phone]    Quality of Family Relationships involved;helpful;supportive    Able to Return to Prior Arrangements yes       Resource/Environmental Concerns    Transportation Concerns none       Transition Planning    Patient/Family Anticipates Transition to home with family    Patient/Family Anticipated Services at Transition none    Transportation Anticipated family or friend will provide       Discharge Needs Assessment    Equipment Currently Used at Home cane, straight;walker, standard;cpap    Concerns to be Addressed discharge planning    Anticipated Changes Related to Illness none    Row Name 03/14/23 1020       Living Environment    People in Home child(carlo), adult               Discharge Plan     Row Name 03/14/23 102       Plan    Plan Home with family to transport- PT eval pending    Patient/Family in Agreement with Plan yes    Plan Comments CCP spoke with patient and patient's sonCooper, at bedside; CCP role explained, face sheet verified, and discharge plan discussed. Patient resides with son in a duplex with three steps to enter through the front and 1 step in the back. Patient uses a walker, cane, and Cpap machine. Reports using [facility] in the past and has also previously been to [facility]. Confirmed pharmacy is [facility] on [address]. Patient prefers to go home with family at discharge.  Family will transport home. PT eval pending, CCP following for any recommendations. Ilia DOZIER LCSW              Continued Care and Services - Admitted Since 3/13/2023    Coordination has not been started for this encounter.     Selected Continued Care - Episodes Includes continued care and service providers with selected services from the active episodes listed below    Oncology Episode start date: 9/9/2022   There are no active outsourced providers for this episode.                  Demographic Summary     Row Name 03/14/23 1018       General Information    Admission Type inpatient    Arrived From home    Reason for Consult discharge planning    Preferred Language English               Functional Status     Row Name 03/14/23 1019       Functional Status    Usual Activity Tolerance moderate    Current Activity Tolerance moderate       Functional Status, IADL    Medications assistive equipment    Meal Preparation assistive equipment    Housekeeping assistive equipment    Laundry assistive equipment    Shopping assistive equipment               Psychosocial    No documentation.                Abuse/Neglect    No documentation.                Legal    No documentation.                Substance Abuse    No documentation.                Patient Forms    No documentation.                   Ilia Evans

## 2023-03-14 NOTE — PLAN OF CARE
Goal Outcome Evaluation:  Plan of Care Reviewed With: patient, family        Progress: improving  Outcome Evaluation: Pt retuned from EGD, doing well. Vitals WNL. AxOx4. No complaints of pain. Patient eager to eat lunch.

## 2023-03-14 NOTE — THERAPY EVALUATION
Patient Name: Luann Frances  : 1940    MRN: [id number]                              Today's Date: 3/14/2023       Admit Date: 3/13/2023    Visit Dx:     ICD-10-CM ICD-9-CM   1. Acute blood loss anemia: Symptomatic anemia  D62 285.1   2. Melena  K92.1 578.1   3. Coagulopathy ([facility]): Pradaxa induced  D68.9 286.9   4. Acute blood loss anemia  D62 285.1     Patient Active Problem List   Diagnosis   • Chronic obstructive pulmonary disease ([facility])   • Diverticulitis of colon   • Acid reflux   • Primary hypothyroidism   • Insomnia   • Chronic nausea   • Post herpetic neuralgia   • Vitamin D deficiency   • CKD (chronic kidney disease) stage 3, GFR 30-59 ml/min (CMS/[facility])   • Mixed hyperlipidemia   • Hypersomnia   • Hyperuricemia   • Essential hypertension   • DELROY (obstructive sleep apnea)   • Renal calculus   • Multiple closed fractures of ribs of right side   • Compression fracture of body of thoracic vertebra ([facility])   • DVT, lower extremity, distal, acute, left ([facility])   • Lower extremity edema   • Medicare annual wellness visit, subsequent   • Tobacco abuse   • Osteoporosis   • Acute deep vein thrombosis (DVT) of femoral vein of left lower extremity ([facility])   • 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[facility])   • Iron deficiency   • Iron deficiency anemia   • History of deep venous thrombosis (DVT) of distal vein of left lower extremity   • Current use of long term anticoagulation   • Acute blood loss anemia     Past Medical History:   Diagnosis Date   • Acute deep vein thrombosis (DVT) of femoral vein of right lower extremity ([facility]) 04/15/2021   • Arthritis    • Asymptomatic PVCs    • Backache    • Chronic bronchitis ([facility])    • CKD (chronic kidney disease)     Stage 3   • Deep vein thrombosis (DVT) of right lower extremity ([facility])    • Essential hypertension 2020   • Fracture of humerus    • GERD (gastroesophageal reflux disease)    • Hyperlipidemia    • Hypertension    • Hypothyroidism    • Pneumonia    •  Pulmonary emphysema (HCC)    • Renal calculi    • Renal calculus 01/06/2021   • Sleep apnea     DOES NOT USE CPAP   • Thoracic vertebral fracture (HCC)      Past Surgical History:   Procedure Laterality Date   • APPENDECTOMY     • EXTRACORPOREAL SHOCK WAVE LITHOTRIPSY (ESWL) Left 01/06/2021    Procedure: EXTRACORPOREAL SHOCKWAVE LITHOTRIPSY, CYSTOSCOPY, RETROGRADE PYLEOGRAM;  Surgeon: Walter Schwartz MD;  Location: [facility];  Service: Urology;  Laterality: Left;   • EYE SURGERY      cataracts   • HUMERUS FRACTURE SURGERY     • LYTIC THROMBIN THERAPY Left 8/17/2022    Procedure: LT. LEG THROMBECTOMY/EMBOLECTOMY AND ANGIOPLASTY STENT PLACEMENT OF LEFT ILIAC VEIN;  Surgeon: Theo Bustos MD;  Location: [facility] OR 18/19;  Service: Vascular;  Laterality: Left;   • RIB FRACTURE SURGERY  2021   • THORACOSCOPY Right 02/16/2021    Procedure: bronchoscopy, right video assisted THORACOSCOPY WITH PLATING OF 3, 4, 5, AND 6 RIBS;  Surgeon: Kelley Delong MD;  Location: [facility] OR;  Service: Thoracic;  Laterality: Right;   • TOTAL KNEE ARTHROPLASTY Left 04/2015   • UMBILICAL HERNIA REPAIR        General Information     Row Name 03/14/23 1044          OT Time and Intention    Document Type evaluation  -JW     Mode of Treatment occupational therapy  -     Row Name 03/14/23 1044          General Information    Patient Profile Reviewed yes  -JW     Prior Level of Function independent:;ADL's;all household mobility  indep with ADls and fxl mobility using a RW. 1 recent fall  -JW     Existing Precautions/Restrictions fall  -JW     Barriers to Rehab none identified  -     Row Name 03/14/23 1044          Living Environment    People in Home alone  son lives in [facility]  -     Row Name 03/14/23 1044          Home Main Entrance    Number of Stairs, Main Entrance one  -JW     Row Name 03/14/23 1044          Cognition    Orientation Status (Cognition) oriented x 3  -     Row Name 03/14/23 1047           Safety Issues, Functional Mobility    Impairments Affecting Function (Mobility) strength;endurance/activity tolerance  -           User Key  (r) = Recorded By, (t) = Taken By, (c) = Cosigned By    Initials Name Provider Type    Tatiana Claudio OT Occupational Therapist                 Mobility/ADL's     Row Name 03/14/23 1045          Bed Mobility    Bed Mobility supine-sit;sit-supine  -     Supine-Sit [place] (Bed Mobility) modified independence  -     Sit-Supine [place] (Bed Mobility) minimum assist (75% patient effort)  -     Assistive Device (Bed Mobility) bed rails  -     Comment, (Bed Mobility) assist for BLE mgt back into bed  -     Row Name 03/14/23 1045          Transfers    Transfers sit-stand transfer  -     Row Name 03/14/23 1045          Sit-Stand Transfer    Sit-Stand [place] (Transfers) standby assist  -     Assistive Device (Sit-Stand Transfers) walker, front-wheeled  -     Row Name 03/14/23 1045          Functional Mobility    Functional Mobility- Ind. Level standby assist  -     Functional Mobility- Device walker, front-wheeled  -     Functional Mobility- Comment performs fxl ambulation in room using RW with SBA.  -     Row Name 03/14/23 1045          Activities of Daily Living    BADL Assessment/Intervention grooming  -     Row Name 03/14/23 1045          Grooming Assessment/Training    [place] Level (Grooming) oral care regimen;set up  -     Position (Grooming) sink side;unsupported standing  -     Comment, (Grooming) standing at the sink multiple minutes to complete grooming task  -           User Key  (r) = Recorded By, (t) = Taken By, (c) = Cosigned By    Initials Name Provider Type    Tatiana Claudio OT Occupational Therapist               Obj/Interventions     Row Name 03/14/23 1237          Sensory Assessment (Somatosensory)    Sensory Assessment (Somatosensory) sensation intact  -     Row Name 03/14/23 1237          Vision  Assessment/Intervention    Visual Impairment/Limitations WNL  -     Row Name 03/14/23 1237          Range of Motion Comprehensive    General Range of Motion bilateral upper extremity ROM WNL  -     Row Name 03/14/23 1237          Strength Comprehensive (MMT)    Comment, General Manual Muscle Testing (MMT) Assessment UB h[place] WFL  -     Row Name 03/14/23 1237          Balance    Balance Assessment sitting static balance;sitting dynamic balance;standing static balance;standing dynamic balance  -     Static Sitting Balance independent  -     Dynamic Sitting Balance supervision  -     Position, Sitting Balance sitting edge of bed  -     Static Standing Balance standby assist  -     Dynamic Standing Balance standby assist  -     Balance Interventions occupation based/functional task  -           User Key  (r) = Recorded By, (t) = Taken By, (c) = Cosigned By    Initials Name Provider Type    Tatiana Claudio OT Occupational Therapist               Goals/Plan     Row Name 03/14/23 1246          Problem Specific Goal 1 (OT)    Problem Specific Goal 1 (OT) Pt will be educated on home safety prior to d/c  -     Time Frame (Problem Specific Goal 1, OT) by discharge  -     Progress/Outcome (Problem Specific Goal 1, OT) goal met  -           User Key  (r) = Recorded By, (t) = Taken By, (c) = Cosigned By    Initials Name Provider Type    Tatiana Claudio OT Occupational Therapist               Clinical Impression     Row Name 03/14/23 1238          Pain Assessment    Pretreatment Pain Rating 0/10 - no pain  -     Posttreatment Pain Rating 0/10 - no pain  -     Row Name 03/14/23 1238          Plan of Care Review    Plan of Care Reviewed With patient  -     Outcome Evaluation Pt is an 84 y/o F admitted with SOB, melena, acute blood loss anemia.She lives alone but her son lives in the [facility] town-home. Pt is indep with ADLs and fxl mobility using a RW. She presents today close to baseline fxn. EOB  (I), STS and fxl ambulation around room using RW with SBA. She stands at the sink a few minutes to complete grooming tasks with set-upA. Makes it back to bed and then transport arrives to take pt to EGD. TFs to stretcher with Harini for BLE mgt. Pt is close to baseline fxn, no acute OT needs identified, recommend d/c home. OT will sign off  -     Row Name 03/14/23 1238          Therapy Assessment/Plan (OT)    Therapy Frequency (OT) evaluation only  -     Row Name 03/14/23 1238          Therapy Plan Review/Discharge Plan (OT)    Anticipated Discharge Disposition (OT) home  -     Row Name 03/14/23 1238          Positioning and Restraints    Pre-Treatment Position in bed  -     Post Treatment Position other  -JW     Other Position with other staff  transport  -           User Key  (r) = Recorded By, (t) = Taken By, (c) = Cosigned By    Initials Name Provider Type    Tatiana Claudio OT Occupational Therapist               Outcome Measures     Row Name 03/14/23 1248          How much help from another is currently needed...    Putting on and taking off regular lower body clothing? 3  -JW     Bathing (including washing, rinsing, and drying) 3  -JW     Toileting (which includes using toilet bed pan or urinal) 4  -JW     Putting on and taking off regular upper body clothing 4  -JW     Taking care of personal grooming (such as brushing teeth) 4  -JW     Eating meals 4  -JW     AM-PAC 6 Clicks Score (OT) 22  -     Row Name 03/14/23 1248          Functional Assessment    Outcome Measure Options AM-PAC 6 Clicks Daily Activity (OT)  -           User Key  (r) = Recorded By, (t) = Taken By, (c) = Cosigned By    Initials Name Provider Type    Tatiana Claudio OT Occupational Therapist                Occupational Therapy Education     Title: PT OT SLP Therapies (In Progress)     Topic: Occupational Therapy (In Progress)     Point: ADL training (Done)     Description:   Instruct learner(s) on proper safety adaptation and  remediation techniques during self care or transfers.   Instruct in proper use of assistive devices.              Learning Progress Summary           Patient Acceptance, E, VU by  at 3/14/2023 1248    Comment: role of OT, plan of care, safety                   Point: Home exercise program (Not Started)     Description:   Instruct learner(s) on appropriate technique for monitoring, assisting and/or progressing therapeutic exercises/activities.              Learner Progress:  Not documented in this visit.          Point: Precautions (Done)     Description:   Instruct learner(s) on prescribed precautions during self-care and functional transfers.              Learning Progress Summary           Patient Acceptance, E, VU by  at 3/14/2023 1248    Comment: role of OT, plan of care, safety                   Point: Body mechanics (Done)     Description:   Instruct learner(s) on proper positioning and spine alignment during self-care, functional mobility activities and/or exercises.              Learning Progress Summary           Patient Acceptance, E, VU by  at 3/14/2023 1248    Comment: role of OT, plan of care, safety                               User Key     Initials Effective Dates Name Provider Type Discipline     06/10/21 -  Tatiana Sampson OT Occupational Therapist OT              OT Recommendation and Plan  Therapy Frequency (OT): evaluation only  Plan of Care Review  Plan of Care Reviewed With: patient  Outcome Evaluation: Pt is an 82 y/o F admitted with SOB, melena, acute blood loss anemia.She lives alone but her son lives in the attached town-home. Pt is indep with ADLs and fxl mobility using a RW. She presents today close to baseline fxn. EOB (I), STS and fxl ambulation around room using RW with SBA. She stands at the sink a few minutes to complete grooming tasks with set-upA. Makes it back to bed and then transport arrives to take pt to EGD. TFs to stretcher with Harini for BLE mgt. Pt is close to baseline  fxn, no acute OT needs identified, recommend d/c home. OT will sign off     Time Calculation:    Time Calculation- OT     Row Name 03/14/23 1249             Time Calculation- OT    OT Start Time 1030  -JW      OT Stop Time 1050  -JW      OT Time Calculation (min) 20 min  -JW      Total Timed Code Minutes- OT 10 minute(s)  -JW      OT Received On 03/14/23  -JW         Timed Charges    79725 - OT Self Care/Mgmt Minutes 10  -JW         Untimed Charges    OT Eval/Re-eval Minutes 10  -JW         Total Minutes    Timed Charges Total Minutes 10  -JW      Untimed Charges Total Minutes 10  -JW       Total Minutes 20  -JW            User Key  (r) = Recorded By, (t) = Taken By, (c) = Cosigned By    Initials Name Provider Type     Tatiana Sampson OT Occupational Therapist              Therapy Charges for Today     Code Description Service Date Service Provider Modifiers Qty    97408135165 HC OT SELF CARE/MGMT/TRAIN EA 15 MIN 3/14/2023 Tatiana Sampson OT GO 1    [id number]  OT EVAL LOW COMPLEXITY 3 3/14/2023 Tatiana Sampson OT GO 1               Tatiana Sampson OT  3/14/2023

## 2023-03-14 NOTE — ANESTHESIA PREPROCEDURE EVALUATION
Anesthesia Evaluation                  Airway   Mallampati: II  TM distance: >3 FB  Neck ROM: full  Dental - normal exam     Pulmonary - normal exam   (+) pneumonia , COPD, shortness of breath, sleep apnea,   Cardiovascular - normal exam    PT is on anticoagulation therapy    (+) hypertension, DVT resolved, hyperlipidemia,       Neuro/Psych  GI/Hepatic/Renal/Endo    (+)  GERD, GI bleeding , renal disease CRI, thyroid problem     Musculoskeletal     Abdominal    Substance History      OB/GYN          Other   arthritis,                    Anesthesia Plan    ASA 3     MAC     intravenous induction     Anesthetic plan, risks, benefits, and alternatives have been provided, discussed and informed consent has been obtained with: patient.        CODE STATUS:    Code Status (Patient has no pulse and is not breathing): CPR (Attempt to Resuscitate)  Medical Interventions (Patient has pulse or is breathing): Full Support

## 2023-03-14 NOTE — PROGRESS NOTES
[facility] INPATIENT PROGRESS NOTE    Length of Stay:  1 days    CHIEF COMPLAINT/REASON FOR VISIT:  Anemia, blood loss    SUBJECTIVE:  Afebrile. Normotensive. On room air. Denies bleeding. Tolerated 2 units pRBC yesterday well. Chronic intermittent nausea, difficult to tell if oral iron is contributing.     ROS:  14 systems reviewed with pertinent positives and negatives in the HPI. Reviewed today.    OBJECTIVE:  Vitals:    03/14/23 0002 03/14/23 0638 03/14/23 0719 03/14/23 0832   BP: 157/75  133/51    BP Location: Right arm  Left arm    Patient Position: Lying  Lying    Pulse: 70  78 63   Resp: 18  18 18   Temp: 97.9 °F (36.6 °C)  98.4 °F (36.9 °C)    TempSrc: Oral  Oral    SpO2: 95%  97% 97%   Weight:  95.1 kg (209 lb 10.5 oz)     Height:             PHYSICAL EXAMINATION:   General: NAD, alert/oriented  Chest/Lungs: CTAB  Heart: RRR  Abdomen/GI: soft, NT, ND, NABS  Extremities: WWP, trace BLE edema    DIAGNOSTIC DATA:  Results Review:     I reviewed the patient's new clinical results.    Results from last 7 days   Lab Units 03/14/23  0348   WBC 10*3/mm3 8.26   HEMOGLOBIN g/dL 9.8*   HEMATOCRIT % 28.7*   PLATELETS 10*3/mm3 191     Lab Results   Component Value Date    NEUTROABS 4.91 03/13/2023     Results from last 7 days   Lab Units 03/14/23  0348   SODIUM mmol/L 143   POTASSIUM mmol/L 4.0   CHLORIDE mmol/L 112*   CO2 mmol/L 20.6*   BUN mg/dL 28*   CREATININE mg/dL 1.06*   GLUCOSE mg/dL 81   CALCIUM mg/dL 8.8     Results from last 7 days   Lab Units 03/13/23  0916   INR  1.41*     Results from last 7 days   Lab Units 03/13/23  0916   MAGNESIUM mg/dL 2.4         IMAGING:    None reviewed    ASSESSMENT:  This is a 83 y.o. female with:     *Normocytic anemia  • Admit hgb 6.2, down from 13.2 on 2/22/23.   • Suspect acute blood loss  • Transfused 2 units pRBC  • Ferritin 575, iron 111, B12 896, folate >20  • Hgb 9.8, from 9.2, from 10.0, from 6.2  • GI plans EGD today     *MAILE/CKD3  · Creatinine 1.06 from  1.24 from 1.37     *h/o BLE DVT  • On chronic therapy with Pradaxa  • Pradaxa on hold    *Chronic intermittent nausea  ·  some concern that ferrous sulfate contributing. Can consider transition to ferrous gluconate to see if this is better tolerated.    RECOMMENDATIONS/PLAN:   1. Serial H/H  2. PPI drip  3. Hold Pradaxa  4. EGD today  5. No transfusion needed at this time  6. Continue oral B12 and folic acid    Will follow. Discussed with her and her son at the bedside    Olivier Pace MD

## 2023-03-14 NOTE — PLAN OF CARE
Goal Outcome Evaluation:  Plan of Care Reviewed With: patient VSS, pt on room air. Turns self in bed. Plan for EGD today.     Problem: Adult Inpatient Plan of Care  Goal: Absence of Hospital-Acquired Illness or Injury  Intervention: Identify and Manage Fall Risk  Recent Flowsheet Documentation  Taken 3/14/2023 0422 by Chava Bay RN  Safety Promotion/Fall Prevention:   activity supervised   assistive device/personal items within reach   clutter free environment maintained   safety round/check completed  Taken 3/14/2023 0215 by Chava Bay RN  Safety Promotion/Fall Prevention: safety round/check completed  Taken 3/14/2023 0138 by Chava Bay RN  Safety Promotion/Fall Prevention:   activity supervised   assistive device/personal items within reach   clutter free environment maintained   safety round/check completed  Taken 3/13/2023 2226 by Chava Bay RN  Safety Promotion/Fall Prevention:   activity supervised   assistive device/personal items within reach   clutter free environment maintained   safety round/check completed  Taken 3/13/2023 2009 by Chava Bay RN  Safety Promotion/Fall Prevention:   activity supervised   assistive device/personal items within reach   clutter free environment maintained   safety round/check completed   room organization consistent   nonskid shoes/slippers when out of bed

## 2023-03-14 NOTE — ANESTHESIA POSTPROCEDURE EVALUATION
"Patient: Luann Frances    Procedure Summary     Date: 03/14/23 Room / [facility]:  [facility]    Anesthesia Start: 1321 Anesthesia Stop: 1405    Procedure: ESOPHAGOGASTRODUODENOSCOPY WITH ARGON PLASMA COAGULATION AND COLD BIOPSIES (Esophagus) Diagnosis:       Acute blood loss anemia      (Acute blood loss anemia [D62])    Surgeons: Jaun Ford MD Provider: Radha Staley MD    Anesthesia Type: Not recorded ASA Status: 3          Anesthesia Type: No value filed.    Vitals  Vitals Value Taken Time   /45 03/14/23 1424   Temp     Pulse 58 03/14/23 1424   Resp 16 03/14/23 1424   SpO2 93 % 03/14/23 1424           Post Anesthesia Care and Evaluation    Patient location during evaluation: bedside  Patient participation: complete - patient participated  Level of consciousness: awake and alert  Pain management: adequate    Airway patency: patent  Anesthetic complications: No anesthetic complications    Cardiovascular status: acceptable  Respiratory status: acceptable  Hydration status: acceptable    Comments: /57 (BP Location: Right arm, Patient Position: Lying)   Pulse 58   Temp 36.7 °C (98.1 °F) (Oral)   Resp 18   Ht 161.3 cm (63.5\")   Wt 95.1 kg (209 lb 10.5 oz)   SpO2 90%   BMI 36.56 kg/m²       "

## 2023-03-14 NOTE — PLAN OF CARE
Goal Outcome Evaluation:  Plan of Care Reviewed With: patient           Outcome Evaluation: Pt is an 82 y/o F admitted with SOB, melena, acute blood loss anemia.She lives alone but her son lives in the attached town-home. Pt is indep with ADLs and fxl mobility using a RW. She presents today close to baseline fxn. EOB (I), STS and fxl ambulation around room using RW with SBA. She stands at the sink a few minutes to complete grooming tasks with set-upA. Makes it back to bed and then transport arrives to take pt to EGD. TFs to stretcher with Harini for BLE mgt. Pt is close to baseline fxn, no acute OT needs identified, recommend d/c home. OT will sign off

## 2023-03-14 NOTE — PROGRESS NOTES
Name: Luann Frances ADMIT: 3/13/2023   : 1940  PCP: Dinah Humphrey MD    MRN: [id number] LOS: 1 days   AGE/SEX: 83 y.o. female  ROOM: [facility]     Subjective   Subjective   Hb stable. NAEO. Had two black BMs        Objective   Objective   Vital Signs  Temp:  [97.8 °F (36.6 °C)-98.6 °F (37 °C)] 98.4 °F (36.9 °C)  Heart Rate:  [58-78] 63  Resp:  [18-20] 18  BP: (133-175)/(50-75) 133/51  SpO2:  [94 %-100 %] 97 %  on  Flow (L/min):  [2] 2;   Device (Oxygen Therapy): room air  Body mass index is 36.56 kg/m².  Physical Exam    General: Alert and oriented x3, no acute distress  HEENT: Normocephalic, atraumatic  CV: Regular rate and rhythm, no murmurs rubs or gallops  Lungs: Clear to auscultation bilaterally, no crackles or wheezes  Abdomen: Soft, nontender, nondistended  Neuro: CN II-XII intact, no FND appreciated     Results Review     I reviewed the patient's new clinical results.  Results from last 7 days   Lab Units 23  1045 23  0348 23  0039 23  2247 23  0916   WBC 10*3/mm3  --  8.26  --   --  7.91   HEMOGLOBIN g/dL 10.3* 9.8* 9.2* 10.0* 6.2*   PLATELETS 10*3/mm3  --  191  --   --  209     Results from last 7 days   Lab Units 23  0348 23  0916   SODIUM mmol/L 143 142   POTASSIUM mmol/L 4.0 4.4   CHLORIDE mmol/L 112* 110*   CO2 mmol/L 20.6* 24.0   BUN mg/dL 28* 38*   CREATININE mg/dL 1.06* 1.24*   GLUCOSE mg/dL 81 106*   Estimated Creatinine Clearance: 45.3 mL/min (A) (by C-G formula based on SCr of 1.06 mg/dL (H)).  Results from last 7 days   Lab Units 23  0916   ALBUMIN g/dL 3.2*   BILIRUBIN mg/dL 0.2   ALK PHOS U/L 45   AST (SGOT) U/L 14   ALT (SGPT) U/L 8     Results from last 7 days   Lab Units 23  0348 23  0916   CALCIUM mg/dL 8.8 9.3   ALBUMIN g/dL  --  3.2*   MAGNESIUM mg/dL  --  2.4       COVID19   Date Value Ref Range Status   2022 Not Detected Not Detected - Ref. Range Final   02/15/2021 Not Detected Not Detected - Ref. Range  Final     No results found for: HGBA1C, POCGLU    XR Chest 2 View  Narrative: XR CHEST 2 VW-     HISTORY: Female who is 83 years-old,  short of breath     TECHNIQUE: Frontal and lateral views of the chest     COMPARISON: 3/8/2021     FINDINGS: The heart size is borderline. Pulmonary vasculature is  unremarkable. Aorta is calcified. No focal pulmonary consolidation,  pleural effusion, or pneumothorax. Chronic lower thoracic compression  deformity appears similar from 06/21/2022. No acute osseous process.     Impression: No focal pulmonary consolidation. Borderline heart size.  Follow-up as clinical indications persist.     This report was finalized on 3/13/2023 10:12 AM by Dr. Alec Arcos M.D.       Scheduled Medications  budesonide-formoterol, 1 puff, Inhalation, BID - RT  cholecalciferol, 2,000 Units, Oral, Daily  ferrous sulfate, 325 mg, Oral, Every Other Day  folic acid, 1 mg, Oral, Daily  levothyroxine, 100 mcg, Oral, Daily  losartan, 25 mg, Oral, Daily  vitamin B-12, 1,000 mcg, Oral, Daily    Infusions  pantoprazole, 8 mg/hr, Last Rate: 8 mg/hr (03/14/23 0614)    Diet  NPO Diet NPO Type: Strict NPO       Assessment/Plan     Active Hospital Problems    Diagnosis  POA   • **Acute blood loss anemia [D62]  Yes   • History of deep venous thrombosis (DVT) of distal vein of left lower extremity [Z86.718]  Not Applicable   • Current use of long term anticoagulation [Z79.01]  Not Applicable   • Iron deficiency [E61.1]  Yes   • Essential hypertension [I10]  Yes   • CKD (chronic kidney disease) stage 3, GFR 30-59 ml/min (CMS/HCC) [N18.30]  Yes   • Chronic obstructive pulmonary disease (HCC) [J44.9]  Yes   • Primary hypothyroidism [E03.9]  Yes      Resolved Hospital Problems   No resolved problems to display.       83 y.o. female admitted with Acute blood loss anemia.    Acute blood loss anemia  Melena  History of DVTs  Chronic anticoagulation use  Status post 2 units of PRBCs.  Hemodynamically stable.  Hematology  was consulted for considerations regarding reversal of Pradaxa  Venous duplex in 2023, February showed deep venous valvular incompetence of the left popliteal but otherwise unremarkable   On PPI ggt      Hypertension  MAILE on CKD 3  Blood pressures and renal function acceptable.  Hold diuretics, continue losartan     Hypothyroidism  Continue home levothyroxine    · SCDs for DVT prophylaxis.  · Full code.  · Discussed with patient and nursing staff.        Braxton Landeros MD  [facility]  03/14/23  12:00 EDT

## 2023-03-15 ENCOUNTER — ANESTHESIA (OUTPATIENT)
Dept: GASTROENTEROLOGY | Facility: HOSPITAL | Age: 83
End: 2023-03-15
Payer: MEDICARE

## 2023-03-15 ENCOUNTER — ANESTHESIA EVENT (OUTPATIENT)
Dept: GASTROENTEROLOGY | Facility: HOSPITAL | Age: 83
End: 2023-03-15
Payer: MEDICARE

## 2023-03-15 LAB
HCT VFR BLD AUTO: 28.8 % (ref 34–46.6)
HCT VFR BLD AUTO: 30.2 % (ref 34–46.6)
HCT VFR BLD AUTO: 30.6 % (ref 34–46.6)
HGB BLD-MCNC: 10.1 G/DL (ref 12–15.9)
HGB BLD-MCNC: 10.2 G/DL (ref 12–15.9)
HGB BLD-MCNC: 9.6 G/DL (ref 12–15.9)
LAB AP CASE REPORT: NORMAL
PATH REPORT.FINAL DX SPEC: NORMAL
PATH REPORT.GROSS SPEC: NORMAL

## 2023-03-15 PROCEDURE — 85014 HEMATOCRIT: CPT | Performed by: NURSE PRACTITIONER

## 2023-03-15 PROCEDURE — 94799 UNLISTED PULMONARY SVC/PX: CPT

## 2023-03-15 PROCEDURE — 25010000002 PROPOFOL 10 MG/ML EMULSION: Performed by: ANESTHESIOLOGY

## 2023-03-15 PROCEDURE — 45385 COLONOSCOPY W/LESION REMOVAL: CPT | Performed by: INTERNAL MEDICINE

## 2023-03-15 PROCEDURE — 94761 N-INVAS EAR/PLS OXIMETRY MLT: CPT

## 2023-03-15 PROCEDURE — 88305 TISSUE EXAM BY PATHOLOGIST: CPT | Performed by: INTERNAL MEDICINE

## 2023-03-15 PROCEDURE — 85018 HEMOGLOBIN: CPT | Performed by: NURSE PRACTITIONER

## 2023-03-15 PROCEDURE — 85018 HEMOGLOBIN: CPT | Performed by: INTERNAL MEDICINE

## 2023-03-15 PROCEDURE — 85014 HEMATOCRIT: CPT | Performed by: INTERNAL MEDICINE

## 2023-03-15 PROCEDURE — 94664 DEMO&/EVAL PT USE INHALER: CPT

## 2023-03-15 PROCEDURE — 99232 SBSQ HOSP IP/OBS MODERATE 35: CPT | Performed by: INTERNAL MEDICINE

## 2023-03-15 DEVICE — DEV CLIP ENDO RESOLUTION360 CONTRL ROT 235CM: Type: IMPLANTABLE DEVICE | Site: SIGMOID COLON | Status: FUNCTIONAL

## 2023-03-15 RX ORDER — SODIUM CHLORIDE 9 MG/ML
30 INJECTION, SOLUTION INTRAVENOUS CONTINUOUS
Status: DISCONTINUED | OUTPATIENT
Start: 2023-03-15 | End: 2023-03-16 | Stop reason: HOSPADM

## 2023-03-15 RX ORDER — PROPOFOL 10 MG/ML
VIAL (ML) INTRAVENOUS CONTINUOUS PRN
Status: DISCONTINUED | OUTPATIENT
Start: 2023-03-15 | End: 2023-03-15 | Stop reason: SURG

## 2023-03-15 RX ORDER — SODIUM CHLORIDE, SODIUM LACTATE, POTASSIUM CHLORIDE, CALCIUM CHLORIDE 600; 310; 30; 20 MG/100ML; MG/100ML; MG/100ML; MG/100ML
INJECTION, SOLUTION INTRAVENOUS CONTINUOUS PRN
Status: DISCONTINUED | OUTPATIENT
Start: 2023-03-15 | End: 2023-03-15 | Stop reason: SURG

## 2023-03-15 RX ORDER — LIDOCAINE HYDROCHLORIDE 20 MG/ML
INJECTION, SOLUTION INFILTRATION; PERINEURAL AS NEEDED
Status: DISCONTINUED | OUTPATIENT
Start: 2023-03-15 | End: 2023-03-15 | Stop reason: SURG

## 2023-03-15 RX ORDER — PROPOFOL 10 MG/ML
VIAL (ML) INTRAVENOUS AS NEEDED
Status: DISCONTINUED | OUTPATIENT
Start: 2023-03-15 | End: 2023-03-15 | Stop reason: SURG

## 2023-03-15 RX ADMIN — ACETAMINOPHEN 650 MG: 325 SUSPENSION ORAL at 13:40

## 2023-03-15 RX ADMIN — TRAMADOL HYDROCHLORIDE 50 MG: 50 TABLET, COATED ORAL at 21:30

## 2023-03-15 RX ADMIN — PROPOFOL 50 MG: 10 INJECTION, EMULSION INTRAVENOUS at 16:10

## 2023-03-15 RX ADMIN — LIDOCAINE HYDROCHLORIDE 60 MG: 20 INJECTION, SOLUTION INFILTRATION; PERINEURAL at 16:09

## 2023-03-15 RX ADMIN — PANTOPRAZOLE SODIUM 8 MG/HR: 40 INJECTION, POWDER, FOR SOLUTION INTRAVENOUS at 01:12

## 2023-03-15 RX ADMIN — POLYETHYLENE GLYCOL 3350, SODIUM SULFATE ANHYDROUS, SODIUM BICARBONATE, SODIUM CHLORIDE, POTASSIUM CHLORIDE 2000 ML: 236; 22.74; 6.74; 5.86; 2.97 POWDER, FOR SOLUTION ORAL at 06:15

## 2023-03-15 RX ADMIN — PANTOPRAZOLE SODIUM 8 MG/HR: 40 INJECTION, POWDER, FOR SOLUTION INTRAVENOUS at 10:00

## 2023-03-15 RX ADMIN — SODIUM CHLORIDE, POTASSIUM CHLORIDE, SODIUM LACTATE AND CALCIUM CHLORIDE: 600; 310; 30; 20 INJECTION, SOLUTION INTRAVENOUS at 16:04

## 2023-03-15 RX ADMIN — SODIUM CHLORIDE 30 ML/HR: 9 INJECTION, SOLUTION INTRAVENOUS at 15:20

## 2023-03-15 RX ADMIN — Medication 180 MCG/KG/MIN: at 16:11

## 2023-03-15 RX ADMIN — TRAMADOL HYDROCHLORIDE 50 MG: 50 TABLET, COATED ORAL at 09:56

## 2023-03-15 RX ADMIN — ACETAMINOPHEN 650 MG: 325 TABLET, FILM COATED ORAL at 17:12

## 2023-03-15 RX ADMIN — BUDESONIDE AND FORMOTEROL FUMARATE DIHYDRATE 1 PUFF: 160; 4.5 AEROSOL RESPIRATORY (INHALATION) at 19:59

## 2023-03-15 RX ADMIN — BUDESONIDE AND FORMOTEROL FUMARATE DIHYDRATE 1 PUFF: 160; 4.5 AEROSOL RESPIRATORY (INHALATION) at 07:14

## 2023-03-15 NOTE — PROGRESS NOTES
Name: Luann Frances ADMIT: 3/13/2023   : 1940  PCP: Dinah Humphrey MD    MRN: [id number] LOS: 2 days   AGE/SEX: 83 y.o. female  ROOM: ENDO/ENDO     Subjective   Subjective   Complained of ear pain. Otherwise tolerated bowel prep in anticipation of C-scope today        Objective   Objective   Vital Signs  Temp:  [97.8 °F (36.6 °C)-98.5 °F (36.9 °C)] 97.9 °F (36.6 °C)  Heart Rate:  [] 62  Resp:  [16-18] 16  BP: (137-181)/(70-88) 142/88  SpO2:  [94 %-100 %] 99 %  on   ;   Device (Oxygen Therapy): room air  Body mass index is 34.25 kg/m².  Physical Exam    General: Alert and oriented x3, no acute distress  HEENT: Normocephalic, atraumatic, Compacted cerumen noted in bilateral ear canals   CV: Regular rate and rhythm, no murmurs rubs or gallops  Lungs: Clear to auscultation bilaterally, no crackles or wheezes  Abdomen: Soft, nontender, nondistended  Neuro: CN II-XII intact, no FND appreciated     Results Review     I reviewed the patient's new clinical results.  Results from last 7 days   Lab Units 03/15/23  0751 03/15/23  0023 23  1553 23  1045 23  0348 23  2247 23  0916   WBC 10*3/mm3  --   --   --   --  8.26  --  7.91   HEMOGLOBIN g/dL 10.2* 10.1* 9.3* 10.3* 9.8*   < > 6.2*   PLATELETS 10*3/mm3  --   --   --   --  191  --  209    < > = values in this interval not displayed.     Results from last 7 days   Lab Units 23  0348 23  0916   SODIUM mmol/L 143 142   POTASSIUM mmol/L 4.0 4.4   CHLORIDE mmol/L 112* 110*   CO2 mmol/L 20.6* 24.0   BUN mg/dL 28* 38*   CREATININE mg/dL 1.06* 1.24*   GLUCOSE mg/dL 81 106*   Estimated Creatinine Clearance: 43.8 mL/min (A) (by C-G formula based on SCr of 1.06 mg/dL (H)).  Results from last 7 days   Lab Units 23  0916   ALBUMIN g/dL 3.2*   BILIRUBIN mg/dL 0.2   ALK PHOS U/L 45   AST (SGOT) U/L 14   ALT (SGPT) U/L 8     Results from last 7 days   Lab Units 23  0348 23  0916   CALCIUM mg/dL 8.8 9.3    ALBUMIN g/dL  --  3.2*   MAGNESIUM mg/dL  --  2.4       COVID19   Date Value Ref Range Status   08/16/2022 Not Detected Not Detected - Ref. Range Final   02/15/2021 Not Detected Not Detected - Ref. Range Final     No results found for: HGBA1C, POCGLU    XR Chest 2 View  Narrative: XR CHEST 2 VW-     HISTORY: Female who is 83 years-old,  short of breath     TECHNIQUE: Frontal and lateral views of the chest     COMPARISON: 3/8/2021     FINDINGS: The heart size is borderline. Pulmonary vasculature is  unremarkable. Aorta is calcified. No focal pulmonary consolidation,  pleural effusion, or pneumothorax. Chronic lower thoracic compression  deformity appears similar from 06/21/2022. No acute osseous process.     Impression: No focal pulmonary consolidation. Borderline heart size.  Follow-up as clinical indications persist.     This report was finalized on 3/13/2023 10:12 AM by Dr. Alec Arcos M.D.       Scheduled Medications  budesonide-formoterol, 1 puff, Inhalation, BID - RT  cholecalciferol, 2,000 Units, Oral, Daily  folic acid, 1 mg, Oral, Daily  levothyroxine, 100 mcg, Oral, Daily  losartan, 25 mg, Oral, Daily  vitamin B-12, 1,000 mcg, Oral, Daily    Infusions  pantoprazole, 8 mg/hr, Last Rate: 8 mg/hr (03/15/23 1000)  sodium chloride, 30 mL/hr, Last Rate: 30 mL/hr (03/15/23 1520)    Diet  Diet: Liquid Diets; Clear Liquid; Texture: Regular Texture (IDDSI 7); Fluid Consistency: Thin (IDDSI 0)       Assessment/Plan     Active Hospital Problems    Diagnosis  POA   • **Acute blood loss anemia [D62]  Yes   • Melena [K92.1]  Unknown   • History of deep venous thrombosis (DVT) of distal vein of left lower extremity [Z86.718]  Not Applicable   • Current use of long term anticoagulation [Z79.01]  Not Applicable   • Iron deficiency [E61.1]  Yes   • Essential hypertension [I10]  Yes   • CKD (chronic kidney disease) stage 3, GFR 30-59 ml/min (CMS/HCC) [N18.30]  Yes   • Chronic obstructive pulmonary disease (HCC)  [J44.9]  Yes   • Primary hypothyroidism [E03.9]  Yes      Resolved Hospital Problems   No resolved problems to display.       83 y.o. female admitted with Acute blood loss anemia.    Acute blood loss anemia  Melena  History of DVTs  Chronic anticoagulation use  Status post 2 units of PRBCs.  Hemodynamically stable.  Hematology was consulted for considerations regarding reversal of Pradaxa  Venous duplex in 2023, February showed deep venous valvular incompetence of the left popliteal but otherwise unremarkable   On PPI ggt   C-scope today     Hypertension  MAILE on CKD 3  Blood pressures and renal function acceptable.  Hold diuretics, continue losartan     Hypothyroidism  Continue home levothyroxine    Cerumen impaction  -ear drops (debrox)    · SCDs for DVT prophylaxis.  · Full code.  · Discussed with patient and nursing staff.        Braxton Landeros MD  [facility]  03/15/23  16:13 EDT

## 2023-03-15 NOTE — PROGRESS NOTES
[facility] INPATIENT PROGRESS NOTE    Length of Stay:  2 days    CHIEF COMPLAINT/REASON FOR VISIT:  Anemia, blood loss    SUBJECTIVE: Afebrile. Remains on room air. Bowel prep for colonoscopy resulting in clear output. No bleeding.     ROS:  14 systems reviewed with pertinent positives and negatives in the HPI. Reviewed today.    OBJECTIVE:  Vitals:    03/14/23 2351 03/15/23 0500 03/15/23 0714 03/15/23 0720   BP: 180/84   140/75   BP Location: Left arm   Right arm   Patient Position: Lying   Sitting   Pulse: 107   90   Resp: 16  16 16   Temp: 98.1 °F (36.7 °C)   97.8 °F (36.6 °C)   TempSrc: Oral   Oral   SpO2: 94%   98%   Weight:  89.1 kg (196 lb 6.9 oz)     Height:             PHYSICAL EXAMINATION:   General:  No acute distress, awake, alert and oriented sitting on the side of the bed.  Skin:  Warm and dry, no visible rash  HEENT:  Normocephalic/atraumatic.    Chest:  Normal respiratory effort  Extremities:  No visible clubbing, cyanosis, or edema  Neuro/psych:  Grossly nonfocal.  Normal mood and affect.        DIAGNOSTIC DATA:  Results Review:     I reviewed the patient's new clinical results.    Results from last 7 days   Lab Units 03/15/23  0751 03/14/23  1045 03/14/23  0348   WBC 10*3/mm3  --   --  8.26   HEMOGLOBIN g/dL 10.2*   < > 9.8*   HEMATOCRIT % 30.6*   < > 28.7*   PLATELETS 10*3/mm3  --   --  191    < > = values in this interval not displayed.     Lab Results   Component Value Date    NEUTROABS 4.91 03/13/2023     Results from last 7 days   Lab Units 03/14/23  0348   SODIUM mmol/L 143   POTASSIUM mmol/L 4.0   CHLORIDE mmol/L 112*   CO2 mmol/L 20.6*   BUN mg/dL 28*   CREATININE mg/dL 1.06*   GLUCOSE mg/dL 81   CALCIUM mg/dL 8.8     Results from last 7 days   Lab Units 03/13/23  0916   INR  1.41*     Results from last 7 days   Lab Units 03/13/23  0916   MAGNESIUM mg/dL 2.4         IMAGING:    None reviewed    ASSESSMENT:  This is a 83 y.o. female with:     *Normocytic anemia  • Admit hgb  6.2, down from 13.2 on 2/22/23.   • Suspect acute blood loss  • Transfused 2 units pRBC  • Ferritin 575, iron 111, B12 896, folate >20  • Hgb stable at 10.2  • EGD 3/14/23 with Schatzki ring in the lower third of the esophagus, small hiatal hernia, patchy erythema in the stomach, three small angioectasias without bleeding in the third and fourth portion of he duodenum treated with argon beam coagulation. Normal proxiimal jejunum.      *MAILE/CKD3  · Creatinine 1.06 from 1.24 from 1.37     *h/o BLE DVT  • On chronic therapy with Pradaxa  • Pradaxa on hold    *Chronic intermittent nausea  ·  some concern that ferrous sulfate contributing. Can consider transition to ferrous gluconate to see if this is better tolerated.    RECOMMENDATIONS/PLAN:   1. Serial H/H continuyes  2. PPI drip continues  3. Holding Pradaxa  4. Colonoscopy planned for today  5. No transfusion needed today  6. Stop ferrous sulfate due to nausea and constipation. Transition to ferrous gluconate  7. Continue oral B12 and folic acid  8. Follow up is scheduled with Dr. Dubon on 6/29.    Will see her tomorrow to f/u the colonoscopy result.     Olivier Pace MD

## 2023-03-15 NOTE — PLAN OF CARE
Goal Outcome Evaluation:      Pt resting in bed. Golytely per MAR. Pt tolerating well with good results. Medicated for leg pain per MAR relief noted. No other c/o voiced. VSS No s/s of distress.

## 2023-03-15 NOTE — SIGNIFICANT NOTE
Dr. Garcia and Dr. Ford notified of pt c/o headache 8/10 located in one specific spot. No neuro deficit noted. Orders received for additional tylenol and IVF's increased. 1714- Patient evaluated by Dr. Garcia. No new orders received.

## 2023-03-15 NOTE — SIGNIFICANT NOTE
03/15/23 0602   OTHER   Discipline physical therapist   Therapy Assessment/Plan (PT)   Criteria for Skilled Interventions Met (PT) no problems identified which require skilled intervention  (pt up with rwx with sba; OT s/o as pt at/near baseline.  Consider OPPT if deficits remain. Pt safe and appropriate to ambulate with nsg supervision.  No acute PT indicated.)

## 2023-03-15 NOTE — ANESTHESIA PREPROCEDURE EVALUATION
Anesthesia Evaluation     Patient summary reviewed and Nursing notes reviewed   history of anesthetic complications: PONV  NPO Solid Status: > 8 hours  NPO Liquid Status: > 8 hours           Airway   Mallampati: II  TM distance: >3 FB  Neck ROM: full  No difficulty expected  Dental - normal exam     Pulmonary    (+) a smoker Current Abstained day of surgery, COPD, sleep apnea on CPAP,   (-) asthma, rhonchi, decreased breath sounds, wheezes  Cardiovascular   Exercise tolerance: good (4-7 METS)    Rhythm: regular  Rate: normal    (+) hypertension, dysrhythmias PVC, DVT resolved, hyperlipidemia,   (-) CAD, angina, LAZCANO, murmur      Neuro/Psych  (-) seizures, CVA  GI/Hepatic/Renal/Endo    (+)  GERD,  renal disease stones and CRI, thyroid problem hypothyroidism  (-) liver disease, diabetes    Musculoskeletal     Abdominal     Abdomen: soft.   Substance History      OB/GYN          Other   arthritis,                      Anesthesia Plan    ASA 3     MAC   total IV anesthesia  intravenous induction     Anesthetic plan, risks, benefits, and alternatives have been provided, discussed and informed consent has been obtained with: patient.        CODE STATUS:    Code Status (Patient has no pulse and is not breathing): CPR (Attempt to Resuscitate)  Medical Interventions (Patient has pulse or is breathing): Full Support

## 2023-03-15 NOTE — ANESTHESIA POSTPROCEDURE EVALUATION
"Patient: Luann Frances    Procedure Summary     Date: 03/15/23 Room / Location:  [facility]    Anesthesia Start: 1604 Anesthesia Stop: 1640    Procedure: COLONOSCOPY to cecum and TI :  hot snare sigmoid polyps with clip to 1 polyp, Diagnosis:       Melena      Iron deficiency      (Melena [K92.1])      (Iron deficiency [E61.1])    Surgeons: Jaun Ford MD Provider: Jaun Jones MD    Anesthesia Type: MAC ASA Status: 3          Anesthesia Type: MAC    Vitals  Vitals Value Taken Time   /51 03/15/23 1642   Temp     Pulse 54 03/15/23 1642   Resp 16 03/15/23 1642   SpO2 100 % 03/15/23 1642           Post Anesthesia Care and Evaluation    Level of consciousness: awake and alert  Pain management: adequate    Airway patency: patent  Anesthetic complications: No anesthetic complications  PONV Status: none  Cardiovascular status: acceptable  Respiratory status: acceptable  Hydration status: acceptable    Comments: /51 (BP Location: Left arm, Patient Position: Lying)   Pulse 54   Temp 36.6 °C (97.9 °F) (Oral)   Resp 16   Ht 161.3 cm (63.5\")   Wt 89.1 kg (196 lb 6.9 oz)   SpO2 100%   BMI 34.25 kg/m²         "

## 2023-03-15 NOTE — SIGNIFICANT NOTE
Patient states that she feels like it could by a strained neck muscle. Wants to switch from heat to ice pack. No other changes noted.

## 2023-03-15 NOTE — SIGNIFICANT NOTE
Patient states her pain is better. She said she was ready to go back to her room and get something to eat. AA aware.

## 2023-03-16 ENCOUNTER — READMISSION MANAGEMENT (OUTPATIENT)
Dept: CALL CENTER | Facility: HOSPITAL | Age: 83
End: 2023-03-16
Payer: MEDICARE

## 2023-03-16 VITALS
OXYGEN SATURATION: 93 % | DIASTOLIC BLOOD PRESSURE: 85 MMHG | WEIGHT: 196.43 LBS | HEIGHT: 64 IN | HEART RATE: 55 BPM | RESPIRATION RATE: 16 BRPM | TEMPERATURE: 97.8 F | BODY MASS INDEX: 33.54 KG/M2 | SYSTOLIC BLOOD PRESSURE: 112 MMHG

## 2023-03-16 LAB
ANION GAP SERPL CALCULATED.3IONS-SCNC: 8 MMOL/L (ref 5–15)
BUN SERPL-MCNC: 18 MG/DL (ref 8–23)
BUN/CREAT SERPL: 15.3 (ref 7–25)
CALCIUM SPEC-SCNC: 8.4 MG/DL (ref 8.6–10.5)
CHLORIDE SERPL-SCNC: 111 MMOL/L (ref 98–107)
CO2 SERPL-SCNC: 23 MMOL/L (ref 22–29)
CREAT SERPL-MCNC: 1.18 MG/DL (ref 0.57–1)
DEPRECATED RDW RBC AUTO: 51.1 FL (ref 37–54)
EGFRCR SERPLBLD CKD-EPI 2021: 46.2 ML/MIN/1.73
ERYTHROCYTE [DISTWIDTH] IN BLOOD BY AUTOMATED COUNT: 14.8 % (ref 12.3–15.4)
GLUCOSE SERPL-MCNC: 92 MG/DL (ref 65–99)
HCT VFR BLD AUTO: 27.2 % (ref 34–46.6)
HGB BLD-MCNC: 9.1 G/DL (ref 12–15.9)
HGB BLD-MCNC: 9.1 G/DL (ref 12–15.9)
HGB BLD-MCNC: 9.4 G/DL (ref 12–15.9)
MCH RBC QN AUTO: 31.7 PG (ref 26.6–33)
MCHC RBC AUTO-ENTMCNC: 33.5 G/DL (ref 31.5–35.7)
MCV RBC AUTO: 94.8 FL (ref 79–97)
PLATELET # BLD AUTO: 205 10*3/MM3 (ref 140–450)
PMV BLD AUTO: 10.5 FL (ref 6–12)
POTASSIUM SERPL-SCNC: 4 MMOL/L (ref 3.5–5.2)
RBC # BLD AUTO: 2.87 10*6/MM3 (ref 3.77–5.28)
SODIUM SERPL-SCNC: 142 MMOL/L (ref 136–145)
WBC NRBC COR # BLD: 7.15 10*3/MM3 (ref 3.4–10.8)

## 2023-03-16 PROCEDURE — 94761 N-INVAS EAR/PLS OXIMETRY MLT: CPT

## 2023-03-16 PROCEDURE — 99232 SBSQ HOSP IP/OBS MODERATE 35: CPT | Performed by: INTERNAL MEDICINE

## 2023-03-16 PROCEDURE — 85014 HEMATOCRIT: CPT | Performed by: INTERNAL MEDICINE

## 2023-03-16 PROCEDURE — 85018 HEMOGLOBIN: CPT | Performed by: INTERNAL MEDICINE

## 2023-03-16 PROCEDURE — 80048 BASIC METABOLIC PNL TOTAL CA: CPT | Performed by: STUDENT IN AN ORGANIZED HEALTH CARE EDUCATION/TRAINING PROGRAM

## 2023-03-16 PROCEDURE — 94799 UNLISTED PULMONARY SVC/PX: CPT

## 2023-03-16 PROCEDURE — 85027 COMPLETE CBC AUTOMATED: CPT | Performed by: STUDENT IN AN ORGANIZED HEALTH CARE EDUCATION/TRAINING PROGRAM

## 2023-03-16 PROCEDURE — G0378 HOSPITAL OBSERVATION PER HR: HCPCS

## 2023-03-16 PROCEDURE — 94664 DEMO&/EVAL PT USE INHALER: CPT

## 2023-03-16 RX ORDER — PANTOPRAZOLE SODIUM 40 MG/1
40 TABLET, DELAYED RELEASE ORAL DAILY
Qty: 30 TABLET | Refills: 0 | Status: SHIPPED | OUTPATIENT
Start: 2023-03-16 | End: 2023-04-07 | Stop reason: SDUPTHER

## 2023-03-16 RX ADMIN — Medication 10 ML: at 09:30

## 2023-03-16 RX ADMIN — BUDESONIDE AND FORMOTEROL FUMARATE DIHYDRATE 1 PUFF: 160; 4.5 AEROSOL RESPIRATORY (INHALATION) at 07:08

## 2023-03-16 RX ADMIN — Medication 2000 UNITS: at 09:31

## 2023-03-16 RX ADMIN — TRAMADOL HYDROCHLORIDE 50 MG: 50 TABLET, COATED ORAL at 09:31

## 2023-03-16 RX ADMIN — LEVOTHYROXINE SODIUM 100 MCG: 0.1 TABLET ORAL at 09:31

## 2023-03-16 RX ADMIN — CARBAMIDE PEROXIDE 6.5% 5 DROP: 6.5 LIQUID AURICULAR (OTIC) at 09:30

## 2023-03-16 RX ADMIN — Medication 1000 MCG: at 09:30

## 2023-03-16 RX ADMIN — FOLIC ACID 1 MG: 1 TABLET ORAL at 09:31

## 2023-03-16 RX ADMIN — LOSARTAN POTASSIUM 25 MG: 25 TABLET, FILM COATED ORAL at 09:30

## 2023-03-16 NOTE — PROGRESS NOTES
[facility]  Inpatient Progress Note    Reason for Follow Up: Anemia    Subjective     Interval History:   Source of anemia, AVMs treated with argon plasma coagulation yesterday in the upper GI tract  No overt bleeding today  She would like to go home    Current Facility-Administered Medications:   •  acetaminophen (TYLENOL) tablet 650 mg, 650 mg, Oral, Q4H PRN, 650 mg at 03/15/23 1712 **OR** acetaminophen (TYLENOL) 160 MG/5ML solution 650 mg, 650 mg, Oral, Q4H PRN, 650 mg at 03/15/23 1340 **OR** acetaminophen (TYLENOL) suppository 650 mg, 650 mg, Rectal, Q4H PRN, Jaun Ford MD  •  albuterol (PROVENTIL) nebulizer solution 0.083% 2.5 mg/3mL, 2.5 mg, Nebulization, Q6H PRN, Jaun Ford MD  •  budesonide-formoterol (SYMBICORT) 160-4.5 MCG/ACT inhaler 1 puff, 1 puff, Inhalation, BID - RT, Jaun Ford MD, 1 puff at 03/16/23 0708  •  carbamide peroxide (DEBROX) 6.5 % otic solution 5 drop, 5 drop, Both Ears, BID, Braxton Landeros MD  •  cholecalciferol (VITAMIN D3) tablet 2,000 Units, 2,000 Units, Oral, Daily, Jaun Ford MD, 2,000 Units at 03/14/23 0905  •  folic acid (FOLVITE) tablet 1 mg, 1 mg, Oral, Daily, Jaun Ford MD, 1 mg at 03/14/23 0905  •  levothyroxine (SYNTHROID, LEVOTHROID) tablet 100 mcg, 100 mcg, Oral, Daily, Jaun Ford MD, 100 mcg at 03/14/23 0905  •  losartan (COZAAR) tablet 25 mg, 25 mg, Oral, Daily, Jaun Ford MD, 25 mg at 03/14/23 0905  •  melatonin tablet 5 mg, 5 mg, Oral, Nightly PRN, Jaun Ford MD, 5 mg at 03/13/23 2116  •  ondansetron (ZOFRAN) tablet 4 mg, 4 mg, Oral, Q6H PRN **OR** ondansetron (ZOFRAN) injection 4 mg, 4 mg, Intravenous, Q6H PRN, Jaun Ford MD  •  pantoprazole (PROTONIX) 40 mg in 100 mL NS (VTB), 8 mg/hr, Intravenous, Continuous, Jaun Ford MD, Last Rate: 20 mL/hr at 03/15/23 1000, 8 mg/hr at 03/15/23 1000  •  [COMPLETED] Insert Peripheral IV, , , Once **AND** sodium chloride 0.9 % flush 10 mL, 10 mL, Intravenous, PRN, Brown,  Jaun BETANCUR MD  •  sodium chloride 0.9 % infusion, 30 mL/hr, Intravenous, Continuous, Jaun Ford MD, Stopped at 03/15/23 1740  •  traMADol (ULTRAM) tablet 50 mg, 50 mg, Oral, Q12H PRN, Jaun Ford MD, 50 mg at 03/15/23 2130  •  vitamin B-12 (CYANOCOBALAMIN) tablet 1,000 mcg, 1,000 mcg, Oral, Daily, Jaun Ford MD, 1,000 mcg at 03/14/23 0905  Review of Systems:    There is weakness of fatigue all other systems reviewed and negative    Objective     Vital Signs  Temp:  [97.6 °F (36.4 °C)-97.9 °F (36.6 °C)] 97.8 °F (36.6 °C)  Heart Rate:  [51-90] 55  Resp:  [16] 16  BP: (106-154)/(51-88) 112/85  Body mass index is 34.25 kg/m².    Intake/Output Summary (Last 24 hours) at 3/16/2023 0856  Last data filed at 3/15/2023 1740  Gross per 24 hour   Intake 900 ml   Output --   Net 900 ml     No intake/output data recorded.     Physical Exam:   General: patient awake, alert and cooperative   Eyes: Normal lids and lashes, no scleral icterus   Neck: supple, normal ROM   Skin: warm and dry, not jaundiced   Cardiovascular: regular rhythm and rate, no murmurs auscultated   Pulm: clear to auscultation bilaterally, regular and unlabored   Abdomen: soft, nontender, nondistended; normal bowel sounds   Extremities: no rash or edema   Psychiatric: Normal mood and behavior; memory intact     Results Review:     I reviewed the patient's new clinical results.    Results from last 7 days   Lab Units 03/16/23  0806 03/16/23  0020 03/15/23  1953 03/14/23  1045 03/14/23  0348 03/13/23  2247 03/13/23  0916   WBC 10*3/mm3 7.15  --   --   --  8.26  --  7.91   HEMOGLOBIN g/dL 9.1*  9.1* 9.4* 9.6*   < > 9.8*   < > 6.2*   HEMATOCRIT % 27.2*  27.2* 27.2* 28.8*   < > 28.7*   < > 19.0*   PLATELETS 10*3/mm3 205  --   --   --  191  --  209    < > = values in this interval not displayed.     Results from last 7 days   Lab Units 03/16/23  0806 03/14/23  0348 03/13/23  0916   SODIUM mmol/L 142 143 142   POTASSIUM mmol/L 4.0 4.0 4.4   CHLORIDE mmol/L  111* 112* 110*   CO2 mmol/L 23.0 20.6* 24.0   BUN mg/dL 18 28* 38*   CREATININE mg/dL 1.18* 1.06* 1.24*   CALCIUM mg/dL 8.4* 8.8 9.3   BILIRUBIN mg/dL  --   --  0.2   ALK PHOS U/L  --   --  45   ALT (SGPT) U/L  --   --  8   AST (SGOT) U/L  --   --  14   GLUCOSE mg/dL 92 81 106*     Results from last 7 days   Lab Units 03/13/23  0916   INR  1.41*     Lab Results   Lab Value Date/Time    LIPASE 25 12/12/2022 1736    LIPASE 18 08/11/2022 1058    LIPASE 28 12/16/2020 1351    LIPASE 24 12/10/2015 0950       Radiology:  XR Chest 2 View   Final Result   No focal pulmonary consolidation. Borderline heart size.   Follow-up as clinical indications persist.       This report was finalized on 3/13/2023 10:12 AM by Dr. Alec Arcos M.D.              Assessment & Plan     Active Hospital Problems    Diagnosis    • **Acute blood loss anemia    • Melena    • History of deep venous thrombosis (DVT) of distal vein of left lower extremity    • Current use of long term anticoagulation    • Iron deficiency    • Essential hypertension    • CKD (chronic kidney disease) stage 3, GFR 30-59 ml/min (CMS/[facility])    • Chronic obstructive pulmonary disease (HCC)    • Primary hypothyroidism        Assessment:  1. Acute on chronic anemia without overt bleed  2. Left lower extremity DVT-on Pradaxa with last dose 3/12 at 2100  3. COPD  4. Hypertension  5. CKD stage III  6. Chronic nausea attributed to medications  7. AVMs, upper GI tract     Plan:  · AVMs treated with argon plasma coagulation  · Okay for discharge home  · Send home on PPI  · Does not need to follow-up with GI at this time  · We will follow-up on biopsies of polyps  · She should have a hemoglobin check with her PCP in 1 week  I discussed the patients findings and my recommendations with patient and nursing staff.    Elton Calvillo MD

## 2023-03-16 NOTE — CASE MANAGEMENT/SOCIAL WORK
Case Management Discharge Note      Final Note: Home via family, no additional CCP needs. Carlin RN/CCP         Selected Continued Care - Admitted Since 3/13/2023     Destination    No services have been selected for the patient.              Durable Medical Equipment    No services have been selected for the patient.              Dialysis/Infusion    No services have been selected for the patient.              Home Medical Care    No services have been selected for the patient.              Therapy    No services have been selected for the patient.              Community Resources    No services have been selected for the patient.              Community & DME    No services have been selected for the patient.                Selected Continued Care - Episodes Includes continued care and service providers with selected services from the active episodes listed below    Oncology Episode start date: 9/9/2022   There are no active outsourced providers for this episode.                    Final Discharge Disposition Code: 01 - home or self-care

## 2023-03-16 NOTE — DISCHARGE SUMMARY
Patient Name: Luann Frances  : 1940  MRN: [id number]    Date of Admission: 3/13/2023  Date of Discharge:  3/16/2023  Primary Care Physician: Dinah Humphrey MD      Chief Complaint:   Shortness of Breath and Weakness - Generalized      Discharge Diagnoses     Active Hospital Problems    Diagnosis  POA   • **Acute blood loss anemia [D62]  Yes   • Melena [K92.1]  Unknown   • History of deep venous thrombosis (DVT) of distal vein of left lower extremity [Z86.718]  Not Applicable   • Current use of long term anticoagulation [Z79.01]  Not Applicable   • Iron deficiency [E61.1]  Yes   • Essential hypertension [I10]  Yes   • CKD (chronic kidney disease) stage 3, GFR 30-59 ml/min (CMS/HCC) [N18.30]  Yes   • Chronic obstructive pulmonary disease (HCC) [J44.9]  Yes   • Primary hypothyroidism [E03.9]  Yes      Resolved Hospital Problems   No resolved problems to display.        Hospital Course   Ms. Frances is a 83 y.o. female former smoker with a history of DVT on AC, CKD, HTN, hypothyroidism, HLD,  that presents to [facility] complaining of soa, fatigue. She has been soa w/minimal exertion as well as mild orthopnea. Denies chest pain, palpitations. She has chronic LE swelling that is better than 2 weeks ago. Denies fever, cough, wheezing, abd pain, n/v/d, dysuria, dizziness/lightheadedness. She has chronic black colored stools, takes iron supplements.       1. Acute blood loss anemia / GI bleed/ melena/ chronic anticoagulation, patient's Pradaxa was held and did receive 2 units of PRBC during this hospital stay.  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  Also had a colonoscopy which revealed diverticulosis and had 2 polyps resected in the sigmoid colon.  H&H remained stable and no further bleeding and Gastroenterology did clear patient  to be discharged home and to resume Pradaxa next 48-72 hours and patient has verbalized understanding of the same.    2. Hypertension, on losartan at home which will be continued.    3. Acute kidney injury/ CKD stage 3, creatinine is better now and back to baseline.  Follow-up with nephrology as an outpatient basis.      4. Hypothyroidism, on Synthroid.    5. History of DVT, as mentioned above resume Pradaxa in next 2-3 days.      Copy text on this note has been reviewed by me on 03/16/2023.      Day of Discharge         Physical Exam:  Temp:  [97.6 °F (36.4 °C)-97.9 °F (36.6 °C)] 97.8 °F (36.6 °C)  Heart Rate:  [51-90] 55  Resp:  [16] 16  BP: (106-154)/(51-88) 112/85  Body mass index is 34.25 kg/m².  Physical Exam    HEENT: PERRLA, extraocular movements intact, sclerae is no icterus   Neck: Supple, no JVD   Cardiovascular:  Regular rate and rhythm with normal S1-S2   Respiratory: Fairly clear to auscultation bilaterally with no wheezes  GI:  Soft, nontender, bowel sounds are present   Extremities:  No edema, palpable pulses   Neurologic:  Grossly nonfocal, no facial asymmetry.    Consultants     Consult Orders (all) (From admission, onward)     Start     Ordered    03/13/23 1332  Inpatient Case Management  Consult  Once        Provider:  (Not yet assigned)    03/13/23 1331    03/13/23 1022  Hematology and Oncology (on-call MD unless specified)  Once        Specialty:  Hematology and Oncology  Provider:  (Not yet assigned)    03/13/23 1021    03/13/23 1022  Gastroenterology (on-call MD unless specified)  Once        Specialty:  Gastroenterology  Provider:  (Not yet assigned)    03/13/23 1021    03/13/23 1022  [facility] (on-call MD unless specified) Details  Once        Specialty:  Hospitalist  Provider:  (Not yet assigned)    03/13/23 1021              Procedures     COLONOSCOPY to cecum and TI :  hot snare sigmoid polyps with clip to 1 polyp,      Imaging Results (All)     Procedure Component Value Units  Date/Time    XR Chest 2 View [[id number]] Collected: 03/13/23 0955     Updated: 03/13/23 1015    Narrative:      XR CHEST 2 VW-     HISTORY: Female who is 83 years-old,  short of breath     TECHNIQUE: Frontal and lateral views of the chest     COMPARISON: 3/8/2021     FINDINGS: The heart size is borderline. Pulmonary vasculature is  unremarkable. Aorta is calcified. No focal pulmonary consolidation,  pleural effusion, or pneumothorax. Chronic lower thoracic compression  deformity appears similar from 06/21/2022. No acute osseous process.       Impression:      No focal pulmonary consolidation. Borderline heart size.  Follow-up as clinical indications persist.     This report was finalized on 3/13/2023 10:12 AM by Dr. Alec Arcos M.D.           Results for orders placed during the hospital encounter of 02/21/23    Duplex Venous Lower Extremity - Bilateral CAR    Interpretation Summary  •  There was deep venous valvular incompetence noted in the left popliteal.  •  All other veins appeared normal bilaterally.    Results for orders placed in visit on 04/08/15    SCANNED - ECHOCARDIOGRAM    Pertinent Labs     Results from last 7 days   Lab Units 03/16/23  0806 03/16/23  0020 03/15/23  1953 03/15/23  0751 03/14/23  1045 03/14/23  0348 03/13/23  2247 03/13/23  0916   WBC 10*3/mm3 7.15  --   --   --   --  8.26  --  7.91   HEMOGLOBIN g/dL 9.1*  9.1* 9.4* 9.6* 10.2*   < > 9.8*   < > 6.2*   PLATELETS 10*3/mm3 205  --   --   --   --  191  --  209    < > = values in this interval not displayed.     Results from last 7 days   Lab Units 03/16/23  0806 03/14/23  0348 03/13/23  0916   SODIUM mmol/L 142 143 142   POTASSIUM mmol/L 4.0 4.0 4.4   CHLORIDE mmol/L 111* 112* 110*   CO2 mmol/L 23.0 20.6* 24.0   BUN mg/dL 18 28* 38*   CREATININE mg/dL 1.18* 1.06* 1.24*   GLUCOSE mg/dL 92 81 106*   EGFR mL/min/1.73 46.2* 52.6* 43.5*     Results from last 7 days   Lab Units 03/13/23  0916   ALBUMIN g/dL 3.2*   BILIRUBIN mg/dL 0.2    ALK PHOS U/L 45   AST (SGOT) U/L 14   ALT (SGPT) U/L 8     Results from last 7 days   Lab Units 03/16/23  0806 03/14/23  0348 03/13/23  0916   CALCIUM mg/dL 8.4* 8.8 9.3   ALBUMIN g/dL  --   --  3.2*   MAGNESIUM mg/dL  --   --  2.4       Results from last 7 days   Lab Units 03/13/23  1100 03/13/23  0916   HSTROP T ng/L 28* 23*   PROBNP pg/mL  --  301.0           Invalid input(s): LDLCALC          Test Results Pending at Discharge     Pending Labs     Order Current Status    Tissue Pathology Exam In process          Discharge Details        Discharge Medications      New Medications      Instructions Start Date   pantoprazole 40 MG EC tablet  Commonly known as: Protonix   40 mg, Oral, Daily         Continue These Medications      Instructions Start Date   acetaminophen 325 MG tablet  Commonly known as: TYLENOL   650 mg, Oral, Every 4 Hours PRN      albuterol sulfate  (90 Base) MCG/ACT inhaler  Commonly known as: PROVENTIL HFA;VENTOLIN HFA;PROAIR HFA   2 puffs, Inhalation, Every 4 Hours PRN      cholecalciferol 25 MCG (1000 UT) tablet  Commonly known as: VITAMIN D3   2,000 Units, Oral, Daily      dabigatran etexilate 150 MG capsu  Commonly known as: Pradaxa   150 mg, Oral, 2 Times Daily      ferrous sulfate 325 (65 FE) MG tablet   325 mg, Oral, Every Other Day      Fluticasone-Salmeterol 100-50 MCG/ACT DISKUS  Commonly known as: Advair Diskus   1 puff, Inhalation, 2 Times Daily      folic acid 1 MG tablet  Commonly known as: FOLVITE   1 mg, Oral, Daily      levothyroxine 100 MCG tablet  Commonly known as: SYNTHROID, LEVOTHROID   100 mcg, Oral, Daily      losartan 50 MG tablet  Commonly known as: COZAAR   25 mg, Oral, Daily, 1/2 tab      melatonin 5 MG tablet tablet   5 mg, Oral, Nightly PRN      ondansetron 8 MG tablet  Commonly known as: ZOFRAN   8 mg, Oral, Every 8 Hours PRN      torsemide 20 MG tablet  Commonly known as: DEMADEX   20 mg, Oral, Every Other Day      traMADol 50 MG tablet  Commonly known as:  ULTRAM   Pt says she takes this as needed      vitamin B-12 1000 MCG tablet  Commonly known as: CYANOCOBALAMIN   1,000 mcg, Oral, Daily         Stop These Medications    omeprazole OTC 20 MG EC tablet  Commonly known as: PriLOSEC OTC            Allergies   Allergen Reactions   • Ambien [Zolpidem Tartrate] Nausea Only     nausea   • Amoxicillin-Pot Clavulanate Nausea Only   • Doxycycline Nausea Only   • Eliquis [Apixaban] Nausea Only   • Levofloxacin Nausea Only   • Metronidazole Nausea Only   • Naproxen GI Intolerance   • Sulfamethoxazole-Trimethoprim Nausea Only   • Synthroid [Levothyroxine Sodium] Nausea Only   • Zolpidem Nausea Only       Discharge Disposition:  Home or Self Care      Discharge Diet:  Diet Order   Procedures   • Diet: Regular/House Diet; Texture: Regular Texture (IDDSI 7); Fluid Consistency: Thin (IDDSI 0)       Discharge Activity:       CODE STATUS:    Code Status and Medical Interventions:   Ordered at: 03/13/23 1402     Code Status (Patient has no pulse and is not breathing):    CPR (Attempt to Resuscitate)     Medical Interventions (Patient has pulse or is breathing):    Full Support       Future Appointments   Date Time Provider Department Center   5/22/2023 10:00 AM Dinah Humphrey MD [facility]   6/29/2023  1:00 PM LAB CHAIR 1 [facility]  LAB [facility] [place]   6/29/2023  1:20 PM SPECIALTY PHARMACY [facility] KIM  [facility]   6/29/2023  1:40 PM Lita Dubon MD [facility] [place]     Additional Instructions for the Follow-ups that You Need to Schedule     Discharge Follow-up with PCP   As directed       Currently Documented PCP:    Dinah Humphrey MD    PCP Phone Number:    [phone]     Follow Up Details: 1 week         Discharge Follow-up with Specified Provider: ; 1 Month   As directed      To:     Follow Up: 1 Month         Discharge Follow-up with Specified Provider: ; 3 Months   As directed      To:     Follow Up: 3 Months             Follow-up Information     Dinah Humphrey MD .    Specialty: Family Medicine  Why: 1 week  Contact information:  [address] [facility] [address]  [place] [address]  [phone]                         Additional Instructions for the Follow-ups that You Need to Schedule     Discharge Follow-up with PCP   As directed       Currently Documented PCP:    Dinah Humphrey MD    PCP Phone Number:    [phone]     Follow Up Details: 1 week         Discharge Follow-up with Specified Provider: ; 1 Month   As directed      To:     Follow Up: 1 Month         Discharge Follow-up with Specified Provider: ; 3 Months   As directed      To:     Follow Up: 3 Months           Time Spent on Discharge:  Greater than 30 minutes      Juan Aviles MD  [facility]  03/16/23  12:27 EDT

## 2023-03-16 NOTE — PROGRESS NOTES
[facility] INPATIENT PROGRESS NOTE    Length of Stay:  3 days    CHIEF COMPLAINT/REASON FOR VISIT:  Anemia, blood loss    SUBJECTIVE: Afebrile on room air. Doing well. No bleeding. Feels 'fine'    ROS:  14 systems reviewed with pertinent positives and negatives in the HPI. Reviewed today.    OBJECTIVE:  Vitals:    03/15/23 1959 03/15/23 2352 03/16/23 0708 03/16/23 0733   BP:  113/53  112/85   BP Location:  Right arm  Right arm   Patient Position:  Lying  Lying   Pulse: 55 90 51 55   Resp: 16 16 16 16   Temp:  97.6 °F (36.4 °C)  97.8 °F (36.6 °C)   TempSrc:  Oral  Oral   SpO2: 96% 98% 96% 93%   Weight:       Height:             PHYSICAL EXAMINATION:   General:  No acute distress, awake, alert and oriented sitting in a chair  Skin:  Warm and dry, no visible rash  HEENT:  Normocephalic/atraumatic.    Chest:  Normal respiratory effort  Extremities:  No visible clubbing, cyanosis, or edema  Neuro/psych:  Grossly nonfocal.  Normal mood and affect.        DIAGNOSTIC DATA:  Results Review:     I reviewed the patient's new clinical results.    Results from last 7 days   Lab Units 03/16/23  0806   WBC 10*3/mm3 7.15   HEMOGLOBIN g/dL 9.1*  9.1*   HEMATOCRIT % 27.2*  27.2*   PLATELETS 10*3/mm3 205     Lab Results   Component Value Date    NEUTROABS 4.91 03/13/2023     Results from last 7 days   Lab Units 03/16/23  0806   SODIUM mmol/L 142   POTASSIUM mmol/L 4.0   CHLORIDE mmol/L 111*   CO2 mmol/L 23.0   BUN mg/dL 18   CREATININE mg/dL 1.18*   GLUCOSE mg/dL 92   CALCIUM mg/dL 8.4*     Results from last 7 days   Lab Units 03/13/23  0916   INR  1.41*     Results from last 7 days   Lab Units 03/13/23  0916   MAGNESIUM mg/dL 2.4         IMAGING:    None reviewed    ASSESSMENT:  This is a 83 y.o. female with:     *Normocytic anemia  • Admit hgb 6.2, down from 13.2 on 2/22/23.   • Suspect acute blood loss  • Transfused 2 units pRBC  • Ferritin 575, iron 111, B12 896, folate >20  • EGD 3/14/23 with Schatzki ring in the  lower third of the esophagus, small hiatal hernia, patchy erythema in the stomach, three small angioectasias without bleeding in the third and fourth portion of he duodenum treated with argon beam coagulation. Normal proxiimal jejunum.   • Colonoscopy 3/15 with diverticulosis, external hemorrhoids, two small sigmoid polyps   • Hgb stable 9.1 from 9.4     *MAILE/CKD3  · Creatinine 1.18, from 1.06 from 1.24 from 1.37     *h/o BLE DVT  • On chronic therapy with Pradaxa  • Pradaxa has been on hold    *Chronic intermittent nausea  · Some concern that ferrous sulfate contributing. Can consider transition to ferrous gluconate to see if this is better tolerated.    RECOMMENDATIONS/PLAN:   1. OK for d/c from my standpoint   2. Stop ferrous sulfate due to nausea and constipation. Transition to ferrous gluconate. Will have the office send an rx to her pharmacy.  3. Continue oral B12 and folic acid  4. Follow up is scheduled with Dr. Dubon on 6/29. Will arrange an NP visit in a couple of weeks.     Will sign off. OK for d/c.     Olivier Pace MD

## 2023-03-16 NOTE — OUTREACH NOTE
Prep Survey    Flowsheet Row Responses   [facility] facility patient discharged from? [place]   Is LACE score < 7 ? No   Eligibility [facility]   Date of Admission 03/13/23   Date of Discharge 03/16/23   Discharge Disposition Home or Self Care   Discharge diagnosis Acute blood loss anemia, melena   Does the patient have one of the following disease processes/diagnoses(primary or secondary)? Other   Does the patient have Home health ordered? No   Is there a DME ordered? No   Prep survey completed? Yes          Lisa KUMAR - Registered Nurse

## 2023-03-16 NOTE — PLAN OF CARE
Problem: Adult Inpatient Plan of Care  Goal: Plan of Care Review  Outcome: Ongoing, Progressing  Flowsheets (Taken 3/16/2023 1105)  Progress: improving  Plan of Care Reviewed With:   patient   family  Goal: Patient-Specific Goal (Individualized)  Outcome: Ongoing, Progressing  Goal: Absence of Hospital-Acquired Illness or Injury  Outcome: Ongoing, Progressing  Intervention: Identify and Manage Fall Risk  Recent Flowsheet Documentation  Taken 3/16/2023 0924 by Jenna Colón RN  Safety Promotion/Fall Prevention:   activity supervised   assistive device/personal items within reach   clutter free environment maintained   fall prevention program maintained   nonskid shoes/slippers when out of bed   safety round/check completed  Intervention: Prevent Skin Injury  Recent Flowsheet Documentation  Taken 3/16/2023 0924 by Jenna Colón RN  Body Position: dangle, side of bed  Skin Protection: adhesive use limited  Intervention: Prevent and Manage VTE (Venous Thromboembolism) Risk  Recent Flowsheet Documentation  Taken 3/16/2023 0924 by Jenna Colón RN  Activity Management: activity adjusted per tolerance  Intervention: Prevent Infection  Recent Flowsheet Documentation  Taken 3/16/2023 0924 by Jenna Colón RN  Infection Prevention: rest/sleep promoted  Goal: Optimal Comfort and Wellbeing  Outcome: Ongoing, Progressing  Intervention: Provide Person-Centered Care  Recent Flowsheet Documentation  Taken 3/16/2023 0924 by Jenna Colón RN  Trust Relationship/Rapport:   care explained   choices provided   emotional support provided   empathic listening provided   questions answered   questions encouraged   reassurance provided   thoughts/feelings acknowledged  Goal: Readiness for Transition of Care  Outcome: Ongoing, Progressing     Problem: Fall Injury Risk  Goal: Absence of Fall and Fall-Related Injury  Outcome: Ongoing, Progressing  Intervention: Identify and Manage Contributors  Recent Flowsheet Documentation  Taken  3/16/2023 0924 by Jenna Colón RN  Medication Review/Management: medications reviewed  Intervention: Promote Injury-Free Environment  Recent Flowsheet Documentation  Taken 3/16/2023 0924 by Jenna Colón RN  Safety Promotion/Fall Prevention:   activity supervised   assistive device/personal items within reach   clutter free environment maintained   fall prevention program maintained   nonskid shoes/slippers when out of bed   safety round/check completed     Problem: Skin Injury Risk Increased  Goal: Skin Health and Integrity  Outcome: Ongoing, Progressing  Intervention: Optimize Skin Protection  Recent Flowsheet Documentation  Taken 3/16/2023 0924 by Jenna Colón RN  Pressure Reduction Techniques: frequent weight shift encouraged  Head of Bed (HOB) Positioning: HOB at 30-45 degrees  Pressure Reduction Devices: pressure-redistributing mattress utilized  Skin Protection: adhesive use limited   Goal Outcome Evaluation:  Plan of Care Reviewed With: patient, family        Progress: improving

## 2023-03-16 NOTE — PLAN OF CARE
Goal Outcome Evaluation:  Plan of Care Reviewed With: patient        Progress: improving  Outcome Evaluation: VSS, last hgb 9.4, Tramadol PRN for leg pain, possible discharge today, will continue to monitor

## 2023-03-17 ENCOUNTER — TRANSITIONAL CARE MANAGEMENT TELEPHONE ENCOUNTER (OUTPATIENT)
Dept: CALL CENTER | Facility: HOSPITAL | Age: 83
End: 2023-03-17
Payer: MEDICARE

## 2023-03-17 LAB
LAB AP CASE REPORT: NORMAL
PATH REPORT.FINAL DX SPEC: NORMAL
PATH REPORT.GROSS SPEC: NORMAL

## 2023-03-17 NOTE — PROGRESS NOTES
Please let patient know:    Biopsies from the stomach did not show any evidence of H pylori.  One of the polyps I removed was hyperplastic and does not convey any increased risk of developing into cancer.   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  These types of polyps are not cancer but could become cancer over period of time if not removed.  Given your age, I would not recommend repeat colonoscopy for the sake of polyp surveillance.

## 2023-03-17 NOTE — OUTREACH NOTE
Call Center TCM Note    Flowsheet Row Responses   [facility] patient discharged from? [place]   Does the patient have one of the following disease processes/diagnoses(primary or secondary)? Other   TCM attempt successful? Yes   Call start time 1124   Call end time 1136   Discharge diagnosis Acute blood loss anemia, melena   Person spoke with today (if not patient) and relationship Patient   Meds reviewed with patient/caregiver? Yes   Does the patient have all medications ordered at discharge? Yes   Is the patient taking all medications as directed (includes completed medication regime)? Yes   Comments PCP Dr Dinah Humphrey. Patient declined to schedule PCP appt with call today. She reports that she is going to Hematology office for recheck on 3/31.    Does the patient have an appointment with their PCP within 7 days of discharge? No   Nursing Interventions Patient declined scheduling/rescheduling appointment at this time   Has home health visited the patient within 72 hours of discharge? N/A   Psychosocial issues? No   Did the patient receive a copy of their discharge instructions? Yes   Nursing interventions Reviewed instructions with patient   What is the patient's perception of their health status since discharge? Improving   Is the patient/caregiver able to teach back signs and symptoms related to disease process for when to call PCP? Yes   Is the patient/caregiver able to teach back the hierarchy of who to call/visit for symptoms/problems? PCP, Specialist, Home health nurse, Urgent Care, ED, 911 Yes   TCM call completed? Yes   Call end time 1136   Would this patient benefit from a Referral to Amb Social Work? No   Is the patient interested in additional calls from an ambulatory ?  NOTE:  applies to high risk patients requiring additional follow-up. No          Ashley Jimenez RN    3/17/2023, 11:38 EDT

## 2023-03-20 DIAGNOSIS — I82.4Z2 DVT, LOWER EXTREMITY, DISTAL, ACUTE, LEFT: ICD-10-CM

## 2023-03-20 DIAGNOSIS — N18.31 STAGE 3A CHRONIC KIDNEY DISEASE: ICD-10-CM

## 2023-03-20 DIAGNOSIS — Z86.718 HISTORY OF DEEP VENOUS THROMBOSIS (DVT) OF DISTAL VEIN OF LEFT LOWER EXTREMITY: ICD-10-CM

## 2023-03-20 DIAGNOSIS — Z79.01 CURRENT USE OF LONG TERM ANTICOAGULATION: Primary | ICD-10-CM

## 2023-03-20 DIAGNOSIS — D50.9 IRON DEFICIENCY ANEMIA, UNSPECIFIED IRON DEFICIENCY ANEMIA TYPE: ICD-10-CM

## 2023-03-20 RX ORDER — DOXYCYCLINE HYCLATE 50 MG/1
324 CAPSULE, GELATIN COATED ORAL
Qty: 30 TABLET | Refills: 2 | Status: SHIPPED | OUTPATIENT
Start: 2023-03-20

## 2023-03-20 NOTE — TELEPHONE ENCOUNTER
----- Message -----   From: Olivier Pace MD   Sent: 3/16/2023  11:44 AM EDT   To: Agustina Harris RN, *   Subject: hospital f/u                                     F/u with NP in about two weeks with a cbc, retic, ferritin, iron panel. Keep f/u with Dr. Dubon on 6/29     Please send rx for ferrous gluconate once daily to her pharmacy. She will stop ferrous sulfate. DEBBI Booker         I spoke with patient to let her know this prescription was sent to her pharmacy. She v/u.

## 2023-03-28 ENCOUNTER — DOCUMENTATION (OUTPATIENT)
Dept: PHARMACY | Facility: HOSPITAL | Age: 83
End: 2023-03-28
Payer: MEDICARE

## 2023-03-30 NOTE — PROGRESS NOTES
Subjective     CHIEF COMPLAINT:      Chief Complaint   Patient presents with   • Follow-up     Hospital return       HISTORY OF PRESENT ILLNESS:     Luann Frances is a 83 y.o. female patient who returns today for hospital follow-up.  She was hospitalized from 3/13/2023 - 3/16/2023 due to shortness of breath and generalized weakness.  She was admitted with a hemoglobin of 6.2.  Pradaxa was held and she received 2 units packed red blood cell transfusion while hospitalized.  EGD 3/14/23 with Schatzki ring in the lower third of the esophagus, small hiatal hernia, patchy erythema in the stomach, three small angioectasias without bleeding in the third and fourth portion of he duodenum treated with argon beam coagulation. Normal proxiimal jejunum.   Colonoscopy 3/15 with diverticulosis, external hemorrhoids, two small sigmoid polyps.   Her hemoglobin remained stable and she had no further bleeding, she was cleared by gastroenterology to resume Pradaxa 48 to 72 hours after discharge.  She was also struggling with nausea secondary to her ferrous sulfate, so she was transition to ferrous gluconate.      Today, she reports feeling well.  She resumed her Pradaxa following hospital discharge.  She continues on Pradaxa 150 mg twice daily and has been tolerating this well without other signs or symptoms of bleeding.  She continues on ferrous gluconate and feels she is tolerating this much better.  She does have some minor constipation for which she plans to start a stool softener.  She is no longer having dark stools, nausea, or abdominal cramping.  She has been cutting down her meals to 1 large meal at lunch and a small meal at dinner.  Her weight has declined because of this, however she does report that she is eating adequately.  She continues to experience shortness of breath secondary to her COPD, this remains stable.  She denies dizziness, lightheadedness, or fatigue.      ROS:  Pertinent ROS is in the HPI.     Past  medical, surgical, social and family history were reviewed.     MEDICATIONS:    Current Outpatient Medications:   •  acetaminophen (TYLENOL) 325 MG tablet, Take 2 tablets by mouth Every 4 (Four) Hours As Needed for Mild Pain ., Disp: , Rfl:   •  albuterol sulfate  (90 Base) MCG/ACT inhaler, Inhale 2 puffs Every 4 (Four) Hours As Needed for Wheezing or Shortness of Air., Disp: 8 g, Rfl: 1  •  cholecalciferol (VITAMIN D3) 25 MCG (1000 UT) tablet, Take 2 tablets by mouth Daily., Disp: , Rfl:   •  dabigatran etexilate (Pradaxa) 150 MG capsu, Take 1 capsule by mouth 2 (Two) Times a Day., Disp: 60 capsule, Rfl: 5  •  ferrous gluconate (FERGON) 324 MG tablet, Take 1 tablet by mouth Daily With Breakfast., Disp: 30 tablet, Rfl: 2  •  Fluticasone-Salmeterol (Advair Diskus) 100-50 MCG/ACT DISKUS, Inhale 1 puff 2 (Two) Times a Day., Disp: 180 each, Rfl: 5  •  folic acid (FOLVITE) 1 MG tablet, Take 1 tablet by mouth Daily., Disp: 30 tablet, Rfl: 5  •  levothyroxine (SYNTHROID, LEVOTHROID) 100 MCG tablet, Take 1 tablet by mouth Daily., Disp: , Rfl:   •  losartan (COZAAR) 50 MG tablet, Take 25 mg by mouth Daily. 1/2 tab, Disp: , Rfl:   •  melatonin 5 MG tablet tablet, Take 1 tablet by mouth At Night As Needed (sleep)., Disp: , Rfl:   •  ondansetron (ZOFRAN) 8 MG tablet, Take 1 tablet by mouth Every 8 (Eight) Hours As Needed for Nausea or Vomiting., Disp: 30 tablet, Rfl: 1  •  pantoprazole (Protonix) 40 MG EC tablet, Take 1 tablet by mouth Daily for 30 days., Disp: 30 tablet, Rfl: 0  •  torsemide (DEMADEX) 20 MG tablet, Take 1 tablet by mouth Every Other Day., Disp: , Rfl:   •  traMADol (ULTRAM) 50 MG tablet, Pt says she takes this as needed, Disp: , Rfl:   •  vitamin B-12 (CYANOCOBALAMIN) 1000 MCG tablet, Take 1 tablet by mouth Daily., Disp: , Rfl:   Objective     VITAL SIGNS:     Vitals:    03/31/23 1507   BP: 149/70   Pulse: 78   Resp: 18   Temp: 96.8 °F (36 °C)   TempSrc: Temporal   SpO2: 100%   Weight: 88 kg (194 lb)  "  Height: 160 cm (62.99\")   PainSc: 0-No pain     Body mass index is 34.37 kg/m².     Wt Readings from Last 5 Encounters:   03/31/23 88 kg (194 lb)   03/15/23 89.1 kg (196 lb 6.9 oz)   02/22/23 91.3 kg (201 lb 4.8 oz)   01/13/23 93.9 kg (207 lb)   01/10/23 94.3 kg (208 lb)     PHYSICAL EXAMINATION:   GENERAL: The patient appears in fair general condition, not in acute distress.   SKIN: No ecchymosis.  EYES: No jaundice. No pallor.  CHEST: Normal respiratory effort.  Lungs clear bilaterally.  No added sounds.  CVS: Normal S1-S2.  No murmurs.  ABDOMEN: Nondistended  EXTREMITIES: Bilateral lower extremity swelling improved, left slightly greater than right with faint discoloration though no heat.  No calf tenderness     DIAGNOSTIC DATA:     Results from last 7 days   Lab Units 03/31/23  1500   WBC 10*3/mm3 5.33   NEUTROS ABS 10*3/mm3 2.84   HEMOGLOBIN g/dL 10.3*   HEMATOCRIT % 33.9*   PLATELETS 10*3/mm3 246         Lab 03/31/23  1500   IRON 75   IRON SATURATION 26   TIBC 290   TRANSFERRIN 207   FERRITIN 729.40*      CT abdomen pelvis on 12/12/2022:  1. Colonic diverticulosis. No acute inflammatory process of bowel is  identified, follow up as indications persist.  2. No urolithiasis or hydronephrosis.    Assessment & Plan    *Acute extensive left lower extremity DVT in a patient with prior history of bilateral lower extremity DVT  · Patient developed right lower extremity DVT on 4/1/2021.  · Patient developed left DVT in the popliteal, posterior tibial, peroneal and soleal veins on 4/12/2022.  · She was on Xarelto and was taking it regularly.  · On 8/16/2022, she developed left foot pain and was unable to walk.  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  She was taken to the OR on 8/17/2022.  · She underwent thrombectomy and angioplasty with placement of stent in the left iliac vein.  · Was initially placed on IV heparin.  · Thrombophilia work-up was obtained.  · Testing " for factor V Leiden and prothrombin mutation was negative.  · Protein S was 39%-consumption versus deficiency - will be repeated in the future.  · Testing for anticardiolipin, B2 glycoprotein antibodies and lupus anticoagulant was negative.  · On 8/20/2022, patient was switched to Pradaxa 150 mg twice daily.  · She is tolerating Pradaxa well.  She is not having problems with bleeding.  · She is unable to wear prescription strength compression stockings.  · 2/22/2023 Patient with worsening lower extremity edema prompting Doppler study done yesterday that was negative for DVT but noting valvular insufficiency on the left.  She does have significant increase of lower extremity edema compared to her baseline per review today.  proBNP checked, normal.  Discussed with Dr. Dubon and we will have patient increase her Lasix to 40 mg daily for 3 days and also follow-up with her PCP.  I also recommended patient get back in with her nephrologist as she missed her appointment in December.  We will help arrange this.  · Patient was hospitalized from 3/13/2020 23- 3/16/2023 for anemia with a hemoglobin of 6.2.  Pradaxa was held during admission and she received 2 units packed red blood cells.  EGD and colonoscopy performed.  Cleared by GI to resume Pradaxa 48 to 72 hours after hospital discharge.  · 3/31/2023: Seen for hospital follow-up.  Resumed Pradaxa following discharge from the hospital.  Continues on Pradaxa 150 mg twice daily without any signs of bleeding.  Hemoglobin today improved to 10.3.    *Iron deficiency anemia.    · Hemoglobin decreased to 9.4 on 8/19/2022.  · IV Ferrlecit 250 mg x 3 was given between 8/19/2022 and 8/21/2022.  · Hemoglobin was 10.4 on 9/9/2022.  · Iron stores improved with ferritin at 521 and transferrin saturation at 30%.  · She was continued on oral iron daily.  · While hospitalized 3/13/2023 - 3/16/2023 she was struggling with nausea secondary to ferrous sulfate.  She was switched to ferrous  gluconate.   · 3/31/2023: Continues on ferrous gluconate, tolerating much better without dark stools, nausea, abdominal cramping.  She does have minor constipation for which she plans to start a stool softener.  Hemoglobin improved to 10.3.  Iron saturation 26%, ferritin 729.4, TIBC 290.  She will continue oral iron replacement.     *Vitamin B12 deficiency anemia.  · Vitamin B12 was 266 on 9/24/2019.  · B12 was 229 on on 8/19/2022.   · Vitamin B12 IM was given x3 in August 2022.  · She was started on vitamin B12 1000 mcg daily on 9/9/2022.     *Folate deficiency anemia.  · Folate was low at 3.89.  · Patient was started on folic acid 1 mg daily.  · She is taking folic acid daily regularly.     *Chronic back pain  · Patient has been utilizing tramadol but pain is getting worse.  · We had discouraged her from proceeding with epidural injections previously due to being on Pradaxa however she has been on this over a year now with negative Dopplers yesterday.  I instructed her to go ahead and talk with Ortho about potential epidural injections and find out timing of how long they would want her off Pradaxa.  She may require Lovenox bridging depending on the answer.  She will let us know when she has talked with them.    *Hemoglobin 6.2 on 3/13/2023 prompting hospital admission.  Received 2 units packed red blood cells while hospitalized.  EGD 3/14/23 with Schatzki ring in the lower third of the esophagus, small hiatal hernia, patchy erythema in the stomach, three small angioectasias without bleeding in the third and fourth portion of he duodenum treated with argon beam coagulation. Normal proxiimal jejunum.   Colonoscopy 3/15 with diverticulosis, external hemorrhoids, two small sigmoid polyps.  Pradaxa held.  Gastroenterology cleared her to resume Pradaxa 48 to 72 hours after discharge.  She was discharged 3/16/2023.  · 3/31/2023: Seen for hospital discharge.  Has resumed Pradaxa without further signs or symptoms of  bleeding.  Hemoglobin improved to 10.3.    PLAN:   1.  Continue Pradaxa 150 mg twice daily.  Our pharmacy team is working with her to help continue karan money.  2.  We recommend continuing anticoagulation indefinitely.  3.  Continue ferrous gluconate.  Switched from ferrous sulfate due to difficulty with tolerance (nausea, abdominal pain).   4.  Continue vitamin B12 1000 mcg daily.   5.  Continue folic acid 1 mg daily.   6.  Encourage patient to keep her legs elevated when resting.   7.  She cannot tolerate medicated support hose but does have some lower strength stockings that she will try to wear.  8.  Patient will call orthopedics to discuss epidural injections and let us know about timing of this.  We will determine if she needs Lovenox bridging as outlined above.  9.  She will otherwise follow-up with Dr. Dubon as already scheduled in 3 months with [facility] CMP ferritin iron panel B12 folate levels.  10.  We did discuss returning in 1 month for CBC with RN review to continue monitoring hemoglobin, however patient declines at this time.  She will call with any signs of bleeding or worsening anemia.      Review of hospital records.    I spent 55 minutes caring for Luann on this date of service. This time includes time spent by me in the following activities: preparing for the visit, reviewing tests, obtaining and/or reviewing a separately obtained history, performing a medically appropriate examination and/or evaluation, counseling and educating the patient/family/caregiver, documenting information in the medical record and care coordination.       Crystal Stauffer, LIVIER  03/31/23

## 2023-03-31 ENCOUNTER — SPECIALTY PHARMACY (OUTPATIENT)
Dept: PHARMACY | Facility: HOSPITAL | Age: 83
End: 2023-03-31
Payer: MEDICARE

## 2023-03-31 ENCOUNTER — LAB (OUTPATIENT)
Dept: LAB | Facility: HOSPITAL | Age: 83
End: 2023-03-31
Payer: MEDICARE

## 2023-03-31 ENCOUNTER — OFFICE VISIT (OUTPATIENT)
Dept: ONCOLOGY | Facility: CLINIC | Age: 83
End: 2023-03-31
Payer: MEDICARE

## 2023-03-31 VITALS
SYSTOLIC BLOOD PRESSURE: 149 MMHG | HEIGHT: 63 IN | OXYGEN SATURATION: 100 % | TEMPERATURE: 96.8 F | BODY MASS INDEX: 34.38 KG/M2 | HEART RATE: 78 BPM | WEIGHT: 194 LBS | DIASTOLIC BLOOD PRESSURE: 70 MMHG | RESPIRATION RATE: 18 BRPM

## 2023-03-31 DIAGNOSIS — D50.9 IRON DEFICIENCY ANEMIA, UNSPECIFIED IRON DEFICIENCY ANEMIA TYPE: ICD-10-CM

## 2023-03-31 DIAGNOSIS — I82.4Z2 DVT, LOWER EXTREMITY, DISTAL, ACUTE, LEFT: ICD-10-CM

## 2023-03-31 DIAGNOSIS — Z79.01 CURRENT USE OF LONG TERM ANTICOAGULATION: Primary | ICD-10-CM

## 2023-03-31 DIAGNOSIS — Z86.718 HISTORY OF DEEP VENOUS THROMBOSIS (DVT) OF DISTAL VEIN OF LEFT LOWER EXTREMITY: ICD-10-CM

## 2023-03-31 DIAGNOSIS — M79.89 SWELLING OF LOWER EXTREMITY: ICD-10-CM

## 2023-03-31 DIAGNOSIS — Z79.01 CURRENT USE OF LONG TERM ANTICOAGULATION: ICD-10-CM

## 2023-03-31 DIAGNOSIS — N18.31 STAGE 3A CHRONIC KIDNEY DISEASE: ICD-10-CM

## 2023-03-31 LAB
BASOPHILS # BLD AUTO: 0.07 10*3/MM3 (ref 0–0.2)
BASOPHILS NFR BLD AUTO: 1.3 % (ref 0–1.5)
DEPRECATED RDW RBC AUTO: 52.1 FL (ref 37–54)
EOSINOPHIL # BLD AUTO: 0.24 10*3/MM3 (ref 0–0.4)
EOSINOPHIL NFR BLD AUTO: 4.5 % (ref 0.3–6.2)
ERYTHROCYTE [DISTWIDTH] IN BLOOD BY AUTOMATED COUNT: 14.1 % (ref 12.3–15.4)
FERRITIN SERPL-MCNC: 729.4 NG/ML (ref 13–150)
HCT VFR BLD AUTO: 33.9 % (ref 34–46.6)
HGB BLD-MCNC: 10.3 G/DL (ref 12–15.9)
HGB RETIC QN AUTO: 33.2 PG (ref 29.8–36.1)
IMM GRANULOCYTES # BLD AUTO: 0.01 10*3/MM3 (ref 0–0.05)
IMM GRANULOCYTES NFR BLD AUTO: 0.2 % (ref 0–0.5)
IMM RETICS NFR: 8.4 % (ref 3–15.8)
IRON 24H UR-MRATE: 75 MCG/DL (ref 37–145)
IRON SATN MFR SERPL: 26 % (ref 14–48)
LYMPHOCYTES # BLD AUTO: 1.69 10*3/MM3 (ref 0.7–3.1)
LYMPHOCYTES NFR BLD AUTO: 31.7 % (ref 19.6–45.3)
MCH RBC QN AUTO: 30.8 PG (ref 26.6–33)
MCHC RBC AUTO-ENTMCNC: 30.4 G/DL (ref 31.5–35.7)
MCV RBC AUTO: 101.5 FL (ref 79–97)
MONOCYTES # BLD AUTO: 0.48 10*3/MM3 (ref 0.1–0.9)
MONOCYTES NFR BLD AUTO: 9 % (ref 5–12)
NEUTROPHILS NFR BLD AUTO: 2.84 10*3/MM3 (ref 1.7–7)
NEUTROPHILS NFR BLD AUTO: 53.3 % (ref 42.7–76)
NRBC BLD AUTO-RTO: 0 /100 WBC (ref 0–0.2)
PLATELET # BLD AUTO: 246 10*3/MM3 (ref 140–450)
PMV BLD AUTO: 10.6 FL (ref 6–12)
RBC # BLD AUTO: 3.34 10*6/MM3 (ref 3.77–5.28)
RETICS # AUTO: 0.07 10*6/MM3 (ref 0.02–0.13)
RETICS/RBC NFR AUTO: 2.01 % (ref 0.7–1.9)
TIBC SERPL-MCNC: 290 MCG/DL (ref 249–505)
TRANSFERRIN SERPL-MCNC: 207 MG/DL (ref 200–360)
WBC NRBC COR # BLD: 5.33 10*3/MM3 (ref 3.4–10.8)

## 2023-03-31 PROCEDURE — 85025 COMPLETE CBC W/AUTO DIFF WBC: CPT

## 2023-03-31 PROCEDURE — 82728 ASSAY OF FERRITIN: CPT

## 2023-03-31 PROCEDURE — 83540 ASSAY OF IRON: CPT

## 2023-03-31 PROCEDURE — 84466 ASSAY OF TRANSFERRIN: CPT

## 2023-03-31 PROCEDURE — 36415 COLL VENOUS BLD VENIPUNCTURE: CPT

## 2023-03-31 PROCEDURE — 85046 RETICYTE/HGB CONCENTRATE: CPT

## 2023-03-31 NOTE — PROGRESS NOTES
I contacted Ms. Frances to coordinate a refill shipment of Pradaxa. She has a month supply on-hand and we agreed that I would call back in 3 weeks to coordinate the next shipment.     Unique Crowe - Care Coordinator   3/31/2023  15:36 EDT

## 2023-04-07 ENCOUNTER — TELEPHONE (OUTPATIENT)
Dept: FAMILY MEDICINE CLINIC | Facility: CLINIC | Age: 83
End: 2023-04-07
Payer: MEDICARE

## 2023-04-07 RX ORDER — PANTOPRAZOLE SODIUM 40 MG/1
40 TABLET, DELAYED RELEASE ORAL DAILY
Qty: 30 TABLET | Refills: 1 | Status: SHIPPED | OUTPATIENT
Start: 2023-04-07 | End: 2023-05-07

## 2023-04-07 NOTE — TELEPHONE ENCOUNTER
I would like her to continue protonix until she sees me on 5/22/23. I will refill for her. Also she had biopsy while in the hospital that should mild inflammation of the stomach. The protonix will help with this.

## 2023-04-07 NOTE — TELEPHONE ENCOUNTER
Caller: Juan Daniel Luann CATARINO    Relationship: Self    Best call back number: [phone]    What is the best time to reach you: ANY TIME    Who are you requesting to speak with (clinical staff, provider,  specific staff member): DR. MORGAN OR MA     What was the call regarding: PATIENT STATES SHE WAS IN THE HOSPITAL ON 03/12/23-03/15/23 OR 03/16/23, AND WAS PRESCRIBED pantoprazole (Protonix) 40 MG EC tablet, AND WAS INFORMED TO TAKE FOR 30 DAYS. SHE STATES SHE DIDN'T HAVE MUCH LEFT AND WAS WONDERING IF SHE NEEDED ANOTHER PRESCRIPTION FOR IT. SHE STATES SHE THINKS IT IS FOR HER STOMACH.     PATIENT ALSO STATES SHE HAD A BIOPSY DONE ON HER STOMACH AND IT WAS SENT OFF, BUT WAS INFORMED TO REACH OUT TO HER PRIMARY CARE PROVIDER, TO SEE RESULTS.     PLEASE ADVISE.

## 2023-04-21 ENCOUNTER — DOCUMENTATION (OUTPATIENT)
Dept: NEUROLOGY | Facility: CLINIC | Age: 83
End: 2023-04-21
Payer: MEDICARE

## 2023-04-21 RX ORDER — DABIGATRAN ETEXILATE 150 MG/1
150 CAPSULE ORAL 2 TIMES DAILY
Qty: 60 CAPSULE | Refills: 5 | Status: SHIPPED | OUTPATIENT
Start: 2023-04-21

## 2023-04-24 ENCOUNTER — SPECIALTY PHARMACY (OUTPATIENT)
Dept: NEUROLOGY | Facility: CLINIC | Age: 83
End: 2023-04-24
Payer: MEDICARE

## 2023-04-24 NOTE — PROGRESS NOTES
Specialty Pharmacy Refill Coordination Note     Luann is a 83 y.o. female contacted today regarding refills of PRADAXA 150mg specialty medication(s).    Reviewed and verified with patient:  Allergies  Meds  Problems       Specialty medication(s) and dose(s) confirmed: yes    Refill Questions    Flowsheet Row Most Recent Value   Changes to allergies? No   Changes to medications? No   New conditions since last clinic visit No   Unplanned office visit, urgent care, ED, or hospital admission in the last 4 weeks  No   How does patient/caregiver feel medication is working? Good   Financial problems or insurance changes  No   Since the previous refill, were any specialty medication doses or scheduled injections missed or delayed?  Yes  [Missed one of her night doses this passed friday on 04/21/2023]          Delivery Questions    Flowsheet Row Most Recent Value   Delivery method [employer]  [[employer] Stand, ship 04/24/2023 for delivery on 04/25/2023, [facility], address confrimed.]   Delivery address correct? Yes   Preferred delivery time? Anytime   Number of medications in delivery 1   Medication being filled and delivered PRADAXA 150mg   Doses left of specialty medications 10 dose   Is there any medication that is due not being filled? No   Supplies needed? No supplies needed   Cooler needed? No   Do any medications need mixed or dated? No   Copay form of payment Payment plan already set up   Questions or concerns for the pharmacist? No                 Follow-up: 28-days.     Santino Ferguson, Pharmacy Technician  Specialty Pharmacy Technician

## 2023-05-18 ENCOUNTER — SPECIALTY PHARMACY (OUTPATIENT)
Dept: PHARMACY | Facility: HOSPITAL | Age: 83
End: 2023-05-18
Payer: MEDICARE

## 2023-05-18 NOTE — PROGRESS NOTES
Specialty Pharmacy Refill Coordination Note     Luann is a 83 y.o. female contacted today regarding refills of  DABIGATRAN specialty medication(s).    Reviewed and verified with patient:       Specialty medication(s) and dose(s) confirmed: yes    Refill Questions    Flowsheet Row Most Recent Value   Changes to allergies? No   Changes to medications? No   New conditions since last clinic visit No   Unplanned office visit, urgent care, ED, or hospital admission in the last 4 weeks  No   How does patient/caregiver feel medication is working? Good   Financial problems or insurance changes  No   If yes, describe changes in insurance or financial issues. N/A   Since the previous refill, were any specialty medication doses or scheduled injections missed or delayed?  No   Does this patient require a clinical escalation to a pharmacist? No          Delivery Questions    Flowsheet Row Most Recent Value   Delivery method Other (Comment)  [shp to home on 5/22 to arrive 5/23. Address confirmed. [facility].]   Delivery address correct? Yes   Delivery phone number [phone]   Preferred delivery time? Anytime   Number of medications in delivery 1   Medication being filled and delivered DABIGATRAN   Doses left of specialty medications 20   Is there any medication that is due not being filled? No   Supplies needed? No supplies needed   Cooler needed? No   Do any medications need mixed or dated? No   Copay form of payment Credit card on file   Additional comments N/A   Questions or concerns for the pharmacist? No   Explain any questions or concerns for the pharmacist N/A   Are any medications first time fills? No                 Follow-up: 23 day(s)     Unique Crowe, Pharmacy Technician  Specialty Pharmacy Technician

## 2023-05-19 ENCOUNTER — TELEPHONE (OUTPATIENT)
Dept: GASTROENTEROLOGY | Facility: CLINIC | Age: 83
End: 2023-05-19
Payer: MEDICARE

## 2023-05-19 ENCOUNTER — TELEPHONE (OUTPATIENT)
Dept: GASTROENTEROLOGY | Facility: CLINIC | Age: 83
End: 2023-05-19

## 2023-05-19 NOTE — TELEPHONE ENCOUNTER
----- Message from Jaun Ford MD sent at 3/17/2023 11:44 AM EDT -----  Please let patient know:    Biopsies from the stomach did not show any evidence of H pylori.  One of the polyps I removed was hyperplastic and does not convey any increased risk of developing into cancer.   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  These types of polyps are not cancer but could become cancer over period of time if not removed.  Given your age, I would not recommend repeat colonoscopy for the sake of polyp surveillance.

## 2023-05-19 NOTE — TELEPHONE ENCOUNTER
Caller: Luann Frances    Relationship: Self    Best call back number: [phone]    What is the best time to reach you: PLEASE WAIT AT LEAST ONE HR IF TODAY.     Who are you requesting to speak with (clinical staff, provider,  specific staff member): CLINICAL STAFF    What was the call regarding:  RETURNING MISSED CALL    Do you require a callback: YES PLEASE

## 2023-05-24 DIAGNOSIS — E03.9 PRIMARY HYPOTHYROIDISM: Primary | ICD-10-CM

## 2023-05-24 RX ORDER — LEVOTHYROXINE SODIUM 0.1 MG/1
100 TABLET ORAL DAILY
Qty: 90 TABLET | Refills: 1 | Status: SHIPPED | OUTPATIENT
Start: 2023-05-24

## 2023-06-09 DIAGNOSIS — K21.9 GASTROESOPHAGEAL REFLUX DISEASE, UNSPECIFIED WHETHER ESOPHAGITIS PRESENT: Primary | ICD-10-CM

## 2023-06-09 RX ORDER — PANTOPRAZOLE SODIUM 40 MG/1
40 TABLET, DELAYED RELEASE ORAL DAILY
Qty: 90 TABLET | Refills: 3 | Status: SHIPPED | OUTPATIENT
Start: 2023-06-09

## 2023-07-24 ENCOUNTER — DOCUMENTATION (OUTPATIENT)
Dept: PHARMACY | Facility: HOSPITAL | Age: 83
End: 2023-07-24
Payer: MEDICARE

## 2023-07-24 NOTE — PROGRESS NOTES
I contacted Ms. Frances to coordinate a refill shipment of Pradaxa. She has 29 tablets on-hand so we agreed that I would call back in one week to coordinate the next refill shipment.     Unique Crowe - Care Coordinator   7/24/2023  13:53 EDT

## 2023-08-01 ENCOUNTER — SPECIALTY PHARMACY (OUTPATIENT)
Dept: PHARMACY | Facility: HOSPITAL | Age: 83
End: 2023-08-01
Payer: MEDICARE

## 2023-08-19 ENCOUNTER — HOSPITAL ENCOUNTER (EMERGENCY)
Facility: HOSPITAL | Age: 83
Discharge: HOME | End: 2023-08-19
Attending: EMERGENCY MEDICINE
Payer: MEDICARE

## 2023-08-19 ENCOUNTER — APPOINTMENT (OUTPATIENT)
Dept: GENERAL RADIOLOGY | Facility: HOSPITAL | Age: 83
End: 2023-08-19
Payer: MEDICARE

## 2023-08-19 ENCOUNTER — APPOINTMENT (OUTPATIENT)
Dept: CARDIOLOGY | Facility: HOSPITAL | Age: 83
End: 2023-08-19
Payer: MEDICARE

## 2023-08-19 VITALS
OXYGEN SATURATION: 97 % | RESPIRATION RATE: 18 BRPM | HEART RATE: 65 BPM | SYSTOLIC BLOOD PRESSURE: 160 MMHG | HEIGHT: 63 IN | BODY MASS INDEX: 34.02 KG/M2 | TEMPERATURE: 97.9 F | WEIGHT: 192 LBS | DIASTOLIC BLOOD PRESSURE: 71 MMHG

## 2023-08-19 DIAGNOSIS — M79.605 PAIN IN BOTH LOWER EXTREMITIES: ICD-10-CM

## 2023-08-19 DIAGNOSIS — M79.604 PAIN IN BOTH LOWER EXTREMITIES: ICD-10-CM

## 2023-08-19 DIAGNOSIS — E86.0 DEHYDRATION: ICD-10-CM

## 2023-08-19 DIAGNOSIS — N17.9 ACUTE KIDNEY INJURY: Primary | ICD-10-CM

## 2023-08-19 LAB
ALBUMIN SERPL-MCNC: 3.8 G/DL (ref 3.5–5.2)
ALBUMIN/GLOB SERPL: 1.5 G/DL
ALP SERPL-CCNC: 51 U/L (ref 39–117)
ALT SERPL W P-5'-P-CCNC: 7 U/L (ref 1–33)
ANION GAP SERPL CALCULATED.3IONS-SCNC: 10.7 MMOL/L (ref 5–15)
AST SERPL-CCNC: 12 U/L (ref 1–32)
BASOPHILS # BLD AUTO: 0.09 10*3/MM3 (ref 0–0.2)
BASOPHILS NFR BLD AUTO: 1.1 % (ref 0–1.5)
BH CV LOW VAS LEFT COMMON FEMORAL SPONT: 1
BH CV LOWER VASCULAR LEFT COMMON FEMORAL AUGMENT: NORMAL
BH CV LOWER VASCULAR LEFT COMMON FEMORAL COMPETENT: NORMAL
BH CV LOWER VASCULAR LEFT COMMON FEMORAL COMPRESS: NORMAL
BH CV LOWER VASCULAR LEFT COMMON FEMORAL PHASIC: NORMAL
BH CV LOWER VASCULAR LEFT COMMON FEMORAL SPONT: NORMAL
BH CV LOWER VASCULAR LEFT DISTAL FEMORAL COMPRESS: NORMAL
BH CV LOWER VASCULAR LEFT GASTRONEMIUS COMPRESS: NORMAL
BH CV LOWER VASCULAR LEFT GREATER SAPH AK COMPRESS: NORMAL
BH CV LOWER VASCULAR LEFT GREATER SAPH BK COMPRESS: NORMAL
BH CV LOWER VASCULAR LEFT LESSER SAPH COMPRESS: NORMAL
BH CV LOWER VASCULAR LEFT MID FEMORAL AUGMENT: NORMAL
BH CV LOWER VASCULAR LEFT MID FEMORAL COMPETENT: NORMAL
BH CV LOWER VASCULAR LEFT MID FEMORAL COMPRESS: NORMAL
BH CV LOWER VASCULAR LEFT MID FEMORAL PHASIC: NORMAL
BH CV LOWER VASCULAR LEFT MID FEMORAL SPONT: NORMAL
BH CV LOWER VASCULAR LEFT PERONEAL COMPRESS: NORMAL
BH CV LOWER VASCULAR LEFT POPLITEAL AUGMENT: NORMAL
BH CV LOWER VASCULAR LEFT POPLITEAL COMPETENT: NORMAL
BH CV LOWER VASCULAR LEFT POPLITEAL COMPRESS: NORMAL
BH CV LOWER VASCULAR LEFT POPLITEAL PHASIC: NORMAL
BH CV LOWER VASCULAR LEFT POPLITEAL SPONT: NORMAL
BH CV LOWER VASCULAR LEFT POSTERIOR TIBIAL COMPRESS: NORMAL
BH CV LOWER VASCULAR LEFT PROFUNDA FEMORAL COMPRESS: NORMAL
BH CV LOWER VASCULAR LEFT PROXIMAL FEMORAL COMPRESS: NORMAL
BH CV LOWER VASCULAR LEFT SAPHENOFEMORAL JUNCTION COMPRESS: NORMAL
BH CV LOWER VASCULAR RIGHT COMMON FEMORAL AUGMENT: NORMAL
BH CV LOWER VASCULAR RIGHT COMMON FEMORAL COMPETENT: NORMAL
BH CV LOWER VASCULAR RIGHT COMMON FEMORAL COMPRESS: NORMAL
BH CV LOWER VASCULAR RIGHT COMMON FEMORAL PHASIC: NORMAL
BH CV LOWER VASCULAR RIGHT COMMON FEMORAL SPONT: NORMAL
BH CV LOWER VASCULAR RIGHT DISTAL FEMORAL COMPRESS: NORMAL
BH CV LOWER VASCULAR RIGHT GASTRONEMIUS COMPRESS: NORMAL
BH CV LOWER VASCULAR RIGHT GREATER SAPH AK COMPRESS: NORMAL
BH CV LOWER VASCULAR RIGHT GREATER SAPH BK COMPRESS: NORMAL
BH CV LOWER VASCULAR RIGHT LESSER SAPH COMPRESS: NORMAL
BH CV LOWER VASCULAR RIGHT MID FEMORAL AUGMENT: NORMAL
BH CV LOWER VASCULAR RIGHT MID FEMORAL COMPETENT: NORMAL
BH CV LOWER VASCULAR RIGHT MID FEMORAL COMPRESS: NORMAL
BH CV LOWER VASCULAR RIGHT MID FEMORAL PHASIC: NORMAL
BH CV LOWER VASCULAR RIGHT MID FEMORAL SPONT: NORMAL
BH CV LOWER VASCULAR RIGHT PERONEAL COMPRESS: NORMAL
BH CV LOWER VASCULAR RIGHT POPLITEAL AUGMENT: NORMAL
BH CV LOWER VASCULAR RIGHT POPLITEAL COMPETENT: NORMAL
BH CV LOWER VASCULAR RIGHT POPLITEAL COMPRESS: NORMAL
BH CV LOWER VASCULAR RIGHT POPLITEAL PHASIC: NORMAL
BH CV LOWER VASCULAR RIGHT POPLITEAL SPONT: NORMAL
BH CV LOWER VASCULAR RIGHT POSTERIOR TIBIAL COMPRESS: NORMAL
BH CV LOWER VASCULAR RIGHT PROFUNDA FEMORAL COMPRESS: NORMAL
BH CV LOWER VASCULAR RIGHT PROXIMAL FEMORAL COMPRESS: NORMAL
BH CV LOWER VASCULAR RIGHT SAPHENOFEMORAL JUNCTION COMPRESS: NORMAL
BH CV VAS PRELIMINARY FINDINGS SCRIPTING: 1
BILIRUB SERPL-MCNC: 0.3 MG/DL (ref 0–1.2)
BILIRUB UR QL STRIP: NEGATIVE
BUN SERPL-MCNC: 57 MG/DL (ref 8–23)
BUN/CREAT SERPL: 39.6 (ref 7–25)
CALCIUM SPEC-SCNC: 9.3 MG/DL (ref 8.6–10.5)
CHLORIDE SERPL-SCNC: 105 MMOL/L (ref 98–107)
CLARITY UR: CLEAR
CO2 SERPL-SCNC: 23.3 MMOL/L (ref 22–29)
COLOR UR: YELLOW
CREAT SERPL-MCNC: 1.44 MG/DL (ref 0.57–1)
D-LACTATE SERPL-SCNC: 1.1 MMOL/L (ref 0.5–2)
DEPRECATED RDW RBC AUTO: 50.4 FL (ref 37–54)
EGFRCR SERPLBLD CKD-EPI 2021: 36.4 ML/MIN/1.73
EOSINOPHIL # BLD AUTO: 0.18 10*3/MM3 (ref 0–0.4)
EOSINOPHIL NFR BLD AUTO: 2.2 % (ref 0.3–6.2)
ERYTHROCYTE [DISTWIDTH] IN BLOOD BY AUTOMATED COUNT: 15.1 % (ref 12.3–15.4)
GLOBULIN UR ELPH-MCNC: 2.6 GM/DL
GLUCOSE SERPL-MCNC: 123 MG/DL (ref 65–99)
GLUCOSE UR STRIP-MCNC: NEGATIVE MG/DL
HCT VFR BLD AUTO: 31.9 % (ref 34–46.6)
HGB BLD-MCNC: 10.4 G/DL (ref 12–15.9)
HGB UR QL STRIP.AUTO: NEGATIVE
HOLD SPECIMEN: NORMAL
IMM GRANULOCYTES # BLD AUTO: 0.04 10*3/MM3 (ref 0–0.05)
IMM GRANULOCYTES NFR BLD AUTO: 0.5 % (ref 0–0.5)
KETONES UR QL STRIP: NEGATIVE
LEUKOCYTE ESTERASE UR QL STRIP.AUTO: NEGATIVE
LYMPHOCYTES # BLD AUTO: 2.06 10*3/MM3 (ref 0.7–3.1)
LYMPHOCYTES NFR BLD AUTO: 25.6 % (ref 19.6–45.3)
MCH RBC QN AUTO: 30.1 PG (ref 26.6–33)
MCHC RBC AUTO-ENTMCNC: 32.6 G/DL (ref 31.5–35.7)
MCV RBC AUTO: 92.5 FL (ref 79–97)
MONOCYTES # BLD AUTO: 0.51 10*3/MM3 (ref 0.1–0.9)
MONOCYTES NFR BLD AUTO: 6.3 % (ref 5–12)
NEUTROPHILS NFR BLD AUTO: 5.16 10*3/MM3 (ref 1.7–7)
NEUTROPHILS NFR BLD AUTO: 64.3 % (ref 42.7–76)
NITRITE UR QL STRIP: NEGATIVE
NRBC BLD AUTO-RTO: 0 /100 WBC (ref 0–0.2)
NT-PROBNP SERPL-MCNC: 148 PG/ML (ref 0–1800)
PH UR STRIP.AUTO: 5.5 [PH] (ref 5–8)
PLATELET # BLD AUTO: 197 10*3/MM3 (ref 140–450)
PMV BLD AUTO: 11.7 FL (ref 6–12)
POTASSIUM SERPL-SCNC: 4.5 MMOL/L (ref 3.5–5.2)
PROCALCITONIN SERPL-MCNC: 0.04 NG/ML (ref 0–0.25)
PROT SERPL-MCNC: 6.4 G/DL (ref 6–8.5)
PROT UR QL STRIP: NEGATIVE
RBC # BLD AUTO: 3.45 10*6/MM3 (ref 3.77–5.28)
SODIUM SERPL-SCNC: 139 MMOL/L (ref 136–145)
SP GR UR STRIP: 1.02 (ref 1–1.03)
TROPONIN T SERPL HS-MCNC: 15 NG/L
TROPONIN T SERPL HS-MCNC: 21 NG/L
UROBILINOGEN UR QL STRIP: NORMAL
WBC NRBC COR # BLD: 8.04 10*3/MM3 (ref 3.4–10.8)
WHOLE BLOOD HOLD SPECIMEN: NORMAL

## 2023-08-19 PROCEDURE — 84145 PROCALCITONIN (PCT): CPT | Performed by: PHYSICIAN ASSISTANT

## 2023-08-19 PROCEDURE — 93970 EXTREMITY STUDY: CPT

## 2023-08-19 PROCEDURE — 96374 THER/PROPH/DIAG INJ IV PUSH: CPT

## 2023-08-19 PROCEDURE — P9612 CATHETERIZE FOR URINE SPEC: HCPCS

## 2023-08-19 PROCEDURE — 83605 ASSAY OF LACTIC ACID: CPT | Performed by: PHYSICIAN ASSISTANT

## 2023-08-19 PROCEDURE — 85025 COMPLETE CBC W/AUTO DIFF WBC: CPT | Performed by: PHYSICIAN ASSISTANT

## 2023-08-19 PROCEDURE — 25010000002 ONDANSETRON PER 1 MG: Performed by: PHYSICIAN ASSISTANT

## 2023-08-19 PROCEDURE — 80053 COMPREHEN METABOLIC PANEL: CPT | Performed by: PHYSICIAN ASSISTANT

## 2023-08-19 PROCEDURE — 84484 ASSAY OF TROPONIN QUANT: CPT | Performed by: PHYSICIAN ASSISTANT

## 2023-08-19 PROCEDURE — 81003 URINALYSIS AUTO W/O SCOPE: CPT | Performed by: PHYSICIAN ASSISTANT

## 2023-08-19 PROCEDURE — 96376 TX/PRO/DX INJ SAME DRUG ADON: CPT

## 2023-08-19 PROCEDURE — 84484 ASSAY OF TROPONIN QUANT: CPT | Performed by: EMERGENCY MEDICINE

## 2023-08-19 PROCEDURE — 71045 X-RAY EXAM CHEST 1 VIEW: CPT

## 2023-08-19 PROCEDURE — 36415 COLL VENOUS BLD VENIPUNCTURE: CPT

## 2023-08-19 PROCEDURE — 93005 ELECTROCARDIOGRAM TRACING: CPT | Performed by: PHYSICIAN ASSISTANT

## 2023-08-19 PROCEDURE — 99284 EMERGENCY DEPT VISIT MOD MDM: CPT

## 2023-08-19 PROCEDURE — 83880 ASSAY OF NATRIURETIC PEPTIDE: CPT | Performed by: PHYSICIAN ASSISTANT

## 2023-08-19 RX ORDER — ACETAMINOPHEN 325 MG/1
650 TABLET ORAL ONCE
Status: COMPLETED | OUTPATIENT
Start: 2023-08-19 | End: 2023-08-19

## 2023-08-19 RX ORDER — ONDANSETRON 2 MG/ML
4 INJECTION INTRAMUSCULAR; INTRAVENOUS ONCE
Status: COMPLETED | OUTPATIENT
Start: 2023-08-19 | End: 2023-08-19

## 2023-08-19 RX ORDER — SODIUM CHLORIDE 0.9 % (FLUSH) 0.9 %
10 SYRINGE (ML) INJECTION AS NEEDED
Status: DISCONTINUED | OUTPATIENT
Start: 2023-08-19 | End: 2023-08-19 | Stop reason: HOSPADM

## 2023-08-19 RX ADMIN — SODIUM CHLORIDE 500 ML: 9 INJECTION, SOLUTION INTRAVENOUS at 08:26

## 2023-08-19 RX ADMIN — ONDANSETRON 4 MG: 2 INJECTION INTRAMUSCULAR; INTRAVENOUS at 13:02

## 2023-08-19 RX ADMIN — ONDANSETRON 4 MG: 2 INJECTION INTRAMUSCULAR; INTRAVENOUS at 08:27

## 2023-08-19 RX ADMIN — ACETAMINOPHEN 650 MG: 325 TABLET, FILM COATED ORAL at 08:29

## 2023-08-19 NOTE — ED PROVIDER NOTES
EMERGENCY DEPARTMENT ENCOUNTER    Room Number:  05/05  Date seen:  8/19/2023  PCP: Dinah Humphrey MD    Spoken Language:  English  Language interpretation services not needed     HPI:  Chief Complaint: nausea    A complete HPI/ROS/PMH/PSH/SH/FH are unobtainable due to: n/a    Context: Luann Frances is a 83 y.o. female, anticoagulated on Pradaxa, with PMH of LLE DVT, CKD (stage 3b), HTN, HLD, anemia secondary to upper GI blood loss, and COPD presenting to the emergency department complaining of nausea.  The history is being obtained by the patient, her son and by review of the medical chart.  The patient states that she has been having nausea since yesterday morning.  She states that she took 3 doses of Zofran yesterday without relief.  She also states that she had decreased in p.o. intake yesterday due to her nausea.  In addition, the patient complains of increased swelling of the bilateral lower extremities, worse on the left leg than the right leg.  She also endorses having increasing shortness of breath last night.  She states that she needed to prop herself up while trying to sleep, but that it was because of the nausea.  She has COPD at baseline, but states that this shortness of breath is worse than normal.  She denies any history of CHF.  She denies fever, headache, cough, sore throat, chest pain, abdominal pain, vomiting, diarrhea, increased urinary frequency, hematuria or dysuria.    Medical Records Review:  The patient's most recent echocardiogram appears to have been in 2014.    PAST MEDICAL HISTORY  Active Ambulatory Problems     Diagnosis Date Noted    Chronic obstructive pulmonary disease 01/21/2016    Diverticulitis of colon 01/21/2016    Acid reflux 01/21/2016    Primary hypothyroidism 01/21/2016    Insomnia 01/21/2016    Chronic nausea 01/21/2016    Post herpetic neuralgia 03/02/2017    Vitamin D deficiency 02/19/2018    CKD (chronic kidney disease) stage 3, GFR 30-59 ml/min 02/19/2018     Mixed hyperlipidemia 02/19/2018    Hypersomnia 10/16/2019    Hyperuricemia 01/20/2020    Essential hypertension 01/20/2020    DELROY (obstructive sleep apnea) 12/08/2020    Renal calculus 01/06/2021    Multiple closed fractures of ribs of right side 02/12/2021    Compression fracture of body of thoracic vertebra 08/17/2021    DVT, lower extremity, distal, acute, left 04/15/2022    Lower extremity edema 04/15/2022    Medicare annual wellness visit, subsequent 05/17/2022    Tobacco abuse 05/17/2022    Osteoporosis 05/17/2022    Acute deep vein thrombosis (DVT) of femoral vein of left lower extremity 08/16/2022    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    Iron deficiency 11/04/2022    Iron deficiency anemia 11/04/2022    History of deep venous thrombosis (DVT) of distal vein of left lower extremity 02/22/2023    Current use of long term anticoagulation 02/22/2023    Acute blood loss anemia 03/13/2023    Melena 03/13/2023     Resolved Ambulatory Problems     Diagnosis Date Noted    Acute sinusitis 01/21/2016    Atopic rhinitis 01/21/2016    Asthma 01/21/2016    Candidiasis 01/21/2016    Cough 01/21/2016    Mild dehydration 01/21/2016    Fatigue 01/21/2016    Knee pain 01/21/2016    Pain of lower extremity 01/21/2016    Chronic obstructive pulmonary disease with acute exacerbation 01/21/2016    Shoulder pain 01/21/2016    Ventricular premature beats 01/21/2016    Elevated BP 10/25/2016    Weight gain 12/07/2016    Cold intolerance 12/07/2016    Dyslipidemia 12/07/2016    Insulin resistance 04/10/2017    Non-cardiac chest pain 02/14/2021    Acute deep vein thrombosis (DVT) of femoral vein of right lower extremity (HCC) 04/15/2021    Arm skin lesion, right 11/17/2021    Skin tear of right lower leg without complication 05/17/2022    Myositis of left lower extremity 08/16/2022     Past Medical History:   Diagnosis Date    Arthritis     Asymptomatic PVCs     Backache     Chronic bronchitis     CKD (chronic  kidney disease)     Deep vein thrombosis (DVT) of right lower extremity     Fracture of humerus     GERD (gastroesophageal reflux disease)     Hyperlipidemia     Hypertension     Hypothyroidism     Pneumonia     Pulmonary emphysema     Renal calculi     Sleep apnea     Thoracic vertebral fracture        PAST SURGICAL HISTORY  Past Surgical History:   Procedure Laterality Date    APPENDECTOMY      COLONOSCOPY N/A 3/15/2023    Procedure: COLONOSCOPY to cecum and TI :  hot snare sigmoid polyps with clip to 1 polyp,;  Surgeon: Jaun Ford MD;  Location: [facility] ENDOSCOPY;  Service: Gastroenterology;  Laterality: N/A;  pre:  anemia  post:  diverticulosis, polyps, hemorrhoids    ENDOSCOPY N/A 3/14/2023    Procedure: ESOPHAGOGASTRODUODENOSCOPY WITH ARGON PLASMA COAGULATION AND COLD BIOPSIES;  Surgeon: Jaun Ford MD;  Location: [facility] ENDOSCOPY;  Service: Gastroenterology;  Laterality: N/A;  PRE: MELANA; ANEMIA  POST: ANGIO ACTASIA; HIATAL HERNIA; SCHATZKI'S RING    EXTRACORPOREAL SHOCK WAVE LITHOTRIPSY (ESWL) Left 01/06/2021    Procedure: EXTRACORPOREAL SHOCKWAVE LITHOTRIPSY, CYSTOSCOPY, RETROGRADE PYLEOGRAM;  Surgeon: Walter Schwartz MD;  Location: [facility] OR OSC;  Service: Urology;  Laterality: Left;    EYE SURGERY      cataracts    HUMERUS FRACTURE SURGERY      LYTIC THROMBIN THERAPY Left 8/17/2022    Procedure: LT. LEG THROMBECTOMY/EMBOLECTOMY AND ANGIOPLASTY STENT PLACEMENT OF LEFT ILIAC VEIN;  Surgeon: Theo Bustos MD;  Location: [facility] HYBRID OR 18/19;  Service: Vascular;  Laterality: Left;    RIB FRACTURE SURGERY  2021    THORACOSCOPY Right 02/16/2021    Procedure: bronchoscopy, right video assisted THORACOSCOPY WITH PLATING OF 3, 4, 5, AND 6 RIBS;  Surgeon: Kelley Delong MD;  Location: [facility] MAIN OR;  Service: Thoracic;  Laterality: Right;    TOTAL KNEE ARTHROPLASTY Left 04/2015    UMBILICAL HERNIA REPAIR         FAMILY HISTORY  Family History   Problem Relation Age of Onset    Cancer Mother          breast    Breast cancer Mother     No Known Problems Father     Heart disease Maternal Grandmother     Cancer Brother         esophageal , stomach     Esophageal cancer Brother     No Known Problems Son     Breast cancer Maternal Aunt     Heart disease Maternal Aunt     No Known Problems Son     Malig Hyperthermia Neg Hx        SOCIAL HISTORY  Social History     Socioeconomic History    Marital status:    Tobacco Use    Smoking status: Every Day     Packs/day: 0.25     Years: 50.00     Pack years: 12.50     Types: Cigarettes     Start date: 1970    Smokeless tobacco: Never   Vaping Use    Vaping Use: Never used   Substance and Sexual Activity    Alcohol use: Not Currently    Drug use: No    Sexual activity: Defer       ALLERGIES  Ambien [zolpidem tartrate], Amoxicillin-pot clavulanate, Doxycycline, Eliquis [apixaban], Levofloxacin, Metronidazole, Naproxen, Sulfamethoxazole-trimethoprim, Synthroid [levothyroxine sodium], and Zolpidem    REVIEW OF SYSTEMS  All systems reviewed and negative except for those discussed in HPI.     PHYSICAL EXAM  ED Triage Vitals [08/19/23 0650]   Temp Heart Rate Resp BP SpO2   97.9 øF (36.6 øC) 89 16 129/90 97 %      Temp src Heart Rate Source Patient Position BP Location FiO2 (%)   -- -- -- -- --       Physical Exam  Constitutional:       Appearance: Normal appearance.   HENT:      Head: Normocephalic and atraumatic.      Mouth/Throat:      Mouth: Mucous membranes are moist.   Eyes:      Extraocular Movements: Extraocular movements intact.      Pupils: Pupils are equal, round, and reactive to light.   Cardiovascular:      Rate and Rhythm: Normal rate and regular rhythm.   Pulmonary:      Effort: Pulmonary effort is normal. No respiratory distress.      Breath sounds: Normal breath sounds. No wheezing, rhonchi or rales.   Abdominal:      General: There is no distension.      Palpations: Abdomen is soft.      Tenderness: There is no abdominal tenderness.   Musculoskeletal:       Cervical back: Normal range of motion and neck supple.      Comments: No calf tenderness.  No palpable cords.  Negative Homans bilaterally.   Skin:     Comments: 1+ pitting edema in bilateral lower extremities   Neurological:      Mental Status: She is alert and oriented to person, place, and time. Mental status is at baseline.   Psychiatric:         Mood and Affect: Mood normal.         Behavior: Behavior normal.         Thought Content: Thought content normal.         Judgment: Judgment normal.       LAB RESULTS  Recent Results (from the past 24 hour(s))   Green Top (Gel)    Collection Time: 08/19/23  7:08 AM   Result Value Ref Range    Extra Tube Hold for add-ons.    Lavender Top    Collection Time: 08/19/23  7:08 AM   Result Value Ref Range    Extra Tube hold for add-on    Comprehensive Metabolic Panel    Collection Time: 08/19/23  7:08 AM    Specimen: Blood   Result Value Ref Range    Glucose 123 (H) 65 - 99 mg/dL    BUN 57 (H) 8 - 23 mg/dL    Creatinine 1.44 (H) 0.57 - 1.00 mg/dL    Sodium 139 136 - 145 mmol/L    Potassium 4.5 3.5 - 5.2 mmol/L    Chloride 105 98 - 107 mmol/L    CO2 23.3 22.0 - 29.0 mmol/L    Calcium 9.3 8.6 - 10.5 mg/dL    Total Protein 6.4 6.0 - 8.5 g/dL    Albumin 3.8 3.5 - 5.2 g/dL    ALT (SGPT) 7 1 - 33 U/L    AST (SGOT) 12 1 - 32 U/L    Alkaline Phosphatase 51 39 - 117 U/L    Total Bilirubin 0.3 0.0 - 1.2 mg/dL    Globulin 2.6 gm/dL    A/G Ratio 1.5 g/dL    BUN/Creatinine Ratio 39.6 (H) 7.0 - 25.0    Anion Gap 10.7 5.0 - 15.0 mmol/L    eGFR 36.4 (L) >60.0 mL/min/1.73   Procalcitonin    Collection Time: 08/19/23  7:08 AM    Specimen: Blood   Result Value Ref Range    Procalcitonin 0.04 0.00 - 0.25 ng/mL   BNP    Collection Time: 08/19/23  7:08 AM    Specimen: Blood   Result Value Ref Range    proBNP 148.0 0.0 - 1,800.0 pg/mL   Single High Sensitivity Troponin T    Collection Time: 08/19/23  7:08 AM    Specimen: Blood   Result Value Ref Range    HS Troponin T 21 (H) <10 ng/L   CBC Auto  Differential    Collection Time: 08/19/23  7:08 AM    Specimen: Blood   Result Value Ref Range    WBC 8.04 3.40 - 10.80 10*3/mm3    RBC 3.45 (L) 3.77 - 5.28 10*6/mm3    Hemoglobin 10.4 (L) 12.0 - 15.9 g/dL    Hematocrit 31.9 (L) 34.0 - 46.6 %    MCV 92.5 79.0 - 97.0 fL    MCH 30.1 26.6 - 33.0 pg    MCHC 32.6 31.5 - 35.7 g/dL    RDW 15.1 12.3 - 15.4 %    RDW-SD 50.4 37.0 - 54.0 fl    MPV 11.7 6.0 - 12.0 fL    Platelets 197 140 - 450 10*3/mm3    Neutrophil % 64.3 42.7 - 76.0 %    Lymphocyte % 25.6 19.6 - 45.3 %    Monocyte % 6.3 5.0 - 12.0 %    Eosinophil % 2.2 0.3 - 6.2 %    Basophil % 1.1 0.0 - 1.5 %    Immature Grans % 0.5 0.0 - 0.5 %    Neutrophils, Absolute 5.16 1.70 - 7.00 10*3/mm3    Lymphocytes, Absolute 2.06 0.70 - 3.10 10*3/mm3    Monocytes, Absolute 0.51 0.10 - 0.90 10*3/mm3    Eosinophils, Absolute 0.18 0.00 - 0.40 10*3/mm3    Basophils, Absolute 0.09 0.00 - 0.20 10*3/mm3    Immature Grans, Absolute 0.04 0.00 - 0.05 10*3/mm3    nRBC 0.0 0.0 - 0.2 /100 WBC   ECG 12 Lead Dyspnea    Collection Time: 08/19/23  7:48 AM   Result Value Ref Range    QT Interval 414 ms   Lactic Acid, Plasma    Collection Time: 08/19/23  8:00 AM    Specimen: Blood   Result Value Ref Range    Lactate 1.1 0.5 - 2.0 mmol/L   Urinalysis With Microscopic If Indicated (No Culture) - Urine, Catheter    Collection Time: 08/19/23  8:09 AM    Specimen: Urine, Catheter   Result Value Ref Range    Color, UA Yellow Yellow, Straw    Appearance, UA Clear Clear    pH, UA 5.5 5.0 - 8.0    Specific Gravity, UA 1.022 1.005 - 1.030    Glucose, UA Negative Negative    Ketones, UA Negative Negative    Bilirubin, UA Negative Negative    Blood, UA Negative Negative    Protein, UA Negative Negative    Leuk Esterase, UA Negative Negative    Nitrite, UA Negative Negative    Urobilinogen, UA 0.2 E.U./dL 0.2 - 1.0 E.U./dL   Duplex Venous Lower Extremity - Bilateral CAR    Collection Time: 08/19/23  9:19 AM   Result Value Ref Range    Left Common Femoral Spont  1.0     Right Common Femoral Spont Y     Right Common Femoral Competent Y     Right Common Femoral Phasic Y     Right Common Femoral Compress C     Right Common Femoral Augment Y     Right Saphenofemoral Junction Compress C     Right Profunda Femoral Compress C     Right Proximal Femoral Compress C     Right Mid Femoral Spont Y     Right Mid Femoral Competent Y     Right Mid Femoral Phasic Y     Right Mid Femoral Compress C     Right Mid Femoral Augment Y     Right Distal Femoral Compress C     Right Popliteal Spont Y     Right Popliteal Competent Y     Right Popliteal Phasic Y     Right Popliteal Compress C     Right Popliteal Augment Y     Right Posterior Tibial Compress C     Right Peroneal Compress C     Right Gastronemius Compress C     Right Greater Saph AK Compress C     Right Greater Saph BK Compress C     Right Lesser Saph Compress C     Left Common Femoral Spont Y     Left Common Femoral Competent N     Left Common Femoral Phasic Y     Left Common Femoral Compress C     Left Common Femoral Augment Y     Left Saphenofemoral Junction Compress C     Left Profunda Femoral Compress C     Left Proximal Femoral Compress C     Left Mid Femoral Spont Y     Left Mid Femoral Competent Y     Left Mid Femoral Phasic Y     Left Mid Femoral Compress C     Left Mid Femoral Augment Y     Left Distal Femoral Compress C     Left Popliteal Spont Y     Left Popliteal Competent Y     Left Popliteal Phasic Y     Left Popliteal Compress C     Left Popliteal Augment Y     Left Posterior Tibial Compress C     Left Peroneal Compress C     Left Gastronemius Compress C     Left Greater Saph AK Compress C     Left Greater Saph BK Compress C     Left Lesser Saph Compress C     BH CV VAS PRELIMINARY FINDINGS SCRIPTING 1.0    Single High Sensitivity Troponin T    Collection Time: 08/19/23  9:53 AM    Specimen: Blood   Result Value Ref Range    HS Troponin T 15 (H) <10 ng/L       RADIOLOGY  Imaging Results (Last 24 Hours)       Procedure  Component Value Units Date/Time    XR Chest 1 View [[id number]] Collected: 08/19/23 0750     Updated: 08/19/23 0754    Narrative:      ONE-VIEW PORTABLE CHEST     HISTORY: Shortness of breath. Leg swelling.     FINDINGS: The lungs are well expanded and clear except for some very  minimal blunting at the left costophrenic angle compared to the study of  3/13/2023. There is no evidence of overt CHF or focal infiltrate. The  heart remains slightly enlarged.     This report was finalized on 8/19/2023 7:51 AM by Dr. Rashawn Cooper M.D.               PROCEDURES  Procedures  None    MEDICATIONS GIVEN IN ER  Medications   sodium chloride 0.9 % flush 10 mL (has no administration in time range)   acetaminophen (TYLENOL) tablet 650 mg (650 mg Oral Given 8/19/23 0829)   ondansetron (ZOFRAN) injection 4 mg (4 mg Intravenous Given 8/19/23 0827)   sodium chloride 0.9 % bolus 500 mL (0 mL Intravenous Stopped 8/19/23 0950)   ondansetron (ZOFRAN) injection 4 mg (4 mg Intravenous Given 8/19/23 1302)       MEDICAL DECISION MAKING, PROGRESS, and CONSULTS  Vital signs and nursing notes have all been reviewed by me.    All laboratory results have been independently interpreted by me.      Orders placed during this visit:  Orders Placed This Encounter   Procedures    XR Chest 1 View    Carbon Draw    Comprehensive Metabolic Panel    Urinalysis With Microscopic If Indicated (No Culture) - Urine, Catheter    Lactic Acid, Plasma    Procalcitonin    BNP    Single High Sensitivity Troponin T    CBC Auto Differential    Single High Sensitivity Troponin T    Straight Cath    Pulse Oximetry, Continuous    Monitor Blood Pressure    ECG 12 Lead Dyspnea    Insert peripheral IV    Green Top (Gel)    Lavender Top    CBC & Differential     Differential diagnosis includes but is not limited to:  -Recurrent or increasing DVT  -CHF  -Infection    Discussion below represents my analysis of pertinent findings related to patient's condition, differential  diagnosis, treatment plan and final disposition:    The patient's kidney function tests were slightly elevated, however her symptoms resolved after IV fluids and Zofran.  The remainder of her work-up was grossly unremarkable.  I think that she might have restless leg syndrome, but I have recommended that she discuss this with her primary care provider.  She verbalized understanding of this.  She is comfortable with discharge home at this time.    ED Course as of 08/19/23 1320   Sat Aug 19, 2023   0810 EKG independently interpreted by myself.  Time 7:48 AM.  Sinus rhythm.  Heart rate 68.  Low voltage in the precordial leads.  T wave inversions in the precordial leads as well from V1 through V6.   [TD]   0812 Patient's baseline creatinine is 1.18. [TD]   0923 I discussed the patient's Doppler ultrasound results with the ultrasound tech.  Her bilateral lower extremities are negative for DVT. [AR]   1044 Patient's nausea has improved after IV Zofran.  Will po challenge. [AR]   1241 Patient has tolerated po fluids without difficulty. Nausea is improved. Okay with plan for discharge home. [AR]      ED Course User Index  [AR] Nicolasa Anton PA  [TD] Bernard Daniels II, MD          Additional sources:  -Discussed/ obtained information from independent historians: patient's son    -Shared decision making: I discussed ED evaluation and treatment plan with patient who is in agreement.  Discussed at length ED testing.      Patient discharged in stable condition.     Reviewed implications of results, diagnosis, meds, responsibility to follow up, warning signs and symptoms of possible worsening, potential complications and reasons to return to ER.     Patient/Family voiced understanding of above instructions.     Discussed plan for discharge, as there is no emergent indication for admission. Patient referred to primary care provider for BP management due to today's BP. Pt/family is agreeable and understands need for  follow up and repeat testing.  Pt is aware that discharge does not mean that nothing is wrong but it indicates no emergency is present that requires admission and they must continue care with follow-up as given below or physician of their choice.     DIAGNOSIS  Final diagnoses:   Acute kidney injury   Pain in both lower extremities   Dehydration       DISPOSITION  ED Disposition       ED Disposition   Discharge    Condition   Stable    Comment   --               FOLLOW UP  Dinah Humphrey MD  [address]  [address]  [place] [id number]  [phone]    Schedule an appointment as soon as possible for a visit         RX  Medications   sodium chloride 0.9 % flush 10 mL (has no administration in time range)   acetaminophen (TYLENOL) tablet 650 mg (650 mg Oral Given 8/19/23 0829)   ondansetron (ZOFRAN) injection 4 mg (4 mg Intravenous Given 8/19/23 0827)   sodium chloride 0.9 % bolus 500 mL (0 mL Intravenous Stopped 8/19/23 0950)   ondansetron (ZOFRAN) injection 4 mg (4 mg Intravenous Given 8/19/23 1302)          Medication List      No changes were made to your prescriptions during this visit.         Latest Documented Vital Signs:  As of 13:20 EDT  BP- 153/75 HR- 74 Temp- 97.9 øF (36.6 øC) O2 sat- 100%    Provider Attestation:  I personally reviewed the past medical history, past surgical history, social history, family history, current medications and allergies as they appear in the chart. I reviewed the patient's history, physical, lab/imaging results and overall care with Dr. Daniels who is in agreement with the patient's treatment plan.    EMR Dragon/Transcription disclaimer:  Dictated using Dragon dictation    Provider note signed by:    Note Disclaimer: At [facility], we believe that sharing information builds trust and better relationships. You are receiving this note because you are receiving care at [facility] or recently visited. It is possible you will see health information before a provider has  talked with you about it. This kind of information can be easy to misunderstand. To help you fully understand what it means for your health, we urge you to discuss this note with your provider.       Nicolasa Anton PA  08/19/23 5319

## 2023-08-19 NOTE — ED PROVIDER NOTES
MD ATTESTATION NOTE    The KYM and I have discussed this patient's history, physical exam, and treatment plan.    I provided a substantive portion of the care of this patient. I personally performed the physical exam, in its entirety. The attached note describes my personal findings.      Luann Frances is a 83 y.o. female who presents to the ED c/o nausea.  She complains of this beginning yesterday.  She denies having any headache, fever, chest pain, shortness of breath, abdominal pain.  She does complain of developing bilateral leg pain that also began within the last day as well.      On exam:  GENERAL: not distressed  HENT: nares patent  EYES: no scleral icterus  CV: regular rhythm, regular rate  RESPIRATORY: normal effort, clear to auscultation bilaterally  ABDOMEN: soft, nontender  MUSCULOSKELETAL: no deformity, bilateral calf tenderness  NEURO: alert, moves all extremities, follows commands  SKIN: warm, dry    Labs  Recent Results (from the past 24 hour(s))   Comprehensive Metabolic Panel    Collection Time: 08/19/23  7:08 AM    Specimen: Blood   Result Value Ref Range    Glucose 123 (H) 65 - 99 mg/dL    BUN 57 (H) 8 - 23 mg/dL    Creatinine 1.44 (H) 0.57 - 1.00 mg/dL    Sodium 139 136 - 145 mmol/L    Potassium 4.5 3.5 - 5.2 mmol/L    Chloride 105 98 - 107 mmol/L    CO2 23.3 22.0 - 29.0 mmol/L    Calcium 9.3 8.6 - 10.5 mg/dL    Total Protein 6.4 6.0 - 8.5 g/dL    Albumin 3.8 3.5 - 5.2 g/dL    ALT (SGPT) 7 1 - 33 U/L    AST (SGOT) 12 1 - 32 U/L    Alkaline Phosphatase 51 39 - 117 U/L    Total Bilirubin 0.3 0.0 - 1.2 mg/dL    Globulin 2.6 gm/dL    A/G Ratio 1.5 g/dL    BUN/Creatinine Ratio 39.6 (H) 7.0 - 25.0    Anion Gap 10.7 5.0 - 15.0 mmol/L    eGFR 36.4 (L) >60.0 mL/min/1.73   Procalcitonin    Collection Time: 08/19/23  7:08 AM    Specimen: Blood   Result Value Ref Range    Procalcitonin 0.04 0.00 - 0.25 ng/mL   BNP    Collection Time: 08/19/23  7:08 AM    Specimen: Blood   Result Value Ref Range     proBNP 148.0 0.0 - 1,800.0 pg/mL   Single High Sensitivity Troponin T    Collection Time: 08/19/23  7:08 AM    Specimen: Blood   Result Value Ref Range    HS Troponin T 21 (H) <10 ng/L   ECG 12 Lead Dyspnea    Collection Time: 08/19/23  7:48 AM   Result Value Ref Range    QT Interval 414 ms       Radiology  XR Chest 1 View    Result Date: 8/19/2023  ONE-VIEW PORTABLE CHEST  HISTORY: Shortness of breath. Leg swelling.  FINDINGS: The lungs are well expanded and clear except for some very minimal blunting at the left costophrenic angle compared to the study of 3/13/2023. There is no evidence of overt CHF or focal infiltrate. The heart remains slightly enlarged.  This report was finalized on 8/19/2023 7:51 AM by Dr. Rashawn Cooper M.D.       Medications given in the ED:  Medications   sodium chloride 0.9 % flush 10 mL (has no administration in time range)   acetaminophen (TYLENOL) tablet 650 mg (has no administration in time range)   ondansetron (ZOFRAN) injection 4 mg (has no administration in time range)   sodium chloride 0.9 % bolus 500 mL (has no administration in time range)       Orders placed during this visit:  Orders Placed This Encounter   Procedures    XR Chest 1 View    [place] Draw    Comprehensive Metabolic Panel    Urinalysis With Microscopic If Indicated (No Culture) - Urine, Catheter    Lactic Acid, Plasma    Procalcitonin    BNP    Single High Sensitivity Troponin T    Straight Cath    Pulse Oximetry, Continuous    Monitor Blood Pressure    ECG 12 Lead Dyspnea    Insert peripheral IV    Green Top (Gel)    Lavender Top    CBC & Differential       Medical Decision Making:  ED Course as of 08/22/23 1543   Sat Aug 19, 2023   0810 EKG independently interpreted by myself.  Time 7:48 AM.  Sinus rhythm.  Heart rate 68.  Low voltage in the precordial leads.  T wave inversions in the precordial leads as well from V1 through V6.   [TD]   0812 Patient's baseline creatinine is 1.18. [TD]   0923 I discussed the  patient's Doppler ultrasound results with the ultrasound tech.  Her bilateral lower extremities are negative for DVT. [AR]   1044 Patient's nausea has improved after IV Zofran.  Will po challenge. [AR]   1241 Patient has tolerated po fluids without difficulty. Nausea is improved. Okay with plan for discharge home. [AR]      ED Course User Index  [AR] Nicolasa Anton PA  [TD] Bernard Daniels II, MD             Diagnosis  Final diagnoses:   Acute kidney injury   Pain in both lower extremities   Dehydration          Bernard Daniels II, MD  08/22/23 [id number]

## 2023-08-19 NOTE — ED TRIAGE NOTES
To ER via EMS from home  c/o nausea x 1 day with no relief with Zofran.  C/o bilat leg pain that started last night.  Worse when lying down.  States can't keep legs still.

## 2023-08-19 NOTE — DISCHARGE INSTRUCTIONS

## 2023-08-20 LAB — QT INTERVAL: 414 MS

## 2023-08-21 ENCOUNTER — APPOINTMENT (OUTPATIENT)
Dept: CT IMAGING | Facility: HOSPITAL | Age: 83
DRG: 378 | End: 2023-08-21
Payer: MEDICARE

## 2023-08-21 ENCOUNTER — HOSPITAL ENCOUNTER (INPATIENT)
Facility: HOSPITAL | Age: 83
LOS: 5 days | Discharge: NURSING, REHAB OR LONG-TERM CARE | DRG: 378 | End: 2023-08-26
Attending: EMERGENCY MEDICINE | Admitting: INTERNAL MEDICINE
Payer: MEDICARE
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
## 2023-08-21 DIAGNOSIS — K92.2 GASTROINTESTINAL HEMORRHAGE, UNSPECIFIED GASTROINTESTINAL HEMORRHAGE TYPE: Primary | ICD-10-CM

## 2023-08-21 DIAGNOSIS — B02.29 POST HERPETIC NEURALGIA: ICD-10-CM

## 2023-08-21 LAB
ABO GROUP BLD: NORMAL
ALBUMIN SERPL-MCNC: 3.2 G/DL (ref 3.5–5.2)
ALBUMIN/GLOB SERPL: 2.1 G/DL
ALP SERPL-CCNC: 34 U/L (ref 39–117)
ALT SERPL W P-5'-P-CCNC: 8 U/L (ref 1–33)
ANION GAP SERPL CALCULATED.3IONS-SCNC: 8 MMOL/L (ref 5–15)
AST SERPL-CCNC: 12 U/L (ref 1–32)
BACTERIA UR QL AUTO: ABNORMAL /HPF
BASOPHILS # BLD AUTO: 0.03 10*3/MM3 (ref 0–0.2)
BASOPHILS NFR BLD AUTO: 0.4 % (ref 0–1.5)
BILIRUB SERPL-MCNC: 0.2 MG/DL (ref 0–1.2)
BILIRUB UR QL STRIP: NEGATIVE
BLD GP AB SCN SERPL QL: NEGATIVE
BUN SERPL-MCNC: 72 MG/DL (ref 8–23)
BUN/CREAT SERPL: 37.9 (ref 7–25)
CALCIUM SPEC-SCNC: 8.4 MG/DL (ref 8.6–10.5)
CHLORIDE SERPL-SCNC: 112 MMOL/L (ref 98–107)
CLARITY UR: CLEAR
CO2 SERPL-SCNC: 21 MMOL/L (ref 22–29)
COLOR UR: YELLOW
CREAT SERPL-MCNC: 1.9 MG/DL (ref 0.57–1)
DEPRECATED RDW RBC AUTO: 50.6 FL (ref 37–54)
DEPRECATED RDW RBC AUTO: 51.9 FL (ref 37–54)
EGFRCR SERPLBLD CKD-EPI 2021: 26.1 ML/MIN/1.73
EOSINOPHIL # BLD AUTO: 0.04 10*3/MM3 (ref 0–0.4)
EOSINOPHIL NFR BLD AUTO: 0.6 % (ref 0.3–6.2)
ERYTHROCYTE [DISTWIDTH] IN BLOOD BY AUTOMATED COUNT: 15.4 % (ref 12.3–15.4)
ERYTHROCYTE [DISTWIDTH] IN BLOOD BY AUTOMATED COUNT: 16 % (ref 12.3–15.4)
FERRITIN SERPL-MCNC: 241 NG/ML (ref 13–150)
GLOBULIN UR ELPH-MCNC: 1.5 GM/DL
GLUCOSE SERPL-MCNC: 115 MG/DL (ref 65–99)
GLUCOSE UR STRIP-MCNC: NEGATIVE MG/DL
HCT VFR BLD AUTO: 17.6 % (ref 34–46.6)
HCT VFR BLD AUTO: 22.1 % (ref 34–46.6)
HGB BLD-MCNC: 5.6 G/DL (ref 12–15.9)
HGB BLD-MCNC: 7.1 G/DL (ref 12–15.9)
HGB UR QL STRIP.AUTO: NEGATIVE
HYALINE CASTS UR QL AUTO: ABNORMAL /LPF
IMM GRANULOCYTES # BLD AUTO: 0.04 10*3/MM3 (ref 0–0.05)
IMM GRANULOCYTES NFR BLD AUTO: 0.6 % (ref 0–0.5)
INR PPP: 2.54 (ref 0.9–1.1)
IRON 24H UR-MRATE: 131 MCG/DL (ref 37–145)
IRON SATN MFR SERPL: 51 % (ref 20–50)
KETONES UR QL STRIP: NEGATIVE
LEUKOCYTE ESTERASE UR QL STRIP.AUTO: ABNORMAL
LYMPHOCYTES # BLD AUTO: 1.65 10*3/MM3 (ref 0.7–3.1)
LYMPHOCYTES NFR BLD AUTO: 23.9 % (ref 19.6–45.3)
MCH RBC QN AUTO: 29.6 PG (ref 26.6–33)
MCH RBC QN AUTO: 29.9 PG (ref 26.6–33)
MCHC RBC AUTO-ENTMCNC: 31.8 G/DL (ref 31.5–35.7)
MCHC RBC AUTO-ENTMCNC: 32.1 G/DL (ref 31.5–35.7)
MCV RBC AUTO: 92.1 FL (ref 79–97)
MCV RBC AUTO: 94.1 FL (ref 79–97)
MONOCYTES # BLD AUTO: 0.4 10*3/MM3 (ref 0.1–0.9)
MONOCYTES NFR BLD AUTO: 5.8 % (ref 5–12)
NEUTROPHILS NFR BLD AUTO: 4.75 10*3/MM3 (ref 1.7–7)
NEUTROPHILS NFR BLD AUTO: 68.7 % (ref 42.7–76)
NITRITE UR QL STRIP: NEGATIVE
NRBC BLD AUTO-RTO: 0 /100 WBC (ref 0–0.2)
PH UR STRIP.AUTO: 5.5 [PH] (ref 5–8)
PLATELET # BLD AUTO: 148 10*3/MM3 (ref 140–450)
PLATELET # BLD AUTO: 154 10*3/MM3 (ref 140–450)
PMV BLD AUTO: 11.4 FL (ref 6–12)
PMV BLD AUTO: 12.4 FL (ref 6–12)
POTASSIUM SERPL-SCNC: 5.1 MMOL/L (ref 3.5–5.2)
PROT SERPL-MCNC: 4.7 G/DL (ref 6–8.5)
PROT UR QL STRIP: NEGATIVE
PROTHROMBIN TIME: 27.9 SECONDS (ref 11.7–14.2)
RBC # BLD AUTO: 1.87 10*6/MM3 (ref 3.77–5.28)
RBC # BLD AUTO: 2.4 10*6/MM3 (ref 3.77–5.28)
RBC # UR STRIP: ABNORMAL /HPF
REF LAB TEST METHOD: ABNORMAL
RH BLD: NEGATIVE
SODIUM SERPL-SCNC: 141 MMOL/L (ref 136–145)
SP GR UR STRIP: 1.02 (ref 1–1.03)
SQUAMOUS #/AREA URNS HPF: ABNORMAL /HPF
T&S EXPIRATION DATE: NORMAL
TIBC SERPL-MCNC: 259 MCG/DL (ref 298–536)
TRANSFERRIN SERPL-MCNC: 174 MG/DL (ref 200–360)
UROBILINOGEN UR QL STRIP: ABNORMAL
WBC # UR STRIP: ABNORMAL /HPF
WBC NRBC COR # BLD: 11.87 10*3/MM3 (ref 3.4–10.8)
WBC NRBC COR # BLD: 6.91 10*3/MM3 (ref 3.4–10.8)

## 2023-08-21 PROCEDURE — 84466 ASSAY OF TRANSFERRIN: CPT | Performed by: INTERNAL MEDICINE

## 2023-08-21 PROCEDURE — 81001 URINALYSIS AUTO W/SCOPE: CPT | Performed by: PHYSICIAN ASSISTANT

## 2023-08-21 PROCEDURE — 70450 CT HEAD/BRAIN W/O DYE: CPT

## 2023-08-21 PROCEDURE — P9016 RBC LEUKOCYTES REDUCED: HCPCS

## 2023-08-21 PROCEDURE — 36415 COLL VENOUS BLD VENIPUNCTURE: CPT | Performed by: INTERNAL MEDICINE

## 2023-08-21 PROCEDURE — 99285 EMERGENCY DEPT VISIT HI MDM: CPT

## 2023-08-21 PROCEDURE — 86900 BLOOD TYPING SEROLOGIC ABO: CPT

## 2023-08-21 PROCEDURE — 86923 COMPATIBILITY TEST ELECTRIC: CPT

## 2023-08-21 PROCEDURE — 85025 COMPLETE CBC W/AUTO DIFF WBC: CPT | Performed by: PHYSICIAN ASSISTANT

## 2023-08-21 PROCEDURE — 85610 PROTHROMBIN TIME: CPT | Performed by: PHYSICIAN ASSISTANT

## 2023-08-21 PROCEDURE — 86850 RBC ANTIBODY SCREEN: CPT | Performed by: PHYSICIAN ASSISTANT

## 2023-08-21 PROCEDURE — 86900 BLOOD TYPING SEROLOGIC ABO: CPT | Performed by: PHYSICIAN ASSISTANT

## 2023-08-21 PROCEDURE — 85027 COMPLETE CBC AUTOMATED: CPT | Performed by: INTERNAL MEDICINE

## 2023-08-21 PROCEDURE — 0W3P8ZZ CONTROL BLEEDING IN GASTROINTESTINAL TRACT, VIA NATURAL OR ARTIFICIAL OPENING ENDOSCOPIC: ICD-10-PCS | Performed by: INTERNAL MEDICINE

## 2023-08-21 PROCEDURE — 99222 1ST HOSP IP/OBS MODERATE 55: CPT | Performed by: INTERNAL MEDICINE

## 2023-08-21 PROCEDURE — 80053 COMPREHEN METABOLIC PANEL: CPT | Performed by: PHYSICIAN ASSISTANT

## 2023-08-21 PROCEDURE — 86901 BLOOD TYPING SEROLOGIC RH(D): CPT | Performed by: PHYSICIAN ASSISTANT

## 2023-08-21 PROCEDURE — 36430 TRANSFUSION BLD/BLD COMPNT: CPT

## 2023-08-21 PROCEDURE — 83540 ASSAY OF IRON: CPT | Performed by: INTERNAL MEDICINE

## 2023-08-21 PROCEDURE — 82728 ASSAY OF FERRITIN: CPT | Performed by: INTERNAL MEDICINE

## 2023-08-21 RX ORDER — SODIUM CHLORIDE 9 MG/ML
150 INJECTION, SOLUTION INTRAVENOUS CONTINUOUS
Status: DISCONTINUED | OUTPATIENT
Start: 2023-08-21 | End: 2023-08-24

## 2023-08-21 RX ORDER — SODIUM CHLORIDE 0.9 % (FLUSH) 0.9 %
10 SYRINGE (ML) INJECTION AS NEEDED
Status: DISCONTINUED | OUTPATIENT
Start: 2023-08-21 | End: 2023-08-26 | Stop reason: HOSPADM

## 2023-08-21 RX ORDER — FOLIC ACID 1 MG/1
1 TABLET ORAL DAILY
Status: DISCONTINUED | OUTPATIENT
Start: 2023-08-22 | End: 2023-08-26 | Stop reason: HOSPADM

## 2023-08-21 RX ORDER — BISACODYL 10 MG
10 SUPPOSITORY, RECTAL RECTAL DAILY PRN
Status: DISCONTINUED | OUTPATIENT
Start: 2023-08-21 | End: 2023-08-26 | Stop reason: HOSPADM

## 2023-08-21 RX ORDER — TRAMADOL HYDROCHLORIDE 50 MG/1
50 TABLET ORAL EVERY 6 HOURS PRN
Status: DISCONTINUED | OUTPATIENT
Start: 2023-08-21 | End: 2023-08-26 | Stop reason: HOSPADM

## 2023-08-21 RX ORDER — LOSARTAN POTASSIUM 25 MG/1
25 TABLET ORAL DAILY
Status: DISCONTINUED | OUTPATIENT
Start: 2023-08-22 | End: 2023-08-26 | Stop reason: HOSPADM

## 2023-08-21 RX ORDER — ALBUTEROL SULFATE 2.5 MG/3ML
2.5 SOLUTION RESPIRATORY (INHALATION) EVERY 6 HOURS PRN
Status: DISCONTINUED | OUTPATIENT
Start: 2023-08-21 | End: 2023-08-26 | Stop reason: HOSPADM

## 2023-08-21 RX ORDER — ACETAMINOPHEN 325 MG/1
650 TABLET ORAL EVERY 4 HOURS PRN
Status: DISCONTINUED | OUTPATIENT
Start: 2023-08-21 | End: 2023-08-26 | Stop reason: HOSPADM

## 2023-08-21 RX ORDER — AMOXICILLIN 250 MG
2 CAPSULE ORAL 2 TIMES DAILY
Status: DISCONTINUED | OUTPATIENT
Start: 2023-08-21 | End: 2023-08-26 | Stop reason: HOSPADM

## 2023-08-21 RX ORDER — LEVOTHYROXINE SODIUM 0.1 MG/1
100 TABLET ORAL
Status: DISCONTINUED | OUTPATIENT
Start: 2023-08-22 | End: 2023-08-26 | Stop reason: HOSPADM

## 2023-08-21 RX ORDER — CHOLECALCIFEROL (VITAMIN D3) 125 MCG
1000 CAPSULE ORAL DAILY
Status: DISCONTINUED | OUTPATIENT
Start: 2023-08-22 | End: 2023-08-26 | Stop reason: HOSPADM

## 2023-08-21 RX ORDER — PANTOPRAZOLE SODIUM 40 MG/10ML
80 INJECTION, POWDER, LYOPHILIZED, FOR SOLUTION INTRAVENOUS ONCE
Status: COMPLETED | OUTPATIENT
Start: 2023-08-21 | End: 2023-08-21

## 2023-08-21 RX ORDER — UREA 10 %
3 LOTION (ML) TOPICAL NIGHTLY PRN
Status: DISCONTINUED | OUTPATIENT
Start: 2023-08-21 | End: 2023-08-26 | Stop reason: HOSPADM

## 2023-08-21 RX ORDER — POLYETHYLENE GLYCOL 3350 17 G/17G
17 POWDER, FOR SOLUTION ORAL DAILY PRN
Status: DISCONTINUED | OUTPATIENT
Start: 2023-08-21 | End: 2023-08-26 | Stop reason: HOSPADM

## 2023-08-21 RX ORDER — BISACODYL 5 MG/1
5 TABLET, DELAYED RELEASE ORAL DAILY PRN
Status: DISCONTINUED | OUTPATIENT
Start: 2023-08-21 | End: 2023-08-26 | Stop reason: HOSPADM

## 2023-08-21 RX ORDER — MELATONIN
2000 DAILY
Status: DISCONTINUED | OUTPATIENT
Start: 2023-08-22 | End: 2023-08-26 | Stop reason: HOSPADM

## 2023-08-21 RX ORDER — TORSEMIDE 20 MG/1
20 TABLET ORAL EVERY OTHER DAY
Status: DISCONTINUED | OUTPATIENT
Start: 2023-08-22 | End: 2023-08-26 | Stop reason: HOSPADM

## 2023-08-21 RX ORDER — BUDESONIDE AND FORMOTEROL FUMARATE DIHYDRATE 160; 4.5 UG/1; UG/1
1 AEROSOL RESPIRATORY (INHALATION)
Status: DISCONTINUED | OUTPATIENT
Start: 2023-08-21 | End: 2023-08-26 | Stop reason: HOSPADM

## 2023-08-21 RX ORDER — TORSEMIDE 20 MG/1
20 TABLET ORAL EVERY OTHER DAY
Status: DISCONTINUED | OUTPATIENT
Start: 2023-08-21 | End: 2023-08-21

## 2023-08-21 RX ORDER — ONDANSETRON 4 MG/1
4 TABLET, FILM COATED ORAL EVERY 6 HOURS PRN
Status: DISCONTINUED | OUTPATIENT
Start: 2023-08-21 | End: 2023-08-26 | Stop reason: HOSPADM

## 2023-08-21 RX ORDER — ONDANSETRON 2 MG/ML
4 INJECTION INTRAMUSCULAR; INTRAVENOUS EVERY 6 HOURS PRN
Status: DISCONTINUED | OUTPATIENT
Start: 2023-08-21 | End: 2023-08-26 | Stop reason: HOSPADM

## 2023-08-21 RX ADMIN — TRAMADOL HYDROCHLORIDE 50 MG: 50 TABLET, COATED ORAL at 18:49

## 2023-08-21 RX ADMIN — SODIUM CHLORIDE 1000 ML: 9 INJECTION, SOLUTION INTRAVENOUS at 14:13

## 2023-08-21 RX ADMIN — SODIUM CHLORIDE 8 MG/HR: 9 INJECTION, SOLUTION INTRAVENOUS at 21:27

## 2023-08-21 RX ADMIN — PANTOPRAZOLE SODIUM 80 MG: 40 INJECTION, POWDER, FOR SOLUTION INTRAVENOUS at 15:57

## 2023-08-21 RX ADMIN — SODIUM CHLORIDE 8 MG/HR: 9 INJECTION, SOLUTION INTRAVENOUS at 16:01

## 2023-08-21 RX ADMIN — ACETAMINOPHEN 650 MG: 325 TABLET, FILM COATED ORAL at 20:22

## 2023-08-21 RX ADMIN — SODIUM CHLORIDE 150 ML/HR: 9 INJECTION, SOLUTION INTRAVENOUS at 18:17

## 2023-08-21 NOTE — ED NOTES
"Nursing report ED to floor  Luann Frances  83 y.o.  female    HPI :   Chief Complaint   Patient presents with    Hypotension    Dizziness    Fall       Admitting doctor:   Sonia Edward MD    Admitting diagnosis:   The primary encounter diagnosis was Gastrointestinal hemorrhage, unspecified gastrointestinal hemorrhage type. A diagnosis of Fall, initial encounter was also pertinent to this visit.    Code status:   Current Code Status       Date Active Code Status Order ID Comments User Context       Prior            Allergies:   Ambien [zolpidem tartrate], Amoxicillin-pot clavulanate, Doxycycline, Eliquis [apixaban], Levofloxacin, Metronidazole, Naproxen, Sulfamethoxazole-trimethoprim, Synthroid [levothyroxine sodium], and Zolpidem    Intake and Output    Intake/Output Summary (Last 24 hours) at 8/21/2023 1547  Last data filed at 8/21/2023 1539  Gross per 24 hour   Intake 2000 ml   Output --   Net 2000 ml       Weight:       08/21/23  1327   Weight: 87.1 kg (192 lb)       Most recent vitals:   Vitals:    08/21/23 1327 08/21/23 1406 08/21/23 1519 08/21/23 1524   BP: 92/54 122/58  133/62   Pulse: 58 64 68 62   Resp: 16   14   Temp: 97.3 øF (36.3 øC)   97.6 øF (36.4 øC)   TempSrc: Tympanic   Oral   SpO2: 99% 99% 100% 100%   Weight: 87.1 kg (192 lb)      Height: 160 cm (63\")          Active LDAs/IV Access:   Lines, Drains & Airways       Active LDAs       Name Placement date Placement time Site Days    Peripheral IV 08/21/23 1330 Left Antecubital 08/21/23  1330  Antecubital  less than 1    External Urinary Catheter 08/21/23  1459  --  less than 1                    Labs (abnormal labs have a star):   Labs Reviewed   COMPREHENSIVE METABOLIC PANEL - Abnormal; Notable for the following components:       Result Value    Glucose 115 (*)     BUN 72 (*)     Creatinine 1.90 (*)     Chloride 112 (*)     CO2 21.0 (*)     Calcium 8.4 (*)     Total Protein 4.7 (*)     Albumin 3.2 (*)     Alkaline Phosphatase 34 (*)     " BUN/Creatinine Ratio 37.9 (*)     eGFR 26.1 (*)     All other components within normal limits    Narrative:     GFR Normal >60  Chronic Kidney Disease <60  Kidney Failure <15    The GFR formula is only valid for adults with stable renal function between ages 18 and 70.   PROTIME-INR - Abnormal; Notable for the following components:    Protime 27.9 (*)     INR 2.54 (*)     All other components within normal limits   CBC WITH AUTO DIFFERENTIAL - Abnormal; Notable for the following components:    RBC 1.87 (*)     Hemoglobin 5.6 (*)     Hematocrit 17.6 (*)     Immature Grans % 0.6 (*)     All other components within normal limits   URINALYSIS W/ MICROSCOPIC IF INDICATED (NO CULTURE)   CBC (NO DIFF)   PREPARE RBC   TYPE AND SCREEN   CBC AND DIFFERENTIAL    Narrative:     The following orders were created for panel order CBC & Differential.  Procedure                               Abnormality         Status                     ---------                               -----------         ------                     CBC Auto Differential[[id number]]        Abnormal            Final result                 Please view results for these tests on the individual orders.       EKG:   No orders to display       Meds given in ED:   Medications   sodium chloride 0.9 % flush 10 mL (has no administration in time range)   pantoprazole (PROTONIX) injection 80 mg (has no administration in time range)     And   pantoprazole (PROTONIX) 40 mg in 100 mL NS (VTB) (has no administration in time range)   sodium chloride 0.9 % bolus 1,000 mL (0 mL Intravenous Stopped 8/21/23 1539)       Imaging results:  CT Head Without Contrast    Result Date: 8/21/2023   No acute intracranial hemorrhage or hydrocephalus. If there is further clinical concern, MRI could be considered for further evaluation.  This report was finalized on 8/21/2023 3:10 PM by Dr. Alec Arcos M.D.       Ambulatory status:   - BR    Social issues:   Social History      Socioeconomic History    Marital status:    Tobacco Use    Smoking status: Every Day     Packs/day: 0.25     Years: 50.00     Pack years: 12.50     Types: Cigarettes     Start date: 1970    Smokeless tobacco: Never   Vaping Use    Vaping Use: Never used   Substance and Sexual Activity    Alcohol use: Not Currently    Drug use: No    Sexual activity: Defer       NIH Stroke Scale:        Nursing report ED to floor:

## 2023-08-21 NOTE — CONSULTS
[facility]  Initial Inpatient Consult Note    Referring Provider: Dr. Hale    Reason for Consultation: GI bleed, anemia    Subjective     History of present illness:    83 y.o. female who takes Pradaxa for DVT last dose this morning, admitted with weakness fatigue shortness of air.  Found to have a hemoglobin of 5.6.  She had an admission similar in March of this year undergoing EGD and colonoscopy for anemia.  EGD showed 3 angioectasias in the first part of the small intestine treated with argon plasma coagulation.  Colonoscopy revealed diverticulosis and 3 small polyps removed.  She does have black stool chronically is on oral iron therapy.  Denies bright blood per rectum or hematemesis.    Past Medical History:  Past Medical History:   Diagnosis Date    Acute deep vein thrombosis (DVT) of femoral vein of right lower extremity 04/15/2021    Arthritis     Asymptomatic PVCs     Backache     Chronic bronchitis     CKD (chronic kidney disease)     Stage 3    Deep vein thrombosis (DVT) of right lower extremity     Essential hypertension 01/20/2020    Fracture of humerus     GERD (gastroesophageal reflux disease)     Hyperlipidemia     Hypertension     Hypothyroidism     Pneumonia     Pulmonary emphysema     Renal calculi     Renal calculus 01/06/2021    Sleep apnea     DOES NOT USE CPAP    Thoracic vertebral fracture      Past Surgical History:  Past Surgical History:   Procedure Laterality Date    APPENDECTOMY      COLONOSCOPY N/A 3/15/2023    Procedure: COLONOSCOPY to cecum and TI :  hot snare sigmoid polyps with clip to 1 polyp,;  Surgeon: Jaun Ford MD;  Location: [facility] ENDOSCOPY;  Service: Gastroenterology;  Laterality: N/A;  pre:  anemia  post:  diverticulosis, polyps, hemorrhoids    ENDOSCOPY N/A 3/14/2023    Procedure: ESOPHAGOGASTRODUODENOSCOPY WITH ARGON PLASMA COAGULATION AND COLD BIOPSIES;  Surgeon: Jaun Ford MD;  Location: [facility] ENDOSCOPY;  Service: Gastroenterology;   Laterality: N/A;  PRE: MELANA; ANEMIA  POST: ANGIO ACTASIA; HIATAL HERNIA; SCHATZKI'S RING    EXTRACORPOREAL SHOCK WAVE LITHOTRIPSY (ESWL) Left 01/06/2021    Procedure: EXTRACORPOREAL SHOCKWAVE LITHOTRIPSY, CYSTOSCOPY, RETROGRADE PYLEOGRAM;  Surgeon: Walter Schwartz MD;  Location: [facility];  Service: Urology;  Laterality: Left;    EYE SURGERY      cataracts    HUMERUS FRACTURE SURGERY      LYTIC THROMBIN THERAPY Left 8/17/2022    Procedure: LT. LEG THROMBECTOMY/EMBOLECTOMY AND ANGIOPLASTY STENT PLACEMENT OF LEFT ILIAC VEIN;  Surgeon: Theo Bustos MD;  Location: [facility] OR 18/19;  Service: Vascular;  Laterality: Left;    RIB FRACTURE SURGERY  2021    THORACOSCOPY Right 02/16/2021    Procedure: bronchoscopy, right video assisted THORACOSCOPY WITH PLATING OF 3, 4, 5, AND 6 RIBS;  Surgeon: Kelley Delong MD;  Location: [facility] OR;  Service: Thoracic;  Laterality: Right;    TOTAL KNEE ARTHROPLASTY Left 04/2015    UMBILICAL HERNIA REPAIR        Social History:   Social History     Tobacco Use    Smoking status: Every Day     Packs/day: 0.25     Years: 50.00     Pack years: 12.50     Types: Cigarettes     Start date: 1970    Smokeless tobacco: Never   Substance Use Topics    Alcohol use: Not Currently      Family History:  Family History   Problem Relation Age of Onset    Cancer Mother         breast    Breast cancer Mother     No Known Problems Father     Heart disease Maternal Grandmother     Cancer Brother         esophageal , stomach     Esophageal cancer Brother     No Known Problems Son     Breast cancer Maternal Aunt     Heart disease Maternal Aunt     No Known Problems Son     Malig Hyperthermia Neg Hx        Home Meds:  (Not in a hospital admission)    Current Meds:   sodium chloride, 1,000 mL, Intravenous, Once      Allergies:  Allergies   Allergen Reactions    Ambien [Zolpidem Tartrate] Nausea Only     nausea    Amoxicillin-Pot Clavulanate Nausea Only    Doxycycline Nausea Only     Eliquis [Apixaban] Nausea Only    Levofloxacin Nausea Only    Metronidazole Nausea Only    Naproxen GI Intolerance    Sulfamethoxazole-Trimethoprim Nausea Only    Synthroid [Levothyroxine Sodium] Nausea Only    Zolpidem Nausea Only     Review of Systems  There is weakness and fatigue all other systems reviewed and negative    Objective     Vital Signs  Temp:  [97.3 øF (36.3 øC)] 97.3 øF (36.3 øC)  Heart Rate:  [58-64] 64  Resp:  [16] 16  BP: ()/(54-58) 122/58  Physical Exam:  General Appearance:    Alert, cooperative, in no acute distress   Head:    Normocephalic, without obvious abnormality, atraumatic   Eyes:          conjunctivae and sclerae normal, no   icterus   Throat:   no thrush, oral mucosa moist   Neck:   Supple, no adenopathy   Lungs:     Clear to auscultation bilaterally    Heart:    Regular rhythm and normal rate    Chest Wall:    No abnormalities observed   Abdomen:     Soft, nondistended, nontender; normal bowel sounds   Extremities:   no edema, no redness   Skin:   No bruising or rash   Psychiatric:  normal mood and insight     Results Review:   I reviewed the patient's new clinical results.    Results from last 7 days   Lab Units 08/21/23  1344 08/19/23  0708   WBC 10*3/mm3 6.91 8.04   HEMOGLOBIN g/dL 5.6* 10.4*   HEMATOCRIT % 17.6* 31.9*   PLATELETS 10*3/mm3 154 197     Results from last 7 days   Lab Units 08/21/23  1344 08/19/23  0708   SODIUM mmol/L 141 139   POTASSIUM mmol/L 5.1 4.5   CHLORIDE mmol/L 112* 105   CO2 mmol/L 21.0* 23.3   BUN mg/dL 72* 57*   CREATININE mg/dL 1.90* 1.44*   CALCIUM mg/dL 8.4* 9.3   BILIRUBIN mg/dL 0.2 0.3   ALK PHOS U/L 34* 51   ALT (SGPT) U/L 8 7   AST (SGOT) U/L 12 12   GLUCOSE mg/dL 115* 123*     Results from last 7 days   Lab Units 08/21/23  1344   INR  2.54*     Lab Results   Lab Value Date/Time    LIPASE 25 12/12/2022 1736    LIPASE 18 08/11/2022 1058    LIPASE 28 12/16/2020 1351    LIPASE 24 12/10/2015 0950       Radiology:  CT Head Without Contrast     (Results Pending)       Assessment & Plan   There are no active hospital problems to display for this patient.      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 after midnight for possible EGD tomorrow depending on level of INR  Hold Pradaxa      I discussed the patients findings and my recommendations with patient and nursing staff.    Elton Calvillo MD

## 2023-08-21 NOTE — ED PROVIDER NOTES
EMERGENCY DEPARTMENT ENCOUNTER    Room Number:  08/08  PCP: Dinah Humphrey MD  Discussed/ obtained information from independent historians: patient      HPI:  Chief Complaint: hypotension  Context: Luann Frances is a 83 y.o. female who presents to the ED c/o low blood pressure and fall that occurred just prior to arrival.  Patient states she got up from where she was sitting to grab her walker when she became very lightheaded and fell to the ground.  Patient states she fell hitting the back of her head on the hardwood floor.  She states she did feel very lightheaded prior to the fall.  She denies any chest pain at the time of the incident and denies any chest pain at this time.  She states she has been very fatigued.  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  She admits to history of GI bleed in the past, in March of this year for which she required blood transfusions.  She does admit her stool has been black recently but also states she has been on iron infusions.  She denies any abdominal pain.  She does admit to fatigue.  She denies any recent fever, chills, cough.      External (non-ED) record review: Patient was seen by Dr. Dubon with oncology on 6/29/2023 for follow-up of her lower extremity DVTs and follow-up of her anemia.  Was recommended at that time to continue Pradaxa at 150 twice daily as well as ferrous gluconate daily.      PAST MEDICAL HISTORY  Active Ambulatory Problems     Diagnosis Date Noted    Chronic obstructive pulmonary disease 01/21/2016    Diverticulitis of colon 01/21/2016    Acid reflux 01/21/2016    Primary hypothyroidism 01/21/2016    Insomnia 01/21/2016    Chronic nausea 01/21/2016    Post herpetic neuralgia 03/02/2017    Vitamin D deficiency 02/19/2018    CKD (chronic kidney disease) stage 3, GFR 30-59 ml/min 02/19/2018    Mixed hyperlipidemia 02/19/2018    Hypersomnia 10/16/2019    Hyperuricemia 01/20/2020    Essential hypertension 01/20/2020     DELROY (obstructive sleep apnea) 12/08/2020    Renal calculus 01/06/2021    Multiple closed fractures of ribs of right side 02/12/2021    Compression fracture of body of thoracic vertebra 08/17/2021    DVT, lower extremity, distal, acute, left 04/15/2022    Lower extremity edema 04/15/2022    Medicare annual wellness visit, subsequent 05/17/2022    Tobacco abuse 05/17/2022    Osteoporosis 05/17/2022    Acute deep vein thrombosis (DVT) of femoral vein of left lower extremity 08/16/2022    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    Iron deficiency 11/04/2022    Iron deficiency anemia 11/04/2022    History of deep venous thrombosis (DVT) of distal vein of left lower extremity 02/22/2023    Current use of long term anticoagulation 02/22/2023    Acute blood loss anemia 03/13/2023    Melena 03/13/2023     Resolved Ambulatory Problems     Diagnosis Date Noted    Acute sinusitis 01/21/2016    Atopic rhinitis 01/21/2016    Asthma 01/21/2016    Candidiasis 01/21/2016    Cough 01/21/2016    Mild dehydration 01/21/2016    Fatigue 01/21/2016    Knee pain 01/21/2016    Pain of lower extremity 01/21/2016    Chronic obstructive pulmonary disease with acute exacerbation 01/21/2016    Shoulder pain 01/21/2016    Ventricular premature beats 01/21/2016    Elevated BP 10/25/2016    Weight gain 12/07/2016    Cold intolerance 12/07/2016    Dyslipidemia 12/07/2016    Insulin resistance 04/10/2017    Non-cardiac chest pain 02/14/2021    Acute deep vein thrombosis (DVT) of femoral vein of right lower extremity (HCC) 04/15/2021    Arm skin lesion, right 11/17/2021    Skin tear of right lower leg without complication 05/17/2022    Myositis of left lower extremity 08/16/2022     Past Medical History:   Diagnosis Date    Arthritis     Asymptomatic PVCs     Backache     Chronic bronchitis     CKD (chronic kidney disease)     Deep vein thrombosis (DVT) of right lower extremity     Fracture of humerus     GERD (gastroesophageal  reflux disease)     Hyperlipidemia     Hypertension     Hypothyroidism     Pneumonia     Pulmonary emphysema     Renal calculi     Sleep apnea     Thoracic vertebral fracture          PAST SURGICAL HISTORY  Past Surgical History:   Procedure Laterality Date    APPENDECTOMY      COLONOSCOPY N/A 3/15/2023    Procedure: COLONOSCOPY to cecum and TI :  hot snare sigmoid polyps with clip to 1 polyp,;  Surgeon: Jaun Ford MD;  Location: [facility] ENDOSCOPY;  Service: Gastroenterology;  Laterality: N/A;  pre:  anemia  post:  diverticulosis, polyps, hemorrhoids    ENDOSCOPY N/A 3/14/2023    Procedure: ESOPHAGOGASTRODUODENOSCOPY WITH ARGON PLASMA COAGULATION AND COLD BIOPSIES;  Surgeon: Jaun Ford MD;  Location: [facility] ENDOSCOPY;  Service: Gastroenterology;  Laterality: N/A;  PRE: MELANA; ANEMIA  POST: ANGIO ACTASIA; HIATAL HERNIA; SCHATZKI'S RING    EXTRACORPOREAL SHOCK WAVE LITHOTRIPSY (ESWL) Left 01/06/2021    Procedure: EXTRACORPOREAL SHOCKWAVE LITHOTRIPSY, CYSTOSCOPY, RETROGRADE PYLEOGRAM;  Surgeon: Walter Schwartz MD;  Location: [facility] OR OSC;  Service: Urology;  Laterality: Left;    EYE SURGERY      cataracts    HUMERUS FRACTURE SURGERY      LYTIC THROMBIN THERAPY Left 8/17/2022    Procedure: LT. LEG THROMBECTOMY/EMBOLECTOMY AND ANGIOPLASTY STENT PLACEMENT OF LEFT ILIAC VEIN;  Surgeon: Theo Bustos MD;  Location: [facility] HYBRID OR 18/19;  Service: Vascular;  Laterality: Left;    RIB FRACTURE SURGERY  2021    THORACOSCOPY Right 02/16/2021    Procedure: bronchoscopy, right video assisted THORACOSCOPY WITH PLATING OF 3, 4, 5, AND 6 RIBS;  Surgeon: Kelley Delong MD;  Location: [facility] OR;  Service: Thoracic;  Laterality: Right;    TOTAL KNEE ARTHROPLASTY Left 04/2015    UMBILICAL HERNIA REPAIR           FAMILY HISTORY  Family History   Problem Relation Age of Onset    Cancer Mother         breast    Breast cancer Mother     No Known Problems Father     Heart disease Maternal Grandmother     Cancer  Brother         esophageal , stomach     Esophageal cancer Brother     No Known Problems Son     Breast cancer Maternal Aunt     Heart disease Maternal Aunt     No Known Problems Son     Malig Hyperthermia Neg Hx          SOCIAL HISTORY  Social History     Socioeconomic History    Marital status:    Tobacco Use    Smoking status: Every Day     Packs/day: 0.25     Years: 50.00     Pack years: 12.50     Types: Cigarettes     Start date: 1970    Smokeless tobacco: Never   Vaping Use    Vaping Use: Never used   Substance and Sexual Activity    Alcohol use: Not Currently    Drug use: No    Sexual activity: Defer         ALLERGIES  Ambien [zolpidem tartrate], Amoxicillin-pot clavulanate, Doxycycline, Eliquis [apixaban], Levofloxacin, Metronidazole, Naproxen, Sulfamethoxazole-trimethoprim, Synthroid [levothyroxine sodium], and Zolpidem        REVIEW OF SYSTEMS  Review of Systems   Constitutional:  Positive for fatigue. Negative for chills and fever.   Respiratory:  Negative for cough and shortness of breath.    Cardiovascular:  Negative for chest pain and leg swelling.   Gastrointestinal:  Positive for blood in stool (dark stools). Negative for abdominal pain, nausea and vomiting.   Genitourinary:  Negative for dysuria, flank pain and hematuria.   Musculoskeletal:  Negative for back pain.   Skin:  Positive for pallor.   Neurological:  Positive for weakness (generalized) and light-headedness. Negative for speech difficulty, numbness and headaches.          PHYSICAL EXAM  ED Triage Vitals [08/21/23 1327]   Temp Heart Rate Resp BP SpO2   97.3 øF (36.3 øC) 58 16 92/54 99 %      Temp src Heart Rate Source Patient Position BP Location FiO2 (%)   Tympanic Monitor -- -- --       Physical Exam      GENERAL: no acute distress  HENT: normocephalic, atraumatic.  No notable hematomas or lacerations of scalp.  EYES: no scleral icterus  CV: regular rhythm, normal rate.  No murmurs, rubs, or gallops.  RESPIRATORY: normal effort.   No wheezes, rales, rhonchi.  ABDOMEN: nondistended.  No rigidity or guarding.  Nontender.  Normal bowel sounds.  Digital rectal exam noted dark black stool which was positive on guaiac card.  No palpable external or internal hemorrhoids or notable fissures.  MUSCULOSKELETAL: no deformity  NEURO: alert, moves all extremities, follows commands  PSYCH:  calm, cooperative  SKIN: warm, dry. pallor    Vital signs and nursing notes reviewed.          LAB RESULTS  Recent Results (from the past 24 hour(s))   Comprehensive Metabolic Panel    Collection Time: 08/21/23  1:44 PM    Specimen: Blood   Result Value Ref Range    Glucose 115 (H) 65 - 99 mg/dL    BUN 72 (H) 8 - 23 mg/dL    Creatinine 1.90 (H) 0.57 - 1.00 mg/dL    Sodium 141 136 - 145 mmol/L    Potassium 5.1 3.5 - 5.2 mmol/L    Chloride 112 (H) 98 - 107 mmol/L    CO2 21.0 (L) 22.0 - 29.0 mmol/L    Calcium 8.4 (L) 8.6 - 10.5 mg/dL    Total Protein 4.7 (L) 6.0 - 8.5 g/dL    Albumin 3.2 (L) 3.5 - 5.2 g/dL    ALT (SGPT) 8 1 - 33 U/L    AST (SGOT) 12 1 - 32 U/L    Alkaline Phosphatase 34 (L) 39 - 117 U/L    Total Bilirubin 0.2 0.0 - 1.2 mg/dL    Globulin 1.5 gm/dL    A/G Ratio 2.1 g/dL    BUN/Creatinine Ratio 37.9 (H) 7.0 - 25.0    Anion Gap 8.0 5.0 - 15.0 mmol/L    eGFR 26.1 (L) >60.0 mL/min/1.73   Protime-INR    Collection Time: 08/21/23  1:44 PM    Specimen: Blood   Result Value Ref Range    Protime 27.9 (H) 11.7 - 14.2 Seconds    INR 2.54 (H) 0.90 - 1.10   CBC Auto Differential    Collection Time: 08/21/23  1:44 PM    Specimen: Blood   Result Value Ref Range    WBC 6.91 3.40 - 10.80 10*3/mm3    RBC 1.87 (L) 3.77 - 5.28 10*6/mm3    Hemoglobin 5.6 (C) 12.0 - 15.9 g/dL    Hematocrit 17.6 (C) 34.0 - 46.6 %    MCV 94.1 79.0 - 97.0 fL    MCH 29.9 26.6 - 33.0 pg    MCHC 31.8 31.5 - 35.7 g/dL    RDW 15.4 12.3 - 15.4 %    RDW-SD 50.6 37.0 - 54.0 fl    MPV 11.4 6.0 - 12.0 fL    Platelets 154 140 - 450 10*3/mm3    Neutrophil % 68.7 42.7 - 76.0 %    Lymphocyte % 23.9 19.6 - 45.3 %     Monocyte % 5.8 5.0 - 12.0 %    Eosinophil % 0.6 0.3 - 6.2 %    Basophil % 0.4 0.0 - 1.5 %    Immature Grans % 0.6 (H) 0.0 - 0.5 %    Neutrophils, Absolute 4.75 1.70 - 7.00 10*3/mm3    Lymphocytes, Absolute 1.65 0.70 - 3.10 10*3/mm3    Monocytes, Absolute 0.40 0.10 - 0.90 10*3/mm3    Eosinophils, Absolute 0.04 0.00 - 0.40 10*3/mm3    Basophils, Absolute 0.03 0.00 - 0.20 10*3/mm3    Immature Grans, Absolute 0.04 0.00 - 0.05 10*3/mm3    nRBC 0.0 0.0 - 0.2 /100 WBC   Type & Screen    Collection Time: 08/21/23  2:12 PM    Specimen: Blood   Result Value Ref Range    ABO Type A     RH type Negative     Antibody Screen Negative     T&S Expiration Date 8/24/2023 11:59:59 PM    Prepare RBC, 2 Units    Collection Time: 08/21/23  3:16 PM   Result Value Ref Range    Product Code P1255U28     Unit Number [id number]     UNIT  ABO A     UNIT  RH NEG     Crossmatch Interpretation Compatible     Dispense Status XM     Blood Expiration Date 202309062359     Blood Type Barcode 0600     Product Code S7234T11     Unit Number [id number]     UNIT  ABO A     UNIT  RH NEG     Crossmatch Interpretation Compatible     Dispense Status XM     Blood Expiration Date 202309062359     Blood Type Barcode 0600        Ordered the above labs and reviewed the results.        RADIOLOGY  CT Head Without Contrast    Result Date: 8/21/2023  CT HEAD WO CONTRAST-  INDICATIONS: Trauma  TECHNIQUE: Radiation dose reduction techniques were utilized, including automated exposure control and exposure modulation based on body size. Noncontrast head CT  COMPARISON: None available  FINDINGS:    No acute intracranial hemorrhage, midline shift or mass effect. No acute territorial infarct is identified.  Mild periventricular hypodensities suggest chronic small vessel ischemic change in a patient this age.  Arterial calcifications are seen at the base of the brain.  Ventricles, cisterns, cerebral sulci are unremarkable for patient age.  The visualized  paranasal sinuses, orbits, mastoid air cells are unremarkable.           No acute intracranial hemorrhage or hydrocephalus. If there is further clinical concern, MRI could be considered for further evaluation.  This report was finalized on 8/21/2023 3:10 PM by Dr. Alec Arcos M.D.       Ordered the above noted radiological studies. Reviewed by me in PACS.            MEDICATIONS GIVEN IN ER  Medications   sodium chloride 0.9 % flush 10 mL (has no administration in time range)   pantoprazole (PROTONIX) injection 80 mg (has no administration in time range)     And   pantoprazole (PROTONIX) 40 mg in 100 mL NS (VTB) (has no administration in time range)   sodium chloride 0.9 % bolus 1,000 mL (1,000 mL Intravenous New Bag 8/21/23 1413)         MEDICAL DECISION MAKING, PROGRESS, and CONSULTS    All labs have been independently reviewed by me.  All radiology studies have been reviewed by me and I have also reviewed the radiology report.  Discussion below represents my analysis of pertinent findings related to patient's condition, differential diagnosis, treatment plan and final disposition.    Orders placed during this visit:  Orders Placed This Encounter   Procedures    CT Head Without Contrast    Comprehensive Metabolic Panel    Protime-INR    Urinalysis With Microscopic If Indicated (No Culture) - Urine, Clean Catch    CBC Auto Differential    CBC (No Diff)    NPO Diet NPO Type: Sips with Meds    Verify Informed Consent    Advance Diet As Tolerated -    Transfuse 2 units of packed red blood cells for hemoglobin less than 7.0  Nursing Communication    Gastroenterology (on-call MD unless specified)    [facility] (on-call MD unless specified) Details    Prepare RBC, 2 Units    Type & Screen    Insert Peripheral IV    Inpatient Admission    CBC & Differential           Differential diagnosis:  Dehydration, GI bleed, sepsis, hypokalemia, viral illness, cardiogenic syncope      Independent interpretation of labs, radiology  studies, and discussions with consultants:  ED Course as of 08/21/23 1534   Mon Aug 21, 2023   1510 I informed the patient of lab and imaging findings and need for transfusion of blood.  I informed that we will page gastroenterology so they can evaluate for source of bleeding as well as need for admission.  Patient is stable at this time.  Patient understands and agrees plan of care. [AH]   1523 Discussed the patient with Dr. Edward who agrees to admit. [AH]   1525 Hemoglobin(!!): 5.6 [AH]   1525 Creatinine(!): 1.90 [AH]   1525 INR(!): 2.54 [AH]   1526 I viewed and independently interpreted the patient's CT head.  There is no intracranial hemorrhage or midline shift. [AH]   1526 2 units of blood have been ordered.  Protonix bolus and drip has been ordered.  Patient's blood pressure is stable currently at 122/58. []      ED Course User Index  [] Mary Lou Li PA       - Shared decision making: Patient agrees to plan for admission and transfusion.    Additional sources:    - Chronic or social conditions impacting care: On chronic anticoagulation for DVTs          DIAGNOSIS  Final diagnoses:   Gastrointestinal hemorrhage, unspecified gastrointestinal hemorrhage type   Fall, initial encounter           Follow Up:  No follow-up provider specified.    RX:     Medication List      No changes were made to your prescriptions during this visit.         Latest Documented Vital Signs:  As of 15:34 EDT  BP- 133/62 HR- 62 Temp- 97.6 øF (36.4 øC) (Oral) O2 sat- 100%              --    Please note that portions of this were completed with a voice recognition program.       Note Disclaimer: At [facility], we believe that sharing information builds trust and better relationships. You are receiving this note because you are receiving care at [facility] or recently visited. It is possible you will see health information before a provider has talked with you about it. This kind of information can be easy to misunderstand.  To help you fully understand what it means for your health, we urge you to discuss this note with your provider.               Mary Lou Li PA  08/21/23 1531

## 2023-08-21 NOTE — ED NOTES
Pt has been dizzy for a few days.  She has been seen for same.  She stood today, was dizzy, lost her balance and fell.  She hit the back of her head.  No loc.  She is on pradaxa.   Sbp was 70s on ems had 1000 ml ns and sbp is now 92

## 2023-08-22 ENCOUNTER — PREP FOR SURGERY (OUTPATIENT)
Dept: OTHER | Facility: HOSPITAL | Age: 83
End: 2023-08-22
Payer: MEDICARE

## 2023-08-22 ENCOUNTER — APPOINTMENT (OUTPATIENT)
Dept: GENERAL RADIOLOGY | Facility: HOSPITAL | Age: 83
DRG: 378 | End: 2023-08-22
Payer: MEDICARE

## 2023-08-22 ENCOUNTER — ANESTHESIA EVENT (OUTPATIENT)
Dept: GASTROENTEROLOGY | Facility: HOSPITAL | Age: 83
DRG: 378 | End: 2023-08-22
Payer: MEDICARE

## 2023-08-22 ENCOUNTER — ANESTHESIA (OUTPATIENT)
Dept: GASTROENTEROLOGY | Facility: HOSPITAL | Age: 83
DRG: 378 | End: 2023-08-22
Payer: MEDICARE

## 2023-08-22 DIAGNOSIS — K92.2 GASTROINTESTINAL HEMORRHAGE, UNSPECIFIED GASTROINTESTINAL HEMORRHAGE TYPE: Primary | ICD-10-CM

## 2023-08-22 LAB
ANION GAP SERPL CALCULATED.3IONS-SCNC: 6 MMOL/L (ref 5–15)
BH BB BLOOD EXPIRATION DATE: NORMAL
BH BB BLOOD EXPIRATION DATE: NORMAL
BH BB BLOOD TYPE BARCODE: 600
BH BB BLOOD TYPE BARCODE: 600
BH BB DISPENSE STATUS: NORMAL
BH BB DISPENSE STATUS: NORMAL
BH BB PRODUCT CODE: NORMAL
BH BB PRODUCT CODE: NORMAL
BH BB UNIT NUMBER: NORMAL
BH BB UNIT NUMBER: NORMAL
BUN SERPL-MCNC: 54 MG/DL (ref 8–23)
BUN/CREAT SERPL: 37.5 (ref 7–25)
CALCIUM SPEC-SCNC: 7.7 MG/DL (ref 8.6–10.5)
CHLORIDE SERPL-SCNC: 114 MMOL/L (ref 98–107)
CO2 SERPL-SCNC: 18 MMOL/L (ref 22–29)
CREAT SERPL-MCNC: 1.44 MG/DL (ref 0.57–1)
CROSSMATCH INTERPRETATION: NORMAL
CROSSMATCH INTERPRETATION: NORMAL
DEPRECATED RDW RBC AUTO: 51.5 FL (ref 37–54)
DEPRECATED RDW RBC AUTO: 52.3 FL (ref 37–54)
EGFRCR SERPLBLD CKD-EPI 2021: 36.4 ML/MIN/1.73
ERYTHROCYTE [DISTWIDTH] IN BLOOD BY AUTOMATED COUNT: 15.8 % (ref 12.3–15.4)
ERYTHROCYTE [DISTWIDTH] IN BLOOD BY AUTOMATED COUNT: 16.2 % (ref 12.3–15.4)
GLUCOSE SERPL-MCNC: 82 MG/DL (ref 65–99)
HCT VFR BLD AUTO: 23.1 % (ref 34–46.6)
HCT VFR BLD AUTO: 26.6 % (ref 34–46.6)
HGB BLD-MCNC: 7.7 G/DL (ref 12–15.9)
HGB BLD-MCNC: 8.8 G/DL (ref 12–15.9)
INR PPP: 1.67 (ref 0.9–1.1)
MCH RBC QN AUTO: 29.6 PG (ref 26.6–33)
MCH RBC QN AUTO: 30.2 PG (ref 26.6–33)
MCHC RBC AUTO-ENTMCNC: 33.1 G/DL (ref 31.5–35.7)
MCHC RBC AUTO-ENTMCNC: 33.3 G/DL (ref 31.5–35.7)
MCV RBC AUTO: 89.6 FL (ref 79–97)
MCV RBC AUTO: 90.6 FL (ref 79–97)
PLATELET # BLD AUTO: 135 10*3/MM3 (ref 140–450)
PLATELET # BLD AUTO: 158 10*3/MM3 (ref 140–450)
PMV BLD AUTO: 12 FL (ref 6–12)
PMV BLD AUTO: 12.2 FL (ref 6–12)
POTASSIUM SERPL-SCNC: 4.4 MMOL/L (ref 3.5–5.2)
PROTHROMBIN TIME: 20 SECONDS (ref 11.7–14.2)
RBC # BLD AUTO: 2.55 10*6/MM3 (ref 3.77–5.28)
RBC # BLD AUTO: 2.97 10*6/MM3 (ref 3.77–5.28)
SODIUM SERPL-SCNC: 138 MMOL/L (ref 136–145)
UNIT  ABO: NORMAL
UNIT  ABO: NORMAL
UNIT  RH: NORMAL
UNIT  RH: NORMAL
WBC NRBC COR # BLD: 7 10*3/MM3 (ref 3.4–10.8)
WBC NRBC COR # BLD: 8.34 10*3/MM3 (ref 3.4–10.8)

## 2023-08-22 PROCEDURE — 43235 EGD DIAGNOSTIC BRUSH WASH: CPT | Performed by: INTERNAL MEDICINE

## 2023-08-22 PROCEDURE — 73660 X-RAY EXAM OF TOE(S): CPT

## 2023-08-22 PROCEDURE — 85610 PROTHROMBIN TIME: CPT | Performed by: INTERNAL MEDICINE

## 2023-08-22 PROCEDURE — 99232 SBSQ HOSP IP/OBS MODERATE 35: CPT | Performed by: INTERNAL MEDICINE

## 2023-08-22 PROCEDURE — 25010000002 PROPOFOL 10 MG/ML EMULSION: Performed by: ANESTHESIOLOGY

## 2023-08-22 PROCEDURE — 94761 N-INVAS EAR/PLS OXIMETRY MLT: CPT

## 2023-08-22 PROCEDURE — 44366 SMALL BOWEL ENDOSCOPY: CPT | Performed by: INTERNAL MEDICINE

## 2023-08-22 PROCEDURE — 94799 UNLISTED PULMONARY SVC/PX: CPT

## 2023-08-22 PROCEDURE — 85027 COMPLETE CBC AUTOMATED: CPT | Performed by: INTERNAL MEDICINE

## 2023-08-22 PROCEDURE — 80048 BASIC METABOLIC PNL TOTAL CA: CPT | Performed by: INTERNAL MEDICINE

## 2023-08-22 PROCEDURE — 94640 AIRWAY INHALATION TREATMENT: CPT

## 2023-08-22 PROCEDURE — 94664 DEMO&/EVAL PT USE INHALER: CPT

## 2023-08-22 DEVICE — WORKING LENGTH 235CM, WORKING CHANNEL 2.8MM
Type: IMPLANTABLE DEVICE | Site: JEJUNUM | Status: FUNCTIONAL
Brand: RESOLUTION 360 CLIP

## 2023-08-22 RX ORDER — PROPOFOL 10 MG/ML
VIAL (ML) INTRAVENOUS CONTINUOUS PRN
Status: DISCONTINUED | OUTPATIENT
Start: 2023-08-22 | End: 2023-08-22 | Stop reason: SURG

## 2023-08-22 RX ORDER — SODIUM CHLORIDE 9 MG/ML
1000 INJECTION, SOLUTION INTRAVENOUS CONTINUOUS
Status: ACTIVE | OUTPATIENT
Start: 2023-08-22 | End: 2023-08-24

## 2023-08-22 RX ORDER — SODIUM CHLORIDE, SODIUM LACTATE, POTASSIUM CHLORIDE, CALCIUM CHLORIDE 600; 310; 30; 20 MG/100ML; MG/100ML; MG/100ML; MG/100ML
INJECTION, SOLUTION INTRAVENOUS CONTINUOUS PRN
Status: DISCONTINUED | OUTPATIENT
Start: 2023-08-22 | End: 2023-08-22 | Stop reason: SURG

## 2023-08-22 RX ADMIN — SODIUM CHLORIDE 150 ML/HR: 9 INJECTION, SOLUTION INTRAVENOUS at 00:32

## 2023-08-22 RX ADMIN — TRAMADOL HYDROCHLORIDE 50 MG: 50 TABLET, COATED ORAL at 02:20

## 2023-08-22 RX ADMIN — TRAMADOL HYDROCHLORIDE 50 MG: 50 TABLET, COATED ORAL at 23:55

## 2023-08-22 RX ADMIN — SODIUM CHLORIDE, POTASSIUM CHLORIDE, SODIUM LACTATE AND CALCIUM CHLORIDE: 600; 310; 30; 20 INJECTION, SOLUTION INTRAVENOUS at 14:47

## 2023-08-22 RX ADMIN — TRAMADOL HYDROCHLORIDE 50 MG: 50 TABLET, COATED ORAL at 08:48

## 2023-08-22 RX ADMIN — ACETAMINOPHEN 650 MG: 325 TABLET, FILM COATED ORAL at 20:30

## 2023-08-22 RX ADMIN — SODIUM CHLORIDE 8 MG/HR: 9 INJECTION, SOLUTION INTRAVENOUS at 08:47

## 2023-08-22 RX ADMIN — SENNOSIDES AND DOCUSATE SODIUM 2 TABLET: 50; 8.6 TABLET ORAL at 20:30

## 2023-08-22 RX ADMIN — BUDESONIDE AND FORMOTEROL FUMARATE DIHYDRATE 1 PUFF: 160; 4.5 AEROSOL RESPIRATORY (INHALATION) at 08:11

## 2023-08-22 RX ADMIN — SODIUM CHLORIDE 150 ML/HR: 9 INJECTION, SOLUTION INTRAVENOUS at 23:53

## 2023-08-22 RX ADMIN — BUDESONIDE AND FORMOTEROL FUMARATE DIHYDRATE 1 PUFF: 160; 4.5 AEROSOL RESPIRATORY (INHALATION) at 20:34

## 2023-08-22 RX ADMIN — SODIUM CHLORIDE 8 MG/HR: 9 INJECTION, SOLUTION INTRAVENOUS at 02:56

## 2023-08-22 RX ADMIN — TRAMADOL HYDROCHLORIDE 50 MG: 50 TABLET, COATED ORAL at 16:38

## 2023-08-22 RX ADMIN — FOLIC ACID 1 MG: 1 TABLET ORAL at 08:48

## 2023-08-22 RX ADMIN — PROPOFOL 50 MCG/KG/MIN: 10 INJECTION, EMULSION INTRAVENOUS at 14:51

## 2023-08-22 RX ADMIN — LEVOTHYROXINE SODIUM 100 MCG: 0.1 TABLET ORAL at 08:48

## 2023-08-22 RX ADMIN — Medication 2000 UNITS: at 08:48

## 2023-08-22 RX ADMIN — SODIUM CHLORIDE 150 ML/HR: 9 INJECTION, SOLUTION INTRAVENOUS at 07:35

## 2023-08-22 RX ADMIN — SODIUM CHLORIDE 1000 ML: 9 INJECTION, SOLUTION INTRAVENOUS at 13:41

## 2023-08-22 RX ADMIN — SODIUM CHLORIDE 8 MG/HR: 9 INJECTION, SOLUTION INTRAVENOUS at 18:04

## 2023-08-22 RX ADMIN — SODIUM CHLORIDE 8 MG/HR: 9 INJECTION, SOLUTION INTRAVENOUS at 23:53

## 2023-08-22 RX ADMIN — SODIUM CHLORIDE 150 ML/HR: 9 INJECTION, SOLUTION INTRAVENOUS at 17:30

## 2023-08-22 RX ADMIN — TORSEMIDE 20 MG: 20 TABLET ORAL at 08:48

## 2023-08-22 RX ADMIN — Medication 10 ML: at 20:31

## 2023-08-22 RX ADMIN — Medication 1000 MCG: at 08:47

## 2023-08-22 RX ADMIN — SENNOSIDES AND DOCUSATE SODIUM 2 TABLET: 50; 8.6 TABLET ORAL at 08:48

## 2023-08-22 NOTE — ANESTHESIA PREPROCEDURE EVALUATION
Anesthesia Evaluation     Patient summary reviewed and Nursing notes reviewed                Airway   Mallampati: II  TM distance: >3 FB  Neck ROM: full  Dental      Pulmonary    (+) a smoker Current, COPD,sleep apnea  Cardiovascular     ECG reviewed  PT is on anticoagulation therapy  Rhythm: regular  Rate: normal    (+) hypertension, DVT, hyperlipidemia      Neuro/Psych- negative ROS  GI/Hepatic/Renal/Endo    (+) morbid obesity, GERD, GI bleeding , renal disease CRI and stones, diabetes mellitus type 2, thyroid problem hypothyroidism    Musculoskeletal     Abdominal    Substance History - negative use     OB/GYN negative ob/gyn ROS         Other   arthritis,                     Anesthesia Plan    ASA 4     MAC     intravenous induction     Anesthetic plan, risks, benefits, and alternatives have been provided, discussed and informed consent has been obtained with: patient.    Plan discussed with CRNA.      CODE STATUS:    Code Status (Patient has no pulse and is not breathing): CPR (Attempt to Resuscitate)  Medical Interventions (Patient has pulse or is breathing): Full

## 2023-08-22 NOTE — DISCHARGE PLACEMENT REQUEST
"Luann Frances (83 y.o. Female)       Date of Birth   1940    Social Security Number       Address   [address] [place] [address]    Home Phone   [phone]    MRN   [id number]       Zoroastrianism   None    Marital Status                               Admission Date   8/21/23    Admission Type   Emergency    Admitting Provider   Sonia Edward MD    Attending Provider   Abhishek Hernandez MD    Department, Room/Bed   [facility] ENDO SUITES, ENDO/ENDO       Discharge Date       Discharge Disposition       Discharge Destination                                 Attending Provider: Abhishek Hernandez MD    Allergies: Ambien [Zolpidem Tartrate], Amoxicillin-pot Clavulanate, Doxycycline, Eliquis [Apixaban], Levofloxacin, Metronidazole, Naproxen, Sulfamethoxazole-trimethoprim, Synthroid [Levothyroxine Sodium], Zolpidem    Isolation: None   Infection: None   Code Status: CPR    Ht: 160 cm (63\")   Wt: 91.8 kg (202 lb 6.1 oz)    Admission Cmt: None   Principal Problem: GI bleed [K92.2]                   Active Insurance as of 8/21/2023       Primary Coverage       Payor Plan Insurance Group Employer/Plan Group    HUMANA MEDICARE REPLACEMENT HUMANA MEDICARE REPLACEMENT [id number]       Payor Plan Address Payor Plan Phone Number Payor Plan Fax Number Effective Dates    [address] [address] [phone]  1/1/2023 - None Entered    [facility] [address]         Subscriber Name Subscriber Birth Date Member ID       LUANN FRANCES 1940 [id number]                     Emergency Contacts        (Rel.) Home Phone Work Phone Mobile Phone    CED FRANCES (Son) [phone] -- --    Cooper Frances (Son) -- -- [phone]                "

## 2023-08-22 NOTE — PLAN OF CARE
Goal Outcome Evaluation:  Plan of Care Reviewed With: patient           Outcome Evaluation: VSS, Tramadol & tylenol given for pain with relief. Pt appeared to sleep well between care. 2nd unit of PRBCs given, next hgb check at 0800. Pt NPO at MN for possible procedure today. Will CTM, safety maintained.

## 2023-08-22 NOTE — CASE MANAGEMENT/SOCIAL WORK
Discharge Planning Assessment  [facility]     Patient Name: Luann Frances  MRN: [id number]  Today's Date: 8/22/2023    Admit Date: 8/21/2023    Plan: SNF vs home, will need stretcher/ambulance transport   Discharge Needs Assessment       Row Name 08/22/23 1335       Living Environment    People in Home alone    Current Living Arrangements home    Potentially Unsafe Housing Conditions none    Primary Care Provided by self    Provides Primary Care For no one    Family Caregiver if Needed child(carlo), adult    Family Caregiver Names Cooper Frances [phone]    Quality of Family Relationships helpful;involved    Able to Return to Prior Arrangements other (see comments)  Pt is requesting short term rehab with the goal of returning home       Resource/Environmental Concerns    Resource/Environmental Concerns none    Transportation Concerns none       Food Insecurity    Within the past 12 months, you worried that your food would run out before you got the money to buy more. Never true    Within the past 12 months, the food you bought just didn't last and you didn't have money to get more. Never true       Transition Planning    Patient/Family Anticipates Transition to home    Patient/Family Anticipated Services at Transition none    Transportation Anticipated family or friend will provide;health plan transportation       Discharge Needs Assessment    Current Outpatient/Agency/Support Group skilled nursing facility    Equipment Currently Used at Home cane, straight;walker, rolling    Concerns to be Addressed discharge planning    Anticipated Changes Related to Illness inability to care for self    Equipment Needed After Discharge none    Outpatient/Agency/Support Group Needs skilled nursing facility    Discharge Facility/Level of Care Needs nursing facility, skilled    Provided Post Acute Provider List? N/A    Provided Post Acute Provider Quality & Resource List? N/A                   Discharge Plan       Row Name  08/22/23 1337       Plan    Plan SNF vs home, will need stretcher/ambulance transport    Plan Comments Met with pt at bedside. Permission obtained to speak to pt in front of son. Introduced self and explained role of . Pt denies any difficulty paying for medications, and she obtains her medications from [facility]. Pt lives alone, sons live nearby that can assist if any care needs arise, has a care giver that comes in every other week to clean house. Pt is slow to perform her ADL's and uses a walker/Cane for mobility. Pt stated she felt as if she could use some rehab wth the goal of returning home. Pt has been to [facility] before and is requesting referrals be placed to the [facility], and any [facility] facility that is around that area. Referrals placed and contacted [facility] Pt stated that she has had home health in the past, but cannot remember. Upon discharge, pt will need ambulance/stretcher transport. Explained that [facility] would follow to assess for discharge needs.                  Continued Care and Services - Admitted Since 8/21/2023    Coordination has not been started for this encounter.       Selected Continued Care - Episodes Includes continued care and service providers with selected services from the active episodes listed below      High Risk Care Management Episode start date: 8/21/2023   There are no active outsourced providers for this episode.             Oncology Episode start date: 9/9/2022   There are no active outsourced providers for this episode.                 Expected Discharge Date and Time       Expected Discharge Date Expected Discharge Time    Aug 25, 2023            Demographic Summary    No documentation.                  Functional Status    No documentation.                  Psychosocial    No documentation.                  Abuse/Neglect    No documentation.                  Legal    No documentation.                  Substance Abuse    No documentation.                   Patient Forms    No documentation.                     Nasrin Carlson RN

## 2023-08-22 NOTE — ANESTHESIA POSTPROCEDURE EVALUATION
"Patient: Luann Frances    Procedure Summary       Date: 08/22/23 Room / Location:  [facility]    Anesthesia Start: 1447 Anesthesia Stop: 1531    Procedures:       ENTEROSCOPY SMALL BOWEL  cauterization to AVMs jejunem wiht clips x2 (Esophagus)      ESOPHAGOGASTRODUODENOSCOPY (Esophagus) Diagnosis:       Gastrointestinal hemorrhage, unspecified gastrointestinal hemorrhage type      (Gastrointestinal hemorrhage, unspecified gastrointestinal hemorrhage type [K92.2])    Surgeons: Johanne Calvo MD Provider: Bernard Garcia MD    Anesthesia Type: MAC ASA Status: 4            Anesthesia Type: MAC    Vitals  Vitals Value Taken Time   /67 08/22/23 1549   Temp     Pulse 68 08/22/23 1549   Resp 12 08/22/23 1549   SpO2 97 % 08/22/23 1549           Post Anesthesia Care and Evaluation    Pain management: adequate    Airway patency: patent  Anesthetic complications: No anesthetic complications    Cardiovascular status: acceptable  Respiratory status: acceptable  Hydration status: acceptable    Comments: /67 (BP Location: Left arm, Patient Position: Lying)   Pulse 68   Temp 36.6 øC (97.8 øF) (Oral)   Resp 12   Ht 160 cm (63\")   Wt 91.8 kg (202 lb 6.1 oz)   SpO2 97%   BMI 35.85 kg/mý       "

## 2023-08-22 NOTE — ED PROVIDER NOTES
MD ATTESTATION NOTE    The KYM and I have discussed this patient's history, physical exam, and treatment plan.  I have reviewed the documentation and personally had a face to face interaction with the patient. I affirm the documentation and agree with the treatment and plan.  The attached note describes my personal findings.          Brief HPI: Patient presents via EMS from home for evaluation of weakness with dizziness and a fall at home.  She was noted to have low blood pressures in route.  Patient apparently was very lightheaded when she was walking and suddenly had to lower herself to the floor quickly.  She does think she struck the back of her head but did not complete lose consciousness.  She has seen some black-colored stools recently and does have a history of previous GI bleeds.  She denies any fever or flulike symptoms.    PHYSICAL EXAM  ED Triage Vitals   Temp Heart Rate Resp BP SpO2   08/21/23 1327 08/21/23 1327 08/21/23 1327 08/21/23 1327 08/21/23 1327   97.3 øF (36.3 øC) 58 16 92/54 99 %      Temp src Heart Rate Source Patient Position BP Location FiO2 (%)   08/21/23 1327 08/21/23 1327 08/21/23 1700 08/21/23 1700 --   Tympanic Monitor Lying Left arm          GENERAL: Pleasant lady, appears fatigued but no acute distress  HENT: nares patent, normocephalic, atraumatic  EYES: no scleral icterus, normal conjunctivae  CV: Normal pulses, normal rate  RESPIRATORY: normal effort, no stridor  ABDOMEN: soft,, no focal areas of tenderness to palpation  MUSCULOSKELETAL: no deformity  NEURO: alert, moves all extremities, follows commands  PSYCH:  calm, cooperative  SKIN: warm, dry    Vital signs and nursing notes reviewed.        Plan: Given history of GI bleed with anemia we will certainly need to check hematologic work-up.  Also checking metabolic panel and cardiac studies to look for any sign of arrhythmia or electrolyte derangement.    -Patient found to have significant anemia.  Ordering blood transfusion.  CT  head is reassuring.  We will arrange to admit patient for further transfusion needs and supportive care today.       Rohan Hale MD  08/21/23 1559

## 2023-08-22 NOTE — PROGRESS NOTES
[facility]  Inpatient Progress Note    Reason for Follow Up:  GI bleed, anemia    Subjective     Interval History:   No issues overnight.  No nausea vomiting or abdominal pain.  No gross bleeding    Current Facility-Administered Medications:     acetaminophen (TYLENOL) tablet 650 mg, 650 mg, Oral, Q4H PRN, Sonia Edward MD, 650 mg at 08/21/23 2022    albuterol (PROVENTIL) nebulizer solution 0.083% 2.5 mg/3mL, 2.5 mg, Nebulization, Q6H PRN, Sonia Edward MD    sennosides-docusate (PERICOLACE) 8.6-50 MG per tablet 2 tablet, 2 tablet, Oral, BID, 2 tablet at 08/22/23 0848 **AND** polyethylene glycol (MIRALAX) packet 17 g, 17 g, Oral, Daily PRN **AND** bisacodyl (DULCOLAX) EC tablet 5 mg, 5 mg, Oral, Daily PRN **AND** bisacodyl (DULCOLAX) suppository 10 mg, 10 mg, Rectal, Daily PRN, Sonia Edward MD    budesonide-formoterol (SYMBICORT) 160-4.5 MCG/ACT inhaler 1 puff, 1 puff, Inhalation, BID - RT, Sonia Edward MD, 1 puff at 08/22/23 0811    cholecalciferol (VITAMIN D3) tablet 2,000 Units, 2,000 Units, Oral, Daily, Sonia Edward MD, 2,000 Units at 08/22/23 0848    folic acid (FOLVITE) tablet 1 mg, 1 mg, Oral, Daily, Sonia Edward MD, 1 mg at 08/22/23 0848    levothyroxine (SYNTHROID, LEVOTHROID) tablet 100 mcg, 100 mcg, Oral, Q AM, Sonia Edward MD, 100 mcg at 08/22/23 0848    losartan (COZAAR) tablet 25 mg, 25 mg, Oral, Daily, Sonia Edward MD    melatonin tablet 3 mg, 3 mg, Oral, Nightly PRN, Sonia Edward MD    ondansetron (ZOFRAN) tablet 4 mg, 4 mg, Oral, Q6H PRN **OR** ondansetron (ZOFRAN) injection 4 mg, 4 mg, Intravenous, Q6H PRN, Sonia Edward MD    [COMPLETED] pantoprazole (PROTONIX) injection 80 mg, 80 mg, Intravenous, Once, 80 mg at 08/21/23 1557 **AND** pantoprazole (PROTONIX) 40 mg in 100 mL NS (VTB), 8 mg/hr, Intravenous, Continuous, Mary Lou Li PA, Last Rate: 20 mL/hr at 08/22/23 0847, 8  mg/hr at 08/22/23 0847    [COMPLETED] Insert Peripheral IV, , , Once **AND** sodium chloride 0.9 % flush 10 mL, 10 mL, Intravenous, PRN, Mary Lou Li PA    sodium chloride 0.9 % infusion, 150 mL/hr, Intravenous, Continuous, Sonia Edward MD, Last Rate: 150 mL/hr at 08/22/23 0735, 150 mL/hr at 08/22/23 0735    torsemide (DEMADEX) tablet 20 mg, 20 mg, Oral, Every Other Day, Sonia Edward MD, 20 mg at 08/22/23 0848    traMADol (ULTRAM) tablet 50 mg, 50 mg, Oral, Q6H PRN, Sonia Edward MD, 50 mg at 08/22/23 0848    vitamin B-12 (CYANOCOBALAMIN) tablet 1,000 mcg, 1,000 mcg, Oral, Daily, Sonia Edward MD, 1,000 mcg at 08/22/23 0847  Review of Systems:    The following systems were reviewed and negative;  constitution and gastrointestinal    Objective     Vital Signs  Temp:  [97.2 øF (36.2 øC)-98.8 øF (37.1 øC)] 98.2 øF (36.8 øC)  Heart Rate:  [55-75] 55  Resp:  [14-18] 16  BP: ()/(50-64) 93/54  Body mass index is 35.85 kg/mý.    Intake/Output Summary (Last 24 hours) at 8/22/2023 1010  Last data filed at 8/22/2023 0745  Gross per 24 hour   Intake 4607.5 ml   Output 750 ml   Net 3857.5 ml     I/O this shift:  In: 985 [I.V.:985]  Out: 750 [Urine:750]     Physical Exam:   General: patient awake, alert and cooperative   Eyes: Normal lids and lashes, no scleral icterus   Neck: supple, normal ROM   Skin: warm and dry, not jaundiced   Pulm: regular and unlabored   Abdomen: soft, nontender, nondistended   Psychiatric: Normal mood and behavior; memory intact     Results Review:     I reviewed the patient's new clinical results.    Results from last 7 days   Lab Units 08/22/23  0831 08/21/23  2000 08/21/23  1344   WBC 10*3/mm3 7.00 11.87* 6.91   HEMOGLOBIN g/dL 7.7* 7.1* 5.6*   HEMATOCRIT % 23.1* 22.1* 17.6*   PLATELETS 10*3/mm3 135* 148 154     Results from last 7 days   Lab Units 08/22/23  0831 08/21/23  1344 08/19/23  0708   SODIUM mmol/L 138 141 139   POTASSIUM mmol/L 4.4 5.1 4.5    CHLORIDE mmol/L 114* 112* 105   CO2 mmol/L 18.0* 21.0* 23.3   BUN mg/dL 54* 72* 57*   CREATININE mg/dL 1.44* 1.90* 1.44*   CALCIUM mg/dL 7.7* 8.4* 9.3   BILIRUBIN mg/dL  --  0.2 0.3   ALK PHOS U/L  --  34* 51   ALT (SGPT) U/L  --  8 7   AST (SGOT) U/L  --  12 12   GLUCOSE mg/dL 82 115* 123*     Results from last 7 days   Lab Units 08/22/23  0831 08/21/23  1344   INR  1.67* 2.54*     Lab Results   Lab Value Date/Time    LIPASE 25 12/12/2022 1736    LIPASE 18 08/11/2022 1058    LIPASE 28 12/16/2020 1351    LIPASE 24 12/10/2015 0950       Radiology:  CT Head Without Contrast   Final Result       No acute intracranial hemorrhage or hydrocephalus. If there is further   clinical concern, MRI could be considered for further evaluation.       This report was finalized on 8/21/2023 3:10 PM by Dr. Alec Arcos M.D.              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    Johanne Calvo MD

## 2023-08-22 NOTE — PROGRESS NOTES
Name: Luann Frances ADMIT: 2023   : 1940  PCP: Dinah Humphrey MD    MRN: [id number] LOS: 1 days   AGE/SEX: 83 y.o. female  ROOM: [facility]     Subjective   Subjective   Patient is seen at bedside, no new complaints.       Objective   Objective   Vital Signs  Temp:  [97.8 øF (36.6 øC)-98.8 øF (37.1 øC)] 97.8 øF (36.6 øC)  Heart Rate:  [55-68] 68  Resp:  [9-18] 12  BP: ()/(51-67) 125/67  SpO2:  [96 %-100 %] 97 %  on   ;   Device (Oxygen Therapy): room air  Body mass index is 35.85 kg/mý.  Physical Exam    General, awake and alert.  Head and ENT, normocephalic and atraumatic.  Lungs, symmetric expansion, equal air entry bilaterally.  Heart, regular rate and rhythm.  Abdomen, soft and nontender.  Extremities, no clubbing or cyanosis.  Neuro, no focal deficits.  Skin: Warm and no rash.  Psych, normal mood and affect.  Musculoskeletal, joint examination is grossly normal.        Results Review     I reviewed the patient's new clinical results.  Results from last 7 days   Lab Units 23  0831 23  1344 23  0708   WBC 10*3/mm3 7.00 11.87* 6.91 8.04   HEMOGLOBIN g/dL 7.7* 7.1* 5.6* 10.4*   PLATELETS 10*3/mm3 135* 148 154 197     Results from last 7 days   Lab Units 23  0831 23  1344 23  0708   SODIUM mmol/L 138 141 139   POTASSIUM mmol/L 4.4 5.1 4.5   CHLORIDE mmol/L 114* 112* 105   CO2 mmol/L 18.0* 21.0* 23.3   BUN mg/dL 54* 72* 57*   CREATININE mg/dL 1.44* 1.90* 1.44*   GLUCOSE mg/dL 82 115* 123*   EGFR mL/min/1.73 36.4* 26.1* 36.4*     Results from last 7 days   Lab Units 23  1344 23  0708   ALBUMIN g/dL 3.2* 3.8   BILIRUBIN mg/dL 0.2 0.3   ALK PHOS U/L 34* 51   AST (SGOT) U/L 12 12   ALT (SGPT) U/L 8 7     Results from last 7 days   Lab Units 23  0831 23  1344 23  0708   CALCIUM mg/dL 7.7* 8.4* 9.3   ALBUMIN g/dL  --  3.2* 3.8     Results from last 7 days   Lab Units 23  0800 23  0708   PROCALCITONIN ng/mL   --  0.04   LACTATE mmol/L 1.1  --      No results found for: HGBA1C, POCGLU    CT Head Without Contrast    Result Date: 8/21/2023   No acute intracranial hemorrhage or hydrocephalus. If there is further clinical concern, MRI could be considered for further evaluation.  This report was finalized on 8/21/2023 3:10 PM by Dr. Alec Arcos M.D.      XR Toe 2+ View Right    Result Date: 8/22/2023  No fracture. Mild hallux valgus deformity. Mild first MTP medial soft tissue swelling. Mild IP joint osteoarthritis.    This report was finalized on 8/22/2023 1:49 PM by Dr. Theo Vickers M.D.       I have personally reviewed all medications:  Scheduled Medications  budesonide-formoterol, 1 puff, Inhalation, BID - RT  cholecalciferol, 2,000 Units, Oral, Daily  folic acid, 1 mg, Oral, Daily  levothyroxine, 100 mcg, Oral, Q AM  losartan, 25 mg, Oral, Daily  senna-docusate sodium, 2 tablet, Oral, BID  torsemide, 20 mg, Oral, Every Other Day  vitamin B-12, 1,000 mcg, Oral, Daily    Infusions  pantoprazole, 8 mg/hr, Last Rate: 8 mg/hr (08/22/23 0847)  sodium chloride, 1,000 mL, Last Rate: 1,000 mL (08/22/23 1341)  sodium chloride, 150 mL/hr, Last Rate: 150 mL/hr (08/22/23 1730)    Diet  Diet: Liquid Diets; Clear Liquid; Texture: Regular Texture (IDDSI 7); Fluid Consistency: Thin (IDDSI 0)    I have personally reviewed:  [x]  Laboratory   [x]  Microbiology   [x]  Radiology   [x]  EKG/Telemetry  [x]  Cardiology/Vascular   []  Pathology    []  Records       Assessment/Plan     Active Hospital Problems    Diagnosis  POA    **GI bleed [K92.2]  Yes    DELROY (obstructive sleep apnea) [G47.33]  Yes    Essential hypertension [I10]  Yes    Vitamin D deficiency [E55.9]  Yes    CKD (chronic kidney disease) stage 3, GFR 30-59 ml/min [N18.30]  Yes    Mixed hyperlipidemia [E78.2]  Yes    Chronic obstructive pulmonary disease [J44.9]  Yes    Primary hypothyroidism [E03.9]  Yes      Resolved Hospital Problems   No resolved problems to display.        83 y.o. female admitted with GI bleed.    Assessment and plan  1.  GI bleed, status post PRBCs transfusion, hold Pradaxa.  Gastroenterology on board, follow management recommendations.    2.  Hypertension, hyperlipidemia, chronic conditions.    3.  Chronic kidney disease stage III, monitor renal function.  Avoid nephrotoxic medications.    4.  COPD, chronic and stable.    5.  Right foot pain, order x-ray.    6.  CODE STATUS is full code.  Further plans based on hospital course.      Abhishek Hernandez MD  [facility]  08/22/23  17:42 EDT

## 2023-08-22 NOTE — PLAN OF CARE
Goal Outcome Evaluation:  Plan of Care Reviewed With: patient, son        Progress: improving  Outcome Evaluation: Patient resting comfortably this shift. Complaints of chronic pain with prn tramadol given with good effects. To Endo today for EGD. Returned in no distress. Tolerated well. Clear liquid diet ordered and consumed some broth and jello. Voiding well. No BM this shift, no s/s/ of bleed. Tolerating IVF and IV protonix well. Xray of right foot negative for fracture. Vital signs stable. Call light in reach. Safety maintained.

## 2023-08-22 NOTE — H&P
HISTORY AND PHYSICAL   [facility]        Date of Admission: 2023  Patient Identification:  Name: Luann Frances  Age: 83 y.o.  Sex: female  :  1940  MRN: [id number]                     Primary Care Physician: Dinah Humphrey MD    Chief Complaint:  83 year old female who presented to the emergency room after a fall; she was getting up to get her walker and felt dizzy; she has been more tired than usual and has felt week; she denies shortness of breath or chest pain; she was seen in the ED  few days ago for leg pain and nausea; she has noted black stool but thought it was due to her iron supplement; no fever or chills she has been on pradaxa due to a history of dvt    History of Present Illness:   As above    Past Medical History:  Past Medical History:   Diagnosis Date    Acute deep vein thrombosis (DVT) of femoral vein of right lower extremity 04/15/2021    Arthritis     Asymptomatic PVCs     Backache     Chronic bronchitis     CKD (chronic kidney disease)     Stage 3    Deep vein thrombosis (DVT) of right lower extremity     Essential hypertension 2020    Fracture of humerus     GERD (gastroesophageal reflux disease)     Hyperlipidemia     Hypertension     Hypothyroidism     Pneumonia     Pulmonary emphysema     Renal calculi     Renal calculus 2021    Sleep apnea     DOES NOT USE CPAP    Thoracic vertebral fracture      Past Surgical History:  Past Surgical History:   Procedure Laterality Date    APPENDECTOMY      COLONOSCOPY N/A 3/15/2023    Procedure: COLONOSCOPY to cecum and TI :  hot snare sigmoid polyps with clip to 1 polyp,;  Surgeon: Jaun Ford MD;  Location: [facility] ENDOSCOPY;  Service: Gastroenterology;  Laterality: N/A;  pre:  anemia  post:  diverticulosis, polyps, hemorrhoids    ENDOSCOPY N/A 3/14/2023    Procedure: ESOPHAGOGASTRODUODENOSCOPY WITH ARGON PLASMA COAGULATION AND COLD BIOPSIES;  Surgeon: Jaun Ford MD;  Location: [facility] ENDOSCOPY;  Service:  Gastroenterology;  Laterality: N/A;  PRE: MELANA; ANEMIA  POST: ANGIO ACTASIA; HIATAL HERNIA; SCHATZKI'S RING    EXTRACORPOREAL SHOCK WAVE LITHOTRIPSY (ESWL) Left 01/06/2021    Procedure: EXTRACORPOREAL SHOCKWAVE LITHOTRIPSY, CYSTOSCOPY, RETROGRADE PYLEOGRAM;  Surgeon: Walter Schwartz MD;  Location: [facility];  Service: Urology;  Laterality: Left;    EYE SURGERY      cataracts    HUMERUS FRACTURE SURGERY      LYTIC THROMBIN THERAPY Left 8/17/2022    Procedure: LT. LEG THROMBECTOMY/EMBOLECTOMY AND ANGIOPLASTY STENT PLACEMENT OF LEFT ILIAC VEIN;  Surgeon: Theo Bustos MD;  Location: [facility] HYBRID OR 18/19;  Service: Vascular;  Laterality: Left;    RIB FRACTURE SURGERY  2021    THORACOSCOPY Right 02/16/2021    Procedure: bronchoscopy, right video assisted THORACOSCOPY WITH PLATING OF 3, 4, 5, AND 6 RIBS;  Surgeon: Kelley Delong MD;  Location: [facility] MAIN OR;  Service: Thoracic;  Laterality: Right;    TOTAL KNEE ARTHROPLASTY Left 04/2015    UMBILICAL HERNIA REPAIR        Home Meds:  Medications Prior to Admission   Medication Sig Dispense Refill Last Dose    acetaminophen (TYLENOL) 325 MG tablet Take 2 tablets by mouth Every 4 (Four) Hours As Needed for Mild Pain .   Past Week    albuterol sulfate  (90 Base) MCG/ACT inhaler Inhale 2 puffs Every 4 (Four) Hours As Needed for Wheezing or Shortness of Air. 8 g 1 8/20/2023    cholecalciferol (VITAMIN D3) 25 MCG (1000 UT) tablet Take 2 tablets by mouth Daily.   8/20/2023    dabigatran etexilate (Pradaxa) 150 MG capsu Take 1 capsule by mouth 2 (Two) Times a Day. 60 capsule 5 8/20/2023    ferrous gluconate (FERGON) 324 MG tablet Take 1 tablet by mouth Daily With Breakfast. 30 tablet 2 8/20/2023    Fluticasone-Salmeterol (Advair Diskus) 100-50 MCG/ACT DISKUS Inhale 1 puff 2 (Two) Times a Day. 180 each 5 8/20/2023    folic acid (FOLVITE) 1 MG tablet Take 1 tablet by mouth Daily. 30 tablet 5 8/20/2023    levothyroxine (SYNTHROID, LEVOTHROID) 100 MCG tablet  Take 1 tablet by mouth Daily. 90 tablet 1 8/20/2023    losartan (COZAAR) 50 MG tablet Take 0.5 tablets by mouth Daily. Takes 2 ( 25 mg) tabs daily   8/20/2023    melatonin 5 MG tablet tablet Take 1 tablet by mouth At Night As Needed (sleep).   8/20/2023    ondansetron (ZOFRAN) 8 MG tablet Take 1 tablet by mouth Every 8 (Eight) Hours As Needed for Nausea or Vomiting. 30 tablet 1 8/20/2023    pantoprazole (PROTONIX) 40 MG EC tablet Take 1 tablet by mouth Daily. 90 tablet 3 8/20/2023    torsemide (DEMADEX) 20 MG tablet Take 1 tablet by mouth Every Other Day.   8/20/2023    traMADol (ULTRAM) 50 MG tablet Pt says she takes this as needed   8/20/2023    vitamin B-12 (CYANOCOBALAMIN) 1000 MCG tablet Take 1 tablet by mouth Daily.   8/20/2023       Allergies:  Allergies   Allergen Reactions    Ambien [Zolpidem Tartrate] Nausea Only     nausea    Amoxicillin-Pot Clavulanate Nausea Only    Doxycycline Nausea Only    Eliquis [Apixaban] Nausea Only    Levofloxacin Nausea Only    Metronidazole Nausea Only    Naproxen GI Intolerance    Sulfamethoxazole-Trimethoprim Nausea Only    Synthroid [Levothyroxine Sodium] Nausea Only    Zolpidem Nausea Only     Immunizations:  Immunization History   Administered Date(s) Administered    COVID-19 (MODERNA) 1st,2nd,3rd Dose Monovalent 01/18/2021, 02/24/2021    COVID-19 (PFIZER) Purple Cap Monovalent 08/22/2022    FLUAD TRI 65YR+ 11/05/2019    Fluad Quad 65+ 11/05/2019    Fluzone High Dose =>65 Years ([employer] ONLY) 11/05/2019, 09/04/2020    Fluzone High-Dose 65+yrs 09/04/2020    TD Preservative Free (Tenivac) 05/12/2022     Social History:   Social History     Social History Narrative    Not on file     Social History     Socioeconomic History    Marital status:    Tobacco Use    Smoking status: Every Day     Packs/day: 0.25     Years: 50.00     Pack years: 12.50     Types: Cigarettes     Start date: 1970    Smokeless tobacco: Never   Vaping Use    Vaping Use: Never used   Substance and  "Sexual Activity    Alcohol use: Not Currently    Drug use: No    Sexual activity: Defer       Family History:  Family History   Problem Relation Age of Onset    Cancer Mother         breast    Breast cancer Mother     No Known Problems Father     Heart disease Maternal Grandmother     Cancer Brother         esophageal , stomach     Esophageal cancer Brother     No Known Problems Son     Breast cancer Maternal Aunt     Heart disease Maternal Aunt     No Known Problems Son     Malig Hyperthermia Neg Hx         Review of Systems  See history of present illness and past medical history.  Patient denies headache, dizziness, syncope trauma, change in vision, change in hearing, change in taste, changes in weight, changes in appetite, focal weakness, numbness, or paresthesia.  Patient denies chest pain, palpitations, dyspnea, orthopnea, PND, cough, sinus pressure, rhinorrhea, epistaxis, hemoptysis, nausea, vomiting,hematemesis, diarrhea, constipation or hematochezia.  Denies cold or heat intolerance, polydipsia, polyuria, polyphagia. Denies hematuria, pyuria, dysuria, hesitancy, frequency or urgency. Denies consumption of raw and under cooked meats foods or change in water source.  Denies fever, chills, sweats, night sweats.       Objective:  T Max 24 hrs: Temp (24hrs), Av.6 øF (36.4 øC), Min:97.2 øF (36.2 øC), Max:98.1 øF (36.7 øC)    Vitals Ranges:   Temp:  [97.2 øF (36.2 øC)-98.1 øF (36.7 øC)] 98.1 øF (36.7 øC)  Heart Rate:  [58-75] 63  Resp:  [14-18] 18  BP: ()/(50-64) 111/58      Exam:  /58 (BP Location: Left arm, Patient Position: Lying)   Pulse 63   Temp 98.1 øF (36.7 øC) (Oral)   Resp 18   Ht 160 cm (63\")   Wt 91.8 kg (202 lb 6.1 oz)   SpO2 100%   BMI 35.85 kg/mý     General Appearance:    Alert, cooperative, no distress, appears stated age   Head:    Normocephalic, without obvious abnormality, atraumatic   Eyes:    PERRL, conjunctivae/corneas clear, EOM's intact, both eyes   Ears:    Normal " external ear canals, both ears   Nose:   Nares normal, septum midline, mucosa normal, no drainage    or sinus tenderness   Throat:   Lips, mucosa, and tongue normal   Neck:   Supple, symmetrical, trachea midline, no adenopathy;     thyroid:  no enlargement/tenderness/nodules; no carotid    bruit or JVD   Back:     Symmetric, no curvature, ROM normal, no CVA tenderness   Lungs:     Decreased breath sounds bilaterally, respirations unlabored   Chest Wall:    No tenderness or deformity    Heart:    Regular rate and rhythm, S1 and S2 normal, no murmur, rub   or gallop   Abdomen:     Soft, nontender, bowel sounds active all four quadrants,     no masses, no hepatomegaly, no splenomegaly   Extremities:   Extremities normal, atraumatic, no cyanosis or edema                       .    Data Review:  Labs in chart were reviewed.  WBC   Date Value Ref Range Status   08/21/2023 6.91 3.40 - 10.80 10*3/mm3 Final     Hemoglobin   Date Value Ref Range Status   08/21/2023 5.6 (C) 12.0 - 15.9 g/dL Final     Hematocrit   Date Value Ref Range Status   08/21/2023 17.6 (C) 34.0 - 46.6 % Final     Platelets   Date Value Ref Range Status   08/21/2023 154 140 - 450 10*3/mm3 Final     Sodium   Date Value Ref Range Status   08/21/2023 141 136 - 145 mmol/L Final     Potassium   Date Value Ref Range Status   08/21/2023 5.1 3.5 - 5.2 mmol/L Final     Comment:     Slight hemolysis detected by analyzer. Results may be affected.     Chloride   Date Value Ref Range Status   08/21/2023 112 (H) 98 - 107 mmol/L Final     CO2   Date Value Ref Range Status   08/21/2023 21.0 (L) 22.0 - 29.0 mmol/L Final     BUN   Date Value Ref Range Status   08/21/2023 72 (H) 8 - 23 mg/dL Final     Creatinine   Date Value Ref Range Status   08/21/2023 1.90 (H) 0.57 - 1.00 mg/dL Final     Glucose   Date Value Ref Range Status   08/21/2023 115 (H) 65 - 99 mg/dL Final     Calcium   Date Value Ref Range Status   08/21/2023 8.4 (L) 8.6 - 10.5 mg/dL Final                 Imaging Results (All)       Procedure Component Value Units Date/Time    CT Head Without Contrast [[id number]] Collected: 08/21/23 1508     Updated: 08/21/23 1513    Narrative:      CT HEAD WO CONTRAST-     INDICATIONS: Trauma     TECHNIQUE: Radiation dose reduction techniques were utilized, including  automated exposure control and exposure modulation based on body size.  Noncontrast head CT     COMPARISON: None available     FINDINGS:           No acute intracranial hemorrhage, midline shift or mass effect. No acute  territorial infarct is identified.     Mild periventricular hypodensities suggest chronic small vessel ischemic  change in a patient this age.     Arterial calcifications are seen at the base of the brain.     Ventricles, cisterns, cerebral sulci are unremarkable for patient age.     The visualized paranasal sinuses, orbits, mastoid air cells are  unremarkable.                   Impression:         No acute intracranial hemorrhage or hydrocephalus. If there is further  clinical concern, MRI could be considered for further evaluation.     This report was finalized on 8/21/2023 3:10 PM by Dr. Alec Arcos M.D.                 Assessment:  Active Hospital Problems    Diagnosis  POA    **GI bleed [K92.2]  Yes      Resolved Hospital Problems   No resolved problems to display.   Acute blood loss anemia  Hypertension  Ckd3  Obesity  Hyperglycemia  Tobacco use      Plan:  Gi has seen the patient  Scope is planned for tomorrow  Hold pradaxa  Transfusion is in progress  Monitor on telemetry  Trend hgb  Dw patient and ed provider    Sonia Edward MD  8/21/2023  20:26 EDT

## 2023-08-23 LAB
ALBUMIN SERPL-MCNC: 3.1 G/DL (ref 3.5–5.2)
ALBUMIN/GLOB SERPL: 1.6 G/DL
ALP SERPL-CCNC: 37 U/L (ref 39–117)
ALT SERPL W P-5'-P-CCNC: 8 U/L (ref 1–33)
ANION GAP SERPL CALCULATED.3IONS-SCNC: 9 MMOL/L (ref 5–15)
AST SERPL-CCNC: 16 U/L (ref 1–32)
BILIRUB SERPL-MCNC: 0.3 MG/DL (ref 0–1.2)
BUN SERPL-MCNC: 39 MG/DL (ref 8–23)
BUN/CREAT SERPL: 27.9 (ref 7–25)
CALCIUM SPEC-SCNC: 7.7 MG/DL (ref 8.6–10.5)
CHLORIDE SERPL-SCNC: 112 MMOL/L (ref 98–107)
CO2 SERPL-SCNC: 18 MMOL/L (ref 22–29)
CREAT SERPL-MCNC: 1.4 MG/DL (ref 0.57–1)
DEPRECATED RDW RBC AUTO: 53.3 FL (ref 37–54)
DEPRECATED RDW RBC AUTO: 53.5 FL (ref 37–54)
EGFRCR SERPLBLD CKD-EPI 2021: 37.6 ML/MIN/1.73
ERYTHROCYTE [DISTWIDTH] IN BLOOD BY AUTOMATED COUNT: 16.1 % (ref 12.3–15.4)
ERYTHROCYTE [DISTWIDTH] IN BLOOD BY AUTOMATED COUNT: 16.2 % (ref 12.3–15.4)
GLOBULIN UR ELPH-MCNC: 2 GM/DL
GLUCOSE SERPL-MCNC: 73 MG/DL (ref 65–99)
HCT VFR BLD AUTO: 25.5 % (ref 34–46.6)
HCT VFR BLD AUTO: 26.6 % (ref 34–46.6)
HGB BLD-MCNC: 8.3 G/DL (ref 12–15.9)
HGB BLD-MCNC: 8.7 G/DL (ref 12–15.9)
MCH RBC QN AUTO: 30 PG (ref 26.6–33)
MCH RBC QN AUTO: 30 PG (ref 26.6–33)
MCHC RBC AUTO-ENTMCNC: 32.5 G/DL (ref 31.5–35.7)
MCHC RBC AUTO-ENTMCNC: 32.7 G/DL (ref 31.5–35.7)
MCV RBC AUTO: 91.7 FL (ref 79–97)
MCV RBC AUTO: 92.1 FL (ref 79–97)
PLATELET # BLD AUTO: 152 10*3/MM3 (ref 140–450)
PLATELET # BLD AUTO: 175 10*3/MM3 (ref 140–450)
PMV BLD AUTO: 11.4 FL (ref 6–12)
PMV BLD AUTO: 11.8 FL (ref 6–12)
POTASSIUM SERPL-SCNC: 4.1 MMOL/L (ref 3.5–5.2)
PROT SERPL-MCNC: 5.1 G/DL (ref 6–8.5)
RBC # BLD AUTO: 2.77 10*6/MM3 (ref 3.77–5.28)
RBC # BLD AUTO: 2.9 10*6/MM3 (ref 3.77–5.28)
SODIUM SERPL-SCNC: 139 MMOL/L (ref 136–145)
WBC NRBC COR # BLD: 6.69 10*3/MM3 (ref 3.4–10.8)
WBC NRBC COR # BLD: 8.13 10*3/MM3 (ref 3.4–10.8)

## 2023-08-23 PROCEDURE — 97161 PT EVAL LOW COMPLEX 20 MIN: CPT

## 2023-08-23 PROCEDURE — 94664 DEMO&/EVAL PT USE INHALER: CPT

## 2023-08-23 PROCEDURE — 36415 COLL VENOUS BLD VENIPUNCTURE: CPT | Performed by: INTERNAL MEDICINE

## 2023-08-23 PROCEDURE — 85027 COMPLETE CBC AUTOMATED: CPT | Performed by: INTERNAL MEDICINE

## 2023-08-23 PROCEDURE — 94799 UNLISTED PULMONARY SVC/PX: CPT

## 2023-08-23 PROCEDURE — 80053 COMPREHEN METABOLIC PANEL: CPT | Performed by: INTERNAL MEDICINE

## 2023-08-23 PROCEDURE — 94761 N-INVAS EAR/PLS OXIMETRY MLT: CPT

## 2023-08-23 PROCEDURE — 99232 SBSQ HOSP IP/OBS MODERATE 35: CPT | Performed by: INTERNAL MEDICINE

## 2023-08-23 RX ADMIN — SODIUM CHLORIDE 8 MG/HR: 9 INJECTION, SOLUTION INTRAVENOUS at 06:21

## 2023-08-23 RX ADMIN — TRAMADOL HYDROCHLORIDE 50 MG: 50 TABLET, COATED ORAL at 21:48

## 2023-08-23 RX ADMIN — LEVOTHYROXINE SODIUM 100 MCG: 0.1 TABLET ORAL at 06:22

## 2023-08-23 RX ADMIN — Medication 1000 MCG: at 09:26

## 2023-08-23 RX ADMIN — SODIUM CHLORIDE 150 ML/HR: 9 INJECTION, SOLUTION INTRAVENOUS at 06:21

## 2023-08-23 RX ADMIN — SENNOSIDES AND DOCUSATE SODIUM 2 TABLET: 50; 8.6 TABLET ORAL at 09:27

## 2023-08-23 RX ADMIN — SODIUM CHLORIDE 150 ML/HR: 9 INJECTION, SOLUTION INTRAVENOUS at 14:16

## 2023-08-23 RX ADMIN — SODIUM CHLORIDE 8 MG/HR: 9 INJECTION, SOLUTION INTRAVENOUS at 20:57

## 2023-08-23 RX ADMIN — BUDESONIDE AND FORMOTEROL FUMARATE DIHYDRATE 1 PUFF: 160; 4.5 AEROSOL RESPIRATORY (INHALATION) at 08:12

## 2023-08-23 RX ADMIN — FOLIC ACID 1 MG: 1 TABLET ORAL at 09:26

## 2023-08-23 RX ADMIN — TRAMADOL HYDROCHLORIDE 50 MG: 50 TABLET, COATED ORAL at 14:20

## 2023-08-23 RX ADMIN — SODIUM CHLORIDE 150 ML/HR: 9 INJECTION, SOLUTION INTRAVENOUS at 20:58

## 2023-08-23 RX ADMIN — SODIUM CHLORIDE 8 MG/HR: 9 INJECTION, SOLUTION INTRAVENOUS at 14:16

## 2023-08-23 RX ADMIN — BUDESONIDE AND FORMOTEROL FUMARATE DIHYDRATE 1 PUFF: 160; 4.5 AEROSOL RESPIRATORY (INHALATION) at 20:19

## 2023-08-23 RX ADMIN — LOSARTAN POTASSIUM 25 MG: 25 TABLET, FILM COATED ORAL at 09:26

## 2023-08-23 RX ADMIN — POLYETHYLENE GLYCOL 3350 17 G: 17 POWDER, FOR SOLUTION ORAL at 21:08

## 2023-08-23 RX ADMIN — Medication 2000 UNITS: at 09:26

## 2023-08-23 NOTE — THERAPY EVALUATION
Patient Name: Luann Frances  : 1940    MRN: [id number]                              Today's Date: 2023       Admit Date: 2023    Visit Dx:     ICD-10-CM ICD-9-CM   1. Gastrointestinal hemorrhage, unspecified gastrointestinal hemorrhage type  K92.2 578.9   2. Fall, initial encounter  W19.XXXA E888.9     Patient Active Problem List   Diagnosis    Chronic obstructive pulmonary disease    Diverticulitis of colon    Acid reflux    Primary hypothyroidism    Insomnia    Chronic nausea    Post herpetic neuralgia    Vitamin D deficiency    CKD (chronic kidney disease) stage 3, GFR 30-59 ml/min    Mixed hyperlipidemia    Hypersomnia    Hyperuricemia    Essential hypertension    DELROY (obstructive sleep apnea)    Renal calculus    Multiple closed fractures of ribs of right side    Compression fracture of body of thoracic vertebra    DVT, lower extremity, distal, acute, left    Lower extremity edema    Medicare annual wellness visit, subsequent    Tobacco abuse    Osteoporosis    Acute deep vein thrombosis (DVT) of femoral vein of left lower extremity    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    Iron deficiency    Iron deficiency anemia    History of deep venous thrombosis (DVT) of distal vein of left lower extremity    Current use of long term anticoagulation    Acute blood loss anemia    Melena    GI bleed     Past Medical History:   Diagnosis Date    Acute deep vein thrombosis (DVT) of femoral vein of right lower extremity 04/15/2021    Arthritis     Asymptomatic PVCs     Backache     Chronic bronchitis     CKD (chronic kidney disease)     Stage 3    Deep vein thrombosis (DVT) of right lower extremity     Essential hypertension 2020    Fracture of humerus     GERD (gastroesophageal reflux disease)     Hyperlipidemia     Hypertension     Hypothyroidism     Pneumonia     Pulmonary emphysema     Renal calculi     Renal calculus 2021    Sleep apnea     DOES NOT USE CPAP    Thoracic  vertebral fracture      Past Surgical History:   Procedure Laterality Date    APPENDECTOMY      COLONOSCOPY N/A 3/15/2023    Procedure: COLONOSCOPY to cecum and TI :  hot snare sigmoid polyps with clip to 1 polyp,;  Surgeon: Jaun Ford MD;  Location: [facility] ENDOSCOPY;  Service: Gastroenterology;  Laterality: N/A;  pre:  anemia  post:  diverticulosis, polyps, hemorrhoids    ENDOSCOPY N/A 3/14/2023    Procedure: ESOPHAGOGASTRODUODENOSCOPY WITH ARGON PLASMA COAGULATION AND COLD BIOPSIES;  Surgeon: Jaun Ford MD;  Location: [facility] ENDOSCOPY;  Service: Gastroenterology;  Laterality: N/A;  PRE: MELANA; ANEMIA  POST: ANGIO ACTASIA; HIATAL HERNIA; SCHATZKI'S RING    ENDOSCOPY N/A 8/22/2023    Procedure: ESOPHAGOGASTRODUODENOSCOPY;  Surgeon: Johanne Calvo MD;  Location: [facility] ENDOSCOPY;  Service: Gastroenterology;  Laterality: N/A;  anemia  POST:    ENTEROSCOPY SMALL BOWEL N/A 8/22/2023    Procedure: ENTEROSCOPY SMALL BOWEL  cauterization to AVMs jejunem wiht clips x2;  Surgeon: Johanne Calvo MD;  Location: [facility] ENDOSCOPY;  Service: Gastroenterology;  Laterality: N/A;  ANEMIA  POST:  AVMs in the jejunem, schatzky's ring, HH    EXTRACORPOREAL SHOCK WAVE LITHOTRIPSY (ESWL) Left 01/06/2021    Procedure: EXTRACORPOREAL SHOCKWAVE LITHOTRIPSY, CYSTOSCOPY, RETROGRADE PYLEOGRAM;  Surgeon: Walter Schwartz MD;  Location: [facility] OR OSC;  Service: Urology;  Laterality: Left;    EYE SURGERY      cataracts    HUMERUS FRACTURE SURGERY      LYTIC THROMBIN THERAPY Left 8/17/2022    Procedure: LT. LEG THROMBECTOMY/EMBOLECTOMY AND ANGIOPLASTY STENT PLACEMENT OF LEFT ILIAC VEIN;  Surgeon: Theo Bustos MD;  Location: [facility] HYBRID OR 18/19;  Service: Vascular;  Laterality: Left;    RIB FRACTURE SURGERY  2021    THORACOSCOPY Right 02/16/2021    Procedure: bronchoscopy, right video assisted THORACOSCOPY WITH PLATING OF 3, 4, 5, AND 6 RIBS;  Surgeon: Kelley Delong MD;  Location: [facility] MAIN OR;  Service: Thoracic;   Laterality: Right;    TOTAL KNEE ARTHROPLASTY Left 04/2015    UMBILICAL HERNIA REPAIR        General Information       Row Name 08/23/23 1217          Physical Therapy Time and Intention    Document Type evaluation  -     Mode of Treatment individual therapy;physical therapy  -[facility] Name 08/23/23 1217          General Information    Patient Profile Reviewed yes  -     Prior Level of Function independent:  -     Existing Precautions/Restrictions fall  -[facility] Name 08/23/23 1217          Living Environment    People in Home alone  -[facility] Name 08/23/23 1217          Home Main Entrance    Number of Stairs, Main Entrance one  -[facility] Name 08/23/23 1217          Cognition    Orientation Status (Cognition) oriented x 4  -[facility] Name 08/23/23 1217          Safety Issues, Functional Mobility    Impairments Affecting Function (Mobility) balance;strength;endurance/activity tolerance  -               User Key  (r) = Recorded By, (t) = Taken By, (c) = Cosigned By      Initials Name Provider Type     Cristina Rubi PT Physical Therapist                   Mobility       [facility] Name 08/23/23 1217          Bed Mobility    Comment, (Bed Mobility) Patient UIC upon arrival  -[facility] Name 08/23/23 1217          Sit-Stand Transfer    Sit-Stand Nabb (Transfers) contact guard;verbal cues  -     Assistive Device (Sit-Stand Transfers) walker, front-wheeled  -     Comment, (Sit-Stand Transfer) VCs for hand placement  -[facility] Name 08/23/23 1217          Gait/Stairs (Locomotion)    Nabb Level (Gait) contact guard  -     Assistive Device (Gait) walker, front-wheeled  -     Distance in Feet (Gait) 15ft  -     Deviations/Abnormal Patterns (Gait) gait speed decreased;festinating/shuffling  -     Bilateral Gait Deviations forward flexed posture  -     Comment, (Gait/Stairs) Gait slow and shuffled. No overt LOB. Patient declined further ambulation due to fatigue.  -                User Key  (r) = Recorded By, (t) = Taken By, (c) = Cosigned By      Initials Name Provider Type     Cristina Rubi PT Physical Therapist                   Obj/Interventions       Row Name 08/23/23 1221          Range of Motion Comprehensive    General Range of Motion bilateral lower extremity ROM WFL  -       Row Name 08/23/23 1221          Strength Comprehensive (MMT)    General Manual Muscle Testing (MMT) Assessment lower extremity strength deficits identified  -     Comment, General Manual Muscle Testing (MMT) Assessment Generalized LE weakness.  -       Row Name 08/23/23 1221          Balance    Balance Assessment sitting static balance;sitting dynamic balance;sit to stand dynamic balance;standing static balance;standing dynamic balance  -     Static Sitting Balance supervision  -     Dynamic Sitting Balance standby assist  -     Position, Sitting Balance sitting in chair  -     Sit to Stand Dynamic Balance contact guard;verbal cues  -     Static Standing Balance contact guard  -     Dynamic Standing Balance contact guard  -     Position/Device Used, Standing Balance supported;walker, front-wheeled  -     Balance Interventions sitting;standing;sit to stand;supported;static;dynamic  -               User Key  (r) = Recorded By, (t) = Taken By, (c) = Cosigned By      Initials Name Provider Type     Cristina Rubi PT Physical Therapist                   Goals/Plan       Row Name 08/23/23 1228          Bed Mobility Goal 1 (PT)    Activity/Assistive Device (Bed Mobility Goal 1, PT) bed mobility activities, all  -     [place]inch Level/Cues Needed (Bed Mobility Goal 1, PT) modified independence  -     Time Frame (Bed Mobility Goal 1, PT) 1 week  -       Row Name 08/23/23 1228          Transfer Goal 1 (PT)    Activity/Assistive Device (Transfer Goal 1, PT) sit-to-stand/stand-to-sit;bed-to-chair/chair-to-bed  -     Clinch Level/Cues Needed (Transfer Goal 1,  PT) supervision required  -SM     Time Frame (Transfer Goal 1, PT) 1 week  -SM       Row Name 08/23/23 1228          Gait Training Goal 1 (PT)    Activity/Assistive Device (Gait Training Goal 1, PT) gait (walking locomotion)  -SM     [place] Level (Gait Training Goal 1, PT) standby assist  -SM     Distance (Gait Training Goal 1, PT) 75ft  -     Time Frame (Gait Training Goal 1, PT) 1 week  -SM               User Key  (r) = Recorded By, (t) = Taken By, (c) = Cosigned By      Initials Name Provider Type     Cristina Rubi, PT Physical Therapist                   Clinical Impression       Row Name 08/23/23 1223          Pain    Pretreatment Pain Rating 0/10 - no pain  -SM     Posttreatment Pain Rating 0/10 - no pain  -SM       Row Name 08/23/23 1223          Plan of Care Review    Plan of Care Reviewed With patient  -     Outcome Evaluation Patient is an 83 y.o female who presented to [facility] followng a fall. Patient AOx4 sitting [facility] upon arrival. Patient lives at home alone with 1 THAD. Patient reports she is independent with ADLs but has been requiring increased time to complete. Patient uses a rwx for ambulation. Patient performed STS from the chair with CGA and VCs for hand placement. Patient ambulated 15ft with rwx and CGA. Gait slow and shuffled. No overt LOB. Patient declined further ambulation due to fatigue. Patient would continue to benefit from skilled PT intervention to address deficits in functional mobility. PT recommends SNF at d/c. PT will continue to monitor.  -       Row Name 08/23/23 1223          Therapy Assessment/Plan (PT)    Rehab Potential (PT) good, to achieve stated therapy goals  -     Criteria for Skilled Interventions Met (PT) yes  -SM     Therapy Frequency (PT) 6 times/wk  -       Row Name 08/23/23 1223          Vital Signs    O2 Delivery Pre Treatment room air  -SM     O2 Delivery Intra Treatment room air  -SM     O2 Delivery Post Treatment room air  -SM     Pre Patient  Position Sitting  -SM     Intra Patient Position Standing  -SM     Post Patient Position Sitting  -SM       Row Name 08/23/23 1223          Positioning and Restraints    Pre-Treatment Position sitting in chair/recliner  -SM     Post Treatment Position chair  -SM     In Chair notified nsg;reclined;call light within reach;encouraged to call for assist  no chair alarm upon arrival  -               User Key  (r) = Recorded By, (t) = Taken By, (c) = Cosigned By      Initials Name Provider Type    Cristina Ferrara PT Physical Therapist                   Outcome Measures       Row Name 08/23/23 1229          How much help from another person do you currently need...    Turning from your back to your side while in flat bed without using bedrails? 3  -SM     Moving from lying on back to sitting on the side of a flat bed without bedrails? 3  -SM     Moving to and from a bed to a chair (including a wheelchair)? 3  -SM     Standing up from a chair using your arms (e.g., wheelchair, bedside chair)? 3  -SM     Climbing 3-5 steps with a railing? 2  -SM     To walk in hospital room? 2  -SM     AM-PAC 6 Clicks Score (PT) 16  -SM     Highest level of mobility 5 --> Static standing  -SM       Row Name 08/23/23 1229          Functional Assessment    Outcome Measure Options AM-PAC 6 Clicks Basic Mobility (PT)  -               User Key  (r) = Recorded By, (t) = Taken By, (c) = Cosigned By      Initials Name Provider Type    Cristina Ferrara PT Physical Therapist                                 Physical Therapy Education       Title: PT OT SLP Therapies (Done)       Topic: Physical Therapy (Done)       Point: Mobility training (Done)       Learning Progress Summary             Patient Acceptance, E, VU by  at 8/23/2023 1229                         Point: Home exercise program (Done)       Learning Progress Summary             Patient Acceptance, E, VU by  at 8/23/2023 1229                         Point: Body mechanics  (Done)       Learning Progress Summary             Patient Acceptance, E, VU by  at 8/23/2023 1229                         Point: Precautions (Done)       Learning Progress Summary             Patient Acceptance, E, VU by  at 8/23/2023 1229                                         User Key       Initials Effective Dates Name Provider Type Discipline     05/02/22 -  Cristina Rubi PT Physical Therapist PT                  PT Recommendation and Plan     Plan of Care Reviewed With: patient  Outcome Evaluation: Patient is an 83 y.o female who presented to [facility]. Patient AOx4 sitting [facility] upon arrival. Patient lives at home alone with 1 THAD. Patient reports she is independent with ADLs but has been requiring increased time to complete. Patient uses a rwx for ambulation. Patient performed STS from the chair with CGA and VCs for hand placement. Patient ambulated 15ft with rwx and CGA. Gait slow and shuffled. No overt LOB. Patient declined further ambulation due to fatigue. Patient would continue to benefit from skilled PT intervention to address deficits in functional mobility. PT recommends SNF at d/c. PT will continue to monitor.     Time Calculation:         PT Charges       Row Name 08/23/23 1230             Time Calculation    Start Time 1139  -      Stop Time 1154  -      Time Calculation (min) 15 min  -      PT Received On 08/23/23  -      PT - Next Appointment 08/24/23  -      PT Goal Re-Cert Due Date 08/30/23  -                User Key  (r) = Recorded By, (t) = Taken By, (c) = Cosigned By      Initials Name Provider Type     Cristina Rubi PT Physical Therapist                  Therapy Charges for Today       Code Description Service Date Service Provider Modifiers Qty    [id number] HC PT EVAL LOW COMPLEXITY 3 8/23/2023 Cristina Rubi PT GP 1            PT G-Codes  Outcome Measure Options: AM-PAC 6 Clicks Basic Mobility (PT)  AM-PAC 6 Clicks Score (PT): 16  PT Discharge  Summary  Anticipated Discharge Disposition (PT): skilled nursing facility    Cristina Rubi, PT  8/23/2023

## 2023-08-23 NOTE — PROGRESS NOTES
Name: Luann Frances ADMIT: 2023   : 1940  PCP: Dinah Humphrey MD    MRN: [id number] LOS: 2 days   AGE/SEX: 83 y.o. female  ROOM: [facility]     Subjective   Subjective   Patient is seen at bedside, no new complaints.       Objective   Objective   Vital Signs  Temp:  [97.5 øF (36.4 øC)-98.6 øF (37 øC)] 98.6 øF (37 øC)  Heart Rate:  [51-73] 63  Resp:  [18] 18  BP: (118-146)/(50-71) 118/50  SpO2:  [97 %-100 %] 100 %  on   ;   Device (Oxygen Therapy): room air  Body mass index is 35.85 kg/mý.  Physical Exam  General, awake and alert.  Head and ENT, normocephalic and atraumatic.  Lungs, symmetric expansion, equal air entry bilaterally.  Heart, regular rate and rhythm.  Abdomen, soft and nontender.  Extremities, no clubbing or cyanosis.  Neuro, no focal deficits.  Skin: Warm and no rash.  Psych, normal mood and affect.  Musculoskeletal, joint examination is grossly normal.    Copied text material from yesterday's note has been reviewed for appropriate changes and remains accurate as of 23.      Results Review     I reviewed the patient's new clinical results.  Results from last 7 days   Lab Units 23  0746 23  0831 23   WBC 10*3/mm3 6.69 8.34 7.00 11.87*   HEMOGLOBIN g/dL 8.3* 8.8* 7.7* 7.1*   PLATELETS 10*3/mm3 152 158 135* 148     Results from last 7 days   Lab Units 23  0746 23  0831 23  1344 23  0708   SODIUM mmol/L 139 138 141 139   POTASSIUM mmol/L 4.1 4.4 5.1 4.5   CHLORIDE mmol/L 112* 114* 112* 105   CO2 mmol/L 18.0* 18.0* 21.0* 23.3   BUN mg/dL 39* 54* 72* 57*   CREATININE mg/dL 1.40* 1.44* 1.90* 1.44*   GLUCOSE mg/dL 73 82 115* 123*   EGFR mL/min/1.73 37.6* 36.4* 26.1* 36.4*     Results from last 7 days   Lab Units 23  0746 23  1344 23  0708   ALBUMIN g/dL 3.1* 3.2* 3.8   BILIRUBIN mg/dL 0.3 0.2 0.3   ALK PHOS U/L 37* 34* 51   AST (SGOT) U/L 16 12 12   ALT (SGPT) U/L 8 8 7     Results from last 7 days   Lab  Units 08/23/23  0746 08/22/23  0831 08/21/23  1344 08/19/23  0708   CALCIUM mg/dL 7.7* 7.7* 8.4* 9.3   ALBUMIN g/dL 3.1*  --  3.2* 3.8     Results from last 7 days   Lab Units 08/19/23  0800 08/19/23  0708   PROCALCITONIN ng/mL  --  0.04   LACTATE mmol/L 1.1  --      No results found for: HGBA1C, POCGLU    XR Toe 2+ View Right    Result Date: 8/22/2023  No fracture. Mild hallux valgus deformity. Mild first MTP medial soft tissue swelling. Mild IP joint osteoarthritis.    This report was finalized on 8/22/2023 1:49 PM by Dr. Theo Vickers M.D.       I have personally reviewed all medications:  Scheduled Medications  budesonide-formoterol, 1 puff, Inhalation, BID - RT  cholecalciferol, 2,000 Units, Oral, Daily  folic acid, 1 mg, Oral, Daily  levothyroxine, 100 mcg, Oral, Q AM  losartan, 25 mg, Oral, Daily  senna-docusate sodium, 2 tablet, Oral, BID  torsemide, 20 mg, Oral, Every Other Day  vitamin B-12, 1,000 mcg, Oral, Daily    Infusions  pantoprazole, 8 mg/hr, Last Rate: 8 mg/hr (08/23/23 1416)  sodium chloride, 1,000 mL, Last Rate: 1,000 mL (08/22/23 1341)  sodium chloride, 150 mL/hr, Last Rate: 150 mL/hr (08/23/23 1416)    Diet  Diet: Cardiac Diets; Healthy Heart (2-3 Na+); Texture: Regular Texture (IDDSI 7); Fluid Consistency: Thin (IDDSI 0)    I have personally reviewed:  [x]  Laboratory   [x]  Microbiology   [x]  Radiology   [x]  EKG/Telemetry  [x]  Cardiology/Vascular   []  Pathology    []  Records       Assessment/Plan     Active Hospital Problems    Diagnosis  POA    **GI bleed [K92.2]  Yes    DELROY (obstructive sleep apnea) [G47.33]  Yes    Essential hypertension [I10]  Yes    Vitamin D deficiency [E55.9]  Yes    CKD (chronic kidney disease) stage 3, GFR 30-59 ml/min [N18.30]  Yes    Mixed hyperlipidemia [E78.2]  Yes    Chronic obstructive pulmonary disease [J44.9]  Yes    Primary hypothyroidism [E03.9]  Yes      Resolved Hospital Problems   No resolved problems to display.       83 y.o. female  admitted with GI bleed.    Assessment and plan  1.  GI bleed, status post PRBCs transfusion, hold Pradaxa.  Gastroenterology on board, follow management recommendations.  Patient underwent EGD and colonoscopy yesterday.  Plan is to continue IV PPI therapy. AVMs found yesterday in the small intestine treated with clipping and cautery.     2.  Hypertension, hyperlipidemia, chronic conditions.     3.  Chronic kidney disease stage III, monitor renal function.  Avoid nephrotoxic medications.     4.  COPD, chronic and stable.     5.  Right foot pain, order x-ray.     6.  CODE STATUS is full code.  Further plans based on hospital course.         Abhishek Hernandez MD  [facility]  08/23/23  17:24 EDT

## 2023-08-23 NOTE — PROGRESS NOTES
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, treated with clips and cautery    Current Facility-Administered Medications:     acetaminophen (TYLENOL) tablet 650 mg, 650 mg, Oral, Q4H PRN, Sonia Edward MD, 650 mg at 08/22/23 2030    albuterol (PROVENTIL) nebulizer solution 0.083% 2.5 mg/3mL, 2.5 mg, Nebulization, Q6H PRN, Sonia Edward MD    sennosides-docusate (PERICOLACE) 8.6-50 MG per tablet 2 tablet, 2 tablet, Oral, BID, 2 tablet at 08/23/23 0927 **AND** polyethylene glycol (MIRALAX) packet 17 g, 17 g, Oral, Daily PRN **AND** bisacodyl (DULCOLAX) EC tablet 5 mg, 5 mg, Oral, Daily PRN **AND** bisacodyl (DULCOLAX) suppository 10 mg, 10 mg, Rectal, Daily PRN, Sonia Edward MD    budesonide-formoterol (SYMBICORT) 160-4.5 MCG/ACT inhaler 1 puff, 1 puff, Inhalation, BID - RT, Sonia Edward MD, 1 puff at 08/23/23 0812    cholecalciferol (VITAMIN D3) tablet 2,000 Units, 2,000 Units, Oral, Daily, Sonia Edward MD, 2,000 Units at 08/23/23 0926    folic acid (FOLVITE) tablet 1 mg, 1 mg, Oral, Daily, Sonia Edward MD, 1 mg at 08/23/23 0926    levothyroxine (SYNTHROID, LEVOTHROID) tablet 100 mcg, 100 mcg, Oral, Q AM, Sonia Edward MD, 100 mcg at 08/23/23 0622    losartan (COZAAR) tablet 25 mg, 25 mg, Oral, Daily, Sonia Edward MD, 25 mg at 08/23/23 0926    melatonin tablet 3 mg, 3 mg, Oral, Nightly PRN, Sonia Edward MD    ondansetron (ZOFRAN) tablet 4 mg, 4 mg, Oral, Q6H PRN **OR** ondansetron (ZOFRAN) injection 4 mg, 4 mg, Intravenous, Q6H PRN, Stingl, Sonia Knapp MD    [COMPLETED] pantoprazole (PROTONIX) injection 80 mg, 80 mg, Intravenous, Once, 80 mg at 08/21/23 1557 **AND** pantoprazole (PROTONIX) 40 mg in 100 mL NS (VTB), 8 mg/hr, Intravenous, Continuous, Mary Lou Li PA, Last Rate: 20 mL/hr at 08/23/23 0621, 8 mg/hr  at 08/23/23 0621    [COMPLETED] Insert Peripheral IV, , , Once **AND** sodium chloride 0.9 % flush 10 mL, 10 mL, Intravenous, PRN, Mary Lou Li PA, 10 mL at 08/22/23 2031    sodium chloride 0.9 % infusion 1,000 mL, 1,000 mL, Intravenous, Continuous, Johanne Calvo MD, Last Rate: 25 mL/hr at 08/22/23 1341, 1,000 mL at 08/22/23 1341    sodium chloride 0.9 % infusion, 150 mL/hr, Intravenous, Continuous, Sonia Edward MD, Last Rate: 150 mL/hr at 08/23/23 0621, 150 mL/hr at 08/23/23 0621    torsemide (DEMADEX) tablet 20 mg, 20 mg, Oral, Every Other Day, Sonia Edward MD, 20 mg at 08/22/23 0848    traMADol (ULTRAM) tablet 50 mg, 50 mg, Oral, Q6H PRN, Sonia Edward MD, 50 mg at 08/22/23 2355    vitamin B-12 (CYANOCOBALAMIN) tablet 1,000 mcg, 1,000 mcg, Oral, Daily, Sonia Edward MD, 1,000 mcg at 08/23/23 0926  Review of Systems:    There is weakness and fatigue all other systems reviewed and negative    Objective     Vital Signs  Temp:  [97.5 øF (36.4 øC)-98.6 øF (37 øC)] 97.5 øF (36.4 øC)  Heart Rate:  [51-73] 51  Resp:  [9-18] 18  BP: (121-146)/(57-71) 130/57  Body mass index is 35.85 kg/mý.    Intake/Output Summary (Last 24 hours) at 8/23/2023 1349  Last data filed at 8/23/2023 0913  Gross per 24 hour   Intake 2217.5 ml   Output 1850 ml   Net 367.5 ml     I/O this shift:  In: -   Out: 250 [Urine:250]     Physical Exam:   General: patient awake, alert and cooperative   Eyes: Normal lids and lashes, no scleral icterus   Neck: supple, normal ROM   Skin: warm and dry, not jaundiced   Cardiovascular: regular rhythm and rate, no murmurs auscultated   Pulm: clear to auscultation bilaterally, regular and unlabored   Abdomen: soft, nontender, nondistended; normal bowel sounds   Extremities: no rash or edema   Psychiatric: Normal mood and behavior; memory intact     Results Review:     I reviewed the patient's new clinical results.    Results from last 7 days   Lab Units 08/23/23  1575  08/22/23 1957 08/22/23  0831   WBC 10*3/mm3 6.69 8.34 7.00   HEMOGLOBIN g/dL 8.3* 8.8* 7.7*   HEMATOCRIT % 25.5* 26.6* 23.1*   PLATELETS 10*3/mm3 152 158 135*     Results from last 7 days   Lab Units 08/23/23  0746 08/22/23  0831 08/21/23  1344 08/19/23  0708   SODIUM mmol/L 139 138 141 139   POTASSIUM mmol/L 4.1 4.4 5.1 4.5   CHLORIDE mmol/L 112* 114* 112* 105   CO2 mmol/L 18.0* 18.0* 21.0* 23.3   BUN mg/dL 39* 54* 72* 57*   CREATININE mg/dL 1.40* 1.44* 1.90* 1.44*   CALCIUM mg/dL 7.7* 7.7* 8.4* 9.3   BILIRUBIN mg/dL 0.3  --  0.2 0.3   ALK PHOS U/L 37*  --  34* 51   ALT (SGPT) U/L 8  --  8 7   AST (SGOT) U/L 16  --  12 12   GLUCOSE mg/dL 73 82 115* 123*     Results from last 7 days   Lab Units 08/22/23  0831 08/21/23  1344   INR  1.67* 2.54*     Lab Results   Lab Value Date/Time    LIPASE 25 12/12/2022 1736    LIPASE 18 08/11/2022 1058    LIPASE 28 12/16/2020 1351    LIPASE 24 12/10/2015 0950       Radiology:  XR Toe 2+ View Right   Final Result   No fracture. Mild hallux valgus deformity. Mild first MTP   medial soft tissue swelling. Mild IP joint osteoarthritis.               This report was finalized on 8/22/2023 1:49 PM by Dr. Theo Vickers M.D.          CT Head Without Contrast   Final Result       No acute intracranial hemorrhage or hydrocephalus. If there is further   clinical concern, MRI could be considered for further evaluation.       This report was finalized on 8/21/2023 3:10 PM by Dr. Alec Arcos M.D.              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    Elton Calvillo MD

## 2023-08-23 NOTE — PLAN OF CARE
Goal Outcome Evaluation:  Plan of Care Reviewed With: patient           Outcome Evaluation: VSS, pain treated with tylenol and tramadol. Pt appeared to sleep some between care. IVF and meds continued. Will CTM, safety maintained.

## 2023-08-23 NOTE — PLAN OF CARE
Goal Outcome Evaluation:           Progress: no change  Outcome Evaluation: Pt A/O x4, had uneventful day, sat up in chair most of the day, tramadol x1 for pain, pt  has agreed that dc to rehab would be beneficial for her. pt still has protonix and IV fluids running. Son at bedside for most of day. safety maintained, will CTM cl

## 2023-08-23 NOTE — PLAN OF CARE
Goal Outcome Evaluation:  Plan of Care Reviewed With: patient           Outcome Evaluation: Patient is an 83 y.o female who presented to [facility] followng a fall. Patient AOx4 sitting UIC upon arrival. Patient lives at home alone with 1 THAD. Patient reports she is independent with ADLs but has been requiring increased time to complete. Patient uses a rwx for ambulation. Patient performed STS from the chair with CGA and VCs for hand placement. Patient ambulated 15ft with rwx and CGA. Gait slow and shuffled. No overt LOB. Patient declined further ambulation due to fatigue. Patient would continue to benefit from skilled PT intervention to address deficits in functional mobility. PT recommends SNF at d/c. PT will continue to monitor.      Anticipated Discharge Disposition (PT): skilled nursing facility

## 2023-08-24 LAB
ALBUMIN SERPL-MCNC: 3.1 G/DL (ref 3.5–5.2)
ALBUMIN/GLOB SERPL: 1.7 G/DL
ALP SERPL-CCNC: 40 U/L (ref 39–117)
ALT SERPL W P-5'-P-CCNC: 6 U/L (ref 1–33)
ANION GAP SERPL CALCULATED.3IONS-SCNC: 7.3 MMOL/L (ref 5–15)
AST SERPL-CCNC: 11 U/L (ref 1–32)
BASOPHILS # BLD AUTO: 0.08 10*3/MM3 (ref 0–0.2)
BASOPHILS NFR BLD AUTO: 1 % (ref 0–1.5)
BILIRUB SERPL-MCNC: 0.3 MG/DL (ref 0–1.2)
BUN SERPL-MCNC: 25 MG/DL (ref 8–23)
BUN/CREAT SERPL: 19.2 (ref 7–25)
CALCIUM SPEC-SCNC: 8 MG/DL (ref 8.6–10.5)
CHLORIDE SERPL-SCNC: 115 MMOL/L (ref 98–107)
CO2 SERPL-SCNC: 18.7 MMOL/L (ref 22–29)
CREAT SERPL-MCNC: 1.3 MG/DL (ref 0.57–1)
DEPRECATED RDW RBC AUTO: 51.3 FL (ref 37–54)
DEPRECATED RDW RBC AUTO: 53.1 FL (ref 37–54)
EGFRCR SERPLBLD CKD-EPI 2021: 41.1 ML/MIN/1.73
EOSINOPHIL # BLD AUTO: 0.22 10*3/MM3 (ref 0–0.4)
EOSINOPHIL NFR BLD AUTO: 2.9 % (ref 0.3–6.2)
ERYTHROCYTE [DISTWIDTH] IN BLOOD BY AUTOMATED COUNT: 15.8 % (ref 12.3–15.4)
ERYTHROCYTE [DISTWIDTH] IN BLOOD BY AUTOMATED COUNT: 16 % (ref 12.3–15.4)
GLOBULIN UR ELPH-MCNC: 1.8 GM/DL
GLUCOSE SERPL-MCNC: 79 MG/DL (ref 65–99)
HCT VFR BLD AUTO: 24.2 % (ref 34–46.6)
HCT VFR BLD AUTO: 26 % (ref 34–46.6)
HGB BLD-MCNC: 8 G/DL (ref 12–15.9)
HGB BLD-MCNC: 8.5 G/DL (ref 12–15.9)
IMM GRANULOCYTES # BLD AUTO: 0.04 10*3/MM3 (ref 0–0.05)
IMM GRANULOCYTES NFR BLD AUTO: 0.5 % (ref 0–0.5)
LYMPHOCYTES # BLD AUTO: 2.32 10*3/MM3 (ref 0.7–3.1)
LYMPHOCYTES NFR BLD AUTO: 30.3 % (ref 19.6–45.3)
MCH RBC QN AUTO: 30.2 PG (ref 26.6–33)
MCH RBC QN AUTO: 30.2 PG (ref 26.6–33)
MCHC RBC AUTO-ENTMCNC: 32.7 G/DL (ref 31.5–35.7)
MCHC RBC AUTO-ENTMCNC: 33.1 G/DL (ref 31.5–35.7)
MCV RBC AUTO: 91.3 FL (ref 79–97)
MCV RBC AUTO: 92.5 FL (ref 79–97)
MONOCYTES # BLD AUTO: 0.64 10*3/MM3 (ref 0.1–0.9)
MONOCYTES NFR BLD AUTO: 8.4 % (ref 5–12)
NEUTROPHILS NFR BLD AUTO: 4.35 10*3/MM3 (ref 1.7–7)
NEUTROPHILS NFR BLD AUTO: 56.9 % (ref 42.7–76)
NRBC BLD AUTO-RTO: 0 /100 WBC (ref 0–0.2)
PLATELET # BLD AUTO: 157 10*3/MM3 (ref 140–450)
PLATELET # BLD AUTO: 185 10*3/MM3 (ref 140–450)
PMV BLD AUTO: 11.2 FL (ref 6–12)
PMV BLD AUTO: 11.6 FL (ref 6–12)
POTASSIUM SERPL-SCNC: 3.8 MMOL/L (ref 3.5–5.2)
PROT SERPL-MCNC: 4.9 G/DL (ref 6–8.5)
RBC # BLD AUTO: 2.65 10*6/MM3 (ref 3.77–5.28)
RBC # BLD AUTO: 2.81 10*6/MM3 (ref 3.77–5.28)
SODIUM SERPL-SCNC: 141 MMOL/L (ref 136–145)
WBC NRBC COR # BLD: 6.68 10*3/MM3 (ref 3.4–10.8)
WBC NRBC COR # BLD: 7.65 10*3/MM3 (ref 3.4–10.8)

## 2023-08-24 PROCEDURE — 85025 COMPLETE CBC W/AUTO DIFF WBC: CPT | Performed by: INTERNAL MEDICINE

## 2023-08-24 PROCEDURE — 94799 UNLISTED PULMONARY SVC/PX: CPT

## 2023-08-24 PROCEDURE — 97110 THERAPEUTIC EXERCISES: CPT

## 2023-08-24 PROCEDURE — 94664 DEMO&/EVAL PT USE INHALER: CPT

## 2023-08-24 PROCEDURE — 36415 COLL VENOUS BLD VENIPUNCTURE: CPT | Performed by: INTERNAL MEDICINE

## 2023-08-24 PROCEDURE — 85027 COMPLETE CBC AUTOMATED: CPT | Performed by: INTERNAL MEDICINE

## 2023-08-24 PROCEDURE — 99232 SBSQ HOSP IP/OBS MODERATE 35: CPT | Performed by: PHYSICIAN ASSISTANT

## 2023-08-24 PROCEDURE — 97535 SELF CARE MNGMENT TRAINING: CPT

## 2023-08-24 PROCEDURE — 94760 N-INVAS EAR/PLS OXIMETRY 1: CPT

## 2023-08-24 PROCEDURE — 94761 N-INVAS EAR/PLS OXIMETRY MLT: CPT

## 2023-08-24 PROCEDURE — 97166 OT EVAL MOD COMPLEX 45 MIN: CPT

## 2023-08-24 PROCEDURE — 80053 COMPREHEN METABOLIC PANEL: CPT | Performed by: INTERNAL MEDICINE

## 2023-08-24 RX ORDER — PANTOPRAZOLE SODIUM 40 MG/1
40 TABLET, DELAYED RELEASE ORAL
Status: DISCONTINUED | OUTPATIENT
Start: 2023-08-25 | End: 2023-08-26 | Stop reason: HOSPADM

## 2023-08-24 RX ADMIN — LOSARTAN POTASSIUM 25 MG: 25 TABLET, FILM COATED ORAL at 09:05

## 2023-08-24 RX ADMIN — POLYETHYLENE GLYCOL 3350 17 G: 17 POWDER, FOR SOLUTION ORAL at 12:51

## 2023-08-24 RX ADMIN — SODIUM CHLORIDE 8 MG/HR: 9 INJECTION, SOLUTION INTRAVENOUS at 02:53

## 2023-08-24 RX ADMIN — SENNOSIDES AND DOCUSATE SODIUM 2 TABLET: 50; 8.6 TABLET ORAL at 09:07

## 2023-08-24 RX ADMIN — TORSEMIDE 20 MG: 20 TABLET ORAL at 09:05

## 2023-08-24 RX ADMIN — BUDESONIDE AND FORMOTEROL FUMARATE DIHYDRATE 1 PUFF: 160; 4.5 AEROSOL RESPIRATORY (INHALATION) at 20:03

## 2023-08-24 RX ADMIN — SODIUM CHLORIDE 150 ML/HR: 9 INJECTION, SOLUTION INTRAVENOUS at 02:55

## 2023-08-24 RX ADMIN — TRAMADOL HYDROCHLORIDE 50 MG: 50 TABLET, COATED ORAL at 09:05

## 2023-08-24 RX ADMIN — Medication 1000 MCG: at 09:05

## 2023-08-24 RX ADMIN — LEVOTHYROXINE SODIUM 100 MCG: 0.1 TABLET ORAL at 06:20

## 2023-08-24 RX ADMIN — TRAMADOL HYDROCHLORIDE 50 MG: 50 TABLET, COATED ORAL at 22:12

## 2023-08-24 RX ADMIN — BUDESONIDE AND FORMOTEROL FUMARATE DIHYDRATE 1 PUFF: 160; 4.5 AEROSOL RESPIRATORY (INHALATION) at 08:28

## 2023-08-24 RX ADMIN — FOLIC ACID 1 MG: 1 TABLET ORAL at 09:05

## 2023-08-24 RX ADMIN — Medication 2000 UNITS: at 09:06

## 2023-08-24 RX ADMIN — SODIUM CHLORIDE 8 MG/HR: 9 INJECTION, SOLUTION INTRAVENOUS at 08:02

## 2023-08-24 RX ADMIN — SENNOSIDES AND DOCUSATE SODIUM 2 TABLET: 50; 8.6 TABLET ORAL at 21:04

## 2023-08-24 NOTE — THERAPY EVALUATION
Patient Name: Luann Frances  : 1940    MRN: [id number]                              Today's Date: 2023       Admit Date: 2023    Visit Dx:     ICD-10-CM ICD-9-CM   1. Gastrointestinal hemorrhage, unspecified gastrointestinal hemorrhage type  K92.2 578.9   2. Fall, initial encounter  W19.XXXA E888.9     Patient Active Problem List   Diagnosis    Chronic obstructive pulmonary disease    Diverticulitis of colon    Acid reflux    Primary hypothyroidism    Insomnia    Chronic nausea    Post herpetic neuralgia    Vitamin D deficiency    CKD (chronic kidney disease) stage 3, GFR 30-59 ml/min    Mixed hyperlipidemia    Hypersomnia    Hyperuricemia    Essential hypertension    DELROY (obstructive sleep apnea)    Renal calculus    Multiple closed fractures of ribs of right side    Compression fracture of body of thoracic vertebra    DVT, lower extremity, distal, acute, left    Lower extremity edema    Medicare annual wellness visit, subsequent    Tobacco abuse    Osteoporosis    Acute deep vein thrombosis (DVT) of femoral vein of left lower extremity    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    Iron deficiency    Iron deficiency anemia    History of deep venous thrombosis (DVT) of distal vein of left lower extremity    Current use of long term anticoagulation    Acute blood loss anemia    Melena    GI bleed     Past Medical History:   Diagnosis Date    Acute deep vein thrombosis (DVT) of femoral vein of right lower extremity 04/15/2021    Arthritis     Asymptomatic PVCs     Backache     Chronic bronchitis     CKD (chronic kidney disease)     Stage 3    Deep vein thrombosis (DVT) of right lower extremity     Essential hypertension 2020    Fracture of humerus     GERD (gastroesophageal reflux disease)     Hyperlipidemia     Hypertension     Hypothyroidism     Pneumonia     Pulmonary emphysema     Renal calculi     Renal calculus 2021    Sleep apnea     DOES NOT USE CPAP    Thoracic  vertebral fracture      Past Surgical History:   Procedure Laterality Date    APPENDECTOMY      COLONOSCOPY N/A 3/15/2023    Procedure: COLONOSCOPY to cecum and TI :  hot snare sigmoid polyps with clip to 1 polyp,;  Surgeon: Jaun Ford MD;  Location: [facility];  Service: Gastroenterology;  Laterality: N/A;  pre:  anemia  post:  diverticulosis, polyps, hemorrhoids    ENDOSCOPY N/A 3/14/2023    Procedure: ESOPHAGOGASTRODUODENOSCOPY WITH ARGON PLASMA COAGULATION AND COLD BIOPSIES;  Surgeon: Jaun Ford MD;  Location: [facility];  Service: Gastroenterology;  Laterality: N/A;  PRE: MELANA; ANEMIA  POST: ANGIO ACTASIA; HIATAL HERNIA; SCHATZKI'S RING    ENDOSCOPY N/A 8/22/2023    Procedure: ESOPHAGOGASTRODUODENOSCOPY;  Surgeon: Johanne Calvo MD;  Location: [facility];  Service: Gastroenterology;  Laterality: N/A;  anemia  POST:    ENTEROSCOPY SMALL BOWEL N/A 8/22/2023    Procedure: ENTEROSCOPY SMALL BOWEL  cauterization to AVMs jejunem wiht clips x2;  Surgeon: Johanne Calvo MD;  Location: [facility];  Service: Gastroenterology;  Laterality: N/A;  ANEMIA  POST:  AVMs in the jejunem, schatzky's ring, HH    EXTRACORPOREAL SHOCK WAVE LITHOTRIPSY (ESWL) Left 01/06/2021    Procedure: EXTRACORPOREAL SHOCKWAVE LITHOTRIPSY, CYSTOSCOPY, RETROGRADE PYLEOGRAM;  Surgeon: Walter Schwartz MD;  Location: [facility] OR OSC;  Service: Urology;  Laterality: Left;    EYE SURGERY      cataracts    HUMERUS FRACTURE SURGERY      LYTIC THROMBIN THERAPY Left 8/17/2022    Procedure: LT. LEG THROMBECTOMY/EMBOLECTOMY AND ANGIOPLASTY STENT PLACEMENT OF LEFT ILIAC VEIN;  Surgeon: Theo Bustos MD;  Location: [facility] HYBRID OR 18/19;  Service: Vascular;  Laterality: Left;    RIB FRACTURE SURGERY  2021    THORACOSCOPY Right 02/16/2021    Procedure: bronchoscopy, right video assisted THORACOSCOPY WITH PLATING OF 3, 4, 5, AND 6 RIBS;  Surgeon: Kelley Delong MD;  Location: [facility] MAIN OR;  Service: Thoracic;   Laterality: Right;    TOTAL KNEE ARTHROPLASTY Left 04/2015    UMBILICAL HERNIA REPAIR        General Information       Row Name 08/24/23 1334          OT Time and Intention    Document Type evaluation  -     Mode of Treatment occupational therapy;individual therapy  -[facility] Name 08/24/23 1334          General Information    Patient Profile Reviewed yes  -SM     Prior Level of Function independent:;ADL's;community mobility;all household mobility  -     Existing Precautions/Restrictions fall  -[facility] Name 08/24/23 1334          Occupational Profile    Environmental Supports and Barriers (Occupational Profile) Home DME includes rwx, SPC, shower chair  -[facility] Name 08/24/23 1334          Living Environment    People in Home alone  -[facility] Name 08/24/23 1334          Cognition    Orientation Status (Cognition) oriented x 4  -[facility] Name 08/24/23 1334          Safety Issues, Functional Mobility    Impairments Affecting Function (Mobility) balance;endurance/activity tolerance;strength  -               User Key  (r) = Recorded By, (t) = Taken By, (c) = Cosigned By      Initials Name Provider Type     Laura Ferguson, OT Occupational Therapist                     Mobility/ADL's       [facility] Name 08/24/23 1335          Transfers    Transfers toilet transfer  -[facility] Name 08/24/23 1335          Sit-Stand Transfer    Sit-Stand [place] (Transfers) contact guard;verbal cues  -     Assistive Device (Sit-Stand Transfers) walker, front-wheeled  -[facility] Name 08/24/23 1335          Toilet Transfer    [place] Level (Toilet Transfer) contact guard  -     Assistive Device (Toilet Transfer) commode, bedside without drop arms;walker, front-wheeled  -SM       Row Name 08/24/23 1335          Functional Mobility    Functional Mobility- Ind. Level contact guard assist  -     Functional Mobility- Device walker, front-wheeled  -     Functional Mobility-Distance (Feet) --  10  -SM        Row Name 08/24/23 1335          Activities of Daily Living    BADL Assessment/Intervention lower body dressing;toileting;grooming  -SM       Row Name 08/24/23 1335          Lower Body Dressing Assessment/Training    [place] (Lower Body Dressing) doff;don;socks;moderate assist (50% patient effort)  -     Position (Lower Body Dressing) supported sitting  -SM       Row Name 08/24/23 1335          Toileting Assessment/Training    [place] (Toileting) minimum assist (75% patient effort)  -     Assistive Devices (Toileting) commode, bedside without drop arms  -SM       Row Name 08/24/23 1335          Grooming Assessment/Training    [place] (Grooming) hair care, combing/brushing;minimum assist (75% patient effort)  -     Comment, (Grooming) assist to put into a hair tie 2/2 UE fatique  -               User Key  (r) = Recorded By, (t) = Taken By, (c) = Cosigned By      Initials Name Provider Type    SM Laura Ferguson OT Occupational Therapist                   Obj/Interventions       Row Name 08/24/23 1336          Range of Motion Comprehensive    Comment, General Range of Motion AROM shoulder flexion impaired 25%, AAROM WFL. Limited by weakness. Elbow to digit AROM WFL  -SM       Row Name 08/24/23 1336          Strength Comprehensive (MMT)    Comment, General Manual Muscle Testing (MMT) Assessment shoulder flex 3-/5, distal UE 3+/5, generalized weakness noted as well  -SM       Row Name 08/24/23 1336          Motor Skills    Motor Skills functional endurance  -     Functional Endurance poor  -SM       Row Name 08/24/23 1336          Balance    Static Standing Balance standby assist  -     Dynamic Standing Balance contact guard  -     Position/Device Used, Standing Balance supported;walker, rolling  -               User Key  (r) = Recorded By, (t) = Taken By, (c) = Cosigned By      Initials Name Provider Type    Laura Hernandez OT Occupational Therapist                    Goals/Plan       Row Name 08/24/23 1340          Transfer Goal 1 (OT)    Activity/Assistive Device (Transfer Goal 1, OT) transfers, all;toilet  -SM     [place] Level/Cues Needed (Transfer Goal 1, OT) supervision required  -SM     Time Frame (Transfer Goal 1, OT) short term goal (STG);2 weeks  -SM     Progress/Outcome (Transfer Goal 1, OT) goal ongoing  -       Row Name 08/24/23 1340          Bathing Goal 1 (OT)    Activity/Device (Bathing Goal 1, OT) bathing skills, all  -SM     [place] Level/Cues Needed (Bathing Goal 1, OT) supervision required  -SM     Time Frame (Bathing Goal 1, OT) short term goal (STG);2 weeks  -SM     Progress/Outcomes (Bathing Goal 1, OT) goal ongoing  -       Row Name 08/24/23 1340          Dressing Goal 1 (OT)    Activity/Device (Dressing Goal 1, OT) dressing skills, all  -SM     [place]/Cues Needed (Dressing Goal 1, OT) supervision required  -SM     Time Frame (Dressing Goal 1, OT) short term goal (STG);2 weeks  -SM     Progress/Outcome (Dressing Goal 1, OT) goal ongoing  -[facility] Name 08/24/23 1340          Toileting Goal 1 (OT)    Activity/Device (Toileting Goal 1, OT) toileting skills, all  -SM     [place] Level/Cues Needed (Toileting Goal 1, OT) supervision required  -SM     Time Frame (Toileting Goal 1, OT) short term goal (STG);2 weeks  -SM     Progress/Outcome (Toileting Goal 1, OT) goal ongoing  -[facility] Name 08/24/23 1340          Grooming Goal 1 (OT)    Activity/Device (Grooming Goal 1, OT) grooming skills, all  -SM     [place] (Grooming Goal 1, OT) supervision required  -SM     Time Frame (Grooming Goal 1, OT) short term goal (STG);2 weeks  -SM     Strategies/Barriers (Grooming Goal 1, OT) in standing position  -SM     Progress/Outcome (Grooming Goal 1, OT) goal ongoing  -       Row Name 08/24/23 1340          Therapy Assessment/Plan (OT)    Planned Therapy Interventions (OT) activity tolerance training;BADL  retraining;functional balance retraining;occupation/activity based interventions;patient/caregiver education/training;transfer/mobility retraining;strengthening exercise;ROM/therapeutic exercise  -               User Key  (r) = Recorded By, (t) = Taken By, (c) = Cosigned By      Initials Name Provider Type    Laura Hernandez, PANCHITO Occupational Therapist                   Clinical Impression       Row Name 08/24/23 5701          Pain Assessment    Pretreatment Pain Rating 0/10 - no pain  -     Posttreatment Pain Rating 0/10 - no pain  -       Row Name 08/24/23 6963          Plan of Care Review    Plan of Care Reviewed With patient  -     Outcome Evaluation Pt is a 83 y.o female admitted with fall and GI bleed. She is typically independent but reports feeling very weak today. She has poor endurance for ADLs. She attempted to mobilize to the bathroom but fatiqued and had to use BSC for toileting. UE weakness noted with attempting to brush and style hair. Pt would benefit from continued OT to increase independence with ADLs. OT recommends SNF at [place], pt lives alone and not yet safe to [place] home.  -       Row Name 08/24/23 1811          Therapy Assessment/Plan (OT)    Rehab Potential (OT) good, to achieve stated therapy goals  -     Criteria for Skilled Therapeutic Interventions Met (OT) yes;skilled treatment is necessary  -     Therapy Frequency (OT) 5 times/wk  -       Row Name 08/24/23 2744          Therapy Plan Review/Discharge Plan (OT)    Anticipated Discharge Disposition (OT) [facility] nursing facility  -       Row Name 08/24/23 3079          Positioning and Restraints    Pre-Treatment Position sitting in chair/recliner  -     Post Treatment Position chair  -SM     In Chair reclined;notified nsg;call light within reach;encouraged to call for assist  no exit alarm at arrival  -               User Key  (r) = Recorded By, (t) = Taken By, (c) = Cosigned By      Initials Name Provider Type    OLGA LIDIA  Laura Ferguson, PANCHITO Occupational Therapist                   Outcome Measures       Row Name 08/24/23 1339          How much help from another is currently needed...    Putting on and taking off regular lower body clothing? 2  -SM     Bathing (including washing, rinsing, and drying) 2  -SM     Toileting (which includes using toilet bed pan or urinal) 3  -SM     Putting on and taking off regular upper body clothing 3  -SM     Taking care of personal grooming (such as brushing teeth) 3  -SM     Eating meals 4  -SM     AM-PAC 6 Clicks Score (OT) 17  -SM       Row Name 08/24/23 1109          How much help from another person do you currently need...    Turning from your back to your side while in flat bed without using bedrails? 3  -PH     Moving from lying on back to sitting on the side of a flat bed without bedrails? 3  -PH     Moving to and from a bed to a chair (including a wheelchair)? 3  -PH     Standing up from a chair using your arms (e.g., wheelchair, bedside chair)? 3  -PH     Climbing 3-5 steps with a railing? 2  -PH     To walk in hospital room? 3  -PH     AM-PAC 6 Clicks Score (PT) 17  -PH     Highest level of mobility 5 --> Static standing  -PH       Row Name 08/24/23 1339 08/24/23 1109       Functional Assessment    Outcome Measure Options AM-PAC 6 Clicks Daily Activity (OT)  - AM-PAC 6 Clicks Basic Mobility (PT)  -PH              User Key  (r) = Recorded By, (t) = Taken By, (c) = Cosigned By      Initials Name Provider Type    PH Venessa Escoto PTA Physical Therapist Assistant    Laura Hernandez, PANCHITO Occupational Therapist                    Occupational Therapy Education       Title: PT OT SLP Therapies (In Progress)       Topic: Occupational Therapy (In Progress)       Point: ADL training (Done)       Description:   Instruct learner(s) on proper safety adaptation and remediation techniques during self care or transfers.   Instruct in proper use of assistive devices.                   Learning Progress Summary             Patient Acceptance, E, VU by  at 8/24/2023 0138    Comment: OT goals and POC, safety with transfer with rwx                         Point: Home exercise program (Not Started)       Description:   Instruct learner(s) on appropriate technique for monitoring, assisting and/or progressing therapeutic exercises/activities.                  Learner Progress:  Not documented in this visit.              Point: Precautions (Not Started)       Description:   Instruct learner(s) on prescribed precautions during self-care and functional transfers.                  Learner Progress:  Not documented in this visit.              Point: Body mechanics (Not Started)       Description:   Instruct learner(s) on proper positioning and spine alignment during self-care, functional mobility activities and/or exercises.                  Learner Progress:  Not documented in this visit.                              User Key       Initials Effective Dates Name Provider Type Discipline     04/02/20 -  Laura Ferguson OT Occupational Therapist OT                  OT Recommendation and Plan  Planned Therapy Interventions (OT): activity tolerance training, BADL retraining, functional balance retraining, occupation/activity based interventions, patient/caregiver education/training, transfer/mobility retraining, strengthening exercise, ROM/therapeutic exercise  Therapy Frequency (OT): 5 times/wk  Plan of Care Review  Plan of Care Reviewed With: patient  Outcome Evaluation: Pt is a 83 y.o female admitted with fall and GI bleed. She is typically independent but reports feeling very weak today. She has poor endurance for ADLs. She attempted to mobilize to the bathroom but fatiqued and had to use BSC for toileting. UE weakness noted with attempting to brush and style hair. Pt would benefit from continued OT to increase independence with ADLs. OT recommends SNF at [place], pt lives alone and not yet safe to [place]  home.     Time Calculation:   Evaluation Complexity (OT)  Review Occupational Profile/Medical/Therapy History Complexity: expanded/moderate complexity  Assessment, Occupational Performance/Identification of Deficit Complexity: 3-5 performance deficits  Clinical Decision Making Complexity (OT): detailed assessment/moderate complexity  Overall Complexity of Evaluation (OT): moderate complexity     Time Calculation- OT       Row Name 08/24/23 1341             Time Calculation- OT    OT Start Time 0916  -SM      OT Stop Time 0935  -SM      OT Time Calculation (min) 19 min  -SM      Total Timed Code Minutes- OT 12 minute(s)  -SM      OT Received On 08/24/23  -      OT - Next Appointment 08/25/23  -      OT Goal Re-Cert Due Date 09/07/23  -         Timed Charges    79910 - OT Self Care/Mgmt Minutes 12  -SM         Untimed Charges    OT Eval/Re-eval Minutes 7  -SM         Total Minutes    Timed Charges Total Minutes 12  -SM      Untimed Charges Total Minutes 7  -SM       Total Minutes 19  -SM                User Key  (r) = Recorded By, (t) = Taken By, (c) = Cosigned By      Initials Name Provider Type     Laura Ferguson OT Occupational Therapist                  Therapy Charges for Today       Code Description Service Date Service Provider Modifiers Qty    14270348401 HC OT SELF CARE/MGMT/TRAIN EA 15 MIN 8/24/2023 Laura Ferguson OT GO 1    14888217139 HC OT EVAL MOD COMPLEXITY 2 8/24/2023 Laura Ferguson OT GO 1                 Laura Ferguson OT  8/24/2023

## 2023-08-24 NOTE — PROGRESS NOTES
[facility]  Inpatient Progress Note    Reason for Follow-up: GI bleed    Subjective     Interval History:   Patient reports she is doing well today.  She has not had a bowel movement in several days.  Hemoglobin is stable around 8.5 today.    8/22 Egd/enteroscopy-red blood antrum and duodenal bulb; actively bleeding avm in distal duudenum - tx with apc and clipped, multiple AVMs in the duodenum and jejunum.    Current Facility-Administered Medications:     acetaminophen (TYLENOL) tablet 650 mg, 650 mg, Oral, Q4H PRN, Sonia Edward MD, 650 mg at 08/22/23 2030    albuterol (PROVENTIL) nebulizer solution 0.083% 2.5 mg/3mL, 2.5 mg, Nebulization, Q6H PRN, Sonia Edward MD    sennosides-docusate (PERICOLACE) 8.6-50 MG per tablet 2 tablet, 2 tablet, Oral, BID, 2 tablet at 08/24/23 0907 **AND** polyethylene glycol (MIRALAX) packet 17 g, 17 g, Oral, Daily PRN, 17 g at 08/24/23 1251 **AND** bisacodyl (DULCOLAX) EC tablet 5 mg, 5 mg, Oral, Daily PRN **AND** bisacodyl (DULCOLAX) suppository 10 mg, 10 mg, Rectal, Daily PRN, Sonia Edward MD    budesonide-formoterol (SYMBICORT) 160-4.5 MCG/ACT inhaler 1 puff, 1 puff, Inhalation, BID - RT, Sonia Edward MD, 1 puff at 08/24/23 0828    cholecalciferol (VITAMIN D3) tablet 2,000 Units, 2,000 Units, Oral, Daily, Sonia Edward MD, 2,000 Units at 08/24/23 0906    folic acid (FOLVITE) tablet 1 mg, 1 mg, Oral, Daily, Sonia Edward MD, 1 mg at 08/24/23 0905    levothyroxine (SYNTHROID, LEVOTHROID) tablet 100 mcg, 100 mcg, Oral, Q AM, Sonia Edward MD, 100 mcg at 08/24/23 0620    losartan (COZAAR) tablet 25 mg, 25 mg, Oral, Daily, Sonia Edward MD, 25 mg at 08/24/23 0905    melatonin tablet 3 mg, 3 mg, Oral, Nightly PRN, Sonia Edward MD    ondansetron (ZOFRAN) tablet 4 mg, 4 mg, Oral, Q6H PRN **OR** ondansetron (ZOFRAN) injection 4 mg, 4 mg, Intravenous, Q6H PRN, Sonia Edward,  MD    [COMPLETED] pantoprazole (PROTONIX) injection 80 mg, 80 mg, Intravenous, Once, 80 mg at 08/21/23 1557 **AND** pantoprazole (PROTONIX) 40 mg in 100 mL NS (VTB), 8 mg/hr, Intravenous, Continuous, Mary Lou Li PA, Last Rate: 20 mL/hr at 08/24/23 0802, 8 mg/hr at 08/24/23 0802    [COMPLETED] Insert Peripheral IV, , , Once **AND** sodium chloride 0.9 % flush 10 mL, 10 mL, Intravenous, PRN, Mary Lou Li PA, 10 mL at 08/22/23 2031    sodium chloride 0.9 % infusion, 150 mL/hr, Intravenous, Continuous, Sonia Edward MD, Last Rate: 150 mL/hr at 08/24/23 0255, 150 mL/hr at 08/24/23 0255    torsemide (DEMADEX) tablet 20 mg, 20 mg, Oral, Every Other Day, Sonia Edward MD, 20 mg at 08/24/23 0905    traMADol (ULTRAM) tablet 50 mg, 50 mg, Oral, Q6H PRN, Sonia Edward MD, 50 mg at 08/24/23 0905    vitamin B-12 (CYANOCOBALAMIN) tablet 1,000 mcg, 1,000 mcg, Oral, Daily, Sonia Edward MD, 1,000 mcg at 08/24/23 0905  Review of Systems:    The following systems were reviewed and negative;  respiratory and cardiovascular    Objective     Vital Signs  Temp:  [97.9 øF (36.6 øC)-98.6 øF (37 øC)] 97.9 øF (36.6 øC)  Heart Rate:  [53-63] 61  Resp:  [18] 18  BP: (118-148)/(50-61) 148/61  Body mass index is 35.85 kg/mý.    Intake/Output Summary (Last 24 hours) at 8/24/2023 1414  Last data filed at 8/24/2023 0807  Gross per 24 hour   Intake 480 ml   Output 1200 ml   Net -720 ml     I/O this shift:  In: -   Out: 200 [Urine:200]     Physical Exam:    General: patient awake, alert and cooperative   Eyes: Normal lids and lashes, no scleral icterus   Neck: supple, normal ROM   Skin: warm and dry, not jaundiced   Pulm: regular and unlabored   Psychiatric: Normal mood and behavior; memory intact     Results Review:     I reviewed the patient's new clinical results.    Results from last 7 days   Lab Units 08/24/23  0816 08/24/23  0415 08/23/23 2029   WBC 10*3/mm3 6.68 7.65 8.13   HEMOGLOBIN g/dL 8.5* 8.0*  8.7*   HEMATOCRIT % 26.0* 24.2* 26.6*   PLATELETS 10*3/mm3 185 157 175     Results from last 7 days   Lab Units 08/24/23  0415 08/23/23  0746 08/22/23  0831 08/21/23  1344   SODIUM mmol/L 141 139 138 141   POTASSIUM mmol/L 3.8 4.1 4.4 5.1   CHLORIDE mmol/L 115* 112* 114* 112*   CO2 mmol/L 18.7* 18.0* 18.0* 21.0*   BUN mg/dL 25* 39* 54* 72*   CREATININE mg/dL 1.30* 1.40* 1.44* 1.90*   CALCIUM mg/dL 8.0* 7.7* 7.7* 8.4*   BILIRUBIN mg/dL 0.3 0.3  --  0.2   ALK PHOS U/L 40 37*  --  34*   ALT (SGPT) U/L 6 8  --  8   AST (SGOT) U/L 11 16  --  12   GLUCOSE mg/dL 79 73 82 115*     Results from last 7 days   Lab Units 08/22/23  0831 08/21/23  1344   INR  1.67* 2.54*     Lab Results   Lab Value Date/Time    LIPASE 25 12/12/2022 1736    LIPASE 18 08/11/2022 1058    LIPASE 28 12/16/2020 1351    LIPASE 24 12/10/2015 0950       Radiology:  XR Toe 2+ View Right   Final Result   No fracture. Mild hallux valgus deformity. Mild first MTP   medial soft tissue swelling. Mild IP joint osteoarthritis.               This report was finalized on 8/22/2023 1:49 PM by Dr. Theo Vickers M.D.          CT Head Without Contrast   Final Result       No acute intracranial hemorrhage or hydrocephalus. If there is further   clinical concern, MRI could be considered for further evaluation.       This report was finalized on 8/21/2023 3:10 PM by Dr. Alec Arcos M.D.              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  She has multiple AVMs treated previously in March and bleeding AVM found in duodenum this time and cauterized.  Unfortunately she has extensive history of DVT/recurrent and will need to go back on her Pradaxa.  Okay to restart tomorrow as long as hemoglobin is stable.  She will continue to be high risk for bleeding given the AVMs.  Follow-up with hematology, Dr. Dubon, as outpatient     I discussed the patients findings and my recommendations with patient and primary care team.    Dragon dictation used throughout this note.            Paola Louis PA-C  [facility] [place], operated by [facility]  [address] [place] [address]  Office: [phone]

## 2023-08-24 NOTE — PLAN OF CARE
Goal Outcome Evaluation:  Plan of Care Reviewed With: patient        Progress: no change  Outcome Evaluation: Pt continues to c/o BLE pain, medicated per MAR. IVF and protonix gtt continue. Purewick removed, encouraged to use BSC. Pt with intermittent incontinence. SNF [place].

## 2023-08-24 NOTE — PLAN OF CARE
Goal Outcome Evaluation:  Plan of Care Reviewed With: patient        Progress: improving  Outcome Evaluation: Pt seen by PT this date for tx. Pt UIC at beg of session. Pt stood req CGA and use of fww. Pt then amb improved distance of 25' w/ CGA and use of fww. Pt limited in distance by fatigue. Pt performed ther ex in chair for strengthening. PT will prog as pt dae.      Anticipated Discharge Disposition (PT): skilled nursing facility

## 2023-08-24 NOTE — PROGRESS NOTES
"DAILY PROGRESS NOTE  [facility]    Patient Identification:  Name: Luann Frances  Age: 83 y.o.  Sex: female  :  1940  MRN: [id number]         Primary Care Physician: Dinah Humphrey MD    Subjective:  Interval History: No bleeding but she has not had bowel movement yet.    Objective:    Scheduled Meds:budesonide-formoterol, 1 puff, Inhalation, BID - RT  cholecalciferol, 2,000 Units, Oral, Daily  folic acid, 1 mg, Oral, Daily  levothyroxine, 100 mcg, Oral, Q AM  losartan, 25 mg, Oral, Daily  [START ON 2023] pantoprazole, 40 mg, Oral, Q AM  senna-docusate sodium, 2 tablet, Oral, BID  torsemide, 20 mg, Oral, Every Other Day  vitamin B-12, 1,000 mcg, Oral, Daily      Continuous Infusions:     Vital signs in last 24 hours:  Temp:  [97.9 øF (36.6 øC)-98.6 øF (37 øC)] 97.9 øF (36.6 øC)  Heart Rate:  [53-63] 61  Resp:  [18] 18  BP: (118-148)/(50-61) 148/61    Intake/Output:    Intake/Output Summary (Last 24 hours) at 2023 1417  Last data filed at 2023 0807  Gross per 24 hour   Intake 480 ml   Output 1200 ml   Net -720 ml       Exam:  /61 (BP Location: Right arm, Patient Position: Sitting)   Pulse 61   Temp 97.9 øF (36.6 øC) (Oral)   Resp 18   Ht 160 cm (63\")   Wt 91.8 kg (202 lb 6.1 oz)   SpO2 96%   BMI 35.85 kg/mý     General Appearance:    Alert, cooperative, no distress   Head:    Normocephalic, without obvious abnormality, atraumatic   Eyes:       Throat:   Lips, tongue, gums normal   Neck:   Supple, symmetrical, trachea midline, no JVD   Lungs:     Clear to auscultation bilaterally, respirations unlabored   Chest Wall:    No tenderness or deformity    Heart:    Regular rate and rhythm, S1 and S2 normal, no murmur,no  Rub or gallop   Abdomen:     Soft, nontender, bowel sounds active, no masses, no organomegaly    Extremities:   Extremities normal, atraumatic, no cyanosis or edema   Pulses:      Skin:   Skin is warm and dry,  no rashes or palpable lesions "   Neurologic:   no focal deficits noted      Lab Results (last 72 hours)       Procedure Component Value Units Date/Time    CBC (No Diff) [[id number]]  (Abnormal) Collected: 08/24/23 0816    Specimen: Blood Updated: 08/24/23 0918     WBC 6.68 10*3/mm3      RBC 2.81 10*6/mm3      Hemoglobin 8.5 g/dL      Hematocrit 26.0 %      MCV 92.5 fL      MCH 30.2 pg      MCHC 32.7 g/dL      RDW 16.0 %      RDW-SD 53.1 fl      MPV 11.6 fL      Platelets 185 10*3/mm3     Comprehensive Metabolic Panel [[id number]]  (Abnormal) Collected: 08/24/23 0415    Specimen: Blood Updated: 08/24/23 0451     Glucose 79 mg/dL      BUN 25 mg/dL      Creatinine 1.30 mg/dL      Sodium 141 mmol/L      Potassium 3.8 mmol/L      Chloride 115 mmol/L      CO2 18.7 mmol/L      Calcium 8.0 mg/dL      Total Protein 4.9 g/dL      Albumin 3.1 g/dL      ALT (SGPT) 6 U/L      AST (SGOT) 11 U/L      Alkaline Phosphatase 40 U/L      Total Bilirubin 0.3 mg/dL      Globulin 1.8 gm/dL      A/G Ratio 1.7 g/dL      BUN/Creatinine Ratio 19.2     Anion Gap 7.3 mmol/L      eGFR 41.1 mL/min/1.73     Narrative:      GFR Normal >60  Chronic Kidney Disease <60  Kidney Failure <15    The GFR formula is only valid for adults with stable renal function between ages 18 and 70.    CBC & Differential [[id number]]  (Abnormal) Collected: 08/24/23 0415    Specimen: Blood Updated: 08/24/23 0441    Narrative:      The following orders were created for panel order CBC & Differential.  Procedure                               Abnormality         Status                     ---------                               -----------         ------                     CBC Auto Differential[[id number]]        Abnormal            Final result                 Please view results for these tests on the individual orders.    CBC Auto Differential [[id number]]  (Abnormal) Collected: 08/24/23 0415    Specimen: Blood Updated: 08/24/23 0441     WBC 7.65 10*3/mm3      RBC 2.65 10*6/mm3      Hemoglobin 8.0 g/dL       Hematocrit 24.2 %      MCV 91.3 fL      MCH 30.2 pg      MCHC 33.1 g/dL      RDW 15.8 %      RDW-SD 51.3 fl      MPV 11.2 fL      Platelets 157 10*3/mm3      Neutrophil % 56.9 %      Lymphocyte % 30.3 %      Monocyte % 8.4 %      Eosinophil % 2.9 %      Basophil % 1.0 %      Immature Grans % 0.5 %      Neutrophils, Absolute 4.35 10*3/mm3      Lymphocytes, Absolute 2.32 10*3/mm3      Monocytes, Absolute 0.64 10*3/mm3      Eosinophils, Absolute 0.22 10*3/mm3      Basophils, Absolute 0.08 10*3/mm3      Immature Grans, Absolute 0.04 10*3/mm3      nRBC 0.0 /100 WBC     CBC (No Diff) [[id number]]  (Abnormal) Collected: 08/23/23 2029    Specimen: Blood Updated: 08/23/23 2109     WBC 8.13 10*3/mm3      RBC 2.90 10*6/mm3      Hemoglobin 8.7 g/dL      Hematocrit 26.6 %      MCV 91.7 fL      MCH 30.0 pg      MCHC 32.7 g/dL      RDW 16.2 %      RDW-SD 53.3 fl      MPV 11.4 fL      Platelets 175 10*3/mm3     Comprehensive Metabolic Panel [[id number]]  (Abnormal) Collected: 08/23/23 0746    Specimen: Blood Updated: 08/23/23 0842     Glucose 73 mg/dL      BUN 39 mg/dL      Creatinine 1.40 mg/dL      Sodium 139 mmol/L      Potassium 4.1 mmol/L      Comment: Slight hemolysis detected by analyzer. Results may be affected.        Chloride 112 mmol/L      CO2 18.0 mmol/L      Calcium 7.7 mg/dL      Total Protein 5.1 g/dL      Albumin 3.1 g/dL      ALT (SGPT) 8 U/L      AST (SGOT) 16 U/L      Alkaline Phosphatase 37 U/L      Total Bilirubin 0.3 mg/dL      Globulin 2.0 gm/dL      A/G Ratio 1.6 g/dL      BUN/Creatinine Ratio 27.9     Anion Gap 9.0 mmol/L      eGFR 37.6 mL/min/1.73     Narrative:      GFR Normal >60  Chronic Kidney Disease <60  Kidney Failure <15    The GFR formula is only valid for adults with stable renal function between ages 18 and 70.    CBC (No Diff) [[id number]]  (Abnormal) Collected: 08/23/23 0746    Specimen: Blood Updated: 08/23/23 0823     WBC 6.69 10*3/mm3      RBC 2.77 10*6/mm3      Hemoglobin 8.3 g/dL       Hematocrit 25.5 %      MCV 92.1 fL      MCH 30.0 pg      MCHC 32.5 g/dL      RDW 16.1 %      RDW-SD 53.5 fl      MPV 11.8 fL      Platelets 152 10*3/mm3     CBC (No Diff) [[id number]]  (Abnormal) Collected: 08/22/23 1957    Specimen: Blood Updated: 08/22/23 2045     WBC 8.34 10*3/mm3      RBC 2.97 10*6/mm3      Hemoglobin 8.8 g/dL      Hematocrit 26.6 %      MCV 89.6 fL      MCH 29.6 pg      MCHC 33.1 g/dL      RDW 15.8 %      RDW-SD 51.5 fl      MPV 12.0 fL      Platelets 158 10*3/mm3     Protime-INR [[id number]]  (Abnormal) Collected: 08/22/23 0831    Specimen: Blood Updated: 08/22/23 0938     Protime 20.0 Seconds      INR 1.67    Basic Metabolic Panel [[id number]]  (Abnormal) Collected: 08/22/23 0831    Specimen: Blood Updated: 08/22/23 0932     Glucose 82 mg/dL      BUN 54 mg/dL      Creatinine 1.44 mg/dL      Sodium 138 mmol/L      Potassium 4.4 mmol/L      Chloride 114 mmol/L      CO2 18.0 mmol/L      Calcium 7.7 mg/dL      BUN/Creatinine Ratio 37.5     Anion Gap 6.0 mmol/L      eGFR 36.4 mL/min/1.73     Narrative:      GFR Normal >60  Chronic Kidney Disease <60  Kidney Failure <15    The GFR formula is only valid for adults with stable renal function between ages 18 and 70.    CBC (No Diff) [[id number]]  (Abnormal) Collected: 08/22/23 0831    Specimen: Blood Updated: 08/22/23 0911     WBC 7.00 10*3/mm3      RBC 2.55 10*6/mm3      Hemoglobin 7.7 g/dL      Hematocrit 23.1 %      MCV 90.6 fL      MCH 30.2 pg      MCHC 33.3 g/dL      RDW 16.2 %      RDW-SD 52.3 fl      MPV 12.2 fL      Platelets 135 10*3/mm3     Ferritin [[id number]]  (Abnormal) Collected: 08/21/23 2000    Specimen: Blood Updated: 08/21/23 2154     Ferritin 241.00 ng/mL     Narrative:      Results may be falsely decreased if patient taking Biotin.      Iron Profile [[id number]]  (Abnormal) Collected: 08/21/23 2000    Specimen: Blood Updated: 08/21/23 2149     Iron 131 mcg/dL      Iron Saturation (TSAT) 51 %      Transferrin 174 mg/dL      TIBC 259  mcg/dL     CBC (No Diff) [[id number]]  (Abnormal) Collected: 08/21/23 2000    Specimen: Blood Updated: 08/21/23 2124     WBC 11.87 10*3/mm3      RBC 2.40 10*6/mm3      Hemoglobin 7.1 g/dL      Hematocrit 22.1 %      MCV 92.1 fL      MCH 29.6 pg      MCHC 32.1 g/dL      RDW 16.0 %      RDW-SD 51.9 fl      MPV 12.4 fL      Platelets 148 10*3/mm3     Urinalysis, Microscopic Only - Urine, Clean Catch [[id number]]  (Abnormal) Collected: 08/21/23 1605    Specimen: Urine, Clean Catch Updated: 08/21/23 1627     RBC, UA 0-2 /HPF      WBC, UA 0-2 /HPF      Bacteria, UA None Seen /HPF      Squamous Epithelial Cells, UA 3-6 /HPF      Hyaline Casts, UA None Seen /LPF      Methodology Automated Microscopy    Urinalysis With Microscopic If Indicated (No Culture) - Urine, Clean Catch [[id number]]  (Abnormal) Collected: 08/21/23 1605    Specimen: Urine, Clean Catch Updated: 08/21/23 1625     Color, UA Yellow     Appearance, UA Clear     pH, UA 5.5     Specific Gravity, UA 1.021     Glucose, UA Negative     Ketones, UA Negative     Bilirubin, UA Negative     Blood, UA Negative     Protein, UA Negative     Leuk Esterase, UA Trace     Nitrite, UA Negative     Urobilinogen, UA 0.2 E.U./dL          Data Review:  Results from last 7 days   Lab Units 08/24/23  0415 08/23/23  0746 08/22/23  0831   SODIUM mmol/L 141 139 138   POTASSIUM mmol/L 3.8 4.1 4.4   CHLORIDE mmol/L 115* 112* 114*   CO2 mmol/L 18.7* 18.0* 18.0*   BUN mg/dL 25* 39* 54*   CREATININE mg/dL 1.30* 1.40* 1.44*   GLUCOSE mg/dL 79 73 82   CALCIUM mg/dL 8.0* 7.7* 7.7*     Results from last 7 days   Lab Units 08/24/23  0816 08/24/23  0415 08/23/23 2029   WBC 10*3/mm3 6.68 7.65 8.13   HEMOGLOBIN g/dL 8.5* 8.0* 8.7*   HEMATOCRIT % 26.0* 24.2* 26.6*   PLATELETS 10*3/mm3 185 157 175             Lab Results   Lab Value Date/Time    TROPONINT 15 (H) 08/19/2023 0953    TROPONINT 21 (H) 08/19/2023 0708    TROPONINT 28 (H) 03/13/2023 1100    TROPONINT 23 (H) 03/13/2023 0916    TROPONINT  <0.010 02/12/2021 1534    TROPONINT <0.01 08/31/2014 2348         Results from last 7 days   Lab Units 08/24/23  0415 08/23/23  0746 08/21/23  1344   ALK PHOS U/L 40 37* 34*   BILIRUBIN mg/dL 0.3 0.3 0.2   ALT (SGPT) U/L 6 8 8   AST (SGOT) U/L 11 16 12             No results found for: POCGLU  Results from last 7 days   Lab Units 08/22/23  0831 08/21/23  1344   INR  1.67* 2.54*       Past Medical History:   Diagnosis Date    Acute deep vein thrombosis (DVT) of femoral vein of right lower extremity 04/15/2021    Arthritis     Asymptomatic PVCs     Backache     Chronic bronchitis     CKD (chronic kidney disease)     Stage 3    Deep vein thrombosis (DVT) of right lower extremity     Essential hypertension 01/20/2020    Fracture of humerus     GERD (gastroesophageal reflux disease)     Hyperlipidemia     Hypertension     Hypothyroidism     Pneumonia     Pulmonary emphysema     Renal calculi     Renal calculus 01/06/2021    Sleep apnea     DOES NOT USE CPAP    Thoracic vertebral fracture        Assessment:  Active Hospital Problems    Diagnosis  POA    **GI bleed [K92.2]  Yes    DELROY (obstructive sleep apnea) [G47.33]  Yes    Essential hypertension [I10]  Yes    Vitamin D deficiency [E55.9]  Yes    CKD (chronic kidney disease) stage 3, GFR 30-59 ml/min [N18.30]  Yes    Mixed hyperlipidemia [E78.2]  Yes    Chronic obstructive pulmonary disease [J44.9]  Yes    Primary hypothyroidism [E03.9]  Yes      Resolved Hospital Problems   No resolved problems to display.       Plan:  Continue to monitor for rebleed.  She has extensive history of DVT and will probably restart anticoagulation tomorrow if no bleeding.  Follow-up lab.  DC planning.  She wants to go to skilled nursing unit for rehab.    Timmy Robles MD  8/24/2023  14:17 EDT

## 2023-08-24 NOTE — CASE MANAGEMENT/SOCIAL WORK
Continued Stay Note  [facility]     Patient Name: Luann Frances  MRN: [id number]  Today's Date: 8/24/2023    Admit Date: 8/21/2023    Plan: DC to the [facility], will need transport arranged, is son is not able to transport   Discharge Plan       Row Name 08/24/23 1258       Plan    Plan DC to the [facility], will need transport arranged, is son is not able to transport    Plan Comments Spoke with Elen KUMAR/Connor. Pt accepted at the [facility] for Friday/Saturday admission.  Will request for precert to be started. Pt may need transport arranged, if son is unable to transport.                   Discharge Codes    No documentation.                 Expected Discharge Date and Time       Expected Discharge Date Expected Discharge Time    Aug 25, 2023               Nasrin Carlson RN

## 2023-08-24 NOTE — PLAN OF CARE
Goal Outcome Evaluation:  Plan of Care Reviewed With: patient           Outcome Evaluation: Pt is a 83 y.o female admitted with fall and GI bleed. She is typically independent but reports feeling very weak today. She has poor endurance for ADLs. She attempted to mobilize to the bathroom but fatiqued and had to use BSC for toileting. UE weakness noted with attempting to brush and style hair. Pt would benefit from continued OT to increase independence with ADLs. OT recommends SNF at dc, pt lives alone and not yet safe to dc home.      Anticipated Discharge Disposition (OT): skilled nursing facility

## 2023-08-24 NOTE — THERAPY TREATMENT NOTE
Patient Name: Luann Frances  : 1940    MRN: [id number]                              Today's Date: 2023       Admit Date: 2023    Visit Dx:     ICD-10-CM ICD-9-CM   1. Gastrointestinal hemorrhage, unspecified gastrointestinal hemorrhage type  K92.2 578.9   2. Fall, initial encounter  W19.XXXA E888.9     Patient Active Problem List   Diagnosis    Chronic obstructive pulmonary disease    Diverticulitis of colon    Acid reflux    Primary hypothyroidism    Insomnia    Chronic nausea    Post herpetic neuralgia    Vitamin D deficiency    CKD (chronic kidney disease) stage 3, GFR 30-59 ml/min    Mixed hyperlipidemia    Hypersomnia    Hyperuricemia    Essential hypertension    DELROY (obstructive sleep apnea)    Renal calculus    Multiple closed fractures of ribs of right side    Compression fracture of body of thoracic vertebra    DVT, lower extremity, distal, acute, left    Lower extremity edema    Medicare annual wellness visit, subsequent    Tobacco abuse    Osteoporosis    Acute deep vein thrombosis (DVT) of femoral vein of left lower extremity    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    Iron deficiency    Iron deficiency anemia    History of deep venous thrombosis (DVT) of distal vein of left lower extremity    Current use of long term anticoagulation    Acute blood loss anemia    Melena    GI bleed     Past Medical History:   Diagnosis Date    Acute deep vein thrombosis (DVT) of femoral vein of right lower extremity 04/15/2021    Arthritis     Asymptomatic PVCs     Backache     Chronic bronchitis     CKD (chronic kidney disease)     Stage 3    Deep vein thrombosis (DVT) of right lower extremity     Essential hypertension 2020    Fracture of humerus     GERD (gastroesophageal reflux disease)     Hyperlipidemia     Hypertension     Hypothyroidism     Pneumonia     Pulmonary emphysema     Renal calculi     Renal calculus 2021    Sleep apnea     DOES NOT USE CPAP    Thoracic  vertebral fracture      Past Surgical History:   Procedure Laterality Date    APPENDECTOMY      COLONOSCOPY N/A 3/15/2023    Procedure: COLONOSCOPY to cecum and TI :  hot snare sigmoid polyps with clip to 1 polyp,;  Surgeon: Jaun Ford MD;  Location: [facility] ENDOSCOPY;  Service: Gastroenterology;  Laterality: N/A;  pre:  anemia  post:  diverticulosis, polyps, hemorrhoids    ENDOSCOPY N/A 3/14/2023    Procedure: ESOPHAGOGASTRODUODENOSCOPY WITH ARGON PLASMA COAGULATION AND COLD BIOPSIES;  Surgeon: Jaun Ford MD;  Location: [facility] ENDOSCOPY;  Service: Gastroenterology;  Laterality: N/A;  PRE: MELANA; ANEMIA  POST: ANGIO ACTASIA; HIATAL HERNIA; SCHATZKI'S RING    ENDOSCOPY N/A 8/22/2023    Procedure: ESOPHAGOGASTRODUODENOSCOPY;  Surgeon: Johanne Calvo MD;  Location: [facility] ENDOSCOPY;  Service: Gastroenterology;  Laterality: N/A;  anemia  POST:    ENTEROSCOPY SMALL BOWEL N/A 8/22/2023    Procedure: ENTEROSCOPY SMALL BOWEL  cauterization to AVMs jejunem wiht clips x2;  Surgeon: Johanne Calvo MD;  Location: [facility] ENDOSCOPY;  Service: Gastroenterology;  Laterality: N/A;  ANEMIA  POST:  AVMs in the jejunem, schatzky's ring, HH    EXTRACORPOREAL SHOCK WAVE LITHOTRIPSY (ESWL) Left 01/06/2021    Procedure: EXTRACORPOREAL SHOCKWAVE LITHOTRIPSY, CYSTOSCOPY, RETROGRADE PYLEOGRAM;  Surgeon: Walter Schwartz MD;  Location: [facility] OR OSC;  Service: Urology;  Laterality: Left;    EYE SURGERY      cataracts    HUMERUS FRACTURE SURGERY      LYTIC THROMBIN THERAPY Left 8/17/2022    Procedure: LT. LEG THROMBECTOMY/EMBOLECTOMY AND ANGIOPLASTY STENT PLACEMENT OF LEFT ILIAC VEIN;  Surgeon: Theo Bustos MD;  Location: [facility] HYBRID OR 18/19;  Service: Vascular;  Laterality: Left;    RIB FRACTURE SURGERY  2021    THORACOSCOPY Right 02/16/2021    Procedure: bronchoscopy, right video assisted THORACOSCOPY WITH PLATING OF 3, 4, 5, AND 6 RIBS;  Surgeon: Kelley Delong MD;  Location: [facility] MAIN OR;  Service: Thoracic;   Laterality: Right;    TOTAL KNEE ARTHROPLASTY Left 04/2015    UMBILICAL HERNIA REPAIR        General Information       Row Name 08/24/23 1105          Physical Therapy Time and Intention    Document Type therapy note (daily note)  -PH     Mode of Treatment physical therapy  -PH       Row Name 08/24/23 1105          General Information    Existing Precautions/Restrictions fall  -PH       Row Name 08/24/23 1105          Cognition    Orientation Status (Cognition) oriented x 4  -PH       Row Name 08/24/23 1105          Safety Issues, Functional Mobility    Impairments Affecting Function (Mobility) balance;endurance/activity tolerance;strength  -PH     Comment, Safety Issues/Impairments (Mobility) gt belt and non skid socks donned  -PH               User Key  (r) = Recorded By, (t) = Taken By, (c) = Cosigned By      Initials Name Provider Type    PH Venessa Escoto PTA Physical Therapist Assistant                   Mobility       Row Name 08/24/23 1106          Bed Mobility    Supine-Sit [place] (Bed Mobility) not tested  -PH     Sit-Supine [place] (Bed Mobility) not tested  -PH     Comment, (Bed Mobility) UIC  -PH       Row Name 08/24/23 1106          Sit-Stand Transfer    Sit-Stand [place] (Transfers) contact guard;verbal cues  -PH     Assistive Device (Sit-Stand Transfers) walker, front-wheeled  -PH       Row Name 08/24/23 1106          Gait/Stairs (Locomotion)    [place] Level (Gait) contact guard;standby assist  -PH     Assistive Device (Gait) walker, front-wheeled  -PH     Distance in Feet (Gait) 25'  -PH     Deviations/Abnormal Patterns (Gait) moraima decreased;gait speed decreased;stride length decreased  -PH     Bilateral Gait Deviations forward flexed posture  -PH     [place] Level (Stairs) unable to assess  -PH     Comment, (Gait/Stairs) slow and shuffling w/ no overt LOB. fatigued limited distance although progress made w/ distance today  -PH               User Key  (r) =  Recorded By, (t) = Taken By, (c) = Cosigned By      Initials Name Provider Type     Venessa Escoto PTA Physical Therapist Assistant                   Obj/Interventions       Row Name 08/24/23 1107          Motor Skills    Therapeutic Exercise other (see comments)  BAP, LAQ, seated march, hip abd/add, SLR w/ aAROM, punches; x 10 reps all  -PH       Row Name 08/24/23 1107          Balance    Balance Assessment sitting static balance;standing static balance  -PH     Static Sitting Balance supervision  -PH     Static Standing Balance contact guard  -PH     Position/Device Used, Standing Balance walker, front-wheeled  -PH               User Key  (r) = Recorded By, (t) = Taken By, (c) = Cosigned By      Initials Name Provider Type    Venessa Morris PTA Physical Therapist Assistant                   Goals/Plan    No documentation.                  Clinical Impression       Row Name 08/24/23 1107          Pain    Pretreatment Pain Rating 0/10 - no pain  -PH     Posttreatment Pain Rating 0/10 - no pain  -PH     Additional Documentation Pain Scale: Numbers Pre/Post-Treatment (Group)  -PH       Row Name 08/24/23 1107          Plan of Care Review    Plan of Care Reviewed With patient  -PH     Progress improving  -PH     Outcome Evaluation Pt seen by PT this date for tx. Pt UIC at beg of session. Pt stood req CGA and use of fww. Pt then amb improved distance of 25' w/ CGA and use of fww. Pt limited in distance by fatigue. Pt performed ther ex in chair for strengthening. PT will prog as pt dae.  -PH               User Key  (r) = Recorded By, (t) = Taken By, (c) = Cosigned By      Initials Name Provider Type     Venessa Escoto PTA Physical Therapist Assistant                   Outcome Measures       Row Name 08/24/23 1109          How much help from another person do you currently need...    Turning from your back to your side while in flat bed without using bedrails? 3  -PH     Moving from lying  on back to sitting on the side of a flat bed without bedrails? 3  -PH     Moving to and from a bed to a chair (including a wheelchair)? 3  -PH     Standing up from a chair using your arms (e.g., wheelchair, bedside chair)? 3  -PH     Climbing 3-5 steps with a railing? 2  -PH     To walk in hospital room? 3  -PH     AM-PAC 6 Clicks Score (PT) 17  -PH     Highest level of mobility 5 --> Static standing  -PH       Row Name 08/24/23 1109          Functional Assessment    Outcome Measure Options AM-PAC 6 Clicks Basic Mobility (PT)  -               User Key  (r) = Recorded By, (t) = Taken By, (c) = Cosigned By      Initials Name Provider Type     Venessa Escoto PTA Physical Therapist Assistant                                 Physical Therapy Education       Title: PT OT SLP Therapies (Done)       Topic: Physical Therapy (Done)       Point: Mobility training (Done)       Learning Progress Summary             Patient Acceptance, E,TB,D, VU,NR by  at 8/24/2023 1109    Acceptance, E, VU by  at 8/23/2023 1229                         Point: Home exercise program (Done)       Learning Progress Summary             Patient Acceptance, E,TB,D, VU,NR by  at 8/24/2023 1109    Acceptance, E, VU by  at 8/23/2023 1229                         Point: Body mechanics (Done)       Learning Progress Summary             Patient Acceptance, E,TB,D, VU,NR by  at 8/24/2023 1109    Acceptance, E, VU by  at 8/23/2023 1229                         Point: Precautions (Done)       Learning Progress Summary             Patient Acceptance, E,TB,D, VU,NR by  at 8/24/2023 1109    Acceptance, E, VU by  at 8/23/2023 1229                                         User Key       Initials Effective Dates Name Provider Type Discipline     06/16/21 -  Venessa Escoto PTA Physical Therapist Assistant PT     05/02/22 -  Cristina Rubi PT Physical Therapist PT                  PT Recommendation and Plan     Plan of Care  Reviewed With: patient  Progress: improving  Outcome Evaluation: Pt seen by PT this date for tx. Pt UIC at beg of session. Pt stood req CGA and use of fww. Pt then amb improved distance of 25' w/ CGA and use of fww. Pt limited in distance by fatigue. Pt performed ther ex in chair for strengthening. PT will prog as pt dae.     Time Calculation:         PT Charges       Row Name 08/24/23 1109             Time Calculation    Start Time 0951  -PH      Stop Time 1005  -PH      Time Calculation (min) 14 min  -PH      PT Received On 08/24/23  -PH      PT - Next Appointment 08/25/23  -PH         Timed Charges    84552 - PT Therapeutic Exercise Minutes 8  -PH      53687 - PT Therapeutic Activity Minutes 5  -PH         Total Minutes    Timed Charges Total Minutes 13  -PH       Total Minutes 13  -PH                User Key  (r) = Recorded By, (t) = Taken By, (c) = Cosigned By      Initials Name Provider Type    PH Venessa Escoto PTA Physical Therapist Assistant                  Therapy Charges for Today       Code Description Service Date Service Provider Modifiers Qty    75506457823 HC PT THER PROC EA 15 MIN 8/24/2023 Venessa Escoto PTA GP 1            PT G-Codes  Outcome Measure Options: AM-PAC 6 Clicks Basic Mobility (PT)  AM-PAC 6 Clicks Score (PT): 17  PT Discharge Summary  Anticipated Discharge Disposition (PT): skilled nursing facility    Venessa Escoto PTA  8/24/2023

## 2023-08-25 PROCEDURE — 94799 UNLISTED PULMONARY SVC/PX: CPT

## 2023-08-25 PROCEDURE — 99232 SBSQ HOSP IP/OBS MODERATE 35: CPT | Performed by: PHYSICIAN ASSISTANT

## 2023-08-25 PROCEDURE — 97530 THERAPEUTIC ACTIVITIES: CPT

## 2023-08-25 PROCEDURE — 94664 DEMO&/EVAL PT USE INHALER: CPT

## 2023-08-25 PROCEDURE — 94761 N-INVAS EAR/PLS OXIMETRY MLT: CPT

## 2023-08-25 RX ADMIN — LOSARTAN POTASSIUM 25 MG: 25 TABLET, FILM COATED ORAL at 09:15

## 2023-08-25 RX ADMIN — Medication 1000 MCG: at 09:15

## 2023-08-25 RX ADMIN — TRAMADOL HYDROCHLORIDE 50 MG: 50 TABLET, COATED ORAL at 23:57

## 2023-08-25 RX ADMIN — LEVOTHYROXINE SODIUM 100 MCG: 0.1 TABLET ORAL at 06:30

## 2023-08-25 RX ADMIN — PANTOPRAZOLE SODIUM 40 MG: 40 TABLET, DELAYED RELEASE ORAL at 09:15

## 2023-08-25 RX ADMIN — SENNOSIDES AND DOCUSATE SODIUM 2 TABLET: 50; 8.6 TABLET ORAL at 09:15

## 2023-08-25 RX ADMIN — BUDESONIDE AND FORMOTEROL FUMARATE DIHYDRATE 1 PUFF: 160; 4.5 AEROSOL RESPIRATORY (INHALATION) at 07:25

## 2023-08-25 RX ADMIN — Medication 2000 UNITS: at 09:15

## 2023-08-25 RX ADMIN — FOLIC ACID 1 MG: 1 TABLET ORAL at 09:15

## 2023-08-25 RX ADMIN — BUDESONIDE AND FORMOTEROL FUMARATE DIHYDRATE 1 PUFF: 160; 4.5 AEROSOL RESPIRATORY (INHALATION) at 22:09

## 2023-08-25 NOTE — PLAN OF CARE
Goal Outcome Evaluation:  Plan of Care Reviewed With: patient        Progress: improving  Outcome Evaluation: Pt seen by PT this date for tx. Pt sat up to EOB w/ mod I. Pt stood req SBA from EOB. Pt amb to BR toilet where she req CGA for STS after performing toilet hygiene w/ IND. Pt amb to sink where she brushed teeth req SBA for stand bal w/pt leaning into sink for support. Pt then amb to chair - approx 18' w/ SBA and use of fww. Pt able to amb very short distances and fatigues quickly req seated rests of a few min.  Pt performed ther ex in chair for strengthening. PT will prog as pt dae.      Anticipated Discharge Disposition (PT): skilled nursing facility

## 2023-08-25 NOTE — PLAN OF CARE
Goal Outcome Evaluation:  Plan of Care Reviewed With: patient        Progress: improving  Outcome Evaluation: Patient has been resting comfortably this shift. Up in chair most of day. Ambulating to bathroom with staff, walker and gait belt and doing well. Denies all pain. Appetite and fluid intake adequate. Voiding well. States she has not had BM but is passing frequent gas.  Vital signs stable. Call light in reach. Safety maintained.

## 2023-08-25 NOTE — PROGRESS NOTES
"DAILY PROGRESS NOTE  [facility]    Patient Identification:  Name: Luann Frances  Age: 83 y.o.  Sex: female  :  1940  MRN: [id number]         Primary Care Physician: Dinah Humphrey MD    Subjective:  Interval History: No bleeding but she had bowel movement that was dark.  Objective:    Scheduled Meds:budesonide-formoterol, 1 puff, Inhalation, BID - RT  cholecalciferol, 2,000 Units, Oral, Daily  folic acid, 1 mg, Oral, Daily  levothyroxine, 100 mcg, Oral, Q AM  losartan, 25 mg, Oral, Daily  pantoprazole, 40 mg, Oral, Q AM  senna-docusate sodium, 2 tablet, Oral, BID  torsemide, 20 mg, Oral, Every Other Day  vitamin B-12, 1,000 mcg, Oral, Daily      Continuous Infusions:     Vital signs in last 24 hours:  Temp:  [97.7 øF (36.5 øC)-98.8 øF (37.1 øC)] 97.9 øF (36.6 øC)  Heart Rate:  [61-84] 72  Resp:  [16-20] 16  BP: (111-148)/(48-61) 140/54    Intake/Output:    Intake/Output Summary (Last 24 hours) at 2023 1218  Last data filed at 2023 0946  Gross per 24 hour   Intake 800 ml   Output --   Net 800 ml         Exam:  /54 (BP Location: Left arm, Patient Position: Lying)   Pulse 72   Temp 97.9 øF (36.6 øC) (Oral)   Resp 16   Ht 160 cm (63\")   Wt 91.8 kg (202 lb 6.1 oz)   SpO2 97%   BMI 35.85 kg/mý     General Appearance:    Alert, cooperative, no distress   Head:    Normocephalic, without obvious abnormality, atraumatic   Eyes:       Throat:   Lips, tongue, gums normal   Neck:   Supple, symmetrical, trachea midline, no JVD   Lungs:     Clear to auscultation bilaterally, respirations unlabored   Chest Wall:    No tenderness or deformity    Heart:    Regular rate and rhythm, S1 and S2 normal, no murmur,no  Rub or gallop   Abdomen:     Soft, nontender, bowel sounds active, no masses, no organomegaly    Extremities:   Extremities normal, atraumatic, no cyanosis or edema   Pulses:      Skin:   Skin is warm and dry,  no rashes or palpable lesions   Neurologic:   no focal deficits " noted      Lab Results (last 72 hours)       Procedure Component Value Units Date/Time    CBC (No Diff) [[id number]]  (Abnormal) Collected: 08/24/23 0816    Specimen: Blood Updated: 08/24/23 0918     WBC 6.68 10*3/mm3      RBC 2.81 10*6/mm3      Hemoglobin 8.5 g/dL      Hematocrit 26.0 %      MCV 92.5 fL      MCH 30.2 pg      MCHC 32.7 g/dL      RDW 16.0 %      RDW-SD 53.1 fl      MPV 11.6 fL      Platelets 185 10*3/mm3     Comprehensive Metabolic Panel [[id number]]  (Abnormal) Collected: 08/24/23 0415    Specimen: Blood Updated: 08/24/23 0451     Glucose 79 mg/dL      BUN 25 mg/dL      Creatinine 1.30 mg/dL      Sodium 141 mmol/L      Potassium 3.8 mmol/L      Chloride 115 mmol/L      CO2 18.7 mmol/L      Calcium 8.0 mg/dL      Total Protein 4.9 g/dL      Albumin 3.1 g/dL      ALT (SGPT) 6 U/L      AST (SGOT) 11 U/L      Alkaline Phosphatase 40 U/L      Total Bilirubin 0.3 mg/dL      Globulin 1.8 gm/dL      A/G Ratio 1.7 g/dL      BUN/Creatinine Ratio 19.2     Anion Gap 7.3 mmol/L      eGFR 41.1 mL/min/1.73     Narrative:      GFR Normal >60  Chronic Kidney Disease <60  Kidney Failure <15    The GFR formula is only valid for adults with stable renal function between ages 18 and 70.    CBC & Differential [[id number]]  (Abnormal) Collected: 08/24/23 0415    Specimen: Blood Updated: 08/24/23 0441    Narrative:      The following orders were created for panel order CBC & Differential.  Procedure                               Abnormality         Status                     ---------                               -----------         ------                     CBC Auto Differential[[id number]]        Abnormal            Final result                 Please view results for these tests on the individual orders.    CBC Auto Differential [[id number]]  (Abnormal) Collected: 08/24/23 0415    Specimen: Blood Updated: 08/24/23 0441     WBC 7.65 10*3/mm3      RBC 2.65 10*6/mm3      Hemoglobin 8.0 g/dL      Hematocrit 24.2 %      MCV  91.3 fL      MCH 30.2 pg      MCHC 33.1 g/dL      RDW 15.8 %      RDW-SD 51.3 fl      MPV 11.2 fL      Platelets 157 10*3/mm3      Neutrophil % 56.9 %      Lymphocyte % 30.3 %      Monocyte % 8.4 %      Eosinophil % 2.9 %      Basophil % 1.0 %      Immature Grans % 0.5 %      Neutrophils, Absolute 4.35 10*3/mm3      Lymphocytes, Absolute 2.32 10*3/mm3      Monocytes, Absolute 0.64 10*3/mm3      Eosinophils, Absolute 0.22 10*3/mm3      Basophils, Absolute 0.08 10*3/mm3      Immature Grans, Absolute 0.04 10*3/mm3      nRBC 0.0 /100 WBC     CBC (No Diff) [[id number]]  (Abnormal) Collected: 08/23/23 2029    Specimen: Blood Updated: 08/23/23 2109     WBC 8.13 10*3/mm3      RBC 2.90 10*6/mm3      Hemoglobin 8.7 g/dL      Hematocrit 26.6 %      MCV 91.7 fL      MCH 30.0 pg      MCHC 32.7 g/dL      RDW 16.2 %      RDW-SD 53.3 fl      MPV 11.4 fL      Platelets 175 10*3/mm3     Comprehensive Metabolic Panel [[id number]]  (Abnormal) Collected: 08/23/23 0746    Specimen: Blood Updated: 08/23/23 0842     Glucose 73 mg/dL      BUN 39 mg/dL      Creatinine 1.40 mg/dL      Sodium 139 mmol/L      Potassium 4.1 mmol/L      Comment: Slight hemolysis detected by analyzer. Results may be affected.        Chloride 112 mmol/L      CO2 18.0 mmol/L      Calcium 7.7 mg/dL      Total Protein 5.1 g/dL      Albumin 3.1 g/dL      ALT (SGPT) 8 U/L      AST (SGOT) 16 U/L      Alkaline Phosphatase 37 U/L      Total Bilirubin 0.3 mg/dL      Globulin 2.0 gm/dL      A/G Ratio 1.6 g/dL      BUN/Creatinine Ratio 27.9     Anion Gap 9.0 mmol/L      eGFR 37.6 mL/min/1.73     Narrative:      GFR Normal >60  Chronic Kidney Disease <60  Kidney Failure <15    The GFR formula is only valid for adults with stable renal function between ages 18 and 70.    CBC (No Diff) [[id number]]  (Abnormal) Collected: 08/23/23 0746    Specimen: Blood Updated: 08/23/23 0823     WBC 6.69 10*3/mm3      RBC 2.77 10*6/mm3      Hemoglobin 8.3 g/dL      Hematocrit 25.5 %      MCV 92.1  fL      MCH 30.0 pg      MCHC 32.5 g/dL      RDW 16.1 %      RDW-SD 53.5 fl      MPV 11.8 fL      Platelets 152 10*3/mm3     CBC (No Diff) [[id number]]  (Abnormal) Collected: 08/22/23 1957    Specimen: Blood Updated: 08/22/23 2045     WBC 8.34 10*3/mm3      RBC 2.97 10*6/mm3      Hemoglobin 8.8 g/dL      Hematocrit 26.6 %      MCV 89.6 fL      MCH 29.6 pg      MCHC 33.1 g/dL      RDW 15.8 %      RDW-SD 51.5 fl      MPV 12.0 fL      Platelets 158 10*3/mm3     Protime-INR [[id number]]  (Abnormal) Collected: 08/22/23 0831    Specimen: Blood Updated: 08/22/23 0938     Protime 20.0 Seconds      INR 1.67    Basic Metabolic Panel [[id number]]  (Abnormal) Collected: 08/22/23 0831    Specimen: Blood Updated: 08/22/23 0932     Glucose 82 mg/dL      BUN 54 mg/dL      Creatinine 1.44 mg/dL      Sodium 138 mmol/L      Potassium 4.4 mmol/L      Chloride 114 mmol/L      CO2 18.0 mmol/L      Calcium 7.7 mg/dL      BUN/Creatinine Ratio 37.5     Anion Gap 6.0 mmol/L      eGFR 36.4 mL/min/1.73     Narrative:      GFR Normal >60  Chronic Kidney Disease <60  Kidney Failure <15    The GFR formula is only valid for adults with stable renal function between ages 18 and 70.    CBC (No Diff) [[id number]]  (Abnormal) Collected: 08/22/23 0831    Specimen: Blood Updated: 08/22/23 0911     WBC 7.00 10*3/mm3      RBC 2.55 10*6/mm3      Hemoglobin 7.7 g/dL      Hematocrit 23.1 %      MCV 90.6 fL      MCH 30.2 pg      MCHC 33.3 g/dL      RDW 16.2 %      RDW-SD 52.3 fl      MPV 12.2 fL      Platelets 135 10*3/mm3     Ferritin [[id number]]  (Abnormal) Collected: 08/21/23 2000    Specimen: Blood Updated: 08/21/23 2154     Ferritin 241.00 ng/mL     Narrative:      Results may be falsely decreased if patient taking Biotin.      Iron Profile [[id number]]  (Abnormal) Collected: 08/21/23 2000    Specimen: Blood Updated: 08/21/23 2149     Iron 131 mcg/dL      Iron Saturation (TSAT) 51 %      Transferrin 174 mg/dL      TIBC 259 mcg/dL     CBC (No Diff) [[id number]]   (Abnormal) Collected: 08/21/23 2000    Specimen: Blood Updated: 08/21/23 2124     WBC 11.87 10*3/mm3      RBC 2.40 10*6/mm3      Hemoglobin 7.1 g/dL      Hematocrit 22.1 %      MCV 92.1 fL      MCH 29.6 pg      MCHC 32.1 g/dL      RDW 16.0 %      RDW-SD 51.9 fl      MPV 12.4 fL      Platelets 148 10*3/mm3     Urinalysis, Microscopic Only - Urine, Clean Catch [[id number]]  (Abnormal) Collected: 08/21/23 1605    Specimen: Urine, Clean Catch Updated: 08/21/23 1627     RBC, UA 0-2 /HPF      WBC, UA 0-2 /HPF      Bacteria, UA None Seen /HPF      Squamous Epithelial Cells, UA 3-6 /HPF      Hyaline Casts, UA None Seen /LPF      Methodology Automated Microscopy    Urinalysis With Microscopic If Indicated (No Culture) - Urine, Clean Catch [[id number]]  (Abnormal) Collected: 08/21/23 1605    Specimen: Urine, Clean Catch Updated: 08/21/23 1625     Color, UA Yellow     Appearance, UA Clear     pH, UA 5.5     Specific Gravity, UA 1.021     Glucose, UA Negative     Ketones, UA Negative     Bilirubin, UA Negative     Blood, UA Negative     Protein, UA Negative     Leuk Esterase, UA Trace     Nitrite, UA Negative     Urobilinogen, UA 0.2 E.U./dL          Data Review:  Results from last 7 days   Lab Units 08/24/23  0415 08/23/23  0746 08/22/23  0831   SODIUM mmol/L 141 139 138   POTASSIUM mmol/L 3.8 4.1 4.4   CHLORIDE mmol/L 115* 112* 114*   CO2 mmol/L 18.7* 18.0* 18.0*   BUN mg/dL 25* 39* 54*   CREATININE mg/dL 1.30* 1.40* 1.44*   GLUCOSE mg/dL 79 73 82   CALCIUM mg/dL 8.0* 7.7* 7.7*       Results from last 7 days   Lab Units 08/24/23  0816 08/24/23  0415 08/23/23 2029   WBC 10*3/mm3 6.68 7.65 8.13   HEMOGLOBIN g/dL 8.5* 8.0* 8.7*   HEMATOCRIT % 26.0* 24.2* 26.6*   PLATELETS 10*3/mm3 185 157 175               Lab Results   Lab Value Date/Time    TROPONINT 15 (H) 08/19/2023 0953    TROPONINT 21 (H) 08/19/2023 0708    TROPONINT 28 (H) 03/13/2023 1100    TROPONINT 23 (H) 03/13/2023 0916    TROPONINT <0.010 02/12/2021 1534    TROPONINT  <0.01 08/31/2014 2348         Results from last 7 days   Lab Units 08/24/23  0415 08/23/23  0746 08/21/23  1344   ALK PHOS U/L 40 37* 34*   BILIRUBIN mg/dL 0.3 0.3 0.2   ALT (SGPT) U/L 6 8 8   AST (SGOT) U/L 11 16 12               No results found for: POCGLU  Results from last 7 days   Lab Units 08/22/23  0831 08/21/23  1344   INR  1.67* 2.54*         Past Medical History:   Diagnosis Date    Acute deep vein thrombosis (DVT) of femoral vein of right lower extremity 04/15/2021    Arthritis     Asymptomatic PVCs     Backache     Chronic bronchitis     CKD (chronic kidney disease)     Stage 3    Deep vein thrombosis (DVT) of right lower extremity     Essential hypertension 01/20/2020    Fracture of humerus     GERD (gastroesophageal reflux disease)     Hyperlipidemia     Hypertension     Hypothyroidism     Pneumonia     Pulmonary emphysema     Renal calculi     Renal calculus 01/06/2021    Sleep apnea     DOES NOT USE CPAP    Thoracic vertebral fracture        Assessment:  Active Hospital Problems    Diagnosis  POA    **GI bleed [K92.2]  Yes    DELROY (obstructive sleep apnea) [G47.33]  Yes    Essential hypertension [I10]  Yes    Vitamin D deficiency [E55.9]  Yes    CKD (chronic kidney disease) stage 3, GFR 30-59 ml/min [N18.30]  Yes    Mixed hyperlipidemia [E78.2]  Yes    Chronic obstructive pulmonary disease [J44.9]  Yes    Primary hypothyroidism [E03.9]  Yes      Resolved Hospital Problems   No resolved problems to display.       Plan:  Continue to monitor for rebleed.  She has extensive history of DVT and will probably restart anticoagulation tomorrow if no bleeding.  Follow-up lab.  DC planning.  She wants to go to skilled nursing unit for rehab.  Await pre-CERT.    Timmy Robles MD  8/25/2023  12:18 EDT

## 2023-08-25 NOTE — PROGRESS NOTES
[facility]  Inpatient Progress Note    Reason for Follow-up: GI bleed    Subjective     Interval History:   She had 2 black stools today.  Denies abdominal pain, nausea, vomiting.  Tolerated p.o. intake very well today.    Hemoglobin stable 8.5.  She is pending placement in rehab.  Plan to restart Pradaxa tomorrow.    Current Facility-Administered Medications:     acetaminophen (TYLENOL) tablet 650 mg, 650 mg, Oral, Q4H PRN, Sonia Edward MD, 650 mg at 08/22/23 2030    albuterol (PROVENTIL) nebulizer solution 0.083% 2.5 mg/3mL, 2.5 mg, Nebulization, Q6H PRN, Sonia Edward MD    sennosides-docusate (PERICOLACE) 8.6-50 MG per tablet 2 tablet, 2 tablet, Oral, BID, 2 tablet at 08/25/23 0915 **AND** polyethylene glycol (MIRALAX) packet 17 g, 17 g, Oral, Daily PRN, 17 g at 08/24/23 1251 **AND** bisacodyl (DULCOLAX) EC tablet 5 mg, 5 mg, Oral, Daily PRN **AND** bisacodyl (DULCOLAX) suppository 10 mg, 10 mg, Rectal, Daily PRN, Sonia Edward MD    budesonide-formoterol (SYMBICORT) 160-4.5 MCG/ACT inhaler 1 puff, 1 puff, Inhalation, BID - RT, Sonia Edward MD, 1 puff at 08/25/23 0725    cholecalciferol (VITAMIN D3) tablet 2,000 Units, 2,000 Units, Oral, Daily, Sonia Edward MD, 2,000 Units at 08/25/23 0915    folic acid (FOLVITE) tablet 1 mg, 1 mg, Oral, Daily, Sonia Edward MD, 1 mg at 08/25/23 0915    levothyroxine (SYNTHROID, LEVOTHROID) tablet 100 mcg, 100 mcg, Oral, Q AM, Sonia Edward MD, 100 mcg at 08/25/23 0630    losartan (COZAAR) tablet 25 mg, 25 mg, Oral, Daily, Sonia Edward MD, 25 mg at 08/25/23 0915    melatonin tablet 3 mg, 3 mg, Oral, Nightly PRN, Sonia Edward MD    ondansetron (ZOFRAN) tablet 4 mg, 4 mg, Oral, Q6H PRN **OR** ondansetron (ZOFRAN) injection 4 mg, 4 mg, Intravenous, Q6H PRN, Sonia Edward MD    pantoprazole (PROTONIX) EC tablet 40 mg, 40 mg, Oral, Q AM, Paola Louis PA-C,  40 mg at 08/25/23 0915    [COMPLETED] Insert Peripheral IV, , , Once **AND** sodium chloride 0.9 % flush 10 mL, 10 mL, Intravenous, PRN, Mary Lou Li PA, 10 mL at 08/22/23 2031    torsemide (DEMADEX) tablet 20 mg, 20 mg, Oral, Every Other Day, Sonia Edward MD, 20 mg at 08/24/23 0905    traMADol (ULTRAM) tablet 50 mg, 50 mg, Oral, Q6H PRN, Sonia Edward MD, 50 mg at 08/24/23 2212    vitamin B-12 (CYANOCOBALAMIN) tablet 1,000 mcg, 1,000 mcg, Oral, Daily, Sonia Edward MD, 1,000 mcg at 08/25/23 0915  Review of Systems:    The following systems were reviewed and negative;  respiratory and cardiovascular    Objective     Vital Signs  Temp:  [97.7 øF (36.5 øC)-98.8 øF (37.1 øC)] 98.1 øF (36.7 øC)  Heart Rate:  [63-84] 65  Resp:  [16-20] 16  BP: (109-140)/(48-56) 109/54  Body mass index is 35.85 kg/mý.    Intake/Output Summary (Last 24 hours) at 8/25/2023 1441  Last data filed at 8/25/2023 0946  Gross per 24 hour   Intake 700 ml   Output --   Net 700 ml     I/O this shift:  In: 480 [P.O.:480]  Out: -      Physical Exam:    General: patient awake, alert and cooperative   Eyes: Normal lids and lashes, no scleral icterus   Skin: warm and dry, not jaundiced   Pulm: regular and unlabored   Abdomen: soft, nontender, nondistended; normal bowel sounds   Psychiatric: Normal mood and behavior; memory intact     Results Review:     I reviewed the patient's new clinical results.    Results from last 7 days   Lab Units 08/24/23  0816 08/24/23  0415 08/23/23 2029   WBC 10*3/mm3 6.68 7.65 8.13   HEMOGLOBIN g/dL 8.5* 8.0* 8.7*   HEMATOCRIT % 26.0* 24.2* 26.6*   PLATELETS 10*3/mm3 185 157 175     Results from last 7 days   Lab Units 08/24/23  0415 08/23/23  0746 08/22/23  0831 08/21/23  1344   SODIUM mmol/L 141 139 138 141   POTASSIUM mmol/L 3.8 4.1 4.4 5.1   CHLORIDE mmol/L 115* 112* 114* 112*   CO2 mmol/L 18.7* 18.0* 18.0* 21.0*   BUN mg/dL 25* 39* 54* 72*   CREATININE mg/dL 1.30* 1.40* 1.44* 1.90*    CALCIUM mg/dL 8.0* 7.7* 7.7* 8.4*   BILIRUBIN mg/dL 0.3 0.3  --  0.2   ALK PHOS U/L 40 37*  --  34*   ALT (SGPT) U/L 6 8  --  8   AST (SGOT) U/L 11 16  --  12   GLUCOSE mg/dL 79 73 82 115*     Results from last 7 days   Lab Units 08/22/23  0831 08/21/23  1344   INR  1.67* 2.54*     Lab Results   Lab Value Date/Time    LIPASE 25 12/12/2022 1736    LIPASE 18 08/11/2022 1058    LIPASE 28 12/16/2020 1351    LIPASE 24 12/10/2015 0950       Radiology:  XR Toe 2+ View Right   Final Result   No fracture. Mild hallux valgus deformity. Mild first MTP   medial soft tissue swelling. Mild IP joint osteoarthritis.               This report was finalized on 8/22/2023 1:49 PM by Dr. Theo Vickers M.D.          CT Head Without Contrast   Final Result       No acute intracranial hemorrhage or hydrocephalus. If there is further   clinical concern, MRI could be considered for further evaluation.       This report was finalized on 8/21/2023 3:10 PM by Dr. Alec Arcos M.D.              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  Transfuse per primary hospital team for hemoglobin less than 7  She has multiple AVMs treated previously in March and bleeding AVM found in duodenum this time and cauterized.  Unfortunately she has extensive history of DVT/recurrent and will need to go back on her Pradaxa.  Plan is to  restart Pradaxa tomorrow per hospitalist note.  She will continue to be high risk for bleeding given the AVMs and will need to be watched closely.  Follow-up with hematology, Dr. Dubon, as outpatient   No further recommendations from a GI standpoint.  Thank you for the consult.  Please call us back if she has a drop in her hemoglobin or worsening evidence of GI bleeding.  Presumably black stools are left over from recent GI bleed.  Hopefully this will clear up soon.    I discussed the patients findings and my recommendations with patient.    Dragon dictation used throughout this note.            Paola Louis PA-C  [facility]  [address] [place] [address]  Office: [phone]

## 2023-08-25 NOTE — CASE MANAGEMENT/SOCIAL WORK
Continued Stay Note  [facility]     Patient Name: Luann Frances  MRN: [id number]  Today's Date: 8/25/2023    Admit Date: 8/21/2023    Plan: DC to [facility], son will transport   Discharge Plan       Row Name 08/25/23 1454       Plan    Plan DC to [facility], son will transport    Plan Comments Pre-cert has been obtained. Pt to be dc to [facility] on 8-. Informed Elen [facility] of disposition, and that son will transport to facility. Please call report to [phone]. Please fax report to [phone]. Packet at nurses station.      Row Name 08/25/23 1437       Plan    Plan Pre-cert approved    Plan Comments Pre-cert approved for admission to [facility] today, 8/25/2023. Nasrin ROJO, CCP as well as [facility] liaison advised. Svitlana MCCOLLUM RN, CCP                   Discharge Codes    No documentation.                 Expected Discharge Date and Time       Expected Discharge Date Expected Discharge Time    Aug 26, 2023               Nasrin Carlson, ALIA

## 2023-08-25 NOTE — THERAPY TREATMENT NOTE
Patient Name: Luann Frances  : 1940    MRN: [id number]                              Today's Date: 2023       Admit Date: 2023    Visit Dx:     ICD-10-CM ICD-9-CM   1. Gastrointestinal hemorrhage, unspecified gastrointestinal hemorrhage type  K92.2 578.9   2. Fall, initial encounter  W19.XXXA E888.9     Patient Active Problem List   Diagnosis    Chronic obstructive pulmonary disease    Diverticulitis of colon    Acid reflux    Primary hypothyroidism    Insomnia    Chronic nausea    Post herpetic neuralgia    Vitamin D deficiency    CKD (chronic kidney disease) stage 3, GFR 30-59 ml/min    Mixed hyperlipidemia    Hypersomnia    Hyperuricemia    Essential hypertension    DELROY (obstructive sleep apnea)    Renal calculus    Multiple closed fractures of ribs of right side    Compression fracture of body of thoracic vertebra    DVT, lower extremity, distal, acute, left    Lower extremity edema    Medicare annual wellness visit, subsequent    Tobacco abuse    Osteoporosis    Acute deep vein thrombosis (DVT) of femoral vein of left lower extremity    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    Iron deficiency    Iron deficiency anemia    History of deep venous thrombosis (DVT) of distal vein of left lower extremity    Current use of long term anticoagulation    Acute blood loss anemia    Melena    GI bleed     Past Medical History:   Diagnosis Date    Acute deep vein thrombosis (DVT) of femoral vein of right lower extremity 04/15/2021    Arthritis     Asymptomatic PVCs     Backache     Chronic bronchitis     CKD (chronic kidney disease)     Stage 3    Deep vein thrombosis (DVT) of right lower extremity     Essential hypertension 2020    Fracture of humerus     GERD (gastroesophageal reflux disease)     Hyperlipidemia     Hypertension     Hypothyroidism     Pneumonia     Pulmonary emphysema     Renal calculi     Renal calculus 2021    Sleep apnea     DOES NOT USE CPAP    Thoracic  vertebral fracture      Past Surgical History:   Procedure Laterality Date    APPENDECTOMY      COLONOSCOPY N/A 3/15/2023    Procedure: COLONOSCOPY to cecum and TI :  hot snare sigmoid polyps with clip to 1 polyp,;  Surgeon: Jaun Ford MD;  Location: [facility] ENDOSCOPY;  Service: Gastroenterology;  Laterality: N/A;  pre:  anemia  post:  diverticulosis, polyps, hemorrhoids    ENDOSCOPY N/A 3/14/2023    Procedure: ESOPHAGOGASTRODUODENOSCOPY WITH ARGON PLASMA COAGULATION AND COLD BIOPSIES;  Surgeon: Jaun Ford MD;  Location: [facility] ENDOSCOPY;  Service: Gastroenterology;  Laterality: N/A;  PRE: MELANA; ANEMIA  POST: ANGIO ACTASIA; HIATAL HERNIA; SCHATZKI'S RING    ENDOSCOPY N/A 8/22/2023    Procedure: ESOPHAGOGASTRODUODENOSCOPY;  Surgeon: Johanne Calvo MD;  Location: [facility] ENDOSCOPY;  Service: Gastroenterology;  Laterality: N/A;  anemia  POST:    ENTEROSCOPY SMALL BOWEL N/A 8/22/2023    Procedure: ENTEROSCOPY SMALL BOWEL  cauterization to AVMs jejunem wiht clips x2;  Surgeon: Johanne Calvo MD;  Location: [facility] ENDOSCOPY;  Service: Gastroenterology;  Laterality: N/A;  ANEMIA  POST:  AVMs in the jejunem, schatzky's ring, HH    EXTRACORPOREAL SHOCK WAVE LITHOTRIPSY (ESWL) Left 01/06/2021    Procedure: EXTRACORPOREAL SHOCKWAVE LITHOTRIPSY, CYSTOSCOPY, RETROGRADE PYLEOGRAM;  Surgeon: Walter Schwartz MD;  Location: [facility] OR OSC;  Service: Urology;  Laterality: Left;    EYE SURGERY      cataracts    HUMERUS FRACTURE SURGERY      LYTIC THROMBIN THERAPY Left 8/17/2022    Procedure: LT. LEG THROMBECTOMY/EMBOLECTOMY AND ANGIOPLASTY STENT PLACEMENT OF LEFT ILIAC VEIN;  Surgeon: Theo Bustos MD;  Location: [facility] HYBRID OR 18/19;  Service: Vascular;  Laterality: Left;    RIB FRACTURE SURGERY  2021    THORACOSCOPY Right 02/16/2021    Procedure: bronchoscopy, right video assisted THORACOSCOPY WITH PLATING OF 3, 4, 5, AND 6 RIBS;  Surgeon: Kelley Delong MD;  Location: [facility] MAIN OR;  Service: Thoracic;   Laterality: Right;    TOTAL KNEE ARTHROPLASTY Left 04/2015    UMBILICAL HERNIA REPAIR        General Information       Row Name 08/25/23 08          Physical Therapy Time and Intention    Document Type therapy note (daily note)  -PH     Mode of Treatment physical therapy  -PH       Row Name 08/25/23 08          General Information    Existing Precautions/Restrictions fall  -PH       Row Name 08/25/23 08          Cognition    Orientation Status (Cognition) oriented x 4  -PH       Row Name 08/25/23 08          Safety Issues, Functional Mobility    Impairments Affecting Function (Mobility) endurance/activity tolerance;strength;balance  -PH     Comment, Safety Issues/Impairments (Mobility) gt belt and non skid socks donned  -PH               User Key  (r) = Recorded By, (t) = Taken By, (c) = Cosigned By      Initials Name Provider Type    PH Venessa Escoto PTA Physical Therapist Assistant                   Mobility       Row Name 08/25/23 08          Bed Mobility    Bed Mobility supine-sit  -PH     Supine-Sit [place] (Bed Mobility) modified independence  -       Row Name 08/25/23 08          Sit-Stand Transfer    Sit-Stand [place] (Transfers) contact guard;standby assist  -PH     Assistive Device (Sit-Stand Transfers) walker, front-wheeled  -PH     Comment, (Sit-Stand Transfer) STS x2; from EOB and from BSC over toilet  -PH       Row Name 08/25/23 08          Gait/Stairs (Locomotion)    [place] Level (Gait) contact guard;standby assist  -PH     Assistive Device (Gait) walker, front-wheeled  -PH     Distance in Feet (Gait) 15' to BR then 4' to sink followed by approx 18'  -PH     Deviations/Abnormal Patterns (Gait) moraima decreased;gait speed decreased;stride length decreased  -PH     Bilateral Gait Deviations forward flexed posture  -PH     [place] Level (Stairs) unable to assess  -PH     Comment, (Gait/Stairs) slow w/ no overt LOB; fatigue limited distance although improved   -PH               User Key  (r) = Recorded By, (t) = Taken By, (c) = Cosigned By      Initials Name Provider Type     Venessa Escoto PTA Physical Therapist Assistant                   Obj/Interventions       Row Name 08/25/23 0837          Motor Skills    Therapeutic Exercise other (see comments)  BAP, LAQ, seated march, hip abd/add, SLR, punches, shldr flexion; x 12/15 reps; limited ROM B shldrs at BL  -PH       Row Name 08/25/23 0837          Balance    Balance Assessment sitting static balance;standing static balance  -PH     Static Sitting Balance standby assist  -PH     Static Standing Balance standby assist  -PH     Comment, Balance pt leaning into sink for support while brushing teeth at sink w/ SBA provided  -PH               User Key  (r) = Recorded By, (t) = Taken By, (c) = Cosigned By      Initials Name Provider Type     Venessa Escoto PTA Physical Therapist Assistant                   Goals/Plan    No documentation.                  Clinical Impression       Row Name 08/25/23 0839          Pain    Pretreatment Pain Rating 0/10 - no pain  -PH     Posttreatment Pain Rating 0/10 - no pain  -PH     Additional Documentation Pain Scale: Numbers Pre/Post-Treatment (Group)  -PH       Row Name 08/25/23 0839          Plan of Care Review    Plan of Care Reviewed With patient  -PH     Progress improving  -PH     Outcome Evaluation Pt seen by PT this date for tx. Pt sat up to EOB w/ mod I. Pt stood req SBA from EOB. Pt amb to BR toilet where she req CGA for STS after performing toilet hygiene w/ IND. Pt amb to sink where she brushed teeth req SBA for stand bal w/pt leaning into sink for support. Pt then amb to chair - approx 18' w/ SBA and use of fww. Pt able to amb very short distances and fatigues quickly req seated rests of a few min.  Pt performed ther ex in chair for strengthening. PT will prog as pt dae.  -PH       Row Name 08/25/23 0839          Vital Signs    O2 Delivery Pre Treatment  room air  -PH     O2 Delivery Intra Treatment room air  -PH     O2 Delivery Post Treatment room air  -PH       Row Name 08/25/23 0839          Positioning and Restraints    Pre-Treatment Position in bed  -PH     Post Treatment Position chair  -PH     In Chair reclined;call light within reach;encouraged to call for assist;exit alarm on;notified nsg  -PH               User Key  (r) = Recorded By, (t) = Taken By, (c) = Cosigned By      Initials Name Provider Type     Venessa Escoto PTA Physical Therapist Assistant                   Outcome Measures       Row Name 08/25/23 0842          How much help from another person do you currently need...    Turning from your back to your side while in flat bed without using bedrails? 4  -PH     Moving from lying on back to sitting on the side of a flat bed without bedrails? 4  -PH     Moving to and from a bed to a chair (including a wheelchair)? 3  -PH     Standing up from a chair using your arms (e.g., wheelchair, bedside chair)? 3  -PH     Climbing 3-5 steps with a railing? 2  -PH     To walk in hospital room? 3  -PH     AM-PAC 6 Clicks Score (PT) 19  -PH     Highest level of mobility 6 --> Walked 10 steps or more  -PH       Row Name 08/25/23 0842          Functional Assessment    Outcome Measure Options AM-PAC 6 Clicks Basic Mobility (PT)  -PH               User Key  (r) = Recorded By, (t) = Taken By, (c) = Cosigned By      Initials Name Provider Type     Venessa Escoto PTA Physical Therapist Assistant                                 Physical Therapy Education       Title: PT OT SLP Therapies (In Progress)       Topic: Physical Therapy (Done)       Point: Mobility training (Done)       Learning Progress Summary             Patient Acceptance, E,TB, VU,DU by  at 8/25/2023 0842    Acceptance, E,TB,D, VU,NR by  at 8/24/2023 1109    Acceptance, E, VU by  at 8/23/2023 1229                         Point: Home exercise program (Done)       Learning  Progress Summary             Patient Acceptance, E,TB, VU,DU by  at 8/25/2023 0842    Acceptance, E,TB,D, VU,NR by  at 8/24/2023 1109    Acceptance, E, VU by  at 8/23/2023 1229                         Point: Body mechanics (Done)       Learning Progress Summary             Patient Acceptance, E,TB, VU,DU by  at 8/25/2023 0842    Acceptance, E,TB,D, VU,NR by  at 8/24/2023 1109    Acceptance, E, VU by  at 8/23/2023 1229                         Point: Precautions (Done)       Learning Progress Summary             Patient Acceptance, E,TB, VU,DU by  at 8/25/2023 0842    Acceptance, E,TB,D, VU,NR by  at 8/24/2023 1109    Acceptance, E, VU by  at 8/23/2023 1229                                         User Key       Initials Effective Dates Name Provider Type Discipline     06/16/21 -  Venessa Escoto PTA Physical Therapist Assistant PT     05/02/22 -  Cristina Rubi, PT Physical Therapist PT                  PT Recommendation and Plan     Plan of Care Reviewed With: patient  Progress: improving  Outcome Evaluation: Pt seen by PT this date for tx. Pt sat up to EOB w/ mod I. Pt stood req SBA from EOB. Pt amb to BR toilet where she req CGA for STS after performing toilet hygiene w/ IND. Pt amb to sink where she brushed teeth req SBA for stand bal w/pt leaning into sink for support. Pt then amb to chair - approx 18' w/ SBA and use of fww. Pt able to amb very short distances and fatigues quickly req seated rests of a few min.  Pt performed ther ex in chair for strengthening. PT will prog as pt dae.     Time Calculation:         PT Charges       Row Name 08/25/23 0843             Time Calculation    Start Time 0817  -PH      Stop Time 0835  -PH      Time Calculation (min) 18 min  -PH      PT Received On 08/25/23  -PH      PT - Next Appointment 08/26/23  -PH         Timed Charges    97967 - PT Therapeutic Exercise Minutes 6  -PH      37860 - PT Therapeutic Activity Minutes 12  -PH         Total  Minutes    Timed Charges Total Minutes 18  -PH       Total Minutes 18  -PH                User Key  (r) = Recorded By, (t) = Taken By, (c) = Cosigned By      Initials Name Provider Type    Venessa Morris PTA Physical Therapist Assistant                  Therapy Charges for Today       Code Description Service Date Service Provider Modifiers Qty    11611731516 HC PT THER PROC EA 15 MIN 8/24/2023 Venessa Escoto PTA GP 1    13224877583 HC PT THERAPEUTIC ACT EA 15 MIN 8/25/2023 Venessa Escoto PTA GP 1            PT G-Codes  Outcome Measure Options: AM-PAC 6 Clicks Basic Mobility (PT)  AM-PAC 6 Clicks Score (PT): 19  AM-PAC 6 Clicks Score (OT): 17  PT Discharge Summary  Anticipated Discharge Disposition (PT): skilled nursing facility    Venessa Escoto PTA  8/25/2023

## 2023-08-25 NOTE — PLAN OF CARE
Goal Outcome Evaluation:  Plan of Care Reviewed With: patient        Progress: improving  Outcome Evaluation: Pt ambulating to BR. Rested well. C/o pain once, medicated per MAR. Possible D/C today, pending precert

## 2023-08-25 NOTE — CASE MANAGEMENT/SOCIAL WORK
Continued Stay Note  [facility]     Patient Name: Luann Frances  MRN: [id number]  Today's Date: 8/25/2023    Admit Date: 8/21/2023    Plan: Pre-cert approved   Discharge Plan       Row Name 08/25/23 1437       Plan    Plan Pre-cert approved    Plan Comments Pre-cert approved for admission to [facility] today, 8/25/2023. Nasrin ROJO, CCP as well as Elen facility liaison advised. Svitlana MCCOLLUM RN, CCP                   Discharge Codes    No documentation.                 Expected Discharge Date and Time       Expected Discharge Date Expected Discharge Time    Aug 25, 2023               Svitlana Jaimes RN

## 2023-08-26 VITALS
BODY MASS INDEX: 35.86 KG/M2 | HEART RATE: 60 BPM | SYSTOLIC BLOOD PRESSURE: 132 MMHG | HEIGHT: 63 IN | RESPIRATION RATE: 18 BRPM | DIASTOLIC BLOOD PRESSURE: 55 MMHG | TEMPERATURE: 98.6 F | WEIGHT: 202.38 LBS | OXYGEN SATURATION: 100 %

## 2023-08-26 PROCEDURE — 94799 UNLISTED PULMONARY SVC/PX: CPT

## 2023-08-26 PROCEDURE — 94664 DEMO&/EVAL PT USE INHALER: CPT

## 2023-08-26 PROCEDURE — 94761 N-INVAS EAR/PLS OXIMETRY MLT: CPT

## 2023-08-26 RX ORDER — TRAMADOL HYDROCHLORIDE 50 MG/1
50 TABLET ORAL EVERY 6 HOURS PRN
Qty: 10 TABLET | Refills: 0 | Status: SHIPPED | OUTPATIENT
Start: 2023-08-26 | End: 2023-08-29

## 2023-08-26 RX ADMIN — Medication 2000 UNITS: at 09:25

## 2023-08-26 RX ADMIN — LEVOTHYROXINE SODIUM 100 MCG: 0.1 TABLET ORAL at 05:45

## 2023-08-26 RX ADMIN — BUDESONIDE AND FORMOTEROL FUMARATE DIHYDRATE 1 PUFF: 160; 4.5 AEROSOL RESPIRATORY (INHALATION) at 08:26

## 2023-08-26 RX ADMIN — FOLIC ACID 1 MG: 1 TABLET ORAL at 09:25

## 2023-08-26 RX ADMIN — LOSARTAN POTASSIUM 25 MG: 25 TABLET, FILM COATED ORAL at 09:24

## 2023-08-26 RX ADMIN — PANTOPRAZOLE SODIUM 40 MG: 40 TABLET, DELAYED RELEASE ORAL at 05:45

## 2023-08-26 RX ADMIN — Medication 1000 MCG: at 09:24

## 2023-08-26 NOTE — PLAN OF CARE
Goal Outcome Evaluation:  Plan of Care Reviewed With: patient        Progress: no change  Outcome Evaluation: Pt up in chair at beginning of shift, dae act well. BM x2 during shift, reported as brown in color by NA. No s/s of bleeding or c/o of abd pain. Placed on 1-2 L N/C in late am to keep sats up, was dipping into mid 80's. PRN Ultram for c/o of baseline BLE discomfort with good effect. Safety maintained.

## 2023-08-26 NOTE — DISCHARGE SUMMARY
PHYSICIAN DISCHARGE SUMMARY                                                                        [facility]    Patient Identification:  Name: Luann Frances  Age: 83 y.o.  Sex: female  :  1940  MRN: [id number]  Primary Care Physician: Dinah Humphrey MD    Admit date: 2023  Discharge date and time:2023  Discharged Condition: good    Discharge Diagnoses:  Active Hospital Problems    Diagnosis  POA    **GI bleed [K92.2]  Yes    DELROY (obstructive sleep apnea) [G47.33]  Yes    Essential hypertension [I10]  Yes    Vitamin D deficiency [E55.9]  Yes    CKD (chronic kidney disease) stage 3, GFR 30-59 ml/min [N18.30]  Yes    Mixed hyperlipidemia [E78.2]  Yes    Chronic obstructive pulmonary disease [J44.9]  Yes    Primary hypothyroidism [E03.9]  Yes      Resolved Hospital Problems   No resolved problems to display.          PMHX:   Past Medical History:   Diagnosis Date    Acute deep vein thrombosis (DVT) of femoral vein of right lower extremity 04/15/2021    Arthritis     Asymptomatic PVCs     Backache     Chronic bronchitis     CKD (chronic kidney disease)     Stage 3    Deep vein thrombosis (DVT) of right lower extremity     Essential hypertension 2020    Fracture of humerus     GERD (gastroesophageal reflux disease)     Hyperlipidemia     Hypertension     Hypothyroidism     Pneumonia     Pulmonary emphysema     Renal calculi     Renal calculus 2021    Sleep apnea     DOES NOT USE CPAP    Thoracic vertebral fracture      PSHX:   Past Surgical History:   Procedure Laterality Date    APPENDECTOMY      COLONOSCOPY N/A 3/15/2023    Procedure: COLONOSCOPY to cecum and TI :  hot snare sigmoid polyps with clip to 1 polyp,;  Surgeon: Jaun Ford MD;  Location: [facility] ENDOSCOPY;  Service: Gastroenterology;  Laterality: N/A;  pre:  anemia  post:  diverticulosis, polyps, hemorrhoids    ENDOSCOPY N/A 3/14/2023     Procedure: ESOPHAGOGASTRODUODENOSCOPY WITH ARGON PLASMA COAGULATION AND COLD BIOPSIES;  Surgeon: Jaun Ford MD;  Location: [facility] ENDOSCOPY;  Service: Gastroenterology;  Laterality: N/A;  PRE: MELANA; ANEMIA  POST: ANGIO ACTASIA; HIATAL HERNIA; SCHATZKI'S RING    ENDOSCOPY N/A 8/22/2023    Procedure: ESOPHAGOGASTRODUODENOSCOPY;  Surgeon: Johanne Calvo MD;  Location: [facility] ENDOSCOPY;  Service: Gastroenterology;  Laterality: N/A;  anemia  POST:    ENTEROSCOPY SMALL BOWEL N/A 8/22/2023    Procedure: ENTEROSCOPY SMALL BOWEL  cauterization to AVMs jejunem wiht clips x2;  Surgeon: Johanne Calvo MD;  Location: [facility] ENDOSCOPY;  Service: Gastroenterology;  Laterality: N/A;  ANEMIA  POST:  AVMs in the jejunem, schatzky's ring, HH    EXTRACORPOREAL SHOCK WAVE LITHOTRIPSY (ESWL) Left 01/06/2021    Procedure: EXTRACORPOREAL SHOCKWAVE LITHOTRIPSY, CYSTOSCOPY, RETROGRADE PYLEOGRAM;  Surgeon: Walter Schwartz MD;  Location: [facility] OR OSC;  Service: Urology;  Laterality: Left;    EYE SURGERY      cataracts    HUMERUS FRACTURE SURGERY      LYTIC THROMBIN THERAPY Left 8/17/2022    Procedure: LT. LEG THROMBECTOMY/EMBOLECTOMY AND ANGIOPLASTY STENT PLACEMENT OF LEFT ILIAC VEIN;  Surgeon: Theo Bustos MD;  Location: [facility] HYBRID OR 18/19;  Service: Vascular;  Laterality: Left;    RIB FRACTURE SURGERY  2021    THORACOSCOPY Right 02/16/2021    Procedure: bronchoscopy, right video assisted THORACOSCOPY WITH PLATING OF 3, 4, 5, AND 6 RIBS;  Surgeon: Kelley Delong MD;  Location: [facility] MAIN OR;  Service: Thoracic;  Laterality: Right;    TOTAL KNEE ARTHROPLASTY Left 04/2015    UMBILICAL HERNIA REPAIR         Hospital Course: Luann Frances  is a 83 year old female who presented to the emergency room after a fall; she was getting up to get her walker and felt dizzy; she has been more tired than usual and has felt week; she denies shortness of breath or chest pain; she was seen in the ED few days ago for leg pain  and nausea; she has noted black stool but thought it was due to her iron supplement; no fever or chills she has been on pradaxa due to a history of dvt.  The patient was admitted to hospital and seen by GI medicine.  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  The patient was watched for rebleed and subsequently restarted on her Pradaxa.  She.  Not have any more bleeding and looks stable enough to go to a skilled nursing facility for rehab.  She will follow-up with her primary care after released from rehab.    Consults:     Consults       Date and Time Order Name Status Description    8/21/2023  5:00 PM Inpatient Gastroenterology Consult      8/21/2023  3:01 PM [facility] (on-call MD unless specified) Details      8/21/2023  2:41 PM Gastroenterology (on-call MD unless specified)            Results from last 7 days   Lab Units 08/24/23  0816   WBC 10*3/mm3 6.68   HEMOGLOBIN g/dL 8.5*   HEMATOCRIT % 26.0*   PLATELETS 10*3/mm3 185     Results from last 7 days   Lab Units 08/24/23  0415   SODIUM mmol/L 141   POTASSIUM mmol/L 3.8   CHLORIDE mmol/L 115*   CO2 mmol/L 18.7*   BUN mg/dL 25*   CREATININE mg/dL 1.30*   GLUCOSE mg/dL 79   CALCIUM mg/dL 8.0*     Significant Diagnostic Studies:   WBC   Date Value Ref Range Status   08/24/2023 6.68 3.40 - 10.80 10*3/mm3 Final     Hemoglobin   Date Value Ref Range Status   08/24/2023 8.5 (L) 12.0 - 15.9 g/dL Final     Hematocrit   Date Value Ref Range Status   08/24/2023 26.0 (L) 34.0 - 46.6 % Final     Platelets   Date Value Ref Range Status   08/24/2023 185 140 - 450 10*3/mm3 Final     Sodium   Date Value Ref Range Status   08/24/2023 141 136 - 145 mmol/L Final     Potassium   Date Value Ref Range Status   08/24/2023 3.8 3.5 - 5.2 mmol/L Final     Chloride   Date Value Ref Range Status   08/24/2023 115 (H) 98 - 107 mmol/L Final     CO2   Date Value Ref Range Status   08/24/2023 18.7 (L) 22.0 - 29.0 mmol/L Final     BUN   Date Value  Ref Range Status   08/24/2023 25 (H) 8 - 23 mg/dL Final     Creatinine   Date Value Ref Range Status   08/24/2023 1.30 (H) 0.57 - 1.00 mg/dL Final     Glucose   Date Value Ref Range Status   08/24/2023 79 65 - 99 mg/dL Final     Calcium   Date Value Ref Range Status   08/24/2023 8.0 (L) 8.6 - 10.5 mg/dL Final     AST (SGOT)   Date Value Ref Range Status   08/24/2023 11 1 - 32 U/L Final     ALT (SGPT)   Date Value Ref Range Status   08/24/2023 6 1 - 33 U/L Final     Alkaline Phosphatase   Date Value Ref Range Status   08/24/2023 40 39 - 117 U/L Final     No results found for: APTT, INR  No results found for: COLORU, CLARITYU, SPECGRAV, PHUR, PROTEINUR, GLUCOSEU, KETONESU, BLOODU, NITRITE, LEUKOCYTESUR, BILIRUBINUR, UROBILINOGEN, RBCUA, WBCUA, BACTERIA, UACOMMENT  No results found for: TROPONINT, TROPONINI, BNP  No components found for: HGBA1C;2  No components found for: TSH;2  Imaging Results (All)       Procedure Component Value Units Date/Time    XR Toe 2+ View Right [[id number]] Collected: 08/22/23 1336     Updated: 08/22/23 1352    Narrative:      EXAM: XR TOE 2+ VW RIGHT-     COMPARISON: None available     INDICATION: Right first toe pain       Impression:      No fracture. Mild hallux valgus deformity. Mild first MTP  medial soft tissue swelling. Mild IP joint osteoarthritis.           This report was finalized on 8/22/2023 1:49 PM by Dr. Theo Vickers M.D.       CT Head Without Contrast [[id number]] Collected: 08/21/23 1508     Updated: 08/21/23 1513    Narrative:      CT HEAD WO CONTRAST-     INDICATIONS: Trauma     TECHNIQUE: Radiation dose reduction techniques were utilized, including  automated exposure control and exposure modulation based on body size.  Noncontrast head CT     COMPARISON: None available     FINDINGS:           No acute intracranial hemorrhage, midline shift or mass effect. No acute  territorial infarct is identified.     Mild periventricular hypodensities suggest chronic small vessel  ischemic  change in a patient this age.     Arterial calcifications are seen at the base of the brain.     Ventricles, cisterns, cerebral sulci are unremarkable for patient age.     The visualized paranasal sinuses, orbits, mastoid air cells are  unremarkable.                   Impression:         No acute intracranial hemorrhage or hydrocephalus. If there is further  clinical concern, MRI could be considered for further evaluation.     This report was finalized on 8/21/2023 3:10 PM by Dr. Alec Arcos M.D.             Lab Results (last 7 days)       Procedure Component Value Units Date/Time    CBC (No Diff) [[id number]]  (Abnormal) Collected: 08/24/23 0816    Specimen: Blood Updated: 08/24/23 0918     WBC 6.68 10*3/mm3      RBC 2.81 10*6/mm3      Hemoglobin 8.5 g/dL      Hematocrit 26.0 %      MCV 92.5 fL      MCH 30.2 pg      MCHC 32.7 g/dL      RDW 16.0 %      RDW-SD 53.1 fl      MPV 11.6 fL      Platelets 185 10*3/mm3     Comprehensive Metabolic Panel [[id number]]  (Abnormal) Collected: 08/24/23 0415    Specimen: Blood Updated: 08/24/23 0451     Glucose 79 mg/dL      BUN 25 mg/dL      Creatinine 1.30 mg/dL      Sodium 141 mmol/L      Potassium 3.8 mmol/L      Chloride 115 mmol/L      CO2 18.7 mmol/L      Calcium 8.0 mg/dL      Total Protein 4.9 g/dL      Albumin 3.1 g/dL      ALT (SGPT) 6 U/L      AST (SGOT) 11 U/L      Alkaline Phosphatase 40 U/L      Total Bilirubin 0.3 mg/dL      Globulin 1.8 gm/dL      A/G Ratio 1.7 g/dL      BUN/Creatinine Ratio 19.2     Anion Gap 7.3 mmol/L      eGFR 41.1 mL/min/1.73     Narrative:      GFR Normal >60  Chronic Kidney Disease <60  Kidney Failure <15    The GFR formula is only valid for adults with stable renal function between ages 18 and 70.    CBC & Differential [[id number]]  (Abnormal) Collected: 08/24/23 0415    Specimen: Blood Updated: 08/24/23 0441    Narrative:      The following orders were created for panel order CBC & Differential.  Procedure                                Abnormality         Status                     ---------                               -----------         ------                     CBC Auto Differential[[id number]]        Abnormal            Final result                 Please view results for these tests on the individual orders.    CBC Auto Differential [[id number]]  (Abnormal) Collected: 08/24/23 0415    Specimen: Blood Updated: 08/24/23 0441     WBC 7.65 10*3/mm3      RBC 2.65 10*6/mm3      Hemoglobin 8.0 g/dL      Hematocrit 24.2 %      MCV 91.3 fL      MCH 30.2 pg      MCHC 33.1 g/dL      RDW 15.8 %      RDW-SD 51.3 fl      MPV 11.2 fL      Platelets 157 10*3/mm3      Neutrophil % 56.9 %      Lymphocyte % 30.3 %      Monocyte % 8.4 %      Eosinophil % 2.9 %      Basophil % 1.0 %      Immature Grans % 0.5 %      Neutrophils, Absolute 4.35 10*3/mm3      Lymphocytes, Absolute 2.32 10*3/mm3      Monocytes, Absolute 0.64 10*3/mm3      Eosinophils, Absolute 0.22 10*3/mm3      Basophils, Absolute 0.08 10*3/mm3      Immature Grans, Absolute 0.04 10*3/mm3      nRBC 0.0 /100 WBC     CBC (No Diff) [[id number]]  (Abnormal) Collected: 08/23/23 2029    Specimen: Blood Updated: 08/23/23 2109     WBC 8.13 10*3/mm3      RBC 2.90 10*6/mm3      Hemoglobin 8.7 g/dL      Hematocrit 26.6 %      MCV 91.7 fL      MCH 30.0 pg      MCHC 32.7 g/dL      RDW 16.2 %      RDW-SD 53.3 fl      MPV 11.4 fL      Platelets 175 10*3/mm3     Comprehensive Metabolic Panel [[id number]]  (Abnormal) Collected: 08/23/23 0746    Specimen: Blood Updated: 08/23/23 0842     Glucose 73 mg/dL      BUN 39 mg/dL      Creatinine 1.40 mg/dL      Sodium 139 mmol/L      Potassium 4.1 mmol/L      Comment: Slight hemolysis detected by analyzer. Results may be affected.        Chloride 112 mmol/L      CO2 18.0 mmol/L      Calcium 7.7 mg/dL      Total Protein 5.1 g/dL      Albumin 3.1 g/dL      ALT (SGPT) 8 U/L      AST (SGOT) 16 U/L      Alkaline Phosphatase 37 U/L      Total Bilirubin 0.3 mg/dL       Globulin 2.0 gm/dL      A/G Ratio 1.6 g/dL      BUN/Creatinine Ratio 27.9     Anion Gap 9.0 mmol/L      eGFR 37.6 mL/min/1.73     Narrative:      GFR Normal >60  Chronic Kidney Disease <60  Kidney Failure <15    The GFR formula is only valid for adults with stable renal function between ages 18 and 70.    CBC (No Diff) [[id number]]  (Abnormal) Collected: 08/23/23 0746    Specimen: Blood Updated: 08/23/23 0823     WBC 6.69 10*3/mm3      RBC 2.77 10*6/mm3      Hemoglobin 8.3 g/dL      Hematocrit 25.5 %      MCV 92.1 fL      MCH 30.0 pg      MCHC 32.5 g/dL      RDW 16.1 %      RDW-SD 53.5 fl      MPV 11.8 fL      Platelets 152 10*3/mm3     CBC (No Diff) [[id number]]  (Abnormal) Collected: 08/22/23 1957    Specimen: Blood Updated: 08/22/23 2045     WBC 8.34 10*3/mm3      RBC 2.97 10*6/mm3      Hemoglobin 8.8 g/dL      Hematocrit 26.6 %      MCV 89.6 fL      MCH 29.6 pg      MCHC 33.1 g/dL      RDW 15.8 %      RDW-SD 51.5 fl      MPV 12.0 fL      Platelets 158 10*3/mm3     Protime-INR [[id number]]  (Abnormal) Collected: 08/22/23 0831    Specimen: Blood Updated: 08/22/23 0938     Protime 20.0 Seconds      INR 1.67    Basic Metabolic Panel [[id number]]  (Abnormal) Collected: 08/22/23 0831    Specimen: Blood Updated: 08/22/23 0932     Glucose 82 mg/dL      BUN 54 mg/dL      Creatinine 1.44 mg/dL      Sodium 138 mmol/L      Potassium 4.4 mmol/L      Chloride 114 mmol/L      CO2 18.0 mmol/L      Calcium 7.7 mg/dL      BUN/Creatinine Ratio 37.5     Anion Gap 6.0 mmol/L      eGFR 36.4 mL/min/1.73     Narrative:      GFR Normal >60  Chronic Kidney Disease <60  Kidney Failure <15    The GFR formula is only valid for adults with stable renal function between ages 18 and 70.    CBC (No Diff) [[id number]]  (Abnormal) Collected: 08/22/23 0831    Specimen: Blood Updated: 08/22/23 0911     WBC 7.00 10*3/mm3      RBC 2.55 10*6/mm3      Hemoglobin 7.7 g/dL      Hematocrit 23.1 %      MCV 90.6 fL      MCH 30.2 pg      MCHC 33.3 g/dL      RDW  16.2 %      RDW-SD 52.3 fl      MPV 12.2 fL      Platelets 135 10*3/mm3     Ferritin [[id number]]  (Abnormal) Collected: 08/21/23 2000    Specimen: Blood Updated: 08/21/23 2154     Ferritin 241.00 ng/mL     Narrative:      Results may be falsely decreased if patient taking Biotin.      Iron Profile [[id number]]  (Abnormal) Collected: 08/21/23 2000    Specimen: Blood Updated: 08/21/23 2149     Iron 131 mcg/dL      Iron Saturation (TSAT) 51 %      Transferrin 174 mg/dL      TIBC 259 mcg/dL     CBC (No Diff) [[id number]]  (Abnormal) Collected: 08/21/23 2000    Specimen: Blood Updated: 08/21/23 2124     WBC 11.87 10*3/mm3      RBC 2.40 10*6/mm3      Hemoglobin 7.1 g/dL      Hematocrit 22.1 %      MCV 92.1 fL      MCH 29.6 pg      MCHC 32.1 g/dL      RDW 16.0 %      RDW-SD 51.9 fl      MPV 12.4 fL      Platelets 148 10*3/mm3     Urinalysis, Microscopic Only - Urine, Clean Catch [[id number]]  (Abnormal) Collected: 08/21/23 1605    Specimen: Urine, Clean Catch Updated: 08/21/23 1627     RBC, UA 0-2 /HPF      WBC, UA 0-2 /HPF      Bacteria, UA None Seen /HPF      Squamous Epithelial Cells, UA 3-6 /HPF      Hyaline Casts, UA None Seen /LPF      Methodology Automated Microscopy    Urinalysis With Microscopic If Indicated (No Culture) - Urine, Clean Catch [[id number]]  (Abnormal) Collected: 08/21/23 1605    Specimen: Urine, Clean Catch Updated: 08/21/23 1625     Color, UA Yellow     Appearance, UA Clear     pH, UA 5.5     Specific Gravity, UA 1.021     Glucose, UA Negative     Ketones, UA Negative     Bilirubin, UA Negative     Blood, UA Negative     Protein, UA Negative     Leuk Esterase, UA Trace     Nitrite, UA Negative     Urobilinogen, UA 0.2 E.U./dL    Comprehensive Metabolic Panel [[id number]]  (Abnormal) Collected: 08/21/23 1344    Specimen: Blood Updated: 08/21/23 1415     Glucose 115 mg/dL      BUN 72 mg/dL      Creatinine 1.90 mg/dL      Sodium 141 mmol/L      Potassium 5.1 mmol/L      Comment: Slight hemolysis detected  by analyzer. Results may be affected.        Chloride 112 mmol/L      CO2 21.0 mmol/L      Calcium 8.4 mg/dL      Total Protein 4.7 g/dL      Albumin 3.2 g/dL      ALT (SGPT) 8 U/L      AST (SGOT) 12 U/L      Comment: Slight hemolysis detected by analyzer. Results may be affected.        Alkaline Phosphatase 34 U/L      Total Bilirubin 0.2 mg/dL      Globulin 1.5 gm/dL      A/G Ratio 2.1 g/dL      BUN/Creatinine Ratio 37.9     Anion Gap 8.0 mmol/L      eGFR 26.1 mL/min/1.73     Narrative:      GFR Normal >60  Chronic Kidney Disease <60  Kidney Failure <15    The GFR formula is only valid for adults with stable renal function between ages 18 and 70.    Protime-INR [[id number]]  (Abnormal) Collected: 08/21/23 1344    Specimen: Blood Updated: 08/21/23 1408     Protime 27.9 Seconds      INR 2.54    CBC & Differential [[id number]]  (Abnormal) Collected: 08/21/23 1344    Specimen: Blood Updated: 08/21/23 1400    Narrative:      The following orders were created for panel order CBC & Differential.  Procedure                               Abnormality         Status                     ---------                               -----------         ------                     CBC Auto Differential[[id number]]        Abnormal            Final result                 Please view results for these tests on the individual orders.    CBC Auto Differential [[id number]]  (Abnormal) Collected: 08/21/23 1344    Specimen: Blood Updated: 08/21/23 1400     WBC 6.91 10*3/mm3      RBC 1.87 10*6/mm3      Hemoglobin 5.6 g/dL      Hematocrit 17.6 %      MCV 94.1 fL      MCH 29.9 pg      MCHC 31.8 g/dL      RDW 15.4 %      RDW-SD 50.6 fl      MPV 11.4 fL      Platelets 154 10*3/mm3      Neutrophil % 68.7 %      Lymphocyte % 23.9 %      Monocyte % 5.8 %      Eosinophil % 0.6 %      Basophil % 0.4 %      Immature Grans % 0.6 %      Neutrophils, Absolute 4.75 10*3/mm3      Lymphocytes, Absolute 1.65 10*3/mm3      Monocytes, Absolute 0.40 10*3/mm3       "Eosinophils, Absolute 0.04 10*3/mm3      Basophils, Absolute 0.03 10*3/mm3      Immature Grans, Absolute 0.04 10*3/mm3      nRBC 0.0 /100 WBC           /55 (BP Location: Right arm, Patient Position: Lying)   Pulse 60   Temp 98.6 øF (37 øC) (Oral)   Resp 18   Ht 160 cm (63\")   Wt 91.8 kg (202 lb 6.1 oz)   SpO2 100%   BMI 35.85 kg/mý     Discharge Exam:  General Appearance:    Alert, cooperative, no distress                          Head:    Normocephalic, without obvious abnormality, atraumatic                          Eyes:                            Throat:   Lips, tongue, gums normal                          Neck:   Supple, symmetrical, trachea midline, no JVD                        Lungs:     Clear to auscultation bilaterally, respirations unlabored                Chest Wall:    No tenderness or deformity                        Heart:    Regular rate and rhythm, S1 and S2 normal, no murmur,no  Rub  or gallop                  Abdomen:     Soft, non-tender, bowel sounds active, no masses, no organomegaly                  Extremities:   Extremities normal, atraumatic, no cyanosis or edema                             Skin:   Skin is warm and dry,  no rashes or palpable lesions                  Neurologic:   no focal deficits noted     Disposition:  Skilled nursing facility    Activity as tolerated    Diet as tolerated  Diet Order   Procedures    Diet: Cardiac Diets; Healthy Heart (2-3 Na+); Texture: Regular Texture (IDDSI 7); Fluid Consistency: Thin (IDDSI 0)       Patient Instructions:      Discharge Medications        Changes to Medications        Instructions Start Date   traMADol 50 MG tablet  Commonly known as: ULTRAM  What changed:   how much to take  how to take this  when to take this  reasons to take this  additional instructions   50 mg, Oral, Every 6 Hours PRN             Continue These Medications        Instructions Start Date   acetaminophen 325 MG tablet  Commonly known as: TYLENOL   650 " mg, Oral, Every 4 Hours PRN      albuterol sulfate  (90 Base) MCG/ACT inhaler  Commonly known as: PROVENTIL HFA;VENTOLIN HFA;PROAIR HFA   2 puffs, Inhalation, Every 4 Hours PRN      cholecalciferol 25 MCG (1000 UT) tablet  Commonly known as: VITAMIN D3   2,000 Units, Oral, Daily      dabigatran etexilate 150 MG capsu  Commonly known as: Pradaxa   150 mg, Oral, 2 Times Daily      ferrous gluconate 324 MG tablet  Commonly known as: FERGON   324 mg, Oral, Daily With Breakfast      Fluticasone-Salmeterol 100-50 MCG/ACT DISKUS  Commonly known as: Advair Diskus   1 puff, Inhalation, 2 Times Daily      folic acid 1 MG tablet  Commonly known as: FOLVITE   1 mg, Oral, Daily      levothyroxine 100 MCG tablet  Commonly known as: SYNTHROID, LEVOTHROID   100 mcg, Oral, Daily      losartan 50 MG tablet  Commonly known as: COZAAR   25 mg, Oral, Daily, Takes 2 ( 25 mg) tabs daily      melatonin 5 MG tablet tablet   5 mg, Oral, Nightly PRN      ondansetron 8 MG tablet  Commonly known as: ZOFRAN   8 mg, Oral, Every 8 Hours PRN      pantoprazole 40 MG EC tablet  Commonly known as: PROTONIX   40 mg, Oral, Daily      torsemide 20 MG tablet  Commonly known as: DEMADEX   20 mg, Oral, Every Other Day      vitamin B-12 1000 MCG tablet  Commonly known as: CYANOCOBALAMIN   1,000 mcg, Oral, Daily             Future Appointments   Date Time Provider Department Center   10/10/2023 10:00 AM LAB CHAIR 2 TERENCE ALVAREZ  [facility]   10/10/2023 10:30 AM RN REV 1 [facility]  [facility]   1/8/2024  1:00 PM LAB CHAIR 2 [facility]  [facility]   1/8/2024  1:20 PM Lita Dubon MD [facility]      Contact information for follow-up providers       Dinah Humphrey MD Follow up.    Specialty: Family Medicine  Why: After released from rehab  Contact information:  [address]   [address]  [place] [address]  [phone]                       Contact information for after-discharge care       Destination       [facility]  [facility] .    Service: Skilled Nursing  Contact information:  [address] [facility] [address]  [phone]                                 Discharge Order (From admission, onward)       Start     Ordered    08/26/23 1144  Discharge patient  Once        Expected Discharge Date: 08/26/23   Discharge Disposition: Skilled Nursing Facility (DC - External)   Physician of Record for Attribution - Please select from Treatment Team: TIMMY ROBLES [[id number]]   Review needed by CMO to determine Physician of Record: No      Question Answer Comment   Physician of Record for Attribution - Please select from Treatment Team TIMMY ROBLES    Review needed by CMO to determine Physician of Record No        08/26/23 1144                    Total time spent discharging patient including evaluation,post hospitalization follow up,  medication and post hospitalization instructions and education total time exceeds 30 minutes.    Signed:  Timmy Robles MD  8/26/2023  12:48 EDT

## 2023-08-27 NOTE — CASE MANAGEMENT/SOCIAL WORK
Case Management Discharge Note      Final [facility] on 8[facility]26[facility]23    Provided Post Acute Provider List?: N/A  Provided Post Acute Provider Quality & Resource List?: N/A    Selected Continued Care - Discharged on 8/26/2023 Admission date: 8/21/2023 - Discharge disposition: Skilled Nursing Facility (DC - External)      Destination Coordination complete.      Service Provider Selected Services Address Phone Fax Patient Preferred    [facility] [address] [id number] [phone] --              Durable Medical Equipment    No services have been selected for the patient.                Dialysis/Infusion    No services have been selected for the patient.                Home Medical Care    No services have been selected for the patient.                Therapy    No services have been selected for the patient.                Community Resources    No services have been selected for the patient.                Community & DME    No services have been selected for the patient.                    Selected Continued Care - Episodes Includes continued care and service providers with selected services from the active episodes listed below      Oncology Episode start date: 9/9/2022   There are no active outsourced providers for this episode.                 Transportation Services  Private: Car    Final Discharge Disposition Code: 03 - skilled nursing facility (SNF)

## 2023-08-29 ENCOUNTER — DOCUMENTATION (OUTPATIENT)
Dept: PHARMACY | Facility: HOSPITAL | Age: 83
End: 2023-08-29
Payer: MEDICARE

## 2023-08-29 NOTE — PROGRESS NOTES
Ms. Frances is currently inpatient and pradaxa is on hold. I will monitor the chart for an update on the disposition of her Pradaxa prescription.     Unique Crowe - Care Coordinator   8/29/2023  11:02 EDT

## 2023-09-01 ENCOUNTER — TRANSCRIBE ORDERS (OUTPATIENT)
Dept: HOME HEALTH SERVICES | Facility: HOME HEALTHCARE | Age: 83
End: 2023-09-01
Payer: MEDICARE

## 2023-09-01 DIAGNOSIS — D50.0 IRON DEFICIENCY ANEMIA SECONDARY TO BLOOD LOSS (CHRONIC): Primary | ICD-10-CM

## 2023-09-02 ENCOUNTER — HOME HEALTH ADMISSION (OUTPATIENT)
Dept: HOME HEALTH SERVICES | Facility: HOME HEALTHCARE | Age: 83
End: 2023-09-02
Payer: MEDICARE

## 2023-09-03 ENCOUNTER — HOME CARE VISIT (OUTPATIENT)
Dept: HOME HEALTH SERVICES | Facility: HOME HEALTHCARE | Age: 83
End: 2023-09-03
Payer: MEDICARE

## 2023-09-03 VITALS
SYSTOLIC BLOOD PRESSURE: 128 MMHG | TEMPERATURE: 98.9 F | RESPIRATION RATE: 16 BRPM | OXYGEN SATURATION: 98 % | DIASTOLIC BLOOD PRESSURE: 70 MMHG | HEART RATE: 55 BPM

## 2023-09-03 PROCEDURE — G0299 HHS/HOSPICE OF RN EA 15 MIN: HCPCS

## 2023-09-03 NOTE — Clinical Note
I put the order/visit in the computer.     THanks!! Salome   ----- Message -----  From: Reyes Vilchis MD  Sent: 9/8/2023   9:13 AM EDT  To: Salome Eastman, RN      Thanks for the update.  I just referred her to  to assist for her.      Reyes Vilchis MD    ----- Message -----  From: Salome Eastman, RN  Sent: 9/4/2023   1:15 AM EDT  To: Reyes Vilchis MD    SOC NOTE:      Primary diagnoses/co-morbidities/recent procedures in past 60 days that impact current episode: Iron deficiency anemia secondary to blood loss (chronic).  Patient also has hx of chronic pain, COPD  Current level of functional ability: ambulate with assist     Homebound status and living arrangements: considerable effort due to weakness, recent hospitalization, fatigue secondary to anemia    Skilled need:  SN, PT, OT already ordered.  Will contact MD for orders for MSW as well for community resources, transportation issues     Focus of care for next 60 days for each discipline ordered: Iron deficiency anemia secondary to blood loss (chronic) [D50.0]  Skin integrity/wound status: no open areas, but patient does have multiple bruises due to blood thinner     Code status: Full    Most recent fall risk: High     Estimated date when home care services will end 11/1/23    Plan of Care confirmed with Dr Vilchis via in basket on 9/3/23     Patient admitted to home health services after recent hospitalization and subsequent stay in rehab facility for anemia and blood loss.  Patient lives on first floor of home, and her son and his partner live upstairs.  Her son recently took a job in [place], so he is not there at this time.  His partner is still in [place], but he is not always home either due to his work schedule.  She has another son, but he and his wife are currently spending all of their time with their young child who was recently dx with cancer, and just had surgery, so they are unable to help her at this time.  She does have  a network of friends who are assisting with grocery pickup, taking her to appts, etc but she will need to find other arrangements as well.  I explained that I will call PCP and request orders for MSW to assist with resource availability.  PT and OT already ordered, and will be coming for eval patient this week.  Patient has all meds in home at this time.  SHe is using a wheeled walker for ambulation.

## 2023-09-03 NOTE — Clinical Note
SOC NOTE:      Primary diagnoses/co-morbidities/recent procedures in past 60 days that impact current episode: Iron deficiency anemia secondary to blood loss (chronic).  Patient also has hx of chronic pain, COPD  Current level of functional ability: ambulate with assist     Homebound status and living arrangements: considerable effort due to weakness, recent hospitalization, fatigue secondary to anemia    Skilled need:  SN, PT, OT already ordered.  Will contact MD for orders for MSW as well for community resources, transportation issues     Focus of care for next 60 days for each discipline ordered: Iron deficiency anemia secondary to blood loss (chronic) [D50.0]  Skin integrity/wound status: no open areas, but patient does have multiple bruises due to blood thinner     Code status: Full    Most recent fall risk: High     Estimated date when home care services will end 11/1/23    Plan of Care confirmed with Dr Deng gallagher in basket on 9/3/23     Patient admitted to home health services after recent hospitalization and subsequent stay in rehab facility for anemia and blood loss.  Patient lives on first floor of home, and her son and his partner live upstairs.  Her son recently took a job in [place], so he is not there at this time.  His partner is still in [place], but he is not always home either due to his work schedule.  She has another son, but he and his wife are currently spending all of their time with their young child who was recently dx with cancer, and just had surgery, so they are unable to help her at this time.  She does have a network of friends who are assisting with grocery pickup, taking her to appts, etc but she will need to find other arrangements as well.  I explained that I will call PCP and request orders for MSW to assist with resource availability.  PT and OT already ordered, and will be coming for eval patient this week.  Patient has all meds in home at this time.  SHe is using a  wheeled walker for ambulation.

## 2023-09-03 NOTE — Clinical Note
Dear Dr Deng Frances             42                  The above named patient was admitted into home health services as of 9/3/23.  We will see her twice weekly for medication teaching, disease mgmt teaching, safety teaching, and assessment.. If you have any questions, please feel free to contact our office at [phone].                         Thank you,                         Salome ESPOSITON RN            [facility]            [phone]

## 2023-09-04 NOTE — HOME HEALTH
SOC NOTE:      Primary diagnoses/co-morbidities/recent procedures in past 60 days that impact current episode: Iron deficiency anemia secondary to blood loss (chronic).  Patient also has hx of chronic pain, COPD  Current level of functional ability: ambulate with assist     Homebound status and living arrangements: considerable effort due to weakness, recent hospitalization, fatigue secondary to anemia    Skilled need:  SN, PT, OT already ordered.  Will contact MD for orders for MSW as well for community resources, transportation issues     Focus of care for next 60 days for each discipline ordered: Iron deficiency anemia secondary to blood loss (chronic) [D50.0]  Skin integrity/wound status: no open areas, but patient does have multiple bruises due to blood thinner  Nursing skill for home health will be teaching of meds and disease mgmt     Code status: Full    Most recent fall risk: High     Estimated date when home care services will end 11/1/23    Plan of Care confirmed with Dr Deng gallagher in basket on 9/3/23     Patient admitted to home health services after recent hospitalization and subsequent stay in rehab facility for anemia and blood loss.  Patient lives on first floor of home, and her son and his partner live upstairs.  Her son recently took a job in [place], so he is not there at this time.  His partner is still in [place], but he is not always home either due to his work schedule.  She has another son, but he and his wife are currently spending all of their time with their young child who was recently dx with cancer, and just had surgery, so they are unable to help her at this time.  She does have a network of friends who are assisting with grocery pickup, taking her to appts, etc but she will need to find other arrangements as well.  I explained that I will call PCP and request orders for MSW to assist with resource availability.  PT and OT already ordered, and will be coming for eval patient  this week.  Patient has all meds in home at this time.  SHe is using a wheeled walker for ambulation.

## 2023-09-05 ENCOUNTER — DOCUMENTATION (OUTPATIENT)
Dept: PHARMACY | Facility: HOSPITAL | Age: 83
End: 2023-09-05
Payer: MEDICARE

## 2023-09-05 ENCOUNTER — OFFICE VISIT (OUTPATIENT)
Dept: FAMILY MEDICINE CLINIC | Facility: CLINIC | Age: 83
End: 2023-09-05
Payer: MEDICARE

## 2023-09-05 VITALS
HEIGHT: 63 IN | DIASTOLIC BLOOD PRESSURE: 68 MMHG | SYSTOLIC BLOOD PRESSURE: 108 MMHG | RESPIRATION RATE: 16 BRPM | WEIGHT: 192.3 LBS | HEART RATE: 76 BPM | OXYGEN SATURATION: 99 % | BODY MASS INDEX: 34.07 KG/M2 | TEMPERATURE: 97.7 F

## 2023-09-05 DIAGNOSIS — I10 PRIMARY HYPERTENSION: ICD-10-CM

## 2023-09-05 DIAGNOSIS — D50.0 IRON DEFICIENCY ANEMIA DUE TO CHRONIC BLOOD LOSS: Primary | ICD-10-CM

## 2023-09-05 DIAGNOSIS — K21.9 CHRONIC GERD: ICD-10-CM

## 2023-09-05 DIAGNOSIS — E03.9 PRIMARY HYPOTHYROIDISM: ICD-10-CM

## 2023-09-05 PROCEDURE — 99214 OFFICE O/P EST MOD 30 MIN: CPT | Performed by: FAMILY MEDICINE

## 2023-09-05 PROCEDURE — 1160F RVW MEDS BY RX/DR IN RCRD: CPT | Performed by: FAMILY MEDICINE

## 2023-09-05 PROCEDURE — 1159F MED LIST DOCD IN RCRD: CPT | Performed by: FAMILY MEDICINE

## 2023-09-05 PROCEDURE — 3078F DIAST BP <80 MM HG: CPT | Performed by: FAMILY MEDICINE

## 2023-09-05 PROCEDURE — 3074F SYST BP LT 130 MM HG: CPT | Performed by: FAMILY MEDICINE

## 2023-09-05 NOTE — PROGRESS NOTES
I contacted Ms. Frances to coordinate a refill shipment of Pradaxa. She has 38 tablets on-hand so we agreed that I would call back in 10 days when she's down to about a 7 day supply to coordinate the next refill shipment.     Unique Crowe - Care Coordinator   9/5/2023  10:37 EDT

## 2023-09-05 NOTE — PROGRESS NOTES
Subjective     Luann Frances is a 83 y.o. female.     Chief Complaint   Patient presents with    Home health follow up     Iron deficiency anemia secondary to blood loss chronic    Fatigue       History of Present Illness     She was admitted to the hospital few weeks ago for anemia and black stool, EGD and small bowel enteroscopy, was found to have AVMs which were treated.  The patient was watched for rebleed and subsequently restarted on her Pradaxa.  she was d/c to a skilled nursing facility for rehab.    Today , she stated that she feels week and tired , taking all the vit  and other supplements   Has normal color BM   Has fair appetite , tried to eat HD   Her Hb when she was in [place] was--> 8.6 on 8/24 and 8.5 on 8/28.    HTN;  well controlled with current Rx, no S/E reported. Her BP much improved after losartan added     GERD;  well controlled with current Rx, no S/E reported.     Hypothyroidism ; Doing well on Levothyr 100 mcg. Time to recheck TSH    Lab Results   Component Value Date    TSH 10.900 (H) 09/05/2023     Labs and documentation from PCP / consulting physician has been reviewed          The following portions of the patient's history were reviewed and updated as appropriate: allergies, current medications, past family history, past medical history, past social history, past surgical history, and problem list.        Review of Systems   Constitutional:  Positive for fatigue.     Vitals:    09/05/23 1314   BP: 108/68   Pulse: 76   Resp: 16   Temp: 97.7 °F (36.5 °C)   SpO2: 99%           09/05/23  1314   Weight: 87.2 kg (192 lb 4.8 oz)         Body mass index is 34.07 kg/m².      Current Outpatient Medications   Medication Sig Dispense Refill    acetaminophen (TYLENOL) 325 MG tablet Take 2 tablets by mouth Every 4 (Four) Hours As Needed for Mild Pain .      albuterol sulfate  (90 Base) MCG/ACT inhaler Inhale 2 puffs Every 4 (Four) Hours As Needed for Wheezing or Shortness of Air. 8 g 1     cholecalciferol (VITAMIN D3) 25 MCG (1000 UT) tablet Take 2 tablets by mouth Daily. Indications: Vitamin D Deficiency      dabigatran etexilate (Pradaxa) 150 MG capsu Take 1 capsule by mouth 2 (Two) Times a Day. 60 capsule 5    Fluticasone-Salmeterol (Advair Diskus) 100-50 MCG/ACT DISKUS Inhale 1 puff 2 (Two) Times a Day. 180 each 5    folic acid (FOLVITE) 1 MG tablet Take 1 tablet by mouth Daily. 30 tablet 5    losartan (COZAAR) 50 MG tablet Take 0.5 tablets by mouth Daily. Takes 2 ( 25 mg) tabs daily  Indications: High Blood Pressure Disorder      melatonin 5 MG tablet tablet Take 1 tablet by mouth At Night As Needed (sleep).      ondansetron (ZOFRAN) 8 MG tablet Take 1 tablet by mouth Every 8 (Eight) Hours As Needed for Nausea or Vomiting. 30 tablet 1    pantoprazole (PROTONIX) 40 MG EC tablet Take 1 tablet by mouth Daily. 90 tablet 3    torsemide (DEMADEX) 20 MG tablet Take 1 tablet by mouth Every Other Day. Indications: Edema, High Blood Pressure Disorder      traMADol (ULTRAM) 50 MG tablet Take 1 tablet by mouth Every 6 (Six) Hours As Needed for Moderate Pain or Severe Pain. Indications: Pain      vitamin B-12 (CYANOCOBALAMIN) 1000 MCG tablet Take 1 tablet by mouth Daily.      ferrous gluconate (FERGON) 324 MG tablet TAKE 1 TABLET BY MOUTH DAILY WITH BREAKFAST 30 tablet 2    levothyroxine (Synthroid) 112 MCG tablet Take 1 tablet by mouth Daily. 60 tablet 0     No current facility-administered medications for this visit.                Objective   Physical Exam  Vitals and nursing note reviewed.   Constitutional:       General: She is not in acute distress.     Appearance: She is not toxic-appearing or diaphoretic.      Comments: Using wheelchair    Cardiovascular:      Rate and Rhythm: Normal rate and regular rhythm.      Heart sounds: Normal heart sounds.   Pulmonary:      Effort: Pulmonary effort is normal. No respiratory distress.      Breath sounds: Normal breath sounds. No stridor. No wheezing or rhonchi.    Skin:     Coloration: Skin is not jaundiced.   Neurological:      Mental Status: She is alert and oriented to person, place, and time.   Psychiatric:         Mood and Affect: Mood normal.         Behavior: Behavior normal.         Assessment & Plan   Diagnoses and all orders for this visit:    1. Iron deficiency anemia due to chronic blood loss (Primary)  Comments:  Hb improved ,continue with iron  will see hema in few months  Orders:  -     Hemoglobin & Hematocrit, Blood    2. Primary hypothyroidism  Comments:  TSH elevated , dose increased to 112 mcg  TSH in 6 weeks  Orders:  -     TSH  -     levothyroxine (Synthroid) 112 MCG tablet; Take 1 tablet by mouth Daily.  Dispense: 60 tablet; Refill: 0  -     TSH; Future    3. Primary hypertension  Comments:  Stable, continue current Rx    4. Chronic GERD  Comments:  Stable, continue current Rx        Addendum;  Hb improved  TSH elevated , will increase dose to 112 mcg, will recheck TSH in 6 weeks   Lab Results   Component Value Date    TSH 10.900 (H) 09/05/2023       Patient was given instructions and counseling regarding her condition or for health maintenance advice.   Please see specific information pulled into the AVS if appropriate.       I have fully discussed the nature of the medical condition(s) risks, complications, management, safe and proper use of medications.   Encouraged medication compliance and the importance of keeping scheduled follow up appointments with me and any other providers.    Patient instructed to follow up with our office for results on any labs/imaging ordered during this visit.    Home care discussed  All questions answered  Patient verbalizes understanding and agrees to treatment plan.     Follow up: Return in about 6 weeks (around 10/17/2023) for medicare wellness.

## 2023-09-06 ENCOUNTER — HOME CARE VISIT (OUTPATIENT)
Dept: HOME HEALTH SERVICES | Facility: HOME HEALTHCARE | Age: 83
End: 2023-09-06
Payer: MEDICARE

## 2023-09-06 VITALS
OXYGEN SATURATION: 100 % | HEART RATE: 64 BPM | DIASTOLIC BLOOD PRESSURE: 62 MMHG | RESPIRATION RATE: 18 BRPM | TEMPERATURE: 97.2 F | SYSTOLIC BLOOD PRESSURE: 110 MMHG

## 2023-09-06 PROCEDURE — G0151 HHCP-SERV OF PT,EA 15 MIN: HCPCS

## 2023-09-06 RX ORDER — DOXYCYCLINE HYCLATE 50 MG/1
324 CAPSULE, GELATIN COATED ORAL
Qty: 30 TABLET | Refills: 2 | Status: SHIPPED | OUTPATIENT
Start: 2023-09-06

## 2023-09-06 NOTE — Clinical Note
This is to notify your office of home PT eval completed on 090623 with PT to continue 2wk3 starting week of 091023 for gait training and HEP instruction for strengthening and balance activities. Any questions please contact me at [phone]. Thanks for the referral!

## 2023-09-06 NOTE — HOME HEALTH
"SUBJECTIVE: \"I've been doing ok since coming home from rehab but using the walker all the time as I don't feel secure.\"    DIAGNOSIS: Iron deficiency anemia secondary to blood loss hospitalized at [facility] [id number] with acute GI bleed; went to subacute rehab at [facility] after hospital    PAST MEDICAL HX: Obstructive sleep apnea, HTN, Vitamin D deficiency, CKD stage 3, Hyperlipidemia, COPD, Chronic pain, and Hypothyroidism     PRIOR LEVEL OF FUNCTION: independent with cane/rolling walker living by herself; son and his partner live in upstairs apartment; son currently out of town working for foreseeable future; has second son living locally but is dealing with his son (her grandson) recently having surgery/diagnosed with brain cancer; she has good friends helping her mostly right now    SKILLED THERAPY IS NEEDED FOR: generalized strengthening and endurance training recovering from GI bleed with iron deficiency anemia with skilled PT need for gait training with rolling walker progressing to cane as able, HEP training for strength and balance, and patient education for home safety, in order to continue to safely live by herself and improve household mobility.    PLAN FOR NEXT VISIT: Continue gait training with rolling walker for safe household mobility and endurance training, review standing leg strength exercises and add balance activities as tolerated, and patient education as needed"

## 2023-09-07 ENCOUNTER — HOME CARE VISIT (OUTPATIENT)
Dept: HOME HEALTH SERVICES | Facility: HOME HEALTHCARE | Age: 83
End: 2023-09-07
Payer: MEDICARE

## 2023-09-07 VITALS
RESPIRATION RATE: 17 BRPM | HEART RATE: 63 BPM | DIASTOLIC BLOOD PRESSURE: 58 MMHG | OXYGEN SATURATION: 98 % | TEMPERATURE: 97.8 F | SYSTOLIC BLOOD PRESSURE: 114 MMHG

## 2023-09-07 DIAGNOSIS — E03.9 PRIMARY HYPOTHYROIDISM: ICD-10-CM

## 2023-09-07 LAB
HCT VFR BLD AUTO: 30.2 % (ref 34–46.6)
HGB BLD-MCNC: 9.5 G/DL (ref 11.1–15.9)
TSH SERPL DL<=0.005 MIU/L-ACNC: 10.9 UIU/ML (ref 0.45–4.5)

## 2023-09-07 PROCEDURE — G0300 HHS/HOSPICE OF LPN EA 15 MIN: HCPCS

## 2023-09-07 PROCEDURE — G0152 HHCP-SERV OF OT,EA 15 MIN: HCPCS

## 2023-09-07 RX ORDER — LEVOTHYROXINE SODIUM 112 UG/1
112 TABLET ORAL DAILY
Qty: 60 TABLET | Refills: 0 | Status: SHIPPED | OUTPATIENT
Start: 2023-09-07

## 2023-09-07 RX ORDER — LEVOTHYROXINE SODIUM 112 UG/1
112 TABLET ORAL DAILY
Qty: 60 TABLET | Refills: 0 | Status: SHIPPED | OUTPATIENT
Start: 2023-09-07 | End: 2023-09-07 | Stop reason: SDUPTHER

## 2023-09-07 RX ORDER — LEVOTHYROXINE SODIUM 112 UG/1
112 TABLET ORAL DAILY
Qty: 90 TABLET | OUTPATIENT
Start: 2023-09-07

## 2023-09-07 NOTE — Clinical Note
No thank you.    ----- Message -----  From: Reyes Vilchis MD  Sent: 9/7/2023   4:00 PM EDT  To: Dana Max OT      Ok, do you need new order ?      ----- Message -----  From: Dana Max, OT  Sent: 9/7/2023   1:38 PM EDT  To: Eliana Nunes RN, Reyes Vilchis MD, *    OT eval only. She is not in need of skilled OT at this time.

## 2023-09-07 NOTE — HOME HEALTH
"CURRENT SITUATION: Pt had a fall at home, she called EMS, was taken to [facility] and admitted 08-21-23 to 08-26-23 for acute iron deficiency blood loss anemia (Hgb 5.5 on admission per her report) due to acute GI bleed. She was d/c'd to [facility] and now home w/HH SN, PT, OT.  PMHx: DELROY, HTN, Vit-D deficiency, CKD-stage 3, HLD, COPD, chronic pain, hypothyroidism.  OT's FOCUS OF CARE: ADL and mobility safety  SUBJECTIVE: \"I still get tired during the day.\" Agreeable to OT evaluation.  SOCIAL & ENVIRONMENTAL SITUATION: Pt lives alone in first floor aprtment w/2 steps to enter, 1-railing. Family live close by and assist prn. Friends/neighbors also help her out prn (grocery shopping, etc.) She no longer drives. She also has a paid CG (every other week) for heavy IADLs like changing bed linens, mopping, heavy laundry.   PATIENT'S GOAL: \"Get back to walking w/my cane.\"  INTERVENTIONS: OT Eval, ADL training, Home Safety, Transfer/Mobility training, Monitor vitals including SPO2 via pulse-oximeter (notifiy MD if resting O2 <90% on room air), Patient education, Falls-risk prevention, Recommendations on AE, DME, AD, environmental adaptations.  ASSESSMENT: Pt is not in need of skilled OT at this time. She is in agreement. Her deficits noted as a result of her current situation/diagnosis are being addressed via PT/SN services.   MEDICAL NECESSITY: OT is not medically needed at this time.   PLAN: OT eval only."

## 2023-09-08 VITALS
HEART RATE: 63 BPM | SYSTOLIC BLOOD PRESSURE: 114 MMHG | OXYGEN SATURATION: 98 % | DIASTOLIC BLOOD PRESSURE: 58 MMHG | TEMPERATURE: 97.8 F | RESPIRATION RATE: 18 BRPM

## 2023-09-08 DIAGNOSIS — Z74.2 HOME HELP NEEDED: Primary | ICD-10-CM

## 2023-09-08 NOTE — HOME HEALTH
SNV for cp assessment and teach on Iron deficiency Anemia r/t chronic blood loss  OT just finished with patient on nurse arrival  Patient deneis any GI bleeding  Chief complaints: Low endurance  Discussed food high in iron

## 2023-09-11 ENCOUNTER — HOME CARE VISIT (OUTPATIENT)
Dept: HOME HEALTH SERVICES | Facility: HOME HEALTHCARE | Age: 83
End: 2023-09-11
Payer: MEDICARE

## 2023-09-11 ENCOUNTER — TELEPHONE (OUTPATIENT)
Dept: FAMILY MEDICINE CLINIC | Facility: CLINIC | Age: 83
End: 2023-09-11

## 2023-09-11 ENCOUNTER — REFERRAL TRIAGE (OUTPATIENT)
Dept: CASE MANAGEMENT | Facility: OTHER | Age: 83
End: 2023-09-11
Payer: MEDICARE

## 2023-09-11 PROCEDURE — G0151 HHCP-SERV OF PT,EA 15 MIN: HCPCS

## 2023-09-11 NOTE — TELEPHONE ENCOUNTER
Caller: KATHERINE- ALLEY NURSE    Relationship: Provider    Best call back number: [phone] [phone]    What is the best time to reach you: ANY    Who are you requesting to speak with (clinical staff, provider,  specific staff member): CLINICAL    Do you know the name of the person who called: NOAH    What was the call regarding: ALLEY IS REQUESTING CLARIFICATION ON HOW THE PATIENT IS TO TAKE torsemide (DEMADEX) 20 MG tablet . PLEASE CALL BACK AND LEAVE MESSAGE IS UNAVAILABLE.    Is it okay if the provider responds through [employer]: NO

## 2023-09-12 ENCOUNTER — HOME CARE VISIT (OUTPATIENT)
Dept: HOME HEALTH SERVICES | Facility: HOME HEALTHCARE | Age: 83
End: 2023-09-12
Payer: MEDICARE

## 2023-09-12 ENCOUNTER — PATIENT OUTREACH (OUTPATIENT)
Dept: CASE MANAGEMENT | Facility: OTHER | Age: 83
End: 2023-09-12
Payer: MEDICARE

## 2023-09-12 PROCEDURE — G0300 HHS/HOSPICE OF LPN EA 15 MIN: HCPCS

## 2023-09-12 NOTE — OUTREACH NOTE
Patient referral received for community resources per request of primary care provider office. MSW outreach to patient unsuccessful and MSW unable to leave voicemail message for patient as her mailbox is full. MSW to continue to attempt outreach to patient.    Nilsa GOMEZ -   Ambulatory Case Management    9/12/2023, 14:08 EDT

## 2023-09-13 ENCOUNTER — HOME CARE VISIT (OUTPATIENT)
Dept: HOME HEALTH SERVICES | Facility: HOME HEALTHCARE | Age: 83
End: 2023-09-13
Payer: MEDICARE

## 2023-09-13 VITALS
TEMPERATURE: 96.4 F | DIASTOLIC BLOOD PRESSURE: 79 MMHG | HEART RATE: 64 BPM | OXYGEN SATURATION: 94 % | RESPIRATION RATE: 17 BRPM | SYSTOLIC BLOOD PRESSURE: 120 MMHG

## 2023-09-13 VITALS
TEMPERATURE: 97.4 F | RESPIRATION RATE: 18 BRPM | HEART RATE: 74 BPM | DIASTOLIC BLOOD PRESSURE: 68 MMHG | OXYGEN SATURATION: 97 % | SYSTOLIC BLOOD PRESSURE: 122 MMHG

## 2023-09-13 VITALS
TEMPERATURE: 98.2 F | OXYGEN SATURATION: 99 % | SYSTOLIC BLOOD PRESSURE: 102 MMHG | DIASTOLIC BLOOD PRESSURE: 62 MMHG | HEART RATE: 68 BPM | RESPIRATION RATE: 20 BRPM

## 2023-09-13 NOTE — HOME HEALTH
Routine Visit Note:    Skill/education provided: Ther ex, gait/transfer tranining    Patient/caregiver response: Well, repeats instructions back to therapist    Plan for next visit: Attempt to practice leaving home    Other pertinent info: None

## 2023-09-13 NOTE — HOME HEALTH
SNV for cp assessment and teach on Iron deficiency Anemia r/t chronic blood loss  Patient stated she was doing her exercise this AM and had to stop d/t back pain  Patient took pain medication and stated pain a little better  Nurse educated on doing few exercises throughout the day. Instead of all at once  Patient agreed

## 2023-09-15 ENCOUNTER — SPECIALTY PHARMACY (OUTPATIENT)
Dept: PHARMACY | Facility: HOSPITAL | Age: 83
End: 2023-09-15
Payer: MEDICARE

## 2023-09-15 ENCOUNTER — HOME CARE VISIT (OUTPATIENT)
Dept: HOME HEALTH SERVICES | Facility: HOME HEALTHCARE | Age: 83
End: 2023-09-15
Payer: MEDICARE

## 2023-09-15 PROCEDURE — G0300 HHS/HOSPICE OF LPN EA 15 MIN: HCPCS

## 2023-09-15 NOTE — PROGRESS NOTES
Specialty Pharmacy Refill Coordination Note     Luann is a 83 y.o. female contacted today regarding refills of  Dabigatran specialty medication(s).    Reviewed and verified with patient:       Specialty medication(s) and dose(s) confirmed: yes    Refill Questions      Flowsheet Row Most Recent Value   Changes to allergies? No   Changes to medications? No   New conditions since last clinic visit No   Unplanned office visit, urgent care, ED, or hospital admission in the last 4 weeks  No   How does patient/caregiver feel medication is working? Good   Financial problems or insurance changes  No   If yes, describe changes in insurance or financial issues. N/A   Since the previous refill, were any specialty medication doses or scheduled injections missed or delayed?  No   Does this patient require a clinical escalation to a pharmacist? No            Delivery Questions      Flowsheet Row Most Recent Value   Delivery method Other (Comment)  [shp to the home on 9/18 to arrive 9/19. Address confirmed. [facility].]   Delivery address correct? Yes   Delivery phone number [phone]   Preferred delivery time? Anytime   Number of medications in delivery 1   Medication being filled and delivered Dabigatran   Doses left of specialty medications 9   Is there any medication that is due not being filled? No   Supplies needed? No supplies needed   Cooler needed? No   Do any medications need mixed or dated? No   Copay form of payment Credit card on file   Additional comments N/A   Questions or concerns for the pharmacist? No   Explain any questions or concerns for the pharmacist N/A   Are any medications first time fills? No                   Follow-up: 3 week(s)     Unique Crowe, Pharmacy Technician  Specialty Pharmacy Technician

## 2023-09-17 VITALS
SYSTOLIC BLOOD PRESSURE: 120 MMHG | DIASTOLIC BLOOD PRESSURE: 64 MMHG | TEMPERATURE: 98.7 F | HEART RATE: 71 BPM | RESPIRATION RATE: 18 BRPM | OXYGEN SATURATION: 97 %

## 2023-09-17 NOTE — HOME HEALTH
Routine Visit Note:    Skill/education provided: only took vitals and discussed her illness    Patient/caregiver response: Well    Plan for next visit: Gait/transfer training, ther ex    Other pertinent info: Client not feeling well today. Therapist informed nurse of client's status.

## 2023-09-18 NOTE — HOSPICE
SNV for cp assessment and teach on Iron deficiency Anemia r/t chronic blood loss  Patient denies any nasea this visit

## 2023-09-19 ENCOUNTER — HOME CARE VISIT (OUTPATIENT)
Dept: HOME HEALTH SERVICES | Facility: HOME HEALTHCARE | Age: 83
End: 2023-09-19
Payer: MEDICARE

## 2023-09-19 VITALS
DIASTOLIC BLOOD PRESSURE: 60 MMHG | TEMPERATURE: 97.3 F | OXYGEN SATURATION: 96 % | HEART RATE: 66 BPM | SYSTOLIC BLOOD PRESSURE: 112 MMHG | RESPIRATION RATE: 18 BRPM

## 2023-09-19 PROCEDURE — G0151 HHCP-SERV OF PT,EA 15 MIN: HCPCS

## 2023-09-19 PROCEDURE — G0300 HHS/HOSPICE OF LPN EA 15 MIN: HCPCS

## 2023-09-19 NOTE — HOME HEALTH
"Subjective: \"I still get tired on given days but I am getting better, I believe.\"    Falls reported: none    Medication changes: none    Plan for next visit: Continue gait training with rolling walker for endurance training and outdoor gait as tolerated, reinstruct HEP with upgrades for balance as tolerated, and patient education as needed"

## 2023-09-20 VITALS
OXYGEN SATURATION: 99 % | SYSTOLIC BLOOD PRESSURE: 128 MMHG | RESPIRATION RATE: 18 BRPM | HEART RATE: 71 BPM | DIASTOLIC BLOOD PRESSURE: 74 MMHG | TEMPERATURE: 97.4 F

## 2023-09-21 ENCOUNTER — HOME CARE VISIT (OUTPATIENT)
Dept: HOME HEALTH SERVICES | Facility: HOME HEALTHCARE | Age: 83
End: 2023-09-21
Payer: MEDICARE

## 2023-09-21 ENCOUNTER — PATIENT OUTREACH (OUTPATIENT)
Dept: CASE MANAGEMENT | Facility: OTHER | Age: 83
End: 2023-09-21
Payer: MEDICARE

## 2023-09-21 VITALS
TEMPERATURE: 97.5 F | HEART RATE: 75 BPM | RESPIRATION RATE: 18 BRPM | SYSTOLIC BLOOD PRESSURE: 122 MMHG | OXYGEN SATURATION: 100 % | DIASTOLIC BLOOD PRESSURE: 72 MMHG

## 2023-09-21 PROCEDURE — G0151 HHCP-SERV OF PT,EA 15 MIN: HCPCS

## 2023-09-21 NOTE — HOME HEALTH
"Subjective: \"I'm feeling pretty good today. My back is not hurting and I have been doing alot around the house.\"    Falls reported: none    Medication changes: none    Plan for next visit: Continue gait training for endurance training with rolling walker and will promote walking on public sidewalk/parking lot next visit, reinstruct/upgrade HEP as tolerated, and patient education as needed"

## 2023-09-21 NOTE — OUTREACH NOTE
Social Work Assessment  Questions/Answers      Flowsheet Row Most Recent Value   Referral Source physician   Reason for Consult community resources, insurance concerns, other (see comments)  [in home services]   Preferred Language English   Advance Care Planning Reviewed no concerns identified   People in Home alone   Current Living Arrangements apartment   Primary Care Provided by self, homecare agency   Provides Primary Care For no one   Employment Status retired   Source of Income unable to assess   Application for Public Assistance not applied   Usual Activity Tolerance fair   Current Activity Tolerance fair   Meal Preparation independent   Housekeeping assistive person   Laundry assistive person   Shopping assistive person   Major Change/Loss/Stressor loss of independence   Transportation Anticipated family or friend will provide          SDOH updated and reviewed with the patient during this program:  Financial Resource Strain: Low Risk     Difficulty of Paying Living Expenses: Not very hard      Food Insecurity: No Food Insecurity    Worried About Running Out of Food in the Last Year: Never true    Ran Out of Food in the Last Year: Never true      Transportation Needs: No Transportation Needs    Lack of Transportation (Medical): No    Lack of Transportation (Non-Medical): No      Housing Stability: Low Risk     Unable to Pay for Housing in the Last Year: No    Number of Places Lived in the Last Year: 1    Unstable Housing in the Last Year: No      Continuing Care   [facility]    [address] [id number]    Phone: [phone]    Resource for: Transportation Needs     Patient Outreach    MSW outreach to patient to discuss community resources available to assist patient as requested by primary care providers office. Patient states that she lives alone in a first floor apartment and family is close by to assist her as needed. Friends and neighbors also  willing to assist patient as needed. Patient hired a caregiver to assist her with more difficult housekeeping and laundry tasks and feels this help is sufficient and does not need further assistance. Patient no longer able to drive but patient gets assistance from  her friends and family for rides. Patient and MSW discussed utilizing [employer] for rides to provider appointments if needed. Patient is also considering switching to an in home primary care provider moving forward due to her insurance no longer being in network with [facility] primary care providers. Patient to call back to MSW as needed for further assistance.    Nilsa GOMEZ -   Ambulatory Case Management    9/21/2023, 10:30 EDT

## 2023-09-22 ENCOUNTER — HOME CARE VISIT (OUTPATIENT)
Dept: HOME HEALTH SERVICES | Facility: HOME HEALTHCARE | Age: 83
End: 2023-09-22
Payer: MEDICARE

## 2023-09-22 VITALS
RESPIRATION RATE: 18 BRPM | OXYGEN SATURATION: 100 % | HEART RATE: 78 BPM | SYSTOLIC BLOOD PRESSURE: 150 MMHG | DIASTOLIC BLOOD PRESSURE: 72 MMHG | TEMPERATURE: 98.2 F

## 2023-09-22 PROCEDURE — G0299 HHS/HOSPICE OF RN EA 15 MIN: HCPCS

## 2023-09-22 NOTE — HOME HEALTH
Routine Visit Note:  pt reports intermit nausea x2 weeks, took zofran prior to arrival, denies black or blood stools, educated patient on signs of bleeding from rectum since recent dx of anemia. Pt states that nasuea has increase since Thyroid medication changed last week changed from 100mcg to 112mcg.   All questions answered.   Lungs clear bilat, denies SOA or CP.

## 2023-09-25 NOTE — TELEPHONE ENCOUNTER
Duplicate request for refill losartan/hctz. Already pending to MD Candance Blotter. Thanks, [facility] S/w pt at length about this. I did explain to her that the referral to Dr Lee/Verito GRAY at [facility] was for treatment of her knee and Dr Foreman cannot keep prescribing hydrocodone if they are taking over care for this. She did have an appt today with Verito and states she did not realize Dr oFreman would not be continuing to prescribe this. I advised her to call their office from now on for any questions on the knee pain treatment. Pt states she will call them now to discuss.

## 2023-09-26 ENCOUNTER — HOME CARE VISIT (OUTPATIENT)
Dept: HOME HEALTH SERVICES | Facility: HOME HEALTHCARE | Age: 83
End: 2023-09-26
Payer: MEDICARE

## 2023-09-26 VITALS
OXYGEN SATURATION: 100 % | TEMPERATURE: 97.5 F | HEART RATE: 57 BPM | DIASTOLIC BLOOD PRESSURE: 60 MMHG | SYSTOLIC BLOOD PRESSURE: 126 MMHG | RESPIRATION RATE: 18 BRPM

## 2023-09-26 VITALS
DIASTOLIC BLOOD PRESSURE: 60 MMHG | RESPIRATION RATE: 18 BRPM | SYSTOLIC BLOOD PRESSURE: 122 MMHG | HEART RATE: 57 BPM | OXYGEN SATURATION: 100 % | TEMPERATURE: 97.5 F

## 2023-09-26 PROCEDURE — G0300 HHS/HOSPICE OF LPN EA 15 MIN: HCPCS

## 2023-09-26 PROCEDURE — G0151 HHCP-SERV OF PT,EA 15 MIN: HCPCS

## 2023-09-26 NOTE — HOME HEALTH
"Subjective: \"I'm getting stronger everyday. I saw orthopaedist yesterday for follow up on my back and he gave me a list of exercises I will try.\"    Falls reported: none    Medication changes: none    Plan for next visit:discharge assessment and instructions, final HEP training, final outdoor gait training for safety and endurance, and patient education as needed"

## 2023-09-29 ENCOUNTER — HOME CARE VISIT (OUTPATIENT)
Dept: HOME HEALTH SERVICES | Facility: HOME HEALTHCARE | Age: 83
End: 2023-09-29
Payer: MEDICARE

## 2023-09-29 VITALS
DIASTOLIC BLOOD PRESSURE: 74 MMHG | RESPIRATION RATE: 18 BRPM | SYSTOLIC BLOOD PRESSURE: 124 MMHG | OXYGEN SATURATION: 100 % | HEART RATE: 61 BPM | TEMPERATURE: 97.2 F

## 2023-09-29 VITALS
SYSTOLIC BLOOD PRESSURE: 118 MMHG | OXYGEN SATURATION: 100 % | RESPIRATION RATE: 18 BRPM | DIASTOLIC BLOOD PRESSURE: 62 MMHG | HEART RATE: 79 BPM | TEMPERATURE: 98.2 F

## 2023-09-29 PROCEDURE — G0299 HHS/HOSPICE OF RN EA 15 MIN: HCPCS

## 2023-09-29 PROCEDURE — G0151 HHCP-SERV OF PT,EA 15 MIN: HCPCS

## 2023-09-29 NOTE — HOME HEALTH
Discharge Decision:      Plan for discharge:  Pt DC home with son who lives upstairs, pt is ambulatory without assistance     Barriers to discharge:    Ongoing needs:         Any declines in outcomes: discuss and document reason/Order received to discharge without goals met?

## 2023-09-29 NOTE — Clinical Note
Patient is discharged from home health skilled nursing from [facility]   Thank you   Eliana   [phone]

## 2023-10-09 DIAGNOSIS — Z86.718 HISTORY OF DEEP VENOUS THROMBOSIS (DVT) OF DISTAL VEIN OF LEFT LOWER EXTREMITY: ICD-10-CM

## 2023-10-09 DIAGNOSIS — Z79.01 CURRENT USE OF LONG TERM ANTICOAGULATION: Primary | ICD-10-CM

## 2023-10-09 DIAGNOSIS — D50.9 IRON DEFICIENCY ANEMIA, UNSPECIFIED IRON DEFICIENCY ANEMIA TYPE: ICD-10-CM

## 2023-10-10 ENCOUNTER — LAB (OUTPATIENT)
Dept: LAB | Facility: HOSPITAL | Age: 83
End: 2023-10-10
Payer: MEDICARE

## 2023-10-10 ENCOUNTER — APPOINTMENT (OUTPATIENT)
Dept: ONCOLOGY | Facility: HOSPITAL | Age: 83
End: 2023-10-10
Payer: MEDICARE

## 2023-10-10 DIAGNOSIS — Z86.718 HISTORY OF DEEP VENOUS THROMBOSIS (DVT) OF DISTAL VEIN OF LEFT LOWER EXTREMITY: ICD-10-CM

## 2023-10-10 DIAGNOSIS — D50.9 IRON DEFICIENCY ANEMIA, UNSPECIFIED IRON DEFICIENCY ANEMIA TYPE: ICD-10-CM

## 2023-10-10 DIAGNOSIS — Z79.01 CURRENT USE OF LONG TERM ANTICOAGULATION: ICD-10-CM

## 2023-10-10 LAB
BASOPHILS # BLD AUTO: 0.07 10*3/MM3 (ref 0–0.2)
BASOPHILS NFR BLD AUTO: 1.2 % (ref 0–1.5)
DEPRECATED RDW RBC AUTO: 52.3 FL (ref 37–54)
EOSINOPHIL # BLD AUTO: 0.19 10*3/MM3 (ref 0–0.4)
EOSINOPHIL NFR BLD AUTO: 3.3 % (ref 0.3–6.2)
ERYTHROCYTE [DISTWIDTH] IN BLOOD BY AUTOMATED COUNT: 14.5 % (ref 12.3–15.4)
FERRITIN SERPL-MCNC: 257 NG/ML (ref 13–150)
HCT VFR BLD AUTO: 36.5 % (ref 34–46.6)
HGB BLD-MCNC: 11.3 G/DL (ref 12–15.9)
IMM GRANULOCYTES # BLD AUTO: 0.01 10*3/MM3 (ref 0–0.05)
IMM GRANULOCYTES NFR BLD AUTO: 0.2 % (ref 0–0.5)
IRON 24H UR-MRATE: 50 MCG/DL (ref 37–145)
IRON SATN MFR SERPL: 17 % (ref 20–50)
LYMPHOCYTES # BLD AUTO: 1.81 10*3/MM3 (ref 0.7–3.1)
LYMPHOCYTES NFR BLD AUTO: 31.3 % (ref 19.6–45.3)
MCH RBC QN AUTO: 30.5 PG (ref 26.6–33)
MCHC RBC AUTO-ENTMCNC: 31 G/DL (ref 31.5–35.7)
MCV RBC AUTO: 98.4 FL (ref 79–97)
MONOCYTES # BLD AUTO: 0.63 10*3/MM3 (ref 0.1–0.9)
MONOCYTES NFR BLD AUTO: 10.9 % (ref 5–12)
NEUTROPHILS NFR BLD AUTO: 3.07 10*3/MM3 (ref 1.7–7)
NEUTROPHILS NFR BLD AUTO: 53.1 % (ref 42.7–76)
NRBC BLD AUTO-RTO: 0 /100 WBC (ref 0–0.2)
PLATELET # BLD AUTO: 238 10*3/MM3 (ref 140–450)
PMV BLD AUTO: 10.6 FL (ref 6–12)
RBC # BLD AUTO: 3.71 10*6/MM3 (ref 3.77–5.28)
TIBC SERPL-MCNC: 297 MCG/DL (ref 298–536)
TRANSFERRIN SERPL-MCNC: 212 MG/DL (ref 200–360)
WBC NRBC COR # BLD: 5.78 10*3/MM3 (ref 3.4–10.8)

## 2023-10-10 PROCEDURE — 83540 ASSAY OF IRON: CPT

## 2023-10-10 PROCEDURE — 84466 ASSAY OF TRANSFERRIN: CPT

## 2023-10-10 PROCEDURE — 85025 COMPLETE CBC W/AUTO DIFF WBC: CPT

## 2023-10-10 PROCEDURE — 82728 ASSAY OF FERRITIN: CPT

## 2023-10-12 ENCOUNTER — TELEPHONE (OUTPATIENT)
Dept: ONCOLOGY | Facility: CLINIC | Age: 83
End: 2023-10-12
Payer: MEDICARE

## 2023-10-12 NOTE — TELEPHONE ENCOUNTER
----- Message from Lita Dubon MD sent at 10/11/2023  8:18 AM EDT -----  Please inform the patient that her anemia improved and ask her to continue the Ferrous Gluconate once daily.    Thank you

## 2023-10-13 ENCOUNTER — DOCUMENTATION (OUTPATIENT)
Dept: PHARMACY | Facility: HOSPITAL | Age: 83
End: 2023-10-13
Payer: MEDICARE

## 2023-10-13 NOTE — PROGRESS NOTES
I contacted Ms. Frances to coordinate a refill shipment of pradaxa. She has 30 tablets on-hand so we agreed that I would call back in one week  to coordinate the next refill shipment.     Unique Crowe - Care Coordinator   10/13/2023  09:46 EDT

## 2023-10-20 ENCOUNTER — SPECIALTY PHARMACY (OUTPATIENT)
Dept: PHARMACY | Facility: HOSPITAL | Age: 83
End: 2023-10-20
Payer: MEDICARE

## 2023-10-20 NOTE — PROGRESS NOTES
Specialty Pharmacy Refill Coordination Note     Luann is a 83 y.o. female contacted today regarding refills of  Pradaxa specialty medication(s).    Reviewed and verified with patient:       Specialty medication(s) and dose(s) confirmed: yes    Refill Questions      Flowsheet Row Most Recent Value   Changes to allergies? No   Changes to medications? Yes  [Patient's losartan is on hold due to low blood pressure]   New conditions since last clinic visit No   Unplanned office visit, urgent care, ED, or hospital admission in the last 4 weeks  No   How does patient/caregiver feel medication is working? Good   Financial problems or insurance changes  No   If yes, describe changes in insurance or financial issues. N/A   Since the previous refill, were any specialty medication doses or scheduled injections missed or delayed?  No   Does this patient require a clinical escalation to a pharmacist? No            Delivery Questions      Flowsheet Row Most Recent Value   Delivery method Other (Comment)  [shp to the home on 10/23 to arrive 10/24. Address confirmed. [facility].]   Delivery address correct? Yes   Delivery phone number [phone]   Preferred delivery time? Anytime   Number of medications in delivery 1   Medication(s) being filled and delivered Dabigatran Etexilate Mesylate   Doses left of specialty medications 11   Is there any medication that is due not being filled? No   Supplies needed? No supplies needed   Cooler needed? No   Do any medications need mixed or dated? No   Copay form of payment Credit card on file   Additional comments N/A   Questions or concerns for the pharmacist? No   Explain any questions or concerns for the pharmacist N/A   Are any medications first time fills? No                   Follow-up: 3 week(s)     Unique Crowe, Pharmacy Technician  Specialty Pharmacy Technician

## 2023-10-23 ENCOUNTER — SPECIALTY PHARMACY (OUTPATIENT)
Dept: PHARMACY | Facility: HOSPITAL | Age: 83
End: 2023-10-23
Payer: MEDICARE

## 2023-11-17 RX ORDER — DABIGATRAN ETEXILATE 150 MG/1
150 CAPSULE ORAL 2 TIMES DAILY
Qty: 60 CAPSULE | Refills: 5 | Status: SHIPPED | OUTPATIENT
Start: 2023-11-17

## 2023-11-28 ENCOUNTER — SPECIALTY PHARMACY (OUTPATIENT)
Dept: PHARMACY | Facility: HOSPITAL | Age: 83
End: 2023-11-28
Payer: MEDICARE

## 2023-11-28 NOTE — PROGRESS NOTES
Specialty Pharmacy Refill Coordination Note     Luann is a 83 y.o. female contacted today regarding refills of  Dabigatran specialty medication(s).    Reviewed and verified with patient:       Specialty medication(s) and dose(s) confirmed: yes    Refill Questions      Flowsheet Row Most Recent Value   Changes to allergies? No   Changes to medications? No   New conditions since last clinic visit No   Unplanned office visit, urgent care, ED, or hospital admission in the last 4 weeks  No   How does patient/caregiver feel medication is working? Good   Financial problems or insurance changes  No   If yes, describe changes in insurance or financial issues. N/A   Since the previous refill, were any specialty medication doses or scheduled injections missed or delayed?  No   Does this patient require a clinical escalation to a pharmacist? No            Delivery Questions      Flowsheet Row Most Recent Value   Delivery method Other (Comment)  [shp to the home on 11/29 to arrive 11/30. Address confirmed. [facility].]   Delivery address correct? Yes   Delivery phone number [phone]   Preferred delivery time? Anytime   Number of medications in delivery 1   Medication(s) being filled and delivered Dabigatran Etexilate Mesylate   Doses left of specialty medications 10   Is there any medication that is due not being filled? No   Supplies needed? No supplies needed   Do any medications need mixed or dated? No   Copay form of payment Credit card on file   Additional comments N/A   Questions or concerns for the pharmacist? No   Explain any questions or concerns for the pharmacist N/A   Are any medications first time fills? No                   Follow-up: 3 week(s)     Unique Crowe, Pharmacy Technician  Specialty Pharmacy Technician

## 2023-12-20 ENCOUNTER — SPECIALTY PHARMACY (OUTPATIENT)
Dept: PHARMACY | Facility: HOSPITAL | Age: 83
End: 2023-12-20
Payer: MEDICARE

## 2023-12-20 NOTE — PROGRESS NOTES
Specialty Note- Pradaxa     Call placed to assess adherence and side effects for 6 month toxicity check.  The phone was answered and then hung up.

## 2023-12-22 ENCOUNTER — SPECIALTY PHARMACY (OUTPATIENT)
Dept: PHARMACY | Facility: HOSPITAL | Age: 83
End: 2023-12-22
Payer: MEDICARE

## 2023-12-26 ENCOUNTER — SPECIALTY PHARMACY (OUTPATIENT)
Dept: PHARMACY | Facility: HOSPITAL | Age: 83
End: 2023-12-26
Payer: MEDICARE

## 2023-12-26 NOTE — PROGRESS NOTES
Specialty Pharmacy Refill Coordination Note     Luann is a 83 y.o. female contacted today regarding refills of  Pradaxa specialty medication(s).    Reviewed and verified with patient:       Specialty medication(s) and dose(s) confirmed: yes    Refill Questions      Flowsheet Row Most Recent Value   Changes to allergies? No   Changes to medications? No   New conditions or infections since last clinic visit No   Unplanned office visit, urgent care, ED, or hospital admission in the last 4 weeks  No   How does patient/caregiver feel medication is working? Good   Financial problems or insurance changes  No   If yes, describe changes in insurance or financial issues. N/A   Since the previous refill, were any specialty medication doses or scheduled injections missed or delayed?  No   Does this patient require a clinical escalation to a pharmacist? No            Delivery Questions      Flowsheet Row Most Recent Value   Delivery method Beeline   Delivery address verified with patient/caregiver? Yes   Delivery address Home   Number of medications in delivery 1   Medication(s) being filled and delivered Dabigatran Etexilate Mesylate   Doses left of specialty medications 14   Copay verified? Yes   Copay amount $100   Copay form of payment Credit/debit on file                   Follow-up: 3 week(s)     Unique Crowe, Pharmacy Technician  Specialty Pharmacy Technician

## 2023-12-26 NOTE — PROGRESS NOTES
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    Regimen: Pradaxa 150 mg po bid    Reason for Outreach: Routine medication check-in .       Problem list reviewed by Luisana Braxton RPH on 12/26/2023 at 10:55 AM  Medicines reviewed by Luisana Braxton RPH on 12/26/2023 at 10:55 AM  Allergies reviewed by Luisana Braxton RPH on 12/26/2023 at 10:55 AM     Goals Addressed Today        Specialty Pharmacy General Goal      No development of new clots            Medication Assessment:  Medication Assessment  Follow Up Clinical Assessment  Linked Medication(s) Assessed: Dabigatran Etexilate Mesylate  Therapeutic appropriateness: Appropriate  Medication tolerability: Tolerating with no to minimal ADRs  Medication plan: Continue therapy with normal follow-up  Quality of Life Improvement Scale: 7-Somewhat better  Comments on Quality of Life: No issues reported today- she has company for the holidays at her home  Administration: twice daily.   Patient can self administer medications: yes  Medication Follow-Up Plan: Next clinical assessment  Lab Review: The labs listed below have been reviewed. No dose adjustments are needed for the oral specialty medication(s) based on the labs.    Lab Results   Component Value Date    GLUCOSE 79 08/24/2023    CALCIUM 8.0 (L) 08/24/2023     08/24/2023    K 3.8 08/24/2023    CO2 18.7 (L) 08/24/2023     (H) 08/24/2023    BUN 25 (H) 08/24/2023    CREATININE 1.30 (H) 08/24/2023    EGFRIFAFRI 55 (L) 09/27/2021    EGFRIFNONA 45 (L) 09/27/2021    BCR 19.2 08/24/2023    ANIONGAP 7.3 08/24/2023     Lab Results   Component Value Date    WBC 5.78 10/10/2023    RBC 3.71 (L) 10/10/2023    HGB 11.3 (L) 10/10/2023    HCT 36.5 10/10/2023    MCV 98.4 (H) 10/10/2023    MCH 30.5 10/10/2023    MCHC 31.0 (L) 10/10/2023    RDW 14.5 10/10/2023    RDWSD  52.3 10/10/2023    MPV 10.6 10/10/2023     10/10/2023    NEUTRORELPCT 53.1 10/10/2023    LYMPHORELPCT 31.3 10/10/2023    MONORELPCT 10.9 10/10/2023    EOSRELPCT 3.3 10/10/2023    BASORELPCT 1.2 10/10/2023    AUTOIGPER 0.2 10/10/2023    NEUTROABS 3.07 10/10/2023    LYMPHSABS 1.81 10/10/2023    MONOSABS 0.63 10/10/2023    EOSABS 0.19 10/10/2023    BASOSABS 0.07 10/10/2023    AUTOIGNUM 0.01 10/10/2023    NRBC 0.0 10/10/2023     Drug-drug interactions  Completed medication reconciliation today to assess for drug interactions. Patient denies starting or stopping any medications.    Assessed medication list for interactions, no significant drug interactions noted.   Advised patient to call the clinic if any new medications are started so we can assess for drug-drug interactions.  Drug-food interactions discussed:  none of concern  Vaccines are coordinated by the patient's oncologist and primary care provider.    Allergies  Known allergies and reactions were discussed with the patient. The patient's chart has been reviewed for allergy information and updated as necessary.   Allergies   Allergen Reactions    Ambien [Zolpidem Tartrate] Nausea Only     nausea    Amoxicillin-Pot Clavulanate Nausea Only    Doxycycline Nausea Only    Eliquis [Apixaban] Nausea Only    Levofloxacin Nausea Only    Metronidazole Nausea Only    Naproxen GI Intolerance    Sulfamethoxazole-Trimethoprim Nausea Only    Synthroid [Levothyroxine Sodium] Nausea Only    Zolpidem Nausea Only        Hospitalizations and Urgent Care Visits Since Last Assessment:  Unplanned hospitalizations or inpatient admissions: no  ED Visits: no  Urgent Office Visits: no    Adherence Assessment:  Adherence Questions  Linked Medication(s) Assessed: Dabigatran Etexilate Mesylate  On average, how many doses/injections does the patient miss per month?: 0  What are the identified reasons for non-adherence or missed doses? : no problems identified  What is the estimated  medication adherence level?: %  Based on the patient/caregiver response and refill history, does this patient require an MTP to track adherence improvements?: no    Quality of Life Assessment:  Quality of Life Assessment  Quality of Life Improvement Scale: 7-Somewhat better  Comments on Quality of Life: No issues reported today- she has company for the holidays at her home  -- Quality of Life: 7/10    Financial Assessment:  Medication availability/affordability: Patient has had no issues obtaining medication from pharmacy.    Attestation     I attest the patient was actively involved in and has agreed to the above plan of care.  If the prescribed therapy is at any point deemed not appropriate based on the current or future assessments, a consultation will be initiated with the patient's specialty care provider to determine the best course of action. The revised plan of therapy will be documented along with any required assessments and/or additional patient education provided.       All questions addressed and patient had no additional concerns. Patient has pharmacy contact information.    Name/Credentials Luisana Braxton RPH, STACIA    Telephone encounter    12/26/2023  10:57 EST

## 2024-01-08 ENCOUNTER — APPOINTMENT (OUTPATIENT)
Dept: ONCOLOGY | Facility: HOSPITAL | Age: 84
End: 2024-01-08
Payer: MEDICARE

## 2024-01-18 RX ORDER — PROCHLORPERAZINE MALEATE 10 MG
10 TABLET ORAL EVERY 6 HOURS PRN
Qty: 100 TABLET | Refills: 5 | OUTPATIENT
Start: 2024-01-18

## 2024-01-26 ENCOUNTER — SPECIALTY PHARMACY (OUTPATIENT)
Dept: PHARMACY | Facility: HOSPITAL | Age: 84
End: 2024-01-26
Payer: MEDICARE

## 2024-01-26 NOTE — PROGRESS NOTES
Specialty Pharmacy Refill Coordination Note     Luann is a 83 y.o. female contacted today regarding refills of  Pradaxa specialty medication(s).    Reviewed and verified with patient:       Specialty medication(s) and dose(s) confirmed: yes    Refill Questions      Flowsheet Row Most Recent Value   Changes to allergies? No   Changes to medications? No   New conditions or infections since last clinic visit No   Unplanned office visit, urgent care, ED, or hospital admission in the last 4 weeks  No   How does patient/caregiver feel medication is working? Good   Financial problems or insurance changes  No   If yes, describe changes in insurance or financial issues. N/A   Since the previous refill, were any specialty medication doses or scheduled injections missed or delayed?  No   Does this patient require a clinical escalation to a pharmacist? No            Delivery Questions      Flowsheet Row Most Recent Value   Delivery method Beeline   Delivery address verified with patient/caregiver? Yes   Delivery address Home  [shp to the home on 1/29 to arrive 1/30. Address confirmed. [facility].]   Number of medications in delivery 1   Medication(s) being filled and delivered Dabigatran Etexilate Mesylate   Doses left of specialty medications 16   Copay verified? No   Copay amount $100   Copay form of payment Credit/debit on file                   Follow-up: 3 week(s)     Unique Crowe, Pharmacy Technician  Specialty Pharmacy Technician

## 2024-02-12 ENCOUNTER — APPOINTMENT (OUTPATIENT)
Dept: ONCOLOGY | Facility: HOSPITAL | Age: 84
End: 2024-02-12
Payer: MEDICARE

## 2024-02-23 ENCOUNTER — TELEPHONE (OUTPATIENT)
Dept: ONCOLOGY | Facility: CLINIC | Age: 84
End: 2024-02-23
Payer: MEDICARE

## 2024-02-23 ENCOUNTER — SPECIALTY PHARMACY (OUTPATIENT)
Dept: PHARMACY | Facility: HOSPITAL | Age: 84
End: 2024-02-23
Payer: MEDICARE

## 2024-02-23 NOTE — TELEPHONE ENCOUNTER
Caller: Luann Frances    Relationship to patient: Self    Best call back number: [phone]     Chief complaint: PT NEEDS TO R/S APPT    Type of visit: LAB, SPECIALTY PHARMACY AND FOLLOW P    Requested date: PT WOULD NEED A MONDAY OR A TUESDAY AFTER 1 PM    If rescheduling, when is the original appointment: 02/12/24    Additional notes: PLEASE CALL THE PT WITH NEW APPT DATE/TIME

## 2024-02-23 NOTE — PROGRESS NOTES
I called patient to schedule delivery of the Padaxa. She has over 1.5 weeks on hand. Due to billing difficulties at [facility], we agreed that it would be better that I called the middle of next week to schedule delivery.     Alma Cheatham  Specialty Pharmacy Technician

## 2024-02-28 ENCOUNTER — SPECIALTY PHARMACY (OUTPATIENT)
Dept: PHARMACY | Facility: HOSPITAL | Age: 84
End: 2024-02-28
Payer: MEDICARE

## 2024-02-28 NOTE — PROGRESS NOTES
Attempted to get a PA for Pradaxa TAMMI via [facility] when the prescription didn't go thru insurance in [facility].  Waiting for response.      Alma Cheatham  Specialty Pharmacy Technician

## 2024-02-28 NOTE — PROGRESS NOTES
Specialty Pharmacy Refill Coordination Note     Luann is a 84 y.o. female contacted today regarding refills of  Pradaxa specialty medication(s).    Reviewed and verified with patient:       Specialty medication(s) and dose(s) confirmed: yes    Refill Questions      Flowsheet Row Most Recent Value   Changes to allergies? No   Changes to medications? No   New conditions or infections since last clinic visit No   Unplanned office visit, urgent care, ED, or hospital admission in the last 4 weeks  No   How does patient/caregiver feel medication is working? Good   Financial problems or insurance changes  No   Since the previous refill, were any specialty medication doses or scheduled injections missed or delayed?  Yes   If yes, please provide the amount 1 dose   Why were doses missed? forgot   Does this patient require a clinical escalation to a pharmacist? No            Delivery Questions      Flowsheet Row Most Recent Value   Delivery method Beeline  [[facility]eline to pt home ship 3/4 deliver 3/5]   Delivery address verified with patient/caregiver? Yes   Delivery address Home   Number of medications in delivery 1   Medication(s) being filled and delivered Dabigatran Etexilate Mesylate   Doses left of specialty medications 9 days   Copay verified? Yes   Copay amount $100   Copay form of payment Credit/debit on file                   Follow-up: 3 week(s)     Alma Cheatham  Specialty Pharmacy Technician

## 2024-02-28 NOTE — PROGRESS NOTES
PA for Pradaxa was approved by [facility].  Prescription was resubmitted in [facility] and co-pay is $100.     Alma Cheatham  Specialty Pharmacy Technician

## 2024-03-26 ENCOUNTER — SPECIALTY PHARMACY (OUTPATIENT)
Dept: ONCOLOGY | Facility: HOSPITAL | Age: 84
End: 2024-03-26
Payer: MEDICARE

## 2024-03-26 ENCOUNTER — OFFICE VISIT (OUTPATIENT)
Dept: ONCOLOGY | Facility: CLINIC | Age: 84
End: 2024-03-26
Payer: MEDICARE

## 2024-03-26 ENCOUNTER — LAB (OUTPATIENT)
Dept: LAB | Facility: HOSPITAL | Age: 84
End: 2024-03-26
Payer: MEDICARE

## 2024-03-26 VITALS
WEIGHT: 192.8 LBS | TEMPERATURE: 97.3 F | BODY MASS INDEX: 34.16 KG/M2 | SYSTOLIC BLOOD PRESSURE: 139 MMHG | HEART RATE: 72 BPM | RESPIRATION RATE: 16 BRPM | DIASTOLIC BLOOD PRESSURE: 76 MMHG | OXYGEN SATURATION: 98 % | HEIGHT: 63 IN

## 2024-03-26 DIAGNOSIS — Z79.01 CHRONIC ANTICOAGULATION: ICD-10-CM

## 2024-03-26 DIAGNOSIS — Z86.718 HISTORY OF DEEP VENOUS THROMBOSIS (DVT) OF DISTAL VEIN OF LEFT LOWER EXTREMITY: ICD-10-CM

## 2024-03-26 DIAGNOSIS — D50.9 IRON DEFICIENCY ANEMIA, UNSPECIFIED IRON DEFICIENCY ANEMIA TYPE: ICD-10-CM

## 2024-03-26 DIAGNOSIS — Z79.01 CURRENT USE OF LONG TERM ANTICOAGULATION: ICD-10-CM

## 2024-03-26 DIAGNOSIS — D52.0 DIETARY FOLATE DEFICIENCY ANEMIA: ICD-10-CM

## 2024-03-26 DIAGNOSIS — I82.412 ACUTE DEEP VEIN THROMBOSIS (DVT) OF FEMORAL VEIN OF LEFT LOWER EXTREMITY: Primary | ICD-10-CM

## 2024-03-26 DIAGNOSIS — D51.8 VITAMIN B12 DEFICIENCY (DIETARY) ANEMIA: ICD-10-CM

## 2024-03-26 DIAGNOSIS — E55.9 VITAMIN D DEFICIENCY: ICD-10-CM

## 2024-03-26 LAB
25(OH)D3 SERPL-MCNC: 50.6 NG/ML (ref 30–100)
ALBUMIN SERPL-MCNC: 4 G/DL (ref 3.5–5.2)
ALBUMIN/GLOB SERPL: 1.2 G/DL
ALP SERPL-CCNC: 73 U/L (ref 39–117)
ALT SERPL W P-5'-P-CCNC: 13 U/L (ref 1–33)
ANION GAP SERPL CALCULATED.3IONS-SCNC: 12.1 MMOL/L (ref 5–15)
AST SERPL-CCNC: 20 U/L (ref 1–32)
BASOPHILS # BLD AUTO: 0.07 10*3/MM3 (ref 0–0.2)
BASOPHILS NFR BLD AUTO: 1.2 % (ref 0–1.5)
BILIRUB SERPL-MCNC: 0.3 MG/DL (ref 0–1.2)
BUN SERPL-MCNC: 30 MG/DL (ref 8–23)
BUN/CREAT SERPL: 21 (ref 7–25)
CALCIUM SPEC-SCNC: 9.8 MG/DL (ref 8.6–10.5)
CHLORIDE SERPL-SCNC: 103 MMOL/L (ref 98–107)
CO2 SERPL-SCNC: 25.9 MMOL/L (ref 22–29)
CREAT SERPL-MCNC: 1.43 MG/DL (ref 0.57–1)
DEPRECATED RDW RBC AUTO: 50.6 FL (ref 37–54)
EGFRCR SERPLBLD CKD-EPI 2021: 36.5 ML/MIN/1.73
EOSINOPHIL # BLD AUTO: 0.16 10*3/MM3 (ref 0–0.4)
EOSINOPHIL NFR BLD AUTO: 2.7 % (ref 0.3–6.2)
ERYTHROCYTE [DISTWIDTH] IN BLOOD BY AUTOMATED COUNT: 14.7 % (ref 12.3–15.4)
FERRITIN SERPL-MCNC: 142 NG/ML (ref 13–150)
FOLATE SERPL-MCNC: >20 NG/ML (ref 4.78–24.2)
GLOBULIN UR ELPH-MCNC: 3.3 GM/DL
GLUCOSE SERPL-MCNC: 96 MG/DL (ref 65–99)
HCT VFR BLD AUTO: 39.5 % (ref 34–46.6)
HGB BLD-MCNC: 12.8 G/DL (ref 12–15.9)
IMM GRANULOCYTES # BLD AUTO: 0.02 10*3/MM3 (ref 0–0.05)
IMM GRANULOCYTES NFR BLD AUTO: 0.3 % (ref 0–0.5)
IRON 24H UR-MRATE: 67 MCG/DL (ref 37–145)
IRON SATN MFR SERPL: 22 % (ref 20–50)
LYMPHOCYTES # BLD AUTO: 1.75 10*3/MM3 (ref 0.7–3.1)
LYMPHOCYTES NFR BLD AUTO: 29.4 % (ref 19.6–45.3)
MCH RBC QN AUTO: 30.2 PG (ref 26.6–33)
MCHC RBC AUTO-ENTMCNC: 32.4 G/DL (ref 31.5–35.7)
MCV RBC AUTO: 93.2 FL (ref 79–97)
MONOCYTES # BLD AUTO: 0.43 10*3/MM3 (ref 0.1–0.9)
MONOCYTES NFR BLD AUTO: 7.2 % (ref 5–12)
NEUTROPHILS NFR BLD AUTO: 3.53 10*3/MM3 (ref 1.7–7)
NEUTROPHILS NFR BLD AUTO: 59.2 % (ref 42.7–76)
NRBC BLD AUTO-RTO: 0 /100 WBC (ref 0–0.2)
PLATELET # BLD AUTO: 250 10*3/MM3 (ref 140–450)
PMV BLD AUTO: 10.4 FL (ref 6–12)
POTASSIUM SERPL-SCNC: 4.3 MMOL/L (ref 3.5–5.2)
PROT SERPL-MCNC: 7.3 G/DL (ref 6–8.5)
RBC # BLD AUTO: 4.24 10*6/MM3 (ref 3.77–5.28)
SODIUM SERPL-SCNC: 141 MMOL/L (ref 136–145)
TIBC SERPL-MCNC: 310 MCG/DL (ref 298–536)
TRANSFERRIN SERPL-MCNC: 208 MG/DL (ref 200–360)
VIT B12 BLD-MCNC: >2000 PG/ML (ref 211–946)
WBC NRBC COR # BLD AUTO: 5.96 10*3/MM3 (ref 3.4–10.8)

## 2024-03-26 PROCEDURE — 99214 OFFICE O/P EST MOD 30 MIN: CPT | Performed by: INTERNAL MEDICINE

## 2024-03-26 PROCEDURE — 83540 ASSAY OF IRON: CPT

## 2024-03-26 PROCEDURE — 3075F SYST BP GE 130 - 139MM HG: CPT | Performed by: INTERNAL MEDICINE

## 2024-03-26 PROCEDURE — 82728 ASSAY OF FERRITIN: CPT

## 2024-03-26 PROCEDURE — 36415 COLL VENOUS BLD VENIPUNCTURE: CPT

## 2024-03-26 PROCEDURE — 1159F MED LIST DOCD IN RCRD: CPT | Performed by: INTERNAL MEDICINE

## 2024-03-26 PROCEDURE — 1126F AMNT PAIN NOTED NONE PRSNT: CPT | Performed by: INTERNAL MEDICINE

## 2024-03-26 PROCEDURE — 3078F DIAST BP <80 MM HG: CPT | Performed by: INTERNAL MEDICINE

## 2024-03-26 PROCEDURE — 84466 ASSAY OF TRANSFERRIN: CPT

## 2024-03-26 PROCEDURE — 1160F RVW MEDS BY RX/DR IN RCRD: CPT | Performed by: INTERNAL MEDICINE

## 2024-03-26 PROCEDURE — 82306 VITAMIN D 25 HYDROXY: CPT | Performed by: INTERNAL MEDICINE

## 2024-03-26 PROCEDURE — 82746 ASSAY OF FOLIC ACID SERUM: CPT | Performed by: INTERNAL MEDICINE

## 2024-03-26 PROCEDURE — 85025 COMPLETE CBC W/AUTO DIFF WBC: CPT

## 2024-03-26 PROCEDURE — 82607 VITAMIN B-12: CPT | Performed by: INTERNAL MEDICINE

## 2024-03-26 PROCEDURE — 80053 COMPREHEN METABOLIC PANEL: CPT

## 2024-03-26 NOTE — PROGRESS NOTES
Subjective     CHIEF COMPLAINT:      Chief Complaint   Patient presents with    Follow-up     Discuss Vitamin B 12/vitamin D 3/Folvite and Fergon      HISTORY OF PRESENT ILLNESS:     Luann Frances is a 84 y.o. female patient who returns today for follow up on lower extremity deep vein thrombosis and anemia.  She is on oral iron, vitamin B12 and folic acid daily.  She takes vitamin D daily.    Patient was hospitalized at [facility] between 8/21/2023 and 8/26/2023.  She had a GI bleed.  She presented to the ER after a fall.  She noticed black stools but she attributed this to her iron supplements.  Hemoglobin decreased to 5.6 on 8/21/2023.  She was seen by GI.  She had EGD and small bowel enteroscopy and was found to have AVMs which were treated.  Pradaxa was initially held.  When hemoglobin became stable, she was restarted on Pradaxa.  No recurrence of the bleeding after starting back on Pradaxa.    Patient reports that the stool color is currently normal.  No blood in the stool.    ROS:  Pertinent ROS is in the HPI.     Past medical, surgical, social and family history were reviewed.     MEDICATIONS:    Current Outpatient Medications:     acetaminophen (TYLENOL) 325 MG tablet, Take 2 tablets by mouth Every 4 (Four) Hours As Needed for Mild Pain ., Disp: , Rfl:     albuterol sulfate  (90 Base) MCG/ACT inhaler, Inhale 2 puffs Every 4 (Four) Hours As Needed for Wheezing or Shortness of Air., Disp: 8 g, Rfl: 1    cholecalciferol (VITAMIN D3) 25 MCG (1000 UT) tablet, Take 2 tablets by mouth Daily. Indications: Vitamin D Deficiency, Disp: , Rfl:     dabigatran etexilate (Pradaxa) 150 MG capsu, Take 1 capsule by mouth 2 (Two) Times a Day., Disp: 60 capsule, Rfl: 5    ferrous gluconate (FERGON) 324 MG tablet, TAKE 1 TABLET BY MOUTH DAILY WITH BREAKFAST, Disp: 30 tablet, Rfl: 2    Fluticasone-Salmeterol (Advair Diskus) 100-50 MCG/ACT DISKUS, Inhale 1 puff 2 (Two) Times a Day., Disp: 180 each, Rfl:  "5    folic acid (FOLVITE) 1 MG tablet, Take 1 tablet by mouth Daily., Disp: 30 tablet, Rfl: 5    levothyroxine (Synthroid) 112 MCG tablet, Take 1 tablet by mouth Daily. (Patient taking differently: Take 150 mcg by mouth Daily. Indications: Underactive Thyroid), Disp: 60 tablet, Rfl: 0    losartan (COZAAR) 50 MG tablet, Take 1 tablet by mouth Daily. Indications: High Blood Pressure Disorder, Disp: , Rfl:     melatonin 5 MG tablet tablet, Take 1 tablet by mouth At Night As Needed (sleep)., Disp: , Rfl:     ondansetron (ZOFRAN) 8 MG tablet, Take 1 tablet by mouth Every 8 (Eight) Hours As Needed for Nausea or Vomiting., Disp: 30 tablet, Rfl: 1    pantoprazole (PROTONIX) 40 MG EC tablet, Take 1 tablet by mouth Daily., Disp: 90 tablet, Rfl: 3    torsemide (DEMADEX) 20 MG tablet, Take 1 tablet by mouth Every Other Day. Indications: Edema, High Blood Pressure Disorder, Disp: , Rfl:     traMADol (ULTRAM) 50 MG tablet, Take 1 tablet by mouth Every 6 (Six) Hours As Needed for Moderate Pain or Severe Pain. Indications: Pain, Disp: , Rfl:     vitamin B-12 (CYANOCOBALAMIN) 1000 MCG tablet, Take 1 tablet by mouth Daily., Disp: , Rfl:   Objective     VITAL SIGNS:     Vitals:    03/26/24 1551   BP: 139/76   Pulse: 72   Resp: 16   Temp: 97.3 °F (36.3 °C)   TempSrc: Temporal   SpO2: 98%   Weight: 87.5 kg (192 lb 12.8 oz)   Height: 160 cm (62.99\")   PainSc: 0-No pain     Body mass index is 34.16 kg/m².     Wt Readings from Last 5 Encounters:   03/26/24 87.5 kg (192 lb 12.8 oz)   09/05/23 87.2 kg (192 lb 4.8 oz)   08/21/23 91.8 kg (202 lb 6.1 oz)   08/19/23 87.1 kg (192 lb)   06/29/23 87.3 kg (192 lb 6.4 oz)     PHYSICAL EXAMINATION:   GENERAL: The patient appears in good general condition, not in acute distress.   SKIN: No ecchymosis.  EYES: No jaundice. No Pallor.  CHEST: Normal respiratory effort.  Lungs clear bilaterally.  No added sounds.  CVS: Normal S1-S2.  No murmurs.  ABDOMEN: Soft. No tenderness. No Hepatomegaly. No " Splenomegaly. No masses.  EXTREMITIES: Trace leg edema.    DIAGNOSTIC DATA:     Results from last 7 days   Lab Units 03/26/24  1523   WBC 10*3/mm3 5.96   NEUTROS ABS 10*3/mm3 3.53   HEMOGLOBIN g/dL 12.8   HEMATOCRIT % 39.5   PLATELETS 10*3/mm3 250     Results from last 7 days   Lab Units 03/26/24  1523   SODIUM mmol/L 141   POTASSIUM mmol/L 4.3   CHLORIDE mmol/L 103   CO2 mmol/L 25.9   BUN mg/dL 30*   CREATININE mg/dL 1.43*   CALCIUM mg/dL 9.8   ALBUMIN g/dL 4.0   BILIRUBIN mg/dL 0.3   ALK PHOS U/L 73   ALT (SGPT) U/L 13   AST (SGOT) U/L 20   GLUCOSE mg/dL 96         Lab 03/26/24  1523   IRON 67   IRON SATURATION (TSAT) 22   TIBC 310   TRANSFERRIN 208   FERRITIN 142.00   FOLATE >20.00   VITAMIN B 12 >2,000*      Component      Latest Ref Rng 3/26/2024   25 Hydroxy, Vitamin D      30.0 - 100.0 ng/ml 50.6      Assessment & Plan    *Acute extensive left lower extremity DVT in a patient with prior history of bilateral lower extremity DVT  Patient developed right lower extremity DVT on 4/1/2021.  Patient developed left DVT in the popliteal, posterior tibial, peroneal and soleal veins on 4/12/2022.  She was on Xarelto and was taking it regularly.  On 8/16/2022, she developed left foot pain and was unable to walk.  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  She was taken to the OR on 8/17/2022.  She underwent thrombectomy and angioplasty with placement of stent in the left iliac vein.  Was initially placed on IV heparin.  Thrombophilia work-up was obtained.  Testing for factor V Leiden and prothrombin mutation was negative.  Protein S was 39%-consumption versus deficiency - will be repeated in the future.  Testing for anticardiolipin, B2 glycoprotein antibodies and lupus anticoagulant was negative.  On 8/20/2022, patient was switched to Pradaxa 150 mg twice daily.  Pradaxa was briefly interrupted during hospitalization for GI bleeding and August 2023.  Patient is otherwise  tolerating Pradaxa.  No leg pain or swelling.  She has follow-up with vascular surgery next month.    *Iron deficiency anemia.    Hemoglobin decreased to 9.4 on 8/19/2022.  IV Ferrlecit 250 mg x 3 was given between 8/19/2022 and 8/21/2022.  Hemoglobin was 10.4 on 9/9/2022.  Iron stores improved with ferritin at 521 and transferrin saturation at 30%.  She was continued on oral iron daily.  Hemoglobin improved to 13.1 on 12/29/2020.  Patient was hospitalized at [facility] between 3/13/2023 and 3/16/2023.  He globin dropped to 6.2.   EGD on 3/14/2023: Schatzki's ring in the lower third of the esophagus, small hiatal hernia, patchy erythema in the stomach, 3 small angiectasia's without bleeding in the third and fourth portion of the duodenum-treated with APC.  Normal proximal jejunum.  Colonoscopy on 3/15/2023 revealed diverticulosis, external hemorrhoids and 2 small polyps.  S/p 2 unit PRBC transfusion.  Oral iron switch to ferrous gluconate daily to decrease nausea.  6/29/2023: Hemoglobin 12.9.  Ferritin 406.  Transferrin saturation 27%.  3/26/2024: Hemoglobin improved to 12.8.  Ferritin 142.  Transferrin saturation 22%.     Vitamin B12 deficiency anemia.  Vitamin B12 was 266 on 9/24/2019.  B12 was 229 on on 8/19/2022.   Vitamin B12 IM was given x3 in August 2022.  She was started on vitamin B12 1000 mcg daily on 9/9/2022.  3/26/2024: Vitamin B12 >2000.     Folate deficiency anemia.  Folate was low at 3.89.  Patient was started on folic acid 1 mg daily.  She is taking folic acid daily.  3/26/2024: Folate >20.     *GI bleeding.  She was hospitalized in August 2023.  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  There were 2 nonbleeding AVMs in the jejunum, treated with APC.  No recurrence of the bleeding after the procedure.  We discussed monitoring the stool color for any signs of recurrent bleeding.    PLAN:     1.  Continue Pradaxa 150 mg twice daily.  2.  Continue ferrous  gluconate 324 mg daily.  3.  Reduce vitamin B12 to once weekly.  4.  Reduce folic acid to once weekly.  5.  Continue vitamin D 2000 units daily.    6.  Repeat CBC ferritin iron panel in 3 months.  She prefers to have the lab done with her PCP.  7.  Follow-up in 6 months.  Will obtain CBC ferritin iron panel vitamin B12 and folate.        Lita Dubon MD  03/26/24

## 2024-03-26 NOTE — PROGRESS NOTES
MTM Encounter-Re: Adherence and side effects (Pradaxa)    Today's encounter was conducted in person, face-to-face.     Medication:  Pradaxa 150 mg po bid  - Reason for outreach: Routine medication check-in .  - Administration: Patient is taking every day at the same time  and twice daily.  - Missed doses: Patient reports missing 2 doses in the last 30 days.  - Self-administration: Patient demonstrates ability to self-administer medication. No barriers to adherence identified.   - Diagnosis/Indication: h/o DVT. Progress toward achieving therapeutic goals reviewed.   - Patient denies side effects.    - Medication availability/affordability: Patient has had no issues obtaining medication from pharmacy.   - Questions/concerns about medications: none       Completed medication reconciliation today to assess for drug interactions.   Reviewed allergies, medical history, labs, quality of life, and medication history with patient.   Patient denies starting or stopping any medications.  Assessed medication list for interactions, no significant drug interactions noted.   Advised pt to call the clinic if any new medications are started so we can assess for drug-drug interactions.     All questions addressed. Patient had no additional concerns for MTM office.     Obdulia Gentile RPH  3/26/2024  16:09 EDT

## 2024-03-27 ENCOUNTER — TELEPHONE (OUTPATIENT)
Dept: ONCOLOGY | Facility: CLINIC | Age: 84
End: 2024-03-27
Payer: MEDICARE

## 2024-03-27 RX ORDER — FOLIC ACID 1 MG/1
1 TABLET ORAL WEEKLY
Start: 2024-03-27

## 2024-03-27 RX ORDER — FERROUS GLUCONATE 324(38)MG
324 TABLET ORAL
Qty: 90 TABLET | Refills: 1 | Status: SHIPPED | OUTPATIENT
Start: 2024-03-27

## 2024-03-27 RX ORDER — LANOLIN ALCOHOL/MO/W.PET/CERES
1000 CREAM (GRAM) TOPICAL WEEKLY
Start: 2024-03-27

## 2024-03-27 NOTE — TELEPHONE ENCOUNTER
----- Message from Lita Dubon MD sent at 3/26/2024  6:15 PM EDT -----  Please inform the patient that her iron and vitamin D levels are adequate.  I recommend continuing the iron and vitamin D daily.    Vitamin B12 became elevated.  I recommend reducing vitamin B12 to once weekly.    Folate level became elevated.  I recommend reducing folic acid to once weekly.    Thank you

## 2024-03-29 ENCOUNTER — SPECIALTY PHARMACY (OUTPATIENT)
Dept: PHARMACY | Facility: HOSPITAL | Age: 84
End: 2024-03-29
Payer: MEDICARE

## 2024-03-29 NOTE — PROGRESS NOTES
Patient disenrolled from [facility] for the Pradaxa.  She was transferred to [facility].  She was put on auto-refill and the medication will be mailed to her each month.     Alma Cheatham  Specialty Pharmacy Technician

## 2024-04-18 PROBLEM — I87.009 POSTPHLEBITIC SYNDROME: Status: ACTIVE | Noted: 2024-04-18

## 2024-05-02 ENCOUNTER — HOSPITAL ENCOUNTER (OUTPATIENT)
Facility: HOSPITAL | Age: 84
Discharge: HOME | End: 2024-05-02
Payer: MEDICARE

## 2024-05-02 ENCOUNTER — OFFICE VISIT (OUTPATIENT)
Age: 84
End: 2024-05-02
Payer: MEDICARE

## 2024-05-02 VITALS
HEART RATE: 64 BPM | DIASTOLIC BLOOD PRESSURE: 70 MMHG | WEIGHT: 188 LBS | BODY MASS INDEX: 33.31 KG/M2 | HEIGHT: 63 IN | SYSTOLIC BLOOD PRESSURE: 133 MMHG

## 2024-05-02 DIAGNOSIS — Z86.718 HISTORY OF DEEP VENOUS THROMBOSIS (DVT) OF DISTAL VEIN OF LEFT LOWER EXTREMITY: Primary | ICD-10-CM

## 2024-05-02 DIAGNOSIS — I82.522 CHRONIC DEEP VEIN THROMBOSIS (DVT) OF ILIAC VEIN OF LEFT LOWER EXTREMITY: ICD-10-CM

## 2024-05-02 DIAGNOSIS — I87.1 COMPRESSION OF VEIN: ICD-10-CM

## 2024-05-02 DIAGNOSIS — I80.202 PHLEGMASIA CERULEA DOLENS OF LEFT LOWER EXTREMITY: ICD-10-CM

## 2024-05-02 DIAGNOSIS — Z95.820 STATUS POST ANGIOPLASTY WITH STENT: ICD-10-CM

## 2024-05-02 DIAGNOSIS — I87.009 POSTPHLEBITIC SYNDROME: ICD-10-CM

## 2024-05-02 DIAGNOSIS — Z86.718 PERSONAL HISTORY OF DVT (DEEP VEIN THROMBOSIS): ICD-10-CM

## 2024-05-02 LAB
BH CV LOW VAS LEFT EXTERNAL ILIAC AUGMENT: NORMAL
BH CV LOW VAS LEFT EXTERNAL ILIAC COMPRESS: NORMAL
BH CV LOWER VASCULAR LEFT COMMON FEMORAL AUGMENT: NORMAL
BH CV LOWER VASCULAR LEFT COMMON FEMORAL COMPETENT: NORMAL
BH CV LOWER VASCULAR LEFT COMMON FEMORAL COMPRESS: NORMAL
BH CV LOWER VASCULAR LEFT COMMON FEMORAL PHASIC: NORMAL
BH CV LOWER VASCULAR LEFT COMMON FEMORAL SPONT: NORMAL
BH CV LOWER VASCULAR LEFT DISTAL FEMORAL COMPRESS: NORMAL
BH CV LOWER VASCULAR LEFT EXTERNAL ILIAC COMPETENT: NORMAL
BH CV LOWER VASCULAR LEFT EXTERNAL ILIAC PHASIC: NORMAL
BH CV LOWER VASCULAR LEFT EXTERNAL ILIAC SPONT: NORMAL
BH CV LOWER VASCULAR LEFT GASTRONEMIUS COMPRESS: NORMAL
BH CV LOWER VASCULAR LEFT GREATER SAPH AK COMPETENT: NORMAL
BH CV LOWER VASCULAR LEFT GREATER SAPH AK COMPRESS: NORMAL
BH CV LOWER VASCULAR LEFT GREATER SAPH BK COMPRESS: NORMAL
BH CV LOWER VASCULAR LEFT LESSER SAPH COMPRESS: NORMAL
BH CV LOWER VASCULAR LEFT MID FEMORAL AUGMENT: NORMAL
BH CV LOWER VASCULAR LEFT MID FEMORAL COMPETENT: NORMAL
BH CV LOWER VASCULAR LEFT MID FEMORAL COMPRESS: NORMAL
BH CV LOWER VASCULAR LEFT PERONEAL COMPRESS: NORMAL
BH CV LOWER VASCULAR LEFT POPLITEAL AUGMENT: NORMAL
BH CV LOWER VASCULAR LEFT POPLITEAL COMPETENT: NORMAL
BH CV LOWER VASCULAR LEFT POPLITEAL COMPRESS: NORMAL
BH CV LOWER VASCULAR LEFT POSTERIOR TIBIAL COMPRESS: NORMAL
BH CV LOWER VASCULAR LEFT PROFUNDA FEMORAL COMPRESS: NORMAL
BH CV LOWER VASCULAR LEFT PROXIMAL FEMORAL COMPETENT: NORMAL
BH CV LOWER VASCULAR LEFT PROXIMAL FEMORAL COMPRESS: NORMAL
BH CV LOWER VASCULAR LEFT SAPHENOFEMORAL JUNCTION COMPETENT: NORMAL
BH CV LOWER VASCULAR LEFT SAPHENOFEMORAL JUNCTION COMPRESS: NORMAL
BH CV LOWER VASCULAR RIGHT COMMON FEMORAL AUGMENT: NORMAL
BH CV LOWER VASCULAR RIGHT COMMON FEMORAL COMPETENT: NORMAL
BH CV LOWER VASCULAR RIGHT COMMON FEMORAL COMPRESS: NORMAL
BH CV LOWER VASCULAR RIGHT COMMON FEMORAL PHASIC: NORMAL
BH CV LOWER VASCULAR RIGHT COMMON FEMORAL SPONT: NORMAL
BH CV VAS ABD AO IVC SPONTANEOUS SCRIPTING: NORMAL
BH CV VAS ABD AO LT COMMON FEM PHASIC SCRIPTING: NORMAL
BH CV VAS ABD AO LT COMMON FEM SPONT SCRIPTING: NORMAL
BH CV VAS ABD AO LT COMMON ILIAC PHASIC SCRIPTING: NORMAL
BH CV VAS ABD AO LT COMMON ILIAC SPONTANEOUS SCRIPTING: NORMAL
BH CV VAS ABD AO LT COMMON ILIAC THROMBUS SCRIPTING: NORMAL
BH CV VAS ABD AO LT EXTERNAL ILIAC PHASIC SCRIPTING: NORMAL
BH CV VAS ABD AO LT EXTERNAL ILIAC SPONT SCRIPTING: NORMAL
BH CV VAS ABD AO RT COM ILIAC PHASIC SCRIPTING: NORMAL
BH CV VAS ABD AO RT COM ILIAC SPONTANEOUS SCRIPTING: NORMAL
BH CV VAS ABD AO RT COMMON FEM PHASIC SCRIPTING: NORMAL
BH CV VAS ABD AO RT COMMON FEM SPONTANEOUS SCRIPTING: NORMAL
BH CV VAS ABD AO RT EXT ILIAC PHASIC SCRIPTING: NORMAL
BH CV VAS ABD AO RT EXT ILIAC SPONTANEOUS SCRIPTING: NORMAL

## 2024-05-02 PROCEDURE — 93979 VASCULAR STUDY: CPT

## 2024-05-02 PROCEDURE — 93971 EXTREMITY STUDY: CPT

## 2024-05-02 NOTE — PROGRESS NOTES
Patient Name: Luann Frances    MRN: [id number] Encounter Date: 05/02/2024      Consulting Service: Vascular Surgery    Referring Provider: Rafa Schwartz MD       CHIEF COMPLAINT:  Chief Complaint   Patient presents with    Deep Vein Thrombosis       Subjective    HPI: Luann Frances is a 84 y.o. 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  She is able to get this on.  She can get some over-the-counter compression on it as well but is having a hard time getting medical grade elastic compression on.  At this point in time her swelling is present but is under decent control.  Her scans today indicate that her iliac stents remain stable and patent although there is some moderate in-stent scar with chronic thrombus that I think requires her to stay on blood thinners long-term.    PAST MEDICAL HISTORY:   Past Medical History:   Diagnosis Date    Acute deep vein thrombosis (DVT) of femoral vein of right lower extremity 04/15/2021    Arthritis     Asymptomatic PVCs     Backache     Chronic bronchitis     CKD (chronic kidney disease)     Stage 3    Deep vein thrombosis (DVT) of right lower extremity     Essential hypertension 01/20/2020    Fracture of humerus     GERD (gastroesophageal reflux disease)     Hyperlipidemia     Hypertension     Hypothyroidism     Localized edema 11/02/2023    Phlebitis and thrombophlebitis of other deep vessels of left lower extremity     Phlebitis and thrombophlebitis of other sites 11/02/2023    Pneumonia     Postphlebitic syndrome without complication 11/02/2023    LLE    Pulmonary emphysema     Renal calculi     Renal calculus 01/06/2021    Sleep apnea     DOES NOT USE CPAP    Thoracic vertebral fracture     Venous insufficiency (chronic) (peripheral) 11/02/2023      PAST SURGICAL HISTORY:   Past Surgical  History:   Procedure Laterality Date    APPENDECTOMY      COLONOSCOPY N/A 03/15/2023    Procedure: COLONOSCOPY to cecum and TI :  hot snare sigmoid polyps with clip to 1 polyp,;  Surgeon: Jaun Ford MD;  Location: [facility] ENDOSCOPY;  Service: Gastroenterology;  Laterality: N/A;  pre:  anemia  post:  diverticulosis, polyps, hemorrhoids    ENDOSCOPY N/A 03/14/2023    Procedure: ESOPHAGOGASTRODUODENOSCOPY WITH ARGON PLASMA COAGULATION AND COLD BIOPSIES;  Surgeon: Jaun Ford MD;  Location: [facility] ENDOSCOPY;  Service: Gastroenterology;  Laterality: N/A;  PRE: MELANA; ANEMIA  POST: ANGIO ACTASIA; HIATAL HERNIA; SCHATZKI'S RING    ENDOSCOPY N/A 08/22/2023    Procedure: ESOPHAGOGASTRODUODENOSCOPY;  Surgeon: Johanne Calvo MD;  Location: [facility] ENDOSCOPY;  Service: Gastroenterology;  Laterality: N/A;  anemia  POST:    ENTEROSCOPY SMALL BOWEL N/A 08/22/2023    Procedure: ENTEROSCOPY SMALL BOWEL  cauterization to AVMs jejunem wiht clips x2;  Surgeon: Johanne Calvo MD;  Location: [facility] ENDOSCOPY;  Service: Gastroenterology;  Laterality: N/A;  ANEMIA  POST:  AVMs in the jejunem, schatzky's ring, HH    EXTRACORPOREAL SHOCK WAVE LITHOTRIPSY (ESWL) Left 01/06/2021    Procedure: EXTRACORPOREAL SHOCKWAVE LITHOTRIPSY, CYSTOSCOPY, RETROGRADE PYLEOGRAM;  Surgeon: Walter Schwartz MD;  [place] [facility] [place] [facility]  Service: Urology;  Laterality: Left;    EYE SURGERY      cataracts    HUMERUS FRACTURE SURGERY      HUMERUS SURGERY      KNEE ARTHROPLASTY      LATERALITY  LEFT 4-15    LYTIC THROMBIN THERAPY Left 08/17/2022    Procedure: LT. LEG THROMBECTOMY/EMBOLECTOMY AND ANGIOPLASTY STENT PLACEMENT OF LEFT ILIAC VEIN;  Surgeon: Theo Bustos MD;  Location: [facility] OR 18/19;  Service: Vascular;  Laterality: Left;    LYTIC THROMBIN THERAPY  08/17/2022    LATERALITY  LEFT    RIB FRACTURE SURGERY  2021    THORACOSCOPY Right 02/16/2021    Procedure: bronchoscopy, right video assisted THORACOSCOPY WITH PLATING OF 3, 4,  5, AND 6 RIBS;  Surgeon: Kelley Delong MD;  Location: [facility] OR;  Service: Thoracic;  Laterality: Right;    TOTAL KNEE ARTHROPLASTY Left 04/2015    UMBILICAL HERNIA REPAIR        FAMILY HISTORY:   Family History   Problem Relation Age of Onset    Cancer Mother         breast    Breast cancer Mother     No Known Problems Father     Heart disease Maternal Grandmother     Cancer Brother         esophageal , stomach     Esophageal cancer Brother     No Known Problems Son     Breast cancer Maternal Aunt     Heart disease Maternal Aunt     No Known Problems Son     Malig Hyperthermia Neg Hx       SOCIAL HISTORY:   Social History     Tobacco Use    Smoking status: Every Day     Current packs/day: 0.25     Average packs/day: 0.3 packs/day for 54.3 years (13.6 ttl pk-yrs)     Types: Cigarettes     Start date: 1970    Smokeless tobacco: Never   Vaping Use    Vaping status: Never Used   Substance Use Topics    Alcohol use: Not Currently    Drug use: No      MEDICATIONS:   Current Outpatient Medications on File Prior to Visit   Medication Sig Dispense Refill    acetaminophen (TYLENOL) 325 MG tablet Take 2 tablets by mouth Every 4 (Four) Hours As Needed for Mild Pain .      albuterol sulfate  (90 Base) MCG/ACT inhaler Inhale 2 puffs Every 4 (Four) Hours As Needed for Wheezing or Shortness of Air. 8 g 1    cholecalciferol (VITAMIN D3) 25 MCG (1000 UT) tablet Take 2 tablets by mouth Daily. Indications: Vitamin D Deficiency      dabigatran etexilate (Pradaxa) 150 MG capsu Take 1 capsule by mouth 2 (Two) Times a Day. 60 capsule 5    ferrous gluconate (FERGON) 324 MG tablet Take 1 tablet by mouth Daily With Breakfast. Indications: Anemia From Inadequate Iron in the Body 90 tablet 1    Fluticasone-Salmeterol (Advair Diskus) 100-50 MCG/ACT DISKUS Inhale 1 puff 2 (Two) Times a Day. 180 each 5    folic acid (FOLVITE) 1 MG tablet Take 1 tablet by mouth 1 (One) Time Per Week. Indications: Anemia From Inadequate Folic Acid    "   levothyroxine (Synthroid) 112 MCG tablet Take 1 tablet by mouth Daily. (Patient taking differently: Take 150 mcg by mouth Daily. Indications: Underactive Thyroid) 60 tablet 0    losartan (COZAAR) 50 MG tablet Take 1 tablet by mouth Daily. Indications: High Blood Pressure Disorder      melatonin 5 MG tablet tablet Take 1 tablet by mouth At Night As Needed (sleep).      ondansetron (ZOFRAN) 8 MG tablet Take 1 tablet by mouth Every 8 (Eight) Hours As Needed for Nausea or Vomiting. 30 tablet 1    pantoprazole (PROTONIX) 40 MG EC tablet Take 1 tablet by mouth Daily. 90 tablet 3    torsemide (DEMADEX) 20 MG tablet Take 1 tablet by mouth Every Other Day. Indications: Edema, High Blood Pressure Disorder      traMADol (ULTRAM) 50 MG tablet Take 1 tablet by mouth Every 6 (Six) Hours As Needed for Moderate Pain or Severe Pain. Indications: Pain      vitamin B-12 (CYANOCOBALAMIN) 1000 MCG tablet Take 1 tablet by mouth 1 (One) Time Per Week. Indications: Inadequate Vitamin B12       No current facility-administered medications on file prior to visit.       ALLERGIES: Ambien [zolpidem tartrate], Amoxicillin-pot clavulanate, Doxycycline, Eliquis [apixaban], Levofloxacin, Metronidazole, Naproxen, Sulfamethoxazole-trimethoprim, Synthroid [levothyroxine sodium], and Zolpidem       Objective   Vitals:    05/02/24 1101   BP: 133/70   Pulse: 64   Weight: 85.3 kg (188 lb)   Height: 160 cm (62.99\")     Body mass index is 33.31 kg/m².  BMI is >= 30 and <35. (Class 1 Obesity). The following options were offered after discussion;: weight loss educational material (shared in after visit summary) and exercise counseling/recommendations      PHYSICAL EXAM:   Physical Exam  Constitutional:       Appearance: Normal appearance.   HENT:      Head: Normocephalic and atraumatic.      Nose: Nose normal.   Eyes:      Extraocular Movements: Extraocular movements intact.      Pupils: Pupils are equal, round, and reactive to light.   Cardiovascular:    "   Rate and Rhythm: Normal rate.      Pulses: Normal pulses.      Heart sounds: Normal heart sounds.   Pulmonary:      Effort: Pulmonary effort is normal.      Breath sounds: Normal breath sounds.   Abdominal:      General: Abdomen is flat. Bowel sounds are normal.      Palpations: Abdomen is soft.   Musculoskeletal:         General: Normal range of motion.      Cervical back: Normal range of motion and neck supple.      Right lower le+ Edema present.      Left lower le+ Edema present.   Skin:     General: Skin is warm and dry.   Neurological:      General: No focal deficit present.      Mental Status: She is alert and oriented to person, place, and time. Mental status is at baseline.   Psychiatric:         Mood and Affect: Mood normal.         Thought Content: Thought content normal.          Result Review   LABS:      Results Review:       I reviewed the patient's new clinical results.    The following radiologic or non-invasive studies have been reviewed by me: Iliac venous stent scan and lower extremity venous scan both reviewed with images reviewed with the patient  No radiology results for the last 30 days.                ASSESSMENT/PLAN:   Diagnoses and all orders for this visit:    1. History of deep venous thrombosis (DVT) of distal vein of left lower extremity (Primary)  -     Duplex Venous Lower Extremity - Left CAR; Future  -     Duplex Aorta IVC Iliac Graft Limited CAR; Future    2. 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 Postphlebitic syndrome  -     Duplex Venous Lower Extremity - Left CAR; Future  -     Duplex Aorta IVC Iliac Graft Limited CAR; Future    4. Compression of vein  -     Duplex Aorta IVC Iliac Graft Limited CAR; Future    5. 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 female with stable chronic thrombus with left  common iliac venous stent that is unchanged and seems to be patent without the need for reintervention.  She is on her Pradaxa and this is keeping her for further problems retrograde iliac venous scan and her left lower extremity venous vein scan looked very good and really she is clear to all the clots below that.  At this point I am very pleased for her.  Her swelling and postphlebitic syndrome is controlled with her CircAid compression and she will continue to push for that.  At this point time I think that the yearly follow-up scans are reasonable and we will set that up.  She will continue her Pradaxa long-term at this seems to be tolerable with her.    I discussed the plan with the patient who is agreeable to the plan of care at this point. Thank you for this consult.   Follow Up  Return in about 1 year (around 5/2/2025).    Theo Bustos MD   05/02/24

## 2024-05-06 NOTE — ASSESSMENT & PLAN NOTE
Patient has had issues with multiple medications causing nausea.  The Eliquis is causing her nausea but she has been taking it regularly morning and evening as instructed.  Adding on Zofran to help with nausea.  If this did not help with symptoms counseled patient she would need to see gastroenterology for further management.   Dr Steven Valdez

## 2024-05-28 RX ORDER — DABIGATRAN ETEXILATE 150 MG/1
150 CAPSULE ORAL 2 TIMES DAILY
Qty: 60 CAPSULE | Refills: 5 | Status: SHIPPED | OUTPATIENT
Start: 2024-05-28

## 2024-05-31 DIAGNOSIS — J43.8 OTHER EMPHYSEMA: ICD-10-CM

## 2024-06-03 RX ORDER — FLUTICASONE PROPIONATE AND SALMETEROL 100; 50 UG/1; UG/1
1 POWDER RESPIRATORY (INHALATION) 2 TIMES DAILY
Qty: 180 EACH | Refills: 3 | Status: SHIPPED | OUTPATIENT
Start: 2024-06-03

## 2024-09-24 ENCOUNTER — TELEPHONE (OUTPATIENT)
Dept: ONCOLOGY | Facility: CLINIC | Age: 84
End: 2024-09-24

## 2024-10-24 ENCOUNTER — OFFICE VISIT (OUTPATIENT)
Dept: ONCOLOGY | Facility: CLINIC | Age: 84
End: 2024-10-24
Payer: MEDICARE

## 2024-10-24 ENCOUNTER — LAB (OUTPATIENT)
Dept: LAB | Facility: HOSPITAL | Age: 84
End: 2024-10-24
Payer: MEDICARE

## 2024-10-24 ENCOUNTER — TELEPHONE (OUTPATIENT)
Dept: ONCOLOGY | Facility: CLINIC | Age: 84
End: 2024-10-24
Payer: MEDICARE

## 2024-10-24 VITALS
BODY MASS INDEX: 35.54 KG/M2 | HEIGHT: 63 IN | HEART RATE: 80 BPM | DIASTOLIC BLOOD PRESSURE: 76 MMHG | WEIGHT: 200.6 LBS | OXYGEN SATURATION: 96 % | SYSTOLIC BLOOD PRESSURE: 132 MMHG

## 2024-10-24 DIAGNOSIS — D52.0 DIETARY FOLATE DEFICIENCY ANEMIA: ICD-10-CM

## 2024-10-24 DIAGNOSIS — I82.412 ACUTE DEEP VEIN THROMBOSIS (DVT) OF FEMORAL VEIN OF LEFT LOWER EXTREMITY: Primary | ICD-10-CM

## 2024-10-24 DIAGNOSIS — I82.412 ACUTE DEEP VEIN THROMBOSIS (DVT) OF FEMORAL VEIN OF LEFT LOWER EXTREMITY: ICD-10-CM

## 2024-10-24 DIAGNOSIS — D51.8 VITAMIN B12 DEFICIENCY (DIETARY) ANEMIA: ICD-10-CM

## 2024-10-24 DIAGNOSIS — D50.9 IRON DEFICIENCY ANEMIA, UNSPECIFIED IRON DEFICIENCY ANEMIA TYPE: ICD-10-CM

## 2024-10-24 DIAGNOSIS — Z79.01 CHRONIC ANTICOAGULATION: ICD-10-CM

## 2024-10-24 LAB
ALBUMIN SERPL-MCNC: 4.1 G/DL (ref 3.5–5.2)
ALBUMIN/GLOB SERPL: 1.2 G/DL
ALP SERPL-CCNC: 64 U/L (ref 39–117)
ALT SERPL W P-5'-P-CCNC: 10 U/L (ref 1–33)
ANION GAP SERPL CALCULATED.3IONS-SCNC: 12 MMOL/L (ref 5–15)
AST SERPL-CCNC: 23 U/L (ref 1–32)
BASOPHILS # BLD AUTO: 0.12 10*3/MM3 (ref 0–0.2)
BASOPHILS NFR BLD AUTO: 1.8 % (ref 0–1.5)
BILIRUB SERPL-MCNC: 0.4 MG/DL (ref 0–1.2)
BUN SERPL-MCNC: 30 MG/DL (ref 8–23)
BUN/CREAT SERPL: 16 (ref 7–25)
CALCIUM SPEC-SCNC: 9.4 MG/DL (ref 8.6–10.5)
CHLORIDE SERPL-SCNC: 103 MMOL/L (ref 98–107)
CO2 SERPL-SCNC: 25 MMOL/L (ref 22–29)
CREAT SERPL-MCNC: 1.88 MG/DL (ref 0.57–1)
DEPRECATED RDW RBC AUTO: 52.9 FL (ref 37–54)
EGFRCR SERPLBLD CKD-EPI 2021: 26.3 ML/MIN/1.73
EOSINOPHIL # BLD AUTO: 0.15 10*3/MM3 (ref 0–0.4)
EOSINOPHIL NFR BLD AUTO: 2.2 % (ref 0.3–6.2)
ERYTHROCYTE [DISTWIDTH] IN BLOOD BY AUTOMATED COUNT: 14.8 % (ref 12.3–15.4)
FERRITIN SERPL-MCNC: 137 NG/ML (ref 13–150)
FOLATE SERPL-MCNC: 14.1 NG/ML (ref 4.78–24.2)
GLOBULIN UR ELPH-MCNC: 3.3 GM/DL
GLUCOSE SERPL-MCNC: 87 MG/DL (ref 65–99)
HCT VFR BLD AUTO: 41.4 % (ref 34–46.6)
HGB BLD-MCNC: 13.1 G/DL (ref 12–15.9)
IMM GRANULOCYTES # BLD AUTO: 0.02 10*3/MM3 (ref 0–0.05)
IMM GRANULOCYTES NFR BLD AUTO: 0.3 % (ref 0–0.5)
IRON 24H UR-MRATE: 83 MCG/DL (ref 37–145)
IRON SATN MFR SERPL: 24 % (ref 20–50)
LYMPHOCYTES # BLD AUTO: 1.86 10*3/MM3 (ref 0.7–3.1)
LYMPHOCYTES NFR BLD AUTO: 27.8 % (ref 19.6–45.3)
MCH RBC QN AUTO: 30.6 PG (ref 26.6–33)
MCHC RBC AUTO-ENTMCNC: 31.6 G/DL (ref 31.5–35.7)
MCV RBC AUTO: 96.7 FL (ref 79–97)
MONOCYTES # BLD AUTO: 0.45 10*3/MM3 (ref 0.1–0.9)
MONOCYTES NFR BLD AUTO: 6.7 % (ref 5–12)
NEUTROPHILS NFR BLD AUTO: 4.1 10*3/MM3 (ref 1.7–7)
NEUTROPHILS NFR BLD AUTO: 61.2 % (ref 42.7–76)
NRBC BLD AUTO-RTO: 0 /100 WBC (ref 0–0.2)
PLATELET # BLD AUTO: 242 10*3/MM3 (ref 140–450)
PMV BLD AUTO: 11.1 FL (ref 6–12)
POTASSIUM SERPL-SCNC: 4.9 MMOL/L (ref 3.5–5.2)
PROT SERPL-MCNC: 7.4 G/DL (ref 6–8.5)
RBC # BLD AUTO: 4.28 10*6/MM3 (ref 3.77–5.28)
SODIUM SERPL-SCNC: 140 MMOL/L (ref 136–145)
TIBC SERPL-MCNC: 346 MCG/DL (ref 298–536)
TRANSFERRIN SERPL-MCNC: 232 MG/DL (ref 200–360)
VIT B12 BLD-MCNC: 1411 PG/ML (ref 211–946)
WBC NRBC COR # BLD AUTO: 6.7 10*3/MM3 (ref 3.4–10.8)

## 2024-10-24 PROCEDURE — 85025 COMPLETE CBC W/AUTO DIFF WBC: CPT

## 2024-10-24 PROCEDURE — 83540 ASSAY OF IRON: CPT

## 2024-10-24 PROCEDURE — 84466 ASSAY OF TRANSFERRIN: CPT

## 2024-10-24 PROCEDURE — 82746 ASSAY OF FOLIC ACID SERUM: CPT | Performed by: INTERNAL MEDICINE

## 2024-10-24 PROCEDURE — 82728 ASSAY OF FERRITIN: CPT

## 2024-10-24 PROCEDURE — 36415 COLL VENOUS BLD VENIPUNCTURE: CPT

## 2024-10-24 PROCEDURE — 82607 VITAMIN B-12: CPT | Performed by: INTERNAL MEDICINE

## 2024-10-24 PROCEDURE — 80053 COMPREHEN METABOLIC PANEL: CPT

## 2024-10-24 NOTE — PROGRESS NOTES
Subjective     CHIEF COMPLAINT:      Chief Complaint   Patient presents with    Follow-up     HISTORY OF PRESENT ILLNESS:     Luann Frances is a 84 y.o. female with the above-mentioned history, who returns to the office today for 6-month follow-up on her lower extremity deep vein thrombosis and anemia.  She continues on oral iron along with weekly B12 and vitamin D.  She also continues on Pradaxa.  She thankfully denies signs or symptoms of bleeding.  She has no signs or symptoms of thrombosis.  She reports she is overall feeling very well with improvement in her energy.      ROS:  Pertinent ROS is in the HPI.     Past medical, surgical, social and family history were reviewed.     MEDICATIONS:    Current Outpatient Medications:     acetaminophen (TYLENOL) 325 MG tablet, Take 2 tablets by mouth Every 4 (Four) Hours As Needed for Mild Pain ., Disp: , Rfl:     albuterol sulfate  (90 Base) MCG/ACT inhaler, Inhale 2 puffs Every 4 (Four) Hours As Needed for Wheezing or Shortness of Air., Disp: 8 g, Rfl: 1    cholecalciferol (VITAMIN D3) 25 MCG (1000 UT) tablet, Take 2 tablets by mouth Daily. Indications: Vitamin D Deficiency, Disp: , Rfl:     dabigatran etexilate (Pradaxa) 150 MG capsu, Take 1 capsule by mouth 2 (Two) Times a Day., Disp: 60 capsule, Rfl: 5    ferrous gluconate (FERGON) 324 MG tablet, Take 1 tablet by mouth Daily With Breakfast. Indications: Anemia From Inadequate Iron in the Body, Disp: 90 tablet, Rfl: 1    Fluticasone-Salmeterol (ADVAIR/WIXELA) 100-50 MCG/ACT DISKUS, INHALE 1 PUFF TWICE DAILY, Disp: 180 each, Rfl: 3    folic acid (FOLVITE) 1 MG tablet, Take 1 tablet by mouth 1 (One) Time Per Week. Indications: Anemia From Inadequate Folic Acid, Disp: , Rfl:     levothyroxine (Synthroid) 112 MCG tablet, Take 1 tablet by mouth Daily. (Patient taking differently: Take 150 mcg by mouth Daily. Indications: Underactive Thyroid), Disp: 60 tablet, Rfl: 0    losartan (COZAAR) 50 MG tablet, Take 1  "tablet by mouth Daily. Indications: High Blood Pressure, Disp: , Rfl:     melatonin 5 MG tablet tablet, Take 1 tablet by mouth At Night As Needed (sleep)., Disp: , Rfl:     ondansetron (ZOFRAN) 8 MG tablet, Take 1 tablet by mouth Every 8 (Eight) Hours As Needed for Nausea or Vomiting., Disp: 30 tablet, Rfl: 1    pantoprazole (PROTONIX) 40 MG EC tablet, Take 1 tablet by mouth Daily., Disp: 90 tablet, Rfl: 3    torsemide (DEMADEX) 20 MG tablet, Take 1 tablet by mouth Every Other Day. Indications: Edema, High Blood Pressure, Disp: , Rfl:     traMADol (ULTRAM) 50 MG tablet, Take 1 tablet by mouth Every 6 (Six) Hours As Needed for Moderate Pain or Severe Pain. Indications: Pain, Disp: , Rfl:     vitamin B-12 (CYANOCOBALAMIN) 1000 MCG tablet, Take 1 tablet by mouth 1 (One) Time Per Week. Indications: Inadequate Vitamin B12, Disp: , Rfl:   Objective     VITAL SIGNS:     Vitals:    10/24/24 1430   BP: 132/76   Pulse: 80   SpO2: 96%   Weight: 91 kg (200 lb 9.6 oz)   Height: 160 cm (62.99\")   PainSc: 0-No pain     Body mass index is 35.54 kg/m².     Wt Readings from Last 5 Encounters:   10/24/24 91 kg (200 lb 9.6 oz)   05/02/24 85.3 kg (188 lb)   03/26/24 87.5 kg (192 lb 12.8 oz)   09/05/23 87.2 kg (192 lb 4.8 oz)   08/21/23 91.8 kg (202 lb 6.1 oz)     PHYSICAL EXAMINATION:   GENERAL: The patient appears in good general condition, not in acute distress.   SKIN: No ecchymosis.  EYES: No jaundice. No Pallor.  CHEST: Normal respiratory effort.  Lungs clear bilaterally.  No added sounds.  CVS: Normal S1-S2.  No murmurs.  ABDOMEN: Soft. No tenderness.   EXTREMITIES: Trace leg edema.    DIAGNOSTIC DATA:     Results from last 7 days   Lab Units 10/24/24  1410   WBC 10*3/mm3 6.70   NEUTROS ABS 10*3/mm3 4.10   HEMOGLOBIN g/dL 13.1   HEMATOCRIT % 41.4   PLATELETS 10*3/mm3 242     Results from last 7 days   Lab Units 10/24/24  1410   SODIUM mmol/L 140   POTASSIUM mmol/L 4.9   CHLORIDE mmol/L 103   CO2 mmol/L 25.0   BUN mg/dL 30* "   CREATININE mg/dL 1.88*   CALCIUM mg/dL 9.4   ALBUMIN g/dL 4.1   BILIRUBIN mg/dL 0.4   ALK PHOS U/L 64   ALT (SGPT) U/L 10   AST (SGOT) U/L 23   GLUCOSE mg/dL 87           Lab 10/24/24  1410   IRON 83   IRON SATURATION (TSAT) 24   TIBC 346   TRANSFERRIN 232   FERRITIN 137.00        Component      Latest Ref Rng 3/26/2024   25 Hydroxy, Vitamin D      30.0 - 100.0 ng/ml 50.6      Assessment & Plan    *Acute extensive left lower extremity DVT in a patient with prior history of bilateral lower extremity DVT  Patient developed right lower extremity DVT on 4/1/2021.  Patient developed left DVT in the popliteal, posterior tibial, peroneal and soleal veins on 4/12/2022.  She was on Xarelto and was taking it regularly.  On 8/16/2022, she developed left foot pain and was unable to walk.  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  She was taken to the OR on 8/17/2022.  She underwent thrombectomy and angioplasty with placement of stent in the left iliac vein.  Was initially placed on IV heparin.  Thrombophilia work-up was obtained.  Testing for factor V Leiden and prothrombin mutation was negative.  Protein S was 39%-consumption versus deficiency - will be repeated in the future.  Testing for anticardiolipin, B2 glycoprotein antibodies and lupus anticoagulant was negative.  On 8/20/2022, patient was switched to Pradaxa 150 mg twice daily.  Pradaxa was briefly interrupted during hospitalization for GI bleeding and August 2023.  She continues on Pradaxa without signs or symptoms of thrombosis, only minimal left lower extremity swelling.  She has no signs or symptoms of bleeding    *Iron deficiency anemia.    Hemoglobin decreased to 9.4 on 8/19/2022.  IV Ferrlecit 250 mg x 3 was given between 8/19/2022 and 8/21/2022.  Hemoglobin was 10.4 on 9/9/2022.  Iron stores improved with ferritin at 521 and transferrin saturation at 30%.  She was continued on oral iron daily.  Hemoglobin  improved to 13.1 on 12/29/2020.  Patient was hospitalized at [facility] between 3/13/2023 and 3/16/2023.  He globin dropped to 6.2.   EGD on 3/14/2023: Schatzki's ring in the lower third of the esophagus, small hiatal hernia, patchy erythema in the stomach, 3 small angiectasia's without bleeding in the third and fourth portion of the duodenum-treated with APC.  Normal proximal jejunum.  Colonoscopy on 3/15/2023 revealed diverticulosis, external hemorrhoids and 2 small polyps.  S/p 2 unit PRBC transfusion.  Oral iron switch to ferrous gluconate daily to decrease nausea.  6/29/2023: Hemoglobin 12.9.  Ferritin 406.  Transferrin saturation 27%.  3/26/2024: Hemoglobin improved to 12.8.  Ferritin 142.  Transferrin saturation 22%.  10/24/2024 hemoglobin normal at 13.1 with ferritin 137, iron saturation 24%.  She will continue oral iron once daily     Vitamin B12 deficiency anemia.  Vitamin B12 was 266 on 9/24/2019.  B12 was 229 on on 8/19/2022.   Vitamin B12 IM was given x3 in August 2022.  She was started on vitamin B12 1000 mcg daily on 9/9/2022.  3/26/2024: Vitamin B12 >2000.  She is now taking B12 once weekly.  A repeat B12 level is pending today     Folate deficiency anemia.  Folate was low at 3.89.  Patient was started on folic acid 1 mg daily.  She is taking folic acid daily.  3/26/2024: Folate >20.     *GI bleeding.  She was hospitalized in August 2023.  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  There were 2 nonbleeding AVMs in the jejunum, treated with APC.  No recurrence of the bleeding after the procedure.  We discussed monitoring the stool color for any signs of recurrent bleeding.  She is without signs or symptoms of bleeding    *Stage 3B chronic renal insufficiency  Followed regularly by Dr. Omar Arce  Creatinine does appear more elevated today at 1.88.  The patient was instructed to increase oral hydration.  Will send today's labs to nephrology    PLAN:    Continue  Pradaxa 150 mg twice daily  Continue ferrous gluconate 324 mg daily  Continue vitamin B12 once weekly.  Repeat B12 level is currently pending  Continue folic acid once weekly.  Repeat folate is pending  Continue Vitamin D 2000 units daily  The patient was instructed to increase her oral hydration.  Today's labs were faxed to her nephrologist  MD follow-up in 6 months with CBC, ferritin, iron profile, B12 and folate    Patient continues on high risk medication with anticoagulation    Paola Black, LIVIER  10/24/24

## 2024-10-24 NOTE — TELEPHONE ENCOUNTER
Attempted to call no answer left message.    Per Paola MAIER patient needs to drink more fluids she is a little dehydrated per her labs. Paola MAIER to fax her labs to patient's Nephrologist

## 2024-12-10 RX ORDER — DABIGATRAN ETEXILATE 150 MG/1
150 CAPSULE ORAL 2 TIMES DAILY
Qty: 60 CAPSULE | Refills: 5 | Status: SHIPPED | OUTPATIENT
Start: 2024-12-10

## 2024-12-20 DIAGNOSIS — I87.1 COMPRESSION OF VEIN: ICD-10-CM

## 2024-12-20 DIAGNOSIS — Z86.718 HISTORY OF DEEP VENOUS THROMBOSIS (DVT) OF DISTAL VEIN OF LEFT LOWER EXTREMITY: Primary | ICD-10-CM

## 2024-12-20 DIAGNOSIS — I87.009 POSTPHLEBITIC SYNDROME: ICD-10-CM

## 2025-04-16 ENCOUNTER — TELEPHONE (OUTPATIENT)
Dept: ONCOLOGY | Facility: CLINIC | Age: 85
End: 2025-04-16
Payer: MEDICARE

## 2025-04-16 NOTE — TELEPHONE ENCOUNTER
Patient called to inquire if she needs to hold Pradaxa prior to seeing her dentist. Advised she does not need to hold for a routine cleaning or consultation. Asked her to call when they make a date for any type of dental work other than a cleaning (if needed) and we will give instructions on when to hold. Also advised to make her dentist aware of what she is on. She v/u.

## 2025-05-05 ENCOUNTER — OFFICE VISIT (OUTPATIENT)
Age: 85
End: 2025-05-05
Payer: MEDICARE

## 2025-05-05 ENCOUNTER — HOSPITAL ENCOUNTER (OUTPATIENT)
Facility: HOSPITAL | Age: 85
Discharge: HOME | End: 2025-05-05
Payer: MEDICARE

## 2025-05-05 VITALS
SYSTOLIC BLOOD PRESSURE: 158 MMHG | HEIGHT: 63 IN | RESPIRATION RATE: 16 BRPM | WEIGHT: 190 LBS | HEART RATE: 69 BPM | BODY MASS INDEX: 33.66 KG/M2 | DIASTOLIC BLOOD PRESSURE: 77 MMHG

## 2025-05-05 DIAGNOSIS — I10 ESSENTIAL (PRIMARY) HYPERTENSION: ICD-10-CM

## 2025-05-05 DIAGNOSIS — I87.009 POSTPHLEBITIC SYNDROME: ICD-10-CM

## 2025-05-05 DIAGNOSIS — I82.522 CHRONIC DEEP VEIN THROMBOSIS (DVT) OF ILIAC VEIN OF LEFT LOWER EXTREMITY: Primary | ICD-10-CM

## 2025-05-05 DIAGNOSIS — I87.1 COMPRESSION OF VEIN: ICD-10-CM

## 2025-05-05 DIAGNOSIS — Z86.718 HISTORY OF DEEP VENOUS THROMBOSIS (DVT) OF DISTAL VEIN OF LEFT LOWER EXTREMITY: ICD-10-CM

## 2025-05-05 LAB
BH CV LOW VAS LEFT EXTERNAL ILIAC AUGMENT: NORMAL
BH CV LOW VAS LEFT EXTERNAL ILIAC COMPRESS: NORMAL
BH CV LOWER VASCULAR LEFT COMMON FEMORAL AUGMENT: NORMAL
BH CV LOWER VASCULAR LEFT COMMON FEMORAL COMPETENT: NORMAL
BH CV LOWER VASCULAR LEFT COMMON FEMORAL COMPRESS: NORMAL
BH CV LOWER VASCULAR LEFT COMMON FEMORAL PHASIC: NORMAL
BH CV LOWER VASCULAR LEFT COMMON FEMORAL SPONT: NORMAL
BH CV LOWER VASCULAR LEFT DISTAL FEMORAL COMPRESS: NORMAL
BH CV LOWER VASCULAR LEFT EXTERNAL ILIAC COMPETENT: NORMAL
BH CV LOWER VASCULAR LEFT EXTERNAL ILIAC PHASIC: NORMAL
BH CV LOWER VASCULAR LEFT EXTERNAL ILIAC SPONT: NORMAL
BH CV LOWER VASCULAR LEFT GASTRONEMIUS COMPRESS: NORMAL
BH CV LOWER VASCULAR LEFT GREATER SAPH AK COMPRESS: NORMAL
BH CV LOWER VASCULAR LEFT GREATER SAPH BK COMPRESS: NORMAL
BH CV LOWER VASCULAR LEFT LESSER SAPH COMPRESS: NORMAL
BH CV LOWER VASCULAR LEFT MID FEMORAL AUGMENT: NORMAL
BH CV LOWER VASCULAR LEFT MID FEMORAL COMPETENT: NORMAL
BH CV LOWER VASCULAR LEFT MID FEMORAL COMPRESS: NORMAL
BH CV LOWER VASCULAR LEFT MID FEMORAL PHASIC: NORMAL
BH CV LOWER VASCULAR LEFT MID FEMORAL SPONT: NORMAL
BH CV LOWER VASCULAR LEFT PERONEAL COMPRESS: NORMAL
BH CV LOWER VASCULAR LEFT POPLITEAL AUGMENT: NORMAL
BH CV LOWER VASCULAR LEFT POPLITEAL COMPETENT: NORMAL
BH CV LOWER VASCULAR LEFT POPLITEAL COMPRESS: NORMAL
BH CV LOWER VASCULAR LEFT POPLITEAL PHASIC: NORMAL
BH CV LOWER VASCULAR LEFT POPLITEAL SPONT: NORMAL
BH CV LOWER VASCULAR LEFT POSTERIOR TIBIAL COMPRESS: NORMAL
BH CV LOWER VASCULAR LEFT PROFUNDA FEMORAL COMPRESS: NORMAL
BH CV LOWER VASCULAR LEFT PROXIMAL FEMORAL COMPRESS: NORMAL
BH CV LOWER VASCULAR LEFT SAPHENOFEMORAL JUNCTION COMPETENT: NORMAL
BH CV LOWER VASCULAR LEFT SAPHENOFEMORAL JUNCTION COMPRESS: NORMAL
BH CV LOWER VASCULAR RIGHT COMMON FEMORAL AUGMENT: NORMAL
BH CV LOWER VASCULAR RIGHT COMMON FEMORAL COMPETENT: NORMAL
BH CV LOWER VASCULAR RIGHT COMMON FEMORAL COMPRESS: NORMAL
BH CV LOWER VASCULAR RIGHT COMMON FEMORAL PHASIC: NORMAL
BH CV LOWER VASCULAR RIGHT COMMON FEMORAL SPONT: NORMAL
BH CV VAS ABD AO IVC SPONTANEOUS SCRIPTING: NORMAL
BH CV VAS ABD AO LT COMMON ILIAC PHASIC SCRIPTING: NORMAL
BH CV VAS ABD AO LT COMMON ILIAC SPONTANEOUS SCRIPTING: NORMAL
BH CV VAS ABD AO LT COMMON ILIAC THROMBUS SCRIPTING: NORMAL
BH CV VAS ABD AO LT EXTERNAL ILIAC PHASIC SCRIPTING: NORMAL
BH CV VAS ABD AO LT EXTERNAL ILIAC SPONT SCRIPTING: NORMAL
BH CV VAS ABD AO RT COM ILIAC PHASIC SCRIPTING: NORMAL
BH CV VAS ABD AO RT COM ILIAC SPONTANEOUS SCRIPTING: NORMAL
BH CV VAS ABD AO RT EXT ILIAC PHASIC SCRIPTING: NORMAL
BH CV VAS ABD AO RT EXT ILIAC SPONTANEOUS SCRIPTING: NORMAL

## 2025-05-05 PROCEDURE — 93971 EXTREMITY STUDY: CPT | Performed by: SURGERY

## 2025-05-05 PROCEDURE — 3078F DIAST BP <80 MM HG: CPT | Performed by: NURSE PRACTITIONER

## 2025-05-05 PROCEDURE — 1159F MED LIST DOCD IN RCRD: CPT | Performed by: NURSE PRACTITIONER

## 2025-05-05 PROCEDURE — 93971 EXTREMITY STUDY: CPT

## 2025-05-05 PROCEDURE — 93978 VASCULAR STUDY: CPT | Performed by: SURGERY

## 2025-05-05 PROCEDURE — 99213 OFFICE O/P EST LOW 20 MIN: CPT | Performed by: NURSE PRACTITIONER

## 2025-05-05 PROCEDURE — 93978 VASCULAR STUDY: CPT

## 2025-05-05 PROCEDURE — 3077F SYST BP >= 140 MM HG: CPT | Performed by: NURSE PRACTITIONER

## 2025-05-05 PROCEDURE — 1160F RVW MEDS BY RX/DR IN RCRD: CPT | Performed by: NURSE PRACTITIONER

## 2025-05-05 NOTE — PROGRESS NOTES
Chief Complaint  Deep Vein Thrombosis    Subjective        Luann Frances presents to [facility]ULAR [facility]  HPI   Luann Frances is a 85 y.o. female that has been followed in our office for history of DVT.  [place] [facility], she presented to [facility] for a left lower extremity DVT with early phlegmasia changes.  She underwent a mechanical thrombectomy and a left common iliac venous stent placement.  She returns today in follow up along with a venous duplex and iliac stent scan. She has been doing well without hospitalizations or surgeries. She denies any worsening leg swelling. She is wearing compression stockings.  Review of Systems   Constitutional:  Negative for fever.   Eyes:  Negative for visual disturbance.   Cardiovascular:  Negative for leg swelling.   Gastrointestinal:  Negative for abdominal pain.   Musculoskeletal:  Negative for back pain.   Skin:  Negative for color change, pallor and wound.   Neurological:  Negative for dizziness, facial asymmetry, speech difficulty and weakness.        Luann Frances  reports that she has been smoking cigarettes. She started smoking about 55 years ago. She has a 13.8 pack-year smoking history. She has been exposed to tobacco smoke. She has never used smokeless tobacco..        Objective   Vital Signs:  Vitals:    05/05/25 1041   BP: 158/77   Pulse: 69   Resp: 16      Body mass index is 33.67 kg/m².   Class 2 Severe Obesity (BMI >=35 and <=39.9). Obesity-related health conditions include the following: peripheral vascular disease. Obesity is unchanged. BMI is is above average; BMI management plan is completed. We discussed portion control, increasing exercise, and Information on healthy weight added to patient's after visit summary.         Physical Exam  Vitals reviewed.   Constitutional:       Appearance: Normal appearance.   HENT:      Head: Normocephalic.   Cardiovascular:      Rate and Rhythm: Normal rate and regular  rhythm.      Pulses: Normal pulses.           Dorsalis pedis pulses are 3+ on the right side and 3+ on the left side.        Posterior tibial pulses are 3+ on the right side and 3+ on the left side.   Pulmonary:      Effort: Pulmonary effort is normal.   Skin:     General: Skin is warm.   Neurological:      General: No focal deficit present.      Mental Status: She is alert and oriented to person, place, and time.   Psychiatric:         Mood and Affect: Mood normal.          Result Review :    Duplex Venous Lower Extremity - Left CAR (05/05/2025 10:41)     Duplex Aorta IVC Iliac Graft Complete CAR (05/05/2025 10:41)                  Assessment and Plan     Diagnoses and all orders for this visit:    1. 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 Essential (primary) hypertension  -     Duplex Aorta IVC Iliac Graft Complete CAR; Future             Patient is doing well without any worsening leg swelling.  Today, her venous duplex shows no chronic DVT.  I recommended continued compression stockings daily.  She is to continue their medication regimen which is Pradaxa. She is to follow-up in 1 year along with an iliac vein stent scan.  I recommended that we stop doing venous duplexes as these have both not shown any chronic thrombus.    Follow Up     Return in about 1 year (around 5/5/2026) for aortic duplex.  Patient was given instructions and counseling regarding her condition or for health maintenance advice. Please see specific information pulled into the AVS if appropriate.     LIVIER Martinez

## 2025-05-12 ENCOUNTER — OFFICE VISIT (OUTPATIENT)
Dept: ONCOLOGY | Facility: CLINIC | Age: 85
End: 2025-05-12
Payer: MEDICARE

## 2025-05-12 ENCOUNTER — LAB (OUTPATIENT)
Dept: LAB | Facility: HOSPITAL | Age: 85
End: 2025-05-12
Payer: MEDICARE

## 2025-05-12 VITALS
DIASTOLIC BLOOD PRESSURE: 77 MMHG | HEIGHT: 63 IN | WEIGHT: 189.1 LBS | BODY MASS INDEX: 33.5 KG/M2 | HEART RATE: 79 BPM | OXYGEN SATURATION: 100 % | SYSTOLIC BLOOD PRESSURE: 166 MMHG | TEMPERATURE: 97.6 F

## 2025-05-12 DIAGNOSIS — I82.412 ACUTE DEEP VEIN THROMBOSIS (DVT) OF FEMORAL VEIN OF LEFT LOWER EXTREMITY: ICD-10-CM

## 2025-05-12 DIAGNOSIS — D52.0 DIETARY FOLATE DEFICIENCY ANEMIA: ICD-10-CM

## 2025-05-12 DIAGNOSIS — D51.8 VITAMIN B12 DEFICIENCY (DIETARY) ANEMIA: ICD-10-CM

## 2025-05-12 DIAGNOSIS — Z79.01 CHRONIC ANTICOAGULATION: ICD-10-CM

## 2025-05-12 DIAGNOSIS — I82.412 ACUTE DEEP VEIN THROMBOSIS (DVT) OF FEMORAL VEIN OF LEFT LOWER EXTREMITY: Primary | ICD-10-CM

## 2025-05-12 DIAGNOSIS — D50.9 IRON DEFICIENCY ANEMIA, UNSPECIFIED IRON DEFICIENCY ANEMIA TYPE: ICD-10-CM

## 2025-05-12 LAB
ALBUMIN SERPL-MCNC: 4.1 G/DL (ref 3.5–5.2)
ALBUMIN/GLOB SERPL: 1.4 G/DL
ALP SERPL-CCNC: 66 U/L (ref 39–117)
ALT SERPL W P-5'-P-CCNC: 18 U/L (ref 1–33)
ANION GAP SERPL CALCULATED.3IONS-SCNC: 11.4 MMOL/L (ref 5–15)
AST SERPL-CCNC: 23 U/L (ref 1–32)
BASOPHILS # BLD AUTO: 0.1 10*3/MM3 (ref 0–0.2)
BASOPHILS NFR BLD AUTO: 1.6 % (ref 0–1.5)
BILIRUB SERPL-MCNC: 0.3 MG/DL (ref 0–1.2)
BUN SERPL-MCNC: 26 MG/DL (ref 8–23)
BUN/CREAT SERPL: 16.3 (ref 7–25)
CALCIUM SPEC-SCNC: 9.9 MG/DL (ref 8.6–10.5)
CHLORIDE SERPL-SCNC: 107 MMOL/L (ref 98–107)
CO2 SERPL-SCNC: 25.6 MMOL/L (ref 22–29)
CREAT SERPL-MCNC: 1.6 MG/DL (ref 0.57–1)
DEPRECATED RDW RBC AUTO: 47 FL (ref 37–54)
EGFRCR SERPLBLD CKD-EPI 2021: 31.7 ML/MIN/1.73
EOSINOPHIL # BLD AUTO: 0.29 10*3/MM3 (ref 0–0.4)
EOSINOPHIL NFR BLD AUTO: 4.7 % (ref 0.3–6.2)
ERYTHROCYTE [DISTWIDTH] IN BLOOD BY AUTOMATED COUNT: 13.5 % (ref 12.3–15.4)
FERRITIN SERPL-MCNC: 118 NG/ML (ref 13–150)
GLOBULIN UR ELPH-MCNC: 3 GM/DL
GLUCOSE SERPL-MCNC: 113 MG/DL (ref 65–99)
HCT VFR BLD AUTO: 40.9 % (ref 34–46.6)
HGB BLD-MCNC: 12.7 G/DL (ref 12–15.9)
IMM GRANULOCYTES # BLD AUTO: 0.02 10*3/MM3 (ref 0–0.05)
IMM GRANULOCYTES NFR BLD AUTO: 0.3 % (ref 0–0.5)
IRON 24H UR-MRATE: 101 MCG/DL (ref 37–145)
IRON SATN MFR SERPL: 29 % (ref 20–50)
LYMPHOCYTES # BLD AUTO: 1.7 10*3/MM3 (ref 0.7–3.1)
LYMPHOCYTES NFR BLD AUTO: 27.5 % (ref 19.6–45.3)
MCH RBC QN AUTO: 29.3 PG (ref 26.6–33)
MCHC RBC AUTO-ENTMCNC: 31.1 G/DL (ref 31.5–35.7)
MCV RBC AUTO: 94.5 FL (ref 79–97)
MONOCYTES # BLD AUTO: 0.49 10*3/MM3 (ref 0.1–0.9)
MONOCYTES NFR BLD AUTO: 7.9 % (ref 5–12)
NEUTROPHILS NFR BLD AUTO: 3.58 10*3/MM3 (ref 1.7–7)
NEUTROPHILS NFR BLD AUTO: 58 % (ref 42.7–76)
NRBC BLD AUTO-RTO: 0 /100 WBC (ref 0–0.2)
PLATELET # BLD AUTO: 257 10*3/MM3 (ref 140–450)
PMV BLD AUTO: 10.8 FL (ref 6–12)
POTASSIUM SERPL-SCNC: 4.4 MMOL/L (ref 3.5–5.2)
PROT SERPL-MCNC: 7.1 G/DL (ref 6–8.5)
RBC # BLD AUTO: 4.33 10*6/MM3 (ref 3.77–5.28)
SODIUM SERPL-SCNC: 144 MMOL/L (ref 136–145)
TIBC SERPL-MCNC: 343 MCG/DL (ref 298–536)
TRANSFERRIN SERPL-MCNC: 230 MG/DL (ref 200–360)
VIT B12 BLD-MCNC: 1356 PG/ML (ref 211–946)
WBC NRBC COR # BLD AUTO: 6.18 10*3/MM3 (ref 3.4–10.8)

## 2025-05-12 PROCEDURE — 83540 ASSAY OF IRON: CPT

## 2025-05-12 PROCEDURE — 82607 VITAMIN B-12: CPT | Performed by: NURSE PRACTITIONER

## 2025-05-12 PROCEDURE — 82728 ASSAY OF FERRITIN: CPT

## 2025-05-12 PROCEDURE — 3077F SYST BP >= 140 MM HG: CPT | Performed by: INTERNAL MEDICINE

## 2025-05-12 PROCEDURE — 84466 ASSAY OF TRANSFERRIN: CPT

## 2025-05-12 PROCEDURE — G2211 COMPLEX E/M VISIT ADD ON: HCPCS | Performed by: INTERNAL MEDICINE

## 2025-05-12 PROCEDURE — 1126F AMNT PAIN NOTED NONE PRSNT: CPT | Performed by: INTERNAL MEDICINE

## 2025-05-12 PROCEDURE — 99214 OFFICE O/P EST MOD 30 MIN: CPT | Performed by: INTERNAL MEDICINE

## 2025-05-12 PROCEDURE — 80053 COMPREHEN METABOLIC PANEL: CPT

## 2025-05-12 PROCEDURE — 36415 COLL VENOUS BLD VENIPUNCTURE: CPT

## 2025-05-12 PROCEDURE — 85025 COMPLETE CBC W/AUTO DIFF WBC: CPT

## 2025-05-12 PROCEDURE — 3078F DIAST BP <80 MM HG: CPT | Performed by: INTERNAL MEDICINE

## 2025-05-12 PROCEDURE — 1159F MED LIST DOCD IN RCRD: CPT | Performed by: INTERNAL MEDICINE

## 2025-05-12 PROCEDURE — 1160F RVW MEDS BY RX/DR IN RCRD: CPT | Performed by: INTERNAL MEDICINE

## 2025-05-12 NOTE — PROGRESS NOTES
Subjective     CHIEF COMPLAINT:      Chief Complaint   Patient presents with    Follow-up     Lab review     HISTORY OF PRESENT ILLNESS:     Luann Frances is a 85 y.o. female patient who returns today for follow up on her lower extremity deep vein thrombosis.  She is on anticoagulation with Pradaxa 150 mg twice daily.  She is tolerating it.  No problem with bruising or bleeding.  She wears long stockings for the lower extremities and they have been helping decrease the swelling.  The legs are usually less swollen in the morning and they worsen if she sits for prolonged period of time which she tries to avoid.    ROS:  Pertinent ROS is in the HPI.     Past medical, surgical, social and family history were reviewed.     MEDICATIONS:    Current Outpatient Medications:     acetaminophen (TYLENOL) 325 MG tablet, Take 2 tablets by mouth Every 4 (Four) Hours As Needed for Mild Pain ., Disp: , Rfl:     albuterol sulfate  (90 Base) MCG/ACT inhaler, Inhale 2 puffs Every 4 (Four) Hours As Needed for Wheezing or Shortness of Air., Disp: 8 g, Rfl: 1    cholecalciferol (VITAMIN D3) 25 MCG (1000 UT) tablet, Take 2 tablets by mouth Daily. Indications: Vitamin D Deficiency, Disp: , Rfl:     dabigatran etexilate (Pradaxa) 150 MG capsu, Take 1 capsule by mouth 2 (Two) Times a Day., Disp: 60 capsule, Rfl: 5    ferrous gluconate (FERGON) 324 MG tablet, Take 1 tablet by mouth Daily With Breakfast. Indications: Anemia From Inadequate Iron in the Body, Disp: 90 tablet, Rfl: 1    Fluticasone-Salmeterol (ADVAIR/WIXELA) 100-50 MCG/ACT DISKUS, INHALE 1 PUFF TWICE DAILY, Disp: 180 each, Rfl: 3    folic acid (FOLVITE) 1 MG tablet, Take 1 tablet by mouth 1 (One) Time Per Week. Indications: Anemia From Inadequate Folic Acid, Disp: , Rfl:     levothyroxine (Synthroid) 112 MCG tablet, Take 1 tablet by mouth Daily., Disp: 60 tablet, Rfl: 0    losartan (COZAAR) 50 MG tablet, Take 1 tablet by mouth Daily. Indications: High Blood Pressure,  "Disp: , Rfl:     ondansetron (ZOFRAN) 8 MG tablet, Take 1 tablet by mouth Every 8 (Eight) Hours As Needed for Nausea or Vomiting., Disp: 30 tablet, Rfl: 1    pantoprazole (PROTONIX) 40 MG EC tablet, Take 1 tablet by mouth Daily., Disp: 90 tablet, Rfl: 3    torsemide (DEMADEX) 20 MG tablet, Take 1 tablet by mouth Every Other Day. Indications: Edema, High Blood Pressure, Disp: , Rfl:     traMADol (ULTRAM) 50 MG tablet, Take 1 tablet by mouth Every 6 (Six) Hours As Needed for Moderate Pain or Severe Pain. Indications: Pain, Disp: , Rfl:     vitamin B-12 (CYANOCOBALAMIN) 1000 MCG tablet, Take 1 tablet by mouth 1 (One) Time Per Week. Indications: Inadequate Vitamin B12, Disp: , Rfl:   Objective     VITAL SIGNS:     Vitals:    05/12/25 1443   BP: 166/77   Pulse: 79   Temp: 97.6 °F (36.4 °C)   TempSrc: Oral   SpO2: 100%   Weight: 85.8 kg (189 lb 1.6 oz)   Height: 160 cm (62.99\")   PainSc: 0-No pain     Body mass index is 33.51 kg/m².     Wt Readings from Last 5 Encounters:   05/12/25 85.8 kg (189 lb 1.6 oz)   05/05/25 86.2 kg (190 lb)   10/24/24 91 kg (200 lb 9.6 oz)   05/02/24 85.3 kg (188 lb)   03/26/24 87.5 kg (192 lb 12.8 oz)     PHYSICAL EXAMINATION:   GENERAL: The patient appears in good general condition, not in acute distress.   SKIN: No Ecchymosis.  No petechiae.  EYES: No jaundice. No Pallor.  CHEST: Normal respiratory effort.   CVS: No edema  ABDOMEN: Nondistended.  EXTREMITIES: No size difference between the legs.  No calf tenderness.  No erythema or warmth.    DIAGNOSTIC DATA:     Results from last 7 days   Lab Units 05/12/25  1428   WBC 10*3/mm3 6.18   NEUTROS ABS 10*3/mm3 3.58   HEMOGLOBIN g/dL 12.7   HEMATOCRIT % 40.9   PLATELETS 10*3/mm3 257     Results from last 7 days   Lab Units 05/12/25  1428   SODIUM mmol/L 144   POTASSIUM mmol/L 4.4   CHLORIDE mmol/L 107   CO2 mmol/L 25.6   BUN mg/dL 26*   CREATININE mg/dL 1.60*   CALCIUM mg/dL 9.9   ALBUMIN g/dL 4.1   BILIRUBIN mg/dL 0.3   ALK PHOS U/L 66   ALT " (SGPT) U/L 18   AST (SGOT) U/L 23   GLUCOSE mg/dL 113*         Lab 05/12/25  1428   IRON 101   IRON SATURATION (TSAT) 29   TIBC 343   TRANSFERRIN 230   FERRITIN 118.00      Assessment & Plan    *Acute extensive left lower extremity DVT in a patient with prior history of bilateral lower extremity DVT  Patient developed right lower extremity DVT on 4/1/2021.  Patient developed left DVT in the popliteal, posterior tibial, peroneal and soleal veins on 4/12/2022.  She was on Xarelto and was taking it regularly.  On 8/16/2022, she developed left foot pain and was unable to walk.  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  She was taken to the OR on 8/17/2022.  She underwent thrombectomy and angioplasty with placement of stent in the left iliac vein.  Was initially placed on IV heparin.  Thrombophilia work-up was obtained.  Testing for factor V Leiden and prothrombin mutation was negative.  Protein S was 39%-consumption versus deficiency - will be repeated in the future.  Testing for anticardiolipin, B2 glycoprotein antibodies and lupus anticoagulant was negative.  On 8/20/2022, patient was switched to Pradaxa 150 mg twice daily.  Pradaxa was briefly interrupted during hospitalization for GI bleeding and August 2023.  Venous Doppler on 5/5/2025 revealed no evidence of DVT or superficial thrombophlebitis.  There was valvular insufficiency at the common femoral and popliteal veins.  She is tolerating Pradaxa.  She is wearing long compression stockings regularly.    *Iron deficiency anemia.    Hemoglobin decreased to 9.4 on 8/19/2022.  IV Ferrlecit 250 mg x 3 was given between 8/19/2022 and 8/21/2022.  Hemoglobin was 10.4 on 9/9/2022.  Iron stores improved with ferritin at 521 and transferrin saturation at 30%.  She was continued on oral iron daily.  Hemoglobin improved to 13.1 on 12/29/2020.  Patient was hospitalized at [facility] between 3/13/2023 and 3/16/2023.  He globin dropped  to 6.2.   EGD on 3/14/2023: Schatzki's ring in the lower third of the esophagus, small hiatal hernia, patchy erythema in the stomach, 3 small angiectasia's without bleeding in the third and fourth portion of the duodenum-treated with APC.  Normal proximal jejunum.  Colonoscopy on 3/15/2023 revealed diverticulosis, external hemorrhoids and 2 small polyps.  S/p 2 unit PRBC transfusion.  Oral iron switch to ferrous gluconate daily to decrease nausea.  6/29/2023: Hemoglobin 12.9.  Ferritin 406.  Transferrin saturation 27%.  3/26/2024: Hemoglobin improved to 12.8.  Ferritin 142.  Transferrin saturation 22%.  5/12/2025: Hemoglobin 12.7.  Ferritin 118.  Transferrin saturation 29%.  She is tolerating the oral iron.  No evidence of blood loss.     Vitamin B12 deficiency anemia.  Vitamin B12 was 266 on 9/24/2019.  B12 was 229 on on 8/19/2022.   Vitamin B12 IM was given x3 in August 2022.  She was started on vitamin B12 1000 mcg daily on 9/9/2022.  3/26/2024: Vitamin B12 >2000.  Vitamin B12 was reduced to once weekly.  10/24/2024: Vitamin B12 1411.     Folate deficiency anemia.  Folate was low at 3.89.  Patient was started on folic acid 1 mg daily.  She is taking folic acid daily.  3/26/2024: Folate >20.  Folic acid was reduced to once weekly.  10/24/2024: Folate 14.1.  5/12/2025: RBC folate from today is pending.     *GI bleeding.  She was hospitalized in August 2023.  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  There were 2 nonbleeding AVMs in the jejunum, treated with APC.  No symptoms of recurrence of the GI bleeding.    PLAN:     1.  Continue Pradaxa 150 mg twice daily.  I recommended continuing anticoagulation lifelong.  She is getting the medicine filled through [facility] pharmacy.  2.  Continue ferrous gluconate 324 mg once daily.  3.  Continue vitamin B12 once weekly.  4.  Continue folic acid 1 mg once weekly.  5.  She will continue vitamin D 2000 units  daily.  6.  We will see her in follow-up in 6 months.  CBC CMP ferritin iron panel vitamin B12 folate levels will be obtained.          Lita Dubon MD  05/12/25

## 2025-05-13 LAB
FOLATE BLD-MCNC: 435 NG/ML
FOLATE RBC-MCNC: 1069 NG/ML
HCT VFR BLD AUTO: 40.7 % (ref 34–46.6)

## 2025-07-09 ENCOUNTER — SPECIALTY PHARMACY (OUTPATIENT)
Dept: PHARMACY | Facility: HOSPITAL | Age: 85
End: 2025-07-09
Payer: MEDICARE

## 2025-07-09 RX ORDER — DABIGATRAN ETEXILATE 150 MG/1
150 CAPSULE ORAL 2 TIMES DAILY
Qty: 60 CAPSULE | Refills: 5 | Status: SHIPPED | OUTPATIENT
Start: 2025-07-09

## (undated) DEVICE — CANN O2 ETCO2 FITS ALL CONN CO2 SMPL A/ 7IN DISP LF

## (undated) DEVICE — ADAPT CLN BIOGUARD AIR/H2O DISP

## (undated) DEVICE — PREP SOL DYNA-HEX CHG LIQ 4% BT 4OZ

## (undated) DEVICE — Device: Brand: ZDRIVE™

## (undated) DEVICE — THE SINGLE USE ETRAP – POLYP TRAP IS USED FOR SUCTION RETRIEVAL OF ENDOSCOPICALLY REMOVED POLYPS.: Brand: ETRAP

## (undated) DEVICE — RADIFOCUS GLIDEWIRE ADVANTAGE GUIDEWIRE: Brand: GLIDEWIRE ADVANTAGE

## (undated) DEVICE — PINNACLE R/O II INTRODUCER SHEATH WITH RADIOPAQUE MARKER: Brand: PINNACLE

## (undated) DEVICE — SENSR O2 OXIMAX FNGR A/ 18IN NONSTR

## (undated) DEVICE — KT ORCA ORCAPOD DISP STRL

## (undated) DEVICE — GLV SURG BIOGEL LTX PF 7

## (undated) DEVICE — TUBING, SUCTION, 1/4" X 10', STRAIGHT: Brand: MEDLINE

## (undated) DEVICE — RADIFOCUS GLIDEWIRE: Brand: GLIDEWIRE

## (undated) DEVICE — Device: Brand: DEFENDO AIR/WATER/SUCTION AND BIOPSY VALVE

## (undated) DEVICE — CATH URETRL FLXITP POLLACK STD 5F 70CM

## (undated) DEVICE — CLOTTRIEVER CATHETER, 16 MM, 105 CM: Brand: CLOTTRIEVER CATHETER, 16 MM

## (undated) DEVICE — Device: Brand: D-STAT® DRY SILVER CLEAR TOPICAL HEMOSTAT

## (undated) DEVICE — ST INTRO PERFORMER W/GW J/TP .038IN 14FR

## (undated) DEVICE — PK ANGIO 40

## (undated) DEVICE — INFLATION DEVICE: Brand: ENCORE™ 26

## (undated) DEVICE — EXTENSION SET, MALE LUER LOCK ADAPTER WITH RETRACTABLE COLLAR

## (undated) DEVICE — ST ACC MICROPUNCTURE STFF .018 ECHO/PLDM/TP 4F/10CM 21G/7CM

## (undated) DEVICE — CVR PROB 96IN LF STRL

## (undated) DEVICE — UNIVERSAL PACK: Brand: CARDINAL HEALTH

## (undated) DEVICE — Device: Brand: RIBFIX BLU SYSTEM

## (undated) DEVICE — FRCP BX RADJAW4 NDL 2.8 240CM LG OG BX40

## (undated) DEVICE — BITEBLOCK OMNI BLOC

## (undated) DEVICE — ERBE NESSY®PLATE 170 SPLIT; 168CM²; CABLE 3M: Brand: ERBE

## (undated) DEVICE — LN SMPL CO2 SHTRM SD STREAM W/M LUER

## (undated) DEVICE — LOU CYSTO: Brand: MEDLINE INDUSTRIES, INC.

## (undated) DEVICE — VISUALIZATION SYSTEM: Brand: CLEARIFY

## (undated) DEVICE — PENCL ES MEGADINE EZ/CLEAN BUTN W/HOLSTR 10FT

## (undated) DEVICE — CLOTTRIEVER SHEATH, 16 FR, 15 CM LENGTH: Brand: CLOTTRIEVER SHEATH,  16 FR

## (undated) DEVICE — SUT SILK 0 PSL 18IN 580H

## (undated) DEVICE — PK ATS CUST W CARDIOTOMY RESEVOIR

## (undated) DEVICE — ADHS SKIN SURG TISS VISC PREMIERPRO EXOFIN HI/VISC FAST/DRY

## (undated) DEVICE — FIAPC® PROBE W/ FILTER 2200 A OD 2.3MM/6.9FR; L 2.2M/7.2FT: Brand: ERBE

## (undated) DEVICE — NAVICROSS SUPPORT CATHETER: Brand: NAVICROSS

## (undated) DEVICE — WOUND RETRACTOR AND PROTECTOR: Brand: ALEXIS WOUND PROTECTOR-RETRACTOR

## (undated) DEVICE — ST INTRO PERFORMER W/GW J/TP .038IN 12F

## (undated) DEVICE — GLV SURG BIOGEL LTX PF 7 1/2

## (undated) DEVICE — PATIENT RETURN ELECTRODE, SINGLE-USE, CONTACT QUALITY MONITORING, ADULT, WITH 9FT CORD, FOR PATIENTS WEIGING OVER 33LBS. (15KG): Brand: MEGADYNE

## (undated) DEVICE — ANTIBACTERIAL UNDYED BRAIDED (POLYGLACTIN 910), SYNTHETIC ABSORBABLE SUTURE: Brand: COATED VICRYL

## (undated) DEVICE — 24 FR STRAIGHT – SOFT PVC CATHETER: Brand: PVC THORACIC CATHETERS

## (undated) DEVICE — ELECTRD BLD EZ CLN MOD XLNG 2.75IN

## (undated) DEVICE — MSK PROC CURAPLEX O2 2/ADAPT 7FT

## (undated) DEVICE — SOL NACL 0.9PCT 1000ML

## (undated) DEVICE — GLV SURG BIOGEL LTX PF 6

## (undated) DEVICE — NITINOL WIRE WITH HYDROPHILIC TIP: Brand: SENSOR

## (undated) DEVICE — CATH GUIDE SOFTVU FLUSH HT PIG .035 5F 65CM

## (undated) DEVICE — Device

## (undated) DEVICE — APPL CHLORAPREP HI/LITE 26ML ORNG

## (undated) DEVICE — SYRINGE KIT,PACKAGED,,150FT,MK 7(ANGIO-ARTERION, 150ML SYR KIT W/QFT,MC)(60729385): Brand: MEDRAD® MARK 7 ARTERION DISPOSABLE SYRINGE 150 ML WITH QUICK FILL TUBE

## (undated) DEVICE — NDL HYPO PRECISIONGLIDE REG 20G 1 1/2

## (undated) DEVICE — PTA BALLOON DILATATION CATHETER: Brand: MUSTANG™

## (undated) DEVICE — LOU THORACIC: Brand: MEDLINE INDUSTRIES, INC.

## (undated) DEVICE — SNAR POLYP CAPTIVATOR HEX 27MM 240CM